# Patient Record
Sex: FEMALE | Race: WHITE | NOT HISPANIC OR LATINO | Employment: UNEMPLOYED | URBAN - METROPOLITAN AREA
[De-identification: names, ages, dates, MRNs, and addresses within clinical notes are randomized per-mention and may not be internally consistent; named-entity substitution may affect disease eponyms.]

---

## 2017-01-05 ENCOUNTER — APPOINTMENT (OUTPATIENT)
Dept: GENERAL RADIOLOGY | Facility: HOSPITAL | Age: 82
End: 2017-01-05

## 2017-01-05 ENCOUNTER — HOSPITAL ENCOUNTER (EMERGENCY)
Facility: HOSPITAL | Age: 82
Discharge: HOME | End: 2017-01-05
Attending: EMERGENCY MEDICINE | Admitting: EMERGENCY MEDICINE

## 2017-01-05 VITALS
SYSTOLIC BLOOD PRESSURE: 173 MMHG | BODY MASS INDEX: 31.53 KG/M2 | HEART RATE: 76 BPM | WEIGHT: 167 LBS | TEMPERATURE: 98.3 F | RESPIRATION RATE: 16 BRPM | DIASTOLIC BLOOD PRESSURE: 73 MMHG | HEIGHT: 61 IN | OXYGEN SATURATION: 98 %

## 2017-01-05 DIAGNOSIS — R53.1 GENERALIZED WEAKNESS: Primary | ICD-10-CM

## 2017-01-05 LAB
ALBUMIN SERPL-MCNC: 3.4 G/DL (ref 3.5–5.2)
ALBUMIN/GLOB SERPL: 1.1 G/DL
ALP SERPL-CCNC: 100 U/L (ref 39–117)
ALT SERPL W P-5'-P-CCNC: 16 U/L (ref 1–33)
ANION GAP SERPL CALCULATED.3IONS-SCNC: 13.8 MMOL/L
APTT PPP: 23 SECONDS (ref 22.7–35.4)
AST SERPL-CCNC: 13 U/L (ref 1–32)
BASOPHILS # BLD AUTO: 0.02 10*3/MM3 (ref 0–0.2)
BASOPHILS NFR BLD AUTO: 0.2 % (ref 0–1.5)
BILIRUB SERPL-MCNC: 0.4 MG/DL (ref 0.1–1.2)
BILIRUB UR QL STRIP: NEGATIVE
BUN BLD-MCNC: 37 MG/DL (ref 8–23)
BUN/CREAT SERPL: 22.8 (ref 7–25)
CALCIUM SPEC-SCNC: 9.2 MG/DL (ref 8.2–9.6)
CHLORIDE SERPL-SCNC: 102 MMOL/L (ref 98–107)
CLARITY UR: CLEAR
CO2 SERPL-SCNC: 23.2 MMOL/L (ref 22–29)
COLOR UR: YELLOW
CREAT BLD-MCNC: 1.62 MG/DL (ref 0.57–1)
DEPRECATED RDW RBC AUTO: 44 FL (ref 37–54)
EOSINOPHIL # BLD AUTO: 0.04 10*3/MM3 (ref 0–0.7)
EOSINOPHIL NFR BLD AUTO: 0.4 % (ref 0.3–6.2)
ERYTHROCYTE [DISTWIDTH] IN BLOOD BY AUTOMATED COUNT: 13.5 % (ref 11.7–13)
GFR SERPL CREATININE-BSD FRML MDRD: 30 ML/MIN/1.73
GLOBULIN UR ELPH-MCNC: 3 GM/DL
GLUCOSE BLD-MCNC: 105 MG/DL (ref 65–99)
GLUCOSE UR STRIP-MCNC: NEGATIVE MG/DL
HCT VFR BLD AUTO: 40.7 % (ref 35.6–45.5)
HGB BLD-MCNC: 13.1 G/DL (ref 11.9–15.5)
HGB UR QL STRIP.AUTO: NEGATIVE
IMM GRANULOCYTES # BLD: 0.2 10*3/MM3 (ref 0–0.03)
IMM GRANULOCYTES NFR BLD: 1.8 % (ref 0–0.5)
INR PPP: 1.02 (ref 0.9–1.1)
KETONES UR QL STRIP: NEGATIVE
LEUKOCYTE ESTERASE UR QL STRIP.AUTO: NEGATIVE
LYMPHOCYTES # BLD AUTO: 1.53 10*3/MM3 (ref 0.9–4.8)
LYMPHOCYTES NFR BLD AUTO: 13.5 % (ref 19.6–45.3)
MCH RBC QN AUTO: 28.6 PG (ref 26.9–32)
MCHC RBC AUTO-ENTMCNC: 32.2 G/DL (ref 32.4–36.3)
MCV RBC AUTO: 88.9 FL (ref 80.5–98.2)
MONOCYTES # BLD AUTO: 1.06 10*3/MM3 (ref 0.2–1.2)
MONOCYTES NFR BLD AUTO: 9.4 % (ref 5–12)
NEUTROPHILS # BLD AUTO: 8.48 10*3/MM3 (ref 1.9–8.1)
NEUTROPHILS NFR BLD AUTO: 74.7 % (ref 42.7–76)
NITRITE UR QL STRIP: NEGATIVE
PH UR STRIP.AUTO: <=5 [PH] (ref 5–8)
PLATELET # BLD AUTO: 300 10*3/MM3 (ref 140–500)
PMV BLD AUTO: 9.7 FL (ref 6–12)
POTASSIUM BLD-SCNC: 5.2 MMOL/L (ref 3.5–5.2)
PROT SERPL-MCNC: 6.4 G/DL (ref 6–8.5)
PROT UR QL STRIP: NEGATIVE
PROTHROMBIN TIME: 13 SECONDS (ref 11.7–14.2)
RBC # BLD AUTO: 4.58 10*6/MM3 (ref 3.9–5.2)
SODIUM BLD-SCNC: 139 MMOL/L (ref 136–145)
SP GR UR STRIP: 1.01 (ref 1–1.03)
TROPONIN T SERPL-MCNC: <0.01 NG/ML (ref 0–0.03)
UROBILINOGEN UR QL STRIP: NORMAL
WBC NRBC COR # BLD: 11.33 10*3/MM3 (ref 4.5–10.7)

## 2017-01-05 PROCEDURE — 80053 COMPREHEN METABOLIC PANEL: CPT | Performed by: EMERGENCY MEDICINE

## 2017-01-05 PROCEDURE — 81003 URINALYSIS AUTO W/O SCOPE: CPT | Performed by: EMERGENCY MEDICINE

## 2017-01-05 PROCEDURE — 85730 THROMBOPLASTIN TIME PARTIAL: CPT | Performed by: EMERGENCY MEDICINE

## 2017-01-05 PROCEDURE — 99284 EMERGENCY DEPT VISIT MOD MDM: CPT

## 2017-01-05 PROCEDURE — 93005 ELECTROCARDIOGRAM TRACING: CPT | Performed by: EMERGENCY MEDICINE

## 2017-01-05 PROCEDURE — 85025 COMPLETE CBC W/AUTO DIFF WBC: CPT | Performed by: EMERGENCY MEDICINE

## 2017-01-05 PROCEDURE — 84484 ASSAY OF TROPONIN QUANT: CPT | Performed by: EMERGENCY MEDICINE

## 2017-01-05 PROCEDURE — 85610 PROTHROMBIN TIME: CPT | Performed by: EMERGENCY MEDICINE

## 2017-01-05 PROCEDURE — 96360 HYDRATION IV INFUSION INIT: CPT

## 2017-01-05 PROCEDURE — 71020 HC CHEST PA AND LATERAL: CPT

## 2017-01-05 PROCEDURE — 93010 ELECTROCARDIOGRAM REPORT: CPT | Performed by: INTERNAL MEDICINE

## 2017-01-05 RX ORDER — SODIUM CHLORIDE 0.9 % (FLUSH) 0.9 %
10 SYRINGE (ML) INJECTION AS NEEDED
Status: DISCONTINUED | OUTPATIENT
Start: 2017-01-05 | End: 2017-01-05 | Stop reason: HOSPADM

## 2017-01-05 RX ADMIN — SODIUM CHLORIDE 500 ML: 9 INJECTION, SOLUTION INTRAVENOUS at 05:10

## 2017-01-05 NOTE — ED PROVIDER NOTES
" EMERGENCY DEPARTMENT ENCOUNTER    CHIEF COMPLAINT  Chief Complaint: Generalized Weakness  History given by: Patient  History limited by:   Room Number: 18/18  PMD: April DCarlos Robert MD  Nephrologist: Dr. Keller    HPI:  Pt is a 89 y.o. female who presents complaining of generalized weakness that onset last night. Pt reports being unable to get off toilet after using restroom due to weakness. She has had episodes of weakness in the past which have become more frequent. Pt has been taking Prednisone due to her RA, but recently stopped due to the weakness. She denies any CP, SOA, N/V/D, fever    Duration: 1 day  Onset: sudden  Timing: constant  Location: generalized  Radiation: none  Quality: \"weak\"  Intensity/Severity: moderate  Progression: worsening  Associated Symptoms: none  Aggravating Factors: movement  Alleviating Factors: none  Previous Episodes: previous episodes of weakness  Treatment before arrival: none    PAST MEDICAL HISTORY  Active Ambulatory Problems     Diagnosis Date Noted   • No Active Ambulatory Problems     Resolved Ambulatory Problems     Diagnosis Date Noted   • No Resolved Ambulatory Problems     Past Medical History   Diagnosis Date   • Foot pain, bilateral    • Glaucoma    • Gout    • Hypertension    • Hypothyroid 09/1999   • IBS (irritable bowel syndrome)    • IGT (impaired glucose tolerance)    • Malaise and fatigue    • Medication management    • Melanoma 1979   • OA (osteoarthritis)    • Osteopenia 09/1999   • Psoriasis    • Renal failure    • Rosacea        PAST SURGICAL HISTORY  Past Surgical History   Procedure Laterality Date   • Foot surgery       mauri toe surgery   • Other surgical history       Melanoma Excision: Left leg       FAMILY HISTORY  Family History   Problem Relation Age of Onset   • Heart attack Mother    • Kidney failure Father    • Heart disease Sister    • Leukemia Brother    • Heart disease Sister    • Heart disease Sister        SOCIAL HISTORY  Social History "     Social History   • Marital status:      Spouse name: N/A   • Number of children: N/A   • Years of education: N/A     Occupational History   • Not on file.     Social History Main Topics   • Smoking status: Never Smoker   • Smokeless tobacco: Not on file   • Alcohol use Not on file   • Drug use: Not on file   • Sexual activity: Not on file     Other Topics Concern   • Not on file     Social History Narrative       ALLERGIES  Cefdinir; Doxycycline monohydrate; and Allopurinol    REVIEW OF SYSTEMS  Review of Systems   Constitutional: Negative for fever.   HENT: Negative for sore throat.    Eyes: Negative.    Respiratory: Negative for cough and shortness of breath.    Cardiovascular: Negative for chest pain.   Gastrointestinal: Negative for abdominal pain, diarrhea and vomiting.   Genitourinary: Negative for dysuria.   Musculoskeletal: Negative for neck pain.   Skin: Negative for rash.   Allergic/Immunologic: Negative.    Neurological: Positive for weakness (generalized). Negative for numbness and headaches.   Hematological: Negative.    Psychiatric/Behavioral: Negative.    All other systems reviewed and are negative.      PHYSICAL EXAM  ED Triage Vitals   Temp Heart Rate Resp BP SpO2   01/05/17 0315 01/05/17 0315 01/05/17 0315 01/05/17 0315 01/05/17 0315   97.4 °F (36.3 °C) 80 16 152/87 100 %      Temp src Heart Rate Source Patient Position BP Location FiO2 (%)   01/05/17 0315 -- -- -- --   Tympanic           Physical Exam   Constitutional: She is oriented to person, place, and time and well-developed, well-nourished, and in no distress. No distress.   HENT:   Head: Normocephalic and atraumatic.   Eyes: EOM are normal. Pupils are equal, round, and reactive to light.   Neck: Normal range of motion. Neck supple.   Cardiovascular: Normal rate, regular rhythm and normal heart sounds.    Pulmonary/Chest: Effort normal and breath sounds normal. No respiratory distress.   Abdominal: Soft. There is no tenderness.  There is no rebound and no guarding.   Musculoskeletal: Normal range of motion. She exhibits no edema.   Neurological: She is alert and oriented to person, place, and time. She has normal sensation and normal strength.   Skin: Skin is warm and dry. No rash noted.   Psychiatric: Mood and affect normal.   Nursing note and vitals reviewed.      LAB RESULTS  Lab Results (last 24 hours)     Procedure Component Value Units Date/Time    CBC & Differential [[id number]] Collected:  01/05/17 0413    Specimen:  Blood Updated:  01/05/17 0426    Narrative:       The following orders were created for panel order CBC & Differential.  Procedure                               Abnormality         Status                     ---------                               -----------         ------                     CBC Auto Differential[[id number]]         Abnormal            Final result                 Please view results for these tests on the individual orders.    Comprehensive Metabolic Panel [[id number]]  (Abnormal) Collected:  01/05/17 0413    Specimen:  Blood Updated:  01/05/17 0445     Glucose 105 (H) mg/dL      BUN 37 (H) mg/dL      Creatinine 1.62 (H) mg/dL      Sodium 139 mmol/L      Potassium 5.2 mmol/L      Chloride 102 mmol/L      CO2 23.2 mmol/L      Calcium 9.2 mg/dL      Total Protein 6.4 g/dL      Albumin 3.40 (L) g/dL      ALT (SGPT) 16 U/L      AST (SGOT) 13 U/L      Alkaline Phosphatase 100 U/L      Total Bilirubin 0.4 mg/dL      eGFR Non African Amer 30 (L) mL/min/1.73      Globulin 3.0 gm/dL      A/G Ratio 1.1 g/dL      BUN/Creatinine Ratio 22.8      Anion Gap 13.8 mmol/L     Narrative:       The MDRD GFR formula is only valid for adults with stable renal function between ages 18 and 70.    Protime-INR [[id number]]  (Normal) Collected:  01/05/17 0413    Specimen:  Blood Updated:  01/05/17 0436     Protime 13.0 Seconds      INR 1.02     aPTT [[id number]]  (Normal) Collected:  01/05/17 0413    Specimen:  Blood Updated:  01/05/17  0436     PTT 23.0 seconds     Troponin [[id number]]  (Normal) Collected:  01/05/17 0413    Specimen:  Blood Updated:  01/05/17 0445     Troponin T <0.010 ng/mL     Narrative:       Troponin T Reference Ranges:  Less than 0.03 ng/mL:    Negative for AMI  0.03 to 0.09 ng/mL:      Indeterminant for AMI  Greater than 0.09 ng/mL: Positive for AMI    CBC Auto Differential [[id number]]  (Abnormal) Collected:  01/05/17 0413    Specimen:  Blood Updated:  01/05/17 0426     WBC 11.33 (H) 10*3/mm3      RBC 4.58 10*6/mm3      Hemoglobin 13.1 g/dL      Hematocrit 40.7 %      MCV 88.9 fL      MCH 28.6 pg      MCHC 32.2 (L) g/dL      RDW 13.5 (H) %      RDW-SD 44.0 fl      MPV 9.7 fL      Platelets 300 10*3/mm3      Neutrophil % 74.7 %      Lymphocyte % 13.5 (L) %      Monocyte % 9.4 %      Eosinophil % 0.4 %      Basophil % 0.2 %      Immature Grans % 1.8 (H) %      Neutrophils, Absolute 8.48 (H) 10*3/mm3      Lymphocytes, Absolute 1.53 10*3/mm3      Monocytes, Absolute 1.06 10*3/mm3      Eosinophils, Absolute 0.04 10*3/mm3      Basophils, Absolute 0.02 10*3/mm3      Immature Grans, Absolute 0.20 (H) 10*3/mm3     Urinalysis With / Culture If Indicated [[id number]]  (Normal) Collected:  01/05/17 0437    Specimen:  Urine from Urine, Catheter Updated:  01/05/17 0454     Color, UA Yellow      Appearance, UA Clear      pH, UA <=5.0      Specific Gravity, UA 1.013      Glucose, UA Negative      Ketones, UA Negative      Bilirubin, UA Negative      Blood, UA Negative      Protein, UA Negative      Leuk Esterase, UA Negative      Nitrite, UA Negative      Urobilinogen, UA 0.2 E.U./dL     Narrative:       Urine microscopic not indicated.          I ordered the above labs and reviewed the results    RADIOLOGY  XR Chest 2 View   Preliminary Result   No acute findings.                    I ordered the above noted radiological studies. Interpreted by radiologist. Reviewed by me in PACS.       PROCEDURES  Procedures  EKG           EKG time:  0337  Rhythm/Rate: NSR, 68BPM  P waves and ID: nml  QRS, axis: nml   ST and T waves: nml     Interpreted Contemporaneously by me, independently viewed  No prior EKG for comparison       PROGRESS AND CONSULTS  ED Course   5:22 AM  Rechecked with pt. Pt resting comfortably and in NAD. Informed pt of labs and imaging showing nothing remarkable. Discussed with pt about plan to discharge. Pt understands and agrees with plan. All concerns addressed.         MEDICAL DECISION MAKING    MDM       DIAGNOSIS  Final diagnoses:   Generalized weakness       DISPOSITION  DISCHARGE    Patient discharged in stable condition.    Reviewed implications of results, diagnosis, meds, responsibility to follow up, warning signs and symptoms of possible worsening, potential complications and reasons to return to ER.    Patient/Family voiced understanding of above instructions.    Discussed plan for discharge, as there is no emergent indication for admission.  Pt/family is agreeable and understands need for follow up and repeat testing.  Pt is aware that discharge does not mean that nothing is wrong but it indicates no emergency is present that requires admission and they must continue care with follow-up as given below or physician of their choice.     FOLLOW-UP  April D MD Jakob  [address] [id number] [phone]    Schedule an appointment as soon as possible for a visit           Medication List      Notice     No changes were made to your prescriptions during this visit.            Latest Documented Vital Signs:  As of 6:54 AM  BP- 134/84 HR- 68 Temp- 98.1 °F (36.7 °C) (Tympanic) O2 sat- 95%    --  Documentation assistance provided by mika Garcia for Dr. Dasilva.  Information recorded by the scribe was done at my direction and has been verified and validated by me.       Real Garcia  01/05/17 0529       Ziyad Dasilva MD  01/05/17 0691

## 2017-01-05 NOTE — ED NOTES
Upon discharge pt's daughter states her car is here but she is not able to assist her mother into the house as well as help her with ADL's once discharged. Pt offered/ agrees to wait for CCP nurse to arrive for further resources.     Luisa West RN  01/05/17 0690

## 2017-01-05 NOTE — PROGRESS NOTES
Discharge Planning Assessment  [facility]     Patient Name: Marianne Delgado  MRN: [id number]  Today's Date: 1/5/2017    Admit Date: 1/5/2017          Discharge Needs Assessment       01/05/17 1013    Living Environment    Living Arrangements house    Provides Primary Care For no one    Primary Care Provided By --   Tono Osei can help but is recovering from recent shoulder surgery.    Caregiving Concerns --   Pt with recent weakness since before Christmas and is wanting help at home with ADL's.    Unique Family Situation --   pt lives alone and has tono who is recovery from recent shoulder surgery so she can't be that much assistance either. Pt requesting in home personal care agency list and [facility] for PT services.    Able to Return to Prior Living Arrangements yes   Pt states she can go back home if she has help. She also states that after getting some PT at home she will evaluate to see if she will need short term rehab from there. Neadenike Daniela at  and is agreeable.    Living Arrangement Comments --   Pt lives alone and has experienced weakness since Christmas. No medical reason for admit. Explained if she needed short term rehab her insurance would require a precert. Pt states she can go home if she has assistnce.     Discharge Needs Assessment    Concerns To Be Addressed basic needs concerns;discharge planning concerns   weakness    Concerns Comments --   pt states she has had weakness since Christmas and needs help at home or short term rehab.    Readmission Within The Last 30 Days no previous admission in last 30 days    Equipment Currently Used at Home walker, rolling    Equipment Needed After Discharge none    Discharge Facility/Level Of Care Needs --   Pt is requesting HH    Transportation Available car    Discharge Disposition --   Pt and Tono are agreeable to go home with in home personal care agency list and arrange HH for PT through her PCP Dr. Robert. I spoke with Jazmyn mabry MD's  office and she is sent the MD a note to call with HH order so I can fax to [facility].    Discharge Contact Information if Applicable --   Jessica Farnazfloyd, [phone].    Discharge Planning Comments --   Await return call from Dr. Osei for a  order for PT.              Discharge Plan       01/05/17 1023    Case Management/Social Work Plan    Plan --   Home with in home personal care agency list and HH.    Patient/Family In Agreement With Plan yes   Jessica Osei at  and is aware.    Final Note    Final Note --   Pt and floyd are agreeable to dc home with in home personal care agency list and arranging [facility] for PT.        Discharge Placement     No information found                Demographic Summary     None            Functional Status     None            Psychosocial     None            Abuse/Neglect     None            Legal     None            Substance Abuse     None            Patient Forms     None          Susy Best, DEVON

## 2017-01-05 NOTE — ED NOTES
Per ems pt has been weakness has been going on since before law and has gotten worse over the past few days.      Hilda Frausto, DEVON  01/05/17 9001

## 2017-01-06 ENCOUNTER — DOCUMENTATION (OUTPATIENT)
Dept: SOCIAL WORK | Facility: HOSPITAL | Age: 82
End: 2017-01-06

## 2017-01-06 NOTE — PROGRESS NOTES
F/u phone call; Spoke w/floyd Osei (POA) who advises has talked w/[facility] and will be following pt for PT. No further needs at this time. Katay Jain RN

## 2019-09-11 ENCOUNTER — OFFICE VISIT (OUTPATIENT)
Dept: FAMILY MEDICINE CLINIC | Facility: CLINIC | Age: 84
End: 2019-09-11

## 2019-09-11 VITALS
HEART RATE: 66 BPM | OXYGEN SATURATION: 98 % | SYSTOLIC BLOOD PRESSURE: 140 MMHG | TEMPERATURE: 97.5 F | WEIGHT: 190 LBS | HEIGHT: 61 IN | DIASTOLIC BLOOD PRESSURE: 74 MMHG | BODY MASS INDEX: 35.87 KG/M2

## 2019-09-11 DIAGNOSIS — L71.9 ROSACEA: ICD-10-CM

## 2019-09-11 DIAGNOSIS — I10 ESSENTIAL HYPERTENSION: ICD-10-CM

## 2019-09-11 DIAGNOSIS — E03.9 ACQUIRED HYPOTHYROIDISM: Primary | ICD-10-CM

## 2019-09-11 DIAGNOSIS — L40.9 PSORIASIS: ICD-10-CM

## 2019-09-11 DIAGNOSIS — B37.31 CANDIDIASIS OF GENITALIA IN FEMALE: ICD-10-CM

## 2019-09-11 DIAGNOSIS — R53.81 DEBILITY: ICD-10-CM

## 2019-09-11 DIAGNOSIS — M1A.0790 CHRONIC GOUT OF ANKLE, UNSPECIFIED CAUSE, UNSPECIFIED LATERALITY: ICD-10-CM

## 2019-09-11 PROCEDURE — 99214 OFFICE O/P EST MOD 30 MIN: CPT | Performed by: FAMILY MEDICINE

## 2019-09-11 RX ORDER — CLOTRIMAZOLE 1 %
CREAM (GRAM) TOPICAL 2 TIMES DAILY
Qty: 113 G | Refills: 5 | Status: SHIPPED | OUTPATIENT
Start: 2019-09-11 | End: 2021-02-17 | Stop reason: HOSPADM

## 2019-09-11 RX ORDER — IRBESARTAN 150 MG/1
150 TABLET ORAL NIGHTLY
COMMUNITY
End: 2019-09-11

## 2019-09-11 RX ORDER — FLUCONAZOLE 150 MG/1
150 TABLET ORAL DAILY
Qty: 7 TABLET | Refills: 0 | Status: SHIPPED | OUTPATIENT
Start: 2019-09-11 | End: 2019-12-11 | Stop reason: SDDI

## 2019-09-11 RX ORDER — AMLODIPINE BESYLATE 10 MG/1
10 TABLET ORAL DAILY
COMMUNITY
End: 2020-03-11 | Stop reason: SDUPTHER

## 2019-09-11 RX ORDER — METOPROLOL SUCCINATE 25 MG/1
25 TABLET, EXTENDED RELEASE ORAL DAILY
COMMUNITY
End: 2019-12-11 | Stop reason: SDDI

## 2019-09-11 RX ORDER — LEVOTHYROXINE SODIUM 0.12 MG/1
125 TABLET ORAL DAILY
COMMUNITY
End: 2020-01-27 | Stop reason: SDUPTHER

## 2019-09-11 RX ORDER — NYSTATIN 100000 [USP'U]/G
POWDER TOPICAL 4 TIMES DAILY
COMMUNITY
End: 2021-02-17 | Stop reason: HOSPADM

## 2019-09-11 NOTE — PROGRESS NOTES
Subjective   Marianne Delgado is a 89 y.o. female.     Chief Complaint   Patient presents with   • Rash     Back and stomach x 1 week        History of Present Illness   Rash- on torso, went to [facility] and was given powder and a pill for fungal infection and it is some better but not resolved. IS extensive and very itchy. Also has a high Cr but this is stable for her.   htn- doing well on meds  Hypothyroidism- doing well on meds and due labs  Gout- no issues, on meds  Psoriasis/rosacea- stable on meds  Having poor balance and no falls but wants to try PT again as this helped in the past. Cannot leave the house without a walker and someone to drive her.     The following portions of the patient's history were reviewed and updated as appropriate: allergies, current medications, past family history, past medical history, past social history, past surgical history and problem list.    Past Medical History:   Diagnosis Date   • Acute sinusitis    • Acute upper respiratory infection    • Arthritis    • CKD (chronic kidney disease)    • Debility    • Fatigue    • Foot pain, bilateral    • Glaucoma    • Gout    • Hematuria    • High blood pressure    • Hypertension    • Hypothyroid 09/1999   • Hypothyroidism    • IBS (irritable bowel syndrome)    • IGT (impaired glucose tolerance)    • Malaise and fatigue    • Medication management    • Melanoma (CMS/HCC) 1979    left leg   • OA (osteoarthritis)    • Osteopenia 09/1999   • Painful joint    • Psoriasis    • Renal failure    • Rosacea    • Vitamin D deficiency        Past Surgical History:   Procedure Laterality Date   • CATARACT EXTRACTION     • FOOT SURGERY      mauri toe surgery   • OTHER SURGICAL HISTORY      Melanoma Excision: Left leg       Family History   Problem Relation Age of Onset   • Heart attack Mother    • Kidney failure Father    • Heart disease Sister    • Leukemia Brother    • Heart disease Sister    • Heart disease Sister    • Heart disease Other         FH  "in females b/f 65 and males b/f 55       Social History     Socioeconomic History   • Marital status:      Spouse name: Not on file   • Number of children: Not on file   • Years of education: Not on file   • Highest education level: Not on file   Tobacco Use   • Smoking status: Never Smoker   • Smokeless tobacco: Never Used   Substance and Sexual Activity   • Alcohol use: Yes   • Drug use: No       Review of Systems   Respiratory: Negative for shortness of breath.    Cardiovascular: Negative for chest pain.       Objective   Visit Vitals  /74   Pulse 66   Temp 97.5 °F (36.4 °C) (Temporal)   Ht 154.9 cm (61\")   Wt 86.2 kg (190 lb)   SpO2 98%   BMI 35.90 kg/m²     Body mass index is 35.9 kg/m².  Physical Exam   Constitutional: She is oriented to person, place, and time. She appears well-developed and well-nourished.   Cardiovascular: Normal rate, regular rhythm, normal heart sounds and intact distal pulses.   Pulmonary/Chest: Effort normal and breath sounds normal.   Musculoskeletal: Normal range of motion. She exhibits no edema.   Using walker   Neurological: She is alert and oriented to person, place, and time.   Skin: Skin is warm and dry.   Psychiatric: She has a normal mood and affect. Her behavior is normal.         Assessment/Plan   Marianne was seen today for rash.    Diagnoses and all orders for this visit:    Acquired hypothyroidism  -     TSH Rfx On Abnormal To Free T4    Essential hypertension  -     Lipid Panel  -     Comprehensive Metabolic Panel    Rosacea    Psoriasis    Chronic gout of ankle, unspecified cause, unspecified laterality  -     Uric Acid    Debility  -     Ambulatory Referral to Physical Therapy Evaluate and treat    Candidiasis of genitalia in female  -     fluconazole (DIFLUCAN) 150 MG tablet; Take 1 tablet by mouth Daily.  -     clotrimazole (LOTRIMIN) 1 % cream; Apply  topically to the appropriate area as directed 2 (Two) Times a Day.             cont meds, f/u in 6 months " and will get medicare wellness exam.

## 2019-09-12 LAB
ALBUMIN SERPL-MCNC: 4.5 G/DL (ref 3.5–5.2)
ALBUMIN/GLOB SERPL: 2.1 G/DL
ALP SERPL-CCNC: 111 U/L (ref 39–117)
ALT SERPL-CCNC: 10 U/L (ref 1–33)
AST SERPL-CCNC: 16 U/L (ref 1–32)
BILIRUB SERPL-MCNC: 0.4 MG/DL (ref 0.2–1.2)
BUN SERPL-MCNC: 35 MG/DL (ref 8–23)
BUN/CREAT SERPL: 17.9 (ref 7–25)
CALCIUM SERPL-MCNC: 9.8 MG/DL (ref 8.2–9.6)
CHLORIDE SERPL-SCNC: 104 MMOL/L (ref 98–107)
CHOLEST SERPL-MCNC: 181 MG/DL (ref 0–200)
CO2 SERPL-SCNC: 21.9 MMOL/L (ref 22–29)
CREAT SERPL-MCNC: 1.95 MG/DL (ref 0.57–1)
GLOBULIN SER CALC-MCNC: 2.1 GM/DL
GLUCOSE SERPL-MCNC: 91 MG/DL (ref 65–99)
HDLC SERPL-MCNC: 69 MG/DL (ref 40–60)
LDLC SERPL CALC-MCNC: 97 MG/DL (ref 0–100)
POTASSIUM SERPL-SCNC: 4.9 MMOL/L (ref 3.5–5.2)
PROT SERPL-MCNC: 6.6 G/DL (ref 6–8.5)
SODIUM SERPL-SCNC: 143 MMOL/L (ref 136–145)
TRIGL SERPL-MCNC: 73 MG/DL (ref 0–150)
TSH SERPL DL<=0.005 MIU/L-ACNC: 1.6 UIU/ML (ref 0.27–4.2)
URATE SERPL-MCNC: 7.3 MG/DL (ref 2.4–5.7)
VLDLC SERPL CALC-MCNC: 14.6 MG/DL

## 2020-01-21 ENCOUNTER — TELEPHONE (OUTPATIENT)
Dept: FAMILY MEDICINE CLINIC | Facility: CLINIC | Age: 85
End: 2020-01-21

## 2020-01-21 DIAGNOSIS — I10 ESSENTIAL HYPERTENSION: Primary | ICD-10-CM

## 2020-01-21 RX ORDER — METOPROLOL SUCCINATE 25 MG/1
25 TABLET, EXTENDED RELEASE ORAL DAILY
Qty: 30 TABLET | Refills: 2 | Status: SHIPPED | OUTPATIENT
Start: 2020-01-21 | End: 2020-01-27 | Stop reason: SDUPTHER

## 2020-01-21 RX ORDER — AMLODIPINE BESYLATE 10 MG/1
10 TABLET ORAL DAILY
Qty: 30 TABLET | Refills: 2 | Status: SHIPPED | OUTPATIENT
Start: 2020-01-21 | End: 2020-03-11 | Stop reason: SDUPTHER

## 2020-01-21 NOTE — TELEPHONE ENCOUNTER
Patient is out of medicine. Pharmacy says they have sent requests. Can Amlodipine and Metoprolol be called in to [facility]? LOV 9-11-19

## 2020-01-27 DIAGNOSIS — I10 ESSENTIAL HYPERTENSION: ICD-10-CM

## 2020-01-27 RX ORDER — METOPROLOL SUCCINATE 25 MG/1
25 TABLET, EXTENDED RELEASE ORAL DAILY
Qty: 30 TABLET | Refills: 2 | Status: SHIPPED | OUTPATIENT
Start: 2020-01-27 | End: 2020-03-11 | Stop reason: SDUPTHER

## 2020-01-27 RX ORDER — LEVOTHYROXINE SODIUM 0.12 MG/1
125 TABLET ORAL DAILY
Qty: 90 TABLET | Refills: 0 | Status: SHIPPED | OUTPATIENT
Start: 2020-01-27 | End: 2020-09-15 | Stop reason: SDUPTHER

## 2020-03-11 ENCOUNTER — OFFICE VISIT (OUTPATIENT)
Dept: FAMILY MEDICINE CLINIC | Facility: CLINIC | Age: 85
End: 2020-03-11

## 2020-03-11 VITALS
TEMPERATURE: 97.4 F | SYSTOLIC BLOOD PRESSURE: 156 MMHG | DIASTOLIC BLOOD PRESSURE: 80 MMHG | HEIGHT: 61 IN | OXYGEN SATURATION: 98 % | WEIGHT: 191.8 LBS | BODY MASS INDEX: 36.21 KG/M2 | HEART RATE: 55 BPM

## 2020-03-11 DIAGNOSIS — L40.9 PSORIASIS: ICD-10-CM

## 2020-03-11 DIAGNOSIS — R53.81 DEBILITY: ICD-10-CM

## 2020-03-11 DIAGNOSIS — M1A.0790 CHRONIC GOUT OF ANKLE, UNSPECIFIED CAUSE, UNSPECIFIED LATERALITY: ICD-10-CM

## 2020-03-11 DIAGNOSIS — L71.9 ROSACEA: ICD-10-CM

## 2020-03-11 DIAGNOSIS — E03.9 ACQUIRED HYPOTHYROIDISM: ICD-10-CM

## 2020-03-11 DIAGNOSIS — E66.01 MORBIDLY OBESE (HCC): ICD-10-CM

## 2020-03-11 DIAGNOSIS — N18.9 CHRONIC KIDNEY DISEASE, UNSPECIFIED CKD STAGE: ICD-10-CM

## 2020-03-11 DIAGNOSIS — Z00.00 MEDICARE ANNUAL WELLNESS VISIT, SUBSEQUENT: Primary | ICD-10-CM

## 2020-03-11 DIAGNOSIS — M85.80 OSTEOPENIA, UNSPECIFIED LOCATION: ICD-10-CM

## 2020-03-11 DIAGNOSIS — I10 ESSENTIAL HYPERTENSION: ICD-10-CM

## 2020-03-11 PROCEDURE — 96160 PT-FOCUSED HLTH RISK ASSMT: CPT | Performed by: FAMILY MEDICINE

## 2020-03-11 PROCEDURE — G0439 PPPS, SUBSEQ VISIT: HCPCS | Performed by: FAMILY MEDICINE

## 2020-03-11 PROCEDURE — G0009 ADMIN PNEUMOCOCCAL VACCINE: HCPCS | Performed by: FAMILY MEDICINE

## 2020-03-11 PROCEDURE — 99213 OFFICE O/P EST LOW 20 MIN: CPT | Performed by: FAMILY MEDICINE

## 2020-03-11 PROCEDURE — 96372 THER/PROPH/DIAG INJ SC/IM: CPT | Performed by: FAMILY MEDICINE

## 2020-03-11 PROCEDURE — 90732 PPSV23 VACC 2 YRS+ SUBQ/IM: CPT | Performed by: FAMILY MEDICINE

## 2020-03-11 RX ORDER — INDAPAMIDE 1.25 MG/1
TABLET, FILM COATED ORAL
COMMUNITY
Start: 2020-02-14 | End: 2021-02-17 | Stop reason: HOSPADM

## 2020-03-11 RX ORDER — METOPROLOL SUCCINATE 25 MG/1
25 TABLET, EXTENDED RELEASE ORAL DAILY
Qty: 90 TABLET | Refills: 3 | Status: SHIPPED | OUTPATIENT
Start: 2020-03-11 | End: 2020-09-15 | Stop reason: SDUPTHER

## 2020-03-11 RX ORDER — CLOBETASOL PROPIONATE 0.5 MG/G
1 AEROSOL, FOAM TOPICAL 2 TIMES DAILY
Qty: 100 G | Refills: 11 | Status: SHIPPED | OUTPATIENT
Start: 2020-03-11 | End: 2020-09-15 | Stop reason: SDUPTHER

## 2020-03-11 RX ORDER — AMLODIPINE BESYLATE 10 MG/1
10 TABLET ORAL DAILY
Qty: 90 TABLET | Refills: 3 | Status: SHIPPED | OUTPATIENT
Start: 2020-03-11 | End: 2020-09-15 | Stop reason: SDUPTHER

## 2020-03-11 NOTE — PROGRESS NOTES
Subsequent Medicare Wellness Visit   The ABC's of the Annual Wellness Visit    Chief Complaint   Patient presents with   • Medicare Wellness-subsequent   • Fatigue       HPI:  Marianne Delgado, -1925, is a 89 y.o. female who presents for a Subsequent Medicare Wellness Visit.    htn- doing well on meds  Hypothyroidism- doing well on meds and due labs, having some fatigue. Sleeping well.   Gout- no issues, has had to stop medication due to side effect of rash  Psoriasis/rosacea- stable on meds  Osteopenia-  CKD-follows with renal, stable.  Needing rails to get in an out of bed. [facility]     Recent Hospitalizations:  No hospitalization(s) within the last year..    Current Medical Providers:  Patient Care Team:  Xenia Robert MD as PCP - General (Family Medicine)    Health Habits and Functional and Cognitive Screening and Depression Screening:  Functional & Cognitive Status 3/11/2020   Do you have difficulty preparing food and eating? Yes   Do you have difficulty bathing yourself, getting dressed or grooming yourself? No   Do you have difficulty using the toilet? No   Do you have difficulty moving around from place to place? Yes   Do you have trouble with steps or getting out of a bed or a chair? Yes   Current Diet Well Balanced Diet   Dental Exam Not up to date   Eye Exam Up to date   Exercise (times per week) 2 times per week   Current Exercise Activities Include (No Data)   Do you need help using the phone?  No   Are you deaf or do you have serious difficulty hearing?  Yes   Do you need help with transportation? Yes   Do you need help shopping? Yes   Do you need help preparing meals?  Yes   Do you need help with housework?  Yes   Do you need help with laundry? No   Do you need help taking your medications? No   Do you need help managing money? No   Do you ever drive or ride in a car without wearing a seat belt? No   Have you felt unusual stress, anger or loneliness in the  last month? No   Who do you live with? (No Data)   If you need help, do you have trouble finding someone available to you? No   Have you been bothered in the last four weeks by sexual problems? No   Do you have difficulty concentrating, remembering or making decisions? No       Compared to one year ago, the patient feels her physical health is the same and her mental health is the same.    Depression Screen:  PHQ-2/PHQ-9 Depression Screening 9/11/2019   Little interest or pleasure in doing things 0   Feeling down, depressed, or hopeless 0   Total Score 0         Past Medical/Family/Social History:  The following portions of the patient's history were reviewed and updated as appropriate: allergies, current medications, past family history, past medical history, past social history, past surgical history and problem list.    Allergies   Allergen Reactions   • Cefdinir Nausea Only   • Doxycycline Monohydrate Nausea Only   • Allopurinol Rash     Psoriasis         Current Outpatient Medications:   •  amLODIPine (NORVASC) 10 MG tablet, Take 1 tablet by mouth Daily., Disp: 90 tablet, Rfl: 3  •  clotrimazole (LOTRIMIN) 1 % cream, Apply  topically to the appropriate area as directed 2 (Two) Times a Day., Disp: 113 g, Rfl: 5  •  indapamide (LOZOL) 1.25 MG tablet, , Disp: , Rfl:   •  levothyroxine (SYNTHROID, LEVOTHROID) 125 MCG tablet, Take 1 tablet by mouth Daily., Disp: 90 tablet, Rfl: 0  •  metoprolol succinate XL (TOPROL XL) 25 MG 24 hr tablet, Take 1 tablet by mouth Daily., Disp: 90 tablet, Rfl: 3  •  metroNIDAZOLE (METROCREAM) 0.75 % cream, Apply 1 application topically 2 (two) times a day as needed., Disp: , Rfl:   •  Clobetasol Propionate Emulsion 0.05 % topical foam, Apply 1 application topically to the appropriate area as directed 2 (Two) Times a Day., Disp: 100 g, Rfl: 11  •  nystatin (nystatin) 625201 UNIT/GM powder, Apply  topically to the appropriate area as directed 4 (Four) Times a Day., Disp: , Rfl:  "    Aspirin use counseling: Does not need ASA but is currently taking (advised patient that ASA is not indicated and patient chooses to stop it)    Current medication list contains no high risk medications.  No harmful drug interactions have been identified.     Family History   Problem Relation Age of Onset   • Heart attack Mother    • Kidney failure Father    • Heart disease Sister    • Leukemia Brother    • Heart disease Sister    • Heart disease Sister    • Heart disease Other         FH in females b/f 65 and males b/f 55       Social History     Tobacco Use   • Smoking status: Never Smoker   • Smokeless tobacco: Never Used   Substance Use Topics   • Alcohol use: Yes       Past Surgical History:   Procedure Laterality Date   • CATARACT EXTRACTION     • FOOT SURGERY      mauri toe surgery   • OTHER SURGICAL HISTORY      Melanoma Excision: Left leg       Patient Active Problem List   Diagnosis   • Arthritis   • CKD (chronic kidney disease)   • Debility   • Foot pain, bilateral   • Glaucoma   • Gout   • Hematuria   • High blood pressure   • Hypothyroid   • Osteopenia   • Psoriasis   • Rosacea   • Vitamin D deficiency   • Medicare annual wellness visit, subsequent   • Morbidly obese (CMS/[facility])       Review of Systems   Respiratory: Negative for shortness of breath.    Cardiovascular: Negative for chest pain.       Objective     Vitals:    03/11/20 1020   BP: 156/80   Pulse: 55   Temp: 97.4 °F (36.3 °C)   TempSrc: Temporal   SpO2: 98%   Weight: 87 kg (191 lb 12.8 oz)   Height: 154.9 cm (61\")       Patient's Body mass index is 36.24 kg/m². BMI is above normal parameters. Recommendations include: exercise counseling.      No exam data present    The patient has no evidence of cognitve impairment.  The patient has no evidence of cognitive impairment. 3/3 items were correctly remembered at 5 minutes.     Physical Exam   Constitutional: She is oriented to person, place, and time. She appears well-developed and " well-nourished.   Cardiovascular: Normal rate, regular rhythm, normal heart sounds and intact distal pulses.   Pulmonary/Chest: Effort normal and breath sounds normal.   Musculoskeletal: Normal range of motion. She exhibits no edema.   Using waker   Neurological: She is alert and oriented to person, place, and time.   Skin: Skin is warm and dry.   Psychiatric: She has a normal mood and affect. Her behavior is normal.       Recent Lab Results:  Lab Results   Component Value Date    GLU 91 09/11/2019     Lab Results   Component Value Date    TRIG 73 09/11/2019    HDL 69 (H) 09/11/2019    VLDL 14.6 09/11/2019       Assessment/Plan   Age-appropriate Screening Schedule:  Refer to the list below for future screening recommendations based on patient's age, sex and/or medical conditions.      Health Maintenance   Topic Date Due   • TDAP/TD VACCINES (1 - Tdap) 07/14/1936   • ZOSTER VACCINE (1 of 2) 07/14/1975   • INFLUENZA VACCINE  08/01/2019   • DXA SCAN  Discontinued       Medicare Risks and Personalized Health Plan:  Advance Directive Discussion      CMS-Preventive Services Quick Reference  Medicare Preventive Services Addressed:  Annual Wellness Visit (AWV)    Advance Care Planning:  ACP discussion was held with the patient during this visit. Patient has an advance directive in EMR which is still valid.     Diagnoses and all orders for this visit:    1. Medicare annual wellness visit, subsequent (Primary)    2. Essential hypertension  -     Comprehensive Metabolic Panel  -     Lipid Panel  -     metoprolol succinate XL (TOPROL XL) 25 MG 24 hr tablet; Take 1 tablet by mouth Daily.  Dispense: 90 tablet; Refill: 3  -     amLODIPine (NORVASC) 10 MG tablet; Take 1 tablet by mouth Daily.  Dispense: 90 tablet; Refill: 3    3. Acquired hypothyroidism  -     TSH Rfx On Abnormal To Free T4    4. Chronic gout of ankle, unspecified cause, unspecified laterality  -     Uric Acid    5. Chronic kidney disease, unspecified CKD  stage    6. Psoriasis  -     Clobetasol Propionate Emulsion 0.05 % topical foam; Apply 1 application topically to the appropriate area as directed 2 (Two) Times a Day.  Dispense: 100 g; Refill: 11    7. Rosacea  -     Clobetasol Propionate Emulsion 0.05 % topical foam; Apply 1 application topically to the appropriate area as directed 2 (Two) Times a Day.  Dispense: 100 g; Refill: 11    8. Osteopenia, unspecified location    9. Debility    10. Morbidly obese (CMS/HCC)    Other orders  -     Pneumococcal Polysaccharide Vaccine 23-Valent Greater Than or Equal To 3yo Subcutaneous / IM        An After Visit Summary and PPPS with all of these plans were given to the patient.      Follow Up:  Return in about 6 months (around 9/11/2020) for Recheck.                Addendum patient requires a hospital bed due to the need of frequent positioning, due to immobility due to arthritis           Dr Eason

## 2020-03-12 LAB
ALBUMIN SERPL-MCNC: 4.1 G/DL (ref 3.5–5.2)
ALBUMIN/GLOB SERPL: 1.7 G/DL
ALP SERPL-CCNC: 119 U/L (ref 39–117)
ALT SERPL-CCNC: 7 U/L (ref 1–33)
AST SERPL-CCNC: 9 U/L (ref 1–32)
BILIRUB SERPL-MCNC: 0.5 MG/DL (ref 0.2–1.2)
BUN SERPL-MCNC: 40 MG/DL (ref 8–23)
BUN/CREAT SERPL: 17.1 (ref 7–25)
CALCIUM SERPL-MCNC: 9.6 MG/DL (ref 8.2–9.6)
CHLORIDE SERPL-SCNC: 106 MMOL/L (ref 98–107)
CHOLEST SERPL-MCNC: 188 MG/DL (ref 0–200)
CO2 SERPL-SCNC: 21.3 MMOL/L (ref 22–29)
CREAT SERPL-MCNC: 2.34 MG/DL (ref 0.57–1)
GLOBULIN SER CALC-MCNC: 2.4 GM/DL
GLUCOSE SERPL-MCNC: 100 MG/DL (ref 65–99)
HDLC SERPL-MCNC: 63 MG/DL (ref 40–60)
LDLC SERPL CALC-MCNC: 106 MG/DL (ref 0–100)
POTASSIUM SERPL-SCNC: 4.7 MMOL/L (ref 3.5–5.2)
PROT SERPL-MCNC: 6.5 G/DL (ref 6–8.5)
SODIUM SERPL-SCNC: 140 MMOL/L (ref 136–145)
TRIGL SERPL-MCNC: 94 MG/DL (ref 0–150)
TSH SERPL DL<=0.005 MIU/L-ACNC: 0.85 UIU/ML (ref 0.27–4.2)
URATE SERPL-MCNC: 7.6 MG/DL (ref 2.4–5.7)
VLDLC SERPL CALC-MCNC: 18.8 MG/DL

## 2020-03-31 ENCOUNTER — TELEPHONE (OUTPATIENT)
Dept: FAMILY MEDICINE CLINIC | Facility: CLINIC | Age: 85
End: 2020-03-31

## 2020-04-07 ENCOUNTER — TELEPHONE (OUTPATIENT)
Dept: FAMILY MEDICINE CLINIC | Facility: CLINIC | Age: 85
End: 2020-04-07

## 2020-09-15 ENCOUNTER — OFFICE VISIT (OUTPATIENT)
Dept: FAMILY MEDICINE CLINIC | Facility: CLINIC | Age: 85
End: 2020-09-15

## 2020-09-15 VITALS
SYSTOLIC BLOOD PRESSURE: 140 MMHG | DIASTOLIC BLOOD PRESSURE: 88 MMHG | OXYGEN SATURATION: 96 % | BODY MASS INDEX: 36.32 KG/M2 | HEIGHT: 61 IN | HEART RATE: 68 BPM | WEIGHT: 192.4 LBS | TEMPERATURE: 97.1 F

## 2020-09-15 DIAGNOSIS — L40.9 PSORIASIS: ICD-10-CM

## 2020-09-15 DIAGNOSIS — L71.9 ROSACEA: ICD-10-CM

## 2020-09-15 DIAGNOSIS — I10 ESSENTIAL HYPERTENSION: Primary | ICD-10-CM

## 2020-09-15 DIAGNOSIS — E03.9 ACQUIRED HYPOTHYROIDISM: ICD-10-CM

## 2020-09-15 DIAGNOSIS — N18.9 CHRONIC KIDNEY DISEASE, UNSPECIFIED CKD STAGE: ICD-10-CM

## 2020-09-15 DIAGNOSIS — M85.80 OSTEOPENIA, UNSPECIFIED LOCATION: ICD-10-CM

## 2020-09-15 DIAGNOSIS — M10.071 ACUTE IDIOPATHIC GOUT OF RIGHT ANKLE: ICD-10-CM

## 2020-09-15 PROCEDURE — 99214 OFFICE O/P EST MOD 30 MIN: CPT | Performed by: FAMILY MEDICINE

## 2020-09-15 RX ORDER — CLOBETASOL PROPIONATE 0.5 MG/G
1 AEROSOL, FOAM TOPICAL 2 TIMES DAILY
Qty: 100 G | Refills: 11 | Status: SHIPPED | OUTPATIENT
Start: 2020-09-15 | End: 2021-02-17 | Stop reason: HOSPADM

## 2020-09-15 RX ORDER — DORZOLAMIDE HYDROCHLORIDE AND TIMOLOL MALEATE 20; 5 MG/ML; MG/ML
1 SOLUTION/ DROPS OPHTHALMIC 2 TIMES DAILY
Status: ON HOLD | COMMUNITY

## 2020-09-15 RX ORDER — METOPROLOL SUCCINATE 25 MG/1
25 TABLET, EXTENDED RELEASE ORAL DAILY
Qty: 90 TABLET | Refills: 3 | Status: ON HOLD | OUTPATIENT
Start: 2020-09-15 | End: 2021-02-17 | Stop reason: SDUPTHER

## 2020-09-15 RX ORDER — AMLODIPINE BESYLATE 10 MG/1
10 TABLET ORAL DAILY
Qty: 90 TABLET | Refills: 3 | Status: SHIPPED | OUTPATIENT
Start: 2020-09-15 | End: 2021-02-17 | Stop reason: HOSPADM

## 2020-09-15 RX ORDER — LEVOTHYROXINE SODIUM 0.12 MG/1
125 TABLET ORAL DAILY
Qty: 90 TABLET | Refills: 0 | Status: SHIPPED | OUTPATIENT
Start: 2020-09-15 | End: 2020-09-16 | Stop reason: SDUPTHER

## 2020-09-15 NOTE — PROGRESS NOTES
Subjective   Marianne Delgado is a 89 y.o. female.     Chief Complaint   Patient presents with   • Hypertension   • Hypothyroidism   • wants flu shot       History of Present Illness   htn- doing well on meds  Hypothyroidism- doing well on meds and due labs, having some fatigue. Sleeping well.   Gout- no issues, has had to stop medication due to side effect of rash  Psoriasis/rosacea- stable on meds  Osteopenia- pt no longer wants to follow this. Discussed risks.   CKD-follows with renal, stable.  Walking much better after PT    The following portions of the patient's history were reviewed and updated as appropriate: allergies, current medications, past family history, past medical history, past social history, past surgical history and problem list.    Past Medical History:   Diagnosis Date   • Acute sinusitis    • Acute upper respiratory infection    • Arthritis    • CKD (chronic kidney disease)    • Debility    • Fatigue    • Foot pain, bilateral    • Glaucoma    • Gout    • Hematuria    • High blood pressure    • Hypertension    • Hypothyroid 09/1999   • Hypothyroidism    • IBS (irritable bowel syndrome)    • IGT (impaired glucose tolerance)    • Malaise and fatigue    • Medication management    • Melanoma (CMS/HCC) 1979    left leg   • OA (osteoarthritis)    • Osteopenia 09/1999   • Painful joint    • Psoriasis    • Renal failure    • Rosacea    • Vitamin D deficiency        Past Surgical History:   Procedure Laterality Date   • CATARACT EXTRACTION     • FOOT SURGERY      mauri toe surgery   • OTHER SURGICAL HISTORY      Melanoma Excision: Left leg       Family History   Problem Relation Age of Onset   • Heart attack Mother    • Kidney failure Father    • Heart disease Sister    • Leukemia Brother    • Heart disease Sister    • Heart disease Sister    • Heart disease Other         FH in females b/f 65 and males b/f 55       Social History     Socioeconomic History   • Marital status:      Spouse name:  "Not on file   • Number of children: Not on file   • Years of education: Not on file   • Highest education level: Not on file   Tobacco Use   • Smoking status: Never Smoker   • Smokeless tobacco: Never Used   Substance and Sexual Activity   • Alcohol use: Yes     Alcohol/week: 2.0 standard drinks     Types: 1 Glasses of wine, 1 Shots of liquor per week     Comment: occasionally   • Drug use: No       Review of Systems   Constitutional: Negative for fever.   Respiratory: Negative for shortness of breath.        Objective   Visit Vitals  /88   Pulse 68   Temp 97.1 °F (36.2 °C) (Temporal)   Ht 154.9 cm (61\")   Wt 87.3 kg (192 lb 6.4 oz)   SpO2 96%   BMI 36.35 kg/m²     Body mass index is 36.35 kg/m².  Physical Exam  Constitutional:       Appearance: Normal appearance. She is well-developed.   Cardiovascular:      Rate and Rhythm: Normal rate and regular rhythm.      Heart sounds: Normal heart sounds.   Pulmonary:      Effort: Pulmonary effort is normal.      Breath sounds: Normal breath sounds.   Musculoskeletal: Normal range of motion.         General: No swelling.      Comments: Slow gait with walker   Skin:     General: Skin is warm and dry.   Neurological:      General: No focal deficit present.      Mental Status: She is alert and oriented to person, place, and time.   Psychiatric:         Mood and Affect: Mood normal.         Behavior: Behavior normal.           Assessment/Plan   Marianne was seen today for hypertension, hypothyroidism and wants flu shot.    Diagnoses and all orders for this visit:    Essential hypertension  -     amLODIPine (Norvasc) 10 MG tablet; Take 1 tablet by mouth Daily.  -     metoprolol succinate XL (Toprol XL) 25 MG 24 hr tablet; Take 1 tablet by mouth Daily.    Acquired hypothyroidism  -     Comprehensive Metabolic Panel  -     TSH Rfx On Abnormal To Free T4  -     levothyroxine (SYNTHROID, LEVOTHROID) 125 MCG tablet; Take 1 tablet by mouth Daily.    Osteopenia, unspecified " location    Rosacea  -     Clobetasol Propionate Emulsion 0.05 % topical foam; Apply 1 application topically to the appropriate area as directed 2 (Two) Times a Day.  -     metroNIDAZOLE (METROCREAM) 0.75 % cream; Apply 1 application topically to the appropriate area as directed 2 (Two) Times a Day As Needed (rash).    Psoriasis  -     Clobetasol Propionate Emulsion 0.05 % topical foam; Apply 1 application topically to the appropriate area as directed 2 (Two) Times a Day.    Chronic kidney disease, unspecified CKD stage    Acute idiopathic gout of right ankle        Cont meds, f/u in 6 months, stable.

## 2020-09-16 ENCOUNTER — TELEPHONE (OUTPATIENT)
Dept: FAMILY MEDICINE CLINIC | Facility: CLINIC | Age: 85
End: 2020-09-16

## 2020-09-16 DIAGNOSIS — E03.9 ACQUIRED HYPOTHYROIDISM: ICD-10-CM

## 2020-09-16 DIAGNOSIS — R74.8 ELEVATED ALKALINE PHOSPHATASE LEVEL: Primary | ICD-10-CM

## 2020-09-16 LAB
ALBUMIN SERPL-MCNC: 4.5 G/DL (ref 3.5–5.2)
ALBUMIN/GLOB SERPL: 2.4 G/DL
ALP SERPL-CCNC: 135 U/L (ref 39–117)
ALT SERPL-CCNC: 14 U/L (ref 1–33)
AST SERPL-CCNC: 12 U/L (ref 1–32)
BILIRUB SERPL-MCNC: 0.5 MG/DL (ref 0–1.2)
BUN SERPL-MCNC: 34 MG/DL (ref 8–23)
BUN/CREAT SERPL: 14.6 (ref 7–25)
CALCIUM SERPL-MCNC: 9.6 MG/DL (ref 8.2–9.6)
CHLORIDE SERPL-SCNC: 110 MMOL/L (ref 98–107)
CO2 SERPL-SCNC: 22.2 MMOL/L (ref 22–29)
CREAT SERPL-MCNC: 2.33 MG/DL (ref 0.57–1)
GLOBULIN SER CALC-MCNC: 1.9 GM/DL
GLUCOSE SERPL-MCNC: 91 MG/DL (ref 65–99)
POTASSIUM SERPL-SCNC: 4.5 MMOL/L (ref 3.5–5.2)
PROT SERPL-MCNC: 6.4 G/DL (ref 6–8.5)
SODIUM SERPL-SCNC: 144 MMOL/L (ref 136–145)
T4 FREE SERPL-MCNC: 1.39 NG/DL (ref 0.93–1.7)
TSH SERPL DL<=0.005 MIU/L-ACNC: 5.16 UIU/ML (ref 0.27–4.2)

## 2020-09-16 RX ORDER — LEVOTHYROXINE SODIUM 137 UG/1
137 TABLET ORAL DAILY
Qty: 90 TABLET | Refills: 1 | Status: SHIPPED | OUTPATIENT
Start: 2020-09-16 | End: 2021-02-17 | Stop reason: HOSPADM

## 2020-09-16 NOTE — TELEPHONE ENCOUNTER
----- Message from Xenia Robert MD sent at 9/16/2020 10:04 AM EDT -----  You are not getting quite enough thyroid hormone.  I increased your thyroid medicine and called it in to your local pharmacy.  Please follow-up in 3 months.  You have some high labs that could indicate a problem with either your liver or your bones.  Please make a lab only appointment and walk-in to get these repeated.

## 2020-09-21 ENCOUNTER — RESULTS ENCOUNTER (OUTPATIENT)
Dept: FAMILY MEDICINE CLINIC | Facility: CLINIC | Age: 85
End: 2020-09-21

## 2020-09-21 DIAGNOSIS — R74.8 ELEVATED ALKALINE PHOSPHATASE LEVEL: ICD-10-CM

## 2021-01-29 ENCOUNTER — HOSPITAL ENCOUNTER (INPATIENT)
Facility: HOSPITAL | Age: 86
LOS: 5 days | Discharge: NURSING, REHAB OR LONG-TERM CARE | End: 2021-02-03
Attending: EMERGENCY MEDICINE | Admitting: HOSPITALIST

## 2021-01-29 ENCOUNTER — APPOINTMENT (OUTPATIENT)
Dept: GENERAL RADIOLOGY | Facility: HOSPITAL | Age: 86
End: 2021-01-29

## 2021-01-29 ENCOUNTER — TELEPHONE (OUTPATIENT)
Dept: FAMILY MEDICINE CLINIC | Facility: CLINIC | Age: 86
End: 2021-01-29

## 2021-01-29 ENCOUNTER — APPOINTMENT (OUTPATIENT)
Dept: MRI IMAGING | Facility: HOSPITAL | Age: 86
End: 2021-01-29

## 2021-01-29 ENCOUNTER — APPOINTMENT (OUTPATIENT)
Dept: CT IMAGING | Facility: HOSPITAL | Age: 86
End: 2021-01-29

## 2021-01-29 DIAGNOSIS — I63.9 CEREBROVASCULAR ACCIDENT (CVA), UNSPECIFIED MECHANISM (HCC): Primary | ICD-10-CM

## 2021-01-29 DIAGNOSIS — N18.9 CHRONIC RENAL FAILURE, UNSPECIFIED CKD STAGE: ICD-10-CM

## 2021-01-29 DIAGNOSIS — I10 ESSENTIAL HYPERTENSION: ICD-10-CM

## 2021-01-29 DIAGNOSIS — R93.89 ABNORMAL CHEST X-RAY: ICD-10-CM

## 2021-01-29 PROBLEM — I16.0 HYPERTENSIVE URGENCY: Status: ACTIVE | Noted: 2021-01-29

## 2021-01-29 LAB
ABO GROUP BLD: NORMAL
ALBUMIN SERPL-MCNC: 3.6 G/DL (ref 3.5–5.2)
ALBUMIN/GLOB SERPL: 1.4 G/DL
ALP SERPL-CCNC: 143 U/L (ref 39–117)
ALT SERPL W P-5'-P-CCNC: 8 U/L (ref 1–33)
ANION GAP SERPL CALCULATED.3IONS-SCNC: 8.4 MMOL/L (ref 5–15)
APTT PPP: 24.3 SECONDS (ref 22.7–35.4)
AST SERPL-CCNC: 10 U/L (ref 1–32)
BASOPHILS # BLD AUTO: 0.05 10*3/MM3 (ref 0–0.2)
BASOPHILS NFR BLD AUTO: 0.4 % (ref 0–1.5)
BILIRUB SERPL-MCNC: 0.3 MG/DL (ref 0–1.2)
BLD GP AB SCN SERPL QL: NEGATIVE
BUN SERPL-MCNC: 20 MG/DL (ref 8–23)
BUN/CREAT SERPL: 9.1 (ref 7–25)
CALCIUM SPEC-SCNC: 9.2 MG/DL (ref 8.2–9.6)
CHLORIDE SERPL-SCNC: 111 MMOL/L (ref 98–107)
CHOLEST SERPL-MCNC: 168 MG/DL (ref 0–200)
CO2 SERPL-SCNC: 23.6 MMOL/L (ref 22–29)
CREAT SERPL-MCNC: 2.2 MG/DL (ref 0.57–1)
DEPRECATED RDW RBC AUTO: 43.5 FL (ref 37–54)
EOSINOPHIL # BLD AUTO: 0.12 10*3/MM3 (ref 0–0.4)
EOSINOPHIL NFR BLD AUTO: 1 % (ref 0.3–6.2)
ERYTHROCYTE [DISTWIDTH] IN BLOOD BY AUTOMATED COUNT: 13.1 % (ref 12.3–15.4)
GFR SERPL CREATININE-BSD FRML MDRD: 21 ML/MIN/1.73
GLOBULIN UR ELPH-MCNC: 2.6 GM/DL
GLUCOSE BLDC GLUCOMTR-MCNC: 100 MG/DL (ref 70–130)
GLUCOSE BLDC GLUCOMTR-MCNC: 96 MG/DL (ref 70–130)
GLUCOSE SERPL-MCNC: 94 MG/DL (ref 65–99)
HBA1C MFR BLD: 5.07 % (ref 4.8–5.6)
HCT VFR BLD AUTO: 42.6 % (ref 34–46.6)
HDLC SERPL-MCNC: 44 MG/DL (ref 40–60)
HGB BLD-MCNC: 13.3 G/DL (ref 12–15.9)
HOLD SPECIMEN: NORMAL
IMM GRANULOCYTES # BLD AUTO: 0.09 10*3/MM3 (ref 0–0.05)
IMM GRANULOCYTES NFR BLD AUTO: 0.8 % (ref 0–0.5)
INR PPP: 0.97 (ref 0.9–1.1)
LDLC SERPL CALC-MCNC: 104 MG/DL (ref 0–100)
LDLC/HDLC SERPL: 2.32 {RATIO}
LYMPHOCYTES # BLD AUTO: 1.49 10*3/MM3 (ref 0.7–3.1)
LYMPHOCYTES NFR BLD AUTO: 12.6 % (ref 19.6–45.3)
MCH RBC QN AUTO: 28.5 PG (ref 26.6–33)
MCHC RBC AUTO-ENTMCNC: 31.2 G/DL (ref 31.5–35.7)
MCV RBC AUTO: 91.2 FL (ref 79–97)
MONOCYTES # BLD AUTO: 0.81 10*3/MM3 (ref 0.1–0.9)
MONOCYTES NFR BLD AUTO: 6.8 % (ref 5–12)
NEUTROPHILS NFR BLD AUTO: 78.4 % (ref 42.7–76)
NEUTROPHILS NFR BLD AUTO: 9.27 10*3/MM3 (ref 1.7–7)
NRBC BLD AUTO-RTO: 0 /100 WBC (ref 0–0.2)
PLATELET # BLD AUTO: 292 10*3/MM3 (ref 140–450)
PMV BLD AUTO: 10.4 FL (ref 6–12)
POTASSIUM SERPL-SCNC: 4 MMOL/L (ref 3.5–5.2)
PROCALCITONIN SERPL-MCNC: 0.07 NG/ML (ref 0–0.25)
PROT SERPL-MCNC: 6.2 G/DL (ref 6–8.5)
PROTHROMBIN TIME: 12.7 SECONDS (ref 11.7–14.2)
QT INTERVAL: 469 MS
RBC # BLD AUTO: 4.67 10*6/MM3 (ref 3.77–5.28)
RH BLD: POSITIVE
SARS-COV-2 ORF1AB RESP QL NAA+PROBE: NOT DETECTED
SODIUM SERPL-SCNC: 143 MMOL/L (ref 136–145)
T&S EXPIRATION DATE: NORMAL
TRIGL SERPL-MCNC: 110 MG/DL (ref 0–150)
TROPONIN T SERPL-MCNC: 0.01 NG/ML (ref 0–0.03)
TSH SERPL DL<=0.05 MIU/L-ACNC: 16.1 UIU/ML (ref 0.27–4.2)
VLDLC SERPL-MCNC: 20 MG/DL (ref 5–40)
WBC # BLD AUTO: 11.83 10*3/MM3 (ref 3.4–10.8)
WHOLE BLOOD HOLD SPECIMEN: NORMAL
WHOLE BLOOD HOLD SPECIMEN: NORMAL

## 2021-01-29 PROCEDURE — 84145 PROCALCITONIN (PCT): CPT | Performed by: NURSE PRACTITIONER

## 2021-01-29 PROCEDURE — 86901 BLOOD TYPING SEROLOGIC RH(D): CPT | Performed by: EMERGENCY MEDICINE

## 2021-01-29 PROCEDURE — 93010 ELECTROCARDIOGRAM REPORT: CPT | Performed by: INTERNAL MEDICINE

## 2021-01-29 PROCEDURE — 85730 THROMBOPLASTIN TIME PARTIAL: CPT | Performed by: EMERGENCY MEDICINE

## 2021-01-29 PROCEDURE — 99222 1ST HOSP IP/OBS MODERATE 55: CPT | Performed by: PSYCHIATRY & NEUROLOGY

## 2021-01-29 PROCEDURE — 86900 BLOOD TYPING SEROLOGIC ABO: CPT | Performed by: EMERGENCY MEDICINE

## 2021-01-29 PROCEDURE — 83036 HEMOGLOBIN GLYCOSYLATED A1C: CPT | Performed by: NURSE PRACTITIONER

## 2021-01-29 PROCEDURE — 99285 EMERGENCY DEPT VISIT HI MDM: CPT

## 2021-01-29 PROCEDURE — 70551 MRI BRAIN STEM W/O DYE: CPT

## 2021-01-29 PROCEDURE — 84484 ASSAY OF TROPONIN QUANT: CPT | Performed by: EMERGENCY MEDICINE

## 2021-01-29 PROCEDURE — 84443 ASSAY THYROID STIM HORMONE: CPT | Performed by: NURSE PRACTITIONER

## 2021-01-29 PROCEDURE — U0004 COV-19 TEST NON-CDC HGH THRU: HCPCS | Performed by: EMERGENCY MEDICINE

## 2021-01-29 PROCEDURE — 82962 GLUCOSE BLOOD TEST: CPT

## 2021-01-29 PROCEDURE — 86850 RBC ANTIBODY SCREEN: CPT | Performed by: EMERGENCY MEDICINE

## 2021-01-29 PROCEDURE — 85610 PROTHROMBIN TIME: CPT | Performed by: EMERGENCY MEDICINE

## 2021-01-29 PROCEDURE — 80061 LIPID PANEL: CPT | Performed by: NURSE PRACTITIONER

## 2021-01-29 PROCEDURE — 85025 COMPLETE CBC W/AUTO DIFF WBC: CPT | Performed by: EMERGENCY MEDICINE

## 2021-01-29 PROCEDURE — 70450 CT HEAD/BRAIN W/O DYE: CPT

## 2021-01-29 PROCEDURE — 93005 ELECTROCARDIOGRAM TRACING: CPT | Performed by: EMERGENCY MEDICINE

## 2021-01-29 PROCEDURE — 71045 X-RAY EXAM CHEST 1 VIEW: CPT

## 2021-01-29 PROCEDURE — 80053 COMPREHEN METABOLIC PANEL: CPT | Performed by: EMERGENCY MEDICINE

## 2021-01-29 RX ORDER — NITROGLYCERIN 0.4 MG/1
0.4 TABLET SUBLINGUAL
Status: DISCONTINUED | OUTPATIENT
Start: 2021-01-29 | End: 2021-02-03 | Stop reason: HOSPADM

## 2021-01-29 RX ORDER — ASPIRIN 300 MG/1
300 SUPPOSITORY RECTAL DAILY
Status: DISCONTINUED | OUTPATIENT
Start: 2021-01-30 | End: 2021-01-30

## 2021-01-29 RX ORDER — SODIUM CHLORIDE 9 MG/ML
125 INJECTION, SOLUTION INTRAVENOUS CONTINUOUS
Status: DISCONTINUED | OUTPATIENT
Start: 2021-01-29 | End: 2021-01-29

## 2021-01-29 RX ORDER — DORZOLAMIDE HYDROCHLORIDE AND TIMOLOL MALEATE 20; 5 MG/ML; MG/ML
SOLUTION/ DROPS OPHTHALMIC 2 TIMES DAILY
Status: DISCONTINUED | OUTPATIENT
Start: 2021-01-29 | End: 2021-02-03 | Stop reason: HOSPADM

## 2021-01-29 RX ORDER — LABETALOL HYDROCHLORIDE 5 MG/ML
10 INJECTION, SOLUTION INTRAVENOUS ONCE
Status: DISCONTINUED | OUTPATIENT
Start: 2021-01-29 | End: 2021-01-29

## 2021-01-29 RX ORDER — LEVOTHYROXINE SODIUM 137 UG/1
137 TABLET ORAL DAILY
Status: DISCONTINUED | OUTPATIENT
Start: 2021-01-29 | End: 2021-01-30

## 2021-01-29 RX ORDER — ASPIRIN 81 MG/1
324 TABLET, CHEWABLE ORAL ONCE
Status: COMPLETED | OUTPATIENT
Start: 2021-01-29 | End: 2021-01-29

## 2021-01-29 RX ORDER — SODIUM CHLORIDE 9 MG/ML
75 INJECTION, SOLUTION INTRAVENOUS CONTINUOUS
Status: DISCONTINUED | OUTPATIENT
Start: 2021-01-29 | End: 2021-01-30

## 2021-01-29 RX ORDER — CHOLECALCIFEROL (VITAMIN D3) 125 MCG
250 CAPSULE ORAL DAILY
Status: DISCONTINUED | OUTPATIENT
Start: 2021-01-29 | End: 2021-02-03 | Stop reason: HOSPADM

## 2021-01-29 RX ORDER — BETAMETHASONE DIPROPIONATE 0.05 %
OINTMENT (GRAM) TOPICAL
Status: DISCONTINUED | OUTPATIENT
Start: 2021-01-29 | End: 2021-02-03 | Stop reason: HOSPADM

## 2021-01-29 RX ORDER — MULTIPLE VITAMINS W/ MINERALS TAB 9MG-400MCG
1 TAB ORAL DAILY
Status: ON HOLD | COMMUNITY

## 2021-01-29 RX ORDER — ATORVASTATIN CALCIUM 80 MG/1
80 TABLET, FILM COATED ORAL NIGHTLY
Status: DISCONTINUED | OUTPATIENT
Start: 2021-01-29 | End: 2021-02-03 | Stop reason: HOSPADM

## 2021-01-29 RX ORDER — METOPROLOL SUCCINATE 25 MG/1
25 TABLET, EXTENDED RELEASE ORAL DAILY
Status: DISCONTINUED | OUTPATIENT
Start: 2021-01-29 | End: 2021-02-01

## 2021-01-29 RX ORDER — ASPIRIN 325 MG
325 TABLET ORAL DAILY
Status: DISCONTINUED | OUTPATIENT
Start: 2021-01-30 | End: 2021-01-30

## 2021-01-29 RX ORDER — TRIAMCINOLONE ACETONIDE 1 MG/G
CREAM TOPICAL
COMMUNITY
End: 2021-02-17 | Stop reason: HOSPADM

## 2021-01-29 RX ORDER — ONDANSETRON 2 MG/ML
4 INJECTION INTRAMUSCULAR; INTRAVENOUS EVERY 4 HOURS PRN
Status: DISCONTINUED | OUTPATIENT
Start: 2021-01-29 | End: 2021-02-03 | Stop reason: HOSPADM

## 2021-01-29 RX ORDER — METOPROLOL SUCCINATE 25 MG/1
25 TABLET, EXTENDED RELEASE ORAL DAILY
Status: DISCONTINUED | OUTPATIENT
Start: 2021-01-29 | End: 2021-01-29

## 2021-01-29 RX ORDER — ASPIRIN 81 MG/1
81 TABLET, CHEWABLE ORAL DAILY
COMMUNITY
End: 2021-02-17 | Stop reason: HOSPADM

## 2021-01-29 RX ORDER — LATANOPROST 50 UG/ML
1 SOLUTION/ DROPS OPHTHALMIC NIGHTLY
Status: DISCONTINUED | OUTPATIENT
Start: 2021-01-29 | End: 2021-02-03 | Stop reason: HOSPADM

## 2021-01-29 RX ORDER — BISACODYL 10 MG
10 SUPPOSITORY, RECTAL RECTAL DAILY PRN
Status: DISCONTINUED | OUTPATIENT
Start: 2021-01-29 | End: 2021-02-03 | Stop reason: HOSPADM

## 2021-01-29 RX ORDER — SODIUM CHLORIDE 0.9 % (FLUSH) 0.9 %
10 SYRINGE (ML) INJECTION AS NEEDED
Status: DISCONTINUED | OUTPATIENT
Start: 2021-01-29 | End: 2021-02-03 | Stop reason: HOSPADM

## 2021-01-29 RX ORDER — ONDANSETRON 2 MG/ML
4 INJECTION INTRAMUSCULAR; INTRAVENOUS EVERY 6 HOURS PRN
Status: DISCONTINUED | OUTPATIENT
Start: 2021-01-29 | End: 2021-02-03 | Stop reason: HOSPADM

## 2021-01-29 RX ADMIN — SODIUM CHLORIDE, PRESERVATIVE FREE 10 ML: 5 INJECTION INTRAVENOUS at 20:33

## 2021-01-29 RX ADMIN — ASPIRIN 324 MG: 81 TABLET, CHEWABLE ORAL at 12:55

## 2021-01-29 RX ADMIN — SODIUM CHLORIDE 500 ML: 9 INJECTION, SOLUTION INTRAVENOUS at 11:01

## 2021-01-29 RX ADMIN — TIMOLOL MALEATE: 5 SOLUTION/ DROPS OPHTHALMIC at 20:32

## 2021-01-29 RX ADMIN — SODIUM CHLORIDE 75 ML/HR: 9 INJECTION, SOLUTION INTRAVENOUS at 20:33

## 2021-01-29 RX ADMIN — SODIUM CHLORIDE 125 ML/HR: 9 INJECTION, SOLUTION INTRAVENOUS at 12:55

## 2021-01-29 RX ADMIN — LATANOPROST 1 DROP: 50 SOLUTION/ DROPS OPHTHALMIC at 22:26

## 2021-01-29 RX ADMIN — ATORVASTATIN CALCIUM 80 MG: 80 TABLET, FILM COATED ORAL at 20:33

## 2021-01-29 NOTE — TELEPHONE ENCOUNTER
Caller: Marianne Delgado    Relationship to patient: Self    Best call back number: [phone]    Chief complaint: NUMBNESS IN LEFT LEG    Patient directed to call 911 or go to their nearest emergency room.     Patient verbalized understanding: [x] Yes  [] No  If no, why?    Additional notes: PATIENT CALLED STATING HER LEFT LEG WAS NUMB AND COULD NOT LIFT OR MOVE IT. LANGUAGE WAS SLURRED. ADVISED PATIENT TO VISIT ER. PATIENT AGREED AND SAID SHE WOULD HAVE TO CALL 911 DUE TO NOT BEING ABLE TO DRIVE AND NO ONE TO TAKE HER.

## 2021-01-30 ENCOUNTER — APPOINTMENT (OUTPATIENT)
Dept: CARDIOLOGY | Facility: HOSPITAL | Age: 86
End: 2021-01-30

## 2021-01-30 LAB
ALBUMIN SERPL-MCNC: 3 G/DL (ref 3.5–5.2)
ALBUMIN/GLOB SERPL: 1.3 G/DL
ALP SERPL-CCNC: 132 U/L (ref 39–117)
ALT SERPL W P-5'-P-CCNC: 6 U/L (ref 1–33)
ANION GAP SERPL CALCULATED.3IONS-SCNC: 6.4 MMOL/L (ref 5–15)
AORTIC ARCH: 2.4 CM
ASCENDING AORTA: 2.6 CM
AST SERPL-CCNC: 10 U/L (ref 1–32)
BH CV ECHO MEAS - ACS: 1.6 CM
BH CV ECHO MEAS - AO ARCH DIAM (PROXIMAL TRANS.): 2.4 CM
BH CV ECHO MEAS - AO MAX PG (FULL): 0.09 MMHG
BH CV ECHO MEAS - AO MAX PG: 5.1 MMHG
BH CV ECHO MEAS - AO MEAN PG (FULL): 1 MMHG
BH CV ECHO MEAS - AO MEAN PG: 3 MMHG
BH CV ECHO MEAS - AO ROOT AREA (BSA CORRECTED): 1.3
BH CV ECHO MEAS - AO ROOT AREA: 4.5 CM^2
BH CV ECHO MEAS - AO ROOT DIAM: 2.4 CM
BH CV ECHO MEAS - AO V2 MAX: 113 CM/SEC
BH CV ECHO MEAS - AO V2 MEAN: 74.9 CM/SEC
BH CV ECHO MEAS - AO V2 VTI: 29.3 CM
BH CV ECHO MEAS - ASC AORTA: 2.6 CM
BH CV ECHO MEAS - AVA(I,A): 2.6 CM^2
BH CV ECHO MEAS - AVA(I,D): 2.6 CM^2
BH CV ECHO MEAS - AVA(V,A): 2.8 CM^2
BH CV ECHO MEAS - AVA(V,D): 2.8 CM^2
BH CV ECHO MEAS - BSA(HAYCOCK): 2 M^2
BH CV ECHO MEAS - BSA: 1.9 M^2
BH CV ECHO MEAS - BZI_BMI: 37 KILOGRAMS/M^2
BH CV ECHO MEAS - BZI_METRIC_HEIGHT: 154.9 CM
BH CV ECHO MEAS - BZI_METRIC_WEIGHT: 88.9 KG
BH CV ECHO MEAS - EDV(CUBED): 32.8 ML
BH CV ECHO MEAS - EDV(MOD-SP2): 90 ML
BH CV ECHO MEAS - EDV(MOD-SP4): 106 ML
BH CV ECHO MEAS - EDV(TEICH): 41 ML
BH CV ECHO MEAS - EF(CUBED): 67.5 %
BH CV ECHO MEAS - EF(MOD-BP): 73.3 %
BH CV ECHO MEAS - EF(MOD-SP2): 75.6 %
BH CV ECHO MEAS - EF(MOD-SP4): 73.6 %
BH CV ECHO MEAS - EF(TEICH): 60.4 %
BH CV ECHO MEAS - ESV(CUBED): 10.6 ML
BH CV ECHO MEAS - ESV(MOD-SP2): 22 ML
BH CV ECHO MEAS - ESV(MOD-SP4): 28 ML
BH CV ECHO MEAS - ESV(TEICH): 16.2 ML
BH CV ECHO MEAS - FS: 31.3 %
BH CV ECHO MEAS - IVS/LVPW: 1.1
BH CV ECHO MEAS - IVSD: 1.3 CM
BH CV ECHO MEAS - LAT PEAK E' VEL: 5.4 CM/SEC
BH CV ECHO MEAS - LV DIASTOLIC VOL/BSA (35-75): 56.6 ML/M^2
BH CV ECHO MEAS - LV MASS(C)D: 127.4 GRAMS
BH CV ECHO MEAS - LV MASS(C)DI: 68 GRAMS/M^2
BH CV ECHO MEAS - LV MAX PG: 5 MMHG
BH CV ECHO MEAS - LV MEAN PG: 2 MMHG
BH CV ECHO MEAS - LV SYSTOLIC VOL/BSA (12-30): 14.9 ML/M^2
BH CV ECHO MEAS - LV V1 MAX: 112 CM/SEC
BH CV ECHO MEAS - LV V1 MEAN: 68.7 CM/SEC
BH CV ECHO MEAS - LV V1 VTI: 27.2 CM
BH CV ECHO MEAS - LVIDD: 3.2 CM
BH CV ECHO MEAS - LVIDS: 2.2 CM
BH CV ECHO MEAS - LVLD AP2: 7 CM
BH CV ECHO MEAS - LVLD AP4: 7.1 CM
BH CV ECHO MEAS - LVLS AP2: 5.2 CM
BH CV ECHO MEAS - LVLS AP4: 5.9 CM
BH CV ECHO MEAS - LVOT AREA (M): 2.8 CM^2
BH CV ECHO MEAS - LVOT AREA: 2.8 CM^2
BH CV ECHO MEAS - LVOT DIAM: 1.9 CM
BH CV ECHO MEAS - LVPWD: 1.2 CM
BH CV ECHO MEAS - MED PEAK E' VEL: 10.8 CM/SEC
BH CV ECHO MEAS - MV A MAX VEL: 99.4 CM/SEC
BH CV ECHO MEAS - MV DEC SLOPE: 161 CM/SEC^2
BH CV ECHO MEAS - MV DEC TIME: 0.22 SEC
BH CV ECHO MEAS - MV E MAX VEL: 81.4 CM/SEC
BH CV ECHO MEAS - MV E/A: 0.82
BH CV ECHO MEAS - MV MAX PG: 4.8 MMHG
BH CV ECHO MEAS - MV MEAN PG: 2 MMHG
BH CV ECHO MEAS - MV P1/2T MAX VEL: 79.3 CM/SEC
BH CV ECHO MEAS - MV P1/2T: 144.3 MSEC
BH CV ECHO MEAS - MV V2 MAX: 109 CM/SEC
BH CV ECHO MEAS - MV V2 MEAN: 63.9 CM/SEC
BH CV ECHO MEAS - MV V2 VTI: 33.5 CM
BH CV ECHO MEAS - MVA P1/2T LCG: 2.8 CM^2
BH CV ECHO MEAS - MVA(P1/2T): 1.5 CM^2
BH CV ECHO MEAS - MVA(VTI): 2.3 CM^2
BH CV ECHO MEAS - PA ACC TIME: 0.12 SEC
BH CV ECHO MEAS - PA MAX PG (FULL): 1.1 MMHG
BH CV ECHO MEAS - PA MAX PG: 4.2 MMHG
BH CV ECHO MEAS - PA PR(ACCEL): 26.8 MMHG
BH CV ECHO MEAS - PA V2 MAX: 102 CM/SEC
BH CV ECHO MEAS - PULM A REVS DUR: 0.18 SEC
BH CV ECHO MEAS - PULM A REVS VEL: 31.9 CM/SEC
BH CV ECHO MEAS - PULM DIAS VEL: 31.4 CM/SEC
BH CV ECHO MEAS - PULM S/D: 1.5
BH CV ECHO MEAS - PULM SYS VEL: 45.7 CM/SEC
BH CV ECHO MEAS - PVA(V,A): 2.4 CM^2
BH CV ECHO MEAS - PVA(V,D): 2.4 CM^2
BH CV ECHO MEAS - QP/QS: 0.85
BH CV ECHO MEAS - RAP SYSTOLE: 3 MMHG
BH CV ECHO MEAS - RV MAX PG: 3.1 MMHG
BH CV ECHO MEAS - RV MEAN PG: 2 MMHG
BH CV ECHO MEAS - RV V1 MAX: 88.1 CM/SEC
BH CV ECHO MEAS - RV V1 MEAN: 68.3 CM/SEC
BH CV ECHO MEAS - RV V1 VTI: 23 CM
BH CV ECHO MEAS - RVOT AREA: 2.8 CM^2
BH CV ECHO MEAS - RVOT DIAM: 1.9 CM
BH CV ECHO MEAS - RVSP: 22.2 MMHG
BH CV ECHO MEAS - SI(AO): 70.8 ML/M^2
BH CV ECHO MEAS - SI(CUBED): 11.8 ML/M^2
BH CV ECHO MEAS - SI(LVOT): 41.2 ML/M^2
BH CV ECHO MEAS - SI(MOD-SP2): 36.3 ML/M^2
BH CV ECHO MEAS - SI(MOD-SP4): 41.6 ML/M^2
BH CV ECHO MEAS - SI(TEICH): 13.2 ML/M^2
BH CV ECHO MEAS - SV(AO): 132.6 ML
BH CV ECHO MEAS - SV(CUBED): 22.1 ML
BH CV ECHO MEAS - SV(LVOT): 77.1 ML
BH CV ECHO MEAS - SV(MOD-SP2): 68 ML
BH CV ECHO MEAS - SV(MOD-SP4): 78 ML
BH CV ECHO MEAS - SV(RVOT): 65.2 ML
BH CV ECHO MEAS - SV(TEICH): 24.8 ML
BH CV ECHO MEAS - TAPSE (>1.6): 1.9 CM
BH CV ECHO MEAS - TR MAX VEL: 219 CM/SEC
BH CV ECHO MEASUREMENTS AVERAGE E/E' RATIO: 10.05
BH CV XLRA - RV BASE: 3 CM
BH CV XLRA - RV LENGTH: 5.9 CM
BH CV XLRA - RV MID: 3.1 CM
BH CV XLRA - TDI S': 12.5 CM/SEC
BH CV XLRA MEAS LEFT CCA RATIO VEL: 64.4 CM/SEC
BH CV XLRA MEAS LEFT DIST CCA EDV: 13.8 CM/SEC
BH CV XLRA MEAS LEFT DIST CCA PSV: 64.4 CM/SEC
BH CV XLRA MEAS LEFT DIST ICA EDV: -22.4 CM/SEC
BH CV XLRA MEAS LEFT DIST ICA PSV: -74.6 CM/SEC
BH CV XLRA MEAS LEFT ICA RATIO VEL: -74.6 CM/SEC
BH CV XLRA MEAS LEFT ICA/CCA RATIO: -1.2
BH CV XLRA MEAS LEFT MID ICA EDV: -15.3 CM/SEC
BH CV XLRA MEAS LEFT MID ICA PSV: -60 CM/SEC
BH CV XLRA MEAS LEFT PROX CCA EDV: 14.3 CM/SEC
BH CV XLRA MEAS LEFT PROX CCA PSV: 79 CM/SEC
BH CV XLRA MEAS LEFT PROX ECA EDV: -7.4 CM/SEC
BH CV XLRA MEAS LEFT PROX ECA PSV: -80.6 CM/SEC
BH CV XLRA MEAS LEFT PROX ICA EDV: -11.9 CM/SEC
BH CV XLRA MEAS LEFT PROX ICA PSV: -41.3 CM/SEC
BH CV XLRA MEAS LEFT PROX SCLA PSV: 99.4 CM/SEC
BH CV XLRA MEAS LEFT VERTEBRAL A EDV: 9.5 CM/SEC
BH CV XLRA MEAS LEFT VERTEBRAL A PSV: 42.4 CM/SEC
BH CV XLRA MEAS RIGHT CCA RATIO VEL: 50.2 CM/SEC
BH CV XLRA MEAS RIGHT DIST CCA EDV: 11.4 CM/SEC
BH CV XLRA MEAS RIGHT DIST CCA PSV: 50.2 CM/SEC
BH CV XLRA MEAS RIGHT DIST ICA EDV: -14.5 CM/SEC
BH CV XLRA MEAS RIGHT DIST ICA PSV: -57.3 CM/SEC
BH CV XLRA MEAS RIGHT ICA RATIO VEL: -57.3 CM/SEC
BH CV XLRA MEAS RIGHT ICA/CCA RATIO: -1.1
BH CV XLRA MEAS RIGHT MID ICA EDV: -17.7 CM/SEC
BH CV XLRA MEAS RIGHT MID ICA PSV: -56.4 CM/SEC
BH CV XLRA MEAS RIGHT PROX CCA EDV: 8.2 CM/SEC
BH CV XLRA MEAS RIGHT PROX CCA PSV: 55.3 CM/SEC
BH CV XLRA MEAS RIGHT PROX ECA EDV: -5.1 CM/SEC
BH CV XLRA MEAS RIGHT PROX ECA PSV: -69.8 CM/SEC
BH CV XLRA MEAS RIGHT PROX ICA EDV: -11.4 CM/SEC
BH CV XLRA MEAS RIGHT PROX ICA PSV: -45.9 CM/SEC
BH CV XLRA MEAS RIGHT PROX SCLA PSV: 163.4 CM/SEC
BH CV XLRA MEAS RIGHT VERTEBRAL A EDV: 9.5 CM/SEC
BH CV XLRA MEAS RIGHT VERTEBRAL A PSV: 43.4 CM/SEC
BILIRUB SERPL-MCNC: 0.5 MG/DL (ref 0–1.2)
BUN SERPL-MCNC: 17 MG/DL (ref 8–23)
BUN/CREAT SERPL: 9.8 (ref 7–25)
CALCIUM SPEC-SCNC: 8.9 MG/DL (ref 8.2–9.6)
CHLORIDE SERPL-SCNC: 113 MMOL/L (ref 98–107)
CO2 SERPL-SCNC: 22.6 MMOL/L (ref 22–29)
CREAT SERPL-MCNC: 1.73 MG/DL (ref 0.57–1)
DEPRECATED RDW RBC AUTO: 42.4 FL (ref 37–54)
ERYTHROCYTE [DISTWIDTH] IN BLOOD BY AUTOMATED COUNT: 12.8 % (ref 12.3–15.4)
GFR SERPL CREATININE-BSD FRML MDRD: 27 ML/MIN/1.73
GLOBULIN UR ELPH-MCNC: 2.4 GM/DL
GLUCOSE SERPL-MCNC: 82 MG/DL (ref 65–99)
HCT VFR BLD AUTO: 39.4 % (ref 34–46.6)
HGB BLD-MCNC: 12.5 G/DL (ref 12–15.9)
LEFT ARM BP: NORMAL MMHG
LEFT ATRIUM VOLUME INDEX: 34 ML/M2
LV EF 2D ECHO EST: 70 %
MAXIMAL PREDICTED HEART RATE: 125 BPM
MCH RBC QN AUTO: 28.6 PG (ref 26.6–33)
MCHC RBC AUTO-ENTMCNC: 31.7 G/DL (ref 31.5–35.7)
MCV RBC AUTO: 90.2 FL (ref 79–97)
PLATELET # BLD AUTO: 244 10*3/MM3 (ref 140–450)
PMV BLD AUTO: 10.5 FL (ref 6–12)
POTASSIUM SERPL-SCNC: 4 MMOL/L (ref 3.5–5.2)
PROT SERPL-MCNC: 5.4 G/DL (ref 6–8.5)
RBC # BLD AUTO: 4.37 10*6/MM3 (ref 3.77–5.28)
RIGHT ARM BP: NORMAL MMHG
SINUS: 2.5 CM
SODIUM SERPL-SCNC: 142 MMOL/L (ref 136–145)
STJ: 2.3 CM
STRESS TARGET HR: 106 BPM
T4 FREE SERPL-MCNC: 1.14 NG/DL (ref 0.93–1.7)
WBC # BLD AUTO: 7.69 10*3/MM3 (ref 3.4–10.8)

## 2021-01-30 PROCEDURE — 93306 TTE W/DOPPLER COMPLETE: CPT | Performed by: INTERNAL MEDICINE

## 2021-01-30 PROCEDURE — 84439 ASSAY OF FREE THYROXINE: CPT | Performed by: HOSPITALIST

## 2021-01-30 PROCEDURE — 97162 PT EVAL MOD COMPLEX 30 MIN: CPT

## 2021-01-30 PROCEDURE — 99233 SBSQ HOSP IP/OBS HIGH 50: CPT | Performed by: NURSE PRACTITIONER

## 2021-01-30 PROCEDURE — 97530 THERAPEUTIC ACTIVITIES: CPT

## 2021-01-30 PROCEDURE — 36415 COLL VENOUS BLD VENIPUNCTURE: CPT | Performed by: NURSE PRACTITIONER

## 2021-01-30 PROCEDURE — 97535 SELF CARE MNGMENT TRAINING: CPT

## 2021-01-30 PROCEDURE — 97166 OT EVAL MOD COMPLEX 45 MIN: CPT

## 2021-01-30 PROCEDURE — 85027 COMPLETE CBC AUTOMATED: CPT | Performed by: NURSE PRACTITIONER

## 2021-01-30 PROCEDURE — 97110 THERAPEUTIC EXERCISES: CPT

## 2021-01-30 PROCEDURE — 80053 COMPREHEN METABOLIC PANEL: CPT | Performed by: NURSE PRACTITIONER

## 2021-01-30 PROCEDURE — 25010000002 PERFLUTREN (DEFINITY) 8.476 MG IN SODIUM CHLORIDE (PF) 0.9 % 10 ML INJECTION: Performed by: NURSE PRACTITIONER

## 2021-01-30 PROCEDURE — 93880 EXTRACRANIAL BILAT STUDY: CPT

## 2021-01-30 PROCEDURE — 93306 TTE W/DOPPLER COMPLETE: CPT

## 2021-01-30 RX ORDER — ASPIRIN 81 MG/1
81 TABLET ORAL DAILY
Status: DISCONTINUED | OUTPATIENT
Start: 2021-01-30 | End: 2021-01-30

## 2021-01-30 RX ORDER — LEVOTHYROXINE SODIUM 0.15 MG/1
150 TABLET ORAL DAILY
Status: DISCONTINUED | OUTPATIENT
Start: 2021-01-31 | End: 2021-02-01

## 2021-01-30 RX ORDER — CLOPIDOGREL BISULFATE 75 MG/1
300 TABLET ORAL ONCE
Status: COMPLETED | OUTPATIENT
Start: 2021-01-30 | End: 2021-01-30

## 2021-01-30 RX ORDER — CLOPIDOGREL BISULFATE 75 MG/1
75 TABLET ORAL DAILY
Status: DISCONTINUED | OUTPATIENT
Start: 2021-01-31 | End: 2021-02-03 | Stop reason: HOSPADM

## 2021-01-30 RX ORDER — ASPIRIN 81 MG/1
81 TABLET ORAL DAILY
Status: DISCONTINUED | OUTPATIENT
Start: 2021-01-31 | End: 2021-02-03 | Stop reason: HOSPADM

## 2021-01-30 RX ORDER — LEVOTHYROXINE SODIUM 0.03 MG/1
25 TABLET ORAL
Status: DISCONTINUED | OUTPATIENT
Start: 2021-01-30 | End: 2021-02-01

## 2021-01-30 RX ADMIN — LEVOTHYROXINE SODIUM 137 MCG: 137 TABLET ORAL at 09:10

## 2021-01-30 RX ADMIN — TIMOLOL MALEATE: 5 SOLUTION/ DROPS OPHTHALMIC at 20:21

## 2021-01-30 RX ADMIN — ASPIRIN 325 MG: 325 TABLET ORAL at 09:10

## 2021-01-30 RX ADMIN — LEVOTHYROXINE SODIUM 25 MCG: 0.03 TABLET ORAL at 13:31

## 2021-01-30 RX ADMIN — CLOPIDOGREL 300 MG: 75 TABLET, FILM COATED ORAL at 13:14

## 2021-01-30 RX ADMIN — LATANOPROST 1 DROP: 50 SOLUTION/ DROPS OPHTHALMIC at 20:21

## 2021-01-30 RX ADMIN — PERFLUTREN 3 ML: 6.52 INJECTION, SUSPENSION INTRAVENOUS at 09:15

## 2021-01-30 RX ADMIN — METOPROLOL SUCCINATE 25 MG: 25 TABLET, EXTENDED RELEASE ORAL at 09:09

## 2021-01-30 RX ADMIN — TIMOLOL MALEATE: 5 SOLUTION/ DROPS OPHTHALMIC at 09:10

## 2021-01-30 RX ADMIN — ATORVASTATIN CALCIUM 80 MG: 80 TABLET, FILM COATED ORAL at 20:21

## 2021-01-30 RX ADMIN — Medication 250 MCG: at 09:10

## 2021-01-31 LAB
ALBUMIN SERPL-MCNC: 3.3 G/DL (ref 3.5–5.2)
ANION GAP SERPL CALCULATED.3IONS-SCNC: 9 MMOL/L (ref 5–15)
BASOPHILS # BLD AUTO: 0.03 10*3/MM3 (ref 0–0.2)
BASOPHILS NFR BLD AUTO: 0.4 % (ref 0–1.5)
BUN SERPL-MCNC: 20 MG/DL (ref 8–23)
BUN/CREAT SERPL: 11.8 (ref 7–25)
CALCIUM SPEC-SCNC: 9 MG/DL (ref 8.2–9.6)
CHLORIDE SERPL-SCNC: 110 MMOL/L (ref 98–107)
CO2 SERPL-SCNC: 20 MMOL/L (ref 22–29)
CREAT SERPL-MCNC: 1.69 MG/DL (ref 0.57–1)
DEPRECATED RDW RBC AUTO: 43 FL (ref 37–54)
EOSINOPHIL # BLD AUTO: 0.12 10*3/MM3 (ref 0–0.4)
EOSINOPHIL NFR BLD AUTO: 1.6 % (ref 0.3–6.2)
ERYTHROCYTE [DISTWIDTH] IN BLOOD BY AUTOMATED COUNT: 12.9 % (ref 12.3–15.4)
GFR SERPL CREATININE-BSD FRML MDRD: 28 ML/MIN/1.73
GLUCOSE SERPL-MCNC: 81 MG/DL (ref 65–99)
HCT VFR BLD AUTO: 41.6 % (ref 34–46.6)
HGB BLD-MCNC: 13.5 G/DL (ref 12–15.9)
IMM GRANULOCYTES # BLD AUTO: 0.02 10*3/MM3 (ref 0–0.05)
IMM GRANULOCYTES NFR BLD AUTO: 0.3 % (ref 0–0.5)
LYMPHOCYTES # BLD AUTO: 1.69 10*3/MM3 (ref 0.7–3.1)
LYMPHOCYTES NFR BLD AUTO: 22 % (ref 19.6–45.3)
MAGNESIUM SERPL-MCNC: 1.8 MG/DL (ref 1.7–2.3)
MCH RBC QN AUTO: 29.7 PG (ref 26.6–33)
MCHC RBC AUTO-ENTMCNC: 32.5 G/DL (ref 31.5–35.7)
MCV RBC AUTO: 91.6 FL (ref 79–97)
MONOCYTES # BLD AUTO: 0.64 10*3/MM3 (ref 0.1–0.9)
MONOCYTES NFR BLD AUTO: 8.3 % (ref 5–12)
NEUTROPHILS NFR BLD AUTO: 5.17 10*3/MM3 (ref 1.7–7)
NEUTROPHILS NFR BLD AUTO: 67.4 % (ref 42.7–76)
NRBC BLD AUTO-RTO: 0 /100 WBC (ref 0–0.2)
PHOSPHATE SERPL-MCNC: 3.3 MG/DL (ref 2.5–4.5)
PLATELET # BLD AUTO: 242 10*3/MM3 (ref 140–450)
PMV BLD AUTO: 10.5 FL (ref 6–12)
POTASSIUM SERPL-SCNC: 4.2 MMOL/L (ref 3.5–5.2)
RBC # BLD AUTO: 4.54 10*6/MM3 (ref 3.77–5.28)
SODIUM SERPL-SCNC: 139 MMOL/L (ref 136–145)
T3FREE SERPL-MCNC: 1.94 PG/ML (ref 2–4.4)
TSH SERPL DL<=0.05 MIU/L-ACNC: 9.15 UIU/ML (ref 0.27–4.2)
URATE SERPL-MCNC: 7.7 MG/DL (ref 2.4–5.7)
WBC # BLD AUTO: 7.67 10*3/MM3 (ref 3.4–10.8)

## 2021-01-31 PROCEDURE — 83735 ASSAY OF MAGNESIUM: CPT | Performed by: INTERNAL MEDICINE

## 2021-01-31 PROCEDURE — 97110 THERAPEUTIC EXERCISES: CPT

## 2021-01-31 PROCEDURE — 84443 ASSAY THYROID STIM HORMONE: CPT | Performed by: HOSPITALIST

## 2021-01-31 PROCEDURE — 97530 THERAPEUTIC ACTIVITIES: CPT

## 2021-01-31 PROCEDURE — 84550 ASSAY OF BLOOD/URIC ACID: CPT | Performed by: INTERNAL MEDICINE

## 2021-01-31 PROCEDURE — 85025 COMPLETE CBC W/AUTO DIFF WBC: CPT | Performed by: INTERNAL MEDICINE

## 2021-01-31 PROCEDURE — 80069 RENAL FUNCTION PANEL: CPT | Performed by: INTERNAL MEDICINE

## 2021-01-31 PROCEDURE — 93005 ELECTROCARDIOGRAM TRACING: CPT | Performed by: PSYCHIATRY & NEUROLOGY

## 2021-01-31 PROCEDURE — 84481 FREE ASSAY (FT-3): CPT | Performed by: HOSPITALIST

## 2021-01-31 PROCEDURE — 99233 SBSQ HOSP IP/OBS HIGH 50: CPT | Performed by: NURSE PRACTITIONER

## 2021-01-31 RX ADMIN — ASPIRIN 81 MG: 81 TABLET, COATED ORAL at 09:50

## 2021-01-31 RX ADMIN — TIMOLOL MALEATE: 5 SOLUTION/ DROPS OPHTHALMIC at 21:01

## 2021-01-31 RX ADMIN — METOPROLOL SUCCINATE 25 MG: 25 TABLET, EXTENDED RELEASE ORAL at 09:50

## 2021-01-31 RX ADMIN — BISACODYL 10 MG: 10 SUPPOSITORY RECTAL at 11:41

## 2021-01-31 RX ADMIN — ATORVASTATIN CALCIUM 80 MG: 80 TABLET, FILM COATED ORAL at 21:01

## 2021-01-31 RX ADMIN — CLOPIDOGREL 75 MG: 75 TABLET, FILM COATED ORAL at 09:50

## 2021-01-31 RX ADMIN — TIMOLOL MALEATE: 5 SOLUTION/ DROPS OPHTHALMIC at 09:52

## 2021-01-31 RX ADMIN — LEVOTHYROXINE SODIUM 150 MCG: 150 TABLET ORAL at 16:13

## 2021-01-31 RX ADMIN — BETAMETHASONE DIPROPIONATE: 0.5 OINTMENT TOPICAL at 13:14

## 2021-01-31 RX ADMIN — Medication 250 MCG: at 09:50

## 2021-01-31 RX ADMIN — LATANOPROST 1 DROP: 50 SOLUTION/ DROPS OPHTHALMIC at 21:01

## 2021-01-31 RX ADMIN — LEVOTHYROXINE SODIUM 25 MCG: 0.03 TABLET ORAL at 05:42

## 2021-02-01 ENCOUNTER — APPOINTMENT (OUTPATIENT)
Dept: CT IMAGING | Facility: HOSPITAL | Age: 86
End: 2021-02-01

## 2021-02-01 ENCOUNTER — TELEPHONE (OUTPATIENT)
Dept: FAMILY MEDICINE CLINIC | Facility: CLINIC | Age: 86
End: 2021-02-01

## 2021-02-01 LAB
ALBUMIN SERPL-MCNC: 2.8 G/DL (ref 3.5–5.2)
ANION GAP SERPL CALCULATED.3IONS-SCNC: 7.5 MMOL/L (ref 5–15)
BUN SERPL-MCNC: 20 MG/DL (ref 8–23)
BUN/CREAT SERPL: 11 (ref 7–25)
CALCIUM SPEC-SCNC: 9 MG/DL (ref 8.2–9.6)
CHLORIDE SERPL-SCNC: 112 MMOL/L (ref 98–107)
CO2 SERPL-SCNC: 20.5 MMOL/L (ref 22–29)
CREAT SERPL-MCNC: 1.81 MG/DL (ref 0.57–1)
GFR SERPL CREATININE-BSD FRML MDRD: 26 ML/MIN/1.73
GLUCOSE SERPL-MCNC: 86 MG/DL (ref 65–99)
MAGNESIUM SERPL-MCNC: 1.8 MG/DL (ref 1.7–2.3)
PHOSPHATE SERPL-MCNC: 3 MG/DL (ref 2.5–4.5)
POTASSIUM SERPL-SCNC: 3.9 MMOL/L (ref 3.5–5.2)
QT INTERVAL: 430 MS
SODIUM SERPL-SCNC: 140 MMOL/L (ref 136–145)
URATE SERPL-MCNC: 7.6 MG/DL (ref 2.4–5.7)

## 2021-02-01 PROCEDURE — 80069 RENAL FUNCTION PANEL: CPT | Performed by: INTERNAL MEDICINE

## 2021-02-01 PROCEDURE — 83735 ASSAY OF MAGNESIUM: CPT | Performed by: INTERNAL MEDICINE

## 2021-02-01 PROCEDURE — 97110 THERAPEUTIC EXERCISES: CPT

## 2021-02-01 PROCEDURE — 71250 CT THORAX DX C-: CPT

## 2021-02-01 PROCEDURE — 84550 ASSAY OF BLOOD/URIC ACID: CPT | Performed by: INTERNAL MEDICINE

## 2021-02-01 RX ORDER — LEVOTHYROXINE SODIUM 0.15 MG/1
150 TABLET ORAL DAILY
Status: DISCONTINUED | OUTPATIENT
Start: 2021-02-01 | End: 2021-02-03 | Stop reason: HOSPADM

## 2021-02-01 RX ORDER — AMLODIPINE BESYLATE 2.5 MG/1
2.5 TABLET ORAL
Status: DISCONTINUED | OUTPATIENT
Start: 2021-02-01 | End: 2021-02-03 | Stop reason: HOSPADM

## 2021-02-01 RX ORDER — METOPROLOL SUCCINATE 25 MG/1
12.5 TABLET, EXTENDED RELEASE ORAL DAILY
Status: DISCONTINUED | OUTPATIENT
Start: 2021-02-02 | End: 2021-02-03 | Stop reason: HOSPADM

## 2021-02-01 RX ADMIN — LEVOTHYROXINE SODIUM 150 MCG: 150 TABLET ORAL at 05:54

## 2021-02-01 RX ADMIN — AMLODIPINE BESYLATE 2.5 MG: 2.5 TABLET ORAL at 09:51

## 2021-02-01 RX ADMIN — ATORVASTATIN CALCIUM 80 MG: 80 TABLET, FILM COATED ORAL at 22:55

## 2021-02-01 RX ADMIN — CLOPIDOGREL 75 MG: 75 TABLET, FILM COATED ORAL at 09:51

## 2021-02-01 RX ADMIN — ASPIRIN 81 MG: 81 TABLET, COATED ORAL at 09:51

## 2021-02-01 RX ADMIN — Medication 250 MCG: at 09:51

## 2021-02-01 RX ADMIN — LEVOTHYROXINE SODIUM 25 MCG: 0.03 TABLET ORAL at 05:45

## 2021-02-01 RX ADMIN — LATANOPROST 1 DROP: 50 SOLUTION/ DROPS OPHTHALMIC at 22:54

## 2021-02-01 RX ADMIN — TIMOLOL MALEATE: 5 SOLUTION/ DROPS OPHTHALMIC at 22:55

## 2021-02-01 RX ADMIN — BETAMETHASONE DIPROPIONATE: 0.5 OINTMENT TOPICAL at 09:56

## 2021-02-01 RX ADMIN — TIMOLOL MALEATE: 5 SOLUTION/ DROPS OPHTHALMIC at 09:54

## 2021-02-01 RX ADMIN — METOPROLOL SUCCINATE 25 MG: 25 TABLET, EXTENDED RELEASE ORAL at 09:51

## 2021-02-01 NOTE — PROGRESS NOTES
"   LOS: 3 days    Patient Care Team:  Xenia Robert MD as PCP - General (Family Medicine)    Chief Complaint:    Chief Complaint   Patient presents with   • Extremity Weakness     Follow UP CKD 4  Subjective     Interval History:   No acute issues    Objective     Vital Signs  Temp:  [98 °F (36.7 °C)-98.2 °F (36.8 °C)] 98 °F (36.7 °C)  Heart Rate:  [64-78] 67  Resp:  [18-20] 18  BP: (148-172)/(58-74) 170/70    Flowsheet Rows      First Filed Value   Admission Height  154.9 cm (61\") Documented at 01/29/2021 1612   Admission Weight  89.4 kg (197 lb 3.2 oz) Documented at 01/29/2021 1027          No intake/output data recorded.  I/O last 3 completed shifts:  In: 920 [P.O.:920]  Out: 500 [Urine:500]    Intake/Output Summary (Last 24 hours) at 2/1/2021 0714  Last data filed at 1/31/2021 1852  Gross per 24 hour   Intake 920 ml   Output --   Net 920 ml       Physical Exam:  General Appearance: frail WF sleeping comfortably  Neck supple no JVD  Lungs CTA bilat no rales  CV RRR no m/g  abd soft NT/ND  vasc no edema, 2+ radial pulses      Results Review:    Results from last 7 days   Lab Units 02/01/21  0530 01/31/21  0710 01/30/21  0629 01/29/21  1035   SODIUM mmol/L 140 139 142 143   POTASSIUM mmol/L 3.9 4.2 4.0 4.0   CHLORIDE mmol/L 112* 110* 113* 111*   CO2 mmol/L 20.5* 20.0* 22.6 23.6   BUN mg/dL 20 20 17 20   CREATININE mg/dL 1.81* 1.69* 1.73* 2.20*   CALCIUM mg/dL 9.0 9.0 8.9 9.2   BILIRUBIN mg/dL  --   --  0.5 0.3   ALK PHOS U/L  --   --  132* 143*   ALT (SGPT) U/L  --   --  6 8   AST (SGOT) U/L  --   --  10 10   GLUCOSE mg/dL 86 81 82 94       Estimated Creatinine Clearance: 19.2 mL/min (A) (by C-G formula based on SCr of 1.81 mg/dL (H)).    Results from last 7 days   Lab Units 02/01/21  0530 01/31/21  0710   MAGNESIUM mg/dL 1.8 1.8   PHOSPHORUS mg/dL 3.0 3.3       Results from last 7 days   Lab Units 02/01/21  0530 01/31/21  0710   URIC ACID mg/dL 7.6* 7.7*       Results from last 7 days   Lab Units " 01/31/21  0710 01/30/21  0629 01/29/21  1035   WBC 10*3/mm3 7.67 7.69 11.83*   HEMOGLOBIN g/dL 13.5 12.5 13.3   PLATELETS 10*3/mm3 242 244 292       Results from last 7 days   Lab Units 01/29/21  1035   INR  0.97         Imaging Results (Last 24 Hours)     ** No results found for the last 24 hours. **        aspirin, 81 mg, Oral, Daily  atorvastatin, 80 mg, Oral, Nightly  betamethasone dipropionate, , Topical, Q24H  clopidogrel, 75 mg, Oral, Daily  dorzolamide-timolol, , Both Eyes, BID  latanoprost, 1 drop, Both Eyes, Nightly  levothyroxine, 150 mcg, Oral, Daily  levothyroxine, 25 mcg, Oral, Q AM  metoprolol succinate XL, 25 mg, Oral, Daily  vitamin B-12, 250 mcg, Oral, Daily           Medication Review:   Current Facility-Administered Medications   Medication Dose Route Frequency Provider Last Rate Last Admin   • aspirin EC tablet 81 mg  81 mg Oral Daily Sandy Miller APRN   81 mg at 01/31/21 0950   • atorvastatin (LIPITOR) tablet 80 mg  80 mg Oral Nightly Jannette Dasilva APRN   80 mg at 01/31/21 2101   • betamethasone dipropionate (DIPROLENE) 0.05 % ointment   Topical Q24H Prasanth Rm MD   Given at 01/31/21 1314   • bisacodyl (DULCOLAX) suppository 10 mg  10 mg Rectal Daily PRN Jannette Dasilva APRN   10 mg at 01/31/21 1141   • clopidogrel (PLAVIX) tablet 75 mg  75 mg Oral Daily Sandy Miller APRN   75 mg at 01/31/21 0950   • dorzolamide (TRUSOPT) 2 % 1 drop, timolol (TIMOPTIC) 0.5 % 1 drop for Cosopt 22.3-6.8 mg/mL   Both Eyes BID Prasanth Rm MD   Given at 01/31/21 2101   • latanoprost (XALATAN) 0.005 % ophthalmic solution 1 drop  1 drop Both Eyes Nightly Prasanth Rm MD   1 drop at 01/31/21 2101   • levothyroxine (SYNTHROID, LEVOTHROID) tablet 150 mcg  150 mcg Oral Daily Prasanth Rm MD       • levothyroxine (SYNTHROID, LEVOTHROID) tablet 25 mcg  25 mcg Oral Q AM Prasanth Rm MD   25 mcg at 02/01/21 0545   • metoprolol succinate XL (TOPROL-XL) 24 hr tablet 25 mg  25  mg Oral Daily Derek Singleton MD   25 mg at 01/31/21 0950   • nitroglycerin (NITROSTAT) SL tablet 0.4 mg  0.4 mg Sublingual Q5 Min PRN Jannette Dasilva APRN       • ondansetron (ZOFRAN) injection 4 mg  4 mg Intravenous Q4H PRN Jannette Dasilva APRN       • ondansetron (ZOFRAN) injection 4 mg  4 mg Intravenous Q6H PRN Jannette Dasilva APRN       • sodium chloride 0.9 % flush 10 mL  10 mL Intravenous PRN Julian Jaquez MD   10 mL at 01/29/21 2033   • vitamin B-12 (CYANOCOBALAMIN) tablet 250 mcg  250 mcg Oral Daily Prasanth Rm MD   250 mcg at 01/31/21 0950       Assessment/Plan   1.  Chronic kidney disease stage IV probably related to nephrosclerosis, Cr stable 1.8. K normal.  Poss NAGMA, HCo3 stable 20.  Euvolemic  2.  Hypertension - liberal BP target given advanced age and frailty, ie 150 - 160 systolic acceptable but often 170s; HR 60s on low dose metoprolol  3.  CVA with weakness of the left lower extremity  4.  Hypothyroidism  5.  Glaucoma  6.  Gout, quiescent    Plan  - restart norvasc at very low dose 2.5 mg  - rehab eval in progress         Cerebrovascular accident (CVA) (CMS/HCC)    Stage 4 chronic kidney disease (CMS/HCC)    Essential hypertension    Acquired hypothyroidism    Vitamin D deficiency    Morbidly obese (CMS/HCC)    Hypertensive urgency              Bernard Emery MD  02/01/21  07:14 EST

## 2021-02-01 NOTE — PROGRESS NOTES
Discharge Planning Assessment  [facility]     Patient Name: Marianne Delgado  MRN: [id number]  Today's Date: 2/1/2021    Admit Date: 1/29/2021    Discharge Needs Assessment     Row Name 02/01/21 1650       Living Environment    Lives With  facility resident    Name(s) of Who Lives With Patient  [facility]    Current Living Arrangements  independent/assisted living facility    Able to Return to Prior Arrangements  yes       Transition Planning    Patient/Family Anticipates Transition to  home with help/services;inpatient rehabilitation facility    Transportation Anticipated  family or friend will provide       Discharge Needs Assessment    Equipment Currently Used at Home  walker, rolling;grab bar;bath bench    Concerns to be Addressed  adjustment to diagnosis/illness;basic needs        Discharge Plan     Row Name 02/01/21 1652       Plan    Plan  Acute rehab vs skilled vs home with assist.    Patient/Family in Agreement with Plan  yes    Plan Comments  CCP spoke with pt @ bedside, confirmed face sheet.  Pt states she lives in independent living.  Pt does have some assist with ADL's.  Pt does not feel she can go home. Requested referral to Acute rehab. CCP discussed sub acute rehab with pt and [facility] FELIPE/ [facility] Osei//  they are also discussing plans to have pvt paid caregiver stay with pt.  CCP will cont to follow.  Corinne Carey RN        Continued Care and Services - Admitted Since 1/29/2021     Destination     Service Provider Request Status Selected Services Address Phone Fax Patient Preferred    [facility]  Accepted N/A [address] [facility] [id number] [phone] --                Demographic Summary    No documentation.       Functional Status     Row Name 02/01/21 1650       Functional Status    Usual Activity Tolerance  moderate    Current Activity Tolerance  fair        Psychosocial    No documentation.       Abuse/Neglect    No documentation.        Legal    No documentation.       Substance Abuse    No documentation.       Patient Forms    No documentation.           Corinne Carey RN

## 2021-02-01 NOTE — PLAN OF CARE
Problem: Adult Inpatient Plan of Care  Goal: Plan of Care Review  Flowsheets (Taken 2/1/2021 0334)  Plan of Care Reviewed With: patient  Outcome Summary: Patient able to ambulate from bed to bedside cammode with assist times 1. Patient has purwick during the nigth time. Vitals stable. Will continue to monitor.

## 2021-02-01 NOTE — THERAPY TREATMENT NOTE
Patient Name: Marianne Delgado  : 1925    MRN: [id number]                              Today's Date: 2021       Admit Date: 2021    Visit Dx:     ICD-10-CM ICD-9-CM   1. Cerebrovascular accident (CVA), unspecified mechanism (CMS/[facility])  I63.9 434.91   2. Abnormal chest x-ray  R93.89 793.2   3. Chronic renal failure, unspecified CKD stage  N18.9 585.9     Patient Active Problem List   Diagnosis   • Arthritis   • Stage 4 chronic kidney disease (CMS/[facility])   • Debility   • Foot pain, bilateral   • Glaucoma   • Acute idiopathic gout of right ankle   • Hematuria   • Essential hypertension   • Acquired hypothyroidism   • Osteopenia   • Psoriasis   • Rosacea   • Vitamin D deficiency   • Medicare annual wellness visit, subsequent   • Morbidly obese (CMS/[facility])   • Cerebrovascular accident (CVA) (CMS/[facility])   • Hypertensive urgency     Past Medical History:   Diagnosis Date   • Acute sinusitis    • Acute upper respiratory infection    • Arthritis    • CKD (chronic kidney disease)    • Debility    • Fatigue    • Foot pain, bilateral    • Glaucoma    • Gout    • Hematuria    • High blood pressure    • Hypertension    • Hypothyroid 1999   • Hypothyroidism    • IBS (irritable bowel syndrome)    • IGT (impaired glucose tolerance)    • Malaise and fatigue    • Medication management    • Melanoma (CMS/[facility]) 1979    left leg   • OA (osteoarthritis)    • Osteopenia 1999   • Painful joint    • Psoriasis    • Renal failure    • Rosacea    • Vitamin D deficiency      Past Surgical History:   Procedure Laterality Date   • CATARACT EXTRACTION     • FOOT SURGERY      mauri toe surgery   • OTHER SURGICAL HISTORY      Melanoma Excision: Left leg     General Information     Row Name 21 1552          OT Time and Intention    Document Type  therapy note (daily note)  -BL     Mode of Treatment  occupational therapy  -BL     Row Name 21 1552          General Information    Patient Profile Reviewed  yes  -BL      Existing Precautions/Restrictions  fall;other (see comments) orthostatic BP  -     Row Name 02/01/21 1552          Cognition    Orientation Status (Cognition)  oriented x 3  -     Row Name 02/01/21 1552          Safety Issues, Functional Mobility    Safety Issues Affecting Function (Mobility)  ability to follow commands  -       User Key  (r) = Recorded By, (t) = Taken By, (c) = Cosigned By    Initials Name Provider Type     Camille Santiago OT Occupational Therapist          Mobility/ADL's     Row Name 02/01/21 1553          Bed Mobility    Comment (Bed Mobility)  NT-pt up in chair upon arrival and departure this date  -     Row Name 02/01/21 1553          Transfers    Comment (Transfers)  NT this date due to increase fatigue from previous PT session  -     Row Name 02/01/21 1553          Activities of Daily Living    BADL Assessment/Intervention  grooming  -     Row Name 02/01/21 1553          Grooming Assessment/Training    [place] (Grooming)  wash face, hands;oral care regimen;set up  -     Position (Grooming)  supported sitting  -       User Key  (r) = Recorded By, (t) = Taken By, (c) = Cosigned By    Initials Name Provider Type     Camille Santiago OT Occupational Therapist        Obj/Interventions     Row Name 02/01/21 1554          Shoulder (Therapeutic Exercise)    Shoulder (Therapeutic Exercise)  AROM (active range of motion);strengthening exercise  -     Shoulder AROM (Therapeutic Exercise)  bilateral;flexion;extension;aBduction;aDduction  -     Shoulder Strengthening (Therapeutic Exercise)  bilateral;flexion;extension;aBduction;aDduction;resistance band;yellow;sitting x15 reps with rest break between sets  -     Row Name 02/01/21 1554          Elbow/Forearm (Therapeutic Exercise)    Elbow/Forearm (Therapeutic Exercise)  AROM (active range of motion);strengthening exercise  -     Elbow/Forearm Strengthening (Therapeutic Exercise)   bilateral;flexion;extension;supination;pronation;resistance band;yellow;sitting  -BL     Row Name 02/01/21 1559          Therapeutic Exercise    Therapeutic Exercise  shoulder;elbow/forearm  -       User Key  (r) = Recorded By, (t) = Taken By, (c) = Cosigned By    Initials Name Provider Type    Camille Urrtuia OT Occupational Therapist        Goals/Plan    No documentation.       Clinical Impression     Row Name 02/01/21 1558          Pain Assessment    Additional Documentation  Pain Scale: Numbers Pre/Post-Treatment (Group)  -BL     Row Name 02/01/21 1558          Pain Scale: Numbers Pre/Post-Treatment    Pretreatment Pain Rating  0/10 - no pain  -BL     Posttreatment Pain Rating  0/10 - no pain  -BL     Row Name 02/01/21 1551          Plan of Care Review    Plan of Care Reviewed With  patient  -BL     Progress  improving  -     Outcome Summary  Pt seen by OT this date. Upon arrival pt reclined in chair with family present, A&O x3. Pt with c/o fatigued due to just completing PT session. Pt completed grooming task sitting up in chair with set-up A. Pt performed seated BUE therex x15 reps with rest break between each set with yellow theraband. Pt wore mask, OT wore mask, gloves, appropriate ppe, hand hygiene performed pre-post treatment session. Pt to continue with skilled OT services to address deficits.  -BL     Row Name 02/01/21 1557          Vital Signs    Pre Patient Position  Sitting  -BL     Intra Patient Position  Sitting  -BL     Post Patient Position  Sitting  -BL     Row Name 02/01/21 1556          Positioning and Restraints    Pre-Treatment Position  sitting in chair/recliner  -BL     Post Treatment Position  chair  -BL     In Chair  notified nsg;reclined;call light within reach;encouraged to call for assist;exit alarm on;with family/caregiver  -       User Key  (r) = Recorded By, (t) = Taken By, (c) = Cosigned By    Initials Name Provider Type    Camille Urrutia OT Occupational Therapist         Outcome Measures     Row Name 02/01/21 1558          How much help from another is currently needed...    Putting on and taking off regular lower body clothing?  2  -BL     Bathing (including washing, rinsing, and drying)  2  -BL     Toileting (which includes using toilet bed pan or urinal)  2  -BL     Putting on and taking off regular upper body clothing  3  -BL     Taking care of personal grooming (such as brushing teeth)  3  -BL     Eating meals  3  -BL     AM-PAC 6 Clicks Score (OT)  15  -BL     Row Name 02/01/21 1558          Modified [place] Scale    Pre-Stroke Modified [place] Scale  1 - No significant disability despite symptoms.  Able to carry out all usual duties and activities.  -BL     Modified [place] Scale  3 - Moderate disability.  Requiring some help, but able to walk without assistance.  -BL     Row Name 02/01/21 1558          Functional Assessment    Outcome Measure Options  AM-PAC 6 Clicks Daily Activity (OT);Modified Lb  -BL       User Key  (r) = Recorded By, (t) = Taken By, (c) = Cosigned By    Initials Name Provider Type    Camille Urrutia OT Occupational Therapist        Occupational Therapy Education                 Title: PT OT SLP Therapies (Done)     Topic: Occupational Therapy (Done)     Point: ADL training (Done)     Description:   Instruct learner(s) on proper safety adaptation and remediation techniques during self care or transfers.   Instruct in proper use of assistive devices.              Learning Progress Summary           Patient Acceptance, E,TB, VU by JULIANO at 1/30/2021 1418                   Point: Home exercise program (Done)     Description:   Instruct learner(s) on appropriate technique for monitoring, assisting and/or progressing therapeutic exercises/activities.              Learning Progress Summary           Patient Acceptance, E,TB, VU by JULIANO at 1/30/2021 1418                   Point: Precautions (Done)     Description:   Instruct learner(s) on prescribed  precautions during self-care and functional transfers.              Learning Progress Summary           Patient Acceptance, E,TB, VU by JULIANO at 1/30/2021 1418                   Point: Body mechanics (Done)     Description:   Instruct learner(s) on proper positioning and spine alignment during self-care, functional mobility activities and/or exercises.              Learning Progress Summary           Patient Acceptance, E,TB, VU by JULIANO at 1/30/2021 1418                               User Key     Initials Effective Dates Name Provider Type Discipline     07/15/20 -  Sandra Souza OT Occupational Therapist OT              OT Recommendation and Plan     Plan of Care Review  Plan of Care Reviewed With: patient  Progress: improving  Outcome Summary: Pt seen by OT this date. Upon arrival pt reclined in chair with family present, A&O x3. Pt with c/o fatigued due to just completing PT session. Pt completed grooming task sitting up in chair with set-up A. Pt performed seated BUE therex x15 reps with rest break between each set with yellow theraband. Pt wore mask, OT wore mask, gloves, appropriate ppe, hand hygiene performed pre-post treatment session. Pt to continue with skilled OT services to address deficits.     Time Calculation:   Time Calculation- OT     Row Name 02/01/21 1558             Time Calculation- OT    OT Start Time  1401  -BL      OT Stop Time  1417  -BL      OT Time Calculation (min)  16 min  -BL      Total Timed Code Minutes- OT  16 minute(s)  -BL      OT Received On  02/01/21  -      OT - Next Appointment  02/02/21  -        User Key  (r) = Recorded By, (t) = Taken By, (c) = Cosigned By    Initials Name Provider Type     Camille Santiago OT Occupational Therapist        Therapy Charges for Today     Code Description Service Date Service Provider Modifiers Qty    [id number] HC OT THER PROC EA 15 MIN 2/1/2021 Camille Santiago OT GO 1               Camille Santiago OT  2/1/2021

## 2021-02-01 NOTE — PROGRESS NOTES
[facility] Acute Inpt Rehab Note     Received request for acute inpt rehab evaluation.  Eval in progress.  Will update CCP when eval is complete.    Thanks,   Xenia Dasilva RN  Rehab Admission Nurse   [phone]

## 2021-02-01 NOTE — PROGRESS NOTES
[facility]     LOS: 3 days     Subjective:    CC:Extremity Weakness    DIET:  Diet Order   Procedures   • Diet Regular   still very fatigued and weak  No cp  No soa  No n/v/d    Objective:    Vital Signs:  Temp:  [97.7 °F (36.5 °C)-98.2 °F (36.8 °C)] 97.7 °F (36.5 °C)  Heart Rate:  [66-73] 73  Resp:  [18-20] 18  BP: (143-219)/(49-89) 219/89    SpO2:  [96 %-97 %] 96 %  on   ;   Device (Oxygen Therapy): room air  Body mass index is 38.32 kg/m².    Physical Exam  Constitutional:       Appearance: She is well-developed.   HENT:      Head: Normocephalic and atraumatic.   Cardiovascular:      Rate and Rhythm: Normal rate and regular rhythm.      Heart sounds: No murmur. No friction rub.   Pulmonary:      Effort: Pulmonary effort is normal.      Breath sounds: Normal breath sounds.   Abdominal:      General: Bowel sounds are normal. There is no distension.      Palpations: Abdomen is soft.      Tenderness: There is no abdominal tenderness.   Skin:     General: Skin is warm and dry.   Neurological:      Mental Status: She is alert.   Psychiatric:         Mood and Affect: Mood normal.         Behavior: Behavior normal.         Results Review:    Glucose   Date Value Ref Range Status   02/01/2021 86 65 - 99 mg/dL Final   01/31/2021 81 65 - 99 mg/dL Final   01/30/2021 82 65 - 99 mg/dL Final     Results from last 7 days   Lab Units 01/31/21  0710   WBC 10*3/mm3 7.67   HEMOGLOBIN g/dL 13.5   HEMATOCRIT % 41.6   PLATELETS 10*3/mm3 242     Results from last 7 days   Lab Units 02/01/21  0530  01/30/21  0629   SODIUM mmol/L 140   < > 142   POTASSIUM mmol/L 3.9   < > 4.0   CHLORIDE mmol/L 112*   < > 113*   CO2 mmol/L 20.5*   < > 22.6   BUN mg/dL 20   < > 17   CREATININE mg/dL 1.81*   < > 1.73*   CALCIUM mg/dL 9.0   < > 8.9   BILIRUBIN mg/dL  --   --  0.5   ALK PHOS U/L  --   --  132*   ALT (SGPT) U/L  --   --  6   AST (SGOT) U/L  --   --  10   GLUCOSE mg/dL 86   < > 82    < > = values in this interval  not displayed.     Results from last 7 days   Lab Units 01/29/21  1035   INR  0.97   APTT seconds 24.3     Results from last 7 days   Lab Units 02/01/21  0530   MAGNESIUM mg/dL 1.8     Results from last 7 days   Lab Units 01/29/21  1035   TROPONIN T ng/mL 0.015     Cultures:  No results found for: BLOODCX, URINECX, WOUNDCX, MRSACX, RESPCX, STOOLCX    I have reviewed daily medications and changes in CPOE    Scheduled meds  amLODIPine, 2.5 mg, Oral, Q24H  aspirin, 81 mg, Oral, Daily  atorvastatin, 80 mg, Oral, Nightly  betamethasone dipropionate, , Topical, Q24H  clopidogrel, 75 mg, Oral, Daily  dorzolamide-timolol, , Both Eyes, BID  latanoprost, 1 drop, Both Eyes, Nightly  levothyroxine, 150 mcg, Oral, Daily  metoprolol succinate XL, 25 mg, Oral, Daily  vitamin B-12, 250 mcg, Oral, Daily           PRN meds  bisacodyl  •  nitroglycerin  •  ondansetron  •  ondansetron  •  sodium chloride        Cerebrovascular accident (CVA) (CMS/HCC)    Stage 4 chronic kidney disease (CMS/HCC)    Essential hypertension    Acquired hypothyroidism    Vitamin D deficiency    Morbidly obese (CMS/HCC)    Hypertensive urgency      Assessment/Plan:      Cerebrovascular accident (CVA) (CMS/HCC)  -mri small cva  -aspirin, add plavix, and high-dose statin  -Carotid Dopplers were unremarkable      Stage 4 chronic kidney disease (CMS/HCC)  -stable  -Nephrology is following      Essential hypertension  -bp is better  -Norvasc added back      Acquired hypothyroidism  -tsh 16 and is coming down, free T3 noted to be, increase levothyroxine  -Poor energy may be worse with beta-blocker will cut back on metoprolol      Vitamin D deficiency      Morbidly obese (CMS/HCC)      DVT PPX: scd    Neurology has signed off, patient continues to feel very weak hopefully with increased thyroid medication this may improve.  CCP to see the patient.  She may need subacute.    >35 minutes spent, > 1/2 time spent counseling and coordination of care on cva    Prasanth SALAZAR  MD Jag  02/01/21  14:16 EST

## 2021-02-01 NOTE — PLAN OF CARE
Goal Outcome Evaluation:  Plan of Care Reviewed With: patient, other (see comments)(Anjel)  Progress: improving  Outcome Summary: Educated patient on the importance of checking her blood pressure regulary.patient is trying to decide on Rehab vs. Home.

## 2021-02-01 NOTE — TELEPHONE ENCOUNTER
PT'S NIECE/POA HOMERO CALLED REQUESTING TO SPEAK WITH DR. DA SILVA OR HER NURSE WITH AN UPDATE ON PT'S CONDITION. SHE STATES PT WAS ADMITTED TO [facility] AND HAS BEEN THERE SINCE Friday. SHE HAS BEEN TOLD SHE HAD A SMALL STROKE, AND THEY ARE WAITING ON AN EVALUATION FOR PHYSICAL THERAPY DUE TO WEAKNESS.    SHE ALSO REQUESTS THAT DR. DA SILVA REVIEW PT'S RECORDS FROM HER HOSPITAL STAY.    SHE WOULD LIKE A CALL BACK ASAP -[phone]

## 2021-02-01 NOTE — PLAN OF CARE
Goal Outcome Evaluation:  Plan of Care Reviewed With: patient  Progress: improving  Outcome Summary: Pt seen by OT this date. Upon arrival pt reclined in chair with family present, A&O x3. Pt with c/o fatigued due to just completing PT session. Pt completed grooming task sitting up in chair with set-up A. Pt performed seated BUE therex x15 reps with rest break between each set with yellow theraband. Pt wore mask, OT wore mask, gloves, appropriate ppe, hand hygiene performed pre-post treatment session. Pt to continue with skilled OT services to address deficits.

## 2021-02-01 NOTE — TELEPHONE ENCOUNTER
So how we normally do this is the patient makes a follow-up appointment after her hospitalization in that first week and then we go over the hospitalization and how she is doing since she is come home from the hospital.  If you want a review of how she is doing in the hospital you would have to call the doctor that is taking care of her there.  I would not have all of that information to be able to give you a full picture.

## 2021-02-01 NOTE — TELEPHONE ENCOUNTER
Was talking to Jessica, uzairece,  she  Cut me off asking to call back because social working came in the room.

## 2021-02-01 NOTE — PLAN OF CARE
Goal Outcome Evaluation:  Plan of Care Reviewed With: patient     Outcome Summary: Pt. able to ambulate 55 feet, CGA x 1, with use of Rwx this date.  Pt. requires Min. assist x 1 for bed mobility and Min. assist x 1 for sit <-> stand transfers.  BLE ther. ex. program x 15 reps completed for general strengthening.  Verbal/tactile cues for posture correction and Rwx guidance given during ambulation.  Limited in gait distance due to fatigue and weakness.  Slow pace throughout.    Patient was wearing a face mask during this therapy encounter. Therapist used appropriate personal protective equipment including eye protection, mask, and gloves.  Mask used was standard procedure mask. Appropriate PPE was worn during the entire therapy session. Hand hygiene was completed before and after therapy session. Patient is not in enhanced droplet precautions.

## 2021-02-01 NOTE — THERAPY TREATMENT NOTE
Patient Name: Marianne Delgado  : 1925    MRN: [id number]                              Today's Date: 2021       Admit Date: 2021    Visit Dx:     ICD-10-CM ICD-9-CM   1. Cerebrovascular accident (CVA), unspecified mechanism (CMS/formerly [facility])  I63.9 434.91   2. Abnormal chest x-ray  R93.89 793.2   3. Chronic renal failure, unspecified CKD stage  N18.9 585.9     Patient Active Problem List   Diagnosis   • Arthritis   • Stage 4 chronic kidney disease (CMS/formerly [facility])   • Debility   • Foot pain, bilateral   • Glaucoma   • Acute idiopathic gout of right ankle   • Hematuria   • Essential hypertension   • Acquired hypothyroidism   • Osteopenia   • Psoriasis   • Rosacea   • Vitamin D deficiency   • Medicare annual wellness visit, subsequent   • Morbidly obese (CMS/formerly [facility])   • Cerebrovascular accident (CVA) (CMS/formerly [facility])   • Hypertensive urgency     Past Medical History:   Diagnosis Date   • Acute sinusitis    • Acute upper respiratory infection    • Arthritis    • CKD (chronic kidney disease)    • Debility    • Fatigue    • Foot pain, bilateral    • Glaucoma    • Gout    • Hematuria    • High blood pressure    • Hypertension    • Hypothyroid 1999   • Hypothyroidism    • IBS (irritable bowel syndrome)    • IGT (impaired glucose tolerance)    • Malaise and fatigue    • Medication management    • Melanoma (CMS/formerly [facility]) 1979    left leg   • OA (osteoarthritis)    • Osteopenia 1999   • Painful joint    • Psoriasis    • Renal failure    • Rosacea    • Vitamin D deficiency      Past Surgical History:   Procedure Laterality Date   • CATARACT EXTRACTION     • FOOT SURGERY      mauri toe surgery   • OTHER SURGICAL HISTORY      Melanoma Excision: Left leg     General Information     Row Name 21 1542          Physical Therapy Time and Intention    Document Type  therapy note (daily note)  -MS     Mode of Treatment  physical therapy;individual therapy  -MS     Row Name 21 1542          General Information    Patient Profile  Reviewed  yes  -MS     Existing Precautions/Restrictions  (S) fall Exit alarm  -MS     Barriers to Rehab  none identified  -MS     Row Name 02/01/21 1542          Cognition    Orientation Status (Cognition)  oriented x 3  -MS     Row Name 02/01/21 1542          Safety Issues, Functional Mobility    Comment, Safety Issues/Impairments (Mobility)  Gait belt used for safety.  -MS       User Key  (r) = Recorded By, (t) = Taken By, (c) = Cosigned By    Initials Name Provider Type    Avel Gibbs, PT Physical Therapist        Mobility     Row Name 02/01/21 1542          Bed Mobility    Bed Mobility  supine-sit;sit-supine  -MS     Supine-Sit [place] (Bed Mobility)  minimum assist (75% patient effort)  -MS     Row Name 02/01/21 1542          Sit-Stand Transfer    Sit-Stand [place]son (Transfers)  minimum assist (75% patient effort)  -MS     Assistive Device (Sit-Stand Transfers)  walker, front-wheeled  -MS     Row Name 02/01/21 1542          Gait/Stairs (Locomotion)    Anson Level (Gait)  contact guard  -MS     Assistive Device (Gait)  walker, front-wheeled  -MS     Distance in Feet (Gait)  55 feet  -MS     Deviations/Abnormal Patterns (Gait)  tho decreased;stride length decreased  -MS     Bilateral Gait Deviations  forward flexed posture  -MS     Comment (Gait/Stairs)  Verbal/tactile cues for posture correction and Rwx guidance.  -MS       User Key  (r) = Recorded By, (t) = Taken By, (c) = Cosigned By    Initials Name Provider Type    Avel Gibbs PT Physical Therapist        Obj/Interventions     Row Name 02/01/21 1543          Motor Skills    Therapeutic Exercise  -- BLE ther. ex. program x 15 reps completed (Ankle Pumps, Hip Flexion, LAQ's)  -MS       User Key  (r) = Recorded By, (t) = Taken By, (c) = Cosigned By    Initials Name Provider Type    Avel Gibbs, PT Physical Therapist        Goals/Plan    No documentation.       Clinical Impression     Row Name 02/01/21 1544           Pain    Additional Documentation  Pain Scale: Numbers Pre/Post-Treatment (Group)  -MS     Row Name 02/01/21 1544          Pain Scale: Numbers Pre/Post-Treatment    Pretreatment Pain Rating  0/10 - no pain  -MS     Posttreatment Pain Rating  0/10 - no pain  -MS     Row Name 02/01/21 1544          Positioning and Restraints    Pre-Treatment Position  in bed  -MS     Post Treatment Position  chair  -MS     In Chair  notified nsg;reclined;sitting;call light within reach;encouraged to call for assist;exit alarm on;with family/caregiver All lines intact.  -MS       User Key  (r) = Recorded By, (t) = Taken By, (c) = Cosigned By    Initials Name Provider Type    Avel Gibbs PT Physical Therapist        Outcome Measures     Row Name 02/01/21 1544          How much help from another person do you currently need...    Turning from your back to your side while in flat bed without using bedrails?  3  -MS     Moving from lying on back to sitting on the side of a flat bed without bedrails?  3  -MS     Moving to and from a bed to a chair (including a wheelchair)?  3  -MS     Standing up from a chair using your arms (e.g., wheelchair, bedside chair)?  3  -MS     Climbing 3-5 steps with a railing?  2  -MS     To walk in hospital room?  3  -MS     AM-PAC 6 Clicks Score (PT)  17  -MS     Row Name 02/01/21 1544          Functional Assessment    Outcome Measure Options  AM-PAC 6 Clicks Basic Mobility (PT)  -MS       User Key  (r) = Recorded By, (t) = Taken By, (c) = Cosigned By    Initials Name Provider Type    Avel Gibbs, PT Physical Therapist        Physical Therapy Education                 Title: PT OT SLP Therapies (Done)     Topic: Physical Therapy (Done)     Point: Mobility training (Done)     Learning Progress Summary           Patient Acceptance, E,D, VU,NR by MS at 2/1/2021 1545    Acceptance, E, VU,NR by RS at 1/31/2021 1203    Acceptance, E, VU,NR by RS at 1/30/2021 1226    Acceptance, E, VU,NR by RS at  1/30/2021 1220                   Point: Home exercise program (Done)     Learning Progress Summary           Patient Acceptance, E,D, VU,NR by MS at 2/1/2021 1545    Acceptance, E, VU,NR by RS at 1/31/2021 1203    Acceptance, E, VU,NR by RS at 1/30/2021 1226    Acceptance, E, VU,NR by RS at 1/30/2021 1220                   Point: Body mechanics (Done)     Learning Progress Summary           Patient Acceptance, E,D, VU,NR by MS at 2/1/2021 1545    Acceptance, E, VU,NR by RS at 1/31/2021 1203    Acceptance, E, VU,NR by RS at 1/30/2021 1226    Acceptance, E, VU,NR by RS at 1/30/2021 1220                   Point: Precautions (Done)     Learning Progress Summary           Patient Acceptance, E,D, VU,NR by MS at 2/1/2021 1545    Acceptance, E, VU,NR by RS at 1/31/2021 1203    Acceptance, E, VU,NR by RS at 1/30/2021 1226    Acceptance, E, VU,NR by RS at 1/30/2021 1220                               User Key     Initials Effective Dates Name Provider Type Discipline    MS 04/03/18 -  Avel Hickey PT Physical Therapist PT     12/04/20 -  Rose Nichols, DOUG Physical Therapist PT              PT Recommendation and Plan     Plan of Care Reviewed With: patient  Outcome Summary: Pt. able to ambulate 55 feet, CGA x 1, with use of Rwx this date.  Pt. requires Min. assist x 1 for bed mobility and Min. assist x 1 for sit <-> stand transfers.  BLE ther. ex. program x 15 reps completed for general strengthening.  Verbal/tactile cues for posture correction and Rwx guidance given during ambulation.  Limited in gait distance due to fatigue and weakness.  Slow pace throughout.     Time Calculation:   PT Charges     Row Name 02/01/21 1547             Time Calculation    Start Time  1327  -MS      Stop Time  1351  -MS      Time Calculation (min)  24 min  -MS      PT Received On  02/01/21  -MS      PT - Next Appointment  02/02/21  -MS         Time Calculation- PT    Total Timed Code Minutes- PT  23 minute(s)  -MS        User Key   (r) = Recorded By, (t) = Taken By, (c) = Cosigned By    Initials Name Provider Type    Avel Gibbs, PT Physical Therapist        Therapy Charges for Today     Code Description Service Date Service Provider Modifiers Qty    07759524383 HC PT THER PROC EA 15 MIN 2/1/2021 Avel Hickey, PT GP 2          PT G-Codes  Outcome Measure Options: AM-PAC 6 Clicks Basic Mobility (PT)  AM-PAC 6 Clicks Score (PT): 17  AM-PAC 6 Clicks Score (OT): 15  Modified Hyden Scale: 3 - Moderate disability.  Requiring some help, but able to walk without assistance.    Avel Hickey, PT  2/1/2021

## 2021-02-01 NOTE — DISCHARGE PLACEMENT REQUEST
"Mk Maya (89 y.o. Female)     Date of Birth Social Security Number Address Home Phone MRN    07/14/1925  [address] [place] [id number] [phone] [id number]    Scientologist Marital Status          Yarsani        Admission Date Admission Type Admitting Provider Attending Provider Department, Room/Bed    1/29/21 Emergency Prasanth Rm MD Edling, Stephen A, MD [address], [address]    Discharge Date Discharge Disposition Discharge Destination                       Attending Provider: Prasanth Rm MD    Allergies: Azithromycin, Cefdinir, Doxycycline Monohydrate, Allopurinol    Isolation: None   Infection: None   Code Status: No CPR    Ht: 154.9 cm (61\")   Wt: 92 kg (202 lb 13.2 oz)    Admission Cmt: None   Principal Problem: Cerebrovascular accident (CVA) (CMS/[facility]) [I63.9]                 Active Insurance as of 1/29/2021     Primary Coverage     Payor Plan Insurance Group Employer/Plan Group    HUMANA MEDICARE REPLACEMENT HUMANA MEDICARE REPLACEMENT [id number]     Payor Plan Address Payor Plan Phone Number Payor Plan Fax Number Effective Dates    [address] [address] [phone]  1/1/2018 - None Entered    [facility] [id number]       Subscriber Name Subscriber Birth Date Member ID       MK MAYA 7/14/1925 [id number]                 Emergency Contacts      (Rel.) Home Phone Work Phone Mobile Phone    Jessica Osei (Power of ) [phone] -- --              "

## 2021-02-02 ENCOUNTER — APPOINTMENT (OUTPATIENT)
Dept: GENERAL RADIOLOGY | Facility: HOSPITAL | Age: 86
End: 2021-02-02

## 2021-02-02 LAB
ANION GAP SERPL CALCULATED.3IONS-SCNC: 7.4 MMOL/L (ref 5–15)
BUN SERPL-MCNC: 21 MG/DL (ref 8–23)
BUN/CREAT SERPL: 11.1 (ref 7–25)
CALCIUM SPEC-SCNC: 9 MG/DL (ref 8.2–9.6)
CHLORIDE SERPL-SCNC: 111 MMOL/L (ref 98–107)
CO2 SERPL-SCNC: 21.6 MMOL/L (ref 22–29)
CREAT SERPL-MCNC: 1.89 MG/DL (ref 0.57–1)
GFR SERPL CREATININE-BSD FRML MDRD: 25 ML/MIN/1.73
GLUCOSE SERPL-MCNC: 89 MG/DL (ref 65–99)
POTASSIUM SERPL-SCNC: 4.3 MMOL/L (ref 3.5–5.2)
SODIUM SERPL-SCNC: 140 MMOL/L (ref 136–145)

## 2021-02-02 PROCEDURE — 97110 THERAPEUTIC EXERCISES: CPT

## 2021-02-02 PROCEDURE — 80048 BASIC METABOLIC PNL TOTAL CA: CPT | Performed by: INTERNAL MEDICINE

## 2021-02-02 PROCEDURE — 73560 X-RAY EXAM OF KNEE 1 OR 2: CPT

## 2021-02-02 RX ORDER — LIDOCAINE HYDROCHLORIDE 10 MG/ML
5 INJECTION, SOLUTION INFILTRATION; PERINEURAL ONCE
Status: DISCONTINUED | OUTPATIENT
Start: 2021-02-02 | End: 2021-02-03 | Stop reason: HOSPADM

## 2021-02-02 RX ORDER — ACETAMINOPHEN 325 MG/1
650 TABLET ORAL EVERY 6 HOURS PRN
Status: DISCONTINUED | OUTPATIENT
Start: 2021-02-02 | End: 2021-02-03 | Stop reason: HOSPADM

## 2021-02-02 RX ORDER — TROLAMINE SALICYLATE 10 G/100G
CREAM TOPICAL AS NEEDED
Status: DISCONTINUED | OUTPATIENT
Start: 2021-02-02 | End: 2021-02-03 | Stop reason: HOSPADM

## 2021-02-02 RX ORDER — TRIAMCINOLONE ACETONIDE 40 MG/ML
40 INJECTION, SUSPENSION INTRA-ARTICULAR; INTRAMUSCULAR ONCE
Status: DISCONTINUED | OUTPATIENT
Start: 2021-02-02 | End: 2021-02-03 | Stop reason: HOSPADM

## 2021-02-02 RX ORDER — HYDROCODONE BITARTRATE AND ACETAMINOPHEN 5; 325 MG/1; MG/1
1 TABLET ORAL EVERY 6 HOURS PRN
Status: DISCONTINUED | OUTPATIENT
Start: 2021-02-02 | End: 2021-02-03 | Stop reason: HOSPADM

## 2021-02-02 RX ADMIN — TROLAMINE SALICYLATE: 10 CREAM TOPICAL at 22:12

## 2021-02-02 RX ADMIN — LATANOPROST 1 DROP: 50 SOLUTION/ DROPS OPHTHALMIC at 22:12

## 2021-02-02 RX ADMIN — LEVOTHYROXINE SODIUM 150 MCG: 150 TABLET ORAL at 07:24

## 2021-02-02 RX ADMIN — AMLODIPINE BESYLATE 2.5 MG: 2.5 TABLET ORAL at 08:53

## 2021-02-02 RX ADMIN — ATORVASTATIN CALCIUM 80 MG: 80 TABLET, FILM COATED ORAL at 22:12

## 2021-02-02 RX ADMIN — BETAMETHASONE DIPROPIONATE: 0.5 OINTMENT TOPICAL at 08:54

## 2021-02-02 RX ADMIN — TIMOLOL MALEATE: 5 SOLUTION/ DROPS OPHTHALMIC at 22:12

## 2021-02-02 RX ADMIN — ASPIRIN 81 MG: 81 TABLET, COATED ORAL at 08:47

## 2021-02-02 RX ADMIN — CLOPIDOGREL 75 MG: 75 TABLET, FILM COATED ORAL at 08:47

## 2021-02-02 RX ADMIN — Medication 250 MCG: at 08:47

## 2021-02-02 RX ADMIN — ACETAMINOPHEN 650 MG: 325 TABLET, FILM COATED ORAL at 14:14

## 2021-02-02 RX ADMIN — TIMOLOL MALEATE: 5 SOLUTION/ DROPS OPHTHALMIC at 08:51

## 2021-02-02 RX ADMIN — METOPROLOL SUCCINATE 12.5 MG: 25 TABLET, EXTENDED RELEASE ORAL at 08:46

## 2021-02-02 NOTE — PLAN OF CARE
Goal Outcome Evaluation:  Plan of Care Reviewed With: patient     Outcome Summary: Pt. requires Min. assist x 1 for bed mobility and Min. assist x 1 for sit <-> stand/stand -pivot transfers this date.  Pt. requires Mod. assist x 1 for attempted gait but pt. unable to bear weight on her LLE due to Left knee pain.  Pt. performed sit <-> stand transfers x 2 at bedside for functional strength training. BUE/LE ther. ex. program x 15 reps completed for general strengthening. Verbal/tactile cues given throughout upright mobility for posture correction.    Patient was wearing a face mask during this therapy encounter. Therapist used appropriate personal protective equipment including eye protection, mask, and gloves.  Mask used was standard procedure mask. Appropriate PPE was worn during the entire therapy session. Hand hygiene was completed before and after therapy session. Patient is not in enhanced droplet precautions.

## 2021-02-02 NOTE — PROGRESS NOTES
Continued Stay Note  [facility]     Patient Name: Marianne Delgado  MRN: [id number]  Today's Date: 2/2/2021    Admit Date: 1/29/2021    Discharge Plan     Row Name 02/02/21 1524       Plan    Plan  Pt has been approved for  Acute rehab pending precert.    Plan Comments  CCP spoke with pt and floyd Lopez to update on plan..  Pt has been approved, pending precert.  They have decided if pt cannot go to acute, they will go home with pvt paid caregivers.  Corinne Carey RN/CCP        Discharge Codes    No documentation.       Expected Discharge Date and Time     Expected Discharge Date Expected Discharge Time    Feb 2, 2021             Corinne Carey RN

## 2021-02-02 NOTE — PROGRESS NOTES
"   LOS: 4 days    Patient Care Team:  Xenia Robert MD as PCP - General (Family Medicine)    Chief Complaint:    Chief Complaint   Patient presents with   • Extremity Weakness     Follow UP CKD 4  Subjective     Interval History:   No appetite. Left knee very painful. Has had injections for osteoarthritis in past, September most recent.  Also with remote history of gout in ankle.  Not soa.     Objective     Vital Signs  Temp:  [98 °F (36.7 °C)-98.3 °F (36.8 °C)] 98 °F (36.7 °C)  Heart Rate:  [67-79] 72  Resp:  [16-18] 16  BP: (142-191)/() 168/63    Flowsheet Rows      First Filed Value   Admission Height  154.9 cm (61\") Documented at 01/29/2021 1612   Admission Weight  89.4 kg (197 lb 3.2 oz) Documented at 01/29/2021 1027          No intake/output data recorded.  I/O last 3 completed shifts:  In: -   Out: 2380 [Urine:2380]    Intake/Output Summary (Last 24 hours) at 2/2/2021 1743  Last data filed at 2/2/2021 0700  Gross per 24 hour   Intake --   Output 1680 ml   Net -1680 ml       Physical Exam:  General Appearance: Appears younger than age.  Speech initially a little dysarthric.     HEENT mask applied. No scleral icterus.   Oral mucosa dry .  Neck supple no JVD  Heart RRR no s3 or rub  Lungs Clear to auscultation, no wheezing.   Abd + bs, soft, nontender.   Ext left knee warm, possible effusion medially.  Tender to touch  Skin no rash .     Results Review:    Results from last 7 days   Lab Units 02/02/21  0450 02/01/21  0530 01/31/21  0710 01/30/21  0629 01/29/21  1035   SODIUM mmol/L 140 140 139 142 143   POTASSIUM mmol/L 4.3 3.9 4.2 4.0 4.0   CHLORIDE mmol/L 111* 112* 110* 113* 111*   CO2 mmol/L 21.6* 20.5* 20.0* 22.6 23.6   BUN mg/dL 21 20 20 17 20   CREATININE mg/dL 1.89* 1.81* 1.69* 1.73* 2.20*   CALCIUM mg/dL 9.0 9.0 9.0 8.9 9.2   BILIRUBIN mg/dL  --   --   --  0.5 0.3   ALK PHOS U/L  --   --   --  132* 143*   ALT (SGPT) U/L  --   --   --  6 8   AST (SGOT) U/L  --   --   --  10 10   GLUCOSE mg/dL " 89 86 81 82 94       Estimated Creatinine Clearance: 18.3 mL/min (A) (by C-G formula based on SCr of 1.89 mg/dL (H)).    Results from last 7 days   Lab Units 02/01/21  0530 01/31/21  0710   MAGNESIUM mg/dL 1.8 1.8   PHOSPHORUS mg/dL 3.0 3.3       Results from last 7 days   Lab Units 02/01/21  0530 01/31/21  0710   URIC ACID mg/dL 7.6* 7.7*       Results from last 7 days   Lab Units 01/31/21  0710 01/30/21  0629 01/29/21  1035   WBC 10*3/mm3 7.67 7.69 11.83*   HEMOGLOBIN g/dL 13.5 12.5 13.3   PLATELETS 10*3/mm3 242 244 292       Results from last 7 days   Lab Units 01/29/21  1035   INR  0.97         Imaging Results (Last 24 Hours)     Procedure Component Value Units Date/Time    XR Knee 1 or 2 View Left [[id number]] Collected: 02/02/21 1711     Updated: 02/02/21 1715    Narrative:      XR KNEE 1 OR 2 VW LEFT-  2/2/2021     HISTORY: Knee pain.     Bones are mildly demineralized. There are mild hypertrophic changes in  the knee. Small amount of vascular calcification is seen. There is a  suprapatellar effusion.     No fractures or subluxation is seen.       Impression:      Mild to moderate degenerative arthritis of the left knee.  2. No acute bony abnormality is seen.  3. Suprapatellar effusion.     This report was finalized on 2/2/2021 5:12 PM by Dr. Kyler Mello M.D.           amLODIPine, 2.5 mg, Oral, Q24H  aspirin, 81 mg, Oral, Daily  atorvastatin, 80 mg, Oral, Nightly  betamethasone dipropionate, , Topical, Q24H  clopidogrel, 75 mg, Oral, Daily  dorzolamide-timolol, , Both Eyes, BID  latanoprost, 1 drop, Both Eyes, Nightly  levothyroxine, 150 mcg, Oral, Daily  metoprolol succinate XL, 12.5 mg, Oral, Daily  vitamin B-12, 250 mcg, Oral, Daily           Medication Review:   Current Facility-Administered Medications   Medication Dose Route Frequency Provider Last Rate Last Admin   • acetaminophen (TYLENOL) tablet 650 mg  650 mg Oral Q6H PRN Prasanth Rm MD   650 mg at 02/02/21 1414   • amLODIPine (NORVASC)  tablet 2.5 mg  2.5 mg Oral Q24H Bernard Emery MD   2.5 mg at 02/02/21 0853   • aspirin EC tablet 81 mg  81 mg Oral Daily Sandy Miller APRN   81 mg at 02/02/21 0847   • atorvastatin (LIPITOR) tablet 80 mg  80 mg Oral Nightly Jannette Dasilva APRN   80 mg at 02/01/21 2255   • betamethasone dipropionate (DIPROLENE) 0.05 % ointment   Topical Q24H Prasanth Rm MD   Given at 02/02/21 0854   • bisacodyl (DULCOLAX) suppository 10 mg  10 mg Rectal Daily PRN Jannette Dasilva APRN   10 mg at 01/31/21 1141   • clopidogrel (PLAVIX) tablet 75 mg  75 mg Oral Daily Sandy Miller APRN   75 mg at 02/02/21 0847   • dorzolamide (TRUSOPT) 2 % 1 drop, timolol (TIMOPTIC) 0.5 % 1 drop for Cosopt 22.3-6.8 mg/mL   Both Eyes BID Prasanth Rm MD   Given at 02/02/21 0851   • HYDROcodone-acetaminophen (NORCO) 5-325 MG per tablet 1 tablet  1 tablet Oral Q6H PRN Prasanth Rm MD       • latanoprost (XALATAN) 0.005 % ophthalmic solution 1 drop  1 drop Both Eyes Nightly Prasanth Rm MD   1 drop at 02/01/21 2254   • levothyroxine (SYNTHROID, LEVOTHROID) tablet 150 mcg  150 mcg Oral Daily Prasanth Rm MD   150 mcg at 02/02/21 0724   • metoprolol succinate XL (TOPROL-XL) 24 hr tablet 12.5 mg  12.5 mg Oral Daily Prasanth Rm MD   12.5 mg at 02/02/21 0846   • nitroglycerin (NITROSTAT) SL tablet 0.4 mg  0.4 mg Sublingual Q5 Min PRN Jannette Dasilva APRN       • ondansetron (ZOFRAN) injection 4 mg  4 mg Intravenous Q4H PRN Jannette Dasilva APRN       • ondansetron (ZOFRAN) injection 4 mg  4 mg Intravenous Q6H PRN Jannette Dasilva APRN       • sodium chloride 0.9 % flush 10 mL  10 mL Intravenous PRN Julian Jaquez MD   10 mL at 01/29/21 2033   • trolamine salicylate (ASPERCREME) 10 % cream   Topical PRN Prasanth Rm MD       • vitamin B-12 (CYANOCOBALAMIN) tablet 250 mcg  250 mcg Oral Daily Prasanth Rm MD   250 mcg at 02/02/21 0834       Assessment/Plan   1.  Chronic  kidney disease stage IV , baseline 2.3. likely  nephrosclerosis, Crt stable 1.89 K normal.  Poss NAGMA, HCo3 stable 21.6.  Euvolemic.  2.  Hypertension - liberal BP target given advanced age and frailty, ie 150 - 160 systolic acceptable but still too high. Norvasc added yesterday, will increase dose to 5 mg tomorrow if not better.   3.  CVA with weakness of the left lower extremity.   4.  Hypothyroidism on replacement.   5.  Glaucoma  6.  Gout. Now with left knee pain. Ortho to see.     Plan  1. Monitor chemistries.          Cerebrovascular accident (CVA) (CMS/HCC)    Stage 4 chronic kidney disease (CMS/HCC)    Essential hypertension    Acquired hypothyroidism    Vitamin D deficiency    Morbidly obese (CMS/HCC)    Hypertensive urgency              Corinne Pugh MD  02/02/21  17:43 EST

## 2021-02-02 NOTE — THERAPY TREATMENT NOTE
Patient Name: Marianne Delgado  : 1925    MRN: [id number]                              Today's Date: 2021       Admit Date: 2021    Visit Dx:     ICD-10-CM ICD-9-CM   1. Cerebrovascular accident (CVA), unspecified mechanism (CMS/[facility])  I63.9 434.91   2. Abnormal chest x-ray  R93.89 793.2   3. Chronic renal failure, unspecified CKD stage  N18.9 585.9     Patient Active Problem List   Diagnosis   • Arthritis   • Stage 4 chronic kidney disease (CMS/[facility])   • Debility   • Foot pain, bilateral   • Glaucoma   • Acute idiopathic gout of right ankle   • Hematuria   • Essential hypertension   • Acquired hypothyroidism   • Osteopenia   • Psoriasis   • Rosacea   • Vitamin D deficiency   • Medicare annual wellness visit, subsequent   • Morbidly obese (CMS/[facility])   • Cerebrovascular accident (CVA) (CMS/[facility])   • Hypertensive urgency     Past Medical History:   Diagnosis Date   • Acute sinusitis    • Acute upper respiratory infection    • Arthritis    • CKD (chronic kidney disease)    • Debility    • Fatigue    • Foot pain, bilateral    • Glaucoma    • Gout    • Hematuria    • High blood pressure    • Hypertension    • Hypothyroid 1999   • Hypothyroidism    • IBS (irritable bowel syndrome)    • IGT (impaired glucose tolerance)    • Malaise and fatigue    • Medication management    • Melanoma (CMS/[facility]) 1979    left leg   • OA (osteoarthritis)    • Osteopenia 1999   • Painful joint    • Psoriasis    • Renal failure    • Rosacea    • Vitamin D deficiency      Past Surgical History:   Procedure Laterality Date   • CATARACT EXTRACTION     • FOOT SURGERY      mauri toe surgery   • OTHER SURGICAL HISTORY      Melanoma Excision: Left leg     General Information     Row Name 21 1147          Physical Therapy Time and Intention    Document Type  therapy note (daily note)  -MS     Mode of Treatment  physical therapy;individual therapy  -MS     Row Name 21 1147          General Information    Patient Profile  Reviewed  yes  -MS     Existing Precautions/Restrictions  (S) fall Exit alarm  -MS     Barriers to Rehab  none identified  -MS     Row Name 02/02/21 1147          Safety Issues, Functional Mobility    Comment, Safety Issues/Impairments (Mobility)  Gait belt used for safety.  -MS       User Key  (r) = Recorded By, (t) = Taken By, (c) = Cosigned By    Initials Name Provider Type    MS MarksHickey, Avel KIRKLAND, PT Physical Therapist        Mobility     Row Name 02/02/21 1148          Bed Mobility    Supine-Sit Ward (Bed Mobility)  minimum assist (75% patient effort)  -MS     Row Name 02/02/21 1148          Transfers    Comment (Transfers)  Pt. performed sit <-> stand transfers x 2 for functional strength training. Pt. attempted to ambulate with each stance but was unable to bear weight on her Left L.E. due to Left knee pain.  Pt. was then able to stand-pivot from bed to chair using Rwx.  -MS     Row Name 02/02/21 1148          Bed-Chair Transfer    Bed-Chair Ward (Transfers)  minimum assist (75% patient effort)  -MS     Assistive Device (Bed-Chair Transfers)  walker, front-wheeled  -MS     Row Name 02/02/21 1148          Sit-Stand Transfer    Sit-Stand Ward (Transfers)  minimum assist (75% patient effort)  -MS     Assistive Device (Sit-Stand Transfers)  walker, front-wheeled  -MS     Row Name 02/02/21 1148          Gait/Stairs (Locomotion)    Ward Level (Gait)  moderate assist (50% patient effort)  -MS     Assistive Device (Gait)  walker, front-wheeled  -MS     Distance in Feet (Gait)  Pt. attempted gait this date but is unable to bear weight on her Left L.E. due to increased Left knee pain.  -MS       User Key  (r) = Recorded By, (t) = Taken By, (c) = Cosigned By    Initials Name Provider Type    Avel Gibbs PT Physical Therapist        Obj/Interventions     Row Name 02/02/21 1150          Motor Skills    Therapeutic Exercise  -- BUE/LE ther. ex. program x 15 reps completed (Ankle  Pumps, Hip Flexion, LAQ's, Shld Shrugs, Scap. Retractions)  -MS       User Key  (r) = Recorded By, (t) = Taken By, (c) = Cosigned By    Initials Name Provider Type    Avel Gibbs, PT Physical Therapist        Goals/Plan    No documentation.       Clinical Impression     Row Name 02/02/21 1150          Pain    Additional Documentation  Pain Scale: Numbers Pre/Post-Treatment (Group)  -MS     Row Name 02/02/21 1150          Pain Scale: Numbers Pre/Post-Treatment    Pretreatment Pain Rating  8/10  -MS     Posttreatment Pain Rating  3/10  -MS     Pain Location - Side  Left  -MS     Pain Location  knee  -MS     Pre/Posttreatment Pain Comment  Pain with LLE weight bearing  -MS     Pain Intervention(s)  Medication (See MAR);Rest;Repositioned  -MS     Row Name 02/02/21 1150          Positioning and Restraints    Pre-Treatment Position  in bed  -MS     Post Treatment Position  chair  -MS     In Chair  notified nsg;sitting;call light within reach;encouraged to call for assist;exit alarm on;with family/caregiver All lines intact.  -MS       User Key  (r) = Recorded By, (t) = Taken By, (c) = Cosigned By    Initials Name Provider Type    Avel Gibbs, PT Physical Therapist        Outcome Measures     Row Name 02/02/21 1151          How much help from another person do you currently need...    Turning from your back to your side while in flat bed without using bedrails?  3  -MS     Moving from lying on back to sitting on the side of a flat bed without bedrails?  3  -MS     Moving to and from a bed to a chair (including a wheelchair)?  3  -MS     Standing up from a chair using your arms (e.g., wheelchair, bedside chair)?  3  -MS     Climbing 3-5 steps with a railing?  2  -MS     To walk in hospital room?  2  -MS     AM-PAC 6 Clicks Score (PT)  16  -MS     Row Name 02/02/21 1151          Functional Assessment    Outcome Measure Options  AM-PAC 6 Clicks Basic Mobility (PT)  -MS       User Key  (r) = Recorded By, (t)  = Taken By, (c) = Cosigned By    Initials Name Provider Type    MS Avel Hickey, PT Physical Therapist        Physical Therapy Education                 Title: PT OT SLP Therapies (Done)     Topic: Physical Therapy (Done)     Point: Mobility training (Done)     Learning Progress Summary           Patient Acceptance, E,D, VU,NR by MS at 2/2/2021 1151    Acceptance, E,D, VU,NR by MS at 2/1/2021 1545    Acceptance, E, VU,NR by RS at 1/31/2021 1203    Acceptance, E, VU,NR by RS at 1/30/2021 1226    Acceptance, E, VU,NR by RS at 1/30/2021 1220                   Point: Home exercise program (Done)     Learning Progress Summary           Patient Acceptance, E,D, VU,NR by MS at 2/2/2021 1151    Acceptance, E,D, VU,NR by MS at 2/1/2021 1545    Acceptance, E, VU,NR by RS at 1/31/2021 1203    Acceptance, E, VU,NR by RS at 1/30/2021 1226    Acceptance, E, VU,NR by RS at 1/30/2021 1220                   Point: Body mechanics (Done)     Learning Progress Summary           Patient Acceptance, E,D, VU,NR by MS at 2/2/2021 1151    Acceptance, E,D, VU,NR by MS at 2/1/2021 1545    Acceptance, E, VU,NR by RS at 1/31/2021 1203    Acceptance, E, VU,NR by RS at 1/30/2021 1226    Acceptance, E, VU,NR by RS at 1/30/2021 1220                   Point: Precautions (Done)     Learning Progress Summary           Patient Acceptance, E,D, VU,NR by MS at 2/2/2021 1151    Acceptance, E,D, VU,NR by MS at 2/1/2021 1545    Acceptance, E, VU,NR by RS at 1/31/2021 1203    Acceptance, E, VU,NR by RS at 1/30/2021 1226    Acceptance, E, VU,NR by RS at 1/30/2021 1220                               User Key     Initials Effective Dates Name Provider Type Discipline    MS 04/03/18 -  Avel Hickey, PT Physical Therapist PT    RS 12/04/20 -  Rose Nichols, PT Physical Therapist PT              PT Recommendation and Plan     Plan of Care Reviewed With: patient  Outcome Summary: Pt. requires Min. assist x 1 for bed mobility and Min. assist x 1 for sit  <-> stand/stand -pivot transfers this date.  Pt. requires Mod. assist x 1 for attempted gait but pt. unable to bear weight on her LLE due to Left knee pain.  Pt. performed sit <-> stand transfers x 2 at bedside for functional strength training. BUE/LE ther. ex. program x 15 reps completed for general strengthening. Verbal/tactile cues given throughout upright mobility for posture correction.     Time Calculation:   PT Charges     Row Name 02/02/21 1153             Time Calculation    Start Time  1121  -MS      Stop Time  1145  -MS      Time Calculation (min)  24 min  -MS      PT Received On  02/02/21  -MS      PT - Next Appointment  02/03/21  -MS         Time Calculation- PT    Total Timed Code Minutes- PT  23 minute(s)  -MS        User Key  (r) = Recorded By, (t) = Taken By, (c) = Cosigned By    Initials Name Provider Type    Avel Gibbs, PT Physical Therapist        Therapy Charges for Today     Code Description Service Date Service Provider Modifiers Qty    74165168317 HC PT THER PROC EA 15 MIN 2/1/2021 Avel Hickey, PT GP 2    13039715032 HC PT THER PROC EA 15 MIN 2/2/2021 Avel Hickey, PT GP 2          PT G-Codes  Outcome Measure Options: AM-PAC 6 Clicks Basic Mobility (PT)  AM-PAC 6 Clicks Score (PT): 16  AM-PAC 6 Clicks Score (OT): 15  Modified Lb Scale: 3 - Moderate disability.  Requiring some help, but able to walk without assistance.    Avel Hickey, PT  2/2/2021

## 2021-02-02 NOTE — PROGRESS NOTES
[facility] Acute Inpt Rehab Note     Chart reviewed with Dr. Matta and he accepted for acute inpt rehab.  Patient will require pre-cert with [employer].  Patient and POA aware patient may be considered too high level from [employer]'s perspective.  Will initiate a pre-cert today and wait to hear back from [employer].      Jessica BRISCOE, and Corinne KIRBY aware.    Thanks,   Xenia Dasilva, RN  Rehab Admissions Office   [phone]

## 2021-02-02 NOTE — PROGRESS NOTES
[facility]     LOS: 4 days     Subjective:    CC:Extremity Weakness    DIET:  Diet Order   Procedures   • Diet Regular   feeling better  No cp  No soa  No n/v/d    Objective:    Vital Signs:  Temp:  [98 °F (36.7 °C)-98.3 °F (36.8 °C)] 98 °F (36.7 °C)  Heart Rate:  [67-79] 72  Resp:  [16-18] 16  BP: (142-191)/() 168/63    SpO2:  [96 %] 96 %  on   ;   Device (Oxygen Therapy): room air  Body mass index is 38.03 kg/m².    Physical Exam  Constitutional:       Appearance: She is well-developed.   HENT:      Head: Normocephalic and atraumatic.   Cardiovascular:      Rate and Rhythm: Normal rate and regular rhythm.      Heart sounds: No murmur. No friction rub.   Pulmonary:      Effort: Pulmonary effort is normal.      Breath sounds: Normal breath sounds.   Abdominal:      General: Bowel sounds are normal. There is no distension.      Palpations: Abdomen is soft.      Tenderness: There is no abdominal tenderness.   Skin:     General: Skin is warm and dry.   Neurological:      Mental Status: She is alert.   Psychiatric:         Mood and Affect: Mood normal.         Behavior: Behavior normal.         Results Review:    Glucose   Date Value Ref Range Status   02/02/2021 89 65 - 99 mg/dL Final   02/01/2021 86 65 - 99 mg/dL Final   01/31/2021 81 65 - 99 mg/dL Final     Results from last 7 days   Lab Units 01/31/21  0710   WBC 10*3/mm3 7.67   HEMOGLOBIN g/dL 13.5   HEMATOCRIT % 41.6   PLATELETS 10*3/mm3 242     Results from last 7 days   Lab Units 02/02/21  0450  01/30/21  0629   SODIUM mmol/L 140   < > 142   POTASSIUM mmol/L 4.3   < > 4.0   CHLORIDE mmol/L 111*   < > 113*   CO2 mmol/L 21.6*   < > 22.6   BUN mg/dL 21   < > 17   CREATININE mg/dL 1.89*   < > 1.73*   CALCIUM mg/dL 9.0   < > 8.9   BILIRUBIN mg/dL  --   --  0.5   ALK PHOS U/L  --   --  132*   ALT (SGPT) U/L  --   --  6   AST (SGOT) U/L  --   --  10   GLUCOSE mg/dL 89   < > 82    < > = values in this interval not displayed.      Results from last 7 days   Lab Units 01/29/21  1035   INR  0.97   APTT seconds 24.3     Results from last 7 days   Lab Units 02/01/21  0530   MAGNESIUM mg/dL 1.8     Results from last 7 days   Lab Units 01/29/21  1035   TROPONIN T ng/mL 0.015     Cultures:  No results found for: BLOODCX, URINECX, WOUNDCX, MRSACX, RESPCX, STOOLCX    I have reviewed daily medications and changes in CPOE    Scheduled meds  amLODIPine, 2.5 mg, Oral, Q24H  aspirin, 81 mg, Oral, Daily  atorvastatin, 80 mg, Oral, Nightly  betamethasone dipropionate, , Topical, Q24H  clopidogrel, 75 mg, Oral, Daily  dorzolamide-timolol, , Both Eyes, BID  latanoprost, 1 drop, Both Eyes, Nightly  levothyroxine, 150 mcg, Oral, Daily  metoprolol succinate XL, 12.5 mg, Oral, Daily  vitamin B-12, 250 mcg, Oral, Daily           PRN meds  bisacodyl  •  nitroglycerin  •  ondansetron  •  ondansetron  •  sodium chloride        Cerebrovascular accident (CVA) (CMS/HCC)    Stage 4 chronic kidney disease (CMS/HCC)    Essential hypertension    Acquired hypothyroidism    Vitamin D deficiency    Morbidly obese (CMS/HCC)    Hypertensive urgency      Assessment/Plan:      Cerebrovascular accident (CVA) (CMS/HCC)  -mri small cva  -aspirin, add plavix, and high-dose statin  -Carotid Dopplers were unremarkable      Stage 4 chronic kidney disease (CMS/HCC)  -stable  -Nephrology is following      Essential hypertension  -bp is better  -Norvasc added back      Acquired hypothyroidism  -tsh 16 and is coming down, free T3 noted to be, increase levothyroxine  -Poor energy may be worse with beta-blocker will cut back on metoprolol    Now with left knee pain will ask ortho to see about an injection- start tylenol and aspercream      Vitamin D deficiency      Morbidly obese (CMS/HCC)      DVT PPX: scd    Patient accepted to acute rehab            Prasanth Rm MD  02/02/21  13:40 EST

## 2021-02-02 NOTE — PLAN OF CARE
Goal Outcome Evaluation:  Plan of Care Reviewed With: patient  Progress: no change  Nihss was two, alert & oriented x4, vss, medicated as ordered  & tolerated well, I will continue to monitor.

## 2021-02-02 NOTE — PLAN OF CARE
Goal Outcome Evaluation:  Plan of Care Reviewed With: patient, family  Progress: improving  Outcome Summary: Patient is agreeable to Rehab awaiting precert.Patient continues to have left knee pain.Tylenol Norco and a pain cream were added today.Consult to Ortho was called in.

## 2021-02-03 ENCOUNTER — HOSPITAL ENCOUNTER (INPATIENT)
Facility: HOSPITAL | Age: 86
LOS: 14 days | Discharge: HOME HEALTH | End: 2021-02-17
Attending: PHYSICAL MEDICINE & REHABILITATION | Admitting: PHYSICAL MEDICINE & REHABILITATION

## 2021-02-03 VITALS
OXYGEN SATURATION: 97 % | SYSTOLIC BLOOD PRESSURE: 140 MMHG | DIASTOLIC BLOOD PRESSURE: 77 MMHG | WEIGHT: 194.45 LBS | HEART RATE: 66 BPM | HEIGHT: 61 IN | TEMPERATURE: 98.3 F | BODY MASS INDEX: 36.71 KG/M2 | RESPIRATION RATE: 18 BRPM

## 2021-02-03 DIAGNOSIS — I10 ESSENTIAL HYPERTENSION: ICD-10-CM

## 2021-02-03 PROBLEM — I63.9 STROKE (CEREBRUM) (HCC): Status: ACTIVE | Noted: 2021-02-03

## 2021-02-03 LAB
ALBUMIN SERPL-MCNC: 3.1 G/DL (ref 3.5–5.2)
ANION GAP SERPL CALCULATED.3IONS-SCNC: 9.4 MMOL/L (ref 5–15)
BUN SERPL-MCNC: 24 MG/DL (ref 8–23)
BUN/CREAT SERPL: 13.2 (ref 7–25)
CALCIUM SPEC-SCNC: 8.8 MG/DL (ref 8.2–9.6)
CHLORIDE SERPL-SCNC: 110 MMOL/L (ref 98–107)
CO2 SERPL-SCNC: 20.6 MMOL/L (ref 22–29)
CREAT SERPL-MCNC: 1.82 MG/DL (ref 0.57–1)
GFR SERPL CREATININE-BSD FRML MDRD: 26 ML/MIN/1.73
GLUCOSE SERPL-MCNC: 84 MG/DL (ref 65–99)
PHOSPHATE SERPL-MCNC: 3.3 MG/DL (ref 2.5–4.5)
POTASSIUM SERPL-SCNC: 4.4 MMOL/L (ref 3.5–5.2)
SARS-COV-2 RNA RESP QL NAA+PROBE: NOT DETECTED
SODIUM SERPL-SCNC: 140 MMOL/L (ref 136–145)

## 2021-02-03 PROCEDURE — 80069 RENAL FUNCTION PANEL: CPT | Performed by: INTERNAL MEDICINE

## 2021-02-03 PROCEDURE — 3E0U33Z INTRODUCTION OF ANTI-INFLAMMATORY INTO JOINTS, PERCUTANEOUS APPROACH: ICD-10-PCS | Performed by: NURSE PRACTITIONER

## 2021-02-03 PROCEDURE — 97110 THERAPEUTIC EXERCISES: CPT

## 2021-02-03 PROCEDURE — U0003 INFECTIOUS AGENT DETECTION BY NUCLEIC ACID (DNA OR RNA); SEVERE ACUTE RESPIRATORY SYNDROME CORONAVIRUS 2 (SARS-COV-2) (CORONAVIRUS DISEASE [COVID-19]), AMPLIFIED PROBE TECHNIQUE, MAKING USE OF HIGH THROUGHPUT TECHNOLOGIES AS DESCRIBED BY CMS-2020-01-R: HCPCS | Performed by: HOSPITALIST

## 2021-02-03 PROCEDURE — 97535 SELF CARE MNGMENT TRAINING: CPT

## 2021-02-03 RX ORDER — BETAMETHASONE DIPROPIONATE 0.05 %
OINTMENT (GRAM) TOPICAL
Status: DISCONTINUED | OUTPATIENT
Start: 2021-02-04 | End: 2021-02-17 | Stop reason: HOSPADM

## 2021-02-03 RX ORDER — BETAMETHASONE DIPROPIONATE 0.05 %
OINTMENT (GRAM) TOPICAL
Status: CANCELLED | OUTPATIENT
Start: 2021-02-04

## 2021-02-03 RX ORDER — AMLODIPINE BESYLATE 2.5 MG/1
2.5 TABLET ORAL
Status: CANCELLED | OUTPATIENT
Start: 2021-02-04

## 2021-02-03 RX ORDER — BISACODYL 10 MG
10 SUPPOSITORY, RECTAL RECTAL DAILY PRN
Status: CANCELLED | OUTPATIENT
Start: 2021-02-03

## 2021-02-03 RX ORDER — CHOLECALCIFEROL (VITAMIN D3) 125 MCG
250 CAPSULE ORAL DAILY
Status: DISCONTINUED | OUTPATIENT
Start: 2021-02-04 | End: 2021-02-17 | Stop reason: HOSPADM

## 2021-02-03 RX ORDER — METOPROLOL SUCCINATE 25 MG/1
12.5 TABLET, EXTENDED RELEASE ORAL DAILY
Status: CANCELLED | OUTPATIENT
Start: 2021-02-04

## 2021-02-03 RX ORDER — ATORVASTATIN CALCIUM 80 MG/1
80 TABLET, FILM COATED ORAL NIGHTLY
Status: DISCONTINUED | OUTPATIENT
Start: 2021-02-03 | End: 2021-02-17 | Stop reason: HOSPADM

## 2021-02-03 RX ORDER — TROLAMINE SALICYLATE 10 G/100G
CREAM TOPICAL AS NEEDED
Status: DISCONTINUED | OUTPATIENT
Start: 2021-02-03 | End: 2021-02-17

## 2021-02-03 RX ORDER — LATANOPROST 50 UG/ML
1 SOLUTION/ DROPS OPHTHALMIC NIGHTLY
Status: DISCONTINUED | OUTPATIENT
Start: 2021-02-03 | End: 2021-02-17 | Stop reason: HOSPADM

## 2021-02-03 RX ORDER — TROLAMINE SALICYLATE 10 G/100G
CREAM TOPICAL AS NEEDED
Status: CANCELLED | OUTPATIENT
Start: 2021-02-03

## 2021-02-03 RX ORDER — ACETAMINOPHEN 325 MG/1
650 TABLET ORAL EVERY 6 HOURS PRN
Status: DISCONTINUED | OUTPATIENT
Start: 2021-02-03 | End: 2021-02-17

## 2021-02-03 RX ORDER — LATANOPROST 50 UG/ML
1 SOLUTION/ DROPS OPHTHALMIC NIGHTLY
Status: CANCELLED | OUTPATIENT
Start: 2021-02-03

## 2021-02-03 RX ORDER — TRIAMCINOLONE ACETONIDE 40 MG/ML
40 INJECTION, SUSPENSION INTRA-ARTICULAR; INTRAMUSCULAR ONCE
Status: CANCELLED | OUTPATIENT
Start: 2021-02-03 | End: 2021-02-03

## 2021-02-03 RX ORDER — ASPIRIN 81 MG/1
81 TABLET ORAL DAILY
Status: CANCELLED | OUTPATIENT
Start: 2021-02-04

## 2021-02-03 RX ORDER — ATORVASTATIN CALCIUM 80 MG/1
80 TABLET, FILM COATED ORAL NIGHTLY
Status: CANCELLED | OUTPATIENT
Start: 2021-02-03

## 2021-02-03 RX ORDER — LEVOTHYROXINE SODIUM 0.15 MG/1
150 TABLET ORAL DAILY
Status: DISCONTINUED | OUTPATIENT
Start: 2021-02-04 | End: 2021-02-17 | Stop reason: HOSPADM

## 2021-02-03 RX ORDER — ACETAMINOPHEN 325 MG/1
650 TABLET ORAL EVERY 6 HOURS PRN
Status: CANCELLED | OUTPATIENT
Start: 2021-02-03

## 2021-02-03 RX ORDER — AMLODIPINE BESYLATE 2.5 MG/1
2.5 TABLET ORAL
Status: DISCONTINUED | OUTPATIENT
Start: 2021-02-04 | End: 2021-02-12

## 2021-02-03 RX ORDER — CHOLECALCIFEROL (VITAMIN D3) 125 MCG
250 CAPSULE ORAL DAILY
Status: CANCELLED | OUTPATIENT
Start: 2021-02-04

## 2021-02-03 RX ORDER — METOPROLOL SUCCINATE 25 MG/1
12.5 TABLET, EXTENDED RELEASE ORAL DAILY
Status: DISCONTINUED | OUTPATIENT
Start: 2021-02-04 | End: 2021-02-17 | Stop reason: HOSPADM

## 2021-02-03 RX ORDER — CLOPIDOGREL BISULFATE 75 MG/1
75 TABLET ORAL DAILY
Status: DISCONTINUED | OUTPATIENT
Start: 2021-02-04 | End: 2021-02-17 | Stop reason: HOSPADM

## 2021-02-03 RX ORDER — TRIAMCINOLONE ACETONIDE 40 MG/ML
40 INJECTION, SUSPENSION INTRA-ARTICULAR; INTRAMUSCULAR ONCE
Status: DISCONTINUED | OUTPATIENT
Start: 2021-02-03 | End: 2021-02-17

## 2021-02-03 RX ORDER — LEVOTHYROXINE SODIUM 0.15 MG/1
150 TABLET ORAL DAILY
Status: CANCELLED | OUTPATIENT
Start: 2021-02-04

## 2021-02-03 RX ORDER — LIDOCAINE HYDROCHLORIDE 10 MG/ML
5 INJECTION, SOLUTION INFILTRATION; PERINEURAL ONCE
Status: DISCONTINUED | OUTPATIENT
Start: 2021-02-03 | End: 2021-02-17

## 2021-02-03 RX ORDER — ASPIRIN 81 MG/1
81 TABLET ORAL DAILY
Status: DISCONTINUED | OUTPATIENT
Start: 2021-02-04 | End: 2021-02-17 | Stop reason: HOSPADM

## 2021-02-03 RX ORDER — DORZOLAMIDE HYDROCHLORIDE AND TIMOLOL MALEATE 20; 5 MG/ML; MG/ML
SOLUTION/ DROPS OPHTHALMIC 2 TIMES DAILY
Status: DISCONTINUED | OUTPATIENT
Start: 2021-02-03 | End: 2021-02-17 | Stop reason: HOSPADM

## 2021-02-03 RX ORDER — LIDOCAINE HYDROCHLORIDE 10 MG/ML
5 INJECTION, SOLUTION INFILTRATION; PERINEURAL ONCE
Status: CANCELLED | OUTPATIENT
Start: 2021-02-03 | End: 2021-02-03

## 2021-02-03 RX ORDER — BISACODYL 10 MG
10 SUPPOSITORY, RECTAL RECTAL DAILY PRN
Status: DISCONTINUED | OUTPATIENT
Start: 2021-02-03 | End: 2021-02-17

## 2021-02-03 RX ORDER — DORZOLAMIDE HYDROCHLORIDE AND TIMOLOL MALEATE 20; 5 MG/ML; MG/ML
SOLUTION/ DROPS OPHTHALMIC 2 TIMES DAILY
Status: CANCELLED | OUTPATIENT
Start: 2021-02-03

## 2021-02-03 RX ORDER — CLOPIDOGREL BISULFATE 75 MG/1
75 TABLET ORAL DAILY
Status: CANCELLED | OUTPATIENT
Start: 2021-02-04

## 2021-02-03 RX ADMIN — TIMOLOL MALEATE: 5 SOLUTION/ DROPS OPHTHALMIC at 23:15

## 2021-02-03 RX ADMIN — LATANOPROST 1 DROP: 50 SOLUTION/ DROPS OPHTHALMIC at 23:14

## 2021-02-03 RX ADMIN — BISACODYL 10 MG: 10 SUPPOSITORY RECTAL at 17:17

## 2021-02-03 RX ADMIN — LEVOTHYROXINE SODIUM 150 MCG: 150 TABLET ORAL at 06:36

## 2021-02-03 RX ADMIN — BETAMETHASONE DIPROPIONATE: 0.5 OINTMENT TOPICAL at 09:05

## 2021-02-03 RX ADMIN — METOPROLOL SUCCINATE 12.5 MG: 25 TABLET, EXTENDED RELEASE ORAL at 09:02

## 2021-02-03 RX ADMIN — AMLODIPINE BESYLATE 2.5 MG: 2.5 TABLET ORAL at 09:03

## 2021-02-03 RX ADMIN — Medication 250 MCG: at 09:03

## 2021-02-03 RX ADMIN — ATORVASTATIN CALCIUM 80 MG: 80 TABLET, FILM COATED ORAL at 23:22

## 2021-02-03 RX ADMIN — TIMOLOL MALEATE: 5 SOLUTION/ DROPS OPHTHALMIC at 09:05

## 2021-02-03 RX ADMIN — CLOPIDOGREL 75 MG: 75 TABLET, FILM COATED ORAL at 09:03

## 2021-02-03 RX ADMIN — ASPIRIN 81 MG: 81 TABLET, COATED ORAL at 09:03

## 2021-02-03 NOTE — THERAPY TREATMENT NOTE
Patient Name: Marianne Delgado  : 1925    MRN: [id number]                              Today's Date: 2/3/2021       Admit Date: 2021    Visit Dx:     ICD-10-CM ICD-9-CM   1. Cerebrovascular accident (CVA), unspecified mechanism (CMS/[facility])  I63.9 434.91   2. Abnormal chest x-ray  R93.89 793.2   3. Chronic renal failure, unspecified CKD stage  N18.9 585.9   4. Essential hypertension  I10 401.9     Patient Active Problem List   Diagnosis   • Arthritis   • Stage 4 chronic kidney disease (CMS/[facility])   • Debility   • Foot pain, bilateral   • Glaucoma   • Acute idiopathic gout of right ankle   • Hematuria   • Essential hypertension   • Acquired hypothyroidism   • Osteopenia   • Psoriasis   • Rosacea   • Vitamin D deficiency   • Medicare annual wellness visit, subsequent   • Morbidly obese (CMS/[facility])   • Cerebrovascular accident (CVA) (CMS/[facility])   • Hypertensive urgency     Past Medical History:   Diagnosis Date   • Acute sinusitis    • Acute upper respiratory infection    • Arthritis    • CKD (chronic kidney disease)    • Debility    • Fatigue    • Foot pain, bilateral    • Glaucoma    • Gout    • Hematuria    • High blood pressure    • Hypertension    • Hypothyroid 1999   • Hypothyroidism    • IBS (irritable bowel syndrome)    • IGT (impaired glucose tolerance)    • Malaise and fatigue    • Medication management    • Melanoma (CMS/[facility]) 1979    left leg   • OA (osteoarthritis)    • Osteopenia 1999   • Painful joint    • Psoriasis    • Renal failure    • Rosacea    • Vitamin D deficiency      Past Surgical History:   Procedure Laterality Date   • CATARACT EXTRACTION     • FOOT SURGERY      mauri toe surgery   • OTHER SURGICAL HISTORY      Melanoma Excision: Left leg     General Information     Row Name 21 1535          OT Time and Intention    Document Type  therapy note (daily note)  -BT     Mode of Treatment  individual therapy;occupational therapy  -BT     Row Name 21 1535          General  Information    Existing Precautions/Restrictions  fall  -BT     Barriers to Rehab  none identified  -BT     Row Name 02/03/21 1535          Cognition    Orientation Status (Cognition)  oriented x 3  -BT     Row Name 02/03/21 1535          Safety Issues, Functional Mobility    Impairments Affecting Function (Mobility)  balance;postural/trunk control;strength;endurance/activity tolerance  -BT       User Key  (r) = Recorded By, (t) = Taken By, (c) = Cosigned By    Initials Name Provider Type    BT Anusha Sloan OT Occupational Therapist          Mobility/ADL's     Row Name 02/03/21 1535          Bed Mobility    Bed Mobility  bed mobility (all) activities  -BT     All Activities, [place] (Bed Mobility)  minimum assist (75% patient effort)  -BT     Assistive Device (Bed Mobility)  bed rails;head of bed elevated  -BT     Row Name 02/03/21 1535          Transfers    Transfers  sit-stand transfer;toilet transfer  -BT     Bed-Chair [place] (Transfers)  minimum assist (75% patient effort)  -BT     Assistive Device (Bed-Chair Transfers)  walker, front-wheeled  -BT     Sit-Stand [place] (Transfers)  minimum assist (75% patient effort)  -BT     [place] Level (Toilet Transfer)  minimum assist (75% patient effort);verbal cues  -BT     Assistive Device (Toilet Transfer)  walker, front-wheeled  -BT     Row Name 02/03/21 1535          Sit-Stand Transfer    Assistive Device (Sit-Stand Transfers)  walker, front-wheeled  -BT     Row Name 02/03/21 1535          Toilet Transfer    Type (Toilet Transfer)  stand pivot/stand step  -BT     Row Name 02/03/21 1535          Activities of Daily Living    BADL Assessment/Intervention  toileting;grooming  -BT     Row Name 02/03/21 1535          Grooming Assessment/Training    [place] Level (Grooming)  grooming skills;wash face, hands;set up  -BT     Position (Grooming)  supported sitting  -BT     Row Name 02/03/21 1535          Toileting Assessment/Training    [place]  Level (Toileting)  toileting skills;moderate assist (50% patient effort)  -BT     Position (Toileting)  supported sitting  -BT       User Key  (r) = Recorded By, (t) = Taken By, (c) = Cosigned By    Initials Name Provider Type    BT Anusha Sloan OT Occupational Therapist        Obj/Interventions    No documentation.       Goals/Plan    No documentation.       Clinical Impression     Row Name 02/03/21 1538          Pain Assessment    Additional Documentation  Pain Scale: Numbers Pre/Post-Treatment (Group)  -BT     Row Name 02/03/21 1538          Pain Scale: Numbers Pre/Post-Treatment    Pretreatment Pain Rating  0/10 - no pain  -BT     Posttreatment Pain Rating  0/10 - no pain  -BT     Row Name 02/03/21 1538          Plan of Care Review    Plan of Care Reviewed With  patient  -BT     Progress  improving  -BT     Outcome Summary  Pt agreeable to OT today. Pt completed functional transfers with min/mod A with vc's utilzing rwx. Toileting task completed with mod A utilzing bsc. Pt worried about being able to tolerate inpatient rehab upon dc due to balance deficit. Pt advised that her balance has improved since admitted.  -BT     Row Name 02/03/21 1533          Positioning and Restraints    Pre-Treatment Position  in bed  -BT     Post Treatment Position  chair  -BT     In Chair  notified nsg;reclined;call light within reach;encouraged to call for assist;exit alarm on  -BT       User Key  (r) = Recorded By, (t) = Taken By, (c) = Cosigned By    Initials Name Provider Type    BT Anusha Sloan OT Occupational Therapist        Outcome Measures     Row Name 02/03/21 6249          How much help from another is currently needed...    Putting on and taking off regular lower body clothing?  2  -BT     Bathing (including washing, rinsing, and drying)  2  -BT     Toileting (which includes using toilet bed pan or urinal)  2  -BT     Putting on and taking off regular upper body clothing  3  -BT     Taking care of personal grooming  (such as brushing teeth)  3  -BT     Eating meals  4  -BT     AM-PAC 6 Clicks Score (OT)  16  -BT     Row Name 02/03/21 1542          Functional Assessment    Outcome Measure Options  AM-PAC 6 Clicks Daily Activity (OT)  -BT       User Key  (r) = Recorded By, (t) = Taken By, (c) = Cosigned By    Initials Name Provider Type    BT Anusha Sloan OT Occupational Therapist        Occupational Therapy Education                 Title: PT OT SLP Therapies (Done)     Topic: Occupational Therapy (Done)     Point: ADL training (Done)     Description:   Instruct learner(s) on proper safety adaptation and remediation techniques during self care or transfers.   Instruct in proper use of assistive devices.              Learning Progress Summary           Patient Acceptance, E,TB, VU by  at 1/30/2021 1418                   Point: Home exercise program (Done)     Description:   Instruct learner(s) on appropriate technique for monitoring, assisting and/or progressing therapeutic exercises/activities.              Learning Progress Summary           Patient Acceptance, E,TB, VU by  at 1/30/2021 1418                   Point: Precautions (Done)     Description:   Instruct learner(s) on prescribed precautions during self-care and functional transfers.              Learning Progress Summary           Patient Acceptance, E,TB, VU by  at 1/30/2021 1418                   Point: Body mechanics (Done)     Description:   Instruct learner(s) on proper positioning and spine alignment during self-care, functional mobility activities and/or exercises.              Learning Progress Summary           Patient Acceptance, E,TB, VU by  at 1/30/2021 1418                               User Key     Initials Effective Dates Name Provider Type Discipline    JULIANO 07/15/20 -  Sandra Souza OT Occupational Therapist OT              OT Recommendation and Plan     Plan of Care Review  Plan of Care Reviewed With: patient  Progress:  improving  Outcome Summary: Pt agreeable to OT today. Pt completed functional transfers with min/mod A with vc's utilzing rwx. Toileting task completed with mod A utilzing bsc. Pt worried about being able to tolerate inpatient rehab upon dc due to balance deficit. Pt advised that her balance has improved since admitted.     Time Calculation:   Time Calculation- OT     Row Name 02/03/21 1543             Time Calculation- OT    OT Start Time  1000  -BT      OT Stop Time  1028  -BT      OT Time Calculation (min)  28 min  -BT      Total Timed Code Minutes- OT  28 minute(s)  -BT      OT Received On  02/03/21  -BT      OT - Next Appointment  02/04/21  -BT        User Key  (r) = Recorded By, (t) = Taken By, (c) = Cosigned By    Initials Name Provider Type    BT Anusha Sloan OT Occupational Therapist        Therapy Charges for Today     Code Description Service Date Service Provider Modifiers Qty    64328781197 HC OT SELF CARE/MGMT/TRAIN EA 15 MIN 2/3/2021 Anusha Sloan OT GO 2               Anusha Sloan OT  2/3/2021

## 2021-02-03 NOTE — PLAN OF CARE
oal Outcome Evaluation:  Plan of Care Reviewed With: patient    Pt. Able to ambulate 40 feet, CGA x 1, with use of Rwx this date. Pt. Requires Min. Assist x 1 for sit <-> stand transfers. BLE exercise program completed for general strengthening. Verbal/tactile cues for posture correction and Rwx guidance. Limited in gait distance due to fatigue and pain.     Patient was wearing a face mask during this therapy encounter. Therapist used appropriate personal protective equipment including eye protection, mask, and gloves.  Mask used was standard procedure mask. Appropriate PPE was worn during the entire therapy session. Hand hygiene was completed before and after therapy session. Patient is not in enhanced droplet precautions.

## 2021-02-03 NOTE — PROCEDURES
Orthopedic Procedure Note      Patient: Marianne Delgado    Date of Admission: 1/29/2021 10:12 AM    YOB: 1925    Medical Record Number: [id number]      Pre-procedure Diagnosis:LEFT Knee pain  Post-procedure Diagnosis: same  Procedure: LEFT Knee steroid injection      Informed Consent: 89 y.o. female presents with knee pain. The risks, benefits, and alternative forms of treatment were discussed with the patient. Patient voiced understanding of the information given and consents to undergo a knee injection.    Time Out was taken to identify the correct patient, extremity, and procedure.     Aspiration: The injection site was cleansed with 70% isopropyl alcohol swab.  This was allowed to dry.  Then, a 22-gauge 1.5 inch needle was advanced into the joint space without difficulty.  After negative aspiration, 40 mg of Kenalog along with 2 mL of 1% lidocaine was injected via an anterolateral approach.  The needle was withdrawn and the injection site was sterilely bandaged.  There were no overlying skin changes noted.  The patient tolerated the procedure well.      Date:2/3/2021    PAUL Littlejohn

## 2021-02-03 NOTE — PLAN OF CARE
Goal Outcome Evaluation:  Plan of Care Reviewed With: patient  Progress: improving  Outcome Summary: Pt agreeable to OT today. Pt completed functional transfers with min/mod A with vc's utilzing rwx. Toileting task completed with mod A utilzing bsc. Pt worried about being able to tolerate inpatient rehab upon dc due to balance deficit. Pt advised that her balance has improved since admitted.    Pt wore face mask during this therapy encounter. Therapist wore appropriate PPE including face mask, gloves, and eye protection. Hand hygiene completed before and after therapy session. Pt not in enhanced precautions.

## 2021-02-03 NOTE — PROGRESS NOTES
Continued Stay Note  [facility]     Patient Name: Marianne Delgado  MRN: [id number]  Today's Date: 2/3/2021    Admit Date: 1/29/2021    Discharge Plan     Row Name 02/03/21 1020       Plan    Plan  Plan is [facility].  Precert received today.    Plan Comments  CCP spoke w/ Paula/ JESUS - precert has been recieved and bed available today pending negative covid test.  MD notified ......sk        Discharge Codes    No documentation.       Expected Discharge Date and Time     Expected Discharge Date Expected Discharge Time    Feb 3, 2021             Annie Gutierrez RN

## 2021-02-03 NOTE — CONSULTS
ORTHOPEDIC SURGERY CONSULT      Patient: Marianne Delgado  Date of Admission: 1/29/2021 10:12 AM  YOB: 1925  Medical Record Number: [id number]  Attending Physician: Yan Rayo MD  Consulting Physician: PAUL Pang    CHIEF COMPLIANT: Left knee pain    HISTORY OF PRESENT ILLINESS: Patient is a 89 y.o. year old female presented to [facility] on 01/29.  She is complaining of lower extremity weakness and strokelike symptoms.  She was admitted for evaluation and treatment.  Over the course of her admission she developed left knee pain.  She has a history of osteoarthritis in the left knee that is been treated successfully with intermittent corticosteroid injections.  Her current pain is similar in quality and severity to previous experience pain.  Exacerbated with activity and improved with rest.  Minimal pain at rest.  She localizes pain to the medial side of the knee.  Denies any recent injury or illness.  No falls.  Positive history of gout with current, mildly elevated serum uric acid. Last injection was in September 2020.  Orthopedics was consulted for further evaluation and treatment.    ALLERGIES:   Allergies   Allergen Reactions   • Azithromycin Diarrhea   • Cefdinir Nausea Only   • Doxycycline Monohydrate Nausea Only   • Allopurinol Rash     Psoriasis       HOME MEDICATIONS:  Medications Prior to Admission   Medication Sig Dispense Refill Last Dose   • amLODIPine (Norvasc) 10 MG tablet Take 1 tablet by mouth Daily. 90 tablet 3 1/28/2021 at Unknown time   • aspirin 81 MG chewable tablet Chew 81 mg Daily.   1/28/2021 at Unknown time   • betamethasone valerate (VALISONE) 0.1 % cream Apply  topically to the appropriate area as directed.   1/28/2021 at Unknown time   • bimatoprost (LUMIGAN) 0.01 % ophthalmic drops 1 drop Every Night.   1/28/2021 at Unknown time   • Clobetasol Propionate Emulsion 0.05 % topical foam Apply 1 application topically to the  appropriate area as directed 2 (Two) Times a Day. 100 g 11 1/28/2021 at Unknown time   • Cyanocobalamin (VITAMIN B-12 PO) Take  by mouth.   1/28/2021 at Unknown time   • dorzolamide-timolol (COSOPT) 22.3-6.8 MG/ML ophthalmic solution 1 drop 2 (Two) Times a Day.   1/28/2021 at Unknown time   • indapamide (LOZOL) 1.25 MG tablet    1/28/2021 at Unknown time   • levothyroxine (SYNTHROID, LEVOTHROID) 137 MCG tablet Take 1 tablet by mouth Daily. 90 tablet 1 1/28/2021 at Unknown time   • metoprolol succinate XL (Toprol XL) 25 MG 24 hr tablet Take 1 tablet by mouth Daily. 90 tablet 3 1/28/2021 at Unknown time   • metroNIDAZOLE (METROCREAM) 0.75 % cream Apply 1 application topically to the appropriate area as directed 2 (Two) Times a Day As Needed (rash). 1 each 5 1/28/2021 at Unknown time   • triamcinolone (KENALOG) 0.1 % cream Apply  topically to the appropriate area as directed.   1/28/2021 at Unknown time   • VITAMIN D PO Take  by mouth.   1/28/2021 at Unknown time   • clotrimazole (LOTRIMIN) 1 % cream Apply  topically to the appropriate area as directed 2 (Two) Times a Day. 113 g 5    • multivitamin with minerals (CENTRUM SILVER PO) Take 1 tablet by mouth Daily.   More than a month at Unknown time   • nystatin (nystatin) 931023 UNIT/GM powder Apply  topically to the appropriate area as directed 4 (Four) Times a Day.          CURRENT MEDICATIONS:  Scheduled Meds:amLODIPine, 2.5 mg, Oral, Q24H  aspirin, 81 mg, Oral, Daily  atorvastatin, 80 mg, Oral, Nightly  betamethasone dipropionate, , Topical, Q24H  clopidogrel, 75 mg, Oral, Daily  dorzolamide-timolol, , Both Eyes, BID  latanoprost, 1 drop, Both Eyes, Nightly  levothyroxine, 150 mcg, Oral, Daily  lidocaine, 5 mL, Intra-articular, Once  metoprolol succinate XL, 12.5 mg, Oral, Daily  triamcinolone acetonide, 40 mg, Intra-articular, Once  vitamin B-12, 250 mcg, Oral, Daily      Continuous Infusions:   PRN Meds:.•  acetaminophen  •  bisacodyl  •   HYDROcodone-acetaminophen  •  nitroglycerin  •  ondansetron  •  ondansetron  •  sodium chloride  •  trolamine salicylate    Past Medical History:   Diagnosis Date   • Acute sinusitis    • Acute upper respiratory infection    • Arthritis    • CKD (chronic kidney disease)    • Debility    • Fatigue    • Foot pain, bilateral    • Glaucoma    • Gout    • Hematuria    • High blood pressure    • Hypertension    • Hypothyroid 09/1999   • Hypothyroidism    • IBS (irritable bowel syndrome)    • IGT (impaired glucose tolerance)    • Malaise and fatigue    • Medication management    • Melanoma (CMS/HCC) 1979    left leg   • OA (osteoarthritis)    • Osteopenia 09/1999   • Painful joint    • Psoriasis    • Renal failure    • Rosacea    • Vitamin D deficiency      Past Surgical History:   Procedure Laterality Date   • CATARACT EXTRACTION     • FOOT SURGERY      mauri toe surgery   • OTHER SURGICAL HISTORY      Melanoma Excision: Left leg     Social History     Occupational History   • Not on file   Tobacco Use   • Smoking status: Never Smoker   • Smokeless tobacco: Never Used   Substance and Sexual Activity   • Alcohol use: Yes     Alcohol/week: 2.0 standard drinks     Types: 1 Glasses of wine, 1 Shots of liquor per week     Comment: occasionally   • Drug use: No   • Sexual activity: Not on file      Social History     Social History Narrative   • Not on file     Family History   Problem Relation Age of Onset   • Heart attack Mother    • Kidney failure Father    • Heart disease Sister    • Leukemia Brother    • Heart disease Sister    • Heart disease Sister    • Heart disease Other         FH in females b/f 65 and males b/f 55       REVIEW OF SYSTEMS:    HEENT: Patient denies any headaches, vision changes, change in hearing, or tinnitus, Patient denies epistaxis, sinus pain, hoarseness, or dysphagia   Pulmonary: Patient denies any cough, congestion, acute change in SOA or wheezing.   Cardiovascular: Patient denies any change in  chest pain, dyspnea, palpitations, weakness, intolerance of exercise, varicosities, change in murmur   Gastrointestinal:  Patient denies change in appetite, melena, change in bowel habits.   Genital/Urinary: Patient denies dysuria, change in color of urine, change in frequency of urination, pain with urgency, change in incontinence, retention.   Musculoskeletal: Patient denies complaints of acute changes in symptoms of other joints not mentioned above.   Neurological: Patient denies changes in dizziness, tremor, ataxia, or difficulty in speaking or changes in memory.   Endocrine system: Patient denies acute changes in tremors, palpitations, polyuria, polydipsia, polyphagia, diaphoresis, exophthalmos, or goiter.   Psychological: Patient denies thoughts/plans or harming self or other; denies acute changes in depression,  insomnia, night terrors, irasema, disorientation.   Skin: Patient denies any bruising, rashes, discoloration, pruritus,or wounds not mentioned in history of present illness or chief complaint above.   Hematopoietic: Patient denies current bleeding, epistaxis, hematuria, or melena.    PHYSICAL EXAM:   Vitals:  Vitals:    02/02/21 0846 02/02/21 2036 02/03/21 0047 02/03/21 0500   BP: 168/63 154/68 165/70    BP Location:  Left arm Left arm    Patient Position:  Lying Lying    Pulse: 72 64 65    Resp:  16 18    Temp:  98.3 °F (36.8 °C) 98.9 °F (37.2 °C)    TempSrc:  Oral Oral    SpO2:  98% 96%    Weight:    88.2 kg (194 lb 7.1 oz)   Height:           General:  89 y.o. female who appears about stated age.    Alert, cooperative, in no acute distress         Head:    Normocephalic, without obvious abnormality, atraumatic   Eyes:            Lids and lashes normal, conjunctivae and sclerae normal, no         icterus, no pallor, corneas clear, PERRLA   Ears:    Ears appear intact with no abnormalities noted   Throat:   No oral lesions, no thrush, oral mucosa moist   Neck:   No adenopathy, supple, trachea midline,  no JVD   Back:     Limited exam shows no severe kyphosis present,no visible           erythema, no excessive  tenderness to palpation.    Lungs:     Respirations regular, even and unlabored.     Heart:    Normal rate, Pulses palpable   Chest Wall:    No abnormalities observed.   Abdomen:     Normal bowel sounds, no masses, no organomegaly, soft              non-tender, non-distended, no guarding, no rebound                      tenderness   Rectal:     Deferred   Pulses:   Pulses palpable and equal bilaterally   Skin:   No bleeding, bruising or rash   Lymph nodes:   No palpable adenopathy   Extremities:     Focused exam of the left knee reveals skin that is warm, dry, and intact with no erythema, ecchymosis.  No signs of trauma.  No signs of infection.  Mild palpable effusion.  Mild point tenderness to manipulation the patella.  Moderate point tenderness overlying the medial joint line.  Nontender overlying the lateral joint line.  She tolerates gentle active and passive range of motion from full extension of 0 degrees to near 90 degrees of flexion.  The knee stable to varus and valgus stress on ligamentous exam and demonstrates no significant sagittal plane instability.  Neurovascularly she is intact although she continues with subjective report of general soreness and blunting to sensation to light touch in the bilateral lower extremities since the time of her recent CVA.  EHL, FHL, TA, GS are intact.  The foot is warm and well-perfused    DIAGNOSTIC TEST:  No results displayed because visit has over 200 results.          XR KNEE 1 OR 2 VW LEFT-  2/2/2021     HISTORY: Knee pain.     Bones are mildly demineralized. There are mild hypertrophic changes in  the knee. Small amount of vascular calcification is seen. There is a  suprapatellar effusion.     No fractures or subluxation is seen.     IMPRESSION:  Mild to moderate degenerative arthritis of the left knee.  2. No acute bony abnormality is seen.  3.  Suprapatellar effusion.     This report was finalized on 2/2/2021 5:12 PM by Dr. Kyler Mello M.D.      ASSESSMENT:  Knee pain, left  Osteoarthritis    Patient Active Problem List   Diagnosis   • Arthritis   • Stage 4 chronic kidney disease (CMS/[facility])   • Debility   • Foot pain, bilateral   • Glaucoma   • Acute idiopathic gout of right ankle   • Hematuria   • Essential hypertension   • Acquired hypothyroidism   • Osteopenia   • Psoriasis   • Rosacea   • Vitamin D deficiency   • Medicare annual wellness visit, subsequent   • Morbidly obese (CMS/[facility])   • Cerebrovascular accident (CVA) (CMS/[facility])   • Hypertensive urgency       PLAN:    Various courses of potential treatment were discussed with the patient at length at bedside.  She would like to repeat cortisone injection as she has had great relief with these in the past.      Risks and benefits of intra-articular injections were reviewed with the patient prior to the procedure and she expressed understanding as well as a desire to proceed.  All her questions were answered to her satisfaction.    We will provide intra-articular injection and discussed follow-up.  She is established with an outside orthopedic physician, but could also return to our office depending on her preference as needed in the future.    No additional interventions are planned.  Orthopedics will sign off at this point.  Please call with any questions or concerns    Thank you for the opportunity participate in this patient's care.    Brigido Erickson, APRN  Date: 2/3/2021

## 2021-02-03 NOTE — THERAPY TREATMENT NOTE
Patient Name: Marianne Delgado  : 1925    MRN: [id number]                              Today's Date: 2/3/2021       Admit Date: 2021    Visit Dx:     ICD-10-CM ICD-9-CM   1. Cerebrovascular accident (CVA), unspecified mechanism (CMS/[facility])  I63.9 434.91   2. Abnormal chest x-ray  R93.89 793.2   3. Chronic renal failure, unspecified CKD stage  N18.9 585.9   4. Essential hypertension  I10 401.9     Patient Active Problem List   Diagnosis   • Arthritis   • Stage 4 chronic kidney disease (CMS/[facility])   • Debility   • Foot pain, bilateral   • Glaucoma   • Acute idiopathic gout of right ankle   • Hematuria   • Essential hypertension   • Acquired hypothyroidism   • Osteopenia   • Psoriasis   • Rosacea   • Vitamin D deficiency   • Medicare annual wellness visit, subsequent   • Morbidly obese (CMS/[facility])   • Cerebrovascular accident (CVA) (CMS/[facility])   • Hypertensive urgency     Past Medical History:   Diagnosis Date   • Acute sinusitis    • Acute upper respiratory infection    • Arthritis    • CKD (chronic kidney disease)    • Debility    • Fatigue    • Foot pain, bilateral    • Glaucoma    • Gout    • Hematuria    • High blood pressure    • Hypertension    • Hypothyroid 1999   • Hypothyroidism    • IBS (irritable bowel syndrome)    • IGT (impaired glucose tolerance)    • Malaise and fatigue    • Medication management    • Melanoma (CMS/[facility]) 1979    left leg   • OA (osteoarthritis)    • Osteopenia 1999   • Painful joint    • Psoriasis    • Renal failure    • Rosacea    • Vitamin D deficiency      Past Surgical History:   Procedure Laterality Date   • CATARACT EXTRACTION     • FOOT SURGERY      mauri toe surgery   • OTHER SURGICAL HISTORY      Melanoma Excision: Left leg     General Information     Row Name 21 1558          Physical Therapy Time and Intention    Document Type  therapy note (daily note)  -MS     Mode of Treatment  physical therapy;individual therapy  -MS     Row Name 21 1558           General Information    Patient Profile Reviewed  yes  -MS     Existing Precautions/Restrictions  (S) fall Exit alarm  -MS     Barriers to Rehab  none identified  -MS     Row Name 02/03/21 1558          Cognition    Orientation Status (Cognition)  oriented x 3  -MS     Row Name 02/03/21 1558          Safety Issues, Functional Mobility    Comment, Safety Issues/Impairments (Mobility)  Gait belt used for safety.  -MS       User Key  (r) = Recorded By, (t) = Taken By, (c) = Cosigned By    Initials Name Provider Type    Avel Gibbs, PT Physical Therapist        Mobility     Row Name 02/03/21 1558          Bed Mobility    Comment (Bed Mobility)  Up in chair this PM.  -MS     Row Name 02/03/21 1558          Sit-Stand Transfer    Sit-Stand [place] (Transfers)  minimum assist (75% patient effort)  -MS     Assistive Device (Sit-Stand Transfers)  walker, front-wheeled  -MS     Row Name 02/03/21 1558          Gait/Stairs (Locomotion)    [place] Level (Gait)  contact guard  -MS     Assistive Device (Gait)  walker, front-wheeled  -MS     Distance in Feet (Gait)  40 feet  -MS     Deviations/Abnormal Patterns (Gait)  antalgic;tho decreased  -MS     Bilateral Gait Deviations  forward flexed posture  -MS     Comment (Gait/Stairs)  Verbal/tactile cues for posture correction and Rwx guidance.  Followed pt. with a chair for safety.  -MS       User Key  (r) = Recorded By, (t) = Taken By, (c) = Cosigned By    Initials Name Provider Type    Avel Gibbs, PT Physical Therapist        Obj/Interventions     Row Name 02/03/21 1559          Motor Skills    Therapeutic Exercise  -- BLE ther. ex. program x 15 reps completed (Ankle Pumps, Hip Flexion, LAQ's)  -MS       User Key  (r) = Recorded By, (t) = Taken By, (c) = Cosigned By    Initials Name Provider Type    Avel Gibbs, PT Physical Therapist        Goals/Plan    No documentation.       Clinical Impression     Row Name 02/03/21 1600          Pain  Scale: Numbers Pre/Post-Treatment    Pretreatment Pain Rating  5/10  -MS     Posttreatment Pain Rating  3/10  -MS     Pain Location - Side  Left  -MS     Pain Location  knee  -MS     Pain Intervention(s)  Medication (See MAR);Rest;Elevated;Repositioned  -MS     Row Name 02/03/21 1600          Positioning and Restraints    Pre-Treatment Position  sitting in chair/recliner  -MS     Post Treatment Position  chair  -MS     In Chair  notified nsg;reclined;sitting;call light within reach;encouraged to call for assist;exit alarm on All lines intact.  -MS       User Key  (r) = Recorded By, (t) = Taken By, (c) = Cosigned By    Initials Name Provider Type    Avel Gibbs PT Physical Therapist        Outcome Measures     Row Name 02/03/21 1601          How much help from another person do you currently need...    Turning from your back to your side while in flat bed without using bedrails?  3  -MS     Moving from lying on back to sitting on the side of a flat bed without bedrails?  3  -MS     Moving to and from a bed to a chair (including a wheelchair)?  3  -MS     Standing up from a chair using your arms (e.g., wheelchair, bedside chair)?  3  -MS     Climbing 3-5 steps with a railing?  2  -MS     To walk in hospital room?  3  -MS     AM-PAC 6 Clicks Score (PT)  17  -MS     Row Name 02/03/21 1601          Functional Assessment    Outcome Measure Options  AM-PAC 6 Clicks Basic Mobility (PT)  -MS       User Key  (r) = Recorded By, (t) = Taken By, (c) = Cosigned By    Initials Name Provider Type    Avel Gibbs PT Physical Therapist        Physical Therapy Education                 Title: PT OT SLP Therapies (Done)     Topic: Physical Therapy (Done)     Point: Mobility training (Done)     Learning Progress Summary           Patient Acceptance, E,D, VU,NR by MS at 2/3/2021 1602    Acceptance, E,D, VU,NR by MS at 2/2/2021 1151    Acceptance, E,D, VU,NR by MS at 2/1/2021 1545    Acceptance, E, VU,NR by RS at  1/31/2021 1203    Acceptance, E, VU,NR by RS at 1/30/2021 1226    Acceptance, E, VU,NR by RS at 1/30/2021 1220                   Point: Home exercise program (Done)     Learning Progress Summary           Patient Acceptance, E,D, VU,NR by MS at 2/3/2021 1602    Acceptance, E,D, VU,NR by MS at 2/2/2021 1151    Acceptance, E,D, VU,NR by MS at 2/1/2021 1545    Acceptance, E, VU,NR by RS at 1/31/2021 1203    Acceptance, E, VU,NR by RS at 1/30/2021 1226    Acceptance, E, VU,NR by RS at 1/30/2021 1220                   Point: Body mechanics (Done)     Learning Progress Summary           Patient Acceptance, E,D, VU,NR by MS at 2/3/2021 1602    Acceptance, E,D, VU,NR by MS at 2/2/2021 1151    Acceptance, E,D, VU,NR by MS at 2/1/2021 1545    Acceptance, E, VU,NR by RS at 1/31/2021 1203    Acceptance, E, VU,NR by RS at 1/30/2021 1226    Acceptance, E, VU,NR by RS at 1/30/2021 1220                   Point: Precautions (Done)     Learning Progress Summary           Patient Acceptance, E,D, VU,NR by MS at 2/3/2021 1602    Acceptance, E,D, VU,NR by MS at 2/2/2021 1151    Acceptance, E,D, VU,NR by MS at 2/1/2021 1545    Acceptance, E, VU,NR by RS at 1/31/2021 1203    Acceptance, E, VU,NR by RS at 1/30/2021 1226    Acceptance, E, VU,NR by RS at 1/30/2021 1220                               User Key     Initials Effective Dates Name Provider Type Discipline    MS 04/03/18 -  Avel Hickey, PT Physical Therapist PT    RS 12/04/20 -  Rose Nichols, PT Physical Therapist PT              PT Recommendation and Plan     Plan of Care Reviewed With: patient  Outcome Summary: Pt. requires Min. assist x 1 for bed mobility and Min. assist x 1 for sit <-> stand/stand -pivot transfers this date.  Pt. requires Mod. assist x 1 for attempted gait but pt. unable to bear weight on her LLE due to Left knee pain.  Pt. performed sit <-> stand transfers x 2 at bedside for functional strength training. BUE/LE ther. ex. program x 15 reps completed  for general strengthening. Verbal/tactile cues given throughout upright mobility for posture correction.     Time Calculation:   PT Charges     Row Name 02/03/21 1605             Time Calculation    Start Time  1410  -MS      Stop Time  1428  -MS      Time Calculation (min)  18 min  -MS      PT Received On  02/03/21  -MS      PT - Next Appointment  02/04/21  -MS         Time Calculation- PT    Total Timed Code Minutes- PT  17 minute(s)  -MS        User Key  (r) = Recorded By, (t) = Taken By, (c) = Cosigned By    Initials Name Provider Type    Avel Gibbs, PT Physical Therapist        Therapy Charges for Today     Code Description Service Date Service Provider Modifiers Qty    37505332683 HC PT THER PROC EA 15 MIN 2/2/2021 Avel Hickey, PT GP 2    99919479739 HC PT THER PROC EA 15 MIN 2/3/2021 Avel Hickey, PT GP 1    24630417376 HC PT THER SUPP EA 15 MIN 2/3/2021 Avel Hickey, PT GP 1          PT G-Codes  Outcome Measure Options: AM-PAC 6 Clicks Basic Mobility (PT)  AM-PAC 6 Clicks Score (PT): 17  AM-PAC 6 Clicks Score (OT): 16  Modified [place] Scale: 3 - Moderate disability.  Requiring some help, but able to walk without assistance.    Avel Hickey, PT  2/3/2021

## 2021-02-03 NOTE — DISCHARGE SUMMARY
PHYSICIAN DISCHARGE SUMMARY                                                                        [facility]    Patient Identification:  Name: Marianne Delgado  Age: 89 y.o.  Sex: female  :  1925  MRN: [id number]  Primary Care Physician: Xenia Robert MD    Admit date: 2021  Discharge date and time: 2/3/2021     Discharged Condition: good    Discharge Diagnoses:  Cerebrovascular accident (CVA) (CMS/[facility])    Stage 4 chronic kidney disease (CMS/[facility])    Essential hypertension    Acquired hypothyroidism    Vitamin D deficiency    Morbidly obese (CMS/[facility])    Hypertensive urgency  Severe, symptomatic hypothyroidism  Knee pain with degenerative joint disease: Status post steroid injection.      Hospital Course:  Pleasant 89-year-old female presenting with new onset left lower extremity weakness and numbness.  Please H&P for full details.  Work-up did reveal a small right sided CVA.  CVA protocol was initiated.  There is some plaquing in the carotid arteries.  Echocardiogram was unremarkable.  She is presently on dual antiplatelet therapy.  Aspirin can be discontinued after 30 days.  In addition she has noted remarkable generalized fatigue and weakness.  Evaluation showed a markedly elevated TSH.  She was on Synthroid prior to admission and states that she had been taking as directed.  Her dose was increased slightly and the TSH has been dropping rather quickly.  The rapidity of its drop with such as small increase does raise the specter of possible compliance issues.  This will need to be followed with repeat TSH in approximately 4 weeks.  She also complained of significant knee pain.  She was evaluated by orthopedic surgery.  X-rays are consistent with degenerative joint disease.  She did receive a steroid injection this morning.  She does have also stage IV chronic kidney disease with significant hypertension which was  uncontrolled at the time of presentation.  Nephrology has been assisting in this.  Norvasc has been started with some improvement.  Goals are to get his systolic down to approximately 150-160.      Consults:     Consults     Date and Time Order Name Status Description    2/2/2021 1341 Inpatient Orthopedic Surgery Consult Completed     1/29/2021 1702 Inpatient Nephrology Consult Completed     1/29/2021 1334 Inpatient Neurology Consult Stroke Completed     1/29/2021 1225 [facility] (on-call MD unless specified) Details Completed     1/29/2021 1032 Inpatient Neurology Consult Stroke Completed     1/29/2021 1032 Inpatient Neurology Consult Stroke Completed             Discharge Exam:  Afebrile vital signs stable.  Well-developed well-nourished female no apparent distress.  Lungs clear to auscultation good air movement.  Heart regular rate and rhythm.  Extremities no clubbing cyanosis or edema.  There are visible and palpable arthritic changes in the knees bilaterally.  Alert oriented conversant cooperative pleasant.     Disposition:  Rehab    Patient Instructions:      Discharge Medications      ASK your doctor about these medications      Instructions Start Date   amLODIPine 10 MG tablet  Commonly known as: Norvasc   10 mg, Oral, Daily      aspirin 81 MG chewable tablet   81 mg, Oral, Daily      betamethasone valerate 0.1 % cream  Commonly known as: VALISONE   Topical      bimatoprost 0.01 % ophthalmic drops  Commonly known as: LUMIGAN   1 drop, Nightly      Clobetasol Propionate Emulsion 0.05 % topical foam   1 application, Topical, 2 Times Daily      clotrimazole 1 % cream  Commonly known as: LOTRIMIN   Topical, 2 Times Daily      dorzolamide-timolol 22.3-6.8 MG/ML ophthalmic solution  Commonly known as: COSOPT   1 drop, 2 Times Daily      indapamide 1.25 MG tablet  Commonly known as: LOZOL   No dose, route, or frequency recorded.      levothyroxine 137 MCG tablet  Commonly known as: SYNTHROID, LEVOTHROID   137 mcg, Oral,  Daily      metoprolol succinate XL 25 MG 24 hr tablet  Commonly known as: Toprol XL   25 mg, Oral, Daily      metroNIDAZOLE 0.75 % cream  Commonly known as: METROCREAM   1 application, Topical, 2 Times Daily PRN      multivitamin with minerals tablet tablet   1 tablet, Oral, Daily      nystatin [id number] UNIT/GM powder  Generic drug: nystatin   Topical, 4 Times Daily      triamcinolone 0.1 % cream  Commonly known as: KENALOG   Topical      VITAMIN B-12 PO   Oral      VITAMIN D PO   Oral             Future Appointments   Date Time Provider Department Center   3/2/2021 10:45 AM Xenia Robert MD [id number]     Follow-up Information     Xenia Robert MD .    Specialty: Family Medicine  Contact information:  [id number] [id number]  [phone]                 Discharge Order (From admission, onward)     Start     Ordered    02/03/21 1122  Discharge readmit patient  Once     Expected Discharge Date: 02/03/21    Discharge Disposition: Rehab Facility or Unit ([facility])    Physician of Record for Attribution - Please select from Treatment Team: LEE ANN LOMAS [[id number]]    Review needed by CMO to determine Physician of Record: No       Question Answer Comment   Physician of Record for Attribution - Please select from Treatment Team LEE ANN LOMAS    Review needed by CMO to determine Physician of Record No        02/03/21 1124                  Total time spent discharging patient including evaluation,post hospitalization follow up,  medication and post hospitalization instructions and education total time exceeds 30 minutes.    Signed:  Yan Rayo MD  2/3/2021  11:24 EST    EMR Dragon/Transcription disclaimer:   Much of this encounter note is an electronic transcription/translation of spoken language to printed text. The electronic translation of spoken language may permit erroneous, or at times, nonsensical words or phrases to be inadvertently transcribed;  Although I have reviewed the note for such errors, some may still exist.

## 2021-02-04 PROCEDURE — 97166 OT EVAL MOD COMPLEX 45 MIN: CPT | Performed by: OCCUPATIONAL THERAPIST

## 2021-02-04 PROCEDURE — 97116 GAIT TRAINING THERAPY: CPT

## 2021-02-04 PROCEDURE — 97161 PT EVAL LOW COMPLEX 20 MIN: CPT

## 2021-02-04 PROCEDURE — 97112 NEUROMUSCULAR REEDUCATION: CPT | Performed by: OCCUPATIONAL THERAPIST

## 2021-02-04 PROCEDURE — 97110 THERAPEUTIC EXERCISES: CPT | Performed by: OCCUPATIONAL THERAPIST

## 2021-02-04 PROCEDURE — 96125 COGNITIVE TEST BY HC PRO: CPT

## 2021-02-04 PROCEDURE — 97530 THERAPEUTIC ACTIVITIES: CPT

## 2021-02-04 PROCEDURE — 97535 SELF CARE MNGMENT TRAINING: CPT | Performed by: OCCUPATIONAL THERAPIST

## 2021-02-04 RX ORDER — NYSTATIN 100000 [USP'U]/G
POWDER TOPICAL EVERY 12 HOURS SCHEDULED
Status: DISCONTINUED | OUTPATIENT
Start: 2021-02-04 | End: 2021-02-17 | Stop reason: HOSPADM

## 2021-02-04 RX ADMIN — METOPROLOL SUCCINATE 12.5 MG: 25 TABLET, EXTENDED RELEASE ORAL at 09:09

## 2021-02-04 RX ADMIN — ATORVASTATIN CALCIUM 80 MG: 80 TABLET, FILM COATED ORAL at 20:38

## 2021-02-04 RX ADMIN — TIMOLOL MALEATE: 5 SOLUTION/ DROPS OPHTHALMIC at 20:38

## 2021-02-04 RX ADMIN — TIMOLOL MALEATE: 5 SOLUTION/ DROPS OPHTHALMIC at 09:10

## 2021-02-04 RX ADMIN — Medication 250 MCG: at 09:09

## 2021-02-04 RX ADMIN — LATANOPROST 1 DROP: 50 SOLUTION/ DROPS OPHTHALMIC at 20:39

## 2021-02-04 RX ADMIN — NYSTATIN 1 APPLICATION: 100000 POWDER TOPICAL at 20:37

## 2021-02-04 RX ADMIN — LEVOTHYROXINE SODIUM 150 MCG: 150 TABLET ORAL at 05:54

## 2021-02-04 RX ADMIN — ASPIRIN 81 MG: 81 TABLET, COATED ORAL at 09:10

## 2021-02-04 RX ADMIN — AMLODIPINE BESYLATE 2.5 MG: 2.5 TABLET ORAL at 09:10

## 2021-02-04 RX ADMIN — NYSTATIN: 100000 POWDER TOPICAL at 13:29

## 2021-02-04 RX ADMIN — BETAMETHASONE DIPROPIONATE: 0.5 OINTMENT TOPICAL at 11:26

## 2021-02-04 RX ADMIN — CLOPIDOGREL 75 MG: 75 TABLET, FILM COATED ORAL at 09:09

## 2021-02-04 NOTE — CONSULTS
"Adult Nutrition  Assessment/PES    Patient Name:  Marianne Delgado  YOB: 1925  MRN: [id number]  Admit Date:  2/3/2021    Assessment Date:  2/4/2021    Comments:  Rehab admission assessment    Transfer from acute neuro, s/p stroke. On regular/thins, but reports poor appetite. RN suggested supplement - boost plus ordered & will be sent with meals. Will work with pt on menu preferences, following during stay.     Reason for Assessment     Row Name 02/04/21 0917          Reason for Assessment    Reason For Assessment  nurse/nurse practitioner consult     Diagnosis  renal disease;neurologic conditions;cardiac disease Stroke, HTN, CKD     Identified At Risk by Screening Criteria  no indicators present         Nutrition/Diet History     Row Name 02/04/21 0929          Nutrition/Diet History    Typical Food/Fluid Intake  25-50%, reports poor appetite.         Anthropometrics     Row Name 02/04/21 0930          Anthropometrics    Height  154.9 cm (61\")        Admit Weight    Admit Weight  88 kg (194 lb)        Ideal Body Weight (IBW)    Ideal Body Weight (IBW) (kg)  48.15        Usual Body Weight (UBW)    Usual Body Weight  -- 190-200#        Body Mass Index (BMI)    BMI Assessment  BMI 35-39.9: obesity grade II         Labs/Tests/Procedures/Meds     Row Name 02/04/21 0979          Labs/Procedures/Meds    Lab Results Reviewed  reviewed     Lab Results Comments  bun, cr, cl        Diagnostic Tests/Procedures    Diagnostic Test/Procedure Reviewed  reviewed     Diagnostic Test/Procedures Comments  MRI, CT head, cxr        Medications    Pertinent Medications Reviewed  reviewed     Pertinent Medications Comments  B12         Physical Findings     Row Name 02/04/21 0934          Physical Findings    Overall Physical Appearance  overweight     Skin  other (see comments) redness to R buttock (blanchable), pink areas under breast         Estimated/Assessed Needs     Row Name 02/04/21 0926          Calculation " "Measurements    Height  154.9 cm (61\")         Nutrition Prescription Ordered     Row Name 02/04/21 0942          Nutrition Prescription PO    Current PO Diet  Regular     Fluid Consistency  Thin         Evaluation of Received Nutrient/Fluid Intake     Row Name 02/04/21 0947          PO Evaluation    Number of Days PO Intake Evaluated  2 days     % PO Intake  25-50               Problem/Interventions:  Problem 1     Row Name 02/04/21 0947          Nutrition Diagnoses Problem 1    Problem 1  Predicted Suboptimal Intake     Etiology (related to)  Factors Affecting Nutrition     Reported/Observed By  Patient     Appetite  Poor at this Time               Intervention Goal     Row Name 02/04/21 0947          Intervention Goal    General  Maintain nutrition;Reduce/improve symptoms;Disease management/therapy     PO  Tolerate PO;Increase intake;PO intake (%)     PO Intake %  75 %     Weight  Appropriate weight loss         Nutrition Intervention     Row Name 02/04/21 0948          Nutrition Intervention    RD/Tech Action  Follow Tx progress;Care plan reviewd;Encourage intake;Recommend/ordered     Recommended/Ordered  Supplement         Nutrition Prescription     Row Name 02/04/21 0949          Nutrition Prescription PO    PO Prescription  Begin/change supplement     Supplement  Boost Plus     Supplement Frequency  3 times a day         Education/Evaluation     Row Name 02/04/21 0949          Education    Education  Will Instruct as appropriate        Monitor/Evaluation    Monitor  Per protocol           Electronically signed by:  Heidi Mac, IVY  02/04/21 09:51 EST  "

## 2021-02-04 NOTE — PROGRESS NOTES
Inpatient Rehabilitation Plan of Care Note    Plan of Care  Updated Problems/Interventions  Mobility    [PT] Bed/Chair/Wheelchair(Active)  Current Status(02/04/2021): Min A, rollator  Weekly Goal(02/11/2021): SBA, rollator  Discharge Goal: Supervision, rollator    [PT] Walk(Active)  Current Status(02/04/2021): 80' CGA, rollator  Weekly Goal(02/11/2021): to and from  SBA, rollator  Discharge Goal: 160' supervision, rollator    Signed by: Katya Bar, PT

## 2021-02-04 NOTE — PROGRESS NOTES
Physical Medicine and Rehabilitation  Inpatient Rehabilitation Interdisciplinary Plan of Care    Demographics            Age: 89Y            Gender: Female    Admission Date: 2/3/2021 8:32:00 PM  Rehabilitation Diagnosis:  Status post CVA-small focus of restricted diffusion in the right parietal lobe  Adjunctive medication for stroke recovery-on atorvastatin.  Recheck vitamin D  level.  Check vitamin B-12 level.    Impaired cognition/impaired mobility/impaired self-care    Stroke prophylaxis-aspirin and Plavix x30 days then Plavix alone.    Left knee degenerative changes-status post steroid injection February 3    Chronic kidney disease stage IV baseline creatinine around 2.0    Acquired hypothyroidism-recheck TFTs 1 March-on levothyroxine    Morbid obesity    Vitamin D deficiency-no home dose listed-recheck vitamin D level    Hypertension-metoprolol/amlodipine-systolic blood pressure goal 150-160    Hyperlipidemia-atorvastatin    Plan of Care    Updated (if changes indicated)  No changes to plan.    Based on the patient's medical and functional status, their prognosis and  expected level of functional improvement is: Fair - supervision    Interdisciplinary Problem/Goals/Status  Body Systems    [RN] Integumentary(Active)  Current Status(02/04/2021): Pt has excoriation to right buttock, blanchable;  slight redness under breasts  Weekly Goal(02/09/2021): No s/s of infection  Discharge Goal: No s/s of infection        Pain    [RN] Pain Management(Active)  Current Status(02/04/2021): Chronic pain to left knee r/t arthritis  Weekly Goal(02/09/2021): Pt able to tolerate therapies  Discharge Goal: Pain under control        Psychosocial    [RN] Coping/Adjustment(Active)  Current Status(02/04/2021): Pt expresses effective coping  Weekly Goal(02/09/2021): Allow pt to express concerns  Discharge Goal: Demonstrates healthy coping strategies        Safety    [RN] Potential for Injury(Active)  Current Status(02/04/2021): No  unsafe behaviors  Weekly Goal(02/09/2021): Pt will use call bell 100% of the time  Discharge Goal: No falls        Sphincter Control    [RN] Bladder Management(Active)  Current Status(02/04/2021): Pt is continent 100%  Weekly Goal(02/09/2021): Pt will be continent 100%  Discharge Goal: Pt will be continent 100%    [RN] Bowel Management(Active)  Current Status(02/04/2021): Pt is continent 100%  Weekly Goal(02/09/2021): Pt will be continent 100%  Discharge Goal: Pt will remain continent 100%      Comments:    Signed by: Ricki Matta MD

## 2021-02-04 NOTE — PROGRESS NOTES
Discharge Planning Assessment  [facility]     Patient Name: Marianne Delgado  MRN: [id number]  Today's Date: 2/4/2021    Admit Date: 2/3/2021    Discharge Needs Assessment    No documentation.       Discharge Plan     Row Name 02/04/21 1323       Plan    Plan  Patient will d/c to her indpendent living apartment at [facility]. Will plan to hire caregiver as needed. Anticipate home health therapies.    Patient/Family in Agreement with Plan  yes    Plan Comments  Completed assessment with patient and niece, by phone. Niece, Jessica Osei, is POA. Patient has lived at [facility], which is Independent Living Facility, for past 2 years. She has first floor apartment with walk in shower and built in shower seat. Patient was independent and uses rollator for ambulation. Patient manages own medicines and independent with ADL's. She does go to  at facility to get meals which is about an 80 foot walk. D/C plan is to return to her apartment and will hire caregiver to assist as needed. She would like home health therapy if needed and stated she has not used any agency in past. Discussed team and family conference. Will assist with plans.        Continued Care and Services - Admitted Since 2/3/2021    Coordination has not been started for this encounter.     Selected Continued Care - Prior Encounters Includes selections from prior encounters from 11/5/2020 to 2/4/2021    Discharged on 2/3/2021 Admission date: 1/29/2021 - Discharge disposition: Rehab Facility or Unit ([facility])    Destination     Service Provider Selected Services Address Phone Fax Patient Preferred    [facility]  Inpatient Rehabilitation [address] [place] [id number] [phone] -- --                      Demographic Summary    No documentation.       Functional Status     Row Name 02/04/21 1305       Functional Status    Usual Activity Tolerance  moderate    Current Activity Tolerance  fair     Functional Status Comments  Patient was independent using rollator for ambulation. Was independent with ADL's. Patient stated her endurance was decreased over last few months.       Functional Status, IADL    Medications  independent    Meal Preparation  completely dependent    Housekeeping  independent    Laundry  independent    Shopping  completely dependent    IADL Comments  Patient goes to dining room at facility where she lives to get her meals. She manages her own medications. Has washer and dryer in her apartment so does own laundry.       Mental Status    General Appearance WDL  WDL       Mental Status Summary    Recent Changes in Mental Status/Cognitive Functioning  no changes    Mental Status Comments  Patient alert and oriented and able to give accurate information. Niece reports patient has always been cognitively sharp and does not notice any changes with cognition since stroke.       Employment/    Employment Status  retired    Current or Previous Occupation  desk job    Employment/ Comments  Patient worked in past as  for her 's business.        Psychosocial     Row Name 02/04/21 1312       Values/Beliefs    Spiritual, Cultural Beliefs, Confucianist Practices, Values that Affect Care  no    Values/Beliefs Comment  Gnosticism       Behavior WDL    Behavior WDL  interactions    Interactions  appropriate to situation;cooperative       Emotion Mood WDL    Emotion/Mood/Affect WDL  emotion mood    Emotion/Mood  appropriate to situation       Speech WDL    Speech WDL  WDL       Perceptual State WDL    Perceptual State WDL  WDL       Thought Process WDL    Thought Process WDL  WDL       Intellectual Performance WDL    Intellectual Performance WDL  WDL       Coping/Stress    Major Change/Loss/Stressor  medical condition/diagnosis    Patient Personal Strengths  expressive of emotions;expressive of needs;motivated;positive attitude;resilient;self-reliant;strong support  system;successful coping history    Sources of Support  other family members    Techniques to Wood with Loss/Stress/Change  diversional activities    Reaction to Health Status  adjusting;hopeful;motivated    Understanding of Condition and Treatment  adequate understanding of medical condition;adequate understanding of treatment    Coping/Stress Comments  Patient stated no history of depression or anxiety. She is discouraged by having stroke but feels she is coping okay.       Developmental Stage (Eriksson's)    Developmental Stage  Stage 8 (65 years-death/Late Adulthood) Integrity vs. Despair        Abuse/Neglect    No documentation.       Legal     Row Name 02/04/21 9446       Financial/Legal    Source of Income  social security;other (see comments) has investments    Who Manages Finances if Patient Unable  Jessica Hobbs    Finance Comments  Niece is POA       Legal    Criminal Activity/Legal Involvement  none        Substance Abuse    No documentation.       Patient Forms    No documentation.           CELY Alvarado

## 2021-02-04 NOTE — H&P
Patient Care Team:  Xenia Robert MD as PCP - General (Family Medicine)    Chief complaint   Status post CVA-small focus of restricted diffusion in the right parietal lobe  Impaired cognition/impaired mobility/impaired self-care  Stroke prophylaxis-aspirin and Plavix x30 days then Plavix alone.  Left knee degenerative changes-status post steroid injection February 3  Chronic kidney disease stage IV baseline creatinine around 2.0  Acquired hypothyroidism-recheck TFTs 1 March-on levothyroxine  Morbid obesity  Vitamin D deficiency  Hypertension-metoprolol/amlodipine-systolic blood pressure goal 150-160  Hyperlipidemia-atorvastatin    Subjective     History of Present Illness  Patient is a 89-year-old female who lived in independent living facility.  She would go to the dining room for meals.  She had difficulty with sit to stand.  She would use a lift chair and slept in the left chair as she had difficulty getting out of her bed.  She uses a Rollator.  She was bathing and dressing independently.    She was admitted to [facility] on January 29, 2021 with stroke with left-sided weakness.  She is on dual antiplatelet therapy with aspirin and Plavix x30 days and then Plavix alone.  She also noted generalized fatigue and weakness.  Markedly elevated TSH.  She was on Synthroid prior to admission.  Her dose was increased slightly and TSH has been dropping.  The rapidity of its drop raised question of possible compliance issues.  She will repeat TSH in 4 weeks.  She complained of significant left knee pain.  She is evaluated by orthopedic surgery.  X-ray showed degenerative joint disease.  She had steroid injection of the left knee on February 3.  She has stage IV chronic kidney disease with significant hypertension which was uncontrolled at the time of her presentation to the hospital.  She is evaluated by nephrology.  Norvasc has been started.  Goal is to get her systolic blood pressure down to  approximately 150-160.    Functionally, supine to sit min assist.  Sit to stand min assist.  Attempted gait but was unable to do that secondary to left knee pain.  Grooming set up.  Given her functional impairments and comorbidities she is now omitted for acute inpatient rehabilitation.  She has the ability to hire additional caregivers at her independent living facility for 24/7 care.  Review of Systems     Past History:  Medical History: has a past medical history of Acute sinusitis, Acute upper respiratory infection, Arthritis, CKD (chronic kidney disease), Debility, Fatigue, Foot pain, bilateral, Glaucoma, Gout, Hematuria, High blood pressure, Hypertension, Hypothyroid (09/1999), Hypothyroidism, IBS (irritable bowel syndrome), IGT (impaired glucose tolerance), Malaise and fatigue, Medication management, Melanoma (CMS/HCC) (1979), OA (osteoarthritis), Osteopenia (09/1999), Painful joint, Psoriasis, Renal failure, Rosacea, and Vitamin D deficiency.   Surgical History: has a past surgical history that includes Foot surgery; Other surgical history; and Cataract extraction.   Family History: family history includes Heart attack in her mother; Heart disease in her sister, sister, sister and another family member; Kidney failure in her father; Leukemia in her brother.   Social History: reports that she has never smoked. She has never used smokeless tobacco. She reports current alcohol use of about 2.0 standard drinks of alcohol per week. She reports that she does not use drugs.    Medications Prior to Admission   Medication Sig Dispense Refill Last Dose   • amLODIPine (Norvasc) 10 MG tablet Take 1 tablet by mouth Daily. 90 tablet 3    • aspirin 81 MG chewable tablet Chew 81 mg Daily.      • betamethasone valerate (VALISONE) 0.1 % cream Apply  topically to the appropriate area as directed.      • bimatoprost (LUMIGAN) 0.01 % ophthalmic drops 1 drop Every Night.      • Clobetasol Propionate Emulsion 0.05 % topical foam  Apply 1 application topically to the appropriate area as directed 2 (Two) Times a Day. 100 g 11    • clotrimazole (LOTRIMIN) 1 % cream Apply  topically to the appropriate area as directed 2 (Two) Times a Day. 113 g 5    • Cyanocobalamin (VITAMIN B-12 PO) Take  by mouth.      • dorzolamide-timolol (COSOPT) 22.3-6.8 MG/ML ophthalmic solution 1 drop 2 (Two) Times a Day.      • indapamide (LOZOL) 1.25 MG tablet       • levothyroxine (SYNTHROID, LEVOTHROID) 137 MCG tablet Take 1 tablet by mouth Daily. 90 tablet 1    • metoprolol succinate XL (Toprol XL) 25 MG 24 hr tablet Take 1 tablet by mouth Daily. 90 tablet 3    • metroNIDAZOLE (METROCREAM) 0.75 % cream Apply 1 application topically to the appropriate area as directed 2 (Two) Times a Day As Needed (rash). 1 each 5    • multivitamin with minerals (CENTRUM SILVER PO) Take 1 tablet by mouth Daily.      • nystatin (nystatin) 137833 UNIT/GM powder Apply  topically to the appropriate area as directed 4 (Four) Times a Day.      • triamcinolone (KENALOG) 0.1 % cream Apply  topically to the appropriate area as directed.      • VITAMIN D PO Take  by mouth.         Allergies: Azithromycin, Cefdinir, Doxycycline monohydrate, and Allopurinol  Scheduled Meds:amLODIPine, 2.5 mg, Oral, Q24H  aspirin, 81 mg, Oral, Daily  atorvastatin, 80 mg, Oral, Nightly  betamethasone dipropionate, , Topical, Q24H  clopidogrel, 75 mg, Oral, Daily  dorzolamide-timolol, , Both Eyes, BID  latanoprost, 1 drop, Both Eyes, Nightly  levothyroxine, 150 mcg, Oral, Daily  lidocaine, 5 mL, Intra-articular, Once  metoprolol succinate XL, 12.5 mg, Oral, Daily  nystatin, , Topical, Q12H  triamcinolone acetonide, 40 mg, Intra-articular, Once  vitamin B-12, 250 mcg, Oral, Daily      Continuous Infusions:   PRN Meds:.•  acetaminophen  •  bisacodyl  •  trolamine salicylate    Objective      Vital Signs  Temp:  [98.1 °F (36.7 °C)-98.9 °F (37.2 °C)] 98.1 °F (36.7 °C)  Heart Rate:  [65-70] 70  Resp:  [18] 18  BP:  (140-172)/(67-77) 163/70    Physical Exam  MENTAL STATUS -  AWAKE / ALERT  HEENT- NCAT, PUPILS EQUALLY ROUND, SCLERAE ANICTERIC, CONJUNCTIVAE PINK, OP MOIST, NO JVD, EARS UNREMARKABLE EXTERNALLY  LUNGS - CTA, NO WHEEZES, RALES OR RHONCHI  HEART- RRR, NO RUB, MURMUR, OR GALLOP  ABD - NORMOACTIVE BOWEL SOUNDS, SOFT, NT. NO HEPATOSPLENOMEGALY APPRECIATED  EXT - NO EDEMA OR CYANOSIS  NEURO -oriented x4.  Able to do simple time and money management.  Spell her name forward and backward.  MOTOR EXAM - RUE/RLE 5/5.  She has weakness in the left upper extremity left lower extremity that was limited by pain inhibition at the left shoulder as well as with the left knee.  Able to take resistance with left ankle dorsiflexion and finger flexion.    Results Review:  Results from last 7 days   Lab Units 01/31/21  0710 01/30/21  0629 01/29/21  1035   WBC 10*3/mm3 7.67 7.69 11.83*   HEMOGLOBIN g/dL 13.5 12.5 13.3   HEMATOCRIT % 41.6 39.4 42.6   PLATELETS 10*3/mm3 242 244 292     Results from last 7 days   Lab Units 02/03/21  0538 02/02/21  0450 02/01/21  0530  01/30/21  0629 01/29/21  1035   SODIUM mmol/L 140 140 140   < > 142 143   POTASSIUM mmol/L 4.4 4.3 3.9   < > 4.0 4.0   CHLORIDE mmol/L 110* 111* 112*   < > 113* 111*   CO2 mmol/L 20.6* 21.6* 20.5*   < > 22.6 23.6   BUN mg/dL 24* 21 20   < > 17 20   CREATININE mg/dL 1.82* 1.89* 1.81*   < > 1.73* 2.20*   CALCIUM mg/dL 8.8 9.0 9.0   < > 8.9 9.2   BILIRUBIN mg/dL  --   --   --   --  0.5 0.3   ALK PHOS U/L  --   --   --   --  132* 143*   ALT (SGPT) U/L  --   --   --   --  6 8   AST (SGOT) U/L  --   --   --   --  10 10   GLUCOSE mg/dL 84 89 86   < > 82 94    < > = values in this interval not displayed.     Results for MK MAYA (MRN [id number]) as of 2/4/2021 12:04   Ref. Range 1/29/2021 10:35 1/30/2021 06:29 1/31/2021 07:10   Hemoglobin A1C Latest Ref Range: 4.80 - 5.60 % 5.07     TSH Baseline Latest Ref Range: 0.270 - 4.200 uIU/mL 16.100 (H)  9.150 (H)   Free T4 Latest  Ref Range: 0.93 - 1.70 ng/dL  1.14    T3, Free Latest Ref Range: 2.00 - 4.40 pg/mL   1.94 (L)   Total Cholesterol Latest Ref Range: 0 - 200 mg/dL 168     HDL Cholesterol Latest Ref Range: 40 - 60 mg/dL 44     LDL Cholesterol  Latest Ref Range: 0 - 100 mg/dL 104 (H)     VLDL Cholesterol Latest Ref Range: 5 - 40 mg/dL 20     Triglycerides Latest Ref Range: 0 - 150 mg/dL 110        I reviewed the patient's new clinical results.      Assessment/Plan       Stroke (cerebrum) (CMS/HCC)      Assessment & Plan  Status post CVA-small focus of restricted diffusion in the right parietal lobe  Adjunctive medication for stroke recovery-on atorvastatin.  Recheck vitamin D level.  Check vitamin B-12 level.    Impaired cognition/impaired mobility/impaired self-care    Stroke prophylaxis-aspirin and Plavix x30 days then Plavix alone.    Left knee degenerative changes-status post steroid injection February 3    Chronic kidney disease stage IV baseline creatinine around 2.0    Acquired hypothyroidism-recheck TFTs 1 March-on levothyroxine    Morbid obesity    Vitamin D deficiency-no home dose listed-recheck vitamin D level    Hypertension-metoprolol/amlodipine-systolic blood pressure goal 150-160    Hyperlipidemia-atorvastatin    Now admit for comprehensive acute inpatient rehabilitation .  This would be an interdisciplinary program with physical therapy 1 hour,  occupational therapy 1 hour, and speech therapy 1 hour, 5 days a week.  Rehabilitation nursing for carryover, monitoring of nutritional and blood pressure and neurologic   status, bowel and bladder, and skin  Ongoing physician follow-up.  Weekly team conferences.  Goals are to achieve a level of supervision with  mobility and self-care and improved balance.   Rehabilitation prognosis fair.  Medical prognosis fair.  Estimated length of stay is approximately 2 to 3 weeks, but is only an estimation.   The patient's functional status and clinical status is unchanged from  preadmission assessment and the patient continues appropriate for acute inpatient rehabilitation.  Goal is for home with home health   therapies.  Barrier to discharge: Impaired mobility- work on transfers ADLs to overcome.       I discussed the patients findings and my recommendations with patient, family and nursing staff    Ricki Matta MD  02/03/21  21:15 EST    Time:   During rounds, used appropriate personal protective equipment including mask and gloves.  Additional gown if indicated.  Mask used was standard procedure mask. Appropriate PPE was worn during the entire visit.  Hand hygiene was completed before and after.

## 2021-02-04 NOTE — PROGRESS NOTES
"Section B. Hearing, Speech, Vision: Expression of Ideas and Wants: Expresses  complex messages without difficulty and with speech that is clear and easy to  understand.  Understanding Verbal and Non-Verbal Content: Understands: Clear comprehension  without cues or repetitions.    Section C. Cognitive Patterns: Brief Interview for Mental Status (BIMS) was  conducted.  Repetition of Three Words: Three words  Able to report correct year: Correct  Able to report correct month: Accurate within 5 days  Able to report correct day of the week: Correct  Able to recall \"sock\": Yes, no cue required  Able to recall \"blue\": Yes, no cue required  Able to recall \"bed\": Yes, no cue required    BIMS SUMMARY SCORE: 15 Cognitively intact Patient was able to complete the Brief  Interview for Mental Status    Signed by: MIRIAM Luis    "

## 2021-02-04 NOTE — PROGRESS NOTES
Inpatient Rehabilitation Functional Measures Assessment and Plan of Care    Plan of Care  Updated Problems/Interventions  Cognition    [ST] Executive Functions(Active)  Current Status(02/04/2021): The patient exhibits moderate deficits in executive  functions.  Weekly Goal(02/09/2021): The patient will provide 3 potentional solutions to  possible problems in the rehab setting.  Discharge Goal: Pt will exhibit age praveen executive function skills.    Functional Measures  TRERANCE Eating:  [place]  [facility] Grooming: [facility] Bathing:  [facility] Upper Body Dressing:  [facility] Lower Body Dressing:  [facility] Toileting:  [facility] Bladder Management  Level of Assistance:  [place]  Frequency/Number of Accidents this Shift:  [facility] Bowel Management  Level of Assistance: [place]  Frequency/Number of Accidents this Shift: [facility] Bed/Chair/Wheelchair Transfer:  [facility] Toilet Transfer:  [facility] Tub/Shower Transfer:  [place]    Previously Documented Mode of Locomotion at Discharge: Field  TERRANCE Expected Mode of Locomotion at Discharge: [facility] Walk/Wheelchair:  [facility] Stairs:  [facility] Comprehension:  [facility] Expression:  [facility] Social Interaction:  [facility] Problem Solving:  [facility] Memory:  [place]    Therapy Mode Minutes  Occupational Therapy: Branch  Physical Therapy: [place]  Speech Language Pathology:  [place]    Signed by: Maria Luisa Lopez, SLP

## 2021-02-04 NOTE — PROGRESS NOTES
SECTION GG    Mobility Performance:     Roll Left and Right: Not attempted due to environmental limitations   Sit to Lying: [place] does all of the effort. Patient does none of the effort to  complete the activity. Or, the assistance of 2 or more helpers is required for  the patient to complete the activity.   Lying to Sitting on Side of Bed: [place] does more than half the effort. [place]  lifts or holds trunk or limbs and provides more than half the effort.   Sit to Stand: [place] does less than half the effort. [place] lifts, holds or  supports trunk or limbs but provides less than half the effort.   Chair/Bed to Chair Transfer: [place] does less than half the effort. [place]  lifts, holds or supports trunk or limbs but provides less than half the effort.   Car Transfer: [place] does less than half the effort. [place] lifts, holds or  supports trunk or limbs but provides less than half the effort.   Walk 10 Feet:   [place] provides verbal cues and/or touching/steadying and/or  contact guard assistance as patient completes activity. Assistance may be  provided throughout the activity or intermittently.  Walk 50 Feet with 2 Turns:   [place] provides verbal cues and/or  touching/steadying and/or contact guard assistance as patient completes  activity. Assistance may be provided throughout the activity or intermittently.  Walk 150 Feet:   [place] provides verbal cues and/or touching/steadying and/or  contact guard assistance as patient completes activity. Assistance may be  provided throughout the activity or intermittently.  Walking 10 Feet on Uneven Surfaces:   Not attempted due to medical or safety  concerns.  1 Step Over Curb or Up/Down Stair:   Not applicable.  Picking up an Object:   Not attempted due to medical or safety concerns.  Uses Wheelchair/Scooter: No    Mobility Discharge Goals:   Car Transfer: [place] provides verbal cues or touching/steadying assistance as  patient completes activity.    Signed by: Katya Bar  PT

## 2021-02-04 NOTE — PROGRESS NOTES
Case Management  Inpatient Rehabilitation Plan of Care and Discharge Plan Note    Rehabilitation Diagnosis:  CVA left siri  Date of Onset:  1/28/21    Medical Summary:  Patient is a 89-year-old female who lived in independent  living facility.  She would go to the dining room for meals.  She had difficulty  with sit to stand.  She would use a lift chair and slept in the left chair as  she had difficulty getting out of her bed.  She uses a Rollator.  She was  bathing and dressing independently.  ?  She was admitted to [facility] on January 29, 2021 with stroke  with left-sided weakness.  She is on dual antiplatelet therapy with aspirin and  Plavix x30 days and then Plavix alone.  She also noted generalized fatigue and  weakness.  Markedly elevated TSH.  She was on Synthroid prior to admission.  Her  dose was increased slightly and TSH has been dropping.  The rapidity of its drop  raised question of possible compliance issues.  She will repeat TSH in 4 weeks.  She complained of significant left knee pain.  She is evaluated by orthopedic  surgery.  X-ray showed degenerative joint disease.  She had steroid injection of  the left knee on February 3.  She has stage IV chronic kidney disease with  significant hypertension which was uncontrolled at the time of her presentation  to the hospital.  She is evaluated by nephrology.  Norvasc has been started.  Goal is to get her systolic blood pressure down to approximately 150-160.  ?  Functionally, supine to sit min assist.  Sit to stand min assist.  Attempted  gait but was unable to do that secondary to left knee pain.  Grooming set up.  Past Medical History: Medical History: has a past medical history of Acute  sinusitis, Acute upper respiratory infection, Arthritis, CKD (chronic kidney  disease), Debility, Fatigue, Foot pain, bilateral, Glaucoma, Gout, Hematuria,  High blood pressure, Hypertension, Hypothyroid (09/1999), Hypothyroidism, IBS  (irritable bowel  syndrome), IGT (impaired glucose tolerance), Malaise and  fatigue, Medication management, Melanoma (CMS/[facility]) (1979), OA (osteoarthritis),  Osteopenia (09/1999), Painful joint, Psoriasis, Renal failure, Rosacea, and  Vitamin D deficiency.  Surgical History: has a past surgical history that includes Foot surgery; Other  surgical history; and Cataract extraction.    Plan of Care  Updated Problems/Interventions      Expected Intensity:  Average of 3 hours of therapy 5 days/week.  Interdisciplinary Team:  Interdisciplinary Team: Medical Supervision and 24 Hour Rehabilitation Nursing.,  Physical Therapy:, Occupational Therapy:, Speech and Language Therapy:, Social  Work, Therapeutic Recreation., Psychology.  Physical Therapy Intensity/Duration: 1 hour/day, 5 days/week for approximately  10 days  Occupational Therapy Intensity/Duration: 1 hour/day, 5 days/week for  approximately 10 days  Speech Language Pathology  Intensity/Duration: 1 hour/day, 5 days/week for  approximately 10 days  Estimated Length of Stay/Anticipated Discharge Date: ELOS: 2 weeks  Anticipated Discharge Destination:  Anticipated discharge destination from inpatient rehabilitation is community  discharge without assistance. Return to independent living      Based on the patient's medical and functional status, their prognosis and  expected level of functional improvement is:  Goals are to achieve a level of  supervision with  mobility and self-care and improved balance.   Rehabilitation  prognosis fair.  Medical prognosis fair.    Signed by: Tone Bernal RN

## 2021-02-04 NOTE — PLAN OF CARE
Goal Outcome Evaluation:  Plan of Care Reviewed With: patient  Progress: improving  Outcome Summary: Patient assessed using the CLQT. Testing revealed moderate impairment in executive functions with functional performance in attention, memory, language, and visuospatial skills. Despite functional performance in those four domains, the patient did score at the lower end of the range. Composite score and clock draw were also WFLs. SLP recs continued cognitive therapy addressing executive function, as patient's goal is to return to independant living. Pt in agreement.

## 2021-02-04 NOTE — PROGRESS NOTES
SECTION GG    Self Care Performance:   Oral Hygiene: [place] sets up or cleans up; patient completes activity. [place]  assists only prior to or following the activity.   Toileting Hygiene: [place] does less than half the effort. [place] lifts, holds  or supports trunk or limbs but provides less than half the effort.   Shower/Bathe Self: [place] does less than half the effort. [place] lifts, holds  or supports trunk or limbs but provides less than half the effort.   Upper Body Dressing: [place] sets up or cleans up; patient completes activity.  [place] assists only prior to or following the activity.   Lower Body Dressing: [place] does more than half the effort. [place] lifts or  holds trunk or limbs and provides more than half the effort.   Putting On/Taking Off Footwear: [place] does all of the effort. Patient does  none of the effort to complete the activity. Or, the assistance of 2 or more  helpers is required for the patient to complete the activity.    Self Care Discharge Goals:   Shower/Bathe Self: Patient completed the activities by him/herself with no  assistance from a helper.    Mobility Toilet Transfer Performance: [place] does less than half the effort.  [place] lifts, holds or supports trunk or limbs but provides less than half the  effort.    Mobility Toilet Transfer Discharge Goal: Patient completed the activities by  him/herself with no assistance from a helper.    Signed by: HARI Dee/MARITA

## 2021-02-04 NOTE — PROGRESS NOTES
LOS: 1 day   Patient Care Team:  Xenia Robert MD as PCP - General (Family Medicine)    Chief Complaint:   Status post CVA-small focus of restricted diffusion in the right parietal lobe  Impaired cognition/impaired mobility/impaired self-care  Stroke prophylaxis-aspirin and Plavix x30 days then Plavix alone.  Left knee degenerative changes-status post steroid injection February 3  Chronic kidney disease stage IV baseline creatinine around 2.0  Acquired hypothyroidism-recheck TFTs 1 March-on levothyroxine  Morbid obesity  Vitamin D deficiency  Hypertension-metoprolol/amlodipine-systolic blood pressure goal 150-160  Hyperlipidemia-atorvastatin       Subjective     History of Present Illness    Subjective  Left knee pain better today.  She walked 100 feet in physical therapy with a walker and two-person assist.  Still has some hypersensitivity in the left leg more than the right leg.  Comfortable with her breathing.  No chest pain.  No shortness of air.  No cough.       History taken from: patient    Objective     Vital Signs  Temp:  [97.8 °F (36.6 °C)-98.3 °F (36.8 °C)] 97.8 °F (36.6 °C)  Heart Rate:  [66-73] 73  Resp:  [18] 18  BP: (140-163)/(70-82) 158/82    Objective  Physical Exam  MENTAL STATUS -  AWAKE / ALERT  HEENT- NCAT, PUPILS EQUALLY ROUND, SCLERAE ANICTERIC,   LUNGS - CTA, NO WHEEZES, RALES OR RHONCHI  HEART- RRR, NO RUB, MURMUR, OR GALLOP  ABD - NORMOACTIVE BOWEL SOUNDS, SOFT, NT.    EXT - NO EDEMA OR CYANOSIS  NEURO -oriented x4.     MOTOR EXAM - RUE/RLE 5/5.  She has weakness in the left upper extremity left lower extremity that was limited by pain inhibition at the left shoulder as well as with the left knee.    She shows better active knee extension seated in the chair.  Able to take resistance with left ankle dorsiflexion and finger flexion.    Results Review:     I reviewed the patient's new clinical results.    Medication Review: done  Scheduled Meds:amLODIPine, 2.5 mg, Oral, Q24H  aspirin,  81 mg, Oral, Daily  atorvastatin, 80 mg, Oral, Nightly  betamethasone dipropionate, , Topical, Q24H  clopidogrel, 75 mg, Oral, Daily  dorzolamide-timolol, , Both Eyes, BID  latanoprost, 1 drop, Both Eyes, Nightly  levothyroxine, 150 mcg, Oral, Daily  lidocaine, 5 mL, Intra-articular, Once  metoprolol succinate XL, 12.5 mg, Oral, Daily  nystatin, , Topical, Q12H  triamcinolone acetonide, 40 mg, Intra-articular, Once  vitamin B-12, 250 mcg, Oral, Daily      Continuous Infusions:   PRN Meds:.•  acetaminophen  •  bisacodyl  •  trolamine salicylate      Assessment/Plan       Stroke (cerebrum) (CMS/[facility])      Assessment & Plan  Status post CVA-small focus of restricted diffusion in the right parietal lobe  Adjunctive medication for stroke recovery-on atorvastatin.  Recheck vitamin D level.  Check vitamin B-12 level.     Impaired cognition/impaired mobility/impaired self-care     Stroke prophylaxis-aspirin and Plavix x30 days then Plavix alone.     Left knee degenerative changes-status post steroid injection February 3     Chronic kidney disease stage IV baseline creatinine around 2.0     Acquired hypothyroidism-recheck TFTs 1 March-on levothyroxine     Morbid obesity     Vitamin D deficiency-no home dose listed-recheck vitamin D level     Hypertension-metoprolol/amlodipine-systolic blood pressure goal 150-160     Hyperlipidemia-atorvastatin     Now admit for comprehensive acute inpatient rehabilitation .  This would be an interdisciplinary program with physical therapy 1 hour,  occupational therapy 1 hour, and speech therapy 1 hour, 5 days a week.  Rehabilitation nursing for carryover, monitoring of nutritional and blood pressure and neurologic   status, bowel and bladder, and skin  Ongoing physician follow-up.  Weekly team conferences.  Goals are to achieve a level of supervision with  mobility and self-care and improved balance.   Rehabilitation prognosis fair.  Medical prognosis fair.  Estimated length of stay is  approximately 2 to 3 weeks, but is only an estimation.   The patient's functional status and clinical status is unchanged from preadmission assessment and the patient continues appropriate for acute inpatient rehabilitation.  Goal is for home with home health   therapies.  Barrier to discharge: Impaired mobility- work on transfers ADLs to overcome.      Ricki Matta MD  02/04/21  12:12 EST    Time:   During rounds, used appropriate personal protective equipment including mask and gloves.  Additional gown if indicated.  Mask used was standard procedure mask. Appropriate PPE was worn during the entire visit.  Hand hygiene was completed before and after.

## 2021-02-04 NOTE — PLAN OF CARE
Goal Outcome Evaluation:  Plan of Care Reviewed With: patient       Problem: Rehabilitation (IRF) Plan of Care  Goal: Plan of Care Review  Flowsheets (Taken 2/4/2021 9778)  Outcome Summary: Pt is alert and oriented x 4. Pleasant and cooperative with staff during all care. Verbalizes all wants and needs with clear speech. Takes all meds whole PO. Regular thin diet. Continent of bowel and bladder. Bed pan this HS r/t new to unit. Last BM 2/3 per report. On RA. Scattered bruising to extremities. Redness to buttocks, will make MD aware for cream. Redness under breasts. C/o of pain and discomfort to left knee r/t arthritis. KPAD ordered. Rested well this evening. Call light within reach.

## 2021-02-04 NOTE — PROGRESS NOTES
Inpatient Rehabilitation Plan of Care Note    Plan of Care  Care Plan Reviewed - No updates at this time.    Sphincter Control    Performed Intervention(s)  Use incontinence products  offer toileting  Encourage fluids      Safety    Performed Intervention(s)  Falls precautions/protocol  Safety rounds  Items within reach  Bed alarm/ chair alarm      Psychosocial    Performed Intervention(s)  Verbalizes needs and concerns  Provide therapeutic enviroment      Pain    Performed Intervention(s)  Offer medication when needed  Practice relaxation techniques  Apply heating pad or cream to area when needed      Body Systems    Performed Intervention(s)  Daily skin inspection  Nutritional support    Signed by: Corinne Sandhu RN

## 2021-02-04 NOTE — PROGRESS NOTES
Inpatient Rehabilitation Plan of Care Note    Plan of Care  Care Plan Reviewed - Updates as Follows    Body Systems    [RN] Integumentary(Active)  Current Status(02/04/2021): Pt has excoriation to right buttock, blanchable;  slight redness under breasts  Weekly Goal(02/09/2021): No s/s of infection  Discharge Goal: No s/s of infection        Pain    [RN] Pain Management(Active)  Current Status(02/04/2021): Chronic pain to left knee r/t arthritis  Weekly Goal(02/09/2021): Pt able to tolerate therapies  Discharge Goal: Pain under control        Psychosocial    [RN] Coping/Adjustment(Active)  Current Status(02/04/2021): Pt expresses effective coping  Weekly Goal(02/09/2021): Allow pt to express concerns  Discharge Goal: Demonstrates healthy coping strategies        Safety    [RN] Potential for Injury(Active)  Current Status(02/04/2021): No unsafe behaviors  Weekly Goal(02/09/2021): Pt will use call bell 100% of the time  Discharge Goal: No falls        Sphincter Control    [RN] Bladder Management(Active)  Current Status(02/04/2021): Pt is continent 100%  Weekly Goal(02/09/2021): Pt will be continent 100%  Discharge Goal: Pt will be continent 100%    [RN] Bowel Management(Active)  Current Status(02/04/2021): Pt is continent 100%  Weekly Goal(02/09/2021): Pt will be continent 100%  Discharge Goal: Pt will remain continent 100%    Signed by: Donna Jones RN

## 2021-02-04 NOTE — THERAPY EVALUATION
Inpatient Rehabilitation - Physical Therapy Initial Evaluation and Treatment Note       [facility]     Patient Name: Marianne Delgado  : 1925  MRN: [id number]    Today's Date: 2021                    Admit Date: 2/3/2021      Visit Dx: No diagnosis found.    Patient Active Problem List   Diagnosis   • Arthritis   • Stage 4 chronic kidney disease (CMS/[facility])   • Debility   • Foot pain, bilateral   • Glaucoma   • Acute idiopathic gout of right ankle   • Hematuria   • Essential hypertension   • Acquired hypothyroidism   • Osteopenia   • Psoriasis   • Rosacea   • Vitamin D deficiency   • Medicare annual wellness visit, subsequent   • Morbidly obese (CMS/[facility])   • Cerebrovascular accident (CVA) (CMS/[facility])   • Hypertensive urgency   • Stroke (cerebrum) (CMS/[facility])       Past Medical History:   Diagnosis Date   • Acute sinusitis    • Acute upper respiratory infection    • Arthritis    • CKD (chronic kidney disease)    • Debility    • Fatigue    • Foot pain, bilateral    • Glaucoma    • Gout    • Hematuria    • High blood pressure    • Hypertension    • Hypothyroid 1999   • Hypothyroidism    • IBS (irritable bowel syndrome)    • IGT (impaired glucose tolerance)    • Malaise and fatigue    • Medication management    • Melanoma (CMS/[facility]) 1979    left leg   • OA (osteoarthritis)    • Osteopenia 1999   • Painful joint    • Psoriasis    • Renal failure    • Rosacea    • Vitamin D deficiency        Past Surgical History:   Procedure Laterality Date   • CATARACT EXTRACTION     • FOOT SURGERY      mauri toe surgery   • OTHER SURGICAL HISTORY      Melanoma Excision: Left leg          PT ASSESSMENT (last 12 hours)      IRF PT Evaluation and Treatment     Row Name 21 1113          PT Time and Intention    Document Type  initial evaluation  -MD     Mode of Treatment  physical therapy  -MD     Patient/Family/Caregiver Comments/Observations  Pt sitting in WC showing no signs of acute distress.  -MD     Row Name  02/04/21 1113          Relationship/Environment    Primary Source of Support/Comfort  extended family Niece  -MD     Row Name 02/04/21 1113          General Information    Patient Profile Reviewed  yes  -MD     Existing Precautions/Restrictions  fall  -MD     Row Name 02/04/21 1113          Previous Level of Function/Home Environm    BADLs, Premorbid Functional Level  needs assistive device for safe performance  -MD     IADLs, Premorbid Functional Level  needs assistive device for safe performance  -MD     Household Ambulation, Premorbid Functional Level  needs assistive device for safe performance  -MD     Community Ambulation, Premorbid Functional Level  other (see comments) NA  -MD     Row Name 02/04/21 1113          Living Environment    Home Accessibility  wheelchair accessible  -MD     Row Name 02/04/21 1113          Home Use of Assistive/Adaptive Equipment    Equipment Currently Used at Home  rollator  -MD     Row Name 02/04/21 1113          Cognition/Psychosocial    Orientation Status (Cognition)  oriented to;person;situation  -MD     Follows Commands (Cognition)  WFL  -MD     Personal Safety Interventions  fall prevention program maintained;gait belt  -MD     Row Name 02/04/21 1113          Pain Scale: Numbers Pre/Post-Treatment    Posttreatment Pain Rating  0/10 - no pain  -MD     Row Name 02/04/21 1113          Range of Motion Comprehensive    General Range of Motion  bilateral lower extremity ROM WFL  -MD     Row Name 02/04/21 1113          Strength (Manual Muscle Testing)    Strength (Manual Muscle Testing)  left lower extremity strength;right lower extremity strength  -MD     Left Lower Extremity Strength  hip;knee;ankle  -MD     Hip, Left (Strength)  flexion: 4-/5  -MD     Knee, Left (Strength)  flexion: 4-/5 extension:3+/5 (limited due to pain)  -MD     Ankle, Left (Strength)  3/5  -MD     Right Lower Extremity Strength  hip;knee;ankle  -MD     Hip, Right (Strength)  flexion: 4/5  -MD     Knee, Right  (Strength)  Flexion: 4/5 extension: 4/5  -MD     Ankle, Right (Strength)  3/5  -MD     Row Name 02/04/21 1113          Bed Mobility    Supine-Sit [place] (Bed Mobility)  verbal cues;maximum assist (25% patient effort)  -MD     Sit-Supine [place] (Bed Mobility)  verbal cues;moderate assist (50% patient effort);2 person assist  -MD     Comment (Bed Mobility)  pt states she sleeps in lift recliner chair at home and does not get into and out of bed  -MD     Row Name 02/04/21 1113          Transfer Assessment/Treatment    Transfers  sit-stand transfer;stand-sit transfer;chair-bed transfer;car transfer  -MD     Row Name 02/04/21 1113          Transfers    Chair-Bed [place] (Transfers)  verbal cues;contact guard;minimum assist (75% patient effort)  -MD     Sit-Stand [place] (Transfers)  verbal cues;minimum assist (75% patient effort)  -MD     Stand-Sit [place] (Transfers)  verbal cues;contact guard  -MD     [place] Level (Toilet Transfer)  verbal cues;contact guard;minimum assist (75% patient effort)  -MD     Assistive Device (Toilet Transfer)  grab bars/safety frame rollator  -MD     Row Name 02/04/21 1113          Chair-Bed Transfer    Assistive Device (Chair-Bed Transfers)  walker, 4-wheeled  -MD     Row Name 02/04/21 1113          Sit-Stand Transfer    Assistive Device (Sit-Stand Transfers)  walker, 4-wheeled  -MD     Row Name 02/04/21 1113          Stand-Sit Transfer    Assistive Device (Stand-Sit Transfers)  walker, 4-wheeled  -MD     Row Name 02/04/21 1113          Car Transfer    [place] Level (Car Transfer)  verbal cues;minimum assist (75% patient effort)  -MD     Assistive Device (Car Transfer)  walker, 4-wheeled  -MD     Row Name 02/04/21 1113          Gait/Stairs (Locomotion)    [place] Level (Gait)  verbal cues;contact guard  -MD     Assistive Device (Gait)  walker, 4-wheeled  -MD     Distance in Feet (Gait)  80'x1 160'x1  -MD     Deviations/Abnormal Patterns (Gait)   antalgic;tho decreased;gait speed decreased;stride length decreased  -MD     Bilateral Gait Deviations  forward flexed posture  -MD     Row Name 02/04/21 1113          Balance    Static Sitting Balance  WFL;sitting, edge of mat  -MD     Row Name 02/04/21 1113          IRF PT Goals    Transfer Goal Selection (PT-IRF)  transfers, PT goal 1;transfers, PT goal 2  -MD     Gait (Walking Locomotion) Goal Selection (PT-IRF)  gait, PT goal 1  -MD     Row Name 02/04/21 1113          Transfer Goal 1 (PT-IRF)    Activity/Assistive Device (Transfer Goal 1, PT-IRF)  sit-to-stand/stand-to-sit rollator  -MD     [place] Level (Transfer Goal 1, PT-IRF)  supervision required  -MD     Time Frame (Transfer Goal 1, PT-IRF)  2 weeks  -MD     Row Name 02/04/21 1113          Transfer Goal 2 (PT-IRF)    Activity/Assistive Device (Transfer Goal 2, PT-IRF)  car transfer rollator  -MD     [place] Level (Transfer Goal 2, PT-IRF)  contact guard assist  -MD     Time Frame (Transfer Goal 2, PT-IRF)  2 weeks  -MD     Row Name 02/04/21 1113          Gait/Walking Locomotion Goal 1 (PT-IRF)    Activity/Assistive Device (Gait/Walking Locomotion Goal 1, PT-IRF)  gait (walking locomotion);other (see comments) rollator  -MD     Gait/Walking Locomotion Distance Goal 1 (PT-IRF)  160'  -MD     [place] Level (Gait/Walking Locomotion Goal 1, PT-IRF)  standby assist  -MD     Time Frame (Gait/Walking Locomotion Goal 1, PT-IRF)  2 weeks  -MD     Row Name 02/04/21 1113          Positioning and Restraints    Pre-Treatment Position  sitting in chair/recliner  -MD     Post Treatment Position  wheelchair  -MD     In Wheelchair  sitting;call light within reach;exit alarm on  -MD     Row Name 02/04/21 1113          Therapy Assessment/Plan (PT)    Functional Level at Time of Evaluation (PT)  Min-CGA  -MD     PT Diagnosis (PT)  decreased strength L>R (due to L knee pain) and decreased endurance  -MD     Rehab Potential/Prognosis (PT)  good, to achieve  stated therapy goals  -MD     Frequency of Treatment (PT)  5 times per week;60 minutes per session  -MD     Estimated Duration of Therapy (PT)  2 weeks  -MD     Comment, Therapy Assessment/Plan (PT)  Pt is 89 yo female presenting to [facility] acute inpatient rehab following CVA of the R parietal region leading to L sided weakness.  Upon PT evaluation pt was found to have generalized weakness L>R and decreased endruance limiting her ability to perform functional mobility tasks.  Prior to admission pt was living in independent living facility independently using rollator for safe ambulation.  At this time pt is requiring assist x1 for functional mobility tasks.  Pt will benifit from skilled PT to address strength deficits and increase safety and independence w functional mobility.  -MD     Row Name 02/04/21 1113          Therapy Plan Review/Discharge Plan (PT)    Anticipated Equipment Needs at Discharge (PT Eval)  -- pt has rollator  -MD     Expected Discharge Disposition (PT Eval)  home with home health care  -MD       User Key  (r) = Recorded By, (t) = Taken By, (c) = Cosigned By    Initials Name Provider Type    Katya Irving, PT Physical Therapist           Physical Therapy Education                 Title: PT OT SLP Therapies (In Progress)     Topic: Physical Therapy (In Progress)     Point: Mobility training (Not Started)     Learner Progress:  Not documented in this visit.          Point: Home exercise program (Not Started)     Learner Progress:  Not documented in this visit.          Point: Body mechanics (Not Started)     Learner Progress:  Not documented in this visit.          Point: Precautions (Done)     Learning Progress Summary           Patient Acceptance, E, VU by MD at 2/4/2021 1551                               User Key     Initials Effective Dates Name Provider Type Landon SHULTZ 04/03/18 -  Katya Bar, PT Physical Therapist PT                PT Recommendation and Plan    Frequency of Treatment (PT):  5 times per week, 60 minutes per session  Anticipated Equipment Needs at Discharge (PT Eval): (pt has rollator)                  Time Calculation:     PT Charges     Row Name 02/04/21 1545 02/04/21 1112          Time Calculation    Start Time  1400  -MD  1030  -MD     Stop Time  1430  -MD  1100  -MD     Time Calculation (min)  30 min  -MD  30 min  -MD     PT Received On  --  02/04/21  -MD     PT - Next Appointment  --  02/05/21  -MD     PT Goal Re-Cert Due Date  --  02/11/21  -MD       User Key  (r) = Recorded By, (t) = Taken By, (c) = Cosigned By    Initials Name Provider Type    Katya Irving, PT Physical Therapist          Therapy Charges for Today     Code Description Service Date Service Provider Modifiers Qty    70737934989 HC PT EVAL LOW COMPLEXITY 2 2/4/2021 Katya Bar, PT GP 1    14346554450 HC PT THERAPEUTIC ACT EA 15 MIN 2/4/2021 Katya Bar, PT GP 1    22849489861 HC GAIT TRAINING EA 15 MIN 2/4/2021 Katya Bar, PT GP 2              Patient was intermittently wearing a face mask during this therapy encounter. Therapist used appropriate personal protective equipment including eye protection, mask, and gloves.  Mask used was standard procedure mask. Appropriate PPE was worn during the entire therapy session. Hand hygiene was completed before and after therapy session. Patient is not in enhanced droplet precautions. Damon Paulino was present throughout evaluation.          Katya Bar PT  2/4/2021

## 2021-02-04 NOTE — PROGRESS NOTES
SECTION GG    Eating Performance: Patient completed the activities by him/herself with no  assistance from a helper.    Eating Discharge Goals: Patient completed the activities by him/herself with no  assistance from a helper.    Signed by: Donna Jones RN

## 2021-02-04 NOTE — PROGRESS NOTES
Inpatient Rehabilitation Functional Measures Assessment and Plan of Care    Plan of Care  Updated Problems/Interventions  Mobility    [OT] Toilet Transfers(Active)  Current Status(02/04/2021): min  Weekly Goal(02/11/2021): CGA  Discharge Goal: SBA/mod I    [OT] Tub/Shower Transfers(Active)  Current Status(02/04/2021): min  Weekly Goal(02/11/2021): CGA  Discharge Goal: SBA/mod I        Self Care    [OT] Bathing(Active)  Current Status(02/04/2021): mod LB and SBA UB  Weekly Goal(02/11/2021): min/CGA w AE  Discharge Goal: SBA/mod I w. AE    [OT] Dressing (Lower)(Active)  Current Status(02/04/2021): max  Weekly Goal(02/11/2021): min/CGA w AE  Discharge Goal: SBA/mod I w AE    [OT] Dressing (Upper)(Active)  Current Status(02/04/2021): SBA  Weekly Goal(02/11/2021):  mod I  Discharge Goal: Independent    [OT] Grooming(Active)  Current Status(02/04/2021): SBA  Weekly Goal(02/11/2021): Supervision/mod I  Discharge Goal: mod I/I    [OT] Toileting(Active)  Current Status(02/04/2021): min/mod  Weekly Goal(02/11/2021): CGA  Discharge Goal: SBA/mod I    Functional Measures  TERRANCE Eating:  Branch  TERRANCE Grooming: Branch  TERRANCE Bathing:  Branch  TERRANCE Upper Body Dressing:  Branch  TERRANCE Lower Body Dressing:  Branch  TERRANCE Toileting:  Branch    TERRANCE Bladder Management  Level of Assistance:  Branch  Frequency/Number of Accidents this Shift:  Branch    TERRANCE Bowel Management  Level of Assistance: Branch  Frequency/Number of Accidents this Shift: Branch    TERRANCE Bed/Chair/Wheelchair Transfer:  Branch  TERRANCE Toilet Transfer:  Branch  TERRANCE Tub/Shower Transfer:  Branch    Previously Documented Mode of Locomotion at Discharge: Field  TERRANCE Expected Mode of Locomotion at Discharge: Branch  TERRANCE Walk/Wheelchair:  Branch  TERRANCE Stairs:  Branch    TERRANCE Comprehension:  Branch  TERRANCE Expression:  Branch  TERRANCE Social Interaction:  Branch  TERRANCE Problem Solving:  Branch  TERRANCE Memory:  Branch    Therapy Mode Minutes  Occupational Therapy: Branch  Physical Therapy: Branch  Speech  Language Pathology:  Branch    Signed by: Mame Nelson, OTR/L

## 2021-02-04 NOTE — THERAPY EVALUATION
Inpatient Rehabilitation - Speech Language Pathology Initial Evaluation  [facility]     Patient Name: Marianne Delgado  : 1925  MRN: [id number]  Today's Date: 2021               Admit Date: 2/3/2021     Visit Dx:  No diagnosis found.  Patient Active Problem List   Diagnosis   • Arthritis   • Stage 4 chronic kidney disease (CMS/HCC)   • Debility   • Foot pain, bilateral   • Glaucoma   • Acute idiopathic gout of right ankle   • Hematuria   • Essential hypertension   • Acquired hypothyroidism   • Osteopenia   • Psoriasis   • Rosacea   • Vitamin D deficiency   • Medicare annual wellness visit, subsequent   • Morbidly obese (CMS/[facility])   • Cerebrovascular accident (CVA) (CMS/[facility])   • Hypertensive urgency   • Stroke (cerebrum) (CMS/[facility])     Past Medical History:   Diagnosis Date   • Acute sinusitis    • Acute upper respiratory infection    • Arthritis    • CKD (chronic kidney disease)    • Debility    • Fatigue    • Foot pain, bilateral    • Glaucoma    • Gout    • Hematuria    • High blood pressure    • Hypertension    • Hypothyroid 1999   • Hypothyroidism    • IBS (irritable bowel syndrome)    • IGT (impaired glucose tolerance)    • Malaise and fatigue    • Medication management    • Melanoma (CMS/[facility]) 1979    left leg   • OA (osteoarthritis)    • Osteopenia 1999   • Painful joint    • Psoriasis    • Renal failure    • Rosacea    • Vitamin D deficiency      Past Surgical History:   Procedure Laterality Date   • CATARACT EXTRACTION     • FOOT SURGERY      mauri toe surgery   • OTHER SURGICAL HISTORY      Melanoma Excision: Left leg     Patient was not wearing a face mask during this therapy encounter. Therapist used appropriate personal protective equipment including mask, eye protection and gloves.  Mask used was N95/duckbill. Appropriate PPE was worn during the entire therapy session. Hand hygiene was completed before and after therapy session. Patient is not in enhanced droplet precautions.           SLP EVALUATION (last 72 hours)      SLP SLC Evaluation     Row Name 02/04/21 1300          Document Type  evaluation  -    Patient Effort  excellent  -          Patient Profile Reviewed  yes  -    Pertinent History Of Current Problem  R parietal stroke. Pt denies speech/lang/cog changes. Mild consonant imprecisions noted in conversation.   -    Precautions/Limitations, Vision  other (see comments);WFL with corrective lenses glaucoma-which impacts her distance vision per pt  -    Precautions/Limitations, Hearing  WFL;for purposes of eval  -    Patient Level of Education  1 year of college  -    Prior Level of Function-Communication  [facility]  -    Plans/Goals Discussed with  patient;agreed upon  -    Barriers to Rehab  none identified  -    Patient's Goals for Discharge  return to home;other (see comments) return to independant assisted living  -    Family Goals for Discharge  patient able to provide self-care with only supervision  -    Standardized Assessment Used  CLQT  -          Additional Documentation  Pain Scale: Word Pre/Post-Treatment (Group)  -          Pain: Word Scale, Pretreatment  0 - no pain  -          Cognitive Function (Cognition)  --  -          Cognitive/Memory Tests  CLQT: Cognitive Linguistic Quick Test  -          Attention Domain Score  164  -    Attention Severity Rating  4: [facility]    Memory Domain Score  151  -    Memory Severity Rating  4: [facility]    Executive Function Domain Score  13  -    Executive Function Severity Rating  2: Moderate  -    Language Domain Score  28  -    Language Severity Rating  4: [facility]    Visuospatial Domain Score  67  -    Visuospatial Severity Rating  4: [facility]  -    Clock Drawing Total Score  12  -    Clock Drawing Severity Rating  [facility]    Composite Severity Rating  3.6  -    Composite Severity Rating Range  4.0 - 3.5: WNL  -    CLQT Comments  Outcome Summary: Patient assessed using the CLQT. Testing  revealed moderate impairment in executive functions with functional performance in attention, memory, language, and visuospatial skills. Despite functional performance in those four domains, the patient did score at the lower end of the range. Composite score and clock draw were also WFLs. SLP recs continued cognitive therapy addressing executive function, as patient's goal is to return to independant living. Pt in agreement.  -          SLP Diagnosis  Moderate deficits in executive functions  -    Rehab Potential/Prognosis  excellent  -          Therapy Frequency (SLP SLC)  5 days per week  -    Predicted Duration Therapy Intervention (Days)  until discharge  -    Anticipated Discharge Disposition (SLP)  assisted living facility (skilled nursing)  -          Cognitive Linguistic Treatment Objectives  Cognitive Linguistic Treatment Objectives (Group)  -          Organizational Skills Selection  organizational skills, SLP goal 1  -    Reasoning Selection  --  -    Executive Function Skills Selection  executive function skills, SLP goal 1  -          Improve Thought Organization Through Goal 1 (SLP)  completing a divergent naming task;completing a convergent naming task;generating a list of items in a category;90%;with minimal cues (75-90%)  -    Time Frame (Thought Organization Skills Goal 1, SLP)  by discharge  -          Improve Executive Function Skills Goal 1 (SLP)  organization/planning activity;time management activity;complex organization/planning activity;home management activity;exhibit cognitive flexibility;80%;with minimal cues (75-90%)  -    Time Frame (Executive Function Skills Goal 1, SLP)  by discharge  -      User Key  (r) = Recorded By, (t) = Taken By, (c) = Cosigned By    Initials Name Effective Dates     Maria Luisa Lopez MS CCC-SLP 03/07/18 -              EDUCATION  The patient has been educated in the following areas:     Cognitive Impairment.    SLP Recommendation and Plan  SLP  Diagnosis: Moderate deficits in executive functions           Anticipated Discharge Disposition (SLP): assisted living facility ([facility])        Predicted Duration Therapy Intervention (Days): until discharge                   Plan of Care Reviewed With: patient  Progress: improving      SLP GOALS     Row Name 02/04/21 1300             Organizational Skills Goal 1 (SLP)    Improve Thought Organization Through Goal 1 (SLP)  completing a divergent naming task;completing a convergent naming task;generating a list of items in a category;90%;with minimal cues (75-90%)  -      Time Frame (Thought Organization Skills Goal 1, SLP)  by discharge  -         Executive Functional Skills Goal 1 (SLP)    Improve Executive Function Skills Goal 1 (SLP)  organization/planning activity;time management activity;complex organization/planning activity;home management activity;exhibit cognitive flexibility;80%;with minimal cues (75-90%)  -      Time Frame (Executive Function Skills Goal 1, SLP)  by discharge  -        User Key  (r) = Recorded By, (t) = Taken By, (c) = Cosigned By    Initials Name Provider Type    Maria Luisa John MS CCC-SLP Speech and Language Pathologist             SLP Outcome Measures (last 72 hours)      SLP Outcome Measures     Row Name 02/04/21 1500             SLP Outcome Measures    Outcome Measure Used?  Adult NOMS  -         Adult FCM Scores    FCM Chosen  Problem Solving  -      Swallowing FCM Score  5  -        User Key  (r) = Recorded By, (t) = Taken By, (c) = Cosigned By    Initials Name Effective Dates    Maria Luisa John MS CCC-SLP 03/07/18 -               Time Calculation:     Time Calculation- SLP     Row Name 02/04/21 1014 02/04/21 1013 02/04/21 1011       Time Calculation- [facility]    SLP Start Time  --  1500  -SH  1000  -    SLP Stop Time  --  1530  -SH  1030  -    SLP Time Calculation (min)  --  30 min  -  30 min  -    SLP Non-Billable Time (min)  60 min  -  --  --    SLP Received  On  --  02/04/21  -  02/04/21  -      User Key  (r) = Recorded By, (t) = Taken By, (c) = Cosigned By    Initials Name Provider Type     Maria Luisa Lopez MS CCC-SLP Speech and Language Pathologist          Therapy Charges for Today     Code Description Service Date Service Provider Modifiers Qty    67988951040 HC ST STD COG PERF TEST PER HOUR 2/4/2021 Maria Luisa Lopez MS CCC-SLP GN 2             ADULT NOMS (last 72 hours)      Adult NOMS     Row Name 02/04/21 1500                   Adult FCM Scores    FCM Chosen  Problem Solving  -        Swallowing FCM Score  5  -          User Key  (r) = Recorded By, (t) = Taken By, (c) = Cosigned By    Initials Name Effective Dates     Maria Luisa Lopez MS CCC-SLP 03/07/18 -                  MS JERROD De Jesus  2/4/2021

## 2021-02-05 LAB
25(OH)D3 SERPL-MCNC: 39.9 NG/ML (ref 30–100)
ALBUMIN SERPL-MCNC: 3.3 G/DL (ref 3.5–5.2)
ANION GAP SERPL CALCULATED.3IONS-SCNC: 6.8 MMOL/L (ref 5–15)
BASOPHILS # BLD AUTO: 0.01 10*3/MM3 (ref 0–0.2)
BASOPHILS NFR BLD AUTO: 0.1 % (ref 0–1.5)
BUN SERPL-MCNC: 42 MG/DL (ref 8–23)
BUN/CREAT SERPL: 23.1 (ref 7–25)
CALCIUM SPEC-SCNC: 9.2 MG/DL (ref 8.2–9.6)
CHLORIDE SERPL-SCNC: 110 MMOL/L (ref 98–107)
CO2 SERPL-SCNC: 20.2 MMOL/L (ref 22–29)
CREAT SERPL-MCNC: 1.82 MG/DL (ref 0.57–1)
DEPRECATED RDW RBC AUTO: 39.7 FL (ref 37–54)
EOSINOPHIL # BLD AUTO: 0 10*3/MM3 (ref 0–0.4)
EOSINOPHIL NFR BLD AUTO: 0 % (ref 0.3–6.2)
ERYTHROCYTE [DISTWIDTH] IN BLOOD BY AUTOMATED COUNT: 12.6 % (ref 12.3–15.4)
GFR SERPL CREATININE-BSD FRML MDRD: 26 ML/MIN/1.73
GLUCOSE SERPL-MCNC: 113 MG/DL (ref 65–99)
HCT VFR BLD AUTO: 37.8 % (ref 34–46.6)
HGB BLD-MCNC: 12.2 G/DL (ref 12–15.9)
IMM GRANULOCYTES # BLD AUTO: 0.05 10*3/MM3 (ref 0–0.05)
IMM GRANULOCYTES NFR BLD AUTO: 0.5 % (ref 0–0.5)
LYMPHOCYTES # BLD AUTO: 0.61 10*3/MM3 (ref 0.7–3.1)
LYMPHOCYTES NFR BLD AUTO: 6 % (ref 19.6–45.3)
MCH RBC QN AUTO: 28.4 PG (ref 26.6–33)
MCHC RBC AUTO-ENTMCNC: 32.3 G/DL (ref 31.5–35.7)
MCV RBC AUTO: 87.9 FL (ref 79–97)
MONOCYTES # BLD AUTO: 0.38 10*3/MM3 (ref 0.1–0.9)
MONOCYTES NFR BLD AUTO: 3.8 % (ref 5–12)
NEUTROPHILS NFR BLD AUTO: 89.6 % (ref 42.7–76)
NEUTROPHILS NFR BLD AUTO: 9.06 10*3/MM3 (ref 1.7–7)
NRBC BLD AUTO-RTO: 0 /100 WBC (ref 0–0.2)
PHOSPHATE SERPL-MCNC: 2.9 MG/DL (ref 2.5–4.5)
PLATELET # BLD AUTO: 274 10*3/MM3 (ref 140–450)
PMV BLD AUTO: 10.8 FL (ref 6–12)
POTASSIUM SERPL-SCNC: 4.7 MMOL/L (ref 3.5–5.2)
RBC # BLD AUTO: 4.3 10*6/MM3 (ref 3.77–5.28)
SODIUM SERPL-SCNC: 137 MMOL/L (ref 136–145)
VIT B12 BLD-MCNC: 1204 PG/ML (ref 211–946)
WBC # BLD AUTO: 10.11 10*3/MM3 (ref 3.4–10.8)

## 2021-02-05 PROCEDURE — 97129 THER IVNTJ 1ST 15 MIN: CPT

## 2021-02-05 PROCEDURE — 97110 THERAPEUTIC EXERCISES: CPT

## 2021-02-05 PROCEDURE — 97535 SELF CARE MNGMENT TRAINING: CPT

## 2021-02-05 PROCEDURE — 82607 VITAMIN B-12: CPT | Performed by: PHYSICAL MEDICINE & REHABILITATION

## 2021-02-05 PROCEDURE — 97530 THERAPEUTIC ACTIVITIES: CPT

## 2021-02-05 PROCEDURE — 85025 COMPLETE CBC W/AUTO DIFF WBC: CPT | Performed by: PHYSICAL MEDICINE & REHABILITATION

## 2021-02-05 PROCEDURE — 97130 THER IVNTJ EA ADDL 15 MIN: CPT

## 2021-02-05 PROCEDURE — 80069 RENAL FUNCTION PANEL: CPT | Performed by: PHYSICAL MEDICINE & REHABILITATION

## 2021-02-05 PROCEDURE — 97116 GAIT TRAINING THERAPY: CPT

## 2021-02-05 PROCEDURE — 82306 VITAMIN D 25 HYDROXY: CPT | Performed by: PHYSICAL MEDICINE & REHABILITATION

## 2021-02-05 RX ORDER — MELATONIN
1000 2 TIMES DAILY
Status: DISCONTINUED | OUTPATIENT
Start: 2021-02-05 | End: 2021-02-17 | Stop reason: HOSPADM

## 2021-02-05 RX ADMIN — Medication 1000 UNITS: at 21:35

## 2021-02-05 RX ADMIN — LEVOTHYROXINE SODIUM 150 MCG: 150 TABLET ORAL at 05:13

## 2021-02-05 RX ADMIN — NYSTATIN 1 APPLICATION: 100000 POWDER TOPICAL at 21:35

## 2021-02-05 RX ADMIN — METOPROLOL SUCCINATE 12.5 MG: 25 TABLET, EXTENDED RELEASE ORAL at 08:49

## 2021-02-05 RX ADMIN — ATORVASTATIN CALCIUM 80 MG: 80 TABLET, FILM COATED ORAL at 21:35

## 2021-02-05 RX ADMIN — CLOPIDOGREL 75 MG: 75 TABLET, FILM COATED ORAL at 08:49

## 2021-02-05 RX ADMIN — AMLODIPINE BESYLATE 2.5 MG: 2.5 TABLET ORAL at 08:49

## 2021-02-05 RX ADMIN — BETAMETHASONE DIPROPIONATE: 0.5 OINTMENT TOPICAL at 08:50

## 2021-02-05 RX ADMIN — LATANOPROST 1 DROP: 50 SOLUTION/ DROPS OPHTHALMIC at 21:39

## 2021-02-05 RX ADMIN — NYSTATIN: 100000 POWDER TOPICAL at 08:50

## 2021-02-05 RX ADMIN — TIMOLOL MALEATE: 5 SOLUTION/ DROPS OPHTHALMIC at 21:35

## 2021-02-05 RX ADMIN — ASPIRIN 81 MG: 81 TABLET, COATED ORAL at 08:50

## 2021-02-05 RX ADMIN — Medication 250 MCG: at 08:49

## 2021-02-05 RX ADMIN — Medication 1000 UNITS: at 13:13

## 2021-02-05 RX ADMIN — TIMOLOL MALEATE: 5 SOLUTION/ DROPS OPHTHALMIC at 08:50

## 2021-02-05 NOTE — THERAPY TREATMENT NOTE
Inpatient Rehabilitation - Physical Therapy Treatment Note       [facility]     Patient Name: Marianne Delgado  : 1925  MRN: [id number]    Today's Date: 2021                    Admit Date: 2/3/2021      Visit Dx: No diagnosis found.    Patient Active Problem List   Diagnosis   • Arthritis   • Stage 4 chronic kidney disease (CMS/[facility])   • Debility   • Foot pain, bilateral   • Glaucoma   • Acute idiopathic gout of right ankle   • Hematuria   • Essential hypertension   • Acquired hypothyroidism   • Osteopenia   • Psoriasis   • Rosacea   • Vitamin D deficiency   • Medicare annual wellness visit, subsequent   • Morbidly obese (CMS/[facility])   • Cerebrovascular accident (CVA) (CMS/[facility])   • Hypertensive urgency   • Stroke (cerebrum) (CMS/[facility])       Past Medical History:   Diagnosis Date   • Acute sinusitis    • Acute upper respiratory infection    • Arthritis    • CKD (chronic kidney disease)    • Debility    • Fatigue    • Foot pain, bilateral    • Glaucoma    • Gout    • Hematuria    • High blood pressure    • Hypertension    • Hypothyroid 1999   • Hypothyroidism    • IBS (irritable bowel syndrome)    • IGT (impaired glucose tolerance)    • Malaise and fatigue    • Medication management    • Melanoma (CMS/[facility]) 1979    left leg   • OA (osteoarthritis)    • Osteopenia 1999   • Painful joint    • Psoriasis    • Renal failure    • Rosacea    • Vitamin D deficiency        Past Surgical History:   Procedure Laterality Date   • CATARACT EXTRACTION     • FOOT SURGERY      mauri toe surgery   • OTHER SURGICAL HISTORY      Melanoma Excision: Left leg          PT ASSESSMENT (last 12 hours)      IR[facility] PT Evaluation and Treatment     Row Name 21 1525 21 1047       PT Time and Intention    Document Type  daily treatment  -MS  daily treatment  -MD    Mode of Treatment  physical therapy  -MS  physical therapy  -MD    Patient/Family/Caregiver Comments/Observations  Sitting up in wheelchair, no acute distress,  exit alarm on  -MS  Pt supine in bed showing no signs of acute distress.  -MD    Total Minutes, Physical Therapy  30  -MS  --    Row Name 02/05/21 1525          General Information    Existing Precautions/Restrictions  fall  -MS     Row Name 02/05/21 1525          Sensory Assessment (Somatosensory)    Sensory Assessment (Somatosensory)  left LE  -MS     Sensory Subjective Reports  numbness  -MS     Row Name 02/05/21 1525 02/05/21 1047       Cognition/Psychosocial    Affect/Mental Status (Cognitive)  WFL  -MS  --    Orientation Status (Cognition)  oriented x 3  -MS  oriented to;person;situation  -MD    Follows Commands (Cognition)  WFL  -MS  WFL  -MD    Personal Safety Interventions  fall prevention program maintained;gait belt;nonskid shoes/slippers when out of bed  -MS  fall prevention program maintained;gait belt  -MD    Row Name 02/05/21 1525 02/05/21 1047       Pain Scale: Numbers Pre/Post-Treatment    Pretreatment Pain Rating  0/10 - no pain  -MS  0/10 - no pain  -MD    Posttreatment Pain Rating  0/10 - no pain  -MS  --    Row Name 02/05/21 1525 02/05/21 1047       Bed Mobility    Supine-Sit [place] (Bed Mobility)  --  standby assist  -MD    Assistive Device (Bed Mobility)  --  bed rails;head of bed elevated  -MD    Comment (Bed Mobility)  NT- up in wheelchair upon PT arrival  -MS  --    Row Name 02/05/21 1525          Transfer Assessment/Treatment    Comment (Transfers)  Multiple STS from different chairs- incr time but able to perform with armless chair.  -MS     Row Name 02/05/21 1525 02/05/21 1047       Transfers    Bed-Chair [place] (Transfers)  --  verbal cues;contact guard  -MD    Assistive Device (Bed-Chair Transfers)  --  walker, 4-wheeled  -MD    Sit-Stand [place] (Transfers)  contact guard;verbal cues  -MS  verbal cues;contact guard  -MD    Stand-Sit [place] (Transfers)  contact guard;verbal cues  -MS  verbal cues;contact guard  -MD    Row Name 02/05/21 1525 02/05/21 1043        Sit-Stand Transfer    Assistive Device (Sit-Stand Transfers)  walker, 4-wheeled  -MS  walker, 4-wheeled  -MD    Row Name 02/05/21 1525 02/05/21 1047       Stand-Sit Transfer    Assistive Device (Stand-Sit Transfers)  walker, 4-wheeled  -MS  walker, 4-wheeled  -MD    Row Name 02/05/21 1525 02/05/21 1047       Gait/Stairs (Locomotion)    [place] Level (Gait)  contact guard;verbal cues  -MS  verbal cues;contact guard  -MD    Assistive Device (Gait)  walker, 4-wheeled  -MS  walker, 4-wheeled  -MD    Distance in Feet (Gait)  160'  -MS  160'x2 and 80'x1  -MD    Pattern (Gait)  step-to  -MS  --    Deviations/Abnormal Patterns (Gait)  antalgic;tho decreased;gait speed decreased  -MS  antalgic;tho decreased;gait speed decreased;stride length decreased  -MD    Bilateral Gait Deviations  forward flexed posture  -MS  forward flexed posture  -MD    Row Name 02/05/21 1525          Balance    Static Sitting Balance  WFL  -MS     Static Standing Balance  mild impairment  -MS     Dynamic Standing Balance  mild impairment  -MS     Row Name 02/05/21 1525          Motor Skills    Therapeutic Exercise  hip;knee;ankle  -MS     Row Name 02/05/21 1525          Hip (Therapeutic Exercise)    Hip (Therapeutic Exercise)  strengthening exercise  -MS     Hip Strengthening (Therapeutic Exercise)  bilateral;marching while seated;10 repetitions;2 sets;resistance band;red;aBduction  -MS     Row Name 02/05/21 1525          Knee (Therapeutic Exercise)    Knee (Therapeutic Exercise)  strengthening exercise  -MS     Knee Strengthening (Therapeutic Exercise)  bilateral;LAQ (long arc quad);10 repetitions;2 sets;resistance band;red;extension  -MS     Row Name 02/05/21 1525          Ankle (Therapeutic Exercise)    Ankle (Therapeutic Exercise)  strengthening exercise  -MS     Ankle Strengthening (Therapeutic Exercise)  bilateral;dorsiflexion;plantarflexion;10 repetitions;2 sets  -MS     Row Name 02/05/21 1525 02/05/21 1047       Positioning and  Restraints    Pre-Treatment Position  sitting in chair/recliner  -MS  in bed  -MD    Post Treatment Position  chair  -MS  wheelchair  -MD    In Chair  reclined;call light within reach;encouraged to call for assist;exit alarm on  -MS  --    In Wheelchair  --  sitting;call light within reach;exit alarm on  -MD      User Key  (r) = Recorded By, (t) = Taken By, (c) = Cosigned By    Initials Name Provider Type    Katya Irving, PT Physical Therapist    Annalise Walton PT Physical Therapist           Physical Therapy Education                 Title: PT OT SLP Therapies (In Progress)     Topic: Physical Therapy (In Progress)     Point: Mobility training (Not Started)     Learner Progress:  Not documented in this visit.          Point: Home exercise program (Not Started)     Learner Progress:  Not documented in this visit.          Point: Body mechanics (Not Started)     Learner Progress:  Not documented in this visit.          Point: Precautions (Done)     Learning Progress Summary           Patient Acceptance, E, VU by MD at 2/5/2021 1049    Acceptance, E, VU by MD at 2/4/2021 1551                               User Key     Initials Effective Dates Name Provider Type Landon SHULTZ 04/03/18 -  Katya Bar, PT Physical Therapist PT                PT Recommendation and Plan      Patient was wearing a face mask during this therapy encounter. Therapist used appropriate personal protective equipment including eye protection, mask, and gloves.  Mask used was standard procedure mask. Appropriate PPE was worn during the entire therapy session. Hand hygiene was completed before and after therapy session. Patient is not in enhanced droplet precautions.        Time Calculation:     PT Charges     Row Name 02/05/21 1521 02/05/21 1047          Time Calculation    Start Time  1430  -MS  1030  -MD     Stop Time  1500  -MS  1100  -MD     Time Calculation (min)  30 min  -MS  30 min  -MD     PT Received On  --  02/05/21  -MD     PT  - Next Appointment  --  02/06/21  -MD       User Key  (r) = Recorded By, (t) = Taken By, (c) = Cosigned By    Initials Name Provider Type    Katya Irving, PT Physical Therapist    Annalise Walton, PT Physical Therapist          Therapy Charges for Today     Code Description Service Date Service Provider Modifiers Qty    08094680844  PT THER PROC EA 15 MIN 2/5/2021 Annalise Neil, PT GP 2                   Annalise Neil PT  2/5/2021

## 2021-02-05 NOTE — PROGRESS NOTES
Inpatient Rehabilitation Plan of Care Note    Plan of Care  Care Plan Reviewed - No updates at this time.    Sphincter Control    Performed Intervention(s)  Use incontinence products  offer toileting  Encourage fluids      Safety    Performed Intervention(s)  Falls precautions/protocol  Safety rounds  Items within reach  Bed alarm/ chair alarm      Psychosocial    Performed Intervention(s)  Verbalizes needs and concerns  Provide therapeutic enviroment      Pain    Performed Intervention(s)  Offer medication when needed  Practice relaxation techniques  Apply heating pad or cream to area when needed      Body Systems    Performed Intervention(s)  Daily skin inspection  Nutritional support  Apply cream to dry areas as needed  Apply powder under folds as ordered    Signed by: Corinne Sandhu RN

## 2021-02-05 NOTE — THERAPY TREATMENT NOTE
Inpatient Rehabilitation - Speech Language Pathology Treatment Note    [facility]     Patient Name: Marianne Delgado  : 1925  MRN: [id number]    Today's Date: 2021                   Admit Date: 2/3/2021       Visit Dx:    No diagnosis found.    Patient Active Problem List   Diagnosis   • Arthritis   • Stage 4 chronic kidney disease (CMS/[facility])   • Debility   • Foot pain, bilateral   • Glaucoma   • Acute idiopathic gout of right ankle   • Hematuria   • Essential hypertension   • Acquired hypothyroidism   • Osteopenia   • Psoriasis   • Rosacea   • Vitamin D deficiency   • Medicare annual wellness visit, subsequent   • Morbidly obese (CMS/[facility])   • Cerebrovascular accident (CVA) (CMS/[facility])   • Hypertensive urgency   • Stroke (cerebrum) (CMS/[facility])       Past Medical History:   Diagnosis Date   • Acute sinusitis    • Acute upper respiratory infection    • Arthritis    • CKD (chronic kidney disease)    • Debility    • Fatigue    • Foot pain, bilateral    • Glaucoma    • Gout    • Hematuria    • High blood pressure    • Hypertension    • Hypothyroid 1999   • Hypothyroidism    • IBS (irritable bowel syndrome)    • IGT (impaired glucose tolerance)    • Malaise and fatigue    • Medication management    • Melanoma (CMS/[facility]) 1979    left leg   • OA (osteoarthritis)    • Osteopenia 1999   • Painful joint    • Psoriasis    • Renal failure    • Rosacea    • Vitamin D deficiency        Past Surgical History:   Procedure Laterality Date   • CATARACT EXTRACTION     • FOOT SURGERY      mauri toe surgery   • OTHER SURGICAL HISTORY      Melanoma Excision: Left leg          SLP EVALUATION (last 72 hours)      SLP SLC Evaluation     Row Name 21 0900 21 1300                Communication Assessment/Intervention    Document Type  therapy note (daily note)  -SH  evaluation  -SH       Patient Effort  excellent  -SH  excellent  -SH          General Information    Patient Profile Reviewed  --  yes  -SH        Pertinent History Of Current Problem  --  R parietal stroke. Pt denies speech/lang/cog changes. Mild consonant imprecisions noted in conversation.   -       Precautions/Limitations, Vision  --  other (see comments);WFL with corrective lenses glaucoma-which impacts her distance vision per pt  -       Precautions/Limitations, Hearing  --  WFL;for purposes of eval  -       Patient Level of Education  --  1 year of college  -       Prior Level of Function-Communication  --  WFL  -       Plans/Goals Discussed with  --  patient;agreed upon  [facility]       Barriers to Rehab  --  none identified  -       Patient's Goals for Discharge  --  return to home;other (see comments) return to independant assisted living  -       Family Goals for Discharge  --  patient able to provide self-care with only supervision  [facility]       Standardized Assessment Used  --  CLQT  -          Pain    Additional Documentation  --  Pain Scale: Word Pre/Post-Treatment (Group)  [facility]          Pain Scale: Numbers Pre/Post-Treatment    Pretreatment Pain Rating  0/10 - no pain  -  --          Pain Scale: Word Pre/Post-Treatment    Pain: Word Scale, Pretreatment  --  0 - no pain  -          Cognitive Assessment Intervention- SLP    Cognitive Function (Cognition)  --  --  -          Standardized Tests    Cognitive/Memory Tests  --  CLQT: Cognitive Linguistic Quick Test  [facility]          CLQT (The Cognitive Linguistic Quick Test)    Attention Domain Score  --  164  -       Attention Severity Rating  --  4: [facility]  -       Memory Domain Score  --  151  -       Memory Severity Rating  --  4: [facility]       Executive Function Domain Score  --  13  -       Executive Function Severity Rating  --  2: Moderate  -       Language Domain Score  --  28  -       Language Severity Rating  --  4: [facility]       Visuospatial Domain Score  --  67  -       Visuospatial Severity Rating  --  4: [facility]  -       Clock Drawing Total Score  --  12  -        Clock Drawing Severity Rating  --  WNL  -       Composite Severity Rating  --  3.6  -       Composite Severity Rating Range  --  4.0 - 3.5: WNL  -       CLQT Comments  --  Outcome Summary: Patient assessed using the CLQT. Testing revealed moderate impairment in executive functions with functional performance in attention, memory, language, and visuospatial skills. Despite functional performance in those four domains, the patient did score at the lower end of the range. Composite score and clock draw were also WFLs. SLP recs continued cognitive therapy addressing executive function, as patient's goal is to return to independant living. Pt in agreement.  -          SLP Clinical Impressions    SLP Diagnosis  --  Moderate deficits in executive functions  -       Rehab Potential/Prognosis  --  excellent  -          Recommendations    Therapy Frequency (SLP SLC)  --  5 days per week  -       Predicted Duration Therapy Intervention (Days)  --  until discharge  -       Anticipated Discharge Disposition (SLP)  --  assisted living facility ([facility])  -          Communication Treatment Objective and Progress Goals (SLP)    Cognitive Linguistic Treatment Objectives  --  Cognitive Linguistic Treatment Objectives (Group)  -          Cognitive Linguistic Treatment Objectives    Organizational Skills Selection  --  organizational skills, SLP goal 1  -       Reasoning Selection  --  --  -       Executive Function Skills Selection  --  executive function skills, SLP goal 1  -          Organizational Skills Goal 1 (SLP)    Improve Thought Organization Through Goal 1 (SLP)  --  completing a divergent naming task;completing a convergent naming task;generating a list of items in a category;90%;with minimal cues (75-90%)  -       Time Frame (Thought Organization Skills Goal 1, SLP)  --  by discharge  -          Executive Functional Skills Goal 1 (SLP)    Improve Executive Function Skills Goal 1 (SLP)  --   organization/planning activity;time management activity;complex organization/planning activity;home management activity;exhibit cognitive flexibility;80%;with minimal cues (75-90%)  -       Time Frame (Executive Function Skills Goal 1, SLP)  --  by discharge  -         User Key  (r) = Recorded By, (t) = Taken By, (c) = Cosigned By    Initials Name Effective Dates     Maria Luisa Lopez, MS CCC-SLP 03/07/18 -              EDUCATION    The patient has been educated in the following areas:       Cognitive Impairment.      SLP Recommendation and Plan    Patient was intermittently wearing a mask wearing a face mask during this therapy encounter. Pt did not wear a mask as SLP assisted patient with using the bathroom and teeth brushing. Pt also removed her mask to sneeze x3, but did turn away from SLP. Patient was within 6 feet of SLP for greater than 15 mins without her mask in place. Therapist used appropriate personal protective equipment including mask, eye protection and gloves.  Mask used was N95/duckbill. Appropriate PPE was worn during the entire therapy session. Hand hygiene was completed before and after therapy session. Patient is not in enhanced droplet precautions.         SLP GOALS     Row Name 02/05/21 0900 02/04/21 1300          Organizational Skills Goal 1 (SLP)    Improve Thought Organization Through Goal 1 (SLP)  completing a divergent naming task;completing a convergent naming task;generating a list of items in a category;90%;with minimal cues (75-90%)  -  completing a divergent naming task;completing a convergent naming task;generating a list of items in a category;90%;with minimal cues (75-90%)  -     Time Frame (Thought Organization Skills Goal 1, SLP)  by discharge  -  by discharge  -     Progress/Outcomes (Thought Organization Skills Goal 1, SLP)  good progress toward goal  -  --     Comment (Thought Organization Skills Goal 1, SLP)  Word fluency task: avg 9 words in 1 minute without  cues  -  --        Executive Functional Skills Goal 1 (SLP)    Improve Executive Function Skills Goal 1 (SLP)  organization/planning activity;time management activity;complex organization/planning activity;home management activity;exhibit cognitive flexibility;80%;with minimal cues (75-90%)  -  organization/planning activity;time management activity;complex organization/planning activity;home management activity;exhibit cognitive flexibility;80%;with minimal cues (75-90%)  -     Time Frame (Executive Function Skills Goal 1, SLP)  by discharge  -  by discharge  -     Progress/Outcomes (Executive Function Skills Goal 1, SLP)  continuing progress toward goal  -  --     Comment (Executive Function Skills Goal 1, SLP)  Simple time word problem with pictures: 66% without cues, simple money word problem with pictures-80% acc, simple deduction puzzle (12 squares)-MOD to MAX cues to complete accurately.   -  --       User Key  (r) = Recorded By, (t) = Taken By, (c) = Cosigned By    Initials Name Provider Type    Maria Luisa John MS CCC-SLP Speech and Language Pathologist             SLP Outcome Measures (last 72 hours)      SLP Outcome Measures     Row Name 02/04/21 1500             SLP Outcome Measures    Outcome Measure Used?  Adult NOMS  -         Adult FCM Scores    FCM Chosen  Problem Solving  -      Swallowing FCM Score  5  -        User Key  (r) = Recorded By, (t) = Taken By, (c) = Cosigned By    Initials Name Effective Dates    Maria Luisa John MS CCC-SLP 03/07/18 -                   Time Calculation:       Time Calculation- SLP     Row Name 02/05/21 1012             Time Calculation- [facility]    SLP Start Time  0900  -      SLP Stop Time  1000  -SH      SLP Time Calculation (min)  60 min  -      SLP Received On  02/05/21  -        User Key  (r) = Recorded By, (t) = Taken By, (c) = Cosigned By    Initials Name Provider Type    Maria Luisa John MS CCC-SLP Speech and Language Pathologist             Therapy Charges for Today     Code Description Service Date Service Provider Modifiers Qty    79759136212 HC ST STD COG PERF TEST PER HOUR 2/4/2021 Maria Luisa Lopez, MS CCC-SLP GN 2    49807380671 HC ST DEV OF COGN SKILLS INITIAL 15 MIN 2/5/2021 Maria Luisa Lopez MS CCC-SLP  1    09808168168 HC ST DEV OF COGN SKILLS EACH ADDT'L 15 MIN 2/5/2021 Maria Luisa Lopez MS CCC-SLP  3               ADULT NOMS (last 72 hours)      Adult NOMS     Row Name 02/04/21 1500                   Adult FCM Scores    FCM Chosen  Problem Solving  -        Swallowing FCM Score  5  -SH          User Key  (r) = Recorded By, (t) = Taken By, (c) = Cosigned By    Initials Name Effective Dates     Maria Luisa Lopez MS CCC-SLP 03/07/18 -                      Maria Luisa Lopez MS CCC-SLP  2/5/2021

## 2021-02-05 NOTE — PLAN OF CARE
Goal Outcome Evaluation:        Outcome Summary: A&OX4. Continent B&B. Last BM today. Calm, cooperative, and pleasant. Diet: Reg, thin liquids. Added protein shakes BID. Nystatin powder to underside breasts. Ate little at meals. Stated she has no appetite. L. lower extremity numb/weak.

## 2021-02-05 NOTE — THERAPY TREATMENT NOTE
Inpatient Rehabilitation - Physical Therapy Treatment Note       [facility]     Patient Name: Marianne Delgado  : 1925  MRN: [id number]    Today's Date: 2021                    Admit Date: 2/3/2021      Visit Dx: No diagnosis found.    Patient Active Problem List   Diagnosis   • Arthritis   • Stage 4 chronic kidney disease (CMS/[facility])   • Debility   • Foot pain, bilateral   • Glaucoma   • Acute idiopathic gout of right ankle   • Hematuria   • Essential hypertension   • Acquired hypothyroidism   • Osteopenia   • Psoriasis   • Rosacea   • Vitamin D deficiency   • Medicare annual wellness visit, subsequent   • Morbidly obese (CMS/[facility])   • Cerebrovascular accident (CVA) (CMS/[facility])   • Hypertensive urgency   • Stroke (cerebrum) (CMS/[facility])       Past Medical History:   Diagnosis Date   • Acute sinusitis    • Acute upper respiratory infection    • Arthritis    • CKD (chronic kidney disease)    • Debility    • Fatigue    • Foot pain, bilateral    • Glaucoma    • Gout    • Hematuria    • High blood pressure    • Hypertension    • Hypothyroid 1999   • Hypothyroidism    • IBS (irritable bowel syndrome)    • IGT (impaired glucose tolerance)    • Malaise and fatigue    • Medication management    • Melanoma (CMS/[facility]) 1979    left leg   • OA (osteoarthritis)    • Osteopenia 1999   • Painful joint    • Psoriasis    • Renal failure    • Rosacea    • Vitamin D deficiency        Past Surgical History:   Procedure Laterality Date   • CATARACT EXTRACTION     • FOOT SURGERY      mauri toe surgery   • OTHER SURGICAL HISTORY      Melanoma Excision: Left leg          PT ASSESSMENT (last 12 hours)      [facility] PT Evaluation and Treatment     Row Name 21 1047          PT Time and Intention    Document Type  daily treatment  -MD     Mode of Treatment  physical therapy  -MD     Patient/Family/Caregiver Comments/Observations  Pt supine in bed showing no signs of acute distress.  -MD     Row Name 21 1046           Cognition/Psychosocial    Orientation Status (Cognition)  oriented to;person;situation  -MD     Follows Commands (Cognition)  WFL  -MD     Personal Safety Interventions  fall prevention program maintained;gait belt  -MD     Row Name 02/05/21 1047          Pain Scale: Numbers Pre/Post-Treatment    Pretreatment Pain Rating  0/10 - no pain  -MD     Row Name 02/05/21 1047          Bed Mobility    Supine-Sit [place] (Bed Mobility)  standby assist  -MD     Assistive Device (Bed Mobility)  bed rails;head of bed elevated  -MD     Row Name 02/05/21 1047          Transfers    Bed-Chair [place] (Transfers)  verbal cues;contact guard  -MD     Assistive Device (Bed-Chair Transfers)  walker, 4-wheeled  -MD     Sit-Stand [place] (Transfers)  verbal cues;contact guard  -MD     Stand-Sit [place] (Transfers)  verbal cues;contact guard  -MD     Row Name 02/05/21 1047          Sit-Stand Transfer    Assistive Device (Sit-Stand Transfers)  walker, 4-wheeled  -MD     Row Name 02/05/21 1047          Stand-Sit Transfer    Assistive Device (Stand-Sit Transfers)  walker, 4-wheeled  -MD     Row Name 02/05/21 1047          Gait/Stairs (Locomotion)    [place] Level (Gait)  verbal cues;contact guard  -MD     Assistive Device (Gait)  walker, 4-wheeled  -MD     Distance in Feet (Gait)  160'x2 and 80'x1  -MD     Deviations/Abnormal Patterns (Gait)  antalgic;tho decreased;gait speed decreased;stride length decreased  -MD     Bilateral Gait Deviations  forward flexed posture  -MD     Row Name 02/05/21 1047          Positioning and Restraints    Pre-Treatment Position  in bed  -MD     Post Treatment Position  wheelchair  -MD     In Wheelchair  sitting;call light within reach;exit alarm on  -MD       User Key  (r) = Recorded By, (t) = Taken By, (c) = Cosigned By    Initials Name Provider Type    Katya Irving PT Physical Therapist           Physical Therapy Education                 Title: PT OT SLP Therapies (In Progress)      Topic: Physical Therapy (In Progress)     Point: Mobility training (Not Started)     Learner Progress:  Not documented in this visit.          Point: Home exercise program (Not Started)     Learner Progress:  Not documented in this visit.          Point: Body mechanics (Not Started)     Learner Progress:  Not documented in this visit.          Point: Precautions (Done)     Learning Progress Summary           Patient Acceptance, E, VU by MD at 2/5/2021 1049    Acceptance, E, VU by MD at 2/4/2021 1551                               User Key     Initials Effective Dates Name Provider Type Discipline    MD 04/03/18 -  Katya Bar PT Physical Therapist PT                PT Recommendation and Plan    Frequency of Treatment (PT): 5 times per week, 60 minutes per session  Anticipated Equipment Needs at Discharge (PT Eval): (pt has rollator)                  Time Calculation:     PT Charges     Row Name 02/05/21 1047             Time Calculation    Start Time  1030  -MD      Stop Time  1100  -MD      Time Calculation (min)  30 min  -MD      PT Received On  02/05/21  -MD      PT - Next Appointment  02/06/21  -MD        User Key  (r) = Recorded By, (t) = Taken By, (c) = Cosigned By    Initials Name Provider Type    Katya Irving PT Physical Therapist          Therapy Charges for Today     Code Description Service Date Service Provider Modifiers Qty    23001776673 HC PT EVAL LOW COMPLEXITY 2 2/4/2021 Katya Bar, PT GP 1    24313997883 HC PT THERAPEUTIC ACT EA 15 MIN 2/4/2021 Katya Bar, PT GP 1    42280747533 HC GAIT TRAINING EA 15 MIN 2/4/2021 Katya Bar, PT GP 2    20736104690 HC GAIT TRAINING EA 15 MIN 2/5/2021 Katya Bar, PT GP 2          Patient was intermittently wearing a face mask during this therapy encounter. Therapist used appropriate personal protective equipment including eye protection, mask, and gloves.  Mask used was standard procedure mask. Appropriate PPE was worn during the entire therapy session. Hand  hygiene was completed before and after therapy session. Patient is not in enhanced droplet precautions.              Katya Bar, PT  2/5/2021

## 2021-02-05 NOTE — PROGRESS NOTES
"[facility] finds pt sitting in chair in sunshine, congenial and cognizant.  Pt relates she is encouraged by her progress over the last couple of days and states \"I am working hard to regain my strength; I want to get back to being able to take care of myself.\"   Grateful for support received by allyssa jenkins for her continued progress and rehab.  Made aware of availability of [facility] 24/7.  No other requests for assistance.    "

## 2021-02-05 NOTE — PROGRESS NOTES
Inpatient Rehabilitation Plan of Care Note    Plan of Care  Care Plan Reviewed - Updates as Follows    Body Systems    [RN] Integumentary(Active)  Current Status(02/05/2021): Pt has red area to right buttock (psoriasis) cream  ordered; slight redness under breasts; powder ordered  Weekly Goal(02/09/2021): No s/s of infection  Discharge Goal: No s/s of infection    Performed Intervention(s)  Daily skin inspection  Nutritional support      Pain    [RN] Pain Management(Active)  Current Status(02/05/2021): Chronic pain to left knee r/t arthritis  Weekly Goal(02/09/2021): Pt able to tolerate therapies  Discharge Goal: Pain under control    Performed Intervention(s)  Offer medication when needed  Practice relaxation techniques  Apply heating pad or cream to area when needed      Psychosocial    [RN] Coping/Adjustment(Active)  Current Status(02/05/2021): Pt expresses effective coping  Weekly Goal(02/09/2021): Allow pt to express concerns  Discharge Goal: Demonstrates healthy coping strategies    Performed Intervention(s)  Verbalizes needs and concerns  Provide therapeutic enviroment      Safety    [RN] Potential for Injury(Active)  Current Status(02/05/2021): No unsafe behaviors  Weekly Goal(02/09/2021): Pt will use call bell 100% of the time  Discharge Goal: No falls    Performed Intervention(s)  Falls precautions/protocol  Safety rounds  Items within reach  Bed alarm/ chair alarm      Sphincter Control    [RN] Bladder Management(Active)  Current Status(02/05/2021): Pt is continent 100%  Weekly Goal(02/09/2021): Pt will be continent 100%  Discharge Goal: Pt will be continent 100%    [RN] Bowel Management(Active)  Current Status(02/05/2021): Pt is continent 100%  Weekly Goal(02/09/2021): Pt will be continent 100%  Discharge Goal: Pt will remain continent 100%    Performed Intervention(s)  Use incontinence products  offer toileting  Encourage fluids    Signed by: Donna Jones RN

## 2021-02-05 NOTE — THERAPY TREATMENT NOTE
Inpatient Rehabilitation - Occupational Therapy Treatment Note    [facility]     Patient Name: Marianne Delgado  : 1925  MRN: [id number]    Today's Date: 2021                 Admit Date: 2/3/2021       No diagnosis found.    Patient Active Problem List   Diagnosis   • Arthritis   • Stage 4 chronic kidney disease (CMS/[facility])   • Debility   • Foot pain, bilateral   • Glaucoma   • Acute idiopathic gout of right ankle   • Hematuria   • Essential hypertension   • Acquired hypothyroidism   • Osteopenia   • Psoriasis   • Rosacea   • Vitamin D deficiency   • Medicare annual wellness visit, subsequent   • Morbidly obese (CMS/[facility])   • Cerebrovascular accident (CVA) (CMS/[facility])   • Hypertensive urgency   • Stroke (cerebrum) (CMS/[facility])       Past Medical History:   Diagnosis Date   • Acute sinusitis    • Acute upper respiratory infection    • Arthritis    • CKD (chronic kidney disease)    • Debility    • Fatigue    • Foot pain, bilateral    • Glaucoma    • Gout    • Hematuria    • High blood pressure    • Hypertension    • Hypothyroid 1999   • Hypothyroidism    • IBS (irritable bowel syndrome)    • IGT (impaired glucose tolerance)    • Malaise and fatigue    • Medication management    • Melanoma (CMS/[facility]) 1979    left leg   • OA (osteoarthritis)    • Osteopenia 1999   • Painful joint    • Psoriasis    • Renal failure    • Rosacea    • Vitamin D deficiency        Past Surgical History:   Procedure Laterality Date   • CATARACT EXTRACTION     • FOOT SURGERY      mauri toe surgery   • OTHER SURGICAL HISTORY      Melanoma Excision: Left leg                IRF OT ASSESSMENT FLOWSHEET (last 12 hours)      IRF OT Evaluation and Treatment     Row Name 21 1444 21 1100       OT Time and Intention    Document Type  daily treatment  -AF  daily treatment  -RD    Mode of Treatment  occupational therapy  -AF  occupational therapy  -RD    Patient Effort  good  -AF  excellent  -RD    Symptoms Noted During/After  Treatment  --  fatigue  -RD    Row Name 02/05/21 1444 02/05/21 1100       General Information    Patient Profile Reviewed  --  yes  -RD    Patient/Family/Caregiver Comments/Observations  pt sitting up in recliner   -AF  pt seated in w/c w/ no signs of acute distress  -RD    Existing Precautions/Restrictions  fall  -AF  fall  -RD    Row Name 02/05/21 1444 02/05/21 1100       Cognition/Psychosocial    Affect/Mental Status (Cognitive)  WFL  -AF  WFL  -RD    Orientation Status (Cognition)  oriented x 3  -AF  oriented x 3  -RD    Follows Commands (Cognition)  WFL  -AF  WFL  -RD    Personal Safety Interventions  fall prevention program maintained;gait belt;nonskid shoes/slippers when out of bed  -AF  fall prevention program maintained;gait belt;nonskid shoes/slippers when out of bed;muscle strengthening facilitated  -RD    Row Name 02/05/21 1444          Pain Scale: Numbers Pre/Post-Treatment    Pretreatment Pain Rating  0/10 - no pain  -AF     Posttreatment Pain Rating  0/10 - no pain  -AF     Row Name 02/05/21 1100          Pain Scale: FACES Pre/Post-Treatment    Pain: FACES Scale, Pretreatment  0-->no hurt  -RD     Posttreatment Pain Rating  0-->no hurt  -RD     Row Name 02/05/21 1444 02/05/21 1100       Transfer Assessment/Treatment    Transfers  --  shower transfer  -RD    Comment (Transfers)  recliner chair to w/c BEBE , MIN with coming to stand at counter top with reaching activities  -AF  stand pivot transfer w/c > recliner chair- CGA/min A  -RD    Row Name 02/05/21 1100          Transfers    [place] Level (Shower Transfer)  minimum assist (75% patient effort);verbal cues  -RD     Assistive Device (Shower Transfer)  grab bar, tub/shower;tub bench;wheelchair  -RD     Row Name 02/05/21 1100          Shower Transfer    Type (Shower Transfer)  stand pivot/stand step;sit-stand;stand-sit  -RD     Row Name 02/05/21 1444          Balance    Static Standing Balance  mild impairment  -AF     Comment, Balance  standing  at counter top to increase standing endurance for ADL tasks with MIN A to come to stand and CGA to maintain balance during BUE reaching tasks 2 mins x 2 sets   -AF     Row Name 02/05/21 1444          Motor Skills    Therapeutic Exercise  wrist  -AF     Row Name 02/05/21 1444          Elbow/Forearm (Therapeutic Exercise)    Elbow/Forearm Strengthening (Therapeutic Exercise)  bilateral;flexion;extension;supination;pronation;sitting;2 lb free weight;2 sets 15 reps   -AF     Row Name 02/05/21 1444          Wrist (Therapeutic Exercise)    Wrist (Therapeutic Exercise)  strengthening exercise  -AF     Wrist Strengthening (Therapeutic Exercise)  bilateral;flexion;extension;2 lb free weight;2 sets 15 reps   -AF     Row Name 02/05/21 1100          Basic Activities of Daily Living (BADLs)    Basic Activities of Daily Living  bathing;upper body dressing;lower body dressing;grooming  -RD     Row Name 02/05/21 1100          Bathing    [place] Level (Bathing)  bathing skills;upper body;lower body;minimum assist (75% patient effort);moderate assist (50% patient effort)  -RD     Assistive Device (Bathing)  grab bar/tub rail;hand held shower spray hose;tub bench  -RD     Position (Bathing)  supported sitting;supported standing  -RD     Set-up Assistance (Bathing)  obtain supplies;adjust water temperature  -RD     Comment (Bathing)  assist for B LEs; min/CGA for balance when standing to wash krista regions  -RD     Row Name 02/05/21 1444 02/05/21 1100       Upper Body Dressing    [place] Level (Upper Body Dressing)  front opening garment;minimum assist (75% or more patient effort)  -AF  upper body dressing skills;doff;don;pull over garment;set up assistance;supervision  -RD    Position (Upper Body Dressing)  supported sitting  -AF  supported sitting  -RD    Set-up Assistance (Upper Body Dressing)  --  obtain clothing  -RD    Comment (Upper Body Dressing)  don sweater  -AF  --    Row Name 02/05/21 1100          Lower Body  Dressing    [place] Level (Lower Body Dressing)  doff;don;underwear;pants/bottoms;socks;shoes/slippers;shoelaces;maximum assist (25% patient effort);verbal cues  -RD     Position (Lower Body Dressing)  supported sitting;supported standing  -RD     Set-up Assistance (Lower Body Dressing)  obtain clothing  -RD     Comment (Lower Body Dressing)  assist to thread all LB clothing- pt able to assist w/ pulling up LB clothing over hips only  -RD     Row Name 02/05/21 1100          Grooming    [place] Level (Grooming)  grooming skills;wash face, hands;set up;supervision  -RD     Position (Grooming)  supported sitting  -RD     Set-up Assistance (Grooming)  obtain supplies  -RD     Row Name 02/05/21 1444 02/05/21 1100       Positioning and Restraints    Pre-Treatment Position  sitting in chair/recliner  -AF  sitting in chair/recliner  -RD    Post Treatment Position  wheelchair  -AF  chair  -RD    In Chair  --  sitting;call light within reach;encouraged to call for assist;exit alarm on  -RD    In Wheelchair  sitting;call light within reach;encouraged to call for assist;exit alarm on  -AF  --      User Key  (r) = Recorded By, (t) = Taken By, (c) = Cosigned By    Initials Name Effective Dates    AF Fadia Patel, OTR 04/14/20 -     Sabina Whitman, OT 10/14/19 -            Occupational Therapy Education                 Title: PT OT SLP Therapies (In Progress)     Topic: Occupational Therapy (In Progress)     Point: ADL training (Done)     Description:   Instruct learner(s) on proper safety adaptation and remediation techniques during self care or transfers.   Instruct in proper use of assistive devices.              Learning Progress Summary           Patient Acceptance, E,TB, VU,DU by  at 2/4/2021 1640    Comment: ed pt on role of OT, benefit of therapy, POC w. OT ed on safety w ADLs and tsf techniques. pt demo w. min A for tsf and more A w. LBD and SBA UBD                   Point: Home exercise program (Not  Started)     Description:   Instruct learner(s) on appropriate technique for monitoring, assisting and/or progressing therapeutic exercises/activities.              Learner Progress:  Not documented in this visit.          Point: Precautions (Done)     Description:   Instruct learner(s) on prescribed precautions during self-care and functional transfers.              Learning Progress Summary           Patient Acceptance, E,TB, VU,DU by  at 2/4/2021 1640    Comment: ed pt on role of OT, benefit of therapy, POC w. OT ed on safety w ADLs and tsf techniques. pt demo w. min A for tsf and more A w. LBD and SBA UBD                   Point: Body mechanics (Done)     Description:   Instruct learner(s) on proper positioning and spine alignment during self-care, functional mobility activities and/or exercises.              Learning Progress Summary           Patient Acceptance, E,TB, VU,DU by  at 2/4/2021 1640    Comment: ed pt on role of OT, benefit of therapy, POC w. OT ed on safety w ADLs and tsf techniques. pt demo w. min A for tsf and more A w. LBD and SBA UBD                               User Key     Initials Effective Dates Name Provider Type Discipline     06/08/18 -  Mame Nelson, OTR Occupational Therapist OT                    OT Recommendation and Plan                         Time Calculation:     Time Calculation- OT     Row Name 02/05/21 1447 02/05/21 1144          Time Calculation- OT    OT Start Time  1330  -AF  1100  -RD     OT Stop Time  1400  -AF  1130  -RD     OT Time Calculation (min)  30 min  -AF  30 min  -RD     Total Timed Code Minutes- OT  --  30 minute(s)  -RD     OT Received On  --  02/05/21  -RD       User Key  (r) = Recorded By, (t) = Taken By, (c) = Cosigned By    Initials Name Provider Type    AF Fadia Patel, OTR Occupational Therapist    RD Sabina Samaniego, OT Occupational Therapist        Therapy Charges for Today     Code Description Service Date Service Provider  Modifiers Qty    90937677605  OT THERAPEUTIC ACT EA 15 MIN 2/5/2021 Fadia Patel, OTR GO 1    [id number]  OT THER PROC EA 15 MIN 2/5/2021 Fadia Patel, OTLEONCIO GO 1                   HRAI Palmer  2/5/2021

## 2021-02-05 NOTE — THERAPY TREATMENT NOTE
Inpatient Rehabilitation - Occupational Therapy Treatment Note    [facility]     Patient Name: Marianne Delgado  : 1925  MRN: [id number]    Today's Date: 2021                 Admit Date: 2/3/2021       No diagnosis found.    Patient Active Problem List   Diagnosis   • Arthritis   • Stage 4 chronic kidney disease (CMS/[facility])   • Debility   • Foot pain, bilateral   • Glaucoma   • Acute idiopathic gout of right ankle   • Hematuria   • Essential hypertension   • Acquired hypothyroidism   • Osteopenia   • Psoriasis   • Rosacea   • Vitamin D deficiency   • Medicare annual wellness visit, subsequent   • Morbidly obese (CMS/[facility])   • Cerebrovascular accident (CVA) (CMS/[facility])   • Hypertensive urgency   • Stroke (cerebrum) (CMS/[facility])       Past Medical History:   Diagnosis Date   • Acute sinusitis    • Acute upper respiratory infection    • Arthritis    • CKD (chronic kidney disease)    • Debility    • Fatigue    • Foot pain, bilateral    • Glaucoma    • Gout    • Hematuria    • High blood pressure    • Hypertension    • Hypothyroid 1999   • Hypothyroidism    • IBS (irritable bowel syndrome)    • IGT (impaired glucose tolerance)    • Malaise and fatigue    • Medication management    • Melanoma (CMS/[facility]) 1979    left leg   • OA (osteoarthritis)    • Osteopenia 1999   • Painful joint    • Psoriasis    • Renal failure    • Rosacea    • Vitamin D deficiency        Past Surgical History:   Procedure Laterality Date   • CATARACT EXTRACTION     • FOOT SURGERY      mauri toe surgery   • OTHER SURGICAL HISTORY      Melanoma Excision: Left leg                IRF OT ASSESSMENT FLOWSHEET (last 12 hours)      IRF OT Evaluation and Treatment     Row Name 21 1100          OT Time and Intention    Document Type  daily treatment  -RD     Mode of Treatment  occupational therapy  -RD     Patient Effort  excellent  -RD     Symptoms Noted During/After Treatment  fatigue  -RD     Row Name 21 1100          General  Information    Patient Profile Reviewed  yes  -RD     Patient/Family/Caregiver Comments/Observations  pt seated in w/c w/ no signs of acute distress  -RD     Existing Precautions/Restrictions  fall  -RD     Row Name 02/05/21 1100          Cognition/Psychosocial    Affect/Mental Status (Cognitive)  WFL  -RD     Orientation Status (Cognition)  oriented x 3  -RD     Follows Commands (Cognition)  WFL  -RD     Personal Safety Interventions  fall prevention program maintained;gait belt;nonskid shoes/slippers when out of bed;muscle strengthening facilitated  -RD     Row Name 02/05/21 1100          Pain Scale: FACES Pre/Post-Treatment    Pain: FACES Scale, Pretreatment  0-->no hurt  -RD     Posttreatment Pain Rating  0-->no hurt  -RD     Row Name 02/05/21 1100          Transfer Assessment/Treatment    Transfers  shower transfer  -RD     Comment (Transfers)  stand pivot transfer w/c > recliner chair- CGA/min A  -RD     Row Name 02/05/21 1100          Transfers    [place] Level (Shower Transfer)  minimum assist (75% patient effort);verbal cues  -RD     Assistive Device (Shower Transfer)  grab bar, tub/shower;tub bench;wheelchair  -RD     Row Name 02/05/21 1100          Shower Transfer    Type (Shower Transfer)  stand pivot/stand step;sit-stand;stand-sit  -RD     Row Name 02/05/21 1100          Basic Activities of Daily Living (BADLs)    Basic Activities of Daily Living  bathing;upper body dressing;lower body dressing;grooming  -RD     Row Name 02/05/21 1100          Bathing    [place] Level (Bathing)  bathing skills;upper body;lower body;minimum assist (75% patient effort);moderate assist (50% patient effort)  -RD     Assistive Device (Bathing)  grab bar/tub rail;hand held shower spray hose;tub bench  -RD     Position (Bathing)  supported sitting;supported standing  -RD     Set-up Assistance (Bathing)  obtain supplies;adjust water temperature  -RD     Comment (Bathing)  assist for B LEs; min/CGA for balance when  standing to wash krista regions  -RD     Row Name 02/05/21 1100          Upper Body Dressing    [place] Level (Upper Body Dressing)  upper body dressing skills;doff;don;pull over garment;set up assistance;supervision  -RD     Position (Upper Body Dressing)  supported sitting  -RD     Set-up Assistance (Upper Body Dressing)  obtain clothing  -RD     Row Name 02/05/21 1100          Lower Body Dressing    [place] Level (Lower Body Dressing)  doff;don;underwear;pants/bottoms;socks;shoes/slippers;shoelaces;maximum assist (25% patient effort);verbal cues  -RD     Position (Lower Body Dressing)  supported sitting;supported standing  -RD     Set-up Assistance (Lower Body Dressing)  obtain clothing  -RD     Comment (Lower Body Dressing)  assist to thread all LB clothing- pt able to assist w/ pulling up LB clothing over hips only  -RD     Row Name 02/05/21 1100          Grooming    [place] Level (Grooming)  grooming skills;wash face, hands;set up;supervision  -RD     Position (Grooming)  supported sitting  -RD     Set-up Assistance (Grooming)  obtain supplies  -RD     Row Name 02/05/21 1100          Positioning and Restraints    Pre-Treatment Position  sitting in chair/recliner  -RD     Post Treatment Position  chair  -RD     In Chair  sitting;call light within reach;encouraged to call for assist;exit alarm on  -RD       User Key  (r) = Recorded By, (t) = Taken By, (c) = Cosigned By    Initials Name Effective Dates    RD Sabina Samaniego, OT 10/14/19 -            Occupational Therapy Education                 Title: PT OT SLP Therapies (In Progress)     Topic: Occupational Therapy (In Progress)     Point: ADL training (Done)     Description:   Instruct learner(s) on proper safety adaptation and remediation techniques during self care or transfers.   Instruct in proper use of assistive devices.              Learning Progress Summary           Patient Acceptance, E,TB, VU,DU by  at 2/4/2021 1640    Comment: ed  pt on role of OT, benefit of therapy, POC w. OT ed on safety w ADLs and tsf techniques. pt demo w. min A for tsf and more A w. LBD and SBA UBD                   Point: Home exercise program (Not Started)     Description:   Instruct learner(s) on appropriate technique for monitoring, assisting and/or progressing therapeutic exercises/activities.              Learner Progress:  Not documented in this visit.          Point: Precautions (Done)     Description:   Instruct learner(s) on prescribed precautions during self-care and functional transfers.              Learning Progress Summary           Patient Acceptance, E,TB, VU,DU by  at 2/4/2021 1640    Comment: ed pt on role of OT, benefit of therapy, POC w. OT ed on safety w ADLs and tsf techniques. pt demo w. min A for tsf and more A w. LBD and SBA UBD                   Point: Body mechanics (Done)     Description:   Instruct learner(s) on proper positioning and spine alignment during self-care, functional mobility activities and/or exercises.              Learning Progress Summary           Patient Acceptance, E,TB, VU,DU by  at 2/4/2021 1640    Comment: ed pt on role of OT, benefit of therapy, POC w. OT ed on safety w ADLs and tsf techniques. pt demo w. min A for tsf and more A w. LBD and SBA UBD                               User Key     Initials Effective Dates Name Provider Type [facility] 06/08/18 -  Mame Nelson, OTR Occupational Therapist OT                    OT Recommendation and Plan                         Time Calculation:     Time Calculation- OT     Row Name 02/05/21 1144             Time Calculation- OT    OT Start Time  1100  -RD      OT Stop Time  1130  -RD      OT Time Calculation (min)  30 min  -RD      Total Timed Code Minutes- OT  30 minute(s)  -RD      OT Received On  02/05/21  -RD        User Key  (r) = Recorded By, (t) = Taken By, (c) = Cosigned By    Initials Name Provider Type    Sabina Whitman, OT Occupational  Therapist        Therapy Charges for Today     Code Description Service Date Service Provider Modifiers Qty    [id number] HC OT SELF CARE/MGMT/TRAIN EA 15 MIN 2/5/2021 Sabina Samaniego, OT GO 2                   Sabina Samaniego, OT  2/5/2021

## 2021-02-05 NOTE — PLAN OF CARE
Goal Outcome Evaluation:  Plan of Care Reviewed With: patient       Problem: Rehabilitation (IRF) Plan of Care  Goal: Plan of Care Review  Outcome: Ongoing, Progressing  Flowsheets  Taken 2/5/2021 0318 by Donna Jones RN  Outcome Summary: Pt is alert and oriented x 4. Pleasant and cooperative with all staff. Takes all meds whole PO. Regular diet with thin liquids. Will get boost supplements TID r/t lack of appetite. Continent of bowel and bladder. lst BM 2/4. CGA with rollator during transfers. Nystatin powder to folds and under breasts. Denies pain at this time. Resting in bed with eyes closed. Call light within reach.

## 2021-02-05 NOTE — PROGRESS NOTES
"   LOS: 2 days   Patient Care Team:  Xenia Robert MD as PCP - General (Family Medicine)    Chief Complaint:   Status post CVA-small focus of restricted diffusion in the right parietal lobe  Impaired cognition/impaired mobility/impaired self-care  Stroke prophylaxis-aspirin and Plavix x30 days then Plavix alone.  Left knee degenerative changes-status post steroid injection February 3  Chronic kidney disease stage IV baseline creatinine around 2.0  Acquired hypothyroidism-recheck TFTs 1 March-on levothyroxine  Morbid obesity  Vitamin D deficiency  Hypertension-metoprolol/amlodipine-systolic blood pressure goal 150-160  Hyperlipidemia-atorvastatin       Subjective     History of Present Illness    Subjective  Continues stronger.  Left knee pain better.  Tolerates therapies.    Denies fever/ chills/ headaches/ bodyaches/ sorethroat/ congestion/ change in taste or smell/ sob/ cough/ cp/ abdominal discomfort/ loose stools      .       History taken from: patient    Objective     Vital Signs  Temp:  [98 °F (36.7 °C)-98.3 °F (36.8 °C)] 98.1 °F (36.7 °C)  Heart Rate:  [59-78] 63  Resp:  [16-18] 18  BP: (152-165)/(65-78) 157/65    Objective:  Vital signs: (most recent): Blood pressure 157/65, pulse 63, temperature 98.1 °F (36.7 °C), temperature source Oral, resp. rate 18, height 154.9 cm (61\"), SpO2 96 %.            Physical Exam  MENTAL STATUS -  AWAKE / ALERT  HEENT- NCAT, PUPILS EQUALLY ROUND, SCLERAE ANICTERIC,   LUNGS - CTA, NO WHEEZES, RALES OR RHONCHI  HEART- RRR, NO RUB, MURMUR, OR GALLOP  ABD - NORMOACTIVE BOWEL SOUNDS, SOFT, NT.    EXT - NO EDEMA OR CYANOSIS  NEURO -oriented x4.     MOTOR EXAM - RUE/RLE 5/5.  She has weakness in the left upper extremity left lower extremity that was limited by pain inhibition at the left shoulder as well as with the left knee.    She shows better active knee extension seated in the chair.  Able to take resistance with left ankle dorsiflexion and finger flexion.    Results " Review:     I reviewed the patient's new clinical results.  Results from last 7 days   Lab Units 02/05/21  0711 01/31/21  0710 01/30/21  0629   WBC 10*3/mm3 10.11 7.67 7.69   HEMOGLOBIN g/dL 12.2 13.5 12.5   HEMATOCRIT % 37.8 41.6 39.4   PLATELETS 10*3/mm3 274 242 244     Results from last 7 days   Lab Units 02/05/21  0711 02/03/21  0538 02/02/21  0450  01/30/21  0629   SODIUM mmol/L 137 140 140   < > 142   POTASSIUM mmol/L 4.7 4.4 4.3   < > 4.0   CHLORIDE mmol/L 110* 110* 111*   < > 113*   CO2 mmol/L 20.2* 20.6* 21.6*   < > 22.6   BUN mg/dL 42* 24* 21   < > 17   CREATININE mg/dL 1.82* 1.82* 1.89*   < > 1.73*   CALCIUM mg/dL 9.2 8.8 9.0   < > 8.9   BILIRUBIN mg/dL  --   --   --   --  0.5   ALK PHOS U/L  --   --   --   --  132*   ALT (SGPT) U/L  --   --   --   --  6   AST (SGOT) U/L  --   --   --   --  10   GLUCOSE mg/dL 113* 84 89   < > 82    < > = values in this interval not displayed.         Ref. Range 9/15/2020 11:00 1/29/2021 10:35 1/30/2021 06:29 1/31/2021 07:10 2/5/2021 07:11   Hemoglobin A1C Latest Ref Range: 4.80 - 5.60 %  5.07      TSH Baseline Latest Ref Range: 0.270 - 4.200 uIU/mL 5.160 (H) 16.100 (H)  9.150 (H)    Free T4 Latest Ref Range: 0.93 - 1.70 ng/dL 1.39  1.14     T3, Free Latest Ref Range: 2.00 - 4.40 pg/mL    1.94 (L)    Total Cholesterol Latest Ref Range: 0 - 200 mg/dL  168      HDL Cholesterol Latest Ref Range: 40 - 60 mg/dL  44      LDL Cholesterol  Latest Ref Range: 0 - 100 mg/dL  104 (H)      VLDL Cholesterol Latest Ref Range: 5 - 40 mg/dL  20      Triglycerides Latest Ref Range: 0 - 150 mg/dL  110      Vitamin B-12 Latest Ref Range: 211 - 946 pg/mL     1,204 (H)   25 Hydroxy, Vitamin D Latest Ref Range: 30.0 - 100.0 ng/ml     39.9     Medication Review: done  Scheduled Meds:amLODIPine, 2.5 mg, Oral, Q24H  aspirin, 81 mg, Oral, Daily  atorvastatin, 80 mg, Oral, Nightly  betamethasone dipropionate, , Topical, Q24H  cholecalciferol, 1,000 Units, Oral, BID  clopidogrel, 75 mg, Oral,  Daily  dorzolamide-timolol, , Both Eyes, BID  latanoprost, 1 drop, Both Eyes, Nightly  levothyroxine, 150 mcg, Oral, Daily  lidocaine, 5 mL, Intra-articular, Once  metoprolol succinate XL, 12.5 mg, Oral, Daily  nystatin, , Topical, Q12H  triamcinolone acetonide, 40 mg, Intra-articular, Once  vitamin B-12, 250 mcg, Oral, Daily      Continuous Infusions:   PRN Meds:.•  acetaminophen  •  bisacodyl  •  trolamine salicylate      Assessment/Plan       Stroke (cerebrum) (CMS/[facility])      Assessment & Plan  Status post CVA-small focus of restricted diffusion in the right parietal lobe  Adjunctive medication for stroke recovery-on atorvastatin.  Recheck vitamin D level.  Check vitamin B-12 level.     Impaired cognition/impaired mobility/impaired self-care     Stroke prophylaxis-aspirin and Plavix x30 days then Plavix alone.     Left knee degenerative changes-status post steroid injection February 3     Chronic kidney disease stage IV baseline creatinine around 2.0     Acquired hypothyroidism-recheck TFTs 1 March-on levothyroxine     Morbid obesity     Vitamin D deficiency-no home dose listed-recheck vitamin D level     Hypertension-metoprolol/amlodipine-systolic blood pressure goal 150-160     Hyperlipidemia-atorvastatin     Now admit for comprehensive acute inpatient rehabilitation .  This would be an interdisciplinary program with physical therapy 1 hour,  occupational therapy 1 hour, and speech therapy 1 hour, 5 days a week.  Rehabilitation nursing for carryover, monitoring of nutritional and blood pressure and neurologic   status, bowel and bladder, and skin  Ongoing physician follow-up.  Weekly team conferences.  Goals are to achieve a level of supervision with  mobility and self-care and improved balance.   Rehabilitation prognosis fair.  Medical prognosis fair.  Estimated length of stay is approximately 2 to 3 weeks, but is only an estimation.   The patient's functional status and clinical status is unchanged from  preadmission assessment and the patient continues appropriate for acute inpatient rehabilitation.  Goal is for home with home health   therapies.  Barrier to discharge: Impaired mobility- work on transfers ADLs to overcome.      Ricki Matta MD  02/05/21  11:12 EST    Time:   During rounds, used appropriate personal protective equipment including mask and gloves.  Additional gown if indicated.  Mask used was standard procedure mask. Appropriate PPE was worn during the entire visit.  Hand hygiene was completed before and after.

## 2021-02-06 PROCEDURE — 97110 THERAPEUTIC EXERCISES: CPT

## 2021-02-06 PROCEDURE — 97116 GAIT TRAINING THERAPY: CPT

## 2021-02-06 PROCEDURE — 97535 SELF CARE MNGMENT TRAINING: CPT

## 2021-02-06 PROCEDURE — 97130 THER IVNTJ EA ADDL 15 MIN: CPT | Performed by: SPEECH-LANGUAGE PATHOLOGIST

## 2021-02-06 PROCEDURE — 97129 THER IVNTJ 1ST 15 MIN: CPT

## 2021-02-06 RX ADMIN — CLOPIDOGREL 75 MG: 75 TABLET, FILM COATED ORAL at 09:56

## 2021-02-06 RX ADMIN — ASPIRIN 81 MG: 81 TABLET, COATED ORAL at 09:56

## 2021-02-06 RX ADMIN — LATANOPROST 1 DROP: 50 SOLUTION/ DROPS OPHTHALMIC at 20:49

## 2021-02-06 RX ADMIN — TIMOLOL MALEATE: 5 SOLUTION/ DROPS OPHTHALMIC at 09:58

## 2021-02-06 RX ADMIN — METOPROLOL SUCCINATE 12.5 MG: 25 TABLET, EXTENDED RELEASE ORAL at 09:57

## 2021-02-06 RX ADMIN — Medication 250 MCG: at 09:57

## 2021-02-06 RX ADMIN — NYSTATIN 1 APPLICATION: 100000 POWDER TOPICAL at 20:49

## 2021-02-06 RX ADMIN — NYSTATIN: 100000 POWDER TOPICAL at 09:59

## 2021-02-06 RX ADMIN — LEVOTHYROXINE SODIUM 150 MCG: 150 TABLET ORAL at 05:49

## 2021-02-06 RX ADMIN — AMLODIPINE BESYLATE 2.5 MG: 2.5 TABLET ORAL at 09:56

## 2021-02-06 RX ADMIN — Medication 1000 UNITS: at 09:56

## 2021-02-06 RX ADMIN — TIMOLOL MALEATE: 5 SOLUTION/ DROPS OPHTHALMIC at 20:49

## 2021-02-06 RX ADMIN — ATORVASTATIN CALCIUM 80 MG: 80 TABLET, FILM COATED ORAL at 20:49

## 2021-02-06 RX ADMIN — BETAMETHASONE DIPROPIONATE: 0.5 OINTMENT TOPICAL at 09:58

## 2021-02-06 RX ADMIN — Medication 1000 UNITS: at 20:49

## 2021-02-06 NOTE — PROGRESS NOTES
Cc overall fatigue    Pt. Complains of being fatigued and low energy. This is her typical level  Left knee pain is better after injection      98.4 64 18 155/67    Awake, alert and 0x3  NAD  No increased work of breathing  abd soft,nt  Calves soft,nt  Lsided weakness        Assessment & Plan  Status post CVA-small focus of restricted diffusion in the right parietal lobe  Adjunctive medication for stroke recovery-on atorvastatin.  Recheck vitamin D level.  Check vitamin B-12 level.     Impaired cognition/impaired mobility/impaired self-care     Stroke prophylaxis-aspirin and Plavix x30 days then Plavix alone.     Left knee degenerative changes-status post steroid injection February 3     Chronic kidney disease stage IV baseline creatinine around 2.0     Acquired hypothyroidism-recheck TFTs 1 March-on levothyroxine     Morbid obesity     Vitamin D deficiency-no home dose listed-recheck vitamin D level     Hypertension-metoprolol/amlodipine-systolic blood pressure goal 150-160     Hyperlipidemia-atorvastatin    2/6- Working on overall endurance  ASA and Plavix for CVA prophy for 30 days then Plavix alone  No changes today  Continue therapy

## 2021-02-06 NOTE — PLAN OF CARE
Goal Outcome Evaluation:  Plan of Care Reviewed With: patient  Progress: improving  Outcome Summary: LLE tender to touch. Ambulates CGA with rollator. Meds with water. Red/pink slit open areas breast folds. Using Nystatin powder.

## 2021-02-06 NOTE — THERAPY TREATMENT NOTE
Inpatient Rehabilitation - Physical Therapy Treatment Note       [facility]     Patient Name: Marianne Delgado  : 1925  MRN: [id number]    Today's Date: 2021                    Admit Date: 2/3/2021      Visit Dx: No diagnosis found.    Patient Active Problem List   Diagnosis   • Arthritis   • Stage 4 chronic kidney disease (CMS/[facility])   • Debility   • Foot pain, bilateral   • Glaucoma   • Acute idiopathic gout of right ankle   • Hematuria   • Essential hypertension   • Acquired hypothyroidism   • Osteopenia   • Psoriasis   • Rosacea   • Vitamin D deficiency   • Medicare annual wellness visit, subsequent   • Morbidly obese (CMS/[facility])   • Cerebrovascular accident (CVA) (CMS/[facility])   • Hypertensive urgency   • Stroke (cerebrum) (CMS/[facility])       Past Medical History:   Diagnosis Date   • Acute sinusitis    • Acute upper respiratory infection    • Arthritis    • CKD (chronic kidney disease)    • Debility    • Fatigue    • Foot pain, bilateral    • Glaucoma    • Gout    • Hematuria    • High blood pressure    • Hypertension    • Hypothyroid 1999   • Hypothyroidism    • IBS (irritable bowel syndrome)    • IGT (impaired glucose tolerance)    • Malaise and fatigue    • Medication management    • Melanoma (CMS/[facility]) 1979    left leg   • OA (osteoarthritis)    • Osteopenia 1999   • Painful joint    • Psoriasis    • Renal failure    • Rosacea    • Vitamin D deficiency        Past Surgical History:   Procedure Laterality Date   • CATARACT EXTRACTION     • FOOT SURGERY      mauri toe surgery   • OTHER SURGICAL HISTORY      Melanoma Excision: Left leg          PT ASSESSMENT (last 12 hours)      IRF PT Evaluation and Treatment     Row Name 21 1000          PT Time and Intention    Document Type  daily treatment  -LB     Mode of Treatment  physical therapy  -LB     Patient/Family/Caregiver Comments/Observations  Pt reporting she was tired but needed to push herself.   -LB     Row Name 21 1000           Home Use of Assistive/Adaptive Equipment    Equipment Currently Used at Home  lift device Pt reports she sleeps in her lift chair.  -LB     Row Name 02/06/21 1000          Cognition/Psychosocial    Affect/Mental Status (Cognitive)  WFL  -LB     Orientation Status (Cognition)  oriented x 3  -LB     Follows Commands (Cognition)  WFL  -LB     Personal Safety Interventions  fall prevention program maintained  -LB     Row Name 02/06/21 1000          Pain Scale: Numbers Pre/Post-Treatment    Pretreatment Pain Rating  0/10 - no pain  -LB     Posttreatment Pain Rating  0/10 - no pain  -LB     Row Name 02/06/21 1000          Bed Mobility    Comment (Bed Mobility)  not tested, up in chair   -LB     Row Name 02/06/21 1000          Transfers    Chair-Bed [place] (Transfers)  contact guard slowly   -LB     Sit-Stand [place] (Transfers)  contact guard  -LB     Row Name 02/06/21 1000          Chair-Bed Transfer    Assistive Device (Chair-Bed Transfers)  walker, 4-wheeled  -LB     Row Name 02/06/21 1000          Sit-Stand Transfer    Assistive Device (Sit-Stand Transfers)  walker, 4-wheeled  -LB     Row Name 02/06/21 1000          Gait/Stairs (Locomotion)    [place] Level (Gait)  contact guard  -LB     Assistive Device (Gait)  walker, 4-wheeled  -LB     Distance in Feet (Gait)  160' x 4   -LB     Pattern (Gait)  step-to;step-through  -LB     Deviations/Abnormal Patterns (Gait)  gait speed decreased;stride length decreased  -LB     Bilateral Gait Deviations  forward flexed posture  -LB     Row Name 02/06/21 1000          Balance    Balance Interventions  standing;static;other (see comments) with one UE on walker  -LB     Row Name 02/06/21 1000          Hip (Therapeutic Exercise)    Hip (Therapeutic Exercise)  strengthening exercise  -LB     Hip Strengthening (Therapeutic Exercise)  bilateral;marching while seated;10 repetitions;2 sets  -LB     Row Name 02/06/21 1000          Knee (Therapeutic Exercise)    Knee  (Therapeutic Exercise)  strengthening exercise  -LB     Knee Strengthening (Therapeutic Exercise)  bilateral;LAQ (long arc quad);sitting;10 repetitions;2 sets  -LB     Row Name 02/06/21 1000          Ankle (Therapeutic Exercise)    Ankle (Therapeutic Exercise)  strengthening exercise  -LB     Ankle Strengthening (Therapeutic Exercise)  bilateral;dorsiflexion;plantarflexion;sitting;10 repetitions;2 sets  -LB       User Key  (r) = Recorded By, (t) = Taken By, (c) = Cosigned By    Initials Name Provider Type    LB Chely Perkins, PT Physical Therapist           Physical Therapy Education                 Title: PT OT SLP Therapies (In Progress)     Topic: Physical Therapy (In Progress)     Point: Mobility training (Done)     Learning Progress Summary           Patient Acceptance, E, DU,NR by  at 2/6/2021 1514                   Point: Home exercise program (Not Started)     Learner Progress:  Not documented in this visit.          Point: Body mechanics (Not Started)     Learner Progress:  Not documented in this visit.          Point: Precautions (Done)     Learning Progress Summary           Patient Acceptance, E, VU by MD at 2/5/2021 1049    Acceptance, E, VU by MD at 2/4/2021 1551                               User Key     Initials Effective Dates Name Provider Type Discipline    LB 06/08/18 -  Chely Perkins, PT Physical Therapist PT    MD 04/03/18 -  Katya Bar, PT Physical Therapist PT                PT Recommendation and Plan                          Time Calculation:     PT Charges     Row Name 02/06/21 1514             Time Calculation    Start Time  1000  -LB      Stop Time  1100  -LB      Time Calculation (min)  60 min  -LB      PT Received On  02/06/21  -LB      PT - Next Appointment  02/08/21  -LB        User Key  (r) = Recorded By, (t) = Taken By, (c) = Cosigned By    Initials Name Provider Type    Chely Mcdonald, PT Physical Therapist          Therapy Charges for Today     Code Description Service Date  Service Provider Modifiers Qty    [id number]  GAIT TRAINING EA 15 MIN 2/6/2021 Chely Perkins, PT GP 3    [id number]  PT THER PROC EA 15 MIN 2/6/2021 Chely Perkins, PT GP 1          Patient was wearing a face mask during this therapy encounter. Therapist used appropriate personal protective equipment including eye protection, mask, and gloves.  Mask used was standard procedure mask. Appropriate PPE was worn during the entire therapy session. Hand hygiene was completed before and after therapy session. Patient is not in enhanced droplet precautions.            Chely Perkins, PT  2/6/2021

## 2021-02-06 NOTE — THERAPY TREATMENT NOTE
Inpatient Rehabilitation - Speech Language Pathology Treatment Note  [facility]     Patient Name: Marianne Delgado  : 1925  MRN: [id number]  Today's Date: 2021               Admit Date: 2/3/2021     Visit Dx:  No diagnosis found.  Patient Active Problem List   Diagnosis   • Arthritis   • Stage 4 chronic kidney disease (CMS/[facility])   • Debility   • Foot pain, bilateral   • Glaucoma   • Acute idiopathic gout of right ankle   • Hematuria   • Essential hypertension   • Acquired hypothyroidism   • Osteopenia   • Psoriasis   • Rosacea   • Vitamin D deficiency   • Medicare annual wellness visit, subsequent   • Morbidly obese (CMS/[facility])   • Cerebrovascular accident (CVA) (CMS/[facility])   • Hypertensive urgency   • Stroke (cerebrum) (CMS/[facility])     Past Medical History:   Diagnosis Date   • Acute sinusitis    • Acute upper respiratory infection    • Arthritis    • CKD (chronic kidney disease)    • Debility    • Fatigue    • Foot pain, bilateral    • Glaucoma    • Gout    • Hematuria    • High blood pressure    • Hypertension    • Hypothyroid 1999   • Hypothyroidism    • IBS (irritable bowel syndrome)    • IGT (impaired glucose tolerance)    • Malaise and fatigue    • Medication management    • Melanoma (CMS/[facility]) 1979    left leg   • OA (osteoarthritis)    • Osteopenia 1999   • Painful joint    • Psoriasis    • Renal failure    • Rosacea    • Vitamin D deficiency      Past Surgical History:   Procedure Laterality Date   • CATARACT EXTRACTION     • FOOT SURGERY      mauri toe surgery   • OTHER SURGICAL HISTORY      Melanoma Excision: Left leg        SLP EVALUATION (last 72 hours)      SLP SLC Evaluation     Row Name 21 0900 21 1300                Communication Assessment/Intervention    Document Type  therapy note (daily note)  -SH  evaluation  -SH       Patient Effort  excellent  -SH  excellent  -SH          General Information    Patient Profile Reviewed  --  yes  -SH       Pertinent History Of  Current Problem  --  R parietal stroke. Pt denies speech/lang/cog changes. Mild consonant imprecisions noted in conversation.   -       Precautions/Limitations, Vision  --  other (see comments);WFL with corrective lenses glaucoma-which impacts her distance vision per pt  -       Precautions/Limitations, Hearing  --  WFL;for purposes of eval  -       Patient Level of Education  --  1 year of college  -       Prior Level of Function-Communication  --  [facility]  -       Plans/Goals Discussed with  --  patient;agreed upon  [facility]       Barriers to Rehab  --  none identified  -       Patient's Goals for Discharge  --  return to home;other (see comments) return to independant assisted living  -       Family Goals for Discharge  --  patient able to provide self-care with only supervision  [facility]       Standardized Assessment Used  --  CLQT  -          Pain    Additional Documentation  --  Pain Scale: Word Pre/Post-Treatment (Group)  [facility]          Pain Scale: Numbers Pre/Post-Treatment    Pretreatment Pain Rating  0/10 - no pain  -  --          Pain Scale: Word Pre/Post-Treatment    Pain: Word Scale, Pretreatment  --  0 - no pain  -          Cognitive Assessment Intervention- SLP    Cognitive Function (Cognition)  --  --  -          Standardized Tests    Cognitive/Memory Tests  --  CLQT: Cognitive Linguistic Quick Test  [facility]          CLQT (The Cognitive Linguistic Quick Test)    Attention Domain Score  --  164  -       Attention Severity Rating  --  4: L  -       Memory Domain Score  --  151  -       Memory Severity Rating  --  4: [facility]       Executive Function Domain Score  --  13  -       Executive Function Severity Rating  --  2: Moderate  -       Language Domain Score  --  28  -       Language Severity Rating  --  4: [facility]       Visuospatial Domain Score  --  67  -       Visuospatial Severity Rating  --  4: [facility]  -       Clock Drawing Total Score  --  12  -       Clock Drawing Severity  Rating  --  WNL  -       Composite Severity Rating  --  3.6  -       Composite Severity Rating Range  --  4.0 - 3.5: WNL  -       CLQT Comments  --  Outcome Summary: Patient assessed using the CLQT. Testing revealed moderate impairment in executive functions with functional performance in attention, memory, language, and visuospatial skills. Despite functional performance in those four domains, the patient did score at the lower end of the range. Composite score and clock draw were also WFLs. SLP recs continued cognitive therapy addressing executive function, as patient's goal is to return to independant living. Pt in agreement.  -          SLP Clinical Impressions    SLP Diagnosis  --  Moderate deficits in executive functions  -       Rehab Potential/Prognosis  --  excellent  -          Recommendations    Therapy Frequency (SLP SLC)  --  5 days per week  -       Predicted Duration Therapy Intervention (Days)  --  until discharge  -       Anticipated Discharge Disposition (SLP)  --  assisted living facility ([facility])  -          Communication Treatment Objective and Progress Goals (SLP)    Cognitive Linguistic Treatment Objectives  --  Cognitive Linguistic Treatment Objectives (Group)  -          Cognitive Linguistic Treatment Objectives    Organizational Skills Selection  --  organizational skills, SLP goal 1  -       Reasoning Selection  --  --  -       Executive Function Skills Selection  --  executive function skills, SLP goal 1  -          Organizational Skills Goal 1 (SLP)    Improve Thought Organization Through Goal 1 (SLP)  --  completing a divergent naming task;completing a convergent naming task;generating a list of items in a category;90%;with minimal cues (75-90%)  -       Time Frame (Thought Organization Skills Goal 1, SLP)  --  by discharge  -          Executive Functional Skills Goal 1 (SLP)    Improve Executive Function Skills Goal 1 (SLP)  --  organization/planning  "activity;time management activity;complex organization/planning activity;home management activity;exhibit cognitive flexibility;80%;with minimal cues (75-90%)  -       Time Frame (Executive Function Skills Goal 1, SLP)  --  by discharge  -         User Key  (r) = Recorded By, (t) = Taken By, (c) = Cosigned By    Initials Name Effective Dates     Maria Luisa Lopez, MS CCC-SLP 03/07/18 -              EDUCATION  The patient has been educated in the following areas:     Cognitive Impairment.    SLP Recommendation and Plan    S: Patient in bed when SLP arrived for therapy.  She said that she wasn't \"doing very well\" and was hesitant to participate with therapy.  She agreed to come to SLP office for therapy but complained of being cold.  SLP brought her a blanket and patient preferred to only do cognitive exercises aloud so she would not have to get uncovered.  The patient also kept complaining that her hearing aids were not helping but she attempted to participate.    O: The patient was alert and fully oriented.  She was able to name 13-14 items in a category with a time constraint and min cues over 3 trials.  She was 80% accurate with completing analogies aloud.  She was 67% accurate with auditory memory tasks with multiple repetitions but her performance was negatively impacted by her decreased hearing ability.  She was able to recall the events of her morning independently and accurately.      A: Cognitive deficits      P: Continue speech therapy to target cognition.    Patient was wearing a face mask during this therapy encounter. Therapist used appropriate personal protective equipment including mask, eye protection, plastic partition and gloves.  Mask used was standard procedure mask. Appropriate PPE was worn during the entire therapy session. Hand hygiene was completed before and after therapy session. Patient is not in enhanced droplet precautions.       SLP GOALS     Row Name 02/05/21 0900 02/04/21 1300       "    Organizational Skills Goal 1 (SLP)    Improve Thought Organization Through Goal 1 (SLP)  completing a divergent naming task;completing a convergent naming task;generating a list of items in a category;90%;with minimal cues (75-90%)  -  completing a divergent naming task;completing a convergent naming task;generating a list of items in a category;90%;with minimal cues (75-90%)  -     Time Frame (Thought Organization Skills Goal 1, SLP)  by discharge  -  by discharge  -     Progress/Outcomes (Thought Organization Skills Goal 1, SLP)  good progress toward goal  -SH  --     Comment (Thought Organization Skills Goal 1, SLP)  Word fluency task: avg 9 words in 1 minute without cues  -  --        Executive Functional Skills Goal 1 (SLP)    Improve Executive Function Skills Goal 1 (SLP)  organization/planning activity;time management activity;complex organization/planning activity;home management activity;exhibit cognitive flexibility;80%;with minimal cues (75-90%)  -  organization/planning activity;time management activity;complex organization/planning activity;home management activity;exhibit cognitive flexibility;80%;with minimal cues (75-90%)  -     Time Frame (Executive Function Skills Goal 1, SLP)  by discharge  -  by discharge  -     Progress/Outcomes (Executive Function Skills Goal 1, SLP)  continuing progress toward goal  -SH  --     Comment (Executive Function Skills Goal 1, SLP)  Simple time word problem with pictures: 66% without cues, simple money word problem with pictures-80% acc, simple deduction puzzle (12 squares)-MOD to MAX cues to complete accurately.   -  --       User Key  (r) = Recorded By, (t) = Taken By, (c) = Cosigned By    Initials Name Provider Type    Maria Luisa John MS CCC-SLP Speech and Language Pathologist             SLP Outcome Measures (last 72 hours)      SLP Outcome Measures     Row Name 02/04/21 1500             SLP Outcome Measures    Outcome Measure Used?   Adult NOMS  -         Adult FCM Scores    FCM Chosen  Problem Solving  -      Swallowing FCM Score  5  -SH        User Key  (r) = Recorded By, (t) = Taken By, (c) = Cosigned By    Initials Name Effective Dates    Maria Luisa John MS CCC-SLP 03/07/18 -               Time Calculation:     Time Calculation- SLP     Row Name 02/06/21 1056             Time Calculation- SLP    SLP Start Time  0900  -J      SLP Stop Time  1000  -J      SLP Time Calculation (min)  60 min  -        User Key  (r) = Recorded By, (t) = Taken By, (c) = Cosigned By    Initials Name Provider Type    Christie Laws, MS CCC-SLP Speech and Language Pathologist          Therapy Charges for Today     Code Description Service Date Service Provider Modifiers Qty    75095666601 [facility] DEV OF COGN SKILLS EACH ADDT'L 15 MIN 2/6/2021 Christei Kuo, MS CCC-SLP  4             ADULT NOMS (last 72 hours)      Adult NOMS     Row Name 02/04/21 1500                   Adult FCM Scores    FCM Chosen  Problem Solving  -        Swallowing FCM Score  5  -SH          User Key  (r) = Recorded By, (t) = Taken By, (c) = Cosigned By    Initials Name Effective Dates    EZIO Maria Luisa Lopez, MS CCC-SLP 03/07/18 -                  Christie Kuo MS CCC-SLP  2/6/2021

## 2021-02-06 NOTE — PLAN OF CARE
Goal Outcome Evaluation:        Outcome Summary: A&OX4. Continent B&B. Last BM y/d. Calm, cooperative, and pleasant. Diet: Reg, thin liquids. Added protein shakes BID. Nystatin powder to underside breasts. Ate little at meals. Stated she has no appetite. L. lower extremity numb/weak. K-pad to L. knee for arthritis.  added vitamin D supplement. No CPR code. Eye drops for glaucoma.

## 2021-02-06 NOTE — PROGRESS NOTES
Inpatient Rehabilitation Plan of Care Note    Plan of Care  Care Plan Reviewed - No updates at this time.    Sphincter Control    Performed Intervention(s)  Use incontinence products  offer toileting  Encourage fluids      Safety    Performed Intervention(s)  Falls precautions/protocol  Safety rounds  Items within reach  Bed alarm/ chair alarm      Psychosocial    Performed Intervention(s)  Verbalizes needs and concerns  Provide therapeutic enviroment      Pain    Performed Intervention(s)  Offer medication when needed  Practice relaxation techniques  Apply heating pad or cream to area when needed      Body Systems    Performed Intervention(s)  Daily skin inspection  Nutritional support  Apply cream to dry areas as needed  Apply powder under folds as ordered    Signed by: Shirley Camilo RN

## 2021-02-06 NOTE — THERAPY TREATMENT NOTE
Inpatient Rehabilitation - Occupational Therapy Treatment Note    [facility]     Patient Name: Marianne Delgado  : 1925  MRN: [id number]    Today's Date: 2021                 Admit Date: 2/3/2021       No diagnosis found.    Patient Active Problem List   Diagnosis   • Arthritis   • Stage 4 chronic kidney disease (CMS/[facility])   • Debility   • Foot pain, bilateral   • Glaucoma   • Acute idiopathic gout of right ankle   • Hematuria   • Essential hypertension   • Acquired hypothyroidism   • Osteopenia   • Psoriasis   • Rosacea   • Vitamin D deficiency   • Medicare annual wellness visit, subsequent   • Morbidly obese (CMS/[facility])   • Cerebrovascular accident (CVA) (CMS/[facility])   • Hypertensive urgency   • Stroke (cerebrum) (CMS/[facility])       Past Medical History:   Diagnosis Date   • Acute sinusitis    • Acute upper respiratory infection    • Arthritis    • CKD (chronic kidney disease)    • Debility    • Fatigue    • Foot pain, bilateral    • Glaucoma    • Gout    • Hematuria    • High blood pressure    • Hypertension    • Hypothyroid 1999   • Hypothyroidism    • IBS (irritable bowel syndrome)    • IGT (impaired glucose tolerance)    • Malaise and fatigue    • Medication management    • Melanoma (CMS/[facility]) 1979    left leg   • OA (osteoarthritis)    • Osteopenia 1999   • Painful joint    • Psoriasis    • Renal failure    • Rosacea    • Vitamin D deficiency        Past Surgical History:   Procedure Laterality Date   • CATARACT EXTRACTION     • FOOT SURGERY      mauri toe surgery   • OTHER SURGICAL HISTORY      Melanoma Excision: Left leg                IRF OT ASSESSMENT FLOWSHEET (last 12 hours)      IRF OT Evaluation and Treatment     Row Name 21 1500          OT Time and Intention    Document Type  daily treatment  -JULIANO     Mode of Treatment  occupational therapy  -JULIANO     Patient Effort  good  -JULIANO     Symptoms Noted During/After Treatment  fatigue  -JULIANO     Comment, Evaluation/Treatment Not Performed  AM:  "Found in w/c. PM: Found in bed.  -     Row Name 02/06/21 1500          General Information    Patient Profile Reviewed  yes  -JULIANO     Patient/Family/Caregiver Comments/Observations  Pt reports she has \"zero energy\" today, but did participate despite this.  -JULIANO     General Observations of Patient  AM/PM: Left in bed at end of sessions each time.  -JULIANO     Existing Precautions/Restrictions  fall  -     Row Name 02/06/21 1500          Cognition/Psychosocial    Affect/Mental Status (Cognitive)  WNL  -JULIANO     Orientation Status (Cognition)  oriented x 3  -JULIANO     Follows Commands (Cognition)  WNL  -JULIANO     Personal Safety Interventions  gait belt;fall prevention program maintained;nonskid shoes/slippers when out of bed;supervised activity  -     Row Name 02/06/21 1500          Pain Scale: Numbers Pre/Post-Treatment    Pretreatment Pain Rating  0/10 - no pain  -     Posttreatment Pain Rating  0/10 - no pain  -     Row Name 02/06/21 1500          Bed Mobility    Bed Mobility  bed mobility (all) activities  -     Supine-Sit [place] (Bed Mobility)  minimum assist (75% patient effort);1 person assist  -JULIANO     Sit-Supine [place] (Bed Mobility)  moderate assist (50% patient effort);maximum assist (25% patient effort);1 person assist  -JULIANO     Bed Mobility, Safety Issues  decreased use of legs for bridging/pushing;impaired trunk control for bed mobility  -     Assistive Device (Bed Mobility)  bed rails;draw sheet;head of bed elevated  -     Row Name 02/06/21 1500          Transfers    Bed-Chair [place] (Transfers)  set up;minimum assist (75% patient effort);1 person assist  -JULIANO     Chair-Bed [place] (Transfers)  minimum assist (75% patient effort);1 person assist  -JULIANO     Assistive Device (Bed-Chair Transfers)  wheelchair  -     Row Name 02/06/21 1500          Chair-Bed Transfer    Assistive Device (Chair-Bed Transfers)  wheelchair  -     Row Name 02/06/21 1500          Safety Issues, Functional " Mobility    Impairments Affecting Function (Mobility)  balance;endurance/activity tolerance;pain;postural/trunk control;shortness of breath;strength  -[facility] Name 02/06/21 1500          Motor Skills    Motor Skills  coordination;functional endurance;therapeutic exercise  -     Therapeutic Exercise  aerobic  -[facility] Name 02/06/21 1500          Aerobic Exercise    Type (Aerobic Exercise)  arm bike;for 5 minutes multiple rest breaks required due to severe fatigue this rosibel  -[facility] Name 02/06/21 1500          Basic Activities of Daily Living (BADLs)    Basic Activities of Daily Living  lower body dressing;grooming;upper body dressing  -[facility] Name 02/06/21 1500          Upper Body Dressing    [place] Level (Upper Body Dressing)  front opening garment;doff;don;minimum assist (75% or more patient effort)  -     Position (Upper Body Dressing)  edge of bed sitting  -     Set-up Assistance (Upper Body Dressing)  obtain clothing  -[facility] Name 02/06/21 1500          Lower Body Dressing    [place] Level (Lower Body Dressing)  don;doff;shoes/slippers;maximum assist (25% patient effort)  -     Position (Lower Body Dressing)  supine  -     Set-up Assistance (Lower Body Dressing)  obtain clothing;don;doff  -[facility] Name 02/06/21 1500          Grooming    [place] Level (Grooming)  grooming skills;make-up application;hair care, combing/brushing;oral care regimen;wash face, hands;standby assist  -     Position (Grooming)  sink side;supported sitting  -     Set-up Assistance (Grooming)  open containers;obtain supplies  -[facility] Name 02/06/21 1500          Positioning and Restraints    In Bed  supine;call light within reach;encouraged to call for assist;exit alarm on  -[facility] Name 02/06/21 1500          Therapy Assessment/Plan (OT)    Rehab Potential/Prognosis (OT)  good, to achieve stated therapy goals  -     Frequency of Treatment (OT)  5 times per week  -JULIANO     Estimated  Duration of Therapy (OT)  2 weeks  -JULIANO     Problem List (OT)  problems related to;balance;mobility;strength;pain;coordination  -JULIANO     Row Name 02/06/21 1500          Therapy Plan Review/Discharge Plan (OT)    Therapy Plan Review (OT)  evaluation/treatment results reviewed;current/potential barriers reviewed;patient;daughter  -JULIANO     Expected Discharge Disposition (OT)  home with 24/7 care;home with home health care  -JULIANO       User Key  (r) = Recorded By, (t) = Taken By, (c) = Cosigned By    Initials Name Effective Dates    Sandra Staton, OT 07/15/20 -            Occupational Therapy Education                 Title: PT OT SLP Therapies (In Progress)     Topic: Occupational Therapy (Done)     Point: ADL training (Done)     Description:   Instruct learner(s) on proper safety adaptation and remediation techniques during self care or transfers.   Instruct in proper use of assistive devices.              Learning Progress Summary           Patient Acceptance, E,TB, VU by JULIANO at 2/6/2021 1534    Acceptance, E,TB, VU,DU by JEMAL at 2/4/2021 1640    Comment: ed pt on role of OT, benefit of therapy, POC w. OT ed on safety w ADLs and tsf techniques. pt demo w. min A for tsf and more A w. LBD and SBA UBD                   Point: Home exercise program (Done)     Description:   Instruct learner(s) on appropriate technique for monitoring, assisting and/or progressing therapeutic exercises/activities.              Learning Progress Summary           Patient Acceptance, E,TB, VU by JULIANO at 2/6/2021 1534                   Point: Precautions (Done)     Description:   Instruct learner(s) on prescribed precautions during self-care and functional transfers.              Learning Progress Summary           Patient Acceptance, E,TB, VU by JULIANO at 2/6/2021 1534    Acceptance, E,TB, VU,DU by JEMAL at 2/4/2021 1640    Comment: ed pt on role of OT, benefit of therapy, POC w. OT ed on safety w ADLs and tsf techniques. pt demo w. min A for tsf  and more A w. LBD and SBA UBD                   Point: Body mechanics (Done)     Description:   Instruct learner(s) on proper positioning and spine alignment during self-care, functional mobility activities and/or exercises.              Learning Progress Summary           Patient Acceptance, E,TB, VU by  at 2/6/2021 1534    Acceptance, E,TB, VU,DU by  at 2/4/2021 1640    Comment: ed pt on role of OT, benefit of therapy, POC w. OT ed on safety w ADLs and tsf techniques. pt demo w. min A for tsf and more A w. LBD and SBA UBD                               User Key     Initials Effective Dates Name Provider Type Discipline     06/08/18 -  Mame Nelson OTR Occupational Therapist OT    JULIANO 07/15/20 -  Sandra Souza OT Occupational Therapist OT                    OT Recommendation and Plan                         Time Calculation:     Time Calculation- OT     Row Name 02/06/21 1535 02/06/21 1534          Time Calculation- OT    OT Start Time  1300  -JULIANO  1100  -JULIANO     OT Stop Time  1330  -JULIANO  1130  -JULIANO     OT Time Calculation (min)  30 min  -JULIANO  30 min  -JULIANO     Total Timed Code Minutes- OT  30 minute(s)  -JULIANO  30 minute(s)  -JULIANO     OT Received On  --  02/06/21  -JULIANO       User Key  (r) = Recorded By, (t) = Taken By, (c) = Cosigned By    Initials Name Provider Type    JULIANO Sandra Souza OT Occupational Therapist        Therapy Charges for Today     Code Description Service Date Service Provider Modifiers Qty    49796430099 HC OT SELF CARE/MGMT/TRAIN EA 15 MIN 2/6/2021 Sandra Souza OT GO 3    15872462535 HC OT THER PROC EA 15 MIN 2/6/2021 Sandra Souza OT GO 1                   Sandra Souza OT  2/6/2021

## 2021-02-07 RX ADMIN — CLOPIDOGREL 75 MG: 75 TABLET, FILM COATED ORAL at 08:54

## 2021-02-07 RX ADMIN — ATORVASTATIN CALCIUM 80 MG: 80 TABLET, FILM COATED ORAL at 20:18

## 2021-02-07 RX ADMIN — NYSTATIN: 100000 POWDER TOPICAL at 20:19

## 2021-02-07 RX ADMIN — Medication 250 MCG: at 08:54

## 2021-02-07 RX ADMIN — NYSTATIN: 100000 POWDER TOPICAL at 08:55

## 2021-02-07 RX ADMIN — ASPIRIN 81 MG: 81 TABLET, COATED ORAL at 08:52

## 2021-02-07 RX ADMIN — LEVOTHYROXINE SODIUM 150 MCG: 150 TABLET ORAL at 06:29

## 2021-02-07 RX ADMIN — TIMOLOL MALEATE: 5 SOLUTION/ DROPS OPHTHALMIC at 20:19

## 2021-02-07 RX ADMIN — TIMOLOL MALEATE: 5 SOLUTION/ DROPS OPHTHALMIC at 08:55

## 2021-02-07 RX ADMIN — METOPROLOL SUCCINATE 12.5 MG: 25 TABLET, EXTENDED RELEASE ORAL at 08:52

## 2021-02-07 RX ADMIN — Medication 1000 UNITS: at 08:54

## 2021-02-07 RX ADMIN — AMLODIPINE BESYLATE 2.5 MG: 2.5 TABLET ORAL at 08:52

## 2021-02-07 RX ADMIN — LATANOPROST 1 DROP: 50 SOLUTION/ DROPS OPHTHALMIC at 20:19

## 2021-02-07 RX ADMIN — BETAMETHASONE DIPROPIONATE: 0.5 OINTMENT TOPICAL at 08:55

## 2021-02-07 RX ADMIN — Medication 1000 UNITS: at 20:18

## 2021-02-07 NOTE — PLAN OF CARE
Problem: Rehabilitation (IRF) Plan of Care  Goal: Plan of Care Review  Outcome: Ongoing, Progressing  Goal: Patient-Specific Goal (Individualized)  Outcome: Ongoing, Progressing  Goal: Absence of New-Onset Illness or Injury  Outcome: Ongoing, Progressing  Goal: Optimal Comfort and Wellbeing  Outcome: Ongoing, Progressing  Goal: Home and Community Transition Plan Established  Outcome: Ongoing, Progressing   Goal Outcome Evaluation:

## 2021-02-07 NOTE — PLAN OF CARE
Goal Outcome Evaluation:  Plan of Care Reviewed With: patient  Progress: improving  Outcome Summary: Pt has bruising both inner calves, both legs tender to touch. Refused scd's at night, states she wore them earlier daytime. Mod. assist to sit up to side of bed, CGA ambulates with rollator. Appears to be sleeping well.

## 2021-02-07 NOTE — NURSING NOTE
"1244-  After helping patient from [facility], patient stated that \"she had an episode of diarrhea earlier that went all over the bed and aid cleaned it up\". Will pass in report and continue to monitor closely. Will call Dr for further orders when this RN observes loose stool.   "

## 2021-02-07 NOTE — PROGRESS NOTES
CC CVA    Pt sleeping comfortably. Awoke to name  Is tired at times.   Left knee pain controlled    98 66 18 163/68    Awake, alert and 0x3  NAD  No increased work of breathing  abd soft,nt  Calves soft,nt  Lsided weakness           Assessment & Plan  Status post CVA-small focus of restricted diffusion in the right parietal lobe  Adjunctive medication for stroke recovery-on atorvastatin.  Recheck vitamin D level.  Check vitamin B-12 level.     Impaired cognition/impaired mobility/impaired self-care     Stroke prophylaxis-aspirin and Plavix x30 days then Plavix alone.     Left knee degenerative changes-status post steroid injection February 3     Chronic kidney disease stage IV baseline creatinine around 2.0     Acquired hypothyroidism-recheck TFTs 1 March-on levothyroxine     Morbid obesity     Vitamin D deficiency-no home dose listed-recheck vitamin D level     Hypertension-metoprolol/amlodipine-systolic blood pressure goal 150-160     Hyperlipidemia-atorvastatin     2/6- Working on overall endurance  ASA and Plavix for CVA prophy for 30 days then Plavix alone  No changes today  Continue therapy              2/7- BP up a little today. 163 systolic, just a little over goal. Will watch and not change meds today  Asa/Plavis for CVA  No changes today  Continue therapy

## 2021-02-07 NOTE — PROGRESS NOTES
Inpatient Rehabilitation Plan of Care Note    Plan of Care  Care Plan Reviewed - Updates as Follows    Pain    [RN] Pain Management(Active)  Current Status(02/05/2021): Chronic pain to left knee r/t arthritis. 2/6 pt  states both legs tender/sore.  Weekly Goal(02/09/2021): Pt able to tolerate therapies  Discharge Goal: Pain under control    Performed Intervention(s)  Offer medication when needed  Practice relaxation techniques  Apply heating pad or cream to area when needed      Sphincter Control    Performed Intervention(s)  Use incontinence products  offer toileting  Encourage fluids      Safety    Performed Intervention(s)  Falls precautions/protocol  Safety rounds  Items within reach  Bed alarm/ chair alarm      Psychosocial    Performed Intervention(s)  Verbalizes needs and concerns  Provide therapeutic enviroment      Body Systems    Performed Intervention(s)  Daily skin inspection  Nutritional support  Apply cream to dry areas as needed  Apply powder under folds as ordered    Signed by: Shirley Camilo RN

## 2021-02-08 LAB
ALBUMIN SERPL-MCNC: 3.2 G/DL (ref 3.5–5.2)
ANION GAP SERPL CALCULATED.3IONS-SCNC: 8.4 MMOL/L (ref 5–15)
BASOPHILS # BLD AUTO: 0.01 10*3/MM3 (ref 0–0.2)
BASOPHILS NFR BLD AUTO: 0.1 % (ref 0–1.5)
BUN SERPL-MCNC: 49 MG/DL (ref 8–23)
BUN/CREAT SERPL: 29.3 (ref 7–25)
CALCIUM SPEC-SCNC: 9 MG/DL (ref 8.2–9.6)
CHLORIDE SERPL-SCNC: 111 MMOL/L (ref 98–107)
CO2 SERPL-SCNC: 21.6 MMOL/L (ref 22–29)
CREAT SERPL-MCNC: 1.67 MG/DL (ref 0.57–1)
DEPRECATED RDW RBC AUTO: 41.1 FL (ref 37–54)
EOSINOPHIL # BLD AUTO: 0.06 10*3/MM3 (ref 0–0.4)
EOSINOPHIL NFR BLD AUTO: 0.9 % (ref 0.3–6.2)
ERYTHROCYTE [DISTWIDTH] IN BLOOD BY AUTOMATED COUNT: 12.6 % (ref 12.3–15.4)
GFR SERPL CREATININE-BSD FRML MDRD: 29 ML/MIN/1.73
GLUCOSE SERPL-MCNC: 82 MG/DL (ref 65–99)
HCT VFR BLD AUTO: 38.2 % (ref 34–46.6)
HGB BLD-MCNC: 12.6 G/DL (ref 12–15.9)
IMM GRANULOCYTES # BLD AUTO: 0.06 10*3/MM3 (ref 0–0.05)
IMM GRANULOCYTES NFR BLD AUTO: 0.9 % (ref 0–0.5)
LYMPHOCYTES # BLD AUTO: 1.05 10*3/MM3 (ref 0.7–3.1)
LYMPHOCYTES NFR BLD AUTO: 15.6 % (ref 19.6–45.3)
MCH RBC QN AUTO: 29.6 PG (ref 26.6–33)
MCHC RBC AUTO-ENTMCNC: 33 G/DL (ref 31.5–35.7)
MCV RBC AUTO: 89.7 FL (ref 79–97)
MONOCYTES # BLD AUTO: 0.63 10*3/MM3 (ref 0.1–0.9)
MONOCYTES NFR BLD AUTO: 9.4 % (ref 5–12)
NEUTROPHILS NFR BLD AUTO: 4.9 10*3/MM3 (ref 1.7–7)
NEUTROPHILS NFR BLD AUTO: 73.1 % (ref 42.7–76)
NRBC BLD AUTO-RTO: 0 /100 WBC (ref 0–0.2)
PHOSPHATE SERPL-MCNC: 3.1 MG/DL (ref 2.5–4.5)
PLATELET # BLD AUTO: 267 10*3/MM3 (ref 140–450)
PMV BLD AUTO: 10.5 FL (ref 6–12)
POTASSIUM SERPL-SCNC: 4.8 MMOL/L (ref 3.5–5.2)
RBC # BLD AUTO: 4.26 10*6/MM3 (ref 3.77–5.28)
SODIUM SERPL-SCNC: 141 MMOL/L (ref 136–145)
WBC # BLD AUTO: 6.71 10*3/MM3 (ref 3.4–10.8)

## 2021-02-08 PROCEDURE — 97110 THERAPEUTIC EXERCISES: CPT

## 2021-02-08 PROCEDURE — 97535 SELF CARE MNGMENT TRAINING: CPT

## 2021-02-08 PROCEDURE — 85025 COMPLETE CBC W/AUTO DIFF WBC: CPT | Performed by: PHYSICAL MEDICINE & REHABILITATION

## 2021-02-08 PROCEDURE — 80069 RENAL FUNCTION PANEL: CPT | Performed by: PHYSICAL MEDICINE & REHABILITATION

## 2021-02-08 RX ADMIN — NYSTATIN: 100000 POWDER TOPICAL at 08:09

## 2021-02-08 RX ADMIN — TIMOLOL MALEATE: 5 SOLUTION/ DROPS OPHTHALMIC at 08:09

## 2021-02-08 RX ADMIN — ASPIRIN 81 MG: 81 TABLET, COATED ORAL at 08:08

## 2021-02-08 RX ADMIN — METOPROLOL SUCCINATE 12.5 MG: 25 TABLET, EXTENDED RELEASE ORAL at 08:08

## 2021-02-08 RX ADMIN — BETAMETHASONE DIPROPIONATE: 0.5 OINTMENT TOPICAL at 08:09

## 2021-02-08 RX ADMIN — Medication 1000 UNITS: at 08:08

## 2021-02-08 RX ADMIN — AMLODIPINE BESYLATE 2.5 MG: 2.5 TABLET ORAL at 08:08

## 2021-02-08 RX ADMIN — Medication 250 MCG: at 08:08

## 2021-02-08 RX ADMIN — LATANOPROST 1 DROP: 50 SOLUTION/ DROPS OPHTHALMIC at 22:00

## 2021-02-08 RX ADMIN — CLOPIDOGREL 75 MG: 75 TABLET, FILM COATED ORAL at 08:08

## 2021-02-08 RX ADMIN — NYSTATIN 1 APPLICATION: 100000 POWDER TOPICAL at 22:00

## 2021-02-08 RX ADMIN — TIMOLOL MALEATE: 5 SOLUTION/ DROPS OPHTHALMIC at 22:00

## 2021-02-08 RX ADMIN — LEVOTHYROXINE SODIUM 150 MCG: 150 TABLET ORAL at 05:30

## 2021-02-08 RX ADMIN — ATORVASTATIN CALCIUM 80 MG: 80 TABLET, FILM COATED ORAL at 21:59

## 2021-02-08 RX ADMIN — Medication 1000 UNITS: at 21:59

## 2021-02-08 NOTE — PLAN OF CARE
Goal Outcome Evaluation:  Plan of Care Reviewed With: patient  Progress: improving   Patient is cooperative. Alert. Forgetful. Complaining of BLE sensitive to touch. Positioned for comfort. Heat applied as ordered. Refusing SCD's. Using the call light for assistance. Continent of B/B. Requires assist with transfers. Taking her medication whole with thin liquids without difficulty. Will continue to monitor.

## 2021-02-08 NOTE — THERAPY TREATMENT NOTE
Inpatient Rehabilitation - Physical Therapy Treatment Note       Psychiatric     Patient Name: Marianne Delgado  : 1925  MRN: [id number]    Today's Date: 2021                    Admit Date: 2/3/2021      Visit Dx: No diagnosis found.    Patient Active Problem List   Diagnosis   • Arthritis   • Stage 4 chronic kidney disease (CMS/[facility])   • Debility   • Foot pain, bilateral   • Glaucoma   • Acute idiopathic gout of right ankle   • Hematuria   • Essential hypertension   • Acquired hypothyroidism   • Osteopenia   • Psoriasis   • Rosacea   • Vitamin D deficiency   • Medicare annual wellness visit, subsequent   • Morbidly obese (CMS/[facility])   • Cerebrovascular accident (CVA) (CMS/[facility])   • Hypertensive urgency   • Stroke (cerebrum) (CMS/[facility])       Past Medical History:   Diagnosis Date   • Acute sinusitis    • Acute upper respiratory infection    • Arthritis    • CKD (chronic kidney disease)    • Debility    • Fatigue    • Foot pain, bilateral    • Glaucoma    • Gout    • Hematuria    • High blood pressure    • Hypertension    • Hypothyroid 1999   • Hypothyroidism    • IBS (irritable bowel syndrome)    • IGT (impaired glucose tolerance)    • Malaise and fatigue    • Medication management    • Melanoma (CMS/[facility]) 1979    left leg   • OA (osteoarthritis)    • Osteopenia 1999   • Painful joint    • Psoriasis    • Renal failure    • Rosacea    • Vitamin D deficiency        Past Surgical History:   Procedure Laterality Date   • CATARACT EXTRACTION     • FOOT SURGERY      mauri toe surgery   • OTHER SURGICAL HISTORY      Melanoma Excision: Left leg          PT ASSESSMENT (last 12 hours)      IRF PT Evaluation and Treatment     Row Name 21 1050          PT Time and Intention    Document Type  daily treatment  -MS     Mode of Treatment  physical therapy  -MS     Patient/Family/Caregiver Comments/Observations  AM: not feeling well and reports no energy, agreeable to PT despite sx; PM: reporting feeling a  little better, continues to be agreeable  -MS     Total Minutes, Physical Therapy  60  -MS     Row Name 02/08/21 1050          General Information    Existing Precautions/Restrictions  fall  -MS     Row Name 02/08/21 1050          Cognition/Psychosocial    Orientation Status (Cognition)  oriented x 4  -MS     Follows Commands (Cognition)  WFL  -MS     Personal Safety Interventions  fall prevention program maintained;gait belt;nonskid shoes/slippers when out of bed  -MS     Row Name 02/08/21 1050          Pain Scale: Numbers Pre/Post-Treatment    Pretreatment Pain Rating  0/10 - no pain  -MS     Posttreatment Pain Rating  0/10 - no pain  -MS     Row Name 02/08/21 1050          Bed Mobility    Comment (Bed Mobility)  NT- up in wheelchair  -MS     Row Name 02/08/21 1050          Transfers    Sit-Stand [place] (Transfers)  contact guard;verbal cues  -MS     Stand-Sit [place] (Transfers)  contact guard;verbal cues  -MS     [place] Level (Toilet Transfer)  contact guard;verbal cues  -MS     Assistive Device (Toilet Transfer)  walker, 4-wheeled  -MS     Row Name 02/08/21 1050          Sit-Stand Transfer    Assistive Device (Sit-Stand Transfers)  walker, 4-wheeled  -MS     Row Name 02/08/21 1050          Stand-Sit Transfer    Assistive Device (Stand-Sit Transfers)  walker, 4-wheeled  -MS     Row Name 02/08/21 1050          Toilet Transfer    Type (Toilet Transfer)  sit-stand;stand-sit  -MS     Row Name 02/08/21 1050          Gait/Stairs (Locomotion)    [place] Level (Gait)  contact guard;verbal cues  -MS     Assistive Device (Gait)  walker, 4-wheeled  -MS     Distance in Feet (Gait)  AM: 20'; PM: 160' x 2 trials  -MS     Pattern (Gait)  step-to;step-through  -MS     Deviations/Abnormal Patterns (Gait)  tho decreased;gait speed decreased  -MS     Bilateral Gait Deviations  forward flexed posture  -MS     Comment (Gait/Stairs)  No overt LOB or veering noted.  -MS     Row Name 02/08/21 1050           Safety Issues, Functional Mobility    Safety Issues Affecting Function (Mobility)  ability to follow commands  -MS     Row Name 02/08/21 1050          Hip (Therapeutic Exercise)    Hip Strengthening (Therapeutic Exercise)  bilateral;marching while seated;resistance band;yellow 2 sets: 15 reps and 10 reps  -MS     Row Name 02/08/21 1050          Knee (Therapeutic Exercise)    Knee Strengthening (Therapeutic Exercise)  bilateral;LAQ (long arc quad);flexion;resistance band;yellow 2 sets: 15 reps and 10 reps  -MS     Row Name 02/08/21 1050          Positioning and Restraints    Pre-Treatment Position  sitting in chair/recliner  -MS     Post Treatment Position  wheelchair  -MS     In Bed  fowlers;call light within reach;encouraged to call for assist;exit alarm on PM session  -MS     In Wheelchair  sitting;call light within reach;encouraged to call for assist;exit alarm on AM session  -MS       User Key  (r) = Recorded By, (t) = Taken By, (c) = Cosigned By    Initials Name Provider Type    Annalise Walton PT Physical Therapist           Physical Therapy Education                 Title: PT OT SLP Therapies (Done)     Topic: Physical Therapy (Done)     Point: Mobility training (Done)     Learning Progress Summary           Patient Acceptance, E,TB, VU,NR by MS at 2/8/2021 1521    Acceptance, E, DU,NR by LB at 2/6/2021 1514                   Point: Home exercise program (Done)     Learning Progress Summary           Patient Acceptance, E,TB, VU,NR by MS at 2/8/2021 1521                   Point: Body mechanics (Done)     Learning Progress Summary           Patient Acceptance, E,TB, VU,NR by MS at 2/8/2021 1521                   Point: Precautions (Done)     Learning Progress Summary           Patient Acceptance, E,TB, VU,NR by MS at 2/8/2021 1521    Acceptance, E, VU by MD at 2/5/2021 1049    Acceptance, E, VU by MD at 2/4/2021 1551                               User Key     Initials Effective Dates Name Provider Type  Discipline    LB 06/08/18 -  Chely Perkins, PT Physical Therapist PT    MD 04/03/18 -  Katya Bar PT Physical Therapist PT    MS 03/04/19 -  Annalise Neil PT Physical Therapist PT                PT Recommendation and Plan      Patient was wearing a face mask during this therapy encounter. Therapist used appropriate personal protective equipment including eye protection, mask, and gloves.  Mask used was standard procedure mask. Appropriate PPE was worn during the entire therapy session. Hand hygiene was completed before and after therapy session. Patient is not in enhanced droplet precautions.      Time Calculation:     PT Charges     Row Name 02/08/21 1518 02/08/21 1049          Time Calculation    Start Time  1430  -MS  1030  -MS     Stop Time  1500  -MS  1100  -MS     Time Calculation (min)  30 min  -MS  30 min  -MS     PT Received On  --  02/08/21  -MS     PT - Next Appointment  --  02/09/21  -MS       User Key  (r) = Recorded By, (t) = Taken By, (c) = Cosigned By    Initials Name Provider Type    MS NeilAnnalise, PT Physical Therapist          Therapy Charges for Today     Code Description Service Date Service Provider Modifiers Qty    50004485477 HC PT THER PROC EA 15 MIN 2/8/2021 Annalise Neil, PT GP 4                   Annalise Neil PT  2/8/2021

## 2021-02-08 NOTE — THERAPY TREATMENT NOTE
Inpatient Rehabilitation - Occupational Therapy Treatment Note    [facility]     Patient Name: Marianne Delgado  : 1925  MRN: [id number]    Today's Date: 2021                 Admit Date: 2/3/2021       No diagnosis found.    Patient Active Problem List   Diagnosis   • Arthritis   • Stage 4 chronic kidney disease (CMS/[facility])   • Debility   • Foot pain, bilateral   • Glaucoma   • Acute idiopathic gout of right ankle   • Hematuria   • Essential hypertension   • Acquired hypothyroidism   • Osteopenia   • Psoriasis   • Rosacea   • Vitamin D deficiency   • Medicare annual wellness visit, subsequent   • Morbidly obese (CMS/[facility])   • Cerebrovascular accident (CVA) (CMS/[facility])   • Hypertensive urgency   • Stroke (cerebrum) (CMS/[facility])       Past Medical History:   Diagnosis Date   • Acute sinusitis    • Acute upper respiratory infection    • Arthritis    • CKD (chronic kidney disease)    • Debility    • Fatigue    • Foot pain, bilateral    • Glaucoma    • Gout    • Hematuria    • High blood pressure    • Hypertension    • Hypothyroid 1999   • Hypothyroidism    • IBS (irritable bowel syndrome)    • IGT (impaired glucose tolerance)    • Malaise and fatigue    • Medication management    • Melanoma (CMS/[facility]) 1979    left leg   • OA (osteoarthritis)    • Osteopenia 1999   • Painful joint    • Psoriasis    • Renal failure    • Rosacea    • Vitamin D deficiency        Past Surgical History:   Procedure Laterality Date   • CATARACT EXTRACTION     • FOOT SURGERY      mauri toe surgery   • OTHER SURGICAL HISTORY      Melanoma Excision: Left leg                IRF OT ASSESSMENT FLOWSHEET (last 12 hours)      IRF OT Evaluation and Treatment     Row Name 21 1245          OT Time and Intention    Document Type  initial evaluation  -CC     Mode of Treatment  occupational therapy  -CC     Patient Effort  good  -CC     Symptoms Noted During/After Treatment  fatigue  -CC     Row Name 21 1245          Sensory     Additional Documentation  Hearing Assessment (Group)  -     Hearing Status  hearing aid, bilateral  -     Row Name 02/08/21 1245          Cognition/Psychosocial    Affect/Mental Status (Cognitive)  WFL  -     Orientation Status (Cognition)  oriented x 4  -     Follows Commands (Cognition)  WFL  -     Personal Safety Interventions  fall prevention program maintained;gait belt;nonskid shoes/slippers when out of bed  -     Row Name 02/08/21 1245          Pain Scale: Numbers Pre/Post-Treatment    Pretreatment Pain Rating  0/10 - no pain  -     Posttreatment Pain Rating  0/10 - no pain  -     Row Name 02/08/21 1245          Bed Mobility    Comment (Bed Mobility)  in w/c  -[facility] Name 02/08/21 1245          Functional Mobility    Functional Mobility- Ind. Level  set up required;contact guard assist  -     Functional Mobility- Device  rollator  -     Functional Mobility-Distance (Feet)  -- to Br  -     Row Name 02/08/21 1245          Transfers    Sit-Stand [employer] (Transfers)  contact guard;verbal cues  -     Stand-Sit [employer] (Transfers)  contact guard;verbal cues  -     [employer] Level (Shower Transfer)  verbal cues;contact guard;minimum assist (75% patient effort)  -     Assistive Device (Shower Transfer)  grab bar, tub/shower;tub bench;wheelchair  -     Row Name 02/08/21 1245          Sit-Stand Transfer    Assistive Device (Sit-Stand Transfers)  walker, 4-wheeled  -[facility] Name 02/08/21 1245          Stand-Sit Transfer    Assistive Device (Stand-Sit Transfers)  walker, 4-wheeled  -[facility] Name 02/08/21 1245          Shower Transfer    Type (Shower Transfer)  sit-stand;stand-sit  -[facility] Name 02/08/21 1245          Bathing    [employer] Level (Bathing)  bathing skills;lower body;upper body;minimum assist (75% patient effort)  -     Assistive Device (Bathing)  grab bar/tub rail;hand held shower spray hose;shower chair  -     Position (Bathing)  supported  sitting;supported standing  -     Set-up Assistance (Bathing)  obtain supplies  -CC     Row Name 02/08/21 1245          Upper Body Dressing    [place] Level (Upper Body Dressing)  upper body dressing skills;doff;don;front opening garment;pull over garment;supervision;moderate assist (50-74% patient effort)  -CC     Position (Upper Body Dressing)  supported sitting  -     Set-up Assistance (Upper Body Dressing)  obtain clothing  -     Row Name 02/08/21 1245          Lower Body Dressing    [place] Level (Lower Body Dressing)  doff;don;pants/bottoms;shoes/slippers;socks;moderate assist (50% patient effort)  -     Position (Lower Body Dressing)  supported sitting;supported standing  -CC     Set-up Assistance (Lower Body Dressing)  obtain clothing  -CC     Row Name 02/08/21 1245          Grooming    [place] Level (Grooming)  grooming skills;make-up application;hair care, combing/brushing;oral care regimen;wash face, hands;standby assist  -     Position (Grooming)  sink side;supported sitting  -CC     Set-up Assistance (Grooming)  obtain supplies;open containers  -     Row Name 02/08/21 1245          Positioning and Restraints    Pre-Treatment Position  sitting in chair/recliner  -CC     Post Treatment Position  wheelchair  -CC     In Wheelchair  notified nsg;sitting;call light within reach;encouraged to call for assist;exit alarm on  -CC       User Key  (r) = Recorded By, (t) = Taken By, (c) = Cosigned By    Initials Name Effective Dates    CC Idania Blood OTR 06/08/18 -            Occupational Therapy Education                 Title: PT OT SLP Therapies (In Progress)     Topic: Occupational Therapy (Done)     Point: ADL training (Done)     Description:   Instruct learner(s) on proper safety adaptation and remediation techniques during self care or transfers.   Instruct in proper use of assistive devices.              Learning Progress Summary           Patient Acceptance, E,TB, VU by JULIANO  at 2/6/2021 1534    Acceptance, E,TB, VU,DU by  at 2/4/2021 1640    Comment: ed pt on role of OT, benefit of therapy, POC w. OT ed on safety w ADLs and tsf techniques. pt demo w. min A for tsf and more A w. LBD and SBA UBD                   Point: Home exercise program (Done)     Description:   Instruct learner(s) on appropriate technique for monitoring, assisting and/or progressing therapeutic exercises/activities.              Learning Progress Summary           Patient Acceptance, E,TB, VU by JULIANO at 2/6/2021 1534                   Point: Precautions (Done)     Description:   Instruct learner(s) on prescribed precautions during self-care and functional transfers.              Learning Progress Summary           Patient Acceptance, E,TB, VU by JULIANO at 2/6/2021 1534    Acceptance, E,TB, VU,DU by  at 2/4/2021 1640    Comment: ed pt on role of OT, benefit of therapy, POC w. OT ed on safety w ADLs and tsf techniques. pt demo w. min A for tsf and more A w. LBD and SBA UBD                   Point: Body mechanics (Done)     Description:   Instruct learner(s) on proper positioning and spine alignment during self-care, functional mobility activities and/or exercises.              Learning Progress Summary           Patient Acceptance, E,TB, VU by JULIANO at 2/6/2021 1534    Acceptance, E,TB, VU,DU by  at 2/4/2021 1640    Comment: ed pt on role of OT, benefit of therapy, POC w. OT ed on safety w ADLs and tsf techniques. pt demo w. min A for tsf and more A w. LBD and SBA UBD                               User Key     Initials Effective Dates Name Provider Type Discipline     06/08/18 -  Mame Nelson OTR Occupational Therapist OT     07/15/20 -  Sandra Souza OT Occupational Therapist OT                    OT Recommendation and Plan                         Time Calculation:     Time Calculation- OT     Row Name 02/08/21 1100             Time Calculation- OT    OT Start Time  1100  -CC      OT Stop Time   1145  -CC      OT Time Calculation (min)  45 min  -CC        User Key  (r) = Recorded By, (t) = Taken By, (c) = Cosigned By    Initials Name Provider Type    CC Idania Blood OTR Occupational Therapist        Therapy Charges for Today     Code Description Service Date Service Provider Modifiers Qty    [id number]  OT SELF CARE/MGMT/TRAIN EA 15 MIN 2/8/2021 Idania Blood OTR GO 3                   HARI Fofana  2/8/2021

## 2021-02-08 NOTE — PROGRESS NOTES
Inpatient Rehabilitation Plan of Care Note    Plan of Care  Updated Problems/Interventions  Mobility    [PT] Bed/Chair/Wheelchair(Active)  Current Status(02/08/2021): CGA, rollator  Weekly Goal(02/15/2021): SBA, rollator  Discharge Goal: SV, rollator    [PT] Walk(Active)  Current Status(02/08/2021): 160' CGA, rollator  Weekly Goal(02/15/2021): to and from BR SBA, rollator  Discharge Goal: 160' supervision, rollator    Signed by: Annalise Neil, PT

## 2021-02-08 NOTE — THERAPY TREATMENT NOTE
Inpatient Rehabilitation - Speech Language Pathology Treatment Note    [facility]     Patient Name: Marianne Delgado  : 1925  MRN: [id number]    Today's Date: 2021                   Admit Date: 2/3/2021       Visit Dx:    No diagnosis found.    Patient Active Problem List   Diagnosis   • Arthritis   • Stage 4 chronic kidney disease (CMS/[facility])   • Debility   • Foot pain, bilateral   • Glaucoma   • Acute idiopathic gout of right ankle   • Hematuria   • Essential hypertension   • Acquired hypothyroidism   • Osteopenia   • Psoriasis   • Rosacea   • Vitamin D deficiency   • Medicare annual wellness visit, subsequent   • Morbidly obese (CMS/[facility])   • Cerebrovascular accident (CVA) (CMS/[facility])   • Hypertensive urgency   • Stroke (cerebrum) (CMS/[facility])       Past Medical History:   Diagnosis Date   • Acute sinusitis    • Acute upper respiratory infection    • Arthritis    • CKD (chronic kidney disease)    • Debility    • Fatigue    • Foot pain, bilateral    • Glaucoma    • Gout    • Hematuria    • High blood pressure    • Hypertension    • Hypothyroid 1999   • Hypothyroidism    • IBS (irritable bowel syndrome)    • IGT (impaired glucose tolerance)    • Malaise and fatigue    • Medication management    • Melanoma (CMS/[facility]) 1979    left leg   • OA (osteoarthritis)    • Osteopenia 1999   • Painful joint    • Psoriasis    • Renal failure    • Rosacea    • Vitamin D deficiency        Past Surgical History:   Procedure Laterality Date   • CATARACT EXTRACTION     • FOOT SURGERY      mauri toe surgery   • OTHER SURGICAL HISTORY      Melanoma Excision: Left leg          SLP EVALUATION (last 72 hours)      SLP SLC Evaluation     Row Name 21 1300                   Executive Functional Skills Goal 1 (SLP)    Improve Executive Function Skills Goal 1 (SLP)  organization/planning activity  -NR        Time Frame (Executive Function Skills Goal 1, SLP)  by discharge  -NR        Comment (Executive Function Skills Goal  1, SLP)  Pt completed a moderate level deductive reasoning puzzle with moderate cues for higher level thinking skills.  Pt completed a written exercise regarding written word problems with 4/5 (80%) increased to 100% with min cues.  Pt able to complete analogies with 100% without cues.  Pt was  alert and oriented x 4.  Pt demonstrated functional sustained attention.  Pt was able to recall 3/3 items with a 10 minute delay and from AM to PM session.  Pt also recalled name of therapist in PM session.  Pt states she feels her mental status was not impaired from the stroke.  Pt states shed like to focus on PT a nd OT.  Pt does manage her finances and medication.  Recommend high level financial managment/check writting/check register balancing and medication management tasks prior to a possible DC from therapy.   -NR          User Key  (r) = Recorded By, (t) = Taken By, (c) = Cosigned By    Initials Name Effective Dates    NR Trudi Trivedi MA,[facility]-SLP 06/08/18 -              EDUCATION    The patient has been educated in the following areas:       Cognitive Impairment.      SLP Recommendation and Plan                                                  SLP GOALS     Row Name 02/08/21 1300             Executive Functional Skills Goal 1 (SLP)    Improve Executive Function Skills Goal 1 (SLP)  organization/planning activity  -NR      Time Frame (Executive Function Skills Goal 1, SLP)  by discharge  -NR      Comment (Executive Function Skills Goal 1, SLP)  Pt completed a moderate level deductive reasoning puzzle with moderate cues for higher level thinking skills.  Pt completed a written exercise regarding written word problems with 4/5 (80%) increased to 100% with min cues.  Pt able to complete analogies with 100% without cues.  Pt was  alert and oriented x 4.  Pt demonstrated functional sustained attention.  Pt was able to recall 3/3 items with a 10 minute delay and from AM to PM session.  Pt also recalled name of therapist in  PM session.  Pt states she feels her mental status was not impaired from the stroke.  Pt states shed like to focus on PT a nd OT.  Pt does manage her finances and medication.  Recommend high level financial managment/check writting/check register balancing and medication management tasks prior to a possible DC from therapy.   -NR        User Key  (r) = Recorded By, (t) = Taken By, (c) = Cosigned By    Initials Name Provider Type    Trudi Solo MA,[facility]-SLP Speech and Language Pathologist             SLP Outcome Measures (last 72 hours)      SLP Outcome Measures     Row Name 02/08/21 1300             SLP Outcome Measures    Outcome Measure Used?  Adult NOMS  -NR         Adult FCM Scores    FCM Chosen  Memory  -NR      Memory FCM Score  5  -NR        User Key  (r) = Recorded By, (t) = Taken By, (c) = Cosigned By    Initials Name Effective Dates    NR Trudi Trivedi MA,[facility]-SLP 06/08/18 -                   Time Calculation:       Time Calculation- SLP     Row Name 02/08/21 1318 02/08/21 1314          Time Calculation- SLP    SLP Start Time  1230  -NR  1000  -NR     SLP Stop Time  1300  -NR  1030  -NR     SLP Time Calculation (min)  30 min  -NR  30 min  -NR     SLP Received On  --  02/08/21  -NR       User Key  (r) = Recorded By, (t) = Taken By, (c) = Cosigned By    Initials Name Provider Type    Trudi Solo MA,CCC-SLP Speech and Language Pathologist                     ADULT NOMS (last 72 hours)      Adult NOMS     Row Name 02/08/21 1300                   Adult FCM Scores    FCM Chosen  Memory  -NR        Memory FCM Score  5  -NR          User Key  (r) = Recorded By, (t) = Taken By, (c) = Cosigned By    Initials Name Effective Dates    Trudi Solo MA,[facility]-SLP 06/08/18 -                      Trudi Trivedi MA,CCC-SLP  2/8/2021

## 2021-02-08 NOTE — PROGRESS NOTES
Inpatient Rehabilitation Functional Measures Assessment and Plan of Care    Plan of Care  Updated Problems/Interventions  Mobility    [OT] Toilet Transfers(Active)  Current Status(02/08/2021): min  Weekly Goal(02/16/2021): CGA  Discharge Goal: SBA/mod I    [OT] Tub/Shower Transfers(Active)  Current Status(02/08/2021): min  Weekly Goal(02/15/2021): CGA  Discharge Goal: SBA/mod I        Self Care    [OT] Bathing(Active)  Current Status(02/08/2021): min LB and SBA UB  Weekly Goal(02/16/2021): min/CGA w AE  Discharge Goal: SBA/mod I w. AE    [OT] Dressing (Lower)(Active)  Current Status(02/08/2021): mod  Weekly Goal(02/15/2021): min/CGA w AE  Discharge Goal: SBA/mod I w AE    [OT] Dressing (Upper)(Active)  Current Status(02/08/2021): SBA  Weekly Goal(02/16/2021):  mod I  Discharge Goal: Independent    [OT] Grooming(Active)  Current Status(02/08/2021): SBA  Weekly Goal(02/15/2021): Supervision/mod I  Discharge Goal: mod I/I    [OT] Toileting(Active)  Current Status(02/08/2021): min  Weekly Goal(02/16/2021): CGA  Discharge Goal: SBA/mod I    Functional Measures  TERRANCE Eating:  Branch  TERRANCE Grooming: Branch  TERRANCE Bathing:  Branch  TERRANCE Upper Body Dressing:  Branch  TERRANCE Lower Body Dressing:  Branch  TERRANCE Toileting:  Branch    TERRANCE Bladder Management  Level of Assistance:  Branch  Frequency/Number of Accidents this Shift:  Branch    TERRANCE Bowel Management  Level of Assistance: Branch  Frequency/Number of Accidents this Shift: Branch    TERRANCE Bed/Chair/Wheelchair Transfer:  Branch  TERRANCE Toilet Transfer:  Branch  TERRANCE Tub/Shower Transfer:  Branch    Previously Documented Mode of Locomotion at Discharge: Field  TERRANCE Expected Mode of Locomotion at Discharge: Branch  TERRANCE Walk/Wheelchair:  Branch  TERRANCE Stairs:  Branch    TERRANCE Comprehension:  Branch  TERRANCE Expression:  Branch  TERRANCE Social Interaction:  Branch  TERRANCE Problem Solving:  Branch  TERRANCE Memory:  Branch    Therapy Mode Minutes  Occupational Therapy: Branch  Physical Therapy: Branch  Speech  Language Pathology:  Branch    Signed by: Idania Blood OTR/L

## 2021-02-08 NOTE — PLAN OF CARE
Goal Outcome Evaluation:  Plan of Care Reviewed With: patient  Progress: improving  Outcome Summary: Ms. Delgado attended therapies and is cooperative with staff.  Pt. is A&Ox4, can be forgetful.  Cont B&B.  Sat up in wc or recliner most of the day.  Ambulates with rollator.  Uses call light for assistance.  No safety issues noted.

## 2021-02-08 NOTE — THERAPY TREATMENT NOTE
Inpatient Rehabilitation - Occupational Therapy Treatment Note    [facility]     Patient Name: Marianne Delgado  : 1925  MRN: [id number]    Today's Date: 2021                 Admit Date: 2/3/2021       No diagnosis found.    Patient Active Problem List   Diagnosis   • Arthritis   • Stage 4 chronic kidney disease (CMS/[facility])   • Debility   • Foot pain, bilateral   • Glaucoma   • Acute idiopathic gout of right ankle   • Hematuria   • Essential hypertension   • Acquired hypothyroidism   • Osteopenia   • Psoriasis   • Rosacea   • Vitamin D deficiency   • Medicare annual wellness visit, subsequent   • Morbidly obese (CMS/[facility])   • Cerebrovascular accident (CVA) (CMS/[facility])   • Hypertensive urgency   • Stroke (cerebrum) (CMS/[facility])       Past Medical History:   Diagnosis Date   • Acute sinusitis    • Acute upper respiratory infection    • Arthritis    • CKD (chronic kidney disease)    • Debility    • Fatigue    • Foot pain, bilateral    • Glaucoma    • Gout    • Hematuria    • High blood pressure    • Hypertension    • Hypothyroid 1999   • Hypothyroidism    • IBS (irritable bowel syndrome)    • IGT (impaired glucose tolerance)    • Malaise and fatigue    • Medication management    • Melanoma (CMS/[facility]) 1979    left leg   • OA (osteoarthritis)    • Osteopenia 1999   • Painful joint    • Psoriasis    • Renal failure    • Rosacea    • Vitamin D deficiency        Past Surgical History:   Procedure Laterality Date   • CATARACT EXTRACTION     • FOOT SURGERY      mauri toe surgery   • OTHER SURGICAL HISTORY      Melanoma Excision: Left leg                IRF OT ASSESSMENT FLOWSHEET (last 12 hours)      IRF OT Evaluation and Treatment     Row Name 21 1531 21 1245       OT Time and Intention    Document Type  daily treatment  -CC  daily treatment  -CC    Mode of Treatment  occupational therapy  -CC  occupational therapy  -CC    Patient Effort  good  -CC  good  -CC    Symptoms Noted During/After Treatment   fatigue  -  fatigue  -[facility] Name 02/08/21 1245          Sensory    Additional Documentation  Hearing Assessment (Group)  -     Hearing Status  hearing aid, bilateral  -[facility] Name 02/08/21 1531 02/08/21 1245       Cognition/Psychosocial    Affect/Mental Status (Cognitive)  WFL  -[facility]  -    Orientation Status (Cognition)  oriented x 4  -CC  oriented x 4  -CC    Follows Commands (Cognition)  WFL  -  WFL  -    Personal Safety Interventions  fall prevention program maintained;gait belt;nonskid shoes/slippers when out of bed  -  fall prevention program maintained;gait belt;nonskid shoes/slippers when out of bed  -[facility] Name 02/08/21 1531 02/08/21 1245       Pain Scale: Numbers Pre/Post-Treatment    Pretreatment Pain Rating  0/10 - no pain  -  0/10 - no pain  -    Posttreatment Pain Rating  0/10 - no pain  -  0/10 - no pain  -CC    Row Name 02/08/21 1531 02/08/21 1245       Bed Mobility    Comment (Bed Mobility)  in w/c  -CC  in w/c  -[facility] Name 02/08/21 1245          Functional Mobility    Functional Mobility- Ind. Level  set up required;contact guard assist  -     Functional Mobility- Device  rollator  -     Functional Mobility-Distance (Feet)  -- to Br  -[facility] Name 02/08/21 1531 02/08/21 1245       Transfers    Sit-Stand [place] (Transfers)  contact guard;verbal cues  -  contact guard;verbal cues  -    Stand-Sit [place] (Transfers)  contact guard;verbal cues  -  contact guard;verbal cues  -    [place] Level (Shower Transfer)  --  verbal cues;contact guard;minimum assist (75% patient effort)  -    Assistive Device (Shower Transfer)  --  grab bar, tub/shower;tub bench;wheelchair  -[facility] Name 02/08/21 1531 02/08/21 1245       Sit-Stand Transfer    Assistive Device (Sit-Stand Transfers)  walker, 4-wheeled  -  walker, 4-wheeled  -[facility] Name 02/08/21 1531 02/08/21 1245       Stand-Sit Transfer    Assistive Device (Stand-Sit Transfers)  walker,  4-wheeled  -CC  walker, 4-wheeled  -    Row Name 02/08/21 1245          Shower Transfer    Type (Shower Transfer)  sit-stand;stand-sit  -     Row Name 02/08/21 1531          Elbow/Forearm (Therapeutic Exercise)    Elbow/Forearm (Therapeutic Exercise)  strengthening exercise  -     Elbow/Forearm Strengthening (Therapeutic Exercise)  bilateral;flexion;extension;supination;pronation;sitting;1 lb free weight;10 repetitions;2 sets  -     Row Name 02/08/21 1531          Wrist (Therapeutic Exercise)    Wrist (Therapeutic Exercise)  strengthening exercise  -     Wrist Strengthening (Therapeutic Exercise)  bilateral;flexion;extension;1 lb free weight;10 repetitions;2 sets  -     Row Name 02/08/21 1300          Aerobic Exercise    Type (Aerobic Exercise)  arm bike  -     Time Performed (Aerobic Exercise)  2 min x 2 seated program 3  -CC     Row Name 02/08/21 1245          Bathing    [place] Level (Bathing)  bathing skills;lower body;upper body;minimum assist (75% patient effort)  -     Assistive Device (Bathing)  grab bar/tub rail;hand held shower spray hose;shower chair  -     Position (Bathing)  supported sitting;supported standing  -     Set-up Assistance (Bathing)  obtain supplies  -     Row Name 02/08/21 1245          Upper Body Dressing    [place] Level (Upper Body Dressing)  upper body dressing skills;doff;don;front opening garment;pull over garment;supervision;moderate assist (50-74% patient effort)  -     Position (Upper Body Dressing)  supported sitting  -     Set-up Assistance (Upper Body Dressing)  obtain clothing  -     Row Name 02/08/21 1245          Lower Body Dressing    [place] Level (Lower Body Dressing)  doff;don;pants/bottoms;shoes/slippers;socks;moderate assist (50% patient effort)  -     Position (Lower Body Dressing)  supported sitting;supported standing  -     Set-up Assistance (Lower Body Dressing)  obtain clothing  -     Row Name 02/08/21 1249           Grooming    [place] Level (Grooming)  grooming skills;make-up application;hair care, combing/brushing;oral care regimen;wash face, hands;standby assist  -CC     Position (Grooming)  sink side;supported sitting  -CC     Set-up Assistance (Grooming)  obtain supplies;open containers  -CC     Row Name 02/08/21 1300 02/08/21 1245       Positioning and Restraints    Pre-Treatment Position  sitting in chair/recliner  -CC  sitting in chair/recliner  -CC    Post Treatment Position  wheelchair  -CC  wheelchair  -CC    In Wheelchair  sitting;call light within reach;encouraged to call for assist;exit alarm on  -CC  notified nsg;sitting;call light within reach;encouraged to call for assist;exit alarm on  -CC      User Key  (r) = Recorded By, (t) = Taken By, (c) = Cosigned By    Initials Name Effective Dates    CC Zachariah Bloodie, OTR 06/08/18 -            Occupational Therapy Education                 Title: PT OT SLP Therapies (Done)     Topic: Occupational Therapy (Done)     Point: ADL training (Done)     Description:   Instruct learner(s) on proper safety adaptation and remediation techniques during self care or transfers.   Instruct in proper use of assistive devices.              Learning Progress Summary           Patient Acceptance, E,TB, VU by JULIANO at 2/6/2021 1534    Acceptance, E,TB, VU,DU by JEMAL at 2/4/2021 1640    Comment: ed pt on role of OT, benefit of therapy, POC w. OT ed on safety w ADLs and tsf techniques. pt demo w. min A for tsf and more A w. LBD and SBA UBD                   Point: Home exercise program (Done)     Description:   Instruct learner(s) on appropriate technique for monitoring, assisting and/or progressing therapeutic exercises/activities.              Learning Progress Summary           Patient Acceptance, E,TB, VU by JULIANO at 2/6/2021 1534                   Point: Precautions (Done)     Description:   Instruct learner(s) on prescribed precautions during self-care and functional transfers.               Learning Progress Summary           Patient Acceptance, E,TB, VU by  at 2/6/2021 1534    Acceptance, E,TB, VU,DU by  at 2/4/2021 1640    Comment: ed pt on role of OT, benefit of therapy, POC w. OT ed on safety w ADLs and tsf techniques. pt demo w. min A for tsf and more A w. LBD and SBA UBD                   Point: Body mechanics (Done)     Description:   Instruct learner(s) on proper positioning and spine alignment during self-care, functional mobility activities and/or exercises.              Learning Progress Summary           Patient Acceptance, E,TB, VU by JULIANO at 2/6/2021 1534    Acceptance, E,TB, VU,DU by  at 2/4/2021 1640    Comment: ed pt on role of OT, benefit of therapy, POC w. OT ed on safety w ADLs and tsf techniques. pt demo w. min A for tsf and more A w. LBD and SBA UBD                               User Key     Initials Effective Dates Name Provider Type Discipline     06/08/18 -  Mame Nelson OTR Occupational Therapist OT     07/15/20 -  Sandra Souza OT Occupational Therapist OT                    OT Recommendation and Plan                         Time Calculation:     Time Calculation- OT     Row Name 02/08/21 1330 02/08/21 1100          Time Calculation- OT    OT Start Time  1330  -CC  1100  -CC     OT Stop Time  1400  -CC  1145  -CC     OT Time Calculation (min)  30 min  -CC  45 min  -CC       User Key  (r) = Recorded By, (t) = Taken By, (c) = Cosigned By    Initials Name Provider Type    CC Idania Blood OTR Occupational Therapist        Therapy Charges for Today     Code Description Service Date Service Provider Modifiers Qty    67182217223 HC OT SELF CARE/MGMT/TRAIN EA 15 MIN 2/8/2021 Idania Blood OTR GO 3    09845802517 HC OT THER PROC EA 15 MIN 2/8/2021 Idania Blood OTR GO 2                   HARI Fofana  2/8/2021

## 2021-02-08 NOTE — PROGRESS NOTES
Inpatient Rehabilitation Plan of Care Note    Plan of Care  Care Plan Reviewed - No updates at this time.    Sphincter Control    Performed Intervention(s)  Use incontinence products  offer toileting  Encourage fluids      Safety    Performed Intervention(s)  Falls precautions/protocol  Safety rounds  Items within reach  Bed alarm/ chair alarm      Psychosocial    Performed Intervention(s)  Verbalizes needs and concerns  Provide therapeutic enviroment      Pain    Performed Intervention(s)  Offer medication when needed  Practice relaxation techniques  Apply heating pad or cream to area when needed      Body Systems    Performed Intervention(s)  Daily skin inspection  Nutritional support  Apply cream to dry areas as needed  Apply powder under folds as ordered    Signed by: Didi Luna RN

## 2021-02-08 NOTE — PROGRESS NOTES
Adult Nutrition  Assessment/PES    Patient Name:  Marianne Delgado  YOB: 1925  MRN: [id number]  Admit Date:  2/3/2021    Assessment Date:  2/8/2021    Reason for Assessment     Row Name 02/08/21 1311          Reason for Assessment    Reason For Assessment  follow-up protocol         Nutrition/Diet History     Row Name 02/08/21 1311          Nutrition/Diet History    Typical Food/Fluid Intake  Pt seen at lunch, had eaten about 50% of meal. Reports appetite still on poor side. She's tried to drink the Boost and Mighty Shake but says one of them caused diarrhea so she's backing off.           Labs/Tests/Procedures/Meds     Row Name 02/08/21 1312          Labs/Procedures/Meds    Lab Results Reviewed  reviewed        Diagnostic Tests/Procedures    Diagnostic Test/Procedure Reviewed  reviewed        Medications    Pertinent Medications Reviewed  reviewed         Physical Findings     Row Name 02/08/21 1312          Physical Findings    Overall Physical Appearance  overweight           Nutrition Prescription Ordered     Row Name 02/08/21 1313 02/08/21 1312       Nutrition Prescription PO    Current PO Diet  --  Regular    Supplement  Boost Plus (Ensure Enlive, Ensure Plus);Mighty Shake  --    Supplement Frequency  3 times a day;2 times a day  --        Evaluation of Received Nutrient/Fluid Intake     Row Name 02/08/21 1314          PO Evaluation    Number of Days PO Intake Evaluated  3 days     % PO Intake  25-75%               Problem/Interventions:          Intervention Goal     Row Name 02/08/21 1315          Intervention Goal    General  Maintain nutrition;Disease management/therapy     PO  Tolerate PO;PO intake (%)     PO Intake %  75 %     Weight  Appropriate weight gain         Nutrition Intervention     Row Name 02/08/21 1315          Nutrition Intervention    RD/Tech Action  Follow Tx progress;Care plan reviewd;Encourage intake;Menu provided;Advise alternate selection            Education/Evaluation     Row Name 02/08/21 1316          Monitor/Evaluation    Monitor  Per protocol           Electronically signed by:  Heidi Mac RD  02/08/21 13:16 EST

## 2021-02-08 NOTE — PROGRESS NOTES
Inpatient Rehabilitation Plan of Care Note    Plan of Care  Care Plan Reviewed - Updates as Follows    Body Systems    [RN] Integumentary(Active)  Current Status(02/08/2021): Pt has red area to right buttock (psoriasis) cream  ordered; slight redness under breasts; powder ordered  Weekly Goal(02/15/2021): No s/s of infection  Discharge Goal: No s/s of infection    Performed Intervention(s)  Daily skin inspection  Nutritional support  Apply cream to dry areas as needed  Apply powder under folds as ordered      Pain    [RN] Pain Management(Active)  Current Status(02/08/2021): Chronic pain to left knee r/t arthritis. 2/6 pt  states both legs tender/sore.  Weekly Goal(02/15/2021): Pt able to tolerate therapies  Discharge Goal: Pain under control    Performed Intervention(s)  Offer medication when needed  Practice relaxation techniques  Apply heating pad or cream to area when needed      Psychosocial    [RN] Coping/Adjustment(Active)  Current Status(02/08/2021): Pt expresses effective coping  Weekly Goal(02/15/2021): Allow pt to express concerns  Discharge Goal: Demonstrates healthy coping strategies    Performed Intervention(s)  Verbalizes needs and concerns  Provide therapeutic enviroment      Safety    [RN] Potential for Injury(Active)  Current Status(02/08/2021): No unsafe behaviors  Weekly Goal(02/15/2021): Pt will use call bell 100% of the time  Discharge Goal: No falls    Performed Intervention(s)  Falls precautions/protocol  Safety rounds  Items within reach  Bed alarm/ chair alarm      Sphincter Control    [RN] Bladder Management(Active)  Current Status(02/08/2021): Pt is continent 100%  Weekly Goal(02/15/2021): Pt will be continent 100%  Discharge Goal: Pt will be continent 100%    [RN] Bowel Management(Active)  Current Status(02/08/2021): Pt is continent 100%  Weekly Goal(02/15/2021): Pt will be continent 100%  Discharge Goal: Pt will remain continent 100%    Performed Intervention(s)  Use incontinence  products  offer toileting  Encourage fluids    Signed by: Corinne Angeles RN

## 2021-02-08 NOTE — PROGRESS NOTES
"   LOS: 5 days   Patient Care Team:  Xenia Robert MD as PCP - General (Family Medicine)    Chief Complaint:   Status post CVA-small focus of restricted diffusion in the right parietal lobe  Impaired cognition/impaired mobility/impaired self-care  Stroke prophylaxis-aspirin and Plavix x30 days then Plavix alone.  Left knee degenerative changes-status post steroid injection February 3  Chronic kidney disease stage IV baseline creatinine around 2.0  Acquired hypothyroidism-recheck TFTs 1 March-on levothyroxine  Morbid obesity  Vitamin D deficiency  Hypertension-metoprolol/amlodipine-systolic blood pressure goal 150-160  Hyperlipidemia-atorvastatin       Subjective     History of Present Illness    Subjective    She is tolerating therapies.  No new weakness    .       History taken from: patient    Objective     Vital Signs  Temp:  [97.7 °F (36.5 °C)-98.3 °F (36.8 °C)] 97.7 °F (36.5 °C)  Heart Rate:  [67-75] 75  Resp:  [18] 18  BP: (130-169)/(64-77) 130/77    Objective:  Vital signs: (most recent): Blood pressure 130/77, pulse 75, temperature 97.7 °F (36.5 °C), temperature source Oral, resp. rate 18, height 154.9 cm (61\"), SpO2 95 %.            Physical Exam  MENTAL STATUS -  AWAKE / ALERT  HEENT- NCAT, PUPILS EQUALLY ROUND, SCLERAE ANICTERIC,   LUNGS - CTA, NO WHEEZES, RALES OR RHONCHI  HEART- RRR, NO RUB, MURMUR, OR GALLOP  ABD - NORMOACTIVE BOWEL SOUNDS, SOFT, NT.    EXT - NO EDEMA OR CYANOSIS  NEURO -oriented x4.     MOTOR EXAM - RUE/RLE 5/5.  She has weakness in the left upper extremity left lower extremity that was limited by pain inhibition at the left shoulder as well as with the left knee.    She shows better active knee extension seated in the chair.  Able to take resistance with left ankle dorsiflexion and finger flexion.    Results Review:     I reviewed the patient's new clinical results.  Results from last 7 days   Lab Units 02/08/21  0729 02/05/21  0711   WBC 10*3/mm3 6.71 10.11   HEMOGLOBIN g/dL " 12.6 12.2   HEMATOCRIT % 38.2 37.8   PLATELETS 10*3/mm3 267 274     Results from last 7 days   Lab Units 02/08/21  0729 02/05/21  0711 02/03/21  0538   SODIUM mmol/L 141 137 140   POTASSIUM mmol/L 4.8 4.7 4.4   CHLORIDE mmol/L 111* 110* 110*   CO2 mmol/L 21.6* 20.2* 20.6*   BUN mg/dL 49* 42* 24*   CREATININE mg/dL 1.67* 1.82* 1.82*   CALCIUM mg/dL 9.0 9.2 8.8   GLUCOSE mg/dL 82 113* 84         Ref. Range 9/15/2020 11:00 1/29/2021 10:35 1/30/2021 06:29 1/31/2021 07:10 2/5/2021 07:11   Hemoglobin A1C Latest Ref Range: 4.80 - 5.60 %  5.07      TSH Baseline Latest Ref Range: 0.270 - 4.200 uIU/mL 5.160 (H) 16.100 (H)  9.150 (H)    Free T4 Latest Ref Range: 0.93 - 1.70 ng/dL 1.39  1.14     T3, Free Latest Ref Range: 2.00 - 4.40 pg/mL    1.94 (L)    Total Cholesterol Latest Ref Range: 0 - 200 mg/dL  168      HDL Cholesterol Latest Ref Range: 40 - 60 mg/dL  44      LDL Cholesterol  Latest Ref Range: 0 - 100 mg/dL  104 (H)      VLDL Cholesterol Latest Ref Range: 5 - 40 mg/dL  20      Triglycerides Latest Ref Range: 0 - 150 mg/dL  110      Vitamin B-12 Latest Ref Range: 211 - 946 pg/mL     1,204 (H)   25 Hydroxy, Vitamin D Latest Ref Range: 30.0 - 100.0 ng/ml     39.9     Medication Review: done  Scheduled Meds:amLODIPine, 2.5 mg, Oral, Q24H  aspirin, 81 mg, Oral, Daily  atorvastatin, 80 mg, Oral, Nightly  betamethasone dipropionate, , Topical, Q24H  cholecalciferol, 1,000 Units, Oral, BID  clopidogrel, 75 mg, Oral, Daily  dorzolamide-timolol, , Both Eyes, BID  latanoprost, 1 drop, Both Eyes, Nightly  levothyroxine, 150 mcg, Oral, Daily  lidocaine, 5 mL, Intra-articular, Once  metoprolol succinate XL, 12.5 mg, Oral, Daily  nystatin, , Topical, Q12H  triamcinolone acetonide, 40 mg, Intra-articular, Once  vitamin B-12, 250 mcg, Oral, Daily      Continuous Infusions:   PRN Meds:.•  acetaminophen  •  bisacodyl  •  trolamine salicylate      Assessment/Plan       Stroke (cerebrum) (CMS/[facility])      Assessment & Plan  Status post  CVA-small focus of restricted diffusion in the right parietal lobe  Adjunctive medication for stroke recovery-on atorvastatin.  Recheck vitamin D level.  Check vitamin B-12 level.     Impaired cognition/impaired mobility/impaired self-care     Stroke prophylaxis-aspirin and Plavix x30 days then Plavix alone.     Left knee degenerative changes-status post steroid injection February 3     Chronic kidney disease stage IV baseline creatinine around 2.0     Acquired hypothyroidism-recheck TFTs 1 March-on levothyroxine     Morbid obesity     Vitamin D deficiency-no home dose listed-recheck vitamin D level     Hypertension-metoprolol/amlodipine-systolic blood pressure goal 150-160     Hyperlipidemia-atorvastatin     Now admit for comprehensive acute inpatient rehabilitation .  This would be an interdisciplinary program with physical therapy 1 hour,  occupational therapy 1 hour, and speech therapy 1 hour, 5 days a week.  Rehabilitation nursing for carryover, monitoring of nutritional and blood pressure and neurologic   status, bowel and bladder, and skin  Ongoing physician follow-up.  Weekly team conferences.  Goals are to achieve a level of supervision with  mobility and self-care and improved balance.   Rehabilitation prognosis fair.  Medical prognosis fair.  Estimated length of stay is approximately 2 to 3 weeks, but is only an estimation.   The patient's functional status and clinical status is unchanged from preadmission assessment and the patient continues appropriate for acute inpatient rehabilitation.  Goal is for home with home health   therapies.  Barrier to discharge: Impaired mobility- work on transfers ADLs to overcome.      Ricki Matta MD  02/08/21  13:15 EST    Time:   During rounds, used appropriate personal protective equipment including mask and gloves.  Additional gown if indicated.  Mask used was standard procedure mask. Appropriate PPE was worn during the entire visit.  Hand hygiene was  completed before and after.

## 2021-02-08 NOTE — PROGRESS NOTES
Case Management  Inpatient Rehabilitation Plan of Care and Discharge Plan Note    Rehabilitation Diagnosis:  Branch  Date of Onset:  Branch    Medical Summary:  Branch  Past Medical History: Branch    Plan of Care  Updated Problems/Interventions  Field    Expected Intensity:  Branch  Interdisciplinary Team:  Paulino  Estimated Length of Stay/Anticipated Discharge Date: Branch  Anticipated Discharge Destination:  Anticipated discharge destination from inpatient rehabilitation is community  discharge with assistance. Patient lives in Independent Living apartment at [facility]  D/[place] to [place]      Based on the patient's medical and functional status, their prognosis and  expected level of functional improvement is:  Paulino    Signed by: ELIZABETH Solis

## 2021-02-09 PROCEDURE — 97110 THERAPEUTIC EXERCISES: CPT

## 2021-02-09 PROCEDURE — 97535 SELF CARE MNGMENT TRAINING: CPT

## 2021-02-09 PROCEDURE — 97129 THER IVNTJ 1ST 15 MIN: CPT

## 2021-02-09 PROCEDURE — 97116 GAIT TRAINING THERAPY: CPT

## 2021-02-09 PROCEDURE — 97130 THER IVNTJ EA ADDL 15 MIN: CPT

## 2021-02-09 RX ADMIN — TIMOLOL MALEATE: 5 SOLUTION/ DROPS OPHTHALMIC at 08:27

## 2021-02-09 RX ADMIN — ATORVASTATIN CALCIUM 80 MG: 80 TABLET, FILM COATED ORAL at 21:32

## 2021-02-09 RX ADMIN — CLOPIDOGREL 75 MG: 75 TABLET, FILM COATED ORAL at 08:25

## 2021-02-09 RX ADMIN — LEVOTHYROXINE SODIUM 150 MCG: 150 TABLET ORAL at 05:46

## 2021-02-09 RX ADMIN — NYSTATIN: 100000 POWDER TOPICAL at 08:27

## 2021-02-09 RX ADMIN — ASPIRIN 81 MG: 81 TABLET, COATED ORAL at 08:25

## 2021-02-09 RX ADMIN — AMLODIPINE BESYLATE 2.5 MG: 2.5 TABLET ORAL at 08:25

## 2021-02-09 RX ADMIN — NYSTATIN 1 APPLICATION: 100000 POWDER TOPICAL at 21:32

## 2021-02-09 RX ADMIN — LATANOPROST 1 DROP: 50 SOLUTION/ DROPS OPHTHALMIC at 21:31

## 2021-02-09 RX ADMIN — Medication 1000 UNITS: at 21:32

## 2021-02-09 RX ADMIN — Medication 1000 UNITS: at 08:25

## 2021-02-09 RX ADMIN — TIMOLOL MALEATE: 5 SOLUTION/ DROPS OPHTHALMIC at 21:32

## 2021-02-09 RX ADMIN — BETAMETHASONE DIPROPIONATE: 0.5 OINTMENT TOPICAL at 08:27

## 2021-02-09 RX ADMIN — Medication 250 MCG: at 08:26

## 2021-02-09 RX ADMIN — METOPROLOL SUCCINATE 12.5 MG: 25 TABLET, EXTENDED RELEASE ORAL at 08:25

## 2021-02-09 NOTE — PROGRESS NOTES
Inpatient Rehabilitation Plan of Care Note    Plan of Care  Care Plan Reviewed - Updates as Follows    Sphincter Control    [RN] Bowel Management(Active)  Current Status(02/09/2021): Pt is continent 100% except 1 incont. bm with  urgency, loose/liquid/ some formed bm on 2/8.  Weekly Goal(02/15/2021): Pt will be continent 100%  Discharge Goal: Pt will remain continent 100%    Performed Intervention(s)  Use incontinence products  offer toileting  Encourage fluids      Safety    Performed Intervention(s)  Falls precautions/protocol  Safety rounds  Items within reach  Bed alarm/ chair alarm      Psychosocial    Performed Intervention(s)  Verbalizes needs and concerns  Provide therapeutic enviroment      Pain    Performed Intervention(s)  Offer medication when needed  Practice relaxation techniques  Apply heating pad or cream to area when needed      Body Systems    Performed Intervention(s)  Daily skin inspection  Nutritional support  Apply cream to dry areas as needed  Apply powder under folds as ordered    Signed by: Shirley Camilo, RN

## 2021-02-09 NOTE — THERAPY TREATMENT NOTE
Inpatient Rehabilitation - Occupational Therapy Treatment Note    [facility]     Patient Name: Marianne Delgado  : 1925  MRN: [id number]    Today's Date: 2021                 Admit Date: 2/3/2021       No diagnosis found.    Patient Active Problem List   Diagnosis   • Arthritis   • Stage 4 chronic kidney disease (CMS/[facility])   • Debility   • Foot pain, bilateral   • Glaucoma   • Acute idiopathic gout of right ankle   • Hematuria   • Essential hypertension   • Acquired hypothyroidism   • Osteopenia   • Psoriasis   • Rosacea   • Vitamin D deficiency   • Medicare annual wellness visit, subsequent   • Morbidly obese (CMS/[facility])   • Cerebrovascular accident (CVA) (CMS/[facility])   • Hypertensive urgency   • Stroke (cerebrum) (CMS/[facility])       Past Medical History:   Diagnosis Date   • Acute sinusitis    • Acute upper respiratory infection    • Arthritis    • CKD (chronic kidney disease)    • Debility    • Fatigue    • Foot pain, bilateral    • Glaucoma    • Gout    • Hematuria    • High blood pressure    • Hypertension    • Hypothyroid 1999   • Hypothyroidism    • IBS (irritable bowel syndrome)    • IGT (impaired glucose tolerance)    • Malaise and fatigue    • Medication management    • Melanoma (CMS/[facility]) 1979    left leg   • OA (osteoarthritis)    • Osteopenia 1999   • Painful joint    • Psoriasis    • Renal failure    • Rosacea    • Vitamin D deficiency        Past Surgical History:   Procedure Laterality Date   • CATARACT EXTRACTION     • FOOT SURGERY      mauri toe surgery   • OTHER SURGICAL HISTORY      Melanoma Excision: Left leg                IRF OT ASSESSMENT FLOWSHEET (last 12 hours)      IRF OT Evaluation and Treatment     Row Name 21 1237          OT Time and Intention    Document Type  daily treatment  -CC     Mode of Treatment  occupational therapy  -CC     Patient Effort  good  -CC     Symptoms Noted During/After Treatment  fatigue  -CC     Row Name 21 1231           Cognition/Psychosocial    Affect/Mental Status (Cognitive)  [facility]     Orientation Status (Cognition)  oriented x 4  -CC     Follows Commands (Cognition)  [facility]  -     Personal Safety Interventions  fall prevention program maintained;gait belt;nonskid shoes/slippers when out of bed  -     Row Name 02/09/21 1237          Pain Scale: Numbers Pre/Post-Treatment    Pretreatment Pain Rating  0/10 - no pain  -     Posttreatment Pain Rating  0/10 - no pain  -     Row Name 02/09/21 1237          Pain Scale: FACES Pre/Post-Treatment    Pain: FACES Scale, Pretreatment  0-->no hurt  -     Posttreatment Pain Rating  0-->no hurt  -[facility] Name 02/09/21 1237          Bed Mobility    Comment (Bed Mobility)  in w/c  -[facility] Name 02/09/21 1237          Transfers    Bed-Chair [place] (Transfers)  set up;minimum assist (75% patient effort);1 person assist  -     Chair-Bed [place] (Transfers)  minimum assist (75% patient effort);1 person assist  -     Assistive Device (Bed-Chair Transfers)  wheelchair  -     Sit-Stand [place] (Transfers)  contact guard;verbal cues  -     [place] Level (Shower Transfer)  verbal cues;contact guard  -     Assistive Device (Shower Transfer)  grab bar, tub/shower;tub bench;wheelchair  -     Row Name 02/09/21 1237          Chair-Bed Transfer    Assistive Device (Chair-Bed Transfers)  wheelchair  -[facility] Name 02/09/21 [facility]7          Sit-Stand Transfer    Assistive Device (Sit-Stand Transfers)  walker, 4-wheeled  -[facility] Name 02/09/21 1237          Shower Transfer    Type (Shower Transfer)  sit-stand;stand-sit  -[facility] Name 02/09/21 [facility]7          Bathing    [place] Level (Bathing)  bathing skills;lower body;upper body;supervision;contact guard assist  -     Assistive Device (Bathing)  grab bar/tub rail;hand held shower spray hose;shower chair  -     Position (Bathing)  supported sitting;supported standing  -     Set-up Assistance (Bathing)   obtain supplies  -     Row Name 02/09/21 1237          Upper Body Dressing    [place] Level (Upper Body Dressing)  upper body dressing skills;doff;don;front opening garment;pull over garment;supervision;minimum assist (75% or more patient effort)  -CC     Position (Upper Body Dressing)  supported sitting  -     Set-up Assistance (Upper Body Dressing)  obtain clothing  -CC     Row Name 02/09/21 1237          Lower Body Dressing    [place] Level (Lower Body Dressing)  doff;don;pants/bottoms;shoes/slippers;socks;moderate assist (50% patient effort)  -CC     Position (Lower Body Dressing)  supported sitting;supported standing  -CC     Set-up Assistance (Lower Body Dressing)  obtain clothing  -CC     Row Name 02/09/21 1237          Grooming    [place] Level (Grooming)  grooming skills;make-up application;hair care, combing/brushing;oral care regimen;wash face, hands;standby assist  -CC     Position (Grooming)  sink side;supported sitting  -CC     Set-up Assistance (Grooming)  obtain supplies  -     Row Name 02/09/21 1237          Positioning and Restraints    Pre-Treatment Position  sitting in chair/recliner  -     Post Treatment Position  wheelchair  -CC     In Wheelchair  sitting;call light within reach;encouraged to call for assist;exit alarm on  -CC       User Key  (r) = Recorded By, (t) = Taken By, (c) = Cosigned By    Initials Name Effective Dates    CC Idania Blood, OTR 06/08/18 -            Occupational Therapy Education                 Title: PT OT SLP Therapies (Done)     Topic: Occupational Therapy (Done)     Point: ADL training (Done)     Description:   Instruct learner(s) on proper safety adaptation and remediation techniques during self care or transfers.   Instruct in proper use of assistive devices.              Learning Progress Summary           Patient Acceptance, E,TB, VU by JULIANO at 2/6/2021 1534    Acceptance, E,TB, VU,DU by JEMAL at 2/4/2021 1640    Comment: ed pt on role of  OT, benefit of therapy, POC w. OT ed on safety w ADLs and tsf techniques. pt demo w. min A for tsf and more A w. LBD and SBA UBD                   Point: Home exercise program (Done)     Description:   Instruct learner(s) on appropriate technique for monitoring, assisting and/or progressing therapeutic exercises/activities.              Learning Progress Summary           Patient Acceptance, E,TB, VU by JULIANO at 2/6/2021 1534                   Point: Precautions (Done)     Description:   Instruct learner(s) on prescribed precautions during self-care and functional transfers.              Learning Progress Summary           Patient Acceptance, E,TB, VU by JULIANO at 2/6/2021 1534    Acceptance, E,TB, VU,DU by  at 2/4/2021 1640    Comment: ed pt on role of OT, benefit of therapy, POC w. OT ed on safety w ADLs and tsf techniques. pt demo w. min A for tsf and more A w. LBD and SBA UBD                   Point: Body mechanics (Done)     Description:   Instruct learner(s) on proper positioning and spine alignment during self-care, functional mobility activities and/or exercises.              Learning Progress Summary           Patient Acceptance, E,TB, VU by JULIANO at 2/6/2021 1534    Acceptance, E,TB, VU,DU by  at 2/4/2021 1640    Comment: ed pt on role of OT, benefit of therapy, POC w. OT ed on safety w ADLs and tsf techniques. pt demo w. min A for tsf and more A w. LBD and SBA UBD                               User Key     Initials Effective Dates Name Provider Type Discipline     06/08/18 -  Mame Nelson OTR Occupational Therapist OT    JULIANO 07/15/20 -  Sandra Souza OT Occupational Therapist OT                    OT Recommendation and Plan                         Time Calculation:     Time Calculation- OT     Row Name 02/09/21 1100             Time Calculation- OT    OT Start Time  1100  -CC      OT Stop Time  1145  -CC      OT Time Calculation (min)  45 min  -CC        User Key  (r) = Recorded By, (t) =  Taken By, (c) = Cosigned By    Initials Name Provider Type    CC Idania Blood, HARI Occupational Therapist        Therapy Charges for Today     Code Description Service Date Service Provider Modifiers Qty    50064323732 HC OT SELF CARE/MGMT/TRAIN EA 15 MIN 2/8/2021 Idania Blood OTR GO 3    93853143724 HC OT THER PROC EA 15 MIN 2/8/2021 Idania Blood OTR GO 2    76023372917 HC OT SELF CARE/MGMT/TRAIN EA 15 MIN 2/9/2021 Idania Blood OTR GO 3                   HARI Fofana  2/9/2021

## 2021-02-09 NOTE — PLAN OF CARE
Goal Outcome Evaluation:  Plan of Care Reviewed With: patient  Progress: improving  Outcome Summary: Ms. Delgado attended therapies and is cooperative with staff. Pt. is A&Ox4.  Cont bladder.  No BM this shift.  Ambulates with rollator.  Sat up in recliner and wc this shift.  Uses k-pad on legs.  Uses call light for assistance.  No safety issues noted.

## 2021-02-09 NOTE — THERAPY TREATMENT NOTE
Inpatient Rehabilitation - Occupational Therapy Treatment Note    [facility]     Patient Name: Marianne Delgado  : 1925  MRN: [id number]    Today's Date: 2021                 Admit Date: 2/3/2021       No diagnosis found.    Patient Active Problem List   Diagnosis   • Arthritis   • Stage 4 chronic kidney disease (CMS/[facility])   • Debility   • Foot pain, bilateral   • Glaucoma   • Acute idiopathic gout of right ankle   • Hematuria   • Essential hypertension   • Acquired hypothyroidism   • Osteopenia   • Psoriasis   • Rosacea   • Vitamin D deficiency   • Medicare annual wellness visit, subsequent   • Morbidly obese (CMS/[facility])   • Cerebrovascular accident (CVA) (CMS/[facility])   • Hypertensive urgency   • Stroke (cerebrum) (CMS/[facility])       Past Medical History:   Diagnosis Date   • Acute sinusitis    • Acute upper respiratory infection    • Arthritis    • CKD (chronic kidney disease)    • Debility    • Fatigue    • Foot pain, bilateral    • Glaucoma    • Gout    • Hematuria    • High blood pressure    • Hypertension    • Hypothyroid 1999   • Hypothyroidism    • IBS (irritable bowel syndrome)    • IGT (impaired glucose tolerance)    • Malaise and fatigue    • Medication management    • Melanoma (CMS/[facility]) 1979    left leg   • OA (osteoarthritis)    • Osteopenia 1999   • Painful joint    • Psoriasis    • Renal failure    • Rosacea    • Vitamin D deficiency        Past Surgical History:   Procedure Laterality Date   • CATARACT EXTRACTION     • FOOT SURGERY      mauri toe surgery   • OTHER SURGICAL HISTORY      Melanoma Excision: Left leg                IRF OT ASSESSMENT FLOWSHEET (last 12 hours)      IRF OT Evaluation and Treatment     Row Name 21 1531 21 1237       OT Time and Intention    Document Type  daily treatment  -CC  daily treatment  -CC    Mode of Treatment  occupational therapy  -CC  occupational therapy  -CC    Patient Effort  good  -CC  good  -CC    Symptoms Noted During/After Treatment   fatigue  -CC  fatigue  -[facility] Name 02/09/21 1531 02/09/21 1237       Cognition/Psychosocial    Affect/Mental Status (Cognitive)  WFL  -CC  WFL  -    Orientation Status (Cognition)  oriented x 4  -CC  oriented x 4  -CC    Follows Commands (Cognition)  [facility]  [facility]  -    Personal Safety Interventions  fall prevention program maintained;gait belt;nonskid shoes/slippers when out of bed  -  fall prevention program maintained;gait belt;nonskid shoes/slippers when out of bed  -CC    [facility] Name 02/09/21 1531 02/09/21 1237       Pain Scale: Numbers Pre/Post-Treatment    Pretreatment Pain Rating  0/10 - no pain  -  0/10 - no pain  -    Posttreatment Pain Rating  0/10 - no pain  -  0/10 - no pain  -CC    Row Name 02/09/21 1237          Pain Scale: FACES Pre/Post-Treatment    Pain: FACES Scale, Pretreatment  0-->no hurt  -     Posttreatment Pain Rating  0-->no hurt  -[facility] Name 02/09/21 1237          Bed Mobility    Comment (Bed Mobility)  in w/c  -[facility] Name 02/09/21 1237          Transfers    Bed-Chair [place] (Transfers)  set up;minimum assist (75% patient effort);1 person assist  -     Chair-Bed [place] (Transfers)  minimum assist (75% patient effort);1 person assist  -     Assistive Device (Bed-Chair Transfers)  wheelchair  -     Sit-Stand [place] (Transfers)  contact guard;verbal cues  -     [place] Level (Shower Transfer)  verbal cues;contact guard  -     Assistive Device (Shower Transfer)  grab bar, tub/shower;tub bench;wheelchair  -[facility] Name 02/09/21 1237          Chair-Bed Transfer    Assistive Device (Chair-Bed Transfers)  wheelchair  -[facility] Name 02/09/21 1237          Sit-Stand Transfer    Assistive Device (Sit-Stand Transfers)  walker, 4-wheeled  -[facility] Name 02/09/21 1237          Shower Transfer    Type (Shower Transfer)  sit-stand;stand-sit  -CC     Row Name 02/09/21 1531          Shoulder (Therapeutic Exercise)    Shoulder (Therapeutic  Exercise)  strengthening exercise  -     Shoulder Strengthening (Therapeutic Exercise)  bilateral;flexion;extension;aBduction;aDduction;1 lb free weight;10 repetitions;2 sets  -     Row Name 02/09/21 1531          Elbow/Forearm (Therapeutic Exercise)    Elbow/Forearm (Therapeutic Exercise)  strengthening exercise  -     Elbow/Forearm Strengthening (Therapeutic Exercise)  bilateral;flexion;extension;supination;pronation;sitting;1 lb free weight;3 sets 15  rep s  -     Row Name 02/09/21 1531          Wrist (Therapeutic Exercise)    Wrist (Therapeutic Exercise)  strengthening exercise  -     Wrist Strengthening (Therapeutic Exercise)  bilateral;flexion;extension;1 lb free weight;10 repetitions;3 sets  -     Row Name 02/09/21 1531          Aerobic Exercise    Type (Aerobic Exercise)  arm bike;for 2 minutes  -     Time Performed (Aerobic Exercise)  2 min x 1 2.5 min x 1 light resistance   -     Row Name 02/09/21 1237          Bathing    [place] Level (Bathing)  bathing skills;lower body;upper body;supervision;contact guard assist  -     Assistive Device (Bathing)  grab bar/tub rail;hand held shower spray hose;shower chair  -     Position (Bathing)  supported sitting;supported standing  -     Set-up Assistance (Bathing)  obtain supplies  -     Row Name 02/09/21 1237          Upper Body Dressing    [place] Level (Upper Body Dressing)  upper body dressing skills;doff;don;front opening garment;pull over garment;supervision;minimum assist (75% or more patient effort)  -     Position (Upper Body Dressing)  supported sitting  -     Set-up Assistance (Upper Body Dressing)  obtain clothing  -     Row Name 02/09/21 1237          Lower Body Dressing    [place] Level (Lower Body Dressing)  doff;don;pants/bottoms;shoes/slippers;socks;moderate assist (50% patient effort)  -     Position (Lower Body Dressing)  supported sitting;supported standing  -     Set-up Assistance (Lower Body  Dressing)  obtain clothing  -CC     Row Name 02/09/21 1237          Grooming    [place] Level (Grooming)  grooming skills;make-up application;hair care, combing/brushing;oral care regimen;wash face, hands;standby assist  -CC     Position (Grooming)  sink side;supported sitting  -     Set-up Assistance (Grooming)  obtain supplies  -     Row Name 02/09/21 1531 02/09/21 1237       Positioning and Restraints    Pre-Treatment Position  sitting in chair/recliner  -CC  sitting in chair/recliner  -CC    Post Treatment Position  wheelchair  -CC  wheelchair  -CC    In Wheelchair  sitting;with PT  -CC  sitting;call light within reach;encouraged to call for assist;exit alarm on  -CC      User Key  (r) = Recorded By, (t) = Taken By, (c) = Cosigned By    Initials Name Effective Dates    CC Idania Blood, OTR 06/08/18 -            Occupational Therapy Education                 Title: PT OT SLP Therapies (Done)     Topic: Occupational Therapy (Done)     Point: ADL training (Done)     Description:   Instruct learner(s) on proper safety adaptation and remediation techniques during self care or transfers.   Instruct in proper use of assistive devices.              Learning Progress Summary           Patient Acceptance, E,TB, VU by JULIANO at 2/6/2021 1534    Acceptance, E,TB, VU,DU by  at 2/4/2021 1640    Comment: ed pt on role of OT, benefit of therapy, POC w. OT ed on safety w ADLs and tsf techniques. pt demo w. min A for tsf and more A w. LBD and SBA UBD                   Point: Home exercise program (Done)     Description:   Instruct learner(s) on appropriate technique for monitoring, assisting and/or progressing therapeutic exercises/activities.              Learning Progress Summary           Patient Acceptance, E,TB, VU by JULIANO at 2/6/2021 1534                   Point: Precautions (Done)     Description:   Instruct learner(s) on prescribed precautions during self-care and functional transfers.              Learning  Progress Summary           Patient Acceptance, E,TB, VU by  at 2/6/2021 1534    Acceptance, E,TB, VU,DU by  at 2/4/2021 1640    Comment: ed pt on role of OT, benefit of therapy, POC w. OT ed on safety w ADLs and tsf techniques. pt demo w. min A for tsf and more A w. LBD and SBA UBD                   Point: Body mechanics (Done)     Description:   Instruct learner(s) on proper positioning and spine alignment during self-care, functional mobility activities and/or exercises.              Learning Progress Summary           Patient Acceptance, E,TB, VU by JULIANO at 2/6/2021 1534    Acceptance, E,TB, VU,DU by  at 2/4/2021 1640    Comment: ed pt on role of OT, benefit of therapy, POC w. OT ed on safety w ADLs and tsf techniques. pt demo w. min A for tsf and more A w. LBD and SBA UBD                               User Key     Initials Effective Dates Name Provider Type Discipline     06/08/18 -  Mame Nelson OTR Occupational Therapist OT    JULIANO 07/15/20 -  Sandra Souza OT Occupational Therapist OT                    OT Recommendation and Plan                         Time Calculation:     Time Calculation- OT     Row Name 02/09/21 1330 02/09/21 1100          Time Calculation- OT    OT Start Time  1330  -CC  1100  -CC     OT Stop Time  1400  -CC  1145  -CC     OT Time Calculation (min)  30 min  -CC  45 min  -CC       User Key  (r) = Recorded By, (t) = Taken By, (c) = Cosigned By    Initials Name Provider Type    CC Idania Blood OTR Occupational Therapist        Therapy Charges for Today     Code Description Service Date Service Provider Modifiers Qty    22373940837 HC OT SELF CARE/MGMT/TRAIN EA 15 MIN 2/8/2021 Idania Blood OTR GO 3    42944557814 HC OT THER PROC EA 15 MIN 2/8/2021 Idania Blood OTR GO 2    25187969358 HC OT SELF CARE/MGMT/TRAIN EA 15 MIN 2/9/2021 Idania Blood OTR GO 3    84839174061 HC OT THER PROC EA 15 MIN 2/9/2021 Idania Blood OTR GO 2                    Idania Blood, OTR  2/9/2021

## 2021-02-09 NOTE — PROGRESS NOTES
Inpatient Rehabilitation Functional Measures Assessment and Plan of Care    Plan of Care  Updated Problems/Interventions  Cognition    [ST] Executive Functions(Resolved)  Current Status(02/09/2021): Pt completed medicine and money management tasks  without errors.  Weekly Goal(02/09/2021): The patient will provide 3 potentional solutions to  possible problems in the rehab setting.  Discharge Goal: Pt is at baseline function per report and performance noted on  2/9.    Signed by: Maria Luisa Lopez, SLP

## 2021-02-09 NOTE — PLAN OF CARE
Goal Outcome Evaluation:  Plan of Care Reviewed With: patient  Progress: improving  Outcome Summary: No loose/liquid bm, did have one soft bm so far this shift per NA report. Ambulates with rollator. Legs tender to touch, otherwise no pain. Meds with water.

## 2021-02-09 NOTE — PROGRESS NOTES
Discussed progress/current status, d/c goals, and d/c date set for Wednesday, 2/17 with patient and niece, Jessica, by phone. Patient hoping her endurance will be improved by d/c date. Discussed home health therapy. [facility] following and notified of d/c date. Scheduled family conference for Monday, 2/15 at 3:00 p.m. Will assist with plans.

## 2021-02-09 NOTE — THERAPY TREATMENT NOTE
Inpatient Rehabilitation - Physical Therapy Treatment Note       [facility]     Patient Name: Marianne Delgado  : 1925  MRN: [id number]    Today's Date: 2021                    Admit Date: 2/3/2021      Visit Dx: No diagnosis found.    Patient Active Problem List   Diagnosis   • Arthritis   • Stage 4 chronic kidney disease (CMS/[facility])   • Debility   • Foot pain, bilateral   • Glaucoma   • Acute idiopathic gout of right ankle   • Hematuria   • Essential hypertension   • Acquired hypothyroidism   • Osteopenia   • Psoriasis   • Rosacea   • Vitamin D deficiency   • Medicare annual wellness visit, subsequent   • Morbidly obese (CMS/[facility])   • Cerebrovascular accident (CVA) (CMS/[facility])   • Hypertensive urgency   • Stroke (cerebrum) (CMS/[facility])       Past Medical History:   Diagnosis Date   • Acute sinusitis    • Acute upper respiratory infection    • Arthritis    • CKD (chronic kidney disease)    • Debility    • Fatigue    • Foot pain, bilateral    • Glaucoma    • Gout    • Hematuria    • High blood pressure    • Hypertension    • Hypothyroid 1999   • Hypothyroidism    • IBS (irritable bowel syndrome)    • IGT (impaired glucose tolerance)    • Malaise and fatigue    • Medication management    • Melanoma (CMS/[facility]) 1979    left leg   • OA (osteoarthritis)    • Osteopenia 1999   • Painful joint    • Psoriasis    • Renal failure    • Rosacea    • Vitamin D deficiency        Past Surgical History:   Procedure Laterality Date   • CATARACT EXTRACTION     • FOOT SURGERY      mauri toe surgery   • OTHER SURGICAL HISTORY      Melanoma Excision: Left leg          PT ASSESSMENT (last 12 hours)      IRF PT Evaluation and Treatment     Row Name 21 0939          PT Time and Intention    Document Type  daily treatment  -MD     Mode of Treatment  physical therapy  -MD     Patient/Family/Caregiver Comments/Observations  Pt sitting in recliner reporting fatigue and weakness.  Pt report diarrhea that started over the  weekend and has continued.  -MD     Row Name 02/09/21 0939          Cognition/Psychosocial    Orientation Status (Cognition)  oriented x 4  -MD     Follows Commands (Cognition)  WFL  -MD     Personal Safety Interventions  fall prevention program maintained;gait belt  -MD     Row Name 02/09/21 0939          Pain Scale: Numbers Pre/Post-Treatment    Pretreatment Pain Rating  0/10 - no pain  -MD     Row Name 02/09/21 0939          Bed Mobility    Sit-Supine [place] (Bed Mobility)  verbal cues;moderate assist (50% patient effort)  -MD     Row Name 02/09/21 0939          Transfers    Chair-Bed [place] (Transfers)  verbal cues;minimum assist (75% patient effort)  -MD     Sit-Stand [place] (Transfers)  contact guard;verbal cues  -MD     Stand-Sit [place] (Transfers)  contact guard;verbal cues  -MD     Row Name 02/09/21 0939          Chair-Bed Transfer    Assistive Device (Chair-Bed Transfers)  walker, 4-wheeled  -MD     Row Name 02/09/21 0939          Sit-Stand Transfer    Assistive Device (Sit-Stand Transfers)  walker, 4-wheeled  -MD     Row Name 02/09/21 0939          Stand-Sit Transfer    Assistive Device (Stand-Sit Transfers)  walker, 4-wheeled  -MD     Row Name 02/09/21 0939          Gait/Stairs (Locomotion)    [place] Level (Gait)  contact guard;verbal cues  -MD     Assistive Device (Gait)  walker, 4-wheeled  -MD     Distance in Feet (Gait)  15'x1 and 160'x1  -MD     Deviations/Abnormal Patterns (Gait)  tho decreased;gait speed decreased  -MD     Bilateral Gait Deviations  forward flexed posture  -MD     Row Name 02/09/21 0939          Hip (Therapeutic Exercise)    Hip Strengthening (Therapeutic Exercise)  bilateral;flexion;aDduction  -MD     Row Name 02/09/21 0939          Knee (Therapeutic Exercise)    Knee Strengthening (Therapeutic Exercise)  bilateral;SAQ (short arc quad)  -MD     Row Name 02/09/21 0939          Positioning and Restraints    Pre-Treatment Position  sitting in  chair/recliner  -MD     Post Treatment Position  bed  -MD     In Bed  supine;call light within reach;exit alarm on  -MD       User Key  (r) = Recorded By, (t) = Taken By, (c) = Cosigned By    Initials Name Provider Type    Katya Irving, PT Physical Therapist           Physical Therapy Education                 Title: PT OT SLP Therapies (Done)     Topic: Physical Therapy (Done)     Point: Mobility training (Done)     Learning Progress Summary           Patient Acceptance, E,TB, VU,NR by MS at 2/8/2021 1521    Acceptance, E, DU,NR by LB at 2/6/2021 1514                   Point: Home exercise program (Done)     Learning Progress Summary           Patient Acceptance, E,TB, VU,NR by MS at 2/8/2021 1521                   Point: Body mechanics (Done)     Learning Progress Summary           Patient Acceptance, E,TB, VU,NR by MS at 2/8/2021 1521                   Point: Precautions (Done)     Learning Progress Summary           Patient Acceptance, E, VU by MD at 2/9/2021 0941    Acceptance, E,TB, VU,NR by MS at 2/8/2021 1521    Acceptance, E, VU by MD at 2/5/2021 1049    Acceptance, E, VU by MD at 2/4/2021 1551                               User Key     Initials Effective Dates Name Provider Type Discipline    LB 06/08/18 -  Chely Perkins, PT Physical Therapist PT    MD 04/03/18 -  Katya Bar PT Physical Therapist PT    MS 03/04/19 -  Annalise Neil PT Physical Therapist PT                PT Recommendation and Plan    Frequency of Treatment (PT): 5 times per week, 60 minutes per session  Anticipated Equipment Needs at Discharge (PT Eval): (pt has rollator)                  Time Calculation:     PT Charges     Row Name 02/09/21 1547 02/09/21 0939          Time Calculation    Start Time  1400  -MD  0930  -MD     Stop Time  1430  -MD  1000  -MD     Time Calculation (min)  30 min  -MD  30 min  -MD     PT Received On  --  02/09/21  -MD     PT - Next Appointment  --  02/10/21  -MD       User Key  (r) = Recorded By, (t) =  Taken By, (c) = Cosigned By    Initials Name Provider Type    Katya Irving, PT Physical Therapist          Therapy Charges for Today     Code Description Service Date Service Provider Modifiers Qty    13457200132  PT THER PROC EA 15 MIN 2/9/2021 Katya Bar PT GP 2    29264492901  GAIT TRAINING EA 15 MIN 2/9/2021 Katya Bar, PT GP 2          Patient was intermittently wearing a face mask during this therapy encounter. Therapist used appropriate personal protective equipment including eye protection, mask, and gloves.  Mask used was standard procedure mask. Appropriate PPE was worn during the entire therapy session. Hand hygiene was completed before and after therapy session. Patient is not in enhanced droplet precautions. Damon Paulino was present throughout treatment.             Katya Bar PT  2/9/2021

## 2021-02-09 NOTE — THERAPY DISCHARGE NOTE
Inpatient Rehabilitation - Speech Language Pathology Treatment Note/Discharge  [facility]     Patient Name: Marianne Delgado  : 1925  MRN: [id number]  Today's Date: 2021               Admit Date: 2/3/2021     Visit Dx:  No diagnosis found.  Patient Active Problem List   Diagnosis   • Arthritis   • Stage 4 chronic kidney disease (CMS/[facility])   • Debility   • Foot pain, bilateral   • Glaucoma   • Acute idiopathic gout of right ankle   • Hematuria   • Essential hypertension   • Acquired hypothyroidism   • Osteopenia   • Psoriasis   • Rosacea   • Vitamin D deficiency   • Medicare annual wellness visit, subsequent   • Morbidly obese (CMS/[facility])   • Cerebrovascular accident (CVA) (CMS/[facility])   • Hypertensive urgency   • Stroke (cerebrum) (CMS/[facility])     Past Medical History:   Diagnosis Date   • Acute sinusitis    • Acute upper respiratory infection    • Arthritis    • CKD (chronic kidney disease)    • Debility    • Fatigue    • Foot pain, bilateral    • Glaucoma    • Gout    • Hematuria    • High blood pressure    • Hypertension    • Hypothyroid 1999   • Hypothyroidism    • IBS (irritable bowel syndrome)    • IGT (impaired glucose tolerance)    • Malaise and fatigue    • Medication management    • Melanoma (CMS/[facility]) 1979    left leg   • OA (osteoarthritis)    • Osteopenia 1999   • Painful joint    • Psoriasis    • Renal failure    • Rosacea    • Vitamin D deficiency      Past Surgical History:   Procedure Laterality Date   • CATARACT EXTRACTION     • FOOT SURGERY      mauri toe surgery   • OTHER SURGICAL HISTORY      Melanoma Excision: Left leg              EDUCATION  The patient has been educated in the following areas:   Cognitive Impairment.      SLP Recommendation and Plan    Patient was wearing a face mask during this therapy encounter. Therapist used appropriate personal protective equipment including mask, eye protection and gloves.  Mask used was N95/duckbill. Appropriate PPE was worn during the  entire therapy session. Hand hygiene was completed before and after therapy session. Patient is not in enhanced droplet precautions.       SLP GOALS     Row Name 02/09/21 1000 02/08/21 1300          Organizational Skills Goal 1 (SLP)    Progress/Outcomes (Thought Organization Skills Goal 1, SLP)  other (see comments) D/C per patient request  -  --        Executive Functional Skills Goal 1 (SLP)    Improve Executive Function Skills Goal 1 (SLP)  organization/planning activity No report of pain this date  -  organization/planning activity  -NR     Time Frame (Executive Function Skills Goal 1, SLP)  by discharge  -  by discharge  -NR     Progress/Outcomes (Executive Function Skills Goal 1, SLP)  goal met  -  --     Comment (Executive Function Skills Goal 1, SLP)  Complex pill sorting task: 100%. Check balancing task: 100%   -  Pt completed a moderate level deductive reasoning puzzle with moderate cues for higher level thinking skills.  Pt completed a written exercise regarding written word problems with 4/5 (80%) increased to 100% with min cues.  Pt able to complete analogies with 100% without cues.  Pt was  alert and oriented x 4.  Pt demonstrated functional sustained attention.  Pt was able to recall 3/3 items with a 10 minute delay and from AM to PM session.  Pt also recalled name of therapist in PM session.  Pt states she feels her mental status was not impaired from the stroke.  Pt states shed like to focus on PT a nd OT.  Pt does manage her finances and medication.  Recommend high level financial managment/check writting/check register balancing and medication management tasks prior to a possible DC from therapy.   -NR       User Key  (r) = Recorded By, (t) = Taken By, (c) = Cosigned By    Initials Name Provider Type    NR Trudi Trivedi MA,CCC-SLP Speech and Language Pathologist     Maria Luisa Lopez MS CCC-SLP Speech and Language Pathologist             SLP Outcome Measures (last 72 hours)      SLP  Outcome Measures     Row Name 02/08/21 1300             SLP Outcome Measures    Outcome Measure Used?  Adult NOMS  -NR         Adult FCM Scores    FCM Chosen  Memory  -NR      Memory FCM Score  5  -NR        User Key  (r) = Recorded By, (t) = Taken By, (c) = Cosigned By    Initials Name Effective Dates    NR Trudi Trivedi MA,CCC-SLP 06/08/18 -           Time Calculation:   Time Calculation- SLP     Row Name 02/09/21 1017             Time Calculation- [facility]    SLP Start Time  1000  -      SLP Stop Time  1100  -      SLP Time Calculation (min)  60 min  -      SLP Non-Billable Time (min)  15 min rounds  -      SLP Received On  02/09/21  -        User Key  (r) = Recorded By, (t) = Taken By, (c) = Cosigned By    Initials Name Provider Type     Maria Luisa Lopez MS CCC-SLP Speech and Language Pathologist          Therapy Charges for Today     Code Description Service Date Service Provider Modifiers Qty    52336726819 HC ST DEV OF COGN SKILLS INITIAL 15 MIN 2/9/2021 Maria Luisa Lopez MS CCC-SLP  1    99792851982 HC ST DEV OF COGN SKILLS EACH ADDT'L 15 MIN 2/9/2021 Maria Luisa Lopez MS CCC-SLP  3             ADULT NOMS (last 72 hours)      Adult NOMS     Row Name 02/08/21 1300                   Adult FCM Scores    FCM Chosen  Memory  -NR        Memory FCM Score  5  -NR          User Key  (r) = Recorded By, (t) = Taken By, (c) = Cosigned By    Initials Name Effective Dates    NR Trudi Trivedi MA,CCC-SLP 06/08/18 -                SLP Discharge Summary  Anticipated Discharge Disposition (SLP): assisted living facility ([facility])    Maria Luisa Lopez MS CCC-SLP  2/9/2021

## 2021-02-09 NOTE — PROGRESS NOTES
Inpatient Rehabilitation Plan of Care Note    Plan of Care  Care Plan Reviewed - No updates at this time.    Sphincter Control    Performed Intervention(s)  Use incontinence products  offer toileting  Encourage fluids      Safety    Performed Intervention(s)  Falls precautions/protocol  Safety rounds  Items within reach  Bed alarm/ chair alarm      Psychosocial    Performed Intervention(s)  Verbalizes needs and concerns  Provide therapeutic enviroment      Pain    Performed Intervention(s)  Offer medication when needed  Practice relaxation techniques  Apply heating pad or cream to area when needed      Body Systems    Performed Intervention(s)  Daily skin inspection  Nutritional support  Apply cream to dry areas as needed  Apply powder under folds as ordered    Signed by: Corinne Angeles RN

## 2021-02-09 NOTE — PLAN OF CARE
Goal Outcome Evaluation:  Plan of Care Reviewed With: patient  Progress: improving  Outcome Summary: Pt is requesting D/C from speech therapy d/t report of being at baseline. Pt completed a complex medicine sorting task and checking writing/balancing task without errors. SLP to sign off at this time d/t patient request and functional performance with money & medicine tasks this date. MD sepncer.

## 2021-02-09 NOTE — PROGRESS NOTES
"   LOS: 6 days   Patient Care Team:  Xenia Robert MD as PCP - General (Family Medicine)    Chief Complaint:   Status post CVA-small focus of restricted diffusion in the right parietal lobe  Impaired cognition/impaired mobility/impaired self-care  Stroke prophylaxis-aspirin and Plavix x30 days then Plavix alone.  Left knee degenerative changes-status post steroid injection February 3  Chronic kidney disease stage IV baseline creatinine around 2.0  Acquired hypothyroidism-recheck TFTs 1 March-on levothyroxine  Morbid obesity  Vitamin D deficiency  Hypertension-metoprolol/amlodipine-systolic blood pressure goal 150-160  Hyperlipidemia-atorvastatin       Subjective     History of Present Illness    Subjective    She is tolerating therapies.  No new weakness  She is improving with her walking.  Left knee pain continues improved.  Still hypersensitivity to touch bilateral lower extremities.  .       History taken from: patient    Objective     Vital Signs  Temp:  [97.4 °F (36.3 °C)-97.8 °F (36.6 °C)] 97.4 °F (36.3 °C)  Heart Rate:  [65-73] 65  Resp:  [18] 18  BP: (135-168)/(58-68) 158/58    Objective:  Vital signs: (most recent): Blood pressure 158/58, pulse 65, temperature 97.4 °F (36.3 °C), temperature source Oral, resp. rate 18, height 154.9 cm (61\"), SpO2 97 %.            Physical Exam  MENTAL STATUS -  AWAKE / ALERT  HEENT- NCAT, PUPILS EQUALLY ROUND, SCLERAE ANICTERIC,   LUNGS - CTA, NO WHEEZES, RALES OR RHONCHI  HEART- RRR, NO RUB, MURMUR, OR GALLOP  ABD - NORMOACTIVE BOWEL SOUNDS, SOFT, NT.    EXT - NO EDEMA OR CYANOSIS  NEURO -oriented x4.     MOTOR EXAM - RUE/RLE 5/5.  Takes resistance with left elbow flexion, finger flexion.   She shows better active knee extension seated in the chair.  Able to take resistance with left ankle dorsiflexion and finger flexion.    Results Review:     I reviewed the patient's new clinical results.  Results from last 7 days   Lab Units 02/08/21  0729 02/05/21  0711   WBC " 10*3/mm3 6.71 10.11   HEMOGLOBIN g/dL 12.6 12.2   HEMATOCRIT % 38.2 37.8   PLATELETS 10*3/mm3 267 274     Results from last 7 days   Lab Units 02/08/21  0729 02/05/21  0711 02/03/21  0538   SODIUM mmol/L 141 137 140   POTASSIUM mmol/L 4.8 4.7 4.4   CHLORIDE mmol/L 111* 110* 110*   CO2 mmol/L 21.6* 20.2* 20.6*   BUN mg/dL 49* 42* 24*   CREATININE mg/dL 1.67* 1.82* 1.82*   CALCIUM mg/dL 9.0 9.2 8.8   GLUCOSE mg/dL 82 113* 84         Ref. Range 9/15/2020 11:00 1/29/2021 10:35 1/30/2021 06:29 1/31/2021 07:10 2/5/2021 07:11   Hemoglobin A1C Latest Ref Range: 4.80 - 5.60 %  5.07      TSH Baseline Latest Ref Range: 0.270 - 4.200 uIU/mL 5.160 (H) 16.100 (H)  9.150 (H)    Free T4 Latest Ref Range: 0.93 - 1.70 ng/dL 1.39  1.14     T3, Free Latest Ref Range: 2.00 - 4.40 pg/mL    1.94 (L)    Total Cholesterol Latest Ref Range: 0 - 200 mg/dL  168      HDL Cholesterol Latest Ref Range: 40 - 60 mg/dL  44      LDL Cholesterol  Latest Ref Range: 0 - 100 mg/dL  104 (H)      VLDL Cholesterol Latest Ref Range: 5 - 40 mg/dL  20      Triglycerides Latest Ref Range: 0 - 150 mg/dL  110      Vitamin B-12 Latest Ref Range: 211 - 946 pg/mL     1,204 (H)   25 Hydroxy, Vitamin D Latest Ref Range: 30.0 - 100.0 ng/ml     39.9     Medication Review: done  Scheduled Meds:amLODIPine, 2.5 mg, Oral, Q24H  aspirin, 81 mg, Oral, Daily  atorvastatin, 80 mg, Oral, Nightly  betamethasone dipropionate, , Topical, Q24H  cholecalciferol, 1,000 Units, Oral, BID  clopidogrel, 75 mg, Oral, Daily  dorzolamide-timolol, , Both Eyes, BID  latanoprost, 1 drop, Both Eyes, Nightly  levothyroxine, 150 mcg, Oral, Daily  lidocaine, 5 mL, Intra-articular, Once  metoprolol succinate XL, 12.5 mg, Oral, Daily  nystatin, , Topical, Q12H  triamcinolone acetonide, 40 mg, Intra-articular, Once  vitamin B-12, 250 mcg, Oral, Daily      Continuous Infusions:   PRN Meds:.•  acetaminophen  •  bisacodyl  •  trolamine salicylate      Assessment/Plan       Stroke (cerebrum)  (CMS/[facility])      Assessment & Plan  Status post CVA-small focus of restricted diffusion in the right parietal lobe  Adjunctive medication for stroke recovery-on atorvastatin.  Recheck vitamin D level.  Check vitamin B-12 level.     Impaired cognition/impaired mobility/impaired self-care     Stroke prophylaxis-aspirin and Plavix x30 days then Plavix alone.     Left knee degenerative changes-status post steroid injection February 3     Chronic kidney disease stage IV baseline creatinine around 2.0     Acquired hypothyroidism-recheck TFTs 1 March-on levothyroxine     Morbid obesity     Vitamin D deficiency-no home dose listed-recheck vitamin D level     Hypertension-metoprolol/amlodipine-systolic blood pressure goal 150-160     Hyperlipidemia-atorvastatin    Hypersensitivity to touch bilateral lower extremities-May possibly be neuropathy.  B12 within normal limits.  She has had hypothyroidism.  Has chronic kidney disease.    TEAM CONF - FEB 9 -  TRANSFERS CTG ROLLATOR. GAIT 160 FEET CTG ROLLATOR. TOILET TRANSFERS MIN. SHOWER TRANSFERS MIN. BATH UB SBA AND LB MIN. LBD MOD. UBD SBA. GROOMING SBA. MODERATE DEFICITS IN EXECUTIVE FUNCTION. PATIENT REQUESTS DISCHARGE FROM SLP AS SHE FEELS SHE IS AT BASELINE. CONTINENT BOWEL AND BLADDER. LEFT KNEE DJD, S/P INJECTION LAST WEEK, PAIN IMPROVED.   ELOS -   WED.  February 17       Now admit for comprehensive acute inpatient rehabilitation .  This would be an interdisciplinary program with physical therapy 1 hour,  occupational therapy 1 hour, and speech therapy 1 hour, 5 days a week.  Rehabilitation nursing for carryover, monitoring of nutritional and blood pressure and neurologic   status, bowel and bladder, and skin  Ongoing physician follow-up.  Weekly team conferences.  Goals are to achieve a level of supervision with  mobility and self-care and improved balance.   Rehabilitation prognosis fair.  Medical prognosis fair.  Estimated length of stay is approximately 2 to 3 weeks,  but is only an estimation.   The patient's functional status and clinical status is unchanged from preadmission assessment and the patient continues appropriate for acute inpatient rehabilitation.  Goal is for home with home health   therapies.  Barrier to discharge: Impaired mobility- work on transfers ADLs to overcome.      Ricki Matta MD  02/09/21  18:31 EST    Time:   During rounds, used appropriate personal protective equipment including mask and gloves.  Additional gown if indicated.  Mask used was standard procedure mask. Appropriate PPE was worn during the entire visit.  Hand hygiene was completed before and after.

## 2021-02-09 NOTE — PROGRESS NOTES
TEAM CONF - FEB 9 -  TRANSFERS CTG ROLLATOR. GAIT 160 FEET CTG ROLLATOR. TOILET TRANSFERS MIN. SHOWER TRANSFERS MIN. BATH UB SBA AND LB MIN. LBD MOD. UBD SBA. GROOMING SBA. MODERATE DEFICITS IN EXECUTIVE FUNCTION. PATIENT REQUESTS DISCHARGE FROM SLP AS SHE FEELS SHE IS AT BASELINE. CONTINENT BOWEL AND BLADDER. LEFT KNEE DJD, S/P INJECTION LAST WEEK, PAIN IMPROVED.   ELOS -   WED. February 17

## 2021-02-09 NOTE — PROGRESS NOTES
Case Management  Inpatient Rehabilitation Team Conference    Conference Date/Time: 2/9/2021 8:23:22 AM    Team Conference Attendees:  Karen Leon MSSW  Katya Bar, PT  Idania Blood, OT  Maria Luisa Lopez, MIRIAM Garrison, CTRS  Heidi Mac RD, LD  Corinne Angeles, RN  Tone Bernal RN    Demographics            Age: 89Y            Gender: Female    Admission Date: 2/3/2021 8:32:00 PM  Rehabilitation Diagnosis:  CVA left siri  Past Medical History: Medical History: has a past medical history of Acute  sinusitis, Acute upper respiratory infection, Arthritis, CKD (chronic kidney  disease), Debility, Fatigue, Foot pain, bilateral, Glaucoma, Gout, Hematuria,  High blood pressure, Hypertension, Hypothyroid (09/1999), Hypothyroidism, IBS  (irritable bowel syndrome), IGT (impaired glucose tolerance), Malaise and  fatigue, Medication management, Melanoma (CMS/HCC) (1979), OA (osteoarthritis),  Osteopenia (09/1999), Painful joint, Psoriasis, Renal failure, Rosacea, and  Vitamin D deficiency.  Surgical History: has a past surgical history that includes Foot surgery; Other  surgical history; and Cataract extraction.      Plan of Care  Anticipated Discharge Date/Estimated Length of Stay: ELOS: 2 weeks  Anticipated Discharge Destination: Community discharge with assistance  Discharge Plan : Patient lives in Independent Living apartment at [facility]  D/[place] to [place]  Medical Necessity Expected Level Rationale: Goals are to achieve a level of  supervision with  mobility and self-care and improved balance.   Rehabilitation  prognosis fair.  Medical prognosis fair.  Intensity and Duration: an average of 3 hours/5 days per week  Medical Supervision and 24 Hour Rehab Nursing: x  Physical Therapy: x  PT Intensity/Duration: 1 hour/day, 5 days/week for approximately 10 days  Occupational Therapy: x  OT Intensity/Duration: 1 hour/day, 5 days/week for approximately 10  days  Speech and Language Therapy: x  SLP Intensity/Duration: 1 hour/day, 5 days/week for approximately 10 days  Social Work: x  Therapeutic Recreation: x  Psychology: x  Updated (if changes indicated)    Anticipated Discharge Date/Estimated Length of Stay:   [facility] 2/17    Based on the patient's medical and functional status, their prognosis and  expected level of functional improvement is: Goals are to achieve a level of  supervision with  mobility and self-care and improved balance.   Rehabilitation  prognosis fair.  Medical prognosis fair.      Interdisciplinary Problem/Goals/Status    All Rehab Problems:  Body Systems    [RN] Integumentary(Active)  Current Status(02/08/2021): Pt has red area to right buttock (psoriasis) cream  ordered; slight redness under breasts; powder ordered  Weekly Goal(02/15/2021): No s/s of infection  Discharge Goal: No s/s of infection        Cognition    [ST] Executive Functions(Active)  Current Status(02/04/2021): The patient exhibits moderate deficits in executive  functions.  Weekly Goal(02/09/2021): The patient will provide 3 potentional solutions to  possible problems in the rehab setting.  Discharge Goal: Pt will exhibit age praveen executive function skills.        Mobility    [PT] Bed/Chair/Wheelchair(Active)  Current Status(02/08/2021): CGA, rollator  Weekly Goal(02/15/2021): SBA, rollator  Discharge Goal: SV, rollator    [PT] Walk(Active)  Current Status(02/08/2021): 160' CGA, rollator  Weekly Goal(02/15/2021): to and from BR SBA, rollator  Discharge Goal: 160' supervision, rollator    [OT] Toilet Transfers(Active)  Current Status(02/08/2021): min  Weekly Goal(02/16/2021): CGA  Discharge Goal: SBA/mod I    [OT] Tub/Shower Transfers(Active)  Current Status(02/08/2021): min  Weekly Goal(02/15/2021): CGA  Discharge Goal: SBA/mod I        Pain    [RN] Pain Management(Active)  Current Status(02/08/2021): Chronic pain to left knee r/t arthritis. 2/6 pt  states both legs  tender/sore.  Weekly Goal(02/15/2021): Pt able to tolerate therapies  Discharge Goal: Pain under control        Psychosocial    [RN] Coping/Adjustment(Active)  Current Status(02/08/2021): Pt expresses effective coping  Weekly Goal(02/15/2021): Allow pt to express concerns  Discharge Goal: Demonstrates healthy coping strategies        Safety    [RN] Potential for Injury(Active)  Current Status(02/08/2021): No unsafe behaviors  Weekly Goal(02/15/2021): Pt will use call bell 100% of the time  Discharge Goal: No falls        Self Care    [OT] Bathing(Active)  Current Status(02/08/2021): min LB and SBA UB  Weekly Goal(02/16/2021): min/CGA w AE  Discharge Goal: SBA/mod I w. AE    [OT] Dressing (Lower)(Active)  Current Status(02/08/2021): mod  Weekly Goal(02/15/2021): min/CGA w AE  Discharge Goal: SBA/mod I w AE    [OT] Dressing (Upper)(Active)  Current Status(02/08/2021): SBA  Weekly Goal(02/16/2021):  mod I  Discharge Goal: Independent    [OT] Grooming(Active)  Current Status(02/08/2021): SBA  Weekly Goal(02/15/2021): Supervision/mod I  Discharge Goal: mod I/I    [OT] Toileting(Active)  Current Status(02/08/2021): min  Weekly Goal(02/16/2021): CGA  Discharge Goal: SBA/mod I        Sphincter Control    [RN] Bladder Management(Active)  Current Status(02/08/2021): Pt is continent 100%  Weekly Goal(02/15/2021): Pt will be continent 100%  Discharge Goal: Pt will be continent 100%    [RN] Bowel Management(Active)  Current Status(02/09/2021): Pt is continent 100% except 1 incont. bm with  urgency, loose/liquid/ some formed bm on 2/8.  Weekly Goal(02/15/2021): Pt will be continent 100%  Discharge Goal: Pt will remain continent 100%        Comments: 2/9: patient requested DC from SLP - to test pill sorrting, may sign  off soon;    Signed by: Tone Wasem, RN    Physician CoSigned By: Ricki Matta 02/09/2021 08:58:48

## 2021-02-10 LAB
ALBUMIN SERPL-MCNC: 3.3 G/DL (ref 3.5–5.2)
ANION GAP SERPL CALCULATED.3IONS-SCNC: 9.9 MMOL/L (ref 5–15)
BUN SERPL-MCNC: 38 MG/DL (ref 8–23)
BUN/CREAT SERPL: 21.2 (ref 7–25)
CALCIUM SPEC-SCNC: 9.3 MG/DL (ref 8.2–9.6)
CHLORIDE SERPL-SCNC: 112 MMOL/L (ref 98–107)
CO2 SERPL-SCNC: 20.1 MMOL/L (ref 22–29)
CREAT SERPL-MCNC: 1.79 MG/DL (ref 0.57–1)
GFR SERPL CREATININE-BSD FRML MDRD: 26 ML/MIN/1.73
GLUCOSE SERPL-MCNC: 78 MG/DL (ref 65–99)
PHOSPHATE SERPL-MCNC: 3.4 MG/DL (ref 2.5–4.5)
POTASSIUM SERPL-SCNC: 4.6 MMOL/L (ref 3.5–5.2)
SODIUM SERPL-SCNC: 142 MMOL/L (ref 136–145)

## 2021-02-10 PROCEDURE — 97116 GAIT TRAINING THERAPY: CPT

## 2021-02-10 PROCEDURE — 97110 THERAPEUTIC EXERCISES: CPT

## 2021-02-10 PROCEDURE — 97535 SELF CARE MNGMENT TRAINING: CPT

## 2021-02-10 PROCEDURE — 80069 RENAL FUNCTION PANEL: CPT | Performed by: PHYSICAL MEDICINE & REHABILITATION

## 2021-02-10 PROCEDURE — 97530 THERAPEUTIC ACTIVITIES: CPT

## 2021-02-10 RX ADMIN — ATORVASTATIN CALCIUM 80 MG: 80 TABLET, FILM COATED ORAL at 19:59

## 2021-02-10 RX ADMIN — LATANOPROST 1 DROP: 50 SOLUTION/ DROPS OPHTHALMIC at 20:00

## 2021-02-10 RX ADMIN — NYSTATIN: 100000 POWDER TOPICAL at 09:28

## 2021-02-10 RX ADMIN — NYSTATIN: 100000 POWDER TOPICAL at 20:00

## 2021-02-10 RX ADMIN — Medication 1000 UNITS: at 19:59

## 2021-02-10 RX ADMIN — Medication 1000 UNITS: at 09:26

## 2021-02-10 RX ADMIN — BETAMETHASONE DIPROPIONATE: 0.5 OINTMENT TOPICAL at 20:00

## 2021-02-10 RX ADMIN — METOPROLOL SUCCINATE 12.5 MG: 25 TABLET, EXTENDED RELEASE ORAL at 09:29

## 2021-02-10 RX ADMIN — CLOPIDOGREL 75 MG: 75 TABLET, FILM COATED ORAL at 09:26

## 2021-02-10 RX ADMIN — TIMOLOL MALEATE: 5 SOLUTION/ DROPS OPHTHALMIC at 20:00

## 2021-02-10 RX ADMIN — LEVOTHYROXINE SODIUM 150 MCG: 150 TABLET ORAL at 06:16

## 2021-02-10 RX ADMIN — AMLODIPINE BESYLATE 2.5 MG: 2.5 TABLET ORAL at 09:25

## 2021-02-10 RX ADMIN — TIMOLOL MALEATE: 5 SOLUTION/ DROPS OPHTHALMIC at 09:27

## 2021-02-10 RX ADMIN — ASPIRIN 81 MG: 81 TABLET, COATED ORAL at 09:25

## 2021-02-10 RX ADMIN — Medication 250 MCG: at 09:30

## 2021-02-10 NOTE — THERAPY PROGRESS REPORT/RE-CERT
Inpatient Rehabilitation - Occupational Therapy Treatment Note    [facility]     Patient Name: Marianne Delgado  : 1925  MRN: [id number]    Today's Date: 2/10/2021                 Admit Date: 2/3/2021       No diagnosis found.    Patient Active Problem List   Diagnosis   • Arthritis   • Stage 4 chronic kidney disease (CMS/[facility])   • Debility   • Foot pain, bilateral   • Glaucoma   • Acute idiopathic gout of right ankle   • Hematuria   • Essential hypertension   • Acquired hypothyroidism   • Osteopenia   • Psoriasis   • Rosacea   • Vitamin D deficiency   • Medicare annual wellness visit, subsequent   • Morbidly obese (CMS/[facility])   • Cerebrovascular accident (CVA) (CMS/[facility])   • Hypertensive urgency   • Stroke (cerebrum) (CMS/[facility])       Past Medical History:   Diagnosis Date   • Acute sinusitis    • Acute upper respiratory infection    • Arthritis    • CKD (chronic kidney disease)    • Debility    • Fatigue    • Foot pain, bilateral    • Glaucoma    • Gout    • Hematuria    • High blood pressure    • Hypertension    • Hypothyroid 1999   • Hypothyroidism    • IBS (irritable bowel syndrome)    • IGT (impaired glucose tolerance)    • Malaise and fatigue    • Medication management    • Melanoma (CMS/[facility]) 1979    left leg   • OA (osteoarthritis)    • Osteopenia 1999   • Painful joint    • Psoriasis    • Renal failure    • Rosacea    • Vitamin D deficiency        Past Surgical History:   Procedure Laterality Date   • CATARACT EXTRACTION     • FOOT SURGERY      mauri toe surgery   • OTHER SURGICAL HISTORY      Melanoma Excision: Left leg                IRF OT ASSESSMENT FLOWSHEET (last 12 hours)      IRF OT Evaluation and Treatment     Row Name 02/10/21 1609          OT Time and Intention    Document Type  daily treatment  -CC     Mode of Treatment  occupational therapy  -CC     Patient Effort  good  -CC     Symptoms Noted During/After Treatment  fatigue  -CC     Row Name 02/10/21 1607           Cognition/Psychosocial    Affect/Mental Status (Cognitive)  [facility]     Orientation Status (Cognition)  oriented x 4  -     Follows Commands (Cognition)  [facility]  -     Personal Safety Interventions  fall prevention program maintained;gait belt;nonskid shoes/slippers when out of bed  -[facility] Name 02/10/21 1609          Pain Scale: Numbers Pre/Post-Treatment    Pretreatment Pain Rating  0/10 - no pain  -     Posttreatment Pain Rating  0/10 - no pain  -[facility] Name 02/10/21 1609          Bed Mobility    Comment (Bed Mobility)  in w/c  -[facility] Name 02/10/21 1609          Functional Mobility    Functional Mobility- Ind. Level  set up required;contact guard assist  -     Functional Mobility- Device  rollator  -     Functional Mobility-Distance (Feet)  -- to and from BR  -[facility] Name 02/10/21 1609          Transfers    Sit-Stand [place] (Transfers)  supervision;contact guard  -     Stand-Sit [place] (Transfers)  supervision;contact guard  -     [place] Level (Shower Transfer)  supervision;verbal cues;contact guard  -     Assistive Device (Shower Transfer)  grab bar, tub/shower;tub bench;wheelchair  -[facility] Name 02/10/21 1609          Sit-Stand Transfer    Assistive Device (Sit-Stand Transfers)  walker, 4-wheeled  -[facility] Name 02/10/21 1609          Stand-Sit Transfer    Assistive Device (Stand-Sit Transfers)  walker, 4-wheeled  -[facility] Name 02/10/21 1609          Shower Transfer    Type (Shower Transfer)  sit-stand;stand-sit  -[facility] Name 02/10/21 1609          Balance    Static Sitting Balance  [facility]     Balance Interventions  sit to stand  -[facility] Name 02/10/21 1609          Shoulder (Therapeutic Exercise)    Shoulder (Therapeutic Exercise)  strengthening exercise  -     Shoulder Strengthening (Therapeutic Exercise)  bilateral;flexion;extension;aBduction;aDduction;sitting;2 lb free weight;10 repetitions;2 sets  -[facility] Name 02/10/21 1609           Elbow/Forearm (Therapeutic Exercise)    Elbow/Forearm (Therapeutic Exercise)  strengthening exercise  -     Elbow/Forearm Strengthening (Therapeutic Exercise)  bilateral;flexion;extension;supination;pronation;sitting;1 lb free weight;10 repetitions;3 sets  -     Row Name 02/10/21 1609          Wrist (Therapeutic Exercise)    Wrist (Therapeutic Exercise)  strengthening exercise  -     Wrist Strengthening (Therapeutic Exercise)  bilateral;flexion;extension;1 lb free weight;10 repetitions;3 sets  -     Row Name 02/10/21 1609          Aerobic Exercise    Type (Aerobic Exercise)  arm bike;for 2 minutes  -     Time Performed (Aerobic Exercise)  2 min x 2 with a rest breaks during each.   -     Row Name 02/10/21 1609          Bathing    [place] Level (Bathing)  bathing skills;lower body;upper body;supervision;contact guard assist  -     Assistive Device (Bathing)  grab bar/tub rail;hand held shower spray hose;shower chair  -     Position (Bathing)  supported sitting;supported standing  -     Set-up Assistance (Bathing)  adjust water temperature;obtain supplies  -     Row Name 02/10/21 1609          Upper Body Dressing    [place] Level (Upper Body Dressing)  upper body dressing skills;doff;don;front opening garment;pull over garment;supervision;minimum assist (75% or more patient effort)  -CC     Position (Upper Body Dressing)  supported sitting  -CC     Set-up Assistance (Upper Body Dressing)  obtain clothing  -CC     Comment (Upper Body Dressing)  assist w front opening   -     Row Name 02/10/21 1609          Lower Body Dressing    [place] Level (Lower Body Dressing)  doff;don;pants/bottoms;shoes/slippers;socks;contact guard assist;minimum assist (75% patient effort)  -CC     Position (Lower Body Dressing)  supported sitting;supported standing  -CC     Set-up Assistance (Lower Body Dressing)  adaptive equipment set-up;obtain clothing  -CC     Comment (Lower Body Dressing)  educated on  sock aid in pm. Min assist placing sock on device. Able to boo on R foot   -CC     Row Name 02/10/21 1609          Grooming    [place] Level (Grooming)  grooming skills;make-up application;hair care, combing/brushing;oral care regimen;wash face, hands;standby assist  -     Position (Grooming)  sink side;supported sitting  -     Set-up Assistance (Grooming)  obtain supplies  -     Row Name 02/10/21 1609          Transfer Goal 1 (OT-IRF)    Progress/Outcomes (Transfer Goal 1, OT-IRF)  goal met  -     Row Name 02/10/21 1609          Transfer Goal 2 (OT-IRF)    Progress/Outcomes (Transfer Goal 2, OT-IRF)  goal partially met  -     Row Name 02/10/21 1609          Bathing Goal 1 (OT-IRF)    Progress/Outcomes (Bathing Goal 1, OT-IRF)  goal met  -     Row Name 02/10/21 1609          Bathing Goal 2 (OT-IRF)    Progress/Outcomes (Bathing Goal 2, OT-IRF)  goal ongoing  -     Row Name 02/10/21 1609          UB Dressing Goal 1 (OT-IRF)    Progress/Outcomes (UB Dressing Goal 1, OT-IRF)  goal ongoing  -     Row Name 02/10/21 1609          UB Dressing Goal 2 (OT-IRF)    Progress/Outcomes (UB Dressing Goal 2, OT-IRF)  goal ongoing  -     Row Name 02/10/21 1609          LB Dressing Goal 1 (OT-IRF)    Progress/Outcomes (LB Dressing Goal 1, OT-IRF)  goal ongoing  -     Row Name 02/10/21 1609          LB Dressing Goal 2 (OT-IRF)    Progress/Outcomes (LB Dressing Goal 2, OT-IRF)  goal ongoing  -     Row Name 02/10/21 1609          Grooming Goal 1 (OT-IRF)    Progress/Outcomes (Grooming Goal 1, OT-IRF)  goal ongoing  -     Row Name 02/10/21 1609          Toileting Goal 1 (OT-IRF)    Progress/Outcomes (Toileting Goal 1, OT-IRF)  goal met  -     Row Name 02/10/21 1609          Toileting Goal 2 (OT-IRF)    Progress/Outcomes (Toileting Goal 2, OT-IRF)  goal ongoing  -     Row Name 02/10/21 1609          Strength Goal 1 (OT-IRF)    Progress/Outcomes (Strength Goal 1, OT-IRF)  goal ongoing  -     Row Name  02/10/21 1609          Balance Goal 1 (OT)    Progress/Outcomes (Balance Goal 1, OT)  goal ongoing  -CC     Row Name 02/10/21 1609          Positioning and Restraints    Pre-Treatment Position  sitting in chair/recliner  -CC     Post Treatment Position  chair  -CC     In Chair  notified nsg;sitting;call light within reach;encouraged to call for assist;exit alarm on  -CC       User Key  (r) = Recorded By, (t) = Taken By, (c) = Cosigned By    Initials Name Effective Dates    CC Idania Blood OTR 06/08/18 -            Occupational Therapy Education                 Title: PT OT SLP Therapies (Done)     Topic: Occupational Therapy (Done)     Point: ADL training (Done)     Description:   Instruct learner(s) on proper safety adaptation and remediation techniques during self care or transfers.   Instruct in proper use of assistive devices.              Learning Progress Summary           Patient Acceptance, E,TB, VU by JULIANO at 2/6/2021 1534    Acceptance, E,TB, VU,DU by  at 2/4/2021 1640    Comment: ed pt on role of OT, benefit of therapy, POC w. OT ed on safety w ADLs and tsf techniques. pt demo w. min A for tsf and more A w. LBD and SBA UBD                   Point: Home exercise program (Done)     Description:   Instruct learner(s) on appropriate technique for monitoring, assisting and/or progressing therapeutic exercises/activities.              Learning Progress Summary           Patient Acceptance, E,TB, VU by JULIANO at 2/6/2021 1534                   Point: Precautions (Done)     Description:   Instruct learner(s) on prescribed precautions during self-care and functional transfers.              Learning Progress Summary           Patient Acceptance, E,TB, VU by JULIANO at 2/6/2021 1534    Acceptance, E,TB, VU,DU by  at 2/4/2021 1640    Comment: ed pt on role of OT, benefit of therapy, POC w. OT ed on safety w ADLs and tsf techniques. pt demo w. min A for tsf and more A w. LBD and SBA UBD                   Point: Body  mechanics (Done)     Description:   Instruct learner(s) on proper positioning and spine alignment during self-care, functional mobility activities and/or exercises.              Learning Progress Summary           Patient Acceptance, E,TB, VU by JULIANO at 2/6/2021 1534    Acceptance, E,TB, VU,DU by JEMAL at 2/4/2021 1640    Comment: ed pt on role of OT, benefit of therapy, POC w. OT ed on safety w ADLs and tsf techniques. pt demo w. min A for tsf and more A w. LBD and SBA UBD                               User Key     Initials Effective Dates Name Provider Type Discipline     06/08/18 -  Mame Nelson, OTR Occupational Therapist OT    JULIANO 07/15/20 -  Sandra Souza, OT Occupational Therapist OT                    OT Recommendation and Plan                         Time Calculation:     Time Calculation- OT     Row Name 02/10/21 1500 02/10/21 1330 02/10/21 1100       Time Calculation- OT    OT Start Time  1500  -CC  1330  -CC  1100  -CC    OT Stop Time  1515  -CC  1400  -CC  1200  -CC    OT Time Calculation (min)  15 min  -CC  30 min  -CC  60 min  -CC      User Key  (r) = Recorded By, (t) = Taken By, (c) = Cosigned By    Initials Name Provider Type    CC Idania Blood OTR Occupational Therapist        Therapy Charges for Today     Code Description Service Date Service Provider Modifiers Qty    32863312748 HC OT SELF CARE/MGMT/TRAIN EA 15 MIN 2/9/2021 Idania Blood OTR GO 3    69415721035 HC OT THER PROC EA 15 MIN 2/9/2021 Idania Blood OTR GO 2    97355326436 HC OT SELF CARE/MGMT/TRAIN EA 15 MIN 2/10/2021 Idania Blood OTR GO 4    91279185581 HC OT THER PROC EA 15 MIN 2/10/2021 Idania Blood OTR GO 3    68054505620 HC OT SELF CARE/MGMT/TRAIN EA 15 MIN 2/10/2021 Idania Blood OTR GO 4    89103651491 HC OT THER PROC EA 15 MIN 2/10/2021 Idania Blood OTR GO 3                   HARI Fofana  2/10/2021

## 2021-02-10 NOTE — THERAPY TREATMENT NOTE
Inpatient Rehabilitation - Physical Therapy Treatment Note       [facility]     Patient Name: Marianne Delgado  : 1925  MRN: [id number]    Today's Date: 2/10/2021                    Admit Date: 2/3/2021      Visit Dx: No diagnosis found.    Patient Active Problem List   Diagnosis   • Arthritis   • Stage 4 chronic kidney disease (CMS/[facility])   • Debility   • Foot pain, bilateral   • Glaucoma   • Acute idiopathic gout of right ankle   • Hematuria   • Essential hypertension   • Acquired hypothyroidism   • Osteopenia   • Psoriasis   • Rosacea   • Vitamin D deficiency   • Medicare annual wellness visit, subsequent   • Morbidly obese (CMS/[facility])   • Cerebrovascular accident (CVA) (CMS/[facility])   • Hypertensive urgency   • Stroke (cerebrum) (CMS/[facility])       Past Medical History:   Diagnosis Date   • Acute sinusitis    • Acute upper respiratory infection    • Arthritis    • CKD (chronic kidney disease)    • Debility    • Fatigue    • Foot pain, bilateral    • Glaucoma    • Gout    • Hematuria    • High blood pressure    • Hypertension    • Hypothyroid 1999   • Hypothyroidism    • IBS (irritable bowel syndrome)    • IGT (impaired glucose tolerance)    • Malaise and fatigue    • Medication management    • Melanoma (CMS/[facility]) 1979    left leg   • OA (osteoarthritis)    • Osteopenia 1999   • Painful joint    • Psoriasis    • Renal failure    • Rosacea    • Vitamin D deficiency        Past Surgical History:   Procedure Laterality Date   • CATARACT EXTRACTION     • FOOT SURGERY      mauri toe surgery   • OTHER SURGICAL HISTORY      Melanoma Excision: Left leg          PT ASSESSMENT (last 12 hours)      IRF PT Evaluation and Treatment     Row Name 02/10/21 0900          PT Time and Intention    Document Type  daily treatment  (Pended)   -AB     Mode of Treatment  physical therapy  (Pended)   -AB     Row Name 02/10/21 0900          General Information    General Observations of Patient  Patient reports and appears to still  suffer from fatigue. Pt cannot specfically attribute anything that is causing her fatigue. Sleep is good, reports bowls improved. Will continue to monitor and adjust therapy accordingly.   (Pended)   -AB     Row Name 02/10/21 0900          Cognition/Psychosocial    Affect/Mental Status (Cognitive)  WFL  (Pended)   -AB     Orientation Status (Cognition)  oriented x 4  (Pended)   -AB     Follows Commands (Cognition)  WFL  (Pended)   -AB     Personal Safety Interventions  fall prevention program maintained;gait belt  (Pended)   -AB     Row Name 02/10/21 0900          Pain Scale: Numbers Pre/Post-Treatment    Pretreatment Pain Rating  0/10 - no pain  (Pended)   -AB     Row Name 02/10/21 0900          Bed Mobility    Bed Mobility  rolling left  (Pended)   -AB     All Activities, [place] (Bed Mobility)  standby assist;verbal cues;supervision  (Pended)   -AB     Rolling Left [place] (Bed Mobility)  supervision;verbal cues;standby assist  (Pended)   -AB     Row Name 02/10/21 0900          Transfers    Sit-Stand [place] (Transfers)  contact guard  (Pended)   -AB     Stand-Sit [place] (Transfers)  contact guard  (Pended)   -AB     [place] Level (Toilet Transfer)  contact guard;minimum assist (75% patient effort)  (Pended)  use of grab bars for assitance.   -AB     Assistive Device (Toilet Transfer)  walker, 4-wheeled  (Pended)   -AB     Row Name 02/10/21 0900          Sit-Stand Transfer    Assistive Device (Sit-Stand Transfers)  walker, 4-wheeled  (Pended)   -AB     Row Name 02/10/21 0900          Stand-Sit Transfer    Assistive Device (Stand-Sit Transfers)  walker, 4-wheeled  (Pended)   -AB     Row Name 02/10/21 0900          Toilet Transfer    Type (Toilet Transfer)  sit-stand;stand-sit  (Pended)   -AB     Row Name 02/10/21 0900          Gait/Stairs (Locomotion)    [place] Level (Gait)  contact guard;standby assist  (Pended)   -AB     Assistive Device (Gait)  walker, 4-wheeled  (Pended)   -AB   "   Distance in Feet (Gait)  160'x2, 80'x2,20'  (Pended)   -AB     Pattern (Gait)  step-through  (Pended)   -AB     Row Name 02/10/21 0900          Balance    Balance Interventions  dynamic;minimal challenge;standing;other (see comments)  (Pended)  Patient engaged in trunk rotation & Placing tray on walker.  -AB     Row Name 02/10/21 0900          Motor Skills    Additional Documentation  Advanced Stepping/Walking Interventions (Group)  (Pended)   -AB     Row Name 02/10/21 0900          Hip (Therapeutic Exercise)    Hip (Therapeutic Exercise)  strengthening exercise  (Pended)   -AB     Hip Strengthening (Therapeutic Exercise)  bilateral;aBduction;red;10 repetitions;aDduction  (Pended)  focus on Ecc control, Adduction w/ smooth ball  -AB     Row Name 02/10/21 0900          Knee (Therapeutic Exercise)    Knee (Therapeutic Exercise)  strengthening exercise  (Pended)   -AB     Knee Strengthening (Therapeutic Exercise)  bilateral;LAQ (long arc quad);hamstring curls;sitting;red;10 repetitions  (Pended)  2x10 w/ red band for all. L LAQ against gravity only.  -AB     Row Name 02/10/21 0900          Advanced Stepping/Walking Interventions    Stepping/Walking Interventions  box stepping  (Pended)   -AB     Box Stepping (Stepping/Walking Interventions)  2\" inch box step. 25 reps L/R alternating every 5 reps.   (Pended)   -AB     Row Name 02/10/21 0900          Positioning and Restraints    Pre-Treatment Position  in bed  (Pended)   -AB     Post Treatment Position  chair  (Pended)   -AB     In Bed  sitting;exit alarm on;call light within reach  (Pended)   -AB     Row Name 02/10/21 0900          Daily Progress Summary (PT)    Daily Progress Summary (PT)  Patient is fatigued but was able to engage in therapeutic intervetions w/ appropriate rest breaks. Gait endurance improving but still impaired to PLOF. Pt was able to progress to more functional task simulating home (simulated walking to  \"tray\" and bring it back to her " room). Pt would continue to benefit from skilled therapy to address impairments and progress to established goals.   (Pended)   -AB       User Key  (r) = Recorded By, (t) = Taken By, (c) = Cosigned By    Initials Name Provider Type    AB Damon Paulino, PT Student PT Student           Physical Therapy Education                 Title: PT OT SLP Therapies (Done)     Topic: Physical Therapy (Done)     Point: Mobility training (Done)     Learning Progress Summary           Patient Acceptance, E, VU by AB at 2/10/2021 1144    Comment: Pt educated about dynamic balance and improved gait mechanics.    Acceptance, E,TB, VU,NR by MS at 2/8/2021 1521    Acceptance, E, DU,NR by LB at 2/6/2021 1514                   Point: Home exercise program (Done)     Learning Progress Summary           Patient Acceptance, E, VU by AB at 2/10/2021 1144    Comment: Pt educated about dynamic balance and improved gait mechanics.    Acceptance, E,TB, VU,NR by MS at 2/8/2021 1521                   Point: Body mechanics (Done)     Learning Progress Summary           Patient Acceptance, E, VU by AB at 2/10/2021 1144    Comment: Pt educated about dynamic balance and improved gait mechanics.    Acceptance, E,TB, VU,NR by MS at 2/8/2021 1521                   Point: Precautions (Done)     Learning Progress Summary           Patient Acceptance, E, VU by AB at 2/10/2021 1144    Comment: Pt educated about dynamic balance and improved gait mechanics.    Acceptance, E, VU by MD at 2/9/2021 0941    Acceptance, E,TB, VU,NR by MS at 2/8/2021 1521    Acceptance, E, VU by MD at 2/5/2021 1049    Acceptance, E, VU by MD at 2/4/2021 1551                               User Key     Initials Effective Dates Name Provider Type Discipline    LB 06/08/18 -  Chely Perkins, PT Physical Therapist PT    MD 04/03/18 -  Katya Bar PT Physical Therapist PT    MS 03/04/19 -  Annalise Neil, PT Physical Therapist PT    AB 02/01/21 -  Damon Paulino, PT Student PT Student PT  "               PT Recommendation and Plan          Daily Progress Summary (PT)  Daily Progress Summary (PT): (P) Patient is fatigued but was able to engage in therapeutic intervetions w/ appropriate rest breaks. Gait endurance improving but still impaired to PLOF. Pt was able to progress to more functional task simulating home (simulated walking to  \"tray\" and bring it back to her room). Pt would continue to benefit from skilled therapy to address impairments and progress to established goals.                Time Calculation:     PT Charges     Row Name 02/10/21 1501 02/10/21 1143          Time Calculation    Start Time  1400  (Pended)   -AB  0930  (Pended)   -AB     Stop Time  1430  (Pended)   -AB  1030  (Pended)   -AB     Time Calculation (min)  30 min  (Pended)   -AB  60 min  (Pended)   -AB     PT Received On  02/10/21  (Pended)   -AB  02/10/21  (Pended)   -AB     PT - Next Appointment  02/11/21  (Pended)   -AB  02/11/21  (Pended)   -AB       User Key  (r) = Recorded By, (t) = Taken By, (c) = Cosigned By    Initials Name Provider Type    AB Damon Paulino, PT Student PT Student          Therapy Charges for Today     Code Description Service Date Service Provider Modifiers Qty    97436382112  GAIT TRAINING EA 15 MIN 2/10/2021 Damon Paulino, PT Student GP 3    [id number]  PT THER PROC EA 15 MIN 2/10/2021 Damon Pauilno, PT Student GP 3        Patient was wearing a face mask during this therapy encounter. Therapist used appropriate personal protective equipment including eye protection, mask, and gloves.  Mask used was standard procedure mask. Appropriate PPE was worn during the entire therapy session. Hand hygiene was completed before and after therapy session. Patient is not in enhanced droplet precautions. Katya Bar present and supervising throughout session.           Damon Paulino PT Student  2/10/2021    "

## 2021-02-10 NOTE — PLAN OF CARE
Goal Outcome Evaluation:  Plan of Care Reviewed With: patient  Progress: improving  Outcome Summary: Legs/toes tender. Using kpad. States needs toenails cut. Meds with water. No unsafe behavior. Ambulates with rollator. No bm's tonight.

## 2021-02-10 NOTE — THERAPY TREATMENT NOTE
Inpatient Rehabilitation - Physical Therapy Treatment Note       [facility]     Patient Name: Marianne Delgado  : 1925  MRN: [id number]    Today's Date: 2/10/2021                    Admit Date: 2/3/2021      Visit Dx: No diagnosis found.    Patient Active Problem List   Diagnosis   • Arthritis   • Stage 4 chronic kidney disease (CMS/[facility])   • Debility   • Foot pain, bilateral   • Glaucoma   • Acute idiopathic gout of right ankle   • Hematuria   • Essential hypertension   • Acquired hypothyroidism   • Osteopenia   • Psoriasis   • Rosacea   • Vitamin D deficiency   • Medicare annual wellness visit, subsequent   • Morbidly obese (CMS/[facility])   • Cerebrovascular accident (CVA) (CMS/[facility])   • Hypertensive urgency   • Stroke (cerebrum) (CMS/[facility])       Past Medical History:   Diagnosis Date   • Acute sinusitis    • Acute upper respiratory infection    • Arthritis    • CKD (chronic kidney disease)    • Debility    • Fatigue    • Foot pain, bilateral    • Glaucoma    • Gout    • Hematuria    • High blood pressure    • Hypertension    • Hypothyroid 1999   • Hypothyroidism    • IBS (irritable bowel syndrome)    • IGT (impaired glucose tolerance)    • Malaise and fatigue    • Medication management    • Melanoma (CMS/[facility]) 1979    left leg   • OA (osteoarthritis)    • Osteopenia 1999   • Painful joint    • Psoriasis    • Renal failure    • Rosacea    • Vitamin D deficiency        Past Surgical History:   Procedure Laterality Date   • CATARACT EXTRACTION     • FOOT SURGERY      mauri toe surgery   • OTHER SURGICAL HISTORY      Melanoma Excision: Left leg          PT ASSESSMENT (last 12 hours)      IRF PT Evaluation and Treatment     Row Name 02/10/21 0900          PT Time and Intention    Document Type  daily treatment  -MD daniel) AB evans) MD (c)     Mode of Treatment  physical therapy  -MD daniel) AB evans) MD (c)     Row Name 02/10/21 0900          General Information    General Observations of Patient  Patient reports and  appears to still suffer from fatigue. Pt cannot specfically attribute anything that is causing her fatigue. Sleep is good, reports bowls improved. Will continue to monitor and adjust therapy accordingly.   -MD (yeni) AB (beau) MD (c)     Row Name 02/10/21 0900          Cognition/Psychosocial    Affect/Mental Status (Cognitive)  WFL  -MD (yeni) AB (beau) MD (c)     Orientation Status (Cognition)  oriented x 4  -MD (yeni) AB evans) MD (c)     Follows Commands (Cognition)  WFL  -MD (yeni) AB (beau) MD (c)     Personal Safety Interventions  fall prevention program maintained;gait belt  -MD (yeni) AB (beau) MD (c)     Row Name 02/10/21 0900          Pain Scale: Numbers Pre/Post-Treatment    Pretreatment Pain Rating  0/10 - no pain  -MD (yeni) AB (beau) MD (c)     Row Name 02/10/21 0900          Bed Mobility    Bed Mobility  rolling left  -MD (r) AB (beau) MD (c)     All Activities, [place] (Bed Mobility)  standby assist;verbal cues;supervision  -MD (yeni) AB (beau) MD (c)     Rolling Left [place] (Bed Mobility)  supervision;verbal cues;standby assist  -MD (yeni) AB (beau) MD (c)     Row Name 02/10/21 0900          Transfers    Sit-Stand [place] (Transfers)  contact guard  -MD (r) AB (beau) MD (c)     Stand-Sit [place] (Transfers)  contact guard  -MD (yeni) AB (beau) MD (c)     [place] Level (Toilet Transfer)  contact guard;minimum assist (75% patient effort) use of grab bars for assitance.   -MD (yeni) AB (beau) MD (c)     Assistive Device (Toilet Transfer)  walker, 4-wheeled  -MD (r) AB (beau) MD (c)     Row Name 02/10/21 0900          Sit-Stand Transfer    Assistive Device (Sit-Stand Transfers)  walker, 4-wheeled  -MD (r) AB (beau) MD (c)     Row Name 02/10/21 0900          Stand-Sit Transfer    Assistive Device (Stand-Sit Transfers)  walker, 4-wheeled  -MD (r) AB (beau) MD (c)     Row Name 02/10/21 0900          Toilet Transfer    Type (Toilet Transfer)  sit-stand;stand-sit  -MD (r) AB (t) MD (c)     Row Name 02/10/21 0900          Gait/Stairs (Locomotion)     "[place] Level (Gait)  contact guard;standby assist  -MD (yeni) AB (beau) MD (c)     Assistive Device (Gait)  walker, 4-wheeled  -MD (yeni) AB (beau) MD (c)     Distance in Feet (Gait)  160'x2, 80'x2,20'  -MD daniel) AB (beau) MD (c)     Pattern (Gait)  step-through  -MD (yeni) AB (beau) MD (chris)     Deviations/Abnormal Patterns (Gait)  gait speed decreased;stride length decreased  (Pended)   -AB     Bilateral Gait Deviations  forward flexed posture  (Pended)   -AB     Row Name 02/10/21 0900          Balance    Balance Interventions  dynamic;minimal challenge;standing;other (see comments) Patient engaged in trunk rotation & Placing tray on walker.  -MD daniel) AB evans) MD (chris)     Row Name 02/10/21 0900          Motor Skills    Additional Documentation  Advanced Stepping/Walking Interventions (Group)  -MD AB nathan (r)) MD edmonds)     Row Name 02/10/21 0900          Hip (Therapeutic Exercise)    Hip (Therapeutic Exercise)  strengthening exercise  -MD daniel) AB evans) MD (c)     Hip Strengthening (Therapeutic Exercise)  bilateral;aBduction;red;10 repetitions;aDduction focus on Ecc control, Adduction w/ smooth ball  -MD daniel) AB evans) MD (c)     Row Name 02/10/21 0900          Knee (Therapeutic Exercise)    Knee (Therapeutic Exercise)  strengthening exercise  -MD daniel) AB evans) MD (c)     Knee Strengthening (Therapeutic Exercise)  bilateral;LAQ (long arc quad);hamstring curls;sitting;red;10 repetitions 2x10 w/ red band for all. L LAQ against gravity only.  -MD daniel) AB evans) MD (c)     Row Name 02/10/21 0900          Advanced Stepping/Walking Interventions    Stepping/Walking Interventions  box stepping  -MD daniel) AB evans) MD (c)     Box Stepping (Stepping/Walking Interventions)  2\" inch box step. 25 reps L/R alternating every 5 reps.   -MD AB nathan (r)) MD edmonds)     Row Name 02/10/21 0900          Positioning and Restraints    Pre-Treatment Position  in bed  -MD daniel) AB evans) MD (c)     Post Treatment Position  chair  -MD daniel) AB evans) MD (chris)     In Bed  sitting;exit alarm on;call light " "within reach  -MD (r) AB (t) MD (chris)     Row Name 02/10/21 0900          Daily Progress Summary (PT)    Daily Progress Summary (PT)  Patient is fatigued but was able to engage in therapeutic intervetions w/ appropriate rest breaks. Gait endurance improving but still impaired to PLOF. Pt was able to progress to more functional task simulating home (simulated walking to  \"tray\" and bring it back to her room). Pt would continue to benefit from skilled therapy to address impairments and progress to established goals.   -MD (r) AB (t) MD (chris)       User Key  (r) = Recorded By, (t) = Taken By, (c) = Cosigned By    Initials Name Provider Type    Katya Irving, PT Physical Therapist    Damon Caba, PT Student PT Student           Physical Therapy Education                 Title: PT OT SLP Therapies (Done)     Topic: Physical Therapy (Done)     Point: Mobility training (Done)     Learning Progress Summary           Patient Acceptance, E, VU by AB at 2/10/2021 1144    Comment: Pt educated about dynamic balance and improved gait mechanics.    Acceptance, E,TB, VU,NR by MS at 2/8/2021 1521    Acceptance, E, DU,NR by LB at 2/6/2021 1514                   Point: Home exercise program (Done)     Learning Progress Summary           Patient Acceptance, E, VU by AB at 2/10/2021 1144    Comment: Pt educated about dynamic balance and improved gait mechanics.    Acceptance, E,TB, VU,NR by MS at 2/8/2021 1521                   Point: Body mechanics (Done)     Learning Progress Summary           Patient Acceptance, E, VU by AB at 2/10/2021 1144    Comment: Pt educated about dynamic balance and improved gait mechanics.    Acceptance, E,TB, VU,NR by MS at 2/8/2021 1521                   Point: Precautions (Done)     Learning Progress Summary           Patient Acceptance, E, VU by AB at 2/10/2021 1144    Comment: Pt educated about dynamic balance and improved gait mechanics.    Acceptance, E, VU by MD at 2/9/2021 0941    " "Acceptance, E,TB, VU,NR by MS at 2/8/2021 1521    Acceptance, E, VU by MD at 2/5/2021 1049    Acceptance, E, VU by MD at 2/4/2021 1551                               User Key     Initials Effective Dates Name Provider Type Discipline    LB 06/08/18 -  Chely Perkins, PT Physical Therapist PT    MD 04/03/18 -  Katya Bar, PT Physical Therapist PT    MS 03/04/19 -  Annalise Neil, PT Physical Therapist PT    AB 02/01/21 -  Damon Paulino, PT Student PT Student PT                PT Recommendation and Plan          Daily Progress Summary (PT)  Daily Progress Summary (PT): Patient is fatigued but was able to engage in therapeutic intervetions w/ appropriate rest breaks. Gait endurance improving but still impaired to PLOF. Pt was able to progress to more functional task simulating home (simulated walking to  \"tray\" and bring it back to her room). Pt would continue to benefit from skilled therapy to address impairments and progress to established goals.                Time Calculation:     PT Charges     Row Name 02/10/21 1501 02/10/21 1143          Time Calculation    Start Time  1400  -MD (r) AB (t) MD (c)  0930  -MD (r) AB (t) MD (c)     Stop Time  1430  -MD (r) AB (t) MD (c)  1030  -MD (r) AB (t) MD (c)     Time Calculation (min)  30 min  -MD (r) AB (t)  60 min  -MD (r) AB (t)     PT Received On  02/10/21  -MD (r) AB (beau) MD (c)  02/10/21  -MD (r) AB (t) MD (c)     PT - Next Appointment  02/11/21  -MD (r) AB (t) MD (c)  02/11/21  -MD (r) AB (t) MD (c)       User Key  (r) = Recorded By, (t) = Taken By, (c) = Cosigned By    Initials Name Provider Type    Katya Irving, PT Physical Therapist    AB Damon Paulino, PT Student PT Student          Therapy Charges for Today     Code Description Service Date Service Provider Modifiers Qty    53183249816 HC GAIT TRAINING EA 15 MIN 2/10/2021 Damon Paulino, PT Student GP 3    20124828844 HC PT THER PROC EA 15 MIN 2/10/2021 Damon Paulino, PT Student GP 3        Patient was " wearing a face mask during this therapy encounter. Therapist used appropriate personal protective equipment including eye protection, mask, and gloves.  Mask used was standard procedure mask. Appropriate PPE was worn during the entire therapy session. Hand hygiene was completed before and after therapy session. Patient is not in enhanced droplet precautions. Katya Bar PT, was present and supervising throughout session.         Damon Paulino, PT Student  2/10/2021

## 2021-02-10 NOTE — PROGRESS NOTES
"   LOS: 7 days   Patient Care Team:  Xenia Robert MD as PCP - General (Family Medicine)    Chief Complaint:   Status post CVA-small focus of restricted diffusion in the right parietal lobe  Impaired cognition/impaired mobility/impaired self-care  Stroke prophylaxis-aspirin and Plavix x30 days then Plavix alone.  Left knee degenerative changes-status post steroid injection February 3  Chronic kidney disease stage IV baseline creatinine around 2.0  Acquired hypothyroidism-recheck TFTs 1 March-on levothyroxine  Morbid obesity  Vitamin D deficiency  Hypertension-metoprolol/amlodipine-systolic blood pressure goal 150-160  Hyperlipidemia-atorvastatin       Subjective     History of Present Illness    Subjective    She is tolerating therapies.  No new weakness  She is improving with her walking.  Left knee pain continues improved.     .       History taken from: patient    Objective     Vital Signs  Temp:  [96.6 °F (35.9 °C)-98.5 °F (36.9 °C)] 96.6 °F (35.9 °C)  Heart Rate:  [63-70] 63  Resp:  [18-20] 18  BP: (134-161)/(65-71) 146/67    Objective:  Vital signs: (most recent): Blood pressure 146/67, pulse 63, temperature 96.6 °F (35.9 °C), temperature source Oral, resp. rate 18, height 154.9 cm (61\"), SpO2 98 %.            Physical Exam  MENTAL STATUS -  AWAKE / ALERT  HEENT- NCAT, PUPILS EQUALLY ROUND, SCLERAE ANICTERIC,   LUNGS - CTA, NO WHEEZES, RALES OR RHONCHI  HEART- RRR, NO RUB, MURMUR, OR GALLOP  ABD - NORMOACTIVE BOWEL SOUNDS, SOFT, NT.    EXT - NO EDEMA OR CYANOSIS  NEURO -oriented x4.     MOTOR EXAM - RUE/RLE 5/5.  Takes resistance with left elbow flexion, finger flexion, knee extension, ADF  Results Review:     I reviewed the patient's new clinical results.  Results from last 7 days   Lab Units 02/08/21  0729 02/05/21  0711   WBC 10*3/mm3 6.71 10.11   HEMOGLOBIN g/dL 12.6 12.2   HEMATOCRIT % 38.2 37.8   PLATELETS 10*3/mm3 267 274     Results from last 7 days   Lab Units 02/10/21  0745 02/08/21  0729 " 02/05/21  0711   SODIUM mmol/L 142 141 137   POTASSIUM mmol/L 4.6 4.8 4.7   CHLORIDE mmol/L 112* 111* 110*   CO2 mmol/L 20.1* 21.6* 20.2*   BUN mg/dL 38* 49* 42*   CREATININE mg/dL 1.79* 1.67* 1.82*   CALCIUM mg/dL 9.3 9.0 9.2   GLUCOSE mg/dL 78 82 113*         Ref. Range 9/15/2020 11:00 1/29/2021 10:35 1/30/2021 06:29 1/31/2021 07:10 2/5/2021 07:11   Hemoglobin A1C Latest Ref Range: 4.80 - 5.60 %  5.07      TSH Baseline Latest Ref Range: 0.270 - 4.200 uIU/mL 5.160 (H) 16.100 (H)  9.150 (H)    Free T4 Latest Ref Range: 0.93 - 1.70 ng/dL 1.39  1.14     T3, Free Latest Ref Range: 2.00 - 4.40 pg/mL    1.94 (L)    Total Cholesterol Latest Ref Range: 0 - 200 mg/dL  168      HDL Cholesterol Latest Ref Range: 40 - 60 mg/dL  44      LDL Cholesterol  Latest Ref Range: 0 - 100 mg/dL  104 (H)      VLDL Cholesterol Latest Ref Range: 5 - 40 mg/dL  20      Triglycerides Latest Ref Range: 0 - 150 mg/dL  110      Vitamin B-12 Latest Ref Range: 211 - 946 pg/mL     1,204 (H)   25 Hydroxy, Vitamin D Latest Ref Range: 30.0 - 100.0 ng/ml     39.9     Medication Review: done  Scheduled Meds:amLODIPine, 2.5 mg, Oral, Q24H  aspirin, 81 mg, Oral, Daily  atorvastatin, 80 mg, Oral, Nightly  betamethasone dipropionate, , Topical, Q24H  cholecalciferol, 1,000 Units, Oral, BID  clopidogrel, 75 mg, Oral, Daily  dorzolamide-timolol, , Both Eyes, BID  latanoprost, 1 drop, Both Eyes, Nightly  levothyroxine, 150 mcg, Oral, Daily  lidocaine, 5 mL, Intra-articular, Once  metoprolol succinate XL, 12.5 mg, Oral, Daily  nystatin, , Topical, Q12H  triamcinolone acetonide, 40 mg, Intra-articular, Once  vitamin B-12, 250 mcg, Oral, Daily      Continuous Infusions:   PRN Meds:.•  acetaminophen  •  bisacodyl  •  trolamine salicylate      Assessment/Plan       Stroke (cerebrum) (CMS/HCC)      Assessment & Plan  Status post CVA-small focus of restricted diffusion in the right parietal lobe  Adjunctive medication for stroke recovery-on atorvastatin.  Recheck  vitamin D level.  Check vitamin B-12 level.     Impaired cognition/impaired mobility/impaired self-care     Stroke prophylaxis-aspirin and Plavix x30 days then Plavix alone.     Left knee degenerative changes-status post steroid injection February 3     Chronic kidney disease stage IV baseline creatinine around 2.0     Acquired hypothyroidism-recheck TFTs 1 March-on levothyroxine     Morbid obesity     Vitamin D deficiency-no home dose listed-recheck vitamin D level     Hypertension-metoprolol/amlodipine-systolic blood pressure goal 150-160     Hyperlipidemia-atorvastatin    Hypersensitivity to touch bilateral lower extremities-May possibly be neuropathy.  B12 within normal limits.  She has had hypothyroidism.  Has chronic kidney disease.    TEAM CONF - FEB 9 -  TRANSFERS CTG ROLLATOR. GAIT 160 FEET CTG ROLLATOR. TOILET TRANSFERS MIN. SHOWER TRANSFERS MIN. BATH UB SBA AND LB MIN. LBD MOD. UBD SBA. GROOMING SBA. MODERATE DEFICITS IN EXECUTIVE FUNCTION. PATIENT REQUESTS DISCHARGE FROM SLP AS SHE FEELS SHE IS AT BASELINE. CONTINENT BOWEL AND BLADDER. LEFT KNEE DJD, S/P INJECTION LAST WEEK, PAIN IMPROVED.   ELOS -   WED.  February 17       Now admit for comprehensive acute inpatient rehabilitation .  This would be an interdisciplinary program with physical therapy 1 hour,  occupational therapy 1 hour, and speech therapy 1 hour, 5 days a week.  Rehabilitation nursing for carryover, monitoring of nutritional and blood pressure and neurologic   status, bowel and bladder, and skin  Ongoing physician follow-up.  Weekly team conferences.  Goals are to achieve a level of supervision with  mobility and self-care and improved balance.   Rehabilitation prognosis fair.  Medical prognosis fair.  Estimated length of stay is approximately 2 to 3 weeks, but is only an estimation.   The patient's functional status and clinical status is unchanged from preadmission assessment and the patient continues appropriate for acute inpatient  rehabilitation.  Goal is for home with home health   therapies.  Barrier to discharge: Impaired mobility- work on transfers ADLs to overcome.      Ricki Matta MD  02/10/21  17:36 EST    Time:   During rounds, used appropriate personal protective equipment including mask and gloves.  Additional gown if indicated.  Mask used was standard procedure mask. Appropriate PPE was worn during the entire visit.  Hand hygiene was completed before and after.

## 2021-02-10 NOTE — PAYOR COMM NOTE
"Good morning!    AUTH # [id number]    Included are current therapy notes as well as the patient's first team conference report from yesterday, 2/9. Next team conference planned for Tuesday, 2/16, and discharge tentatively planned for Wednesday, 2/17. The patient is participating and progressing with therapies. Please review for authorization of continued stay.     Thank you!    Tone Bernal RN  p   f     Mk Maya (89 y.o. Female)     Date of Birth Social Security Number Address Home Phone MRN    07/14/1925  [address] [place] [id number] [phone] [id number]    Gnosticist Marital Status          Voodoo        Admission Date Admission Type Admitting Provider Attending Provider Department, Room/Bed    2/3/21 Elective Ricki Matta MD Gormley, John Michael, MD [facility], 4411/1    Discharge Date Discharge Disposition Discharge Destination                       Attending Provider: Ricki Matta MD    Allergies: Azithromycin, Cefdinir, Doxycycline Monohydrate, Allopurinol    Isolation: None   Infection: None   Code Status: No CPR    Ht: 154.9 cm (61\")   Wt: 88.2 kg (194 lb 7.1 oz)    Admission Cmt: None   Principal Problem: None                Active Insurance as of 2/3/2021     Primary Coverage     Payor Plan Insurance Group Employer/Plan Group    HUMANA MEDICARE REPLACEMENT HUMANA MEDICARE REPLACEMENT [id number]     Payor Plan Address Payor Plan Phone Number Payor Plan Fax Number Effective Dates    [address] [address] [phone]  1/1/2018 - None Entered    [facility] [id number]       Subscriber Name Subscriber Birth Date Member ID       MK MAYA 7/14/1925 [id number]                 Emergency Contacts      (Rel.) Home Phone Work Phone Mobile Phone    Jessica Osei (Power of ) [phone] -- --               Physical Therapy Notes (most recent note)      Katya Bar, PT at 02/09/21 " 1548  Version 1 of 1         Inpatient Rehabilitation - Physical Therapy Treatment Note       [facility]     Patient Name: Marianne Delgado  : 1925  MRN: [id number]    Today's Date: 2021                    Admit Date: 2/3/2021      Visit Dx: No diagnosis found.    Patient Active Problem List   Diagnosis   • Arthritis   • Stage 4 chronic kidney disease (CMS/HCC)   • Debility   • Foot pain, bilateral   • Glaucoma   • Acute idiopathic gout of right ankle   • Hematuria   • Essential hypertension   • Acquired hypothyroidism   • Osteopenia   • Psoriasis   • Rosacea   • Vitamin D deficiency   • Medicare annual wellness visit, subsequent   • Morbidly obese (CMS/[facility])   • Cerebrovascular accident (CVA) (CMS/[facility])   • Hypertensive urgency   • Stroke (cerebrum) (CMS/[facility])       Past Medical History:   Diagnosis Date   • Acute sinusitis    • Acute upper respiratory infection    • Arthritis    • CKD (chronic kidney disease)    • Debility    • Fatigue    • Foot pain, bilateral    • Glaucoma    • Gout    • Hematuria    • High blood pressure    • Hypertension    • Hypothyroid 1999   • Hypothyroidism    • IBS (irritable bowel syndrome)    • IGT (impaired glucose tolerance)    • Malaise and fatigue    • Medication management    • Melanoma (CMS/HCC) 1979    left leg   • OA (osteoarthritis)    • Osteopenia 1999   • Painful joint    • Psoriasis    • Renal failure    • Rosacea    • Vitamin D deficiency        Past Surgical History:   Procedure Laterality Date   • CATARACT EXTRACTION     • FOOT SURGERY      mauri toe surgery   • OTHER SURGICAL HISTORY      Melanoma Excision: Left leg          PT ASSESSMENT (last 12 hours)      IRF PT Evaluation and Treatment     Row Name 21 0939          PT Time and Intention    Document Type  daily treatment  -MD     Mode of Treatment  physical therapy  -MD     Patient/Family/Caregiver Comments/Observations  Pt sitting in recliner reporting fatigue and weakness.  Pt report  diarrhea that started over the weekend and has continued.  -MD     Row Name 02/09/21 0939          Cognition/Psychosocial    Orientation Status (Cognition)  oriented x 4  -MD     Follows Commands (Cognition)  WFL  -MD     Personal Safety Interventions  fall prevention program maintained;gait belt  -MD     Row Name 02/09/21 0939          Pain Scale: Numbers Pre/Post-Treatment    Pretreatment Pain Rating  0/10 - no pain  -MD     Row Name 02/09/21 0939          Bed Mobility    Sit-Supine [place] (Bed Mobility)  verbal cues;moderate assist (50% patient effort)  -MD     Row Name 02/09/21 0939          Transfers    Chair-Bed [place] (Transfers)  verbal cues;minimum assist (75% patient effort)  -MD     Sit-Stand [place] (Transfers)  contact guard;verbal cues  -MD     Stand-Sit [place] (Transfers)  contact guard;verbal cues  -MD     Row Name 02/09/21 0939          Chair-Bed Transfer    Assistive Device (Chair-Bed Transfers)  walker, 4-wheeled  -MD     Row Name 02/09/21 0939          Sit-Stand Transfer    Assistive Device (Sit-Stand Transfers)  walker, 4-wheeled  -MD     Row Name 02/09/21 0939          Stand-Sit Transfer    Assistive Device (Stand-Sit Transfers)  walker, 4-wheeled  -MD     Row Name 02/09/21 0939          Gait/Stairs (Locomotion)    [place] Level (Gait)  contact guard;verbal cues  -MD     Assistive Device (Gait)  walker, 4-wheeled  -MD     Distance in Feet (Gait)  15'x1 and 160'x1  -MD     Deviations/Abnormal Patterns (Gait)  tho decreased;gait speed decreased  -MD     Bilateral Gait Deviations  forward flexed posture  -MD     Row Name 02/09/21 0939          Hip (Therapeutic Exercise)    Hip Strengthening (Therapeutic Exercise)  bilateral;flexion;aDduction  -MD     Row Name 02/09/21 0939          Knee (Therapeutic Exercise)    Knee Strengthening (Therapeutic Exercise)  bilateral;SAQ (short arc quad)  -MD     Row Name 02/09/21 0939          Positioning and Restraints     Pre-Treatment Position  sitting in chair/recliner  -MD     Post Treatment Position  bed  -MD     In Bed  supine;call light within reach;exit alarm on  -MD       User Key  (r) = Recorded By, (t) = Taken By, (c) = Cosigned By    Initials Name Provider Type    Katya Irving, PT Physical Therapist           Physical Therapy Education                 Title: PT OT SLP Therapies (Done)     Topic: Physical Therapy (Done)     Point: Mobility training (Done)     Learning Progress Summary           Patient Acceptance, E,TB, VU,NR by MS at 2/8/2021 1521    Acceptance, E, DU,NR by LB at 2/6/2021 1514                   Point: Home exercise program (Done)     Learning Progress Summary           Patient Acceptance, E,TB, VU,NR by MS at 2/8/2021 1521                   Point: Body mechanics (Done)     Learning Progress Summary           Patient Acceptance, E,TB, VU,NR by MS at 2/8/2021 1521                   Point: Precautions (Done)     Learning Progress Summary           Patient Acceptance, E, VU by MD at 2/9/2021 0941    Acceptance, E,TB, VU,NR by MS at 2/8/2021 1521    Acceptance, E, VU by MD at 2/5/2021 1049    Acceptance, E, VU by MD at 2/4/2021 1551                               User Key     Initials Effective Dates Name Provider Type Discipline     06/08/18 -  Chely Perkins, PT Physical Therapist PT    MD 04/03/18 -  Katya Bar, PT Physical Therapist PT    MS 03/04/19 -  Annalise Neil PT Physical Therapist PT                PT Recommendation and Plan    Frequency of Treatment (PT): 5 times per week, 60 minutes per session  Anticipated Equipment Needs at Discharge (PT Eval): (pt has rollator)                  Time Calculation:     PT Charges     Row Name 02/09/21 1547 02/09/21 0939          Time Calculation    Start Time  1400  -MD  0930  -MD     Stop Time  1430  -MD  1000  -MD     Time Calculation (min)  30 min  -MD  30 min  -MD     PT Received On  --  02/09/21  -MD     PT - Next Appointment  --  02/10/21  -MD        User Key  (r) = Recorded By, (t) = Taken By, (c) = Cosigned By    Initials Name Provider Type    Katya Irving PT Physical Therapist          Therapy Charges for Today     Code Description Service Date Service Provider Modifiers Qty    07310048580  PT THER PROC EA 15 MIN 2021 Katya Bar PT GP 2    31503970034 HC GAIT TRAINING EA 15 MIN 2021 Katya Bar, PT GP 2          Patient was intermittently wearing a face mask during this therapy encounter. Therapist used appropriate personal protective equipment including eye protection, mask, and gloves.  Mask used was standard procedure mask. Appropriate PPE was worn during the entire therapy session. Hand hygiene was completed before and after therapy session. Patient is not in enhanced droplet precautions. Damon Paulino was present throughout treatment.             Katya Bar PT  2021      Electronically signed by Katya Bar PT at 21 1548          Occupational Therapy Notes (most recent note)      Idania Blood, OTR at 21 1533          Inpatient Rehabilitation - Occupational Therapy Treatment Note    [facility]     Patient Name: Marianne Delgado  : 1925  MRN: [id number]    Today's Date: 2021                 Admit Date: 2/3/2021       No diagnosis found.    Patient Active Problem List   Diagnosis   • Arthritis   • Stage 4 chronic kidney disease (CMS/[facility])   • Debility   • Foot pain, bilateral   • Glaucoma   • Acute idiopathic gout of right ankle   • Hematuria   • Essential hypertension   • Acquired hypothyroidism   • Osteopenia   • Psoriasis   • Rosacea   • Vitamin D deficiency   • Medicare annual wellness visit, subsequent   • Morbidly obese (CMS/[facility])   • Cerebrovascular accident (CVA) (CMS/[facility])   • Hypertensive urgency   • Stroke (cerebrum) (CMS/[facility])       Past Medical History:   Diagnosis Date   • Acute sinusitis    • Acute upper respiratory infection    • Arthritis    • CKD (chronic kidney disease)    • Debility    •  Fatigue    • Foot pain, bilateral    • Glaucoma    • Gout    • Hematuria    • High blood pressure    • Hypertension    • Hypothyroid 09/1999   • Hypothyroidism    • IBS (irritable bowel syndrome)    • IGT (impaired glucose tolerance)    • Malaise and fatigue    • Medication management    • Melanoma (CMS/HCC) 1979    left leg   • OA (osteoarthritis)    • Osteopenia 09/1999   • Painful joint    • Psoriasis    • Renal failure    • Rosacea    • Vitamin D deficiency        Past Surgical History:   Procedure Laterality Date   • CATARACT EXTRACTION     • FOOT SURGERY      mauri toe surgery   • OTHER SURGICAL HISTORY      Melanoma Excision: Left leg                IRF OT ASSESSMENT FLOWSHEET (last 12 hours)      IRF OT Evaluation and Treatment     Row Name 02/09/21 1531 02/09/21 1237       OT Time and Intention    Document Type  daily treatment  -CC  daily treatment  -CC    Mode of Treatment  occupational therapy  -CC  occupational therapy  -CC    Patient Effort  good  -CC  good  -CC    Symptoms Noted During/After Treatment  fatigue  -CC  fatigue  -CC    Row Name 02/09/21 1531 02/09/21 1237       Cognition/Psychosocial    Affect/Mental Status (Cognitive)  WFL  -CC  WFL  -CC    Orientation Status (Cognition)  oriented x 4  -CC  oriented x 4  -CC    Follows Commands (Cognition)  WFL  -CC  WFL  -CC    Personal Safety Interventions  fall prevention program maintained;gait belt;nonskid shoes/slippers when out of bed  -CC  fall prevention program maintained;gait belt;nonskid shoes/slippers when out of bed  -CC    Row Name 02/09/21 1531 02/09/21 1237       Pain Scale: Numbers Pre/Post-Treatment    Pretreatment Pain Rating  0/10 - no pain  -CC  0/10 - no pain  -CC    Posttreatment Pain Rating  0/10 - no pain  -CC  0/10 - no pain  -CC    Row Name 02/09/21 1237          Pain Scale: FACES Pre/Post-Treatment    Pain: FACES Scale, Pretreatment  0-->no hurt  -CC     Posttreatment Pain Rating  0-->no hurt  -CC     Row Name 02/09/21 7222           Bed Mobility    Comment (Bed Mobility)  in w/c  -     Row Name 02/09/21 1237          Transfers    Bed-Chair [place] (Transfers)  set up;minimum assist (75% patient effort);1 person assist  -     Chair-Bed [place] (Transfers)  minimum assist (75% patient effort);1 person assist  -     Assistive Device (Bed-Chair Transfers)  wheelchair  -     Sit-Stand [place] (Transfers)  contact guard;verbal cues  -     [place] Level (Shower Transfer)  verbal cues;contact guard  -     Assistive Device (Shower Transfer)  grab bar, tub/shower;tub bench;wheelchair  -     Row Name 02/09/21 1237          Chair-Bed Transfer    Assistive Device (Chair-Bed Transfers)  wheelchair  -     Row Name 02/09/21 1237          Sit-Stand Transfer    Assistive Device (Sit-Stand Transfers)  walker, 4-wheeled  -     Row Name 02/09/21 1237          Shower Transfer    Type (Shower Transfer)  sit-stand;stand-sit  -     Row Name 02/09/21 1531          Shoulder (Therapeutic Exercise)    Shoulder (Therapeutic Exercise)  strengthening exercise  -     Shoulder Strengthening (Therapeutic Exercise)  bilateral;flexion;extension;aBduction;aDduction;1 lb free weight;10 repetitions;2 sets  -     Row Name 02/09/21 1531          Elbow/Forearm (Therapeutic Exercise)    Elbow/Forearm (Therapeutic Exercise)  strengthening exercise  -     Elbow/Forearm Strengthening (Therapeutic Exercise)  bilateral;flexion;extension;supination;pronation;sitting;1 lb free weight;3 sets 15  rep s  -     Row Name 02/09/21 1531          Wrist (Therapeutic Exercise)    Wrist (Therapeutic Exercise)  strengthening exercise  -     Wrist Strengthening (Therapeutic Exercise)  bilateral;flexion;extension;1 lb free weight;10 repetitions;3 sets  -     Row Name 02/09/21 1531          Aerobic Exercise    Type (Aerobic Exercise)  arm bike;for 2 minutes  -     Time Performed (Aerobic Exercise)  2 min x 1 2.5 min x 1 light resistance   -      Row Name 02/09/21 1237          Bathing    [place] Level (Bathing)  bathing skills;lower body;upper body;supervision;contact guard assist  -CC     Assistive Device (Bathing)  grab bar/tub rail;hand held shower spray hose;shower chair  -CC     Position (Bathing)  supported sitting;supported standing  -CC     Set-up Assistance (Bathing)  obtain supplies  -CC     Row Name 02/09/21 1237          Upper Body Dressing    [place] Level (Upper Body Dressing)  upper body dressing skills;doff;don;front opening garment;pull over garment;supervision;minimum assist (75% or more patient effort)  -CC     Position (Upper Body Dressing)  supported sitting  -CC     Set-up Assistance (Upper Body Dressing)  obtain clothing  -CC     Row Name 02/09/21 1237          Lower Body Dressing    [place] Level (Lower Body Dressing)  doff;don;pants/bottoms;shoes/slippers;socks;moderate assist (50% patient effort)  -CC     Position (Lower Body Dressing)  supported sitting;supported standing  -CC     Set-up Assistance (Lower Body Dressing)  obtain clothing  -CC     Row Name 02/09/21 1237          Grooming    [place] Level (Grooming)  grooming skills;make-up application;hair care, combing/brushing;oral care regimen;wash face, hands;standby assist  -CC     Position (Grooming)  sink side;supported sitting  -CC     Set-up Assistance (Grooming)  obtain supplies  -     Row Name 02/09/21 1531 02/09/21 1237       Positioning and Restraints    Pre-Treatment Position  sitting in chair/recliner  -CC  sitting in chair/recliner  -CC    Post Treatment Position  wheelchair  -CC  wheelchair  -CC    In Wheelchair  sitting;with PT  -CC  sitting;call light within reach;encouraged to call for assist;exit alarm on  -CC      User Key  (r) = Recorded By, (t) = Taken By, (c) = Cosigned By    Initials Name Effective Dates    Idania Awan OTR 06/08/18 -            Occupational Therapy Education                 Title: PT OT SLP Therapies (Done)      Topic: Occupational Therapy (Done)     Point: ADL training (Done)     Description:   Instruct learner(s) on proper safety adaptation and remediation techniques during self care or transfers.   Instruct in proper use of assistive devices.              Learning Progress Summary           Patient Acceptance, E,TB, VU by JULIANO at 2/6/2021 1534    Acceptance, E,TB, VU,DU by  at 2/4/2021 1640    Comment: ed pt on role of OT, benefit of therapy, POC w. OT ed on safety w ADLs and tsf techniques. pt demo w. min A for tsf and more A w. LBD and SBA UBD                   Point: Home exercise program (Done)     Description:   Instruct learner(s) on appropriate technique for monitoring, assisting and/or progressing therapeutic exercises/activities.              Learning Progress Summary           Patient Acceptance, E,TB, VU by JULIANO at 2/6/2021 1534                   Point: Precautions (Done)     Description:   Instruct learner(s) on prescribed precautions during self-care and functional transfers.              Learning Progress Summary           Patient Acceptance, E,TB, VU by JULIANO at 2/6/2021 1534    Acceptance, E,TB, VU,DU by  at 2/4/2021 1640    Comment: ed pt on role of OT, benefit of therapy, POC w. OT ed on safety w ADLs and tsf techniques. pt demo w. min A for tsf and more A w. LBD and SBA UBD                   Point: Body mechanics (Done)     Description:   Instruct learner(s) on proper positioning and spine alignment during self-care, functional mobility activities and/or exercises.              Learning Progress Summary           Patient Acceptance, E,TB, VU by JULIANO at 2/6/2021 1534    Acceptance, E,TB, VU,DU by  at 2/4/2021 1640    Comment: ed pt on role of OT, benefit of therapy, POC w. OT ed on safety w ADLs and tsf techniques. pt demo w. min A for tsf and more A w. LBD and SBA UBD                               User Key     Initials Effective Dates Name Provider Type Discipline     06/08/18 -  Mame Nelson  HARI Castillo Occupational Therapist OT    JULIANO 07/15/20 -  Sandra Souza OT Occupational Therapist OT                    OT Recommendation and Plan                         Time Calculation:     Time Calculation- OT     Row Name 02/09/21 1330 02/09/21 1100          Time Calculation- OT    OT Start Time  1330  -CC  1100  -CC     OT Stop Time  1400  -CC  1145  -CC     OT Time Calculation (min)  30 min  -CC  45 min  -CC       User Key  (r) = Recorded By, (t) = Taken By, (c) = Cosigned By    Initials Name Provider Type    CC Idania Blood OTR Occupational Therapist        Therapy Charges for Today     Code Description Service Date Service Provider Modifiers Qty    27594410334 HC OT SELF CARE/MGMT/TRAIN EA 15 MIN 2/8/2021 Idania Blood OTR GO 3    62312033869 HC OT THER PROC EA 15 MIN 2/8/2021 Idania Blood OTR GO 2    45283369486 HC OT SELF CARE/MGMT/TRAIN EA 15 MIN 2/9/2021 Idania Blood OTR GO 3    32825090251 HC OT THER PROC EA 15 MIN 2/9/2021 Idania Blood OTR GO 2                   HARI Fofana  2/9/2021    Electronically signed by Idania Blood OTR at 02/09/21 1534          Speech Language Pathology Notes (last 48 hours) (Notes from 02/08/21 1210 through 02/10/21 1210)      Progress Notes signed by Maria Luisa Lopez MS CCC-SLP at 02/09/21 1055         Inpatient Rehabilitation Functional Measures Assessment and Plan of Care    Plan of Care  Updated Problems/Interventions  Cognition    [ST] Executive Functions(Resolved)  Current Status(02/09/2021): Pt completed medicine and money management tasks  without errors.  Weekly Goal(02/09/2021): The patient will provide 3 potentional solutions to  possible problems in the rehab setting.  Discharge Goal: Pt is at baseline function per report and performance noted on  2/9.    Signed by: MIRIAM Luis      Electronically signed by Maria Luisa Lopez MS CCC-SLP at 02/09/21 1055     Maria Luisa Lopez MS CCC-SLP at 02/09/21 1040          Inpatient  Rehabilitation - Speech Language Pathology Treatment Note/Discharge  [facility]     Patient Name: Marianne Delgado  : 1925  MRN: [id number]  Today's Date: 2021               Admit Date: 2/3/2021     Visit Dx:  No diagnosis found.  Patient Active Problem List   Diagnosis   • Arthritis   • Stage 4 chronic kidney disease (CMS/[facility])   • Debility   • Foot pain, bilateral   • Glaucoma   • Acute idiopathic gout of right ankle   • Hematuria   • Essential hypertension   • Acquired hypothyroidism   • Osteopenia   • Psoriasis   • Rosacea   • Vitamin D deficiency   • Medicare annual wellness visit, subsequent   • Morbidly obese (CMS/[facility])   • Cerebrovascular accident (CVA) (CMS/[facility])   • Hypertensive urgency   • Stroke (cerebrum) (CMS/[facility])     Past Medical History:   Diagnosis Date   • Acute sinusitis    • Acute upper respiratory infection    • Arthritis    • CKD (chronic kidney disease)    • Debility    • Fatigue    • Foot pain, bilateral    • Glaucoma    • Gout    • Hematuria    • High blood pressure    • Hypertension    • Hypothyroid 1999   • Hypothyroidism    • IBS (irritable bowel syndrome)    • IGT (impaired glucose tolerance)    • Malaise and fatigue    • Medication management    • Melanoma (CMS/[facility]) 1979    left leg   • OA (osteoarthritis)    • Osteopenia 1999   • Painful joint    • Psoriasis    • Renal failure    • Rosacea    • Vitamin D deficiency      Past Surgical History:   Procedure Laterality Date   • CATARACT EXTRACTION     • FOOT SURGERY      mauri toe surgery   • OTHER SURGICAL HISTORY      Melanoma Excision: Left leg              EDUCATION  The patient has been educated in the following areas:   Cognitive Impairment.      SLP Recommendation and Plan    Patient was wearing a face mask during this therapy encounter. Therapist used appropriate personal protective equipment including mask, eye protection and gloves.  Mask used was N95/duckbill. Appropriate PPE was worn during the entire  therapy session. Hand hygiene was completed before and after therapy session. Patient is not in enhanced droplet precautions.       SLP GOALS     Row Name 02/09/21 1000 02/08/21 1300          Organizational Skills Goal 1 (SLP)    Progress/Outcomes (Thought Organization Skills Goal 1, SLP)  other (see comments) D/C per patient request  -  --        Executive Functional Skills Goal 1 (SLP)    Improve Executive Function Skills Goal 1 (SLP)  organization/planning activity No report of pain this date  -  organization/planning activity  -NR     Time Frame (Executive Function Skills Goal 1, SLP)  by discharge  -  by discharge  -NR     Progress/Outcomes (Executive Function Skills Goal 1, SLP)  goal met  -  --     Comment (Executive Function Skills Goal 1, SLP)  Complex pill sorting task: 100%. Check balancing task: 100%   -  Pt completed a moderate level deductive reasoning puzzle with moderate cues for higher level thinking skills.  Pt completed a written exercise regarding written word problems with 4/5 (80%) increased to 100% with min cues.  Pt able to complete analogies with 100% without cues.  Pt was  alert and oriented x 4.  Pt demonstrated functional sustained attention.  Pt was able to recall 3/3 items with a 10 minute delay and from AM to PM session.  Pt also recalled name of therapist in PM session.  Pt states she feels her mental status was not impaired from the stroke.  Pt states shed like to focus on PT a nd OT.  Pt does manage her finances and medication.  Recommend high level financial managment/check writting/check register balancing and medication management tasks prior to a possible DC from therapy.   -NR       User Key  (r) = Recorded By, (t) = Taken By, (c) = Cosigned By    Initials Name Provider Type    NR Trudi Trivedi MA,CCC-SLP Speech and Language Pathologist     Maria Luisa Lopez MS CCC-SLP Speech and Language Pathologist             SLP Outcome Measures (last 72 hours)      SLP Outcome  Measures     Row Name 02/08/21 1300             SLP Outcome Measures    Outcome Measure Used?  Adult NOMS  -NR         Adult FCM Scores    FCM Chosen  Memory  -NR      Memory FCM Score  5  -NR        User Key  (r) = Recorded By, (t) = Taken By, (c) = Cosigned By    Initials Name Effective Dates    NR Trudi Trivedi MA,CCC-SLP 06/08/18 -           Time Calculation:   Time Calculation- SLP     Row Name 02/09/21 1017             Time Calculation- [facility]    SLP Start Time  1000  -      SLP Stop Time  1100  -      SLP Time Calculation (min)  60 min  -      SLP Non-Billable Time (min)  15 min rounds  -      SLP Received On  02/09/21  -        User Key  (r) = Recorded By, (t) = Taken By, (c) = Cosigned By    Initials Name Provider Type     Maria Luisa Lopez MS CCC-SLP Speech and Language Pathologist          Therapy Charges for Today     Code Description Service Date Service Provider Modifiers Qty    27834961956 HC ST DEV OF COGN SKILLS INITIAL 15 MIN 2/9/2021 Maria Luisa Lopez MS CCC-SLP  1    84405549253 HC ST DEV OF COGN SKILLS EACH ADDT'L 15 MIN 2/9/2021 Maria Luisa Lopez MS CCC-SLP  3             ADULT NOMS (last 72 hours)      Adult NOMS     Row Name 02/08/21 1300                   Adult FCM Scores    FCM Chosen  Memory  -NR        Memory FCM Score  5  -NR          User Key  (r) = Recorded By, (t) = Taken By, (c) = Cosigned By    Initials Name Effective Dates    NR Trudi Trivedi MA,CCC-SLP 06/08/18 -                SLP Discharge Summary  Anticipated Discharge Disposition (SLP): assisted living facility (correction)    Maria Luisa Lopez MS CCC-SLP  2/9/2021    Electronically signed by Maria Luisa Lopez MS CCC-SLP at 02/09/21 1043     Maria Luisa Lopez MS CCC-SLP at 02/09/21 1037        Goal Outcome Evaluation:  Plan of Care Reviewed With: patient  Progress: improving  Outcome Summary: Pt is requesting D/C from speech therapy d/t report of being at baseline. Pt completed a complex medicine sorting task and checking  writing/balancing task without errors. SLP to sign off at this time d/t patient request and functional performance with money & medicine tasks this date. MD aware.    Electronically signed by Maria Luisa Lopez MS CCC-SLP at 21 1038     Trudi Trivedi MA,CCC-SLP at 21 1318          Inpatient Rehabilitation - Speech Language Pathology Treatment Note    [facility]     Patient Name: Marianne Delgado  : 1925  MRN: [id number]    Today's Date: 2021                   Admit Date: 2/3/2021       Visit Dx:    No diagnosis found.    Patient Active Problem List   Diagnosis   • Arthritis   • Stage 4 chronic kidney disease (CMS/HCC)   • Debility   • Foot pain, bilateral   • Glaucoma   • Acute idiopathic gout of right ankle   • Hematuria   • Essential hypertension   • Acquired hypothyroidism   • Osteopenia   • Psoriasis   • Rosacea   • Vitamin D deficiency   • Medicare annual wellness visit, subsequent   • Morbidly obese (CMS/[facility])   • Cerebrovascular accident (CVA) (CMS/[facility])   • Hypertensive urgency   • Stroke (cerebrum) (CMS/[facility])       Past Medical History:   Diagnosis Date   • Acute sinusitis    • Acute upper respiratory infection    • Arthritis    • CKD (chronic kidney disease)    • Debility    • Fatigue    • Foot pain, bilateral    • Glaucoma    • Gout    • Hematuria    • High blood pressure    • Hypertension    • Hypothyroid 1999   • Hypothyroidism    • IBS (irritable bowel syndrome)    • IGT (impaired glucose tolerance)    • Malaise and fatigue    • Medication management    • Melanoma (CMS/HCC) 1979    left leg   • OA (osteoarthritis)    • Osteopenia 1999   • Painful joint    • Psoriasis    • Renal failure    • Rosacea    • Vitamin D deficiency        Past Surgical History:   Procedure Laterality Date   • CATARACT EXTRACTION     • FOOT SURGERY      mauri toe surgery   • OTHER SURGICAL HISTORY      Melanoma Excision: Left leg          SLP EVALUATION (last 72 hours)      SLP SLC Evaluation      Row Name 02/08/21 1300                   Executive Functional Skills Goal 1 (SLP)    Improve Executive Function Skills Goal 1 (SLP)  organization/planning activity  -NR        Time Frame (Executive Function Skills Goal 1, SLP)  by discharge  -NR        Comment (Executive Function Skills Goal 1, SLP)  Pt completed a moderate level deductive reasoning puzzle with moderate cues for higher level thinking skills.  Pt completed a written exercise regarding written word problems with 4/5 (80%) increased to 100% with min cues.  Pt able to complete analogies with 100% without cues.  Pt was  alert and oriented x 4.  Pt demonstrated functional sustained attention.  Pt was able to recall 3/3 items with a 10 minute delay and from AM to PM session.  Pt also recalled name of therapist in PM session.  Pt states she feels her mental status was not impaired from the stroke.  Pt states shed like to focus on PT a nd OT.  Pt does manage her finances and medication.  Recommend high level financial managment/check writting/check register balancing and medication management tasks prior to a possible DC from therapy.   -NR          User Key  (r) = Recorded By, (t) = Taken By, (c) = Cosigned By    Initials Name Effective Dates    NR Trudi Trivedi MA,[facility]-SLP 06/08/18 -              EDUCATION    The patient has been educated in the following areas:       Cognitive Impairment.      SLP Recommendation and Plan                                                  SLP GOALS     Row Name 02/08/21 1300             Executive Functional Skills Goal 1 (SLP)    Improve Executive Function Skills Goal 1 (SLP)  organization/planning activity  -NR      Time Frame (Executive Function Skills Goal 1, SLP)  by discharge  -NR      Comment (Executive Function Skills Goal 1, SLP)  Pt completed a moderate level deductive reasoning puzzle with moderate cues for higher level thinking skills.  Pt completed a written exercise regarding written word problems with 4/5 (80%)  increased to 100% with min cues.  Pt able to complete analogies with 100% without cues.  Pt was  alert and oriented x 4.  Pt demonstrated functional sustained attention.  Pt was able to recall 3/3 items with a 10 minute delay and from AM to PM session.  Pt also recalled name of therapist in PM session.  Pt states she feels her mental status was not impaired from the stroke.  Pt states shed like to focus on PT a nd OT.  Pt does manage her finances and medication.  Recommend high level financial managment/check writting/check register balancing and medication management tasks prior to a possible DC from therapy.   -NR        User Key  (r) = Recorded By, (t) = Taken By, (c) = Cosigned By    Initials Name Provider Type    NR Trudi Trivedi MA,CCC-SLP Speech and Language Pathologist             SLP Outcome Measures (last 72 hours)      SLP Outcome Measures     Row Name 02/08/21 1300             SLP Outcome Measures    Outcome Measure Used?  Adult NOMS  -NR         Adult FCM Scores    FCM Chosen  Memory  -NR      Memory FCM Score  5  -NR        User Key  (r) = Recorded By, (t) = Taken By, (c) = Cosigned By    Initials Name Effective Dates    NR Trudi Trivedi MA,[facility]-SLP 06/08/18 -                   Time Calculation:       Time Calculation- SLP     Row Name 02/08/21 1318 02/08/21 1314          Time Calculation- SLP    SLP Start Time  1230  -NR  1000  -NR     SLP Stop Time  1300  -NR  1030  -NR     SLP Time Calculation (min)  30 min  -NR  30 min  -NR     SLP Received On  --  02/08/21  -NR       User Key  (r) = Recorded By, (t) = Taken By, (c) = Cosigned By    Initials Name Provider Type    Trudi Solo MA,CCC-SLP Speech and Language Pathologist                     ADULT NOMS (last 72 hours)      Adult NOMS     Row Name 02/08/21 1300                   Adult FCM Scores    FCM Chosen  Memory  -NR        Memory FCM Score  5  -NR          User Key  (r) = Recorded By, (t) = Taken By, (c) = Cosigned By    Initials Name  Effective Dates    NR Trudi Trivedi MA,CCC-SLP 06/08/18 -                      Trudi Trivedi MA,CCC-SLP  2/8/2021      Electronically signed by Trudi Trivedi MA,CCC-SLP at 02/08/21 7432

## 2021-02-11 PROCEDURE — 97530 THERAPEUTIC ACTIVITIES: CPT

## 2021-02-11 PROCEDURE — 97116 GAIT TRAINING THERAPY: CPT

## 2021-02-11 PROCEDURE — 97110 THERAPEUTIC EXERCISES: CPT

## 2021-02-11 PROCEDURE — 97110 THERAPEUTIC EXERCISES: CPT | Performed by: OCCUPATIONAL THERAPIST

## 2021-02-11 PROCEDURE — 97535 SELF CARE MNGMENT TRAINING: CPT | Performed by: OCCUPATIONAL THERAPIST

## 2021-02-11 RX ADMIN — CLOPIDOGREL 75 MG: 75 TABLET, FILM COATED ORAL at 09:15

## 2021-02-11 RX ADMIN — ASPIRIN 81 MG: 81 TABLET, COATED ORAL at 09:14

## 2021-02-11 RX ADMIN — LEVOTHYROXINE SODIUM 150 MCG: 150 TABLET ORAL at 06:24

## 2021-02-11 RX ADMIN — Medication 250 MCG: at 11:31

## 2021-02-11 RX ADMIN — TIMOLOL MALEATE: 5 SOLUTION/ DROPS OPHTHALMIC at 09:15

## 2021-02-11 RX ADMIN — TIMOLOL MALEATE: 5 SOLUTION/ DROPS OPHTHALMIC at 20:32

## 2021-02-11 RX ADMIN — NYSTATIN 1 APPLICATION: 100000 POWDER TOPICAL at 20:32

## 2021-02-11 RX ADMIN — AMLODIPINE BESYLATE 2.5 MG: 2.5 TABLET ORAL at 09:14

## 2021-02-11 RX ADMIN — Medication 1000 UNITS: at 09:14

## 2021-02-11 RX ADMIN — LATANOPROST 1 DROP: 50 SOLUTION/ DROPS OPHTHALMIC at 20:32

## 2021-02-11 RX ADMIN — BETAMETHASONE DIPROPIONATE 1 APPLICATION: 0.5 OINTMENT TOPICAL at 20:33

## 2021-02-11 RX ADMIN — NYSTATIN 1 APPLICATION: 100000 POWDER TOPICAL at 09:16

## 2021-02-11 RX ADMIN — Medication 1000 UNITS: at 20:32

## 2021-02-11 RX ADMIN — METOPROLOL SUCCINATE 12.5 MG: 25 TABLET, EXTENDED RELEASE ORAL at 09:15

## 2021-02-11 RX ADMIN — ATORVASTATIN CALCIUM 80 MG: 80 TABLET, FILM COATED ORAL at 20:33

## 2021-02-11 NOTE — PLAN OF CARE
Goal Outcome Evaluation:  Plan of Care Reviewed With: patient  Progress: improving   Patient is calm and cooperative. A & O x 4. Using the call light for assistance. Ambulating with rolling walker and 1 person assist to the bathroom and back to bed. Independent in bed mobility. Continent of bladder during the night. Taking her medication whole with thin liquids without difficulty. Refusing SCD's. Bilateral lower legs tender to touch. No safety issues observed. Will continue to monitor.

## 2021-02-11 NOTE — PROGRESS NOTES
Inpatient Rehabilitation Plan of Care Note    Plan of Care  Care Plan Reviewed - No updates at this time.    Sphincter Control    Performed Intervention(s)  Use incontinence products  offer toileting  Encourage fluids      Safety    Performed Intervention(s)  Falls precautions/protocol  Safety rounds  Items within reach  Bed alarm/ chair alarm      Psychosocial    Performed Intervention(s)  Verbalizes needs and concerns  Provide therapeutic enviroment      Pain    Performed Intervention(s)  Practice relaxation techniques  Apply heating pad or cream to area when needed      Body Systems    Performed Intervention(s)  Daily skin inspection  Nutritional support  Apply cream to dry areas as needed  Apply powder under folds as ordered    Signed by: Didi Luna, RN

## 2021-02-11 NOTE — PROGRESS NOTES
Inpatient Rehabilitation Plan of Care Note    Plan of Care  Care Plan Reviewed - No updates at this time.    Pain    Performed Intervention(s)  Offer medication when needed      Body Systems    Performed Intervention(s)  Daily skin inspection  Nutritional support  Apply cream to dry areas as needed  Apply powder under folds as ordered    Signed by: Didi Luna RN

## 2021-02-11 NOTE — PLAN OF CARE
Goal Outcome Evaluation:  Plan of Care Reviewed With: patient     Outcome Summary: Legs tender. Heat pad helps. AAO x 4 but forgetful. Ambulates w/ Rolator. Cont B & B

## 2021-02-11 NOTE — THERAPY TREATMENT NOTE
Inpatient Rehabilitation - Occupational Therapy Treatment Note    [facility]     Patient Name: Marianne Delgado  : 1925  MRN: [id number]    Today's Date: 2021                 Admit Date: 2/3/2021       No diagnosis found.    Patient Active Problem List   Diagnosis   • Arthritis   • Stage 4 chronic kidney disease (CMS/[facility])   • Debility   • Foot pain, bilateral   • Glaucoma   • Acute idiopathic gout of right ankle   • Hematuria   • Essential hypertension   • Acquired hypothyroidism   • Osteopenia   • Psoriasis   • Rosacea   • Vitamin D deficiency   • Medicare annual wellness visit, subsequent   • Morbidly obese (CMS/[facility])   • Cerebrovascular accident (CVA) (CMS/[facility])   • Hypertensive urgency   • Stroke (cerebrum) (CMS/[facility])       Past Medical History:   Diagnosis Date   • Acute sinusitis    • Acute upper respiratory infection    • Arthritis    • CKD (chronic kidney disease)    • Debility    • Fatigue    • Foot pain, bilateral    • Glaucoma    • Gout    • Hematuria    • High blood pressure    • Hypertension    • Hypothyroid 1999   • Hypothyroidism    • IBS (irritable bowel syndrome)    • IGT (impaired glucose tolerance)    • Malaise and fatigue    • Medication management    • Melanoma (CMS/[facility]) 1979    left leg   • OA (osteoarthritis)    • Osteopenia 1999   • Painful joint    • Psoriasis    • Renal failure    • Rosacea    • Vitamin D deficiency        Past Surgical History:   Procedure Laterality Date   • CATARACT EXTRACTION     • FOOT SURGERY      mauri toe surgery   • OTHER SURGICAL HISTORY      Melanoma Excision: Left leg                IRF OT ASSESSMENT FLOWSHEET (last 12 hours)      IRF OT Evaluation and Treatment     Row Name 21 1537 21 1214       OT Time and Intention    Document Type  daily treatment  -KP  daily treatment  -KP    Mode of Treatment  individual therapy;occupational therapy  -KP  individual therapy;occupational therapy  -    Patient Effort  excellent  -KP  good   -KP    Symptoms Noted During/After Treatment  fatigue  -KP  none  -KP    Row Name 02/11/21 1537 02/11/21 1214       General Information    Patient/Family/Caregiver Comments/Observations  pt in wc in room.   -KP  pt in wc in room  -    Existing Precautions/Restrictions  --  fall  -KP    Row Name 02/11/21 1537          Cognition/Psychosocial    Affect/Mental Status (Cognitive)  WFL  -     Orientation Status (Cognition)  oriented x 4  -KP     Follows Commands (Cognition)  WFL  -     Personal Safety Interventions  fall prevention program maintained;gait belt;muscle strengthening facilitated;nonskid shoes/slippers when out of bed  -KP     Row Name 02/11/21 1537 02/11/21 1214       Pain Scale: Numbers Pre/Post-Treatment    Pretreatment Pain Rating  0/10 - no pain  -KP  0/10 - no pain  -KP    Posttreatment Pain Rating  0/10 - no pain  -KP  0/10 - no pain  -KP    Row Name 02/11/21 1537 02/11/21 1214       Bed Mobility    Sit-Supine [place] (Bed Mobility)  set up;minimum assist (75% patient effort)  -  --    Comment (Bed Mobility)  --  in wc  -KP    Row Name 02/11/21 1537 02/11/21 1214       Transfers    Sit-Stand [place] (Transfers)  set up;contact guard  -  contact guard;set up  -    Stand-Sit [place] (Transfers)  set up;contact guard;supervision  -  set up;standby assist  -    [place] Level (Toilet Transfer)  set up;contact guard  -  --    Assistive Device (Toilet Transfer)  grab bars/safety frame;wheelchair  -  --    Assistive Device (Shower Transfer)  --  -- pt did not want to shower today, dry skin  -KP    Row Name 02/11/21 1537 02/11/21 1214       Sit-Stand Transfer    Assistive Device (Sit-Stand Transfers)  wheelchair  -KP  wheelchair  -KP    Row Name 02/11/21 1537 02/11/21 1214       Stand-Sit Transfer    Assistive Device (Stand-Sit Transfers)  wheelchair  -KP  wheelchair  -KP    Row Name 02/11/21 1537 02/11/21 1214       Toilet Transfer    Type (Toilet Transfer)   stand-sit;sit-stand  -  -- NT pt did not have to use the BR  -    Row Name 02/11/21 1537          Shoulder (Therapeutic Exercise)    Shoulder (Therapeutic Exercise)  strengthening exercise  -     Shoulder Strengthening (Therapeutic Exercise)  other (see comments);bilateral;flexion;extension 1# dowel elbert, chest press and sh fl/ext.  -     Row Name 02/11/21 1214          Elbow/Forearm (Therapeutic Exercise)    Elbow/Forearm (Therapeutic Exercise)  strengthening exercise  -     Elbow/Forearm Strengthening (Therapeutic Exercise)  left;right;flexion;extension;supination;pronation;sitting;1 lb free weight;other (see comments) 15x3  -     Row Name 02/11/21 1214          Wrist (Therapeutic Exercise)    Wrist (Therapeutic Exercise)  strengthening exercise  -     Wrist Strengthening (Therapeutic Exercise)  left;right;extension;flexion;1 lb free weight;other (see comments) 15x3  -     Row Name 02/11/21 1537          Aerobic Exercise    Type (Aerobic Exercise)  arm bike;for 2 minutes 2 sets  -     Time Performed (Aerobic Exercise)  2 min 2 sets w rest breaks in bewteen.  -     Row Name 02/11/21 1214          Bathing    Comment (Bathing)  NT this date pt did not want to bathe fully this am, dry skin, but says she def will tomorrow  -     Row Name 02/11/21 1214          Upper Body Dressing    [place] Level (Upper Body Dressing)  upper body dressing skills;doff;don;front opening garment;minimum assist (75% or more patient effort);set up assistance;pull over garment;supervision  -     Position (Upper Body Dressing)  supported sitting  -     Set-up Assistance (Upper Body Dressing)  obtain clothing  -     Comment (Upper Body Dressing)  A w front open garment  -     Row Name 02/11/21 1214          Lower Body Dressing    [place] Level (Lower Body Dressing)  doff;don;pants/bottoms;socks;shoes/slippers;underwear;set up;verbal cues;moderate assist (50% patient effort)  -     Position (Lower Body  Dressing)  supported sitting;supported standing  -KP     Set-up Assistance (Lower Body Dressing)  obtain clothing  -     Comment (Lower Body Dressing)  pt did not want to use AE this date, tired.  -KP     Row Name 02/11/21 1537 02/11/21 1214       Grooming    [place] Level (Grooming)  grooming skills;wash face, hands;set up;standby assist  -KP  grooming skills;hair care, combing/brushing;oral care regimen;wash face, hands;set up;standby assist  -KP    Position (Grooming)  sink side  -KP  sink side  -KP    Set-up Assistance (Grooming)  obtain supplies  -  obtain supplies  -    Row Name 02/11/21 1537 02/11/21 1214       Toileting    [place] Level (Toileting)  toileting skills;adjust/manage clothing;perform perineal hygiene;minimum assist (75% patient effort)  -  toileting skills;adjust/manage clothing;set up assistance;minimum assist (75% patient effort);perform perineal hygiene  -    Assistive Device Use (Toileting)  grab bar/safety frame  -KP  grab bar/safety frame  -KP    Position (Toileting)  supported standing;supported sitting  -KP  supported standing;supported sitting  -KP    Set-up Assistance (Toileting)  --  obtain supplies  -    Row Name 02/11/21 1537 02/11/21 1214       Positioning and Restraints    Pre-Treatment Position  sitting in chair/recliner  -KP  sitting in chair/recliner  -KP    Post Treatment Position  bed  -KP  wheelchair  -KP    In Bed  supine;call light within reach;encouraged to call for assist;exit alarm on  -  --    In Wheelchair  --  sitting;call light within reach;encouraged to call for assist;exit alarm on  -KP    Row Name 02/11/21 1537 02/11/21 1214       Daily Progress Summary (OT)    Overall Progress Toward Functional Goals (OT)  progressing toward functional goals as expected  -KP  progressing toward functional goals as expected  -KP      User Key  (r) = Recorded By, (t) = Taken By, (c) = Cosigned By    Initials Name Effective Dates    Mame Rondon  HARI Castillo 06/08/18 -            Occupational Therapy Education                 Title: PT OT SLP Therapies (Done)     Topic: Occupational Therapy (Done)     Point: ADL training (Done)     Description:   Instruct learner(s) on proper safety adaptation and remediation techniques during self care or transfers.   Instruct in proper use of assistive devices.              Learning Progress Summary           Patient Acceptance, E, VU by AB at 2/11/2021 1415    Comment: Educated pt about pacing and activity tolerance strategies to manage fatigue    Acceptance, E,TB, VU by JULIANO at 2/6/2021 1534    Acceptance, E,TB, VU,DU by JEMAL at 2/4/2021 1640    Comment: ed pt on role of OT, benefit of therapy, POC w. OT ed on safety w ADLs and tsf techniques. pt demo w. min A for tsf and more A w. LBD and SBA UBD                   Point: Home exercise program (Done)     Description:   Instruct learner(s) on appropriate technique for monitoring, assisting and/or progressing therapeutic exercises/activities.              Learning Progress Summary           Patient Acceptance, E, VU by AB at 2/11/2021 1415    Comment: Educated pt about pacing and activity tolerance strategies to manage fatigue    Acceptance, E,TB, VU by JULIANO at 2/6/2021 1534                   Point: Precautions (Done)     Description:   Instruct learner(s) on prescribed precautions during self-care and functional transfers.              Learning Progress Summary           Patient Acceptance, E, VU by AB at 2/11/2021 1415    Comment: Educated pt about pacing and activity tolerance strategies to manage fatigue    Acceptance, E,TB, VU by JULIANO at 2/6/2021 1534    Acceptance, E,TB, VU,DU by JEMAL at 2/4/2021 1640    Comment: ed pt on role of OT, benefit of therapy, POC w. OT ed on safety w ADLs and tsf techniques. pt demo w. min A for tsf and more A w. LBD and SBA UBD                   Point: Body mechanics (Done)     Description:   Instruct learner(s) on proper positioning and spine  alignment during self-care, functional mobility activities and/or exercises.              Learning Progress Summary           Patient Acceptance, E, VU by AB at 2/11/2021 1415    Comment: Educated pt about pacing and activity tolerance strategies to manage fatigue    Acceptance, E,TB, VU by JULIANO at 2/6/2021 1534    Acceptance, E,TB, VU,DU by KP at 2/4/2021 1640    Comment: ed pt on role of OT, benefit of therapy, POC w. OT ed on safety w ADLs and tsf techniques. pt demo w. min A for tsf and more A w. LBD and SBA UBD                               User Key     Initials Effective Dates Name Provider Type Discipline     06/08/18 -  Mame Nelson OTR Occupational Therapist OT    JULIANO 07/15/20 -  Sandra Souza OT Occupational Therapist OT    AB 02/01/21 -  Damon Paulino, PT Student PT Student PT                    OT Recommendation and Plan            Daily Progress Summary (OT)  Overall Progress Toward Functional Goals (OT): progressing toward functional goals as expected            Time Calculation:     Time Calculation- OT     Row Name 02/11/21 1544 02/11/21 1220          Time Calculation- OT    OT Start Time  1330  -KP  1030  -KP     OT Stop Time  1400  -KP  1130  -KP     OT Time Calculation (min)  30 min  -  60 min  -       User Key  (r) = Recorded By, (t) = Taken By, (c) = Cosigned By    Initials Name Provider Type     Mame Nelson OTLEONCIO Occupational Therapist        Therapy Charges for Today     Code Description Service Date Service Provider Modifiers Qty    53287138053 HC OT SELF CARE/MGMT/TRAIN EA 15 MIN 2/11/2021 Mame Nelson OTLEONCIO GO 2    27176908805 HC OT THER PROC EA 15 MIN 2/11/2021 Mame Nelson OTR GO 2    20661357763 HC OT THER PROC EA 15 MIN 2/11/2021 Mame Nelson OTR GO 2                   HARI Diallo  2/11/2021

## 2021-02-11 NOTE — THERAPY TREATMENT NOTE
Inpatient Rehabilitation - Physical Therapy Treatment Note       [facility]     Patient Name: Marianne Delgado  : 1925  MRN: [id number]    Today's Date: 2021                    Admit Date: 2/3/2021      Visit Dx: No diagnosis found.    Patient Active Problem List   Diagnosis   • Arthritis   • Stage 4 chronic kidney disease (CMS/[facility])   • Debility   • Foot pain, bilateral   • Glaucoma   • Acute idiopathic gout of right ankle   • Hematuria   • Essential hypertension   • Acquired hypothyroidism   • Osteopenia   • Psoriasis   • Rosacea   • Vitamin D deficiency   • Medicare annual wellness visit, subsequent   • Morbidly obese (CMS/[facility])   • Cerebrovascular accident (CVA) (CMS/[facility])   • Hypertensive urgency   • Stroke (cerebrum) (CMS/[facility])       Past Medical History:   Diagnosis Date   • Acute sinusitis    • Acute upper respiratory infection    • Arthritis    • CKD (chronic kidney disease)    • Debility    • Fatigue    • Foot pain, bilateral    • Glaucoma    • Gout    • Hematuria    • High blood pressure    • Hypertension    • Hypothyroid 1999   • Hypothyroidism    • IBS (irritable bowel syndrome)    • IGT (impaired glucose tolerance)    • Malaise and fatigue    • Medication management    • Melanoma (CMS/[facility]) 1979    left leg   • OA (osteoarthritis)    • Osteopenia 1999   • Painful joint    • Psoriasis    • Renal failure    • Rosacea    • Vitamin D deficiency        Past Surgical History:   Procedure Laterality Date   • CATARACT EXTRACTION     • FOOT SURGERY      mauri toe surgery   • OTHER SURGICAL HISTORY      Melanoma Excision: Left leg          PT ASSESSMENT (last 12 hours)      IRF PT Evaluation and Treatment     Row Name 21 0945          PT Time and Intention    Document Type  daily treatment  -MD     Mode of Treatment  physical therapy  -MD     Patient/Family/Caregiver Comments/Observations  Pt supine in bed showing no signs of acute distress.    -MD     Row Name 21 0945           "Pain Scale: Numbers Pre/Post-Treatment    Pretreatment Pain Rating  0/10 - no pain  -MD     Row Name 02/11/21 0945          Bed Mobility    Supine-Sit [place] (Bed Mobility)  standby assist  -MD     Assistive Device (Bed Mobility)  bed rails;head of bed elevated  -MD     Row Name 02/11/21 0945          Transfers    Sit-Stand [place] (Transfers)  verbal cues;standby assist  -MD     Stand-Sit [place] (Transfers)  verbal cues;standby assist  -MD     Row Name 02/11/21 0945          Sit-Stand Transfer    Assistive Device (Sit-Stand Transfers)  walker, 4-wheeled  -MD     Row Name 02/11/21 0945          Stand-Sit Transfer    Assistive Device (Stand-Sit Transfers)  walker, 4-wheeled  -MD     Row Name 02/11/21 0945          Car Transfer    [place] Level (Car Transfer)  verbal cues;contact guard;minimum assist (75% patient effort)  -MD     Assistive Device (Car Transfer)  walker, 4-wheeled  -MD     Row Name 02/11/21 0945          Gait/Stairs (Locomotion)    [place] Level (Gait)  contact guard;standby assist  -MD     Assistive Device (Gait)  walker, 4-wheeled  -MD     Distance in Feet (Gait)  160'x1 and 200'x1  -MD     Pattern (Gait)  step-through  -MD     Deviations/Abnormal Patterns (Gait)  gait speed decreased;stride length decreased  -MD     Bilateral Gait Deviations  forward flexed posture  -MD     Row Name 02/11/21 0945          Balance    Balance Assessment  hip strategy assessment  -MD     Row Name 02/11/21 0945          Hip Strategy (Balance)    Hip Strategy Assessment (Balance)  standing in // bars: toe taps onto 2\" step w CGA then step ups onto 2\" step w CGA.  10 reps on each LE  for each exercise  -MD     Promotion Techniques, Hip Strategy (Balance)  other (see comments)  -MD     Row Name 02/11/21 0945          Hip (Therapeutic Exercise)    Hip Strengthening (Therapeutic Exercise)  bilateral;marching while seated 15 reps  -MD     Row Name 02/11/21 0945          Knee (Therapeutic Exercise) "    Knee Strengthening (Therapeutic Exercise)  right;hamstring curls;red;resistance band;left;SAQ (short arc quad) 15 reps  -MD     Row Name 02/11/21 0945          Positioning and Restraints    Pre-Treatment Position  sitting in chair/recliner  -MD     Post Treatment Position  wheelchair  -MD     In Wheelchair  sitting;call light within reach;exit alarm on  -MD       User Key  (r) = Recorded By, (t) = Taken By, (c) = Cosigned By    Initials Name Provider Type    Katya Irving, PT Physical Therapist           Physical Therapy Education                 Title: PT OT SLP Therapies (Done)     Topic: Physical Therapy (Done)     Point: Mobility training (Done)     Learning Progress Summary           Patient Acceptance, E, VU by AB at 2/10/2021 1144    Comment: Pt educated about dynamic balance and improved gait mechanics.    Acceptance, E,TB, VU,NR by MS at 2/8/2021 1521    Acceptance, E, DU,NR by LB at 2/6/2021 1514                   Point: Home exercise program (Done)     Learning Progress Summary           Patient Acceptance, E, VU by AB at 2/10/2021 1144    Comment: Pt educated about dynamic balance and improved gait mechanics.    Acceptance, E,TB, VU,NR by MS at 2/8/2021 1521                   Point: Body mechanics (Done)     Learning Progress Summary           Patient Acceptance, E, VU by AB at 2/10/2021 1144    Comment: Pt educated about dynamic balance and improved gait mechanics.    Acceptance, E,TB, VU,NR by MS at 2/8/2021 1521                   Point: Precautions (Done)     Learning Progress Summary           Patient Acceptance, E, VU by MD at 2/11/2021 0949    Acceptance, E, VU by AB at 2/10/2021 1144    Comment: Pt educated about dynamic balance and improved gait mechanics.    Acceptance, E, VU by MD at 2/9/2021 0941    Acceptance, E,TB, VU,NR by MS at 2/8/2021 1521    Acceptance, E, VU by MD at 2/5/2021 1049    Acceptance, E, VU by MD at 2/4/2021 1551                               User Key     Initials  Effective Dates Name Provider Type Discipline    LB 06/08/18 -  Chely Perkins, PT Physical Therapist PT    MD 04/03/18 -  Katya Bar PT Physical Therapist PT    MS 03/04/19 -  Annalise Neil PT Physical Therapist PT    AB 02/01/21 -  Damon Paulino, PT Student PT Student PT                PT Recommendation and Plan    Frequency of Treatment (PT): 5 times per week, 60 minutes per session  Anticipated Equipment Needs at Discharge (PT Eval): (pt has rollator)                  Time Calculation:     PT Charges     Row Name 02/11/21 0945             Time Calculation    Start Time  0930  -MD      Stop Time  1030  -MD      Time Calculation (min)  60 min  -MD      PT Received On  02/11/21  -MD      PT - Next Appointment  02/12/21  -MD        User Key  (r) = Recorded By, (t) = Taken By, (c) = Cosigned By    Initials Name Provider Type    Katya Irving, PT Physical Therapist          Therapy Charges for Today     Code Description Service Date Service Provider Modifiers Qty    59037776826 HC PT THER PROC EA 15 MIN 2/11/2021 Katya Bar, PT GP 2    56907063113 HC GAIT TRAINING EA 15 MIN 2/11/2021 Katya Bar, PT GP 2              Patient was wearing a face mask during this therapy encounter. Therapist used appropriate personal protective equipment including eye protection, mask, and gloves.  Mask used was standard procedure mask. Appropriate PPE was worn during the entire therapy session. Hand hygiene was completed before and after therapy session. Patient is not in enhanced droplet precautions.           Katya Bar PT  2/11/2021

## 2021-02-11 NOTE — PROGRESS NOTES
"   LOS: 8 days   Patient Care Team:  Xenia Robert MD as PCP - General (Family Medicine)    Chief Complaint:   Status post CVA-small focus of restricted diffusion in the right parietal lobe  Impaired cognition/impaired mobility/impaired self-care  Stroke prophylaxis-aspirin and Plavix x30 days then Plavix alone.  Left knee degenerative changes-status post steroid injection February 3  Chronic kidney disease stage IV baseline creatinine around 2.0  Acquired hypothyroidism-recheck TFTs 1 March-on levothyroxine  Morbid obesity  Vitamin D deficiency  Hypertension-metoprolol/amlodipine-systolic blood pressure goal 150-160  Hyperlipidemia-atorvastatin       Subjective     History of Present Illness    Subjective  Tolerating therapies.  Strength continues to improve.  No new focal weakness.  Left knee pain continues better.     .       History taken from: patient    Objective     Vital Signs  Temp:  [96.6 °F (35.9 °C)-98.2 °F (36.8 °C)] 98.2 °F (36.8 °C)  Heart Rate:  [63-70] 70  Resp:  [18] 18  BP: (138-163)/(67-72) 163/68    Objective:  Vital signs: (most recent): Blood pressure 163/68, pulse 70, temperature 98.2 °F (36.8 °C), temperature source Oral, resp. rate 18, height 154.9 cm (61\"), SpO2 97 %.            Physical Exam  MENTAL STATUS -  AWAKE / ALERT  HEENT- NCAT, PUPILS EQUALLY ROUND, SCLERAE ANICTERIC,   LUNGS - CTA, NO WHEEZES, RALES OR RHONCHI  HEART- RRR, NO RUB, MURMUR, OR GALLOP  ABD - NORMOACTIVE BOWEL SOUNDS, SOFT, NT.    EXT - NO EDEMA OR CYANOSIS  NEURO -oriented x4.     MOTOR EXAM - RUE/RLE 5/5.  Takes resistance with left elbow flexion, finger flexion, knee extension, ADF  Results Review:     I reviewed the patient's new clinical results.  Results from last 7 days   Lab Units 02/08/21  0729 02/05/21  0711   WBC 10*3/mm3 6.71 10.11   HEMOGLOBIN g/dL 12.6 12.2   HEMATOCRIT % 38.2 37.8   PLATELETS 10*3/mm3 267 274     Results from last 7 days   Lab Units 02/10/21  0745 02/08/21  0729 02/05/21  0711 "   SODIUM mmol/L 142 141 137   POTASSIUM mmol/L 4.6 4.8 4.7   CHLORIDE mmol/L 112* 111* 110*   CO2 mmol/L 20.1* 21.6* 20.2*   BUN mg/dL 38* 49* 42*   CREATININE mg/dL 1.79* 1.67* 1.82*   CALCIUM mg/dL 9.3 9.0 9.2   GLUCOSE mg/dL 78 82 113*         Ref. Range 9/15/2020 11:00 1/29/2021 10:35 1/30/2021 06:29 1/31/2021 07:10 2/5/2021 07:11   Hemoglobin A1C Latest Ref Range: 4.80 - 5.60 %  5.07      TSH Baseline Latest Ref Range: 0.270 - 4.200 uIU/mL 5.160 (H) 16.100 (H)  9.150 (H)    Free T4 Latest Ref Range: 0.93 - 1.70 ng/dL 1.39  1.14     T3, Free Latest Ref Range: 2.00 - 4.40 pg/mL    1.94 (L)    Total Cholesterol Latest Ref Range: 0 - 200 mg/dL  168      HDL Cholesterol Latest Ref Range: 40 - 60 mg/dL  44      LDL Cholesterol  Latest Ref Range: 0 - 100 mg/dL  104 (H)      VLDL Cholesterol Latest Ref Range: 5 - 40 mg/dL  20      Triglycerides Latest Ref Range: 0 - 150 mg/dL  110      Vitamin B-12 Latest Ref Range: 211 - 946 pg/mL     1,204 (H)   25 Hydroxy, Vitamin D Latest Ref Range: 30.0 - 100.0 ng/ml     39.9     Medication Review: done  Scheduled Meds:amLODIPine, 2.5 mg, Oral, Q24H  aspirin, 81 mg, Oral, Daily  atorvastatin, 80 mg, Oral, Nightly  betamethasone dipropionate, , Topical, Q24H  cholecalciferol, 1,000 Units, Oral, BID  clopidogrel, 75 mg, Oral, Daily  dorzolamide-timolol, , Both Eyes, BID  latanoprost, 1 drop, Both Eyes, Nightly  levothyroxine, 150 mcg, Oral, Daily  lidocaine, 5 mL, Intra-articular, Once  metoprolol succinate XL, 12.5 mg, Oral, Daily  nystatin, , Topical, Q12H  triamcinolone acetonide, 40 mg, Intra-articular, Once  vitamin B-12, 250 mcg, Oral, Daily      Continuous Infusions:   PRN Meds:.•  acetaminophen  •  bisacodyl  •  trolamine salicylate      Assessment/Plan       Stroke (cerebrum) (CMS/HCC)      Assessment & Plan  Status post CVA-small focus of restricted diffusion in the right parietal lobe  Adjunctive medication for stroke recovery-on atorvastatin.  Recheck vitamin D level.   Check vitamin B-12 level.     Impaired cognition/impaired mobility/impaired self-care     Stroke prophylaxis-aspirin and Plavix x30 days then Plavix alone.     Left knee degenerative changes-status post steroid injection February 3     Chronic kidney disease stage IV baseline creatinine around 2.0     Acquired hypothyroidism-recheck TFTs 1 March-on levothyroxine     Morbid obesity     Vitamin D deficiency-no home dose listed-recheck vitamin D level     Hypertension-metoprolol/amlodipine-systolic blood pressure goal 150-160     Hyperlipidemia-atorvastatin    Hypersensitivity to touch bilateral lower extremities-May possibly be neuropathy.  B12 within normal limits.  She has had hypothyroidism.  Has chronic kidney disease.    TEAM CONF - FEB 9 -  TRANSFERS CTG ROLLATOR. GAIT 160 FEET CTG ROLLATOR. TOILET TRANSFERS MIN. SHOWER TRANSFERS MIN. BATH UB SBA AND LB MIN. LBD MOD. UBD SBA. GROOMING SBA. MODERATE DEFICITS IN EXECUTIVE FUNCTION. PATIENT REQUESTS DISCHARGE FROM SLP AS SHE FEELS SHE IS AT BASELINE. CONTINENT BOWEL AND BLADDER. LEFT KNEE DJD, S/P INJECTION LAST WEEK, PAIN IMPROVED.   ELOS -   WED.  February 17       Now admit for comprehensive acute inpatient rehabilitation .  This would be an interdisciplinary program with physical therapy 1 hour,  occupational therapy 1 hour, and speech therapy 1 hour, 5 days a week.  Rehabilitation nursing for carryover, monitoring of nutritional and blood pressure and neurologic   status, bowel and bladder, and skin  Ongoing physician follow-up.  Weekly team conferences.  Goals are to achieve a level of supervision with  mobility and self-care and improved balance.   Rehabilitation prognosis fair.  Medical prognosis fair.  Estimated length of stay is approximately 2 to 3 weeks, but is only an estimation.   The patient's functional status and clinical status is unchanged from preadmission assessment and the patient continues appropriate for acute inpatient rehabilitation.  Goal  is for home with home health   therapies.  Barrier to discharge: Impaired mobility- work on transfers ADLs to overcome.      Ricki Matta MD  02/11/21  12:14 EST    Time:   During rounds, used appropriate personal protective equipment including mask and gloves.  Additional gown if indicated.  Mask used was standard procedure mask. Appropriate PPE was worn during the entire visit.  Hand hygiene was completed before and after.

## 2021-02-11 NOTE — THERAPY TREATMENT NOTE
Inpatient Rehabilitation - Occupational Therapy Treatment Note    [facility]     Patient Name: Marianne Delgado  : 1925  MRN: [id number]    Today's Date: 2021                 Admit Date: 2/3/2021       No diagnosis found.    Patient Active Problem List   Diagnosis   • Arthritis   • Stage 4 chronic kidney disease (CMS/[facility])   • Debility   • Foot pain, bilateral   • Glaucoma   • Acute idiopathic gout of right ankle   • Hematuria   • Essential hypertension   • Acquired hypothyroidism   • Osteopenia   • Psoriasis   • Rosacea   • Vitamin D deficiency   • Medicare annual wellness visit, subsequent   • Morbidly obese (CMS/[facility])   • Cerebrovascular accident (CVA) (CMS/[facility])   • Hypertensive urgency   • Stroke (cerebrum) (CMS/[facility])       Past Medical History:   Diagnosis Date   • Acute sinusitis    • Acute upper respiratory infection    • Arthritis    • CKD (chronic kidney disease)    • Debility    • Fatigue    • Foot pain, bilateral    • Glaucoma    • Gout    • Hematuria    • High blood pressure    • Hypertension    • Hypothyroid 1999   • Hypothyroidism    • IBS (irritable bowel syndrome)    • IGT (impaired glucose tolerance)    • Malaise and fatigue    • Medication management    • Melanoma (CMS/[facility]) 1979    left leg   • OA (osteoarthritis)    • Osteopenia 1999   • Painful joint    • Psoriasis    • Renal failure    • Rosacea    • Vitamin D deficiency        Past Surgical History:   Procedure Laterality Date   • CATARACT EXTRACTION     • FOOT SURGERY      mauri toe surgery   • OTHER SURGICAL HISTORY      Melanoma Excision: Left leg                IRF OT ASSESSMENT FLOWSHEET (last 12 hours)      IRF OT Evaluation and Treatment     Row Name 21 1214          OT Time and Intention    Document Type  daily treatment  -KP     Mode of Treatment  individual therapy;occupational therapy  -KP     Patient Effort  good  -KP     Symptoms Noted During/After Treatment  none  -KP     Row Name 21 1211           General Information    Patient/Family/Caregiver Comments/Observations  pt in  in room  -     Existing Precautions/Restrictions  fall  -     Row Name 02/11/21 1214          Pain Scale: Numbers Pre/Post-Treatment    Pretreatment Pain Rating  0/10 - no pain  -     Posttreatment Pain Rating  0/10 - no pain  -     Row Name 02/11/21 1214          Bed Mobility    Comment (Bed Mobility)  in wc  -     Row Name 02/11/21 1214          Transfers    Sit-Stand [place] (Transfers)  contact guard;set up  -     Stand-Sit [place] (Transfers)  set up;standby assist  -     Assistive Device (Shower Transfer)  -- pt did not want to shower today, dry skin  -     Row Name 02/11/21 1214          Sit-Stand Transfer    Assistive Device (Sit-Stand Transfers)  wheelchair  -     Row Name 02/11/21 1214          Stand-Sit Transfer    Assistive Device (Stand-Sit Transfers)  wheelchair  -     Row Name 02/11/21 1214          Toilet Transfer    Type (Toilet Transfer)  -- NT pt did not have to use the BR  -     Row Name 02/11/21 1214          Elbow/Forearm (Therapeutic Exercise)    Elbow/Forearm (Therapeutic Exercise)  strengthening exercise  -     Elbow/Forearm Strengthening (Therapeutic Exercise)  left;right;flexion;extension;supination;pronation;sitting;1 lb free weight;other (see comments) 15x3  -     Row Name 02/11/21 1214          Wrist (Therapeutic Exercise)    Wrist (Therapeutic Exercise)  strengthening exercise  -     Wrist Strengthening (Therapeutic Exercise)  left;right;extension;flexion;1 lb free weight;other (see comments) 15x3  -     Row Name 02/11/21 1214          Bathing    Comment (Bathing)  NT this date pt did not want to bathe fully this am, dry skin, but says she def will tomorrow  -     Row Name 02/11/21 1214          Upper Body Dressing    [place] Level (Upper Body Dressing)  upper body dressing skills;doff;don;front opening garment;minimum assist (75% or more patient effort);set up  assistance;pull over garment;supervision  -KP     Position (Upper Body Dressing)  supported sitting  -KP     Set-up Assistance (Upper Body Dressing)  obtain clothing  -KP     Comment (Upper Body Dressing)  A w front open garment  -KP     Row Name 02/11/21 1214          Lower Body Dressing    [place] Level (Lower Body Dressing)  doff;don;pants/bottoms;socks;shoes/slippers;underwear;set up;verbal cues;moderate assist (50% patient effort)  -KP     Position (Lower Body Dressing)  supported sitting;supported standing  -KP     Set-up Assistance (Lower Body Dressing)  obtain clothing  -KP     Comment (Lower Body Dressing)  pt did not want to use AE this date, tired.  -     Row Name 02/11/21 1214          Grooming    [place] Level (Grooming)  grooming skills;hair care, combing/brushing;oral care regimen;wash face, hands;set up;standby assist  -KP     Position (Grooming)  sink side  -KP     Set-up Assistance (Grooming)  obtain supplies  -     Row Name 02/11/21 1214          Toileting    [place] Level (Toileting)  toileting skills;adjust/manage clothing;set up assistance;minimum assist (75% patient effort);perform perineal hygiene  -     Assistive Device Use (Toileting)  grab bar/safety frame  -     Position (Toileting)  supported standing;supported sitting  -     Set-up Assistance (Toileting)  obtain supplies  -     Row Name 02/11/21 1214          Positioning and Restraints    Pre-Treatment Position  sitting in chair/recliner  -     Post Treatment Position  wheelchair  -KP     In Wheelchair  sitting;call light within reach;encouraged to call for assist;exit alarm on  -     Row Name 02/11/21 1214          Daily Progress Summary (OT)    Overall Progress Toward Functional Goals (OT)  progressing toward functional goals as expected  -       User Key  (r) = Recorded By, (t) = Taken By, (c) = Cosigned By    Initials Name Effective Dates     Mame Nelson, OTR 06/08/18 -             Occupational Therapy Education                 Title: PT OT SLP Therapies (Done)     Topic: Occupational Therapy (Done)     Point: ADL training (Done)     Description:   Instruct learner(s) on proper safety adaptation and remediation techniques during self care or transfers.   Instruct in proper use of assistive devices.              Learning Progress Summary           Patient Acceptance, E,TB, VU by JULIANO at 2/6/2021 1534    Acceptance, E,TB, VU,DU by  at 2/4/2021 1640    Comment: ed pt on role of OT, benefit of therapy, POC w. OT ed on safety w ADLs and tsf techniques. pt demo w. min A for tsf and more A w. LBD and SBA UBD                   Point: Home exercise program (Done)     Description:   Instruct learner(s) on appropriate technique for monitoring, assisting and/or progressing therapeutic exercises/activities.              Learning Progress Summary           Patient Acceptance, E,TB, VU by JULIANO at 2/6/2021 1534                   Point: Precautions (Done)     Description:   Instruct learner(s) on prescribed precautions during self-care and functional transfers.              Learning Progress Summary           Patient Acceptance, E,TB, VU by JULIANO at 2/6/2021 1534    Acceptance, E,TB, VU,DU by  at 2/4/2021 1640    Comment: ed pt on role of OT, benefit of therapy, POC w. OT ed on safety w ADLs and tsf techniques. pt demo w. min A for tsf and more A w. LBD and SBA UBD                   Point: Body mechanics (Done)     Description:   Instruct learner(s) on proper positioning and spine alignment during self-care, functional mobility activities and/or exercises.              Learning Progress Summary           Patient Acceptance, E,TB, VU by JULIANO at 2/6/2021 1534    Acceptance, E,TB, VU,DU by  at 2/4/2021 1640    Comment: ed pt on role of OT, benefit of therapy, POC w. OT ed on safety w ADLs and tsf techniques. pt demo w. min A for tsf and more A w. LBD and SBA UBD                               User Key      Initials Effective Dates Name Provider Type Discipline     06/08/18 -  Mame Nelson OTR Occupational Therapist OT    JULIANO 07/15/20 -  Sandra Souza OT Occupational Therapist OT                    OT Recommendation and Plan            Daily Progress Summary (OT)  Overall Progress Toward Functional Goals (OT): progressing toward functional goals as expected            Time Calculation:     Time Calculation- OT     Row Name 02/11/21 1220             Time Calculation- OT    OT Start Time  1030  -      OT Stop Time  1130  -      OT Time Calculation (min)  60 min  -        User Key  (r) = Recorded By, (t) = Taken By, (c) = Cosigned By    Initials Name Provider Type     Mame Nelson, OTLEONCIO Occupational Therapist        Therapy Charges for Today     Code Description Service Date Service Provider Modifiers Qty    90567467875 HC OT SELF CARE/MGMT/TRAIN EA 15 MIN 2/11/2021 Mame Nelson OTR GO 2    85527187025 HC OT THER PROC EA 15 MIN 2/11/2021 Mame Nelson OTR GO 2                   HARI Diallo  2/11/2021

## 2021-02-11 NOTE — THERAPY PROGRESS REPORT/RE-CERT
Inpatient Rehabilitation - Physical Therapy Progress Note       [facility]     Patient Name: Marianne Delgado  : 1925  MRN: [id number]    Today's Date: 2021                    Admit Date: 2/3/2021      Visit Dx: No diagnosis found.    Patient Active Problem List   Diagnosis   • Arthritis   • Stage 4 chronic kidney disease (CMS/HCC)   • Debility   • Foot pain, bilateral   • Glaucoma   • Acute idiopathic gout of right ankle   • Hematuria   • Essential hypertension   • Acquired hypothyroidism   • Osteopenia   • Psoriasis   • Rosacea   • Vitamin D deficiency   • Medicare annual wellness visit, subsequent   • Morbidly obese (CMS/[facility])   • Cerebrovascular accident (CVA) (CMS/[facility])   • Hypertensive urgency   • Stroke (cerebrum) (CMS/[facility])       Past Medical History:   Diagnosis Date   • Acute sinusitis    • Acute upper respiratory infection    • Arthritis    • CKD (chronic kidney disease)    • Debility    • Fatigue    • Foot pain, bilateral    • Glaucoma    • Gout    • Hematuria    • High blood pressure    • Hypertension    • Hypothyroid 1999   • Hypothyroidism    • IBS (irritable bowel syndrome)    • IGT (impaired glucose tolerance)    • Malaise and fatigue    • Medication management    • Melanoma (CMS/[facility]) 1979    left leg   • OA (osteoarthritis)    • Osteopenia 1999   • Painful joint    • Psoriasis    • Renal failure    • Rosacea    • Vitamin D deficiency        Past Surgical History:   Procedure Laterality Date   • CATARACT EXTRACTION     • FOOT SURGERY      mauri toe surgery   • OTHER SURGICAL HISTORY      Melanoma Excision: Left leg          PT ASSESSMENT (last 12 hours)      IRF PT Evaluation and Treatment     Row Name 21 1245 21 0945       PT Time and Intention    Document Type  progress note  (Pended)   -AB  daily treatment  -MD    Mode of Treatment  physical therapy  (Pended)   -AB  physical therapy  -MD    Patient/Family/Caregiver Comments/Observations  Pt seated in . States  she is feeling more energize this morning and got much more sleep than previous nights.  (Pended)   -AB  Pt supine in bed showing no signs of acute distress.    -MD    Row Name 02/11/21 1245          General Information    Existing Precautions/Restrictions  fall  (Pended)   -AB     Row Name 02/11/21 1245          Cognition/Psychosocial    Affect/Mental Status (Cognitive)  WFL  (Pended)   -AB     Orientation Status (Cognition)  oriented x 4  (Pended)   -AB     Follows Commands (Cognition)  WFL  (Pended)   -AB     Personal Safety Interventions  gait belt;fall prevention program maintained  (Pended)   -AB     Row Name 02/11/21 1245 02/11/21 0945       Pain Scale: Numbers Pre/Post-Treatment    Pretreatment Pain Rating  0/10 - no pain  (Pended)   -AB  0/10 - no pain  -MD    Posttreatment Pain Rating  0/10 - no pain  (Pended)   -AB  --    Row Name 02/11/21 1245 02/11/21 0945       Bed Mobility    Supine-Sit [place] (Bed Mobility)  --  standby assist  -MD    Assistive Device (Bed Mobility)  --  bed rails;head of bed elevated  -MD    Comment (Bed Mobility)  Patient was in WC for beginning of session  (Pended)   -AB  --    Row Name 02/11/21 1245 02/11/21 0945       Transfers    Sit-Stand [place] (Transfers)  contact guard;standby assist;set up  (Pended)   -AB  verbal cues;standby assist  -MD    Stand-Sit [place] (Transfers)  standby assist;contact guard;set up  (Pended)   -AB  verbal cues;standby assist  -MD    Row Name 02/11/21 1245 02/11/21 0945       Sit-Stand Transfer    Assistive Device (Sit-Stand Transfers)  wheelchair;walker, 4-wheeled  (Pended)   -AB  walker, 4-wheeled  -MD    Row Name 02/11/21 1245 02/11/21 0945       Stand-Sit Transfer    Assistive Device (Stand-Sit Transfers)  wheelchair;walker, 4-wheeled  (Pended)   -AB  walker, 4-wheeled  -MD    [facility] Name 02/11/21 0945          Car Transfer    [place] Level (Car Transfer)  verbal cues;contact guard;minimum assist (75% patient effort)  -MD      "Assistive Device (Car Transfer)  walker, 4-wheeled  -MD     Row Name 02/11/21 1245 02/11/21 0945       Gait/Stairs (Locomotion)    [place] Level (Gait)  standby assist  (Pended)   -AB  contact guard;standby assist  -MD    Assistive Device (Gait)  walker, 4-wheeled  (Pended)   -AB  walker, 4-wheeled  -MD    Distance in Feet (Gait)  180' and 15'  (Pended)   -AB  160'x1 and 200'x1  -MD    Pattern (Gait)  step-through  (Pended)   -AB  step-through  -MD    Deviations/Abnormal Patterns (Gait)  tho decreased  (Pended)   -AB  gait speed decreased;stride length decreased  -MD    Bilateral Gait Deviations  forward flexed posture  (Pended)   -AB  forward flexed posture  -MD    Row Name 02/11/21 1245          Safety Issues, Functional Mobility    Impairments Affecting Function (Mobility)  endurance/activity tolerance;strength  (Pended)   -AB     Row Name 02/11/21 1245 02/11/21 0945       Balance    Balance Assessment  --  hip strategy assessment  -MD    Dynamic Standing Balance  supported  (Pended)   -AB  --    Balance Interventions  standing;dynamic;dynamic reaching  (Pended)   -AB  --    Comment, Balance  Performing reaching into a art machine with use of tongs for 20 bean bags. Use UE on machine for support. No LOB observed.   (Pended)   -AB  --    Row Name 02/11/21 0945          Hip Strategy (Balance)    Hip Strategy Assessment (Balance)  standing in // bars: toe taps onto 2\" step w CGA then step ups onto 2\" step w CGA.  10 reps on each LE  for each exercise  -MD     Promotion Techniques, Hip Strategy (Balance)  other (see comments)  -MD     Row Name 02/11/21 0945          Hip (Therapeutic Exercise)    Hip Strengthening (Therapeutic Exercise)  bilateral;marching while seated 15 reps  -MD     Row Name 02/11/21 0945          Knee (Therapeutic Exercise)    Knee Strengthening (Therapeutic Exercise)  right;hamstring curls;red;resistance band;left;SAQ (short arc quad) 15 reps  -MD     Row Name 02/11/21 1245          " Transfer Goal 1 (PT-IRF)    Activity/Assistive Device (Transfer Goal 1, PT-IRF)  sit-to-stand/stand-to-sit  (Pended)   -AB     [place] Level (Transfer Goal 1, PT-IRF)  supervision required  (Pended)   -AB     Time Frame (Transfer Goal 1, PT-IRF)  1 week  (Pended)   -AB     Progress/Outcomes (Transfer Goal 1, PT-IRF)  goal ongoing  (Pended)   -AB     Row Name 02/11/21 1245          Transfer Goal 2 (PT-IRF)    Activity/Assistive Device (Transfer Goal 2, PT-IRF)  car transfer  (Pended)  w/ rollator   -AB     [place] Level (Transfer Goal 2, PT-IRF)  contact guard assist  (Pended)   -AB     Progress/Outcomes (Transfer Goal 2, PT-IRF)  goal met  (Pended)   -AB     Row Name 02/11/21 1245          Gait/Walking Locomotion Goal 1 (PT-IRF)    Activity/Assistive Device (Gait/Walking Locomotion Goal 1, PT-IRF)  gait (walking locomotion);other (see comments)  (Pended)  w/ rollator  -AB     Gait/Walking Locomotion Distance Goal 1 (PT-IRF)  160'  (Pended)   -AB     [place] Level (Gait/Walking Locomotion Goal 1, PT-IRF)  supervision required  (Pended)   -AB     Time Frame (Gait/Walking Locomotion Goal 1, PT-IRF)  1 week  (Pended)   -AB     Progress/Outcomes (Gait/Walking Locomotion Goal 1, PT-IRF)  goal revised this date  (Pended)  able to perform 160' w/ CGA  -AB     Row Name 02/11/21 1245 02/11/21 0945       Positioning and Restraints    Pre-Treatment Position  other (comment)  (Pended)  in WC  -AB  sitting in chair/recliner  -MD    Post Treatment Position  wheelchair  (Pended)   -AB  wheelchair  -MD    In Wheelchair  call light within reach;exit alarm on  (Pended)   -AB  sitting;call light within reach;exit alarm on  -MD    Row Name 02/11/21 1245          Weekly Progress Summary (PT)    Weekly Progress Summary (PT)  Pt demonstrates improved ambulation endurance and tolerance with PT intervention. LE muscular endurance also steadily improving. Current concerns are full curve navigation and still needing at times CGA  for transfers. Thus it is recommended that patient continue to engage in skilled PT to address impairments and work towards established goals.   (Pended)   -AB       User Key  (r) = Recorded By, (t) = Taken By, (c) = Cosigned By    Initials Name Provider Type    Katya Irving, PT Physical Therapist    Damon Caba, PT Student PT Student           Physical Therapy Education                 Title: PT OT SLP Therapies (Done)     Topic: Physical Therapy (Done)     Point: Mobility training (Done)     Learning Progress Summary           Patient Acceptance, E, VU by AB at 2/11/2021 1415    Comment: Educated pt about pacing and activity tolerance strategies to manage fatigue    Acceptance, E, VU by AB at 2/10/2021 1144    Comment: Pt educated about dynamic balance and improved gait mechanics.    Acceptance, E,TB, VU,NR by MS at 2/8/2021 1521    Acceptance, E, DU,NR by LB at 2/6/2021 1514                   Point: Home exercise program (Done)     Learning Progress Summary           Patient Acceptance, E, VU by AB at 2/11/2021 1415    Comment: Educated pt about pacing and activity tolerance strategies to manage fatigue    Acceptance, E, VU by AB at 2/10/2021 1144    Comment: Pt educated about dynamic balance and improved gait mechanics.    Acceptance, E,TB, VU,NR by MS at 2/8/2021 1521                   Point: Body mechanics (Done)     Learning Progress Summary           Patient Acceptance, E, VU by AB at 2/11/2021 1415    Comment: Educated pt about pacing and activity tolerance strategies to manage fatigue    Acceptance, E, VU by AB at 2/10/2021 1144    Comment: Pt educated about dynamic balance and improved gait mechanics.    Acceptance, E,TB, VU,NR by MS at 2/8/2021 1521                   Point: Precautions (Done)     Learning Progress Summary           Patient Acceptance, E, VU by AB at 2/11/2021 1415    Comment: Educated pt about pacing and activity tolerance strategies to manage fatigue    Acceptance, E, VU by MD at  "2/11/2021 0949    Acceptance, E, VU by AB at 2/10/2021 1144    Comment: Pt educated about dynamic balance and improved gait mechanics.    Acceptance, E, VU by MD at 2/9/2021 0941    Acceptance, E,TB, VU,NR by MS at 2/8/2021 1521    Acceptance, E, VU by MD at 2/5/2021 1049    Acceptance, E, VU by MD at 2/4/2021 1551                               User Key     Initials Effective Dates Name Provider Type Discipline    LB 06/08/18 -  Chely Perkins, PT Physical Therapist PT    MD 04/03/18 -  Katya Bar, PT Physical Therapist PT    MS 03/04/19 -  Annalise Neil, PT Physical Therapist PT    AB 02/01/21 -  Damon Paulino, PT Student PT Student PT                PT Recommendation and Plan          Daily Progress Summary (PT)  Daily Progress Summary (PT): Patient is fatigued but was able to engage in therapeutic intervetions w/ appropriate rest breaks. Gait endurance improving but still impaired to PLOF. Pt was able to progress to more functional task simulating home (simulated walking to  \"tray\" and bring it back to her room). Pt would continue to benefit from skilled therapy to address impairments and progress to established goals.                Time Calculation:     PT Charges     Row Name 02/11/21 1244 02/11/21 0945          Time Calculation    Start Time  1230  (Pended)   -AB  0930  -MD     Stop Time  1300  (Pended)   -AB  1030  -MD     Time Calculation (min)  30 min  (Pended)   -AB  60 min  -MD     PT Received On  02/11/21  (Pended)   -AB  02/11/21  -MD     PT - Next Appointment  02/12/21  (Pended)   -AB  02/12/21  -MD       User Key  (r) = Recorded By, (t) = Taken By, (c) = Cosigned By    Initials Name Provider Type    Katya Irving, PT Physical Therapist    AB Damon Paulino, PT Student PT Student          Therapy Charges for Today     Code Description Service Date Service Provider Modifiers Qty    79587169917 HC GAIT TRAINING EA 15 MIN 2/10/2021 Damon Paulino, PT Student GP 3    95047871588 HC PT THER PROC " EA 15 MIN 2/10/2021 Damon Paulino, PT Student GP 3    [id number]  GAIT TRAINING EA 15 MIN 2/11/2021 Damon Paulino, PT Student GP 1    [id number]  PT THERAPEUTIC ACT EA 15 MIN 2/11/2021 Damon Paulino, PT Student GP 1      Patient was intermittently wearing a face mask during this therapy encounter. Therapist used appropriate personal protective equipment including eye protection, mask, and gloves.  Mask used was standard procedure mask. Appropriate PPE was worn during the entire therapy session. Hand hygiene was completed before and after therapy session. Patient is not in enhanced droplet precautions. Katya Bar, PT supervised and was present throughout treatment session.             Damon Paulino PT Student  2/11/2021

## 2021-02-11 NOTE — PROGRESS NOTES
Inpatient Rehabilitation Plan of Care Note    Plan of Care  Care Plan Reviewed - No updates at this time.    Safety    Performed Intervention(s)  Falls precautions/protocol  Safety rounds      Pain    Performed Intervention(s)  Offer medication when needed  Apply heating pad or cream to area when needed      Body Systems    Performed Intervention(s)  Daily skin inspection    Signed by: Julian De La Cruz RN

## 2021-02-12 LAB
ALBUMIN SERPL-MCNC: 3.1 G/DL (ref 3.5–5.2)
ANION GAP SERPL CALCULATED.3IONS-SCNC: 5.6 MMOL/L (ref 5–15)
BASOPHILS # BLD AUTO: 0.02 10*3/MM3 (ref 0–0.2)
BASOPHILS NFR BLD AUTO: 0.3 % (ref 0–1.5)
BUN SERPL-MCNC: 29 MG/DL (ref 8–23)
BUN/CREAT SERPL: 17.7 (ref 7–25)
CALCIUM SPEC-SCNC: 9.1 MG/DL (ref 8.2–9.6)
CHLORIDE SERPL-SCNC: 112 MMOL/L (ref 98–107)
CO2 SERPL-SCNC: 23.4 MMOL/L (ref 22–29)
CREAT SERPL-MCNC: 1.64 MG/DL (ref 0.57–1)
DEPRECATED RDW RBC AUTO: 39.9 FL (ref 37–54)
EOSINOPHIL # BLD AUTO: 0.18 10*3/MM3 (ref 0–0.4)
EOSINOPHIL NFR BLD AUTO: 2.7 % (ref 0.3–6.2)
ERYTHROCYTE [DISTWIDTH] IN BLOOD BY AUTOMATED COUNT: 12.3 % (ref 12.3–15.4)
GFR SERPL CREATININE-BSD FRML MDRD: 29 ML/MIN/1.73
GLUCOSE BLDC GLUCOMTR-MCNC: 104 MG/DL (ref 70–130)
GLUCOSE SERPL-MCNC: 80 MG/DL (ref 65–99)
HCT VFR BLD AUTO: 35.9 % (ref 34–46.6)
HGB BLD-MCNC: 11.9 G/DL (ref 12–15.9)
IMM GRANULOCYTES # BLD AUTO: 0.05 10*3/MM3 (ref 0–0.05)
IMM GRANULOCYTES NFR BLD AUTO: 0.7 % (ref 0–0.5)
LYMPHOCYTES # BLD AUTO: 0.91 10*3/MM3 (ref 0.7–3.1)
LYMPHOCYTES NFR BLD AUTO: 13.6 % (ref 19.6–45.3)
MCH RBC QN AUTO: 29.7 PG (ref 26.6–33)
MCHC RBC AUTO-ENTMCNC: 33.1 G/DL (ref 31.5–35.7)
MCV RBC AUTO: 89.5 FL (ref 79–97)
MONOCYTES # BLD AUTO: 0.69 10*3/MM3 (ref 0.1–0.9)
MONOCYTES NFR BLD AUTO: 10.3 % (ref 5–12)
NEUTROPHILS NFR BLD AUTO: 4.82 10*3/MM3 (ref 1.7–7)
NEUTROPHILS NFR BLD AUTO: 72.4 % (ref 42.7–76)
NRBC BLD AUTO-RTO: 0 /100 WBC (ref 0–0.2)
PHOSPHATE SERPL-MCNC: 3 MG/DL (ref 2.5–4.5)
PLATELET # BLD AUTO: 234 10*3/MM3 (ref 140–450)
PMV BLD AUTO: 10.6 FL (ref 6–12)
POTASSIUM SERPL-SCNC: 4.8 MMOL/L (ref 3.5–5.2)
RBC # BLD AUTO: 4.01 10*6/MM3 (ref 3.77–5.28)
SODIUM SERPL-SCNC: 141 MMOL/L (ref 136–145)
WBC # BLD AUTO: 6.67 10*3/MM3 (ref 3.4–10.8)

## 2021-02-12 PROCEDURE — 85025 COMPLETE CBC W/AUTO DIFF WBC: CPT | Performed by: PHYSICAL MEDICINE & REHABILITATION

## 2021-02-12 PROCEDURE — 97110 THERAPEUTIC EXERCISES: CPT

## 2021-02-12 PROCEDURE — 97116 GAIT TRAINING THERAPY: CPT

## 2021-02-12 PROCEDURE — 82962 GLUCOSE BLOOD TEST: CPT

## 2021-02-12 PROCEDURE — 97530 THERAPEUTIC ACTIVITIES: CPT

## 2021-02-12 PROCEDURE — 80069 RENAL FUNCTION PANEL: CPT | Performed by: PHYSICAL MEDICINE & REHABILITATION

## 2021-02-12 PROCEDURE — 97535 SELF CARE MNGMENT TRAINING: CPT

## 2021-02-12 RX ORDER — AMLODIPINE BESYLATE 5 MG/1
5 TABLET ORAL
Status: DISCONTINUED | OUTPATIENT
Start: 2021-02-13 | End: 2021-02-15

## 2021-02-12 RX ORDER — AMLODIPINE BESYLATE 2.5 MG/1
2.5 TABLET ORAL ONCE
Status: COMPLETED | OUTPATIENT
Start: 2021-02-12 | End: 2021-02-12

## 2021-02-12 RX ADMIN — LEVOTHYROXINE SODIUM 150 MCG: 150 TABLET ORAL at 06:01

## 2021-02-12 RX ADMIN — Medication 1000 UNITS: at 08:36

## 2021-02-12 RX ADMIN — AMLODIPINE BESYLATE 2.5 MG: 2.5 TABLET ORAL at 08:36

## 2021-02-12 RX ADMIN — TIMOLOL MALEATE: 5 SOLUTION/ DROPS OPHTHALMIC at 08:36

## 2021-02-12 RX ADMIN — Medication 1000 UNITS: at 20:19

## 2021-02-12 RX ADMIN — LATANOPROST 1 DROP: 50 SOLUTION/ DROPS OPHTHALMIC at 20:20

## 2021-02-12 RX ADMIN — TIMOLOL MALEATE: 5 SOLUTION/ DROPS OPHTHALMIC at 20:20

## 2021-02-12 RX ADMIN — CLOPIDOGREL 75 MG: 75 TABLET, FILM COATED ORAL at 08:35

## 2021-02-12 RX ADMIN — ATORVASTATIN CALCIUM 80 MG: 80 TABLET, FILM COATED ORAL at 20:20

## 2021-02-12 RX ADMIN — BETAMETHASONE DIPROPIONATE: 0.5 OINTMENT TOPICAL at 20:20

## 2021-02-12 RX ADMIN — Medication 250 MCG: at 08:36

## 2021-02-12 RX ADMIN — ASPIRIN 81 MG: 81 TABLET, COATED ORAL at 08:36

## 2021-02-12 RX ADMIN — AMLODIPINE BESYLATE 2.5 MG: 2.5 TABLET ORAL at 18:28

## 2021-02-12 RX ADMIN — METOPROLOL SUCCINATE 12.5 MG: 25 TABLET, EXTENDED RELEASE ORAL at 08:36

## 2021-02-12 RX ADMIN — NYSTATIN: 100000 POWDER TOPICAL at 08:37

## 2021-02-12 RX ADMIN — NYSTATIN: 100000 POWDER TOPICAL at 20:19

## 2021-02-12 NOTE — CODE DOCUMENTATION
Nereida craftr called, spoke with Dr Carpenter. No need for team D imaging at this time. RN will update Dr Matta and notify A of elevated BP.  No additional need for RRT at this time.

## 2021-02-12 NOTE — PROGRESS NOTES
"   LOS: 9 days   Patient Care Team:  Xenia Robert MD as PCP - General (Family Medicine)    Chief Complaint:   Status post CVA-small focus of restricted diffusion in the right parietal lobe  Impaired cognition/impaired mobility/impaired self-care  Stroke prophylaxis-aspirin and Plavix x30 days then Plavix alone.  Left knee degenerative changes-status post steroid injection February 3  Chronic kidney disease stage IV baseline creatinine around 2.0  Acquired hypothyroidism-recheck TFTs 1 March-on levothyroxine  Morbid obesity  Vitamin D deficiency  Hypertension-metoprolol/amlodipine-systolic blood pressure goal 150-160  Hyperlipidemia-atorvastatin       Subjective     History of Present Illness    Subjective    Patient had complaints of increased weakness in the left lower extremity this morning with left lower extremity feeling heaviness, per like she was dragging the left leg.  No weakness in the left arm.  No changes in her speech.  Given her symptoms of increased weakness on the left side, stroke team team was called.  Evaluated by the neurology service.  Patient was not felt to need imaging at that time and her symptoms have resolved and she reports that her strength on the left leg is back to the recent baseline.    Her blood pressure has increased over the last day, late yesterday blood pressure was 191/84 and then today 183/97.  She is on amlodipine 2.5 and metoprolol 12.5 mg daily.  Patient reviewed with internal medicine service and recommended increase amlodipine.    History taken from: patient    Objective     Vital Signs  Temp:  [97.9 °F (36.6 °C)-98.6 °F (37 °C)] 97.9 °F (36.6 °C)  Heart Rate:  [63-71] 64  Resp:  [18] 18  BP: (158-187)/(62-97) 182/75    Objective:  Vital signs: (most recent): Blood pressure (!) 182/75, pulse 64, temperature 97.9 °F (36.6 °C), temperature source Oral, resp. rate 18, height 154.9 cm (61\"), SpO2 100 %.            Physical Exam  MENTAL STATUS -  AWAKE / ALERT  HEENT- " NCAT, PUPILS EQUALLY ROUND, SCLERAE ANICTERIC,   LUNGS - CTA, NO WHEEZES, RALES OR RHONCHI  HEART- RRR, NO RUB, MURMUR, OR GALLOP  ABD - NORMOACTIVE BOWEL SOUNDS, SOFT, NT.    EXT - NO EDEMA OR CYANOSIS  NEURO -oriented x4.     MOTOR EXAM - RUE/RLE 5/5.  Takes resistance with left elbow flexion, finger flexion, knee extension, ADF  Results Review:     I reviewed the patient's new clinical results.  Results from last 7 days   Lab Units 02/12/21  0613 02/08/21  0729   WBC 10*3/mm3 6.67 6.71   HEMOGLOBIN g/dL 11.9* 12.6   HEMATOCRIT % 35.9 38.2   PLATELETS 10*3/mm3 234 267     Results from last 7 days   Lab Units 02/12/21  0613 02/10/21  0745 02/08/21  0729   SODIUM mmol/L 141 142 141   POTASSIUM mmol/L 4.8 4.6 4.8   CHLORIDE mmol/L 112* 112* 111*   CO2 mmol/L 23.4 20.1* 21.6*   BUN mg/dL 29* 38* 49*   CREATININE mg/dL 1.64* 1.79* 1.67*   CALCIUM mg/dL 9.1 9.3 9.0   GLUCOSE mg/dL 80 78 82         Ref. Range 9/15/2020 11:00 1/29/2021 10:35 1/30/2021 06:29 1/31/2021 07:10 2/5/2021 07:11   Hemoglobin A1C Latest Ref Range: 4.80 - 5.60 %  5.07      TSH Baseline Latest Ref Range: 0.270 - 4.200 uIU/mL 5.160 (H) 16.100 (H)  9.150 (H)    Free T4 Latest Ref Range: 0.93 - 1.70 ng/dL 1.39  1.14     T3, Free Latest Ref Range: 2.00 - 4.40 pg/mL    1.94 (L)    Total Cholesterol Latest Ref Range: 0 - 200 mg/dL  168      HDL Cholesterol Latest Ref Range: 40 - 60 mg/dL  44      LDL Cholesterol  Latest Ref Range: 0 - 100 mg/dL  104 (H)      VLDL Cholesterol Latest Ref Range: 5 - 40 mg/dL  20      Triglycerides Latest Ref Range: 0 - 150 mg/dL  110      Vitamin B-12 Latest Ref Range: 211 - 946 pg/mL     1,204 (H)   25 Hydroxy, Vitamin D Latest Ref Range: 30.0 - 100.0 ng/ml     39.9     Medication Review: done  Scheduled Meds:amLODIPine, 2.5 mg, Oral, Once  [START ON 2/13/2021] amLODIPine, 5 mg, Oral, Q24H  aspirin, 81 mg, Oral, Daily  atorvastatin, 80 mg, Oral, Nightly  betamethasone dipropionate, , Topical, Q24H  cholecalciferol, 1,000  Units, Oral, BID  clopidogrel, 75 mg, Oral, Daily  dorzolamide-timolol, , Both Eyes, BID  latanoprost, 1 drop, Both Eyes, Nightly  levothyroxine, 150 mcg, Oral, Daily  lidocaine, 5 mL, Intra-articular, Once  metoprolol succinate XL, 12.5 mg, Oral, Daily  nystatin, , Topical, Q12H  triamcinolone acetonide, 40 mg, Intra-articular, Once  vitamin B-12, 250 mcg, Oral, Daily      Continuous Infusions:   PRN Meds:.•  acetaminophen  •  bisacodyl  •  trolamine salicylate      Assessment/Plan       Stroke (cerebrum) (CMS/HCC)      Assessment & Plan  Status post CVA-small focus of restricted diffusion in the right parietal lobe  Adjunctive medication for stroke recovery-on atorvastatin.  Recheck vitamin D level.  Check vitamin B-12 level.     Impaired cognition/impaired mobility/impaired self-care     Stroke prophylaxis-aspirin and Plavix x30 days then Plavix alone.     Left knee degenerative changes-status post steroid injection February 3     Chronic kidney disease stage IV baseline creatinine around 2.0     Acquired hypothyroidism-recheck TFTs 1 March-on levothyroxine     Morbid obesity     Vitamin D deficiency-no home dose listed-recheck vitamin D level     Hypertension-metoprolol/amlodipine-systolic blood pressure goal 150-160  February 12-Her blood pressure has increased over the last day, late yesterday blood pressure was 191/84 and then today 183/97.  She is on amlodipine 2.5 and metoprolol 12.5 mg daily.  Patient reviewed with internal medicine service and recommended increase amlodipine.  Will give additional amlodipine 2.5 mg this afternoon increased dose to 5 mg daily     Hyperlipidemia-atorvastatin    Hypersensitivity to touch bilateral lower extremities-May possibly be neuropathy.  B12 within normal limits.  She has had hypothyroidism.  Has chronic kidney disease.    TEAM CONF - FEB 9 -  TRANSFERS CTG ROLLATOR. GAIT 160 FEET CTG ROLLATOR. TOILET TRANSFERS MIN. SHOWER TRANSFERS MIN. BATH UB SBA AND LB MIN. LBD  MOD. UBD SBA. GROOMING SBA. MODERATE DEFICITS IN EXECUTIVE FUNCTION. PATIENT REQUESTS DISCHARGE FROM SLP AS SHE FEELS SHE IS AT BASELINE. CONTINENT BOWEL AND BLADDER. LEFT KNEE DJD, S/P INJECTION LAST WEEK, PAIN IMPROVED.   ELOS -   WED.  February 17       Now admit for comprehensive acute inpatient rehabilitation .  This would be an interdisciplinary program with physical therapy 1 hour,  occupational therapy 1 hour, and speech therapy 1 hour, 5 days a week.  Rehabilitation nursing for carryover, monitoring of nutritional and blood pressure and neurologic   status, bowel and bladder, and skin  Ongoing physician follow-up.  Weekly team conferences.  Goals are to achieve a level of supervision with  mobility and self-care and improved balance.   Rehabilitation prognosis fair.  Medical prognosis fair.  Estimated length of stay is approximately 2 to 3 weeks, but is only an estimation.   The patient's functional status and clinical status is unchanged from preadmission assessment and the patient continues appropriate for acute inpatient rehabilitation.  Goal is for home with home health   therapies.  Barrier to discharge: Impaired mobility- work on transfers ADLs to overcome.      Ricki Matta MD  02/12/21  16:28 EST    Time:   During rounds, used appropriate personal protective equipment including mask and gloves.  Additional gown if indicated.  Mask used was standard procedure mask. Appropriate PPE was worn during the entire visit.  Hand hygiene was completed before and after.

## 2021-02-12 NOTE — PLAN OF CARE
Goal Outcome Evaluation:  Plan of Care Reviewed With: patient  Progress: improving  Outcome Summary: Kpad on feet/lower legs for pain. Declines Tylenol. Meds with thin liquids. Urgency with bm but was continent formed bm. Brief on at night per pt request.

## 2021-02-12 NOTE — CODE DOCUMENTATION
"RRT called for worsening weakness in left leg. RN reports pt was in therapy and therapist noted increased weakness and notified RN. RN called and talked with Dr Alexander who requested Team D. RN also reports higher BP despite BP meds given this am. Upon arrival pt is awake, alert and oriented x4. Neuro assessment and NIH per LUCERO Mi RN.  Pt reports left left feels \"stiff and heavy\"  "

## 2021-02-12 NOTE — CONSULTS
CONSULT NOTE    INTERNAL MEDICINE   [facility]       Patient Identification:  Name: Marianne Delgado  Age: 89 y.o.  Sex: female  :  1925  MRN: [id number]             Date of Consultation:  2021        Primary Care Physician: Xenia Robert MD                               Requesting Physician: Dr Matta  Reason for Consultation:htn    Chief Complaint: High blood pressure    History of Present Illness:          The patient is a 89-year-old white female with history of recent acute stroke, chronic kidney disease, gout, hypertension, hypothyroidism, osteoarthritis and renal insufficiency who was admitted to rehab for rehabilitation from acute stroke.  Medicine was asked to see the patient for some high blood pressure.  Patient has been off most of her medicine due to stroke wanting to let the pressure run a little higher.  The patient had some strokelike symptoms earlier today and blood pressure was above 180 systolic.  The patient was started on some amlodipine 2.5 mg and medicine was consulted for further evaluation treatment.      Past Medical History:  Past Medical History:   Diagnosis Date   • Acute sinusitis    • Acute upper respiratory infection    • Arthritis    • CKD (chronic kidney disease)    • Debility    • Fatigue    • Foot pain, bilateral    • Glaucoma    • Gout    • Hematuria    • High blood pressure    • Hypertension    • Hypothyroid 1999   • Hypothyroidism    • IBS (irritable bowel syndrome)    • IGT (impaired glucose tolerance)    • Malaise and fatigue    • Medication management    • Melanoma (CMS/HCC) 1979    left leg   • OA (osteoarthritis)    • Osteopenia 1999   • Painful joint    • Psoriasis    • Renal failure    • Rosacea    • Vitamin D deficiency      Past Surgical History:  Past Surgical History:   Procedure Laterality Date   • CATARACT EXTRACTION     • FOOT SURGERY      mauri toe surgery   • OTHER SURGICAL HISTORY      Melanoma Excision: Left leg       Home Meds:  Medications Prior to Admission   Medication Sig Dispense Refill Last Dose   • amLODIPine (Norvasc) 10 MG tablet Take 1 tablet by mouth Daily. 90 tablet 3    • aspirin 81 MG chewable tablet Chew 81 mg Daily.      • betamethasone valerate (VALISONE) 0.1 % cream Apply  topically to the appropriate area as directed.      • bimatoprost (LUMIGAN) 0.01 % ophthalmic drops 1 drop Every Night.      • Clobetasol Propionate Emulsion 0.05 % topical foam Apply 1 application topically to the appropriate area as directed 2 (Two) Times a Day. 100 g 11    • clotrimazole (LOTRIMIN) 1 % cream Apply  topically to the appropriate area as directed 2 (Two) Times a Day. 113 g 5    • Cyanocobalamin (VITAMIN B-12 PO) Take  by mouth.      • dorzolamide-timolol (COSOPT) 22.3-6.8 MG/ML ophthalmic solution 1 drop 2 (Two) Times a Day.      • indapamide (LOZOL) 1.25 MG tablet       • levothyroxine (SYNTHROID, LEVOTHROID) 137 MCG tablet Take 1 tablet by mouth Daily. 90 tablet 1    • metoprolol succinate XL (Toprol XL) 25 MG 24 hr tablet Take 1 tablet by mouth Daily. 90 tablet 3    • metroNIDAZOLE (METROCREAM) 0.75 % cream Apply 1 application topically to the appropriate area as directed 2 (Two) Times a Day As Needed (rash). 1 each 5    • multivitamin with minerals (CENTRUM SILVER PO) Take 1 tablet by mouth Daily.      • nystatin (nystatin) 438034 UNIT/GM powder Apply  topically to the appropriate area as directed 4 (Four) Times a Day.      • triamcinolone (KENALOG) 0.1 % cream Apply  topically to the appropriate area as directed.      • VITAMIN D PO Take  by mouth.        Current Meds:     Current Facility-Administered Medications:   •  acetaminophen (TYLENOL) tablet 650 mg, 650 mg, Oral, Q6H PRN, Ricki Matta MD  •  amLODIPine (NORVASC) tablet 2.5 mg, 2.5 mg, Oral, Once, Ricki Matta MD  •  [START ON 2/13/2021] amLODIPine (NORVASC) tablet 5 mg, 5 mg, Oral, Q24H, Ricki Matta MD  •  aspirin EC tablet 81  mg, 81 mg, Oral, Daily, Rciki Matta MD, 81 mg at 02/12/21 0836  •  atorvastatin (LIPITOR) tablet 80 mg, 80 mg, Oral, Nightly, Ricki Matta MD, 80 mg at 02/11/21 2033  •  betamethasone dipropionate (DIPROLENE) 0.05 % ointment, , Topical, Q24H, Ricki Matta MD, 1 application at 02/11/21 2033  •  bisacodyl (DULCOLAX) suppository 10 mg, 10 mg, Rectal, Daily PRN, Ricki Matta MD  •  cholecalciferol (VITAMIN D3) tablet 1,000 Units, 1,000 Units, Oral, BID, Ricki Matta MD, 1,000 Units at 02/12/21 0836  •  clopidogrel (PLAVIX) tablet 75 mg, 75 mg, Oral, Daily, Ricki Matta MD, 75 mg at 02/12/21 0835  •  dorzolamide (TRUSOPT) 2 % 1 drop, timolol (TIMOPTIC) 0.5 % 1 drop for Cosopt 22.3-6.8 mg/mL, , Both Eyes, BID, Ricki Matta MD, Given at 02/12/21 0836  •  latanoprost (XALATAN) 0.005 % ophthalmic solution 1 drop, 1 drop, Both Eyes, Nightly, Ricki Matta MD, 1 drop at 02/11/21 2032  •  levothyroxine (SYNTHROID, LEVOTHROID) tablet 150 mcg, 150 mcg, Oral, Daily, Ricki Matta MD, 150 mcg at 02/12/21 0601  •  lidocaine (XYLOCAINE) 1 % injection 5 mL, 5 mL, Intra-articular, Once, Ricki Matta MD  •  metoprolol succinate XL (TOPROL-XL) 24 hr tablet 12.5 mg, 12.5 mg, Oral, Daily, Ricki Matta MD, 12.5 mg at 02/12/21 0836  •  nystatin (MYCOSTATIN) powder, , Topical, Q12H, Ricki Matta MD, Given at 02/12/21 0837  •  triamcinolone acetonide (KENALOG-40) injection 40 mg, 40 mg, Intra-articular, Once, Ricki Matta MD  •  trolamine salicylate (ASPERCREME) 10 % cream, , Topical, PRN, Ricki Matta MD  •  vitamin B-12 (CYANOCOBALAMIN) tablet 250 mcg, 250 mcg, Oral, Daily, Ricki Matta MD, 250 mcg at 02/12/21 0836  Allergies:  Allergies   Allergen Reactions   • Azithromycin Diarrhea   • Cefdinir Nausea Only   • Doxycycline Monohydrate Nausea Only   • Allopurinol Rash     Psoriasis     Social  "History:   Social History     Socioeconomic History   • Marital status:      Spouse name: Not on file   • Number of children: Not on file   • Years of education: Not on file   • Highest education level: Not on file   Tobacco Use   • Smoking status: Never Smoker   • Smokeless tobacco: Never Used   Substance and Sexual Activity   • Alcohol use: Yes     Alcohol/week: 2.0 standard drinks     Types: 1 Glasses of wine, 1 Shots of liquor per week     Comment: occasionally   • Drug use: No     Family History:  Family History   Problem Relation Age of Onset   • Heart attack Mother    • Kidney failure Father    • Heart disease Sister    • Leukemia Brother    • Heart disease Sister    • Heart disease Sister    • Heart disease Other         FH in females b/f 65 and males b/f 55          Review of Systems  See history of present illness and past medical history.  Patient denies headache, dizziness, syncope, falls, trauma, change in vision, change in hearing, change in taste, changes in weight, changes in appetite,.  Patient denies chest pain, palpitations, dyspnea, orthopnea, PND, cough, sinus pressure, rhinorrhea, epistaxis, hemoptysis, nausea, vomiting,hematemesis, diarrhea, constipation or hematchezia.  Denies cold or heat intolerance, polydipsia, polyuria, polyphagia. Denies hematuria, pyuria, dysuria, hesitancy, frequency or urgency.   Denies fever, chills, sweats, night sweats.   Remainder of ROS is negative.      Vitals:   BP (!) 182/75 (BP Location: Right arm)   Pulse 64   Temp 97.9 °F (36.6 °C) (Oral)   Resp 18   Ht 154.9 cm (61\")   SpO2 100%   BMI 36.74 kg/m²   I/O:     Intake/Output Summary (Last 24 hours) at 2/12/2021 1730  Last data filed at 2/12/2021 1310  Gross per 24 hour   Intake 440 ml   Output --   Net 440 ml     Exam:  General Appearance:    Alert, cooperative, no distress, appears stated age   Head:    Normocephalic, without obvious abnormality, atraumatic   Eyes:    PERRL, conjunctivae/corneas " clear, EOM's intact, both eyes   Ears:    Normal external ear canals, both ears   Nose:   Nares normal, septum midline, mucosa normal, no drainage    or sinus tenderness   Throat:   Lips, tongue, gums normal; oral mucosa pink and moist   Neck:   Supple, symmetrical, trachea midline, no adenopathy;     thyroid:  no enlargement/tenderness/nodules; no carotid    bruit or JVD   Back:     Symmetric, no curvature, ROM normal, no CVA tenderness   Lungs:     Clear to auscultation bilaterally, respirations unlabored   Chest Wall:    No tenderness or deformity    Heart:    Regular rate and rhythm, S1 and S2 normal, no murmur, rub   or gallop   Abdomen:     Soft, nontender, bowel sounds active all four quadrants,     no masses, no hepatomegaly, no splenomegaly   Extremities:   Extremities normal, atraumatic, no cyanosis or edema   Pulses:   Pulses palpable in all extremities; symmetric all extremities   Skin:   Skin color normal, Skin is warm and dry,  no rashes or palpable lesions   Neurologic:  She is a little weak from stroke.     Data Review:  WBC   Date Value Ref Range Status   02/12/2021 6.67 3.40 - 10.80 10*3/mm3 Final     Hemoglobin   Date Value Ref Range Status   02/12/2021 11.9 (L) 12.0 - 15.9 g/dL Final     Hematocrit   Date Value Ref Range Status   02/12/2021 35.9 34.0 - 46.6 % Final     Platelets   Date Value Ref Range Status   02/12/2021 234 140 - 450 10*3/mm3 Final     Sodium   Date Value Ref Range Status   02/12/2021 141 136 - 145 mmol/L Final     Potassium   Date Value Ref Range Status   02/12/2021 4.8 3.5 - 5.2 mmol/L Final     Chloride   Date Value Ref Range Status   02/12/2021 112 (H) 98 - 107 mmol/L Final     CO2   Date Value Ref Range Status   02/12/2021 23.4 22.0 - 29.0 mmol/L Final     BUN   Date Value Ref Range Status   02/12/2021 29 (H) 8 - 23 mg/dL Final     Creatinine   Date Value Ref Range Status   02/12/2021 1.64 (H) 0.57 - 1.00 mg/dL Final     Glucose   Date Value Ref Range Status   02/12/2021 80  65 - 99 mg/dL Final     Calcium   Date Value Ref Range Status   02/12/2021 9.1 8.2 - 9.6 mg/dL Final     Phosphorus   Date Value Ref Range Status   02/12/2021 3.0 2.5 - 4.5 mg/dL Final     No results found for: AST, ALT, ALKPHOS  No results found for: APTT, INR  No results found for: COLORU, CLARITYU, SPECGRAV, PHUR, PROTEINUR, GLUCOSEU, KETONESU, BLOODU, NITRITE, LEUKOCYTESUR, BILIRUBINUR, UROBILINOGEN, RBCUA, WBCUA, BACTERIA, UACOMMENT  No results found for: TROPONINT, TROPONINI, BNP  No components found for: HGBA1C;2  No components found for: TSH;2            Imaging Results (Last 7 Days)     ** No results found for the last 168 hours. **        Past Medical History:   Diagnosis Date   • Acute sinusitis    • Acute upper respiratory infection    • Arthritis    • CKD (chronic kidney disease)    • Debility    • Fatigue    • Foot pain, bilateral    • Glaucoma    • Gout    • Hematuria    • High blood pressure    • Hypertension    • Hypothyroid 09/1999   • Hypothyroidism    • IBS (irritable bowel syndrome)    • IGT (impaired glucose tolerance)    • Malaise and fatigue    • Medication management    • Melanoma (CMS/HCC) 1979    left leg   • OA (osteoarthritis)    • Osteopenia 09/1999   • Painful joint    • Psoriasis    • Renal failure    • Rosacea    • Vitamin D deficiency        Assessment:  Active Hospital Problems    Diagnosis  POA   • **Stroke (cerebrum) (CMS/[facility]) [I63.9]  Yes   • Stage 4 chronic kidney disease (CMS/HCC) [N18.4]  Yes   • Arthritis [M19.90]  Yes   • Essential hypertension [I10]  Yes   • Acquired hypothyroidism [E03.9]  Yes      Resolved Hospital Problems   No resolved problems to display.       Plan:  Agree with starting small dose of amlodipine and medicine will follow for blood pressure management.  Ideally would be better to get her pressure under 180 systolic.    Jose Mosher MD   2/12/2021  17:30 EST

## 2021-02-12 NOTE — THERAPY TREATMENT NOTE
Inpatient Rehabilitation - Physical Therapy Treatment Note       [facility]     Patient Name: Marianne Delgado  : 1925  MRN: [id number]    Today's Date: 2021                    Admit Date: 2/3/2021      Visit Dx: No diagnosis found.    Patient Active Problem List   Diagnosis   • Arthritis   • Stage 4 chronic kidney disease (CMS/[facility])   • Debility   • Foot pain, bilateral   • Glaucoma   • Acute idiopathic gout of right ankle   • Hematuria   • Essential hypertension   • Acquired hypothyroidism   • Osteopenia   • Psoriasis   • Rosacea   • Vitamin D deficiency   • Medicare annual wellness visit, subsequent   • Morbidly obese (CMS/[facility])   • Cerebrovascular accident (CVA) (CMS/[facility])   • Hypertensive urgency   • Stroke (cerebrum) (CMS/[facility])       Past Medical History:   Diagnosis Date   • Acute sinusitis    • Acute upper respiratory infection    • Arthritis    • CKD (chronic kidney disease)    • Debility    • Fatigue    • Foot pain, bilateral    • Glaucoma    • Gout    • Hematuria    • High blood pressure    • Hypertension    • Hypothyroid 1999   • Hypothyroidism    • IBS (irritable bowel syndrome)    • IGT (impaired glucose tolerance)    • Malaise and fatigue    • Medication management    • Melanoma (CMS/[facility]) 1979    left leg   • OA (osteoarthritis)    • Osteopenia 1999   • Painful joint    • Psoriasis    • Renal failure    • Rosacea    • Vitamin D deficiency        Past Surgical History:   Procedure Laterality Date   • CATARACT EXTRACTION     • FOOT SURGERY      mauri toe surgery   • OTHER SURGICAL HISTORY      Melanoma Excision: Left leg          PT ASSESSMENT (last 12 hours)      [facility] PT Evaluation and Treatment     Row Name 21 0900          PT Time and Intention    Document Type  daily treatment  (Pended)   -AB     Mode of Treatment  physical therapy;individual therapy  (Pended)   -AB     Patient/Family/Caregiver Comments/Observations  Pt lying supine in bed w/ HOB elevated upon entery. Pt  "reports faitgue secondary to poor nights rest. Agrees to PT today. During therapeutic activity patient reports feeling increased heaviness in LLE. \"it feels like it did when I first came in.\" \"It feels like someone is holding my leg down.\" PM patient remarks that LLE feels better but that she is very fatigued.  (Pended)   -AB     Row Name 02/12/21 0900          Cognition/Psychosocial    Affect/Mental Status (Cognitive)  WFL  (Pended)   -AB     Orientation Status (Cognition)  oriented x 4  (Pended)   -AB     Follows Commands (Cognition)  WFL  (Pended)   -AB     Personal Safety Interventions  gait belt;fall prevention program maintained  (Pended)   -AB     Row Name 02/12/21 0900          Pain Scale: Numbers Pre/Post-Treatment    Pretreatment Pain Rating  0/10 - no pain  (Pended)   -AB     Posttreatment Pain Rating  0/10 - no pain  (Pended)   -[facility] Name 02/12/21 0900          Strength (Manual Muscle Testing)    Hip, Left (Strength)  3+/5  (Pended)   -AB     Knee, Left (Strength)  at least 3/5  (Pended)  (additionally pressure not applied due to L knee OA)  -AB     Ankle, Left (Strength)  at least 3/5  (Pended)   -AB     Row Name 02/12/21 0900          Bed Mobility    Bed Mobility  supine-sit  (Pended)   -AB     Supine-Sit [place] (Bed Mobility)  set up;minimum assist (75% patient effort)  (Pended)   -AB     Sit-Supine [place] (Bed Mobility)  set up;moderate assist (50% patient effort);2 person assist  (Pended)  w/ use of HR, for shoulder guide and LE lift.   -AB     Row Name 02/12/21 0900          Transfers    Sit-Stand [place] (Transfers)  set up;contact guard  (Pended)   -AB     Stand-Sit [place] (Transfers)  set up;contact guard  (Pended)   -[facility] Name 02/12/21 0900          Sit-Stand Transfer    Assistive Device (Sit-Stand Transfers)  walker, 4-wheeled;wheelchair  (Pended)   -AB     Row Name 02/12/21 0900          Stand-Sit Transfer    Assistive Device (Stand-Sit Transfers)  " "wheelchair;walker, 4-wheeled  (Pended)   -AB     Row Name 02/12/21 0900          Gait/Stairs (Locomotion)    [place] Level (Gait)  contact guard  (Pended)   -AB     Assistive Device (Gait)  walker, 4-wheeled  (Pended)   -AB     Distance in Feet (Gait)  160',160' in PM  (Pended)   -AB     Pattern (Gait)  step-through  (Pended)   -AB     Deviations/Abnormal Patterns (Gait)  gait speed decreased  (Pended)   -AB     Bilateral Gait Deviations  forward flexed posture  (Pended)   -AB     Comment (Gait/Stairs)  Patient gait speed notably decreased in PM compared to past days. Pt attributes this to fatigue.   (Pended)   -AB     Row Name 02/12/21 0900          Hip Strategy (Balance)    Promotion Techniques, Hip Strategy (Balance)  other (see comments)  (Pended)  Step ups onto a 2\" step. Alternating 25 Reps L/R in //  -AB     Row Name 02/12/21 0900          Hip (Therapeutic Exercise)    Hip (Therapeutic Exercise)  strengthening exercise  (Pended)   -AB     Hip Strengthening (Therapeutic Exercise)  marching while seated;aBduction;red;10 repetitions  (Pended)  10 reps x 2 sets   -AB     Row Name 02/12/21 0900          Knee (Therapeutic Exercise)    Knee (Therapeutic Exercise)  strengthening exercise  (Pended)   -AB     Knee Strengthening (Therapeutic Exercise)  bilateral;LAQ (long arc quad);sitting;hamstring curls;10 repetitions;yellow  (Pended)  against gravity for LAQ,   -AB     Row Name 02/12/21 0900          Positioning and Restraints    Pre-Treatment Position  in bed  (Pended)   -AB     Post Treatment Position  wheelchair  (Pended)   -AB     In Chair  exit alarm on;call light within reach  (Pended)   -AB     Row Name 02/12/21 0900          Vital Signs    Intra Systolic BP Rehab  183  (Pended)   -AB     Intra Treatment Diastolic BP  97  (Pended)   -AB     Intratreatment Heart Rate (beats/min)  63  (Pended)   -AB     Intra SpO2 (%)  96  (Pended)   -AB     Row Name 02/12/21 0900          Daily Progress Summary (PT)    " Daily Progress Summary (PT)  Pt reported increased fatigued today. Activity tolerance decreased for morning session. Nursing staff alerted about change in symptoms for LLE. Julian MEDINA came and took manual BP (see nursing note). Dr. Alexander informed by nursing staff. Team D called. Will monitor situation and adjust POC as needed.   (Pended)   -AB     Impairments Still Limiting Function (PT)  impaired functional activity tolerance  (Pended)   -AB     Recommendations (PT)  may trial to move pt session later into the day and break sessions into 3x30 mins as schedule allows.    (Pended)   -AB       User Key  (r) = Recorded By, (t) = Taken By, (c) = Cosigned By    Initials Name Provider Type    AB Damon Paulino, PT Student PT Student           Physical Therapy Education                 Title: PT OT SLP Therapies (Done)     Topic: Physical Therapy (Done)     Point: Mobility training (Done)     Learning Progress Summary           Patient Acceptance, E, VU,DU by AB at 2/12/2021 1428    Acceptance, E, VU by AB at 2/11/2021 1415    Comment: Educated pt about pacing and activity tolerance strategies to manage fatigue    Acceptance, E, VU by AB at 2/10/2021 1144    Comment: Pt educated about dynamic balance and improved gait mechanics.    Acceptance, E,TB, VU,NR by MS at 2/8/2021 1521    Acceptance, E, DU,NR by LB at 2/6/2021 1514                   Point: Home exercise program (Done)     Learning Progress Summary           Patient Acceptance, E, VU,DU by AB at 2/12/2021 1428    Acceptance, E, VU by AB at 2/11/2021 1415    Comment: Educated pt about pacing and activity tolerance strategies to manage fatigue    Acceptance, E, VU by AB at 2/10/2021 1144    Comment: Pt educated about dynamic balance and improved gait mechanics.    Acceptance, E,TB, VU,NR by MS at 2/8/2021 1521                   Point: Body mechanics (Done)     Learning Progress Summary           Patient Acceptance, E, VU,DU by AB at 2/12/2021 1428    Acceptance, E, VU  by AB at 2/11/2021 1415    Comment: Educated pt about pacing and activity tolerance strategies to manage fatigue    Acceptance, E, VU by AB at 2/10/2021 1144    Comment: Pt educated about dynamic balance and improved gait mechanics.    Acceptance, E,TB, VU,NR by MS at 2/8/2021 1521                   Point: Precautions (Done)     Learning Progress Summary           Patient Acceptance, E, VU,DU by AB at 2/12/2021 1428    Acceptance, E, VU by AB at 2/11/2021 1415    Comment: Educated pt about pacing and activity tolerance strategies to manage fatigue    Acceptance, E, VU by MD at 2/11/2021 0949    Acceptance, E, VU by AB at 2/10/2021 1144    Comment: Pt educated about dynamic balance and improved gait mechanics.    Acceptance, E, VU by MD at 2/9/2021 0941    Acceptance, E,TB, VU,NR by MS at 2/8/2021 1521    Acceptance, E, VU by MD at 2/5/2021 1049    Acceptance, E, VU by MD at 2/4/2021 1551                               User Key     Initials Effective Dates Name Provider Type Discipline    LB 06/08/18 -  Chely Perkins, PT Physical Therapist PT    MD 04/03/18 -  Katya Bar, PT Physical Therapist PT    MS 03/04/19 -  Annalise Neil, PT Physical Therapist PT    AB 02/01/21 -  Damon Paulino, PT Student PT Student PT                PT Recommendation and Plan          Daily Progress Summary (PT)  Daily Progress Summary (PT): (P) Pt reported increased fatigued today. Activity tolerance decreased for morning session. Nursing staff alerted about change in symptoms for LLE. Julian MEDINA came and took manual BP (see nursing note). Dr. Alexander informed by nursing staff. Team D called. Will monitor situation and adjust POC as needed.   Impairments Still Limiting Function (PT): (P) impaired functional activity tolerance  Recommendations (PT): (P) may trial to move pt session later into the day and break sessions into 3x30 mins as schedule allows.                 Time Calculation:     PT Charges     Row Name 02/12/21 1244 02/12/21  0946          Time Calculation    Start Time  1230  (Pended)   -AB  0930  (Pended)   -AB     Stop Time  1300  (Pended)   -AB  1030  (Pended)   -AB     Time Calculation (min)  30 min  (Pended)   -AB  60 min  (Pended)   -AB     PT Received On  02/12/21  (Pended)   -AB  02/12/21  (Pended)   -AB     PT - Next Appointment  02/13/21  (Pended)   -AB  02/13/21  (Pended)   -AB       User Key  (r) = Recorded By, (t) = Taken By, (c) = Cosigned By    Initials Name Provider Type    AB Damon Paulino PT Student PT Student          Therapy Charges for Today     Code Description Service Date Service Provider Modifiers Qty    25148874237  GAIT TRAINING EA 15 MIN 2/11/2021 Damon Paulino, PT Student GP 1    [id number]  PT THERAPEUTIC ACT EA 15 MIN 2/11/2021 Damon Paulino, PT Student GP 1    [id number]  GAIT TRAINING EA 15 MIN 2/12/2021 Damon Paulino, PT Student GP 2    [id number]  PT THER PROC EA 15 MIN 2/12/2021 Damon Paulino, PT Student GP 2    [id number]  PT THERAPEUTIC ACT EA 15 MIN 2/12/2021 Damon Paulino, PT Student GP 2      Patient was intermittently wearing a face mask during this therapy encounter. Therapist used appropriate personal protective equipment including eye protection, mask, and gloves.  Mask used was standard procedure mask. Appropriate PPE was worn during the entire therapy session. Hand hygiene was completed before and after therapy session. Patient is not in enhanced droplet precautions. Katya Bar, PT was supervising and present throughout treatment session.              DOUG Johnston  2/12/2021

## 2021-02-12 NOTE — PROGRESS NOTES
Inpatient Rehabilitation Plan of Care Note    Plan of Care  Care Plan Reviewed - No updates at this time.    Safety    Performed Intervention(s)  Safety rounds      Pain    Performed Intervention(s)  Apply heating pad or cream to area when needed      Body Systems    Performed Intervention(s)  Daily skin inspection    Signed by: Julian De La Cruz RN

## 2021-02-12 NOTE — THERAPY TREATMENT NOTE
Inpatient Rehabilitation - Occupational Therapy Treatment Note    [facility]     Patient Name: Marianne Delgado  : 1925  MRN: [id number]    Today's Date: 2021                 Admit Date: 2/3/2021       No diagnosis found.    Patient Active Problem List   Diagnosis   • Arthritis   • Stage 4 chronic kidney disease (CMS/[facility])   • Debility   • Foot pain, bilateral   • Glaucoma   • Acute idiopathic gout of right ankle   • Hematuria   • Essential hypertension   • Acquired hypothyroidism   • Osteopenia   • Psoriasis   • Rosacea   • Vitamin D deficiency   • Medicare annual wellness visit, subsequent   • Morbidly obese (CMS/[facility])   • Cerebrovascular accident (CVA) (CMS/[facility])   • Hypertensive urgency   • Stroke (cerebrum) (CMS/[facility])       Past Medical History:   Diagnosis Date   • Acute sinusitis    • Acute upper respiratory infection    • Arthritis    • CKD (chronic kidney disease)    • Debility    • Fatigue    • Foot pain, bilateral    • Glaucoma    • Gout    • Hematuria    • High blood pressure    • Hypertension    • Hypothyroid 1999   • Hypothyroidism    • IBS (irritable bowel syndrome)    • IGT (impaired glucose tolerance)    • Malaise and fatigue    • Medication management    • Melanoma (CMS/[facility]) 1979    left leg   • OA (osteoarthritis)    • Osteopenia 1999   • Painful joint    • Psoriasis    • Renal failure    • Rosacea    • Vitamin D deficiency        Past Surgical History:   Procedure Laterality Date   • CATARACT EXTRACTION     • FOOT SURGERY      mauri toe surgery   • OTHER SURGICAL HISTORY      Melanoma Excision: Left leg                IRF OT ASSESSMENT FLOWSHEET (last 12 hours)      IRF OT Evaluation and Treatment     Row Name 21 1516          OT Time and Intention    Document Type  daily treatment  -CC     Mode of Treatment  individual therapy;occupational therapy  -CC     Patient Effort  good  -CC     Symptoms Noted During/After Treatment  fatigue  -CC     Row Name 21 1516           Cognition/Psychosocial    Affect/Mental Status (Cognitive)  WFL  [facility]     Orientation Status (Cognition)  oriented x 4  -CC     Personal Safety Interventions  fall prevention program maintained;gait belt;nonskid shoes/slippers when out of bed  -[facility] Name 02/12/21 1516          Pain Scale: Numbers Pre/Post-Treatment    Pretreatment Pain Rating  0/10 - no pain  -     Posttreatment Pain Rating  0/10 - no pain  -CC     Row Name 02/12/21 1516          Bed Mobility    Comment (Bed Mobility)  in w/c   -CC     Row Name 02/12/21 1516          Transfers    Sit-Stand [place] (Transfers)  set up;contact guard  -     Stand-Sit [place] (Transfers)  set up;contact guard;supervision  -     [place] Level (Toilet Transfer)  set up;contact guard  -     Assistive Device (Toilet Transfer)  grab bars/safety frame;wheelchair  [facility]     [place] Level (Shower Transfer)  supervision;verbal cues;contact guard  -     Assistive Device (Shower Transfer)  grab bar, tub/shower;shower chair  -CC     Row Name 02/12/21 1516          Sit-Stand Transfer    Assistive Device (Sit-Stand Transfers)  wheelchair  -CC     Row Name 02/12/21 1516          Stand-Sit Transfer    Assistive Device (Stand-Sit Transfers)  wheelchair  -CC     Row Name 02/12/21 1516          Toilet Transfer    Type (Toilet Transfer)  stand-sit;sit-stand  -CC     Row Name 02/12/21 1516          Shower Transfer    Type (Shower Transfer)  sit-stand;stand-sit  -CC     Row Name 02/12/21 1516          Shoulder (Therapeutic Exercise)    Shoulder (Therapeutic Exercise)  strengthening exercise  -     Shoulder Strengthening (Therapeutic Exercise)  bilateral;flexion;extension;aBduction;aDduction;sitting;1 lb free weight;10 repetitions;3 sets  -CC     Row Name 02/12/21 1516          Elbow/Forearm (Therapeutic Exercise)    Elbow/Forearm (Therapeutic Exercise)  strengthening exercise  -     Elbow/Forearm Strengthening (Therapeutic Exercise)   bilateral;flexion;extension;supination;pronation;sitting;1 lb free weight;10 repetitions;3 sets  -     Row Name 02/12/21 1516          Wrist (Therapeutic Exercise)    Wrist (Therapeutic Exercise)  strengthening exercise  -     Wrist Strengthening (Therapeutic Exercise)  bilateral;flexion;extension;1 lb free weight;10 repetitions;3 sets  -     Row Name 02/12/21 1516          Bathing    [place] Level (Bathing)  bathing skills;lower body;upper body;standby assist;supervision  -     Assistive Device (Bathing)  grab bar/tub rail;hand held shower spray hose;shower chair  -     Position (Bathing)  supported sitting;supported standing  -     Set-up Assistance (Bathing)  adjust water temperature;obtain supplies  -     Row Name 02/12/21 1516          Upper Body Dressing    [place] Level (Upper Body Dressing)  upper body dressing skills;doff;don;front opening garment;pull over garment;supervision;verbal cues  -     Position (Upper Body Dressing)  supported sitting  -     Set-up Assistance (Upper Body Dressing)  obtain clothing  -     Row Name 02/12/21 1516          Lower Body Dressing    [place] Level (Lower Body Dressing)  doff;don;pants/bottoms;shoes/slippers;socks;underwear;set up;verbal cues;minimum assist (75% patient effort)  -     Position (Lower Body Dressing)  supported sitting;supported standing  -     Set-up Assistance (Lower Body Dressing)  obtain clothing  -     Row Name 02/12/21 1516          Grooming    [place] Level (Grooming)  grooming skills;hair care, combing/brushing;oral care regimen;set up  -     Position (Grooming)  sink side;supported sitting  -     Set-up Assistance (Grooming)  obtain supplies  -     Row Name 02/12/21 1516          Toileting    [place] Level (Toileting)  toileting skills;adjust/manage clothing;perform perineal hygiene;supervision  -     Assistive Device Use (Toileting)  grab bar/safety frame  -     Position (Toileting)   supported sitting;supported standing  -     Row Name 02/12/21 1516          Positioning and Restraints    Pre-Treatment Position  sitting in chair/recliner  -CC     Post Treatment Position  wheelchair  -CC     In Wheelchair  sitting;call light within reach;encouraged to call for assist;exit alarm on  -CC       User Key  (r) = Recorded By, (t) = Taken By, (c) = Cosigned By    Initials Name Effective Dates    CC Idania Blood, OTR 06/08/18 -            Occupational Therapy Education                 Title: PT OT SLP Therapies (Done)     Topic: Occupational Therapy (Done)     Point: ADL training (Done)     Description:   Instruct learner(s) on proper safety adaptation and remediation techniques during self care or transfers.   Instruct in proper use of assistive devices.              Learning Progress Summary           Patient Acceptance, E, VU by AB at 2/11/2021 1415    Comment: Educated pt about pacing and activity tolerance strategies to manage fatigue    Acceptance, E,TB, VU by JULIANO at 2/6/2021 1534    Acceptance, E,TB, VU,DU by JEMAL at 2/4/2021 1640    Comment: ed pt on role of OT, benefit of therapy, POC w. OT ed on safety w ADLs and tsf techniques. pt demo w. min A for tsf and more A w. LBD and SBA UBD                   Point: Home exercise program (Done)     Description:   Instruct learner(s) on appropriate technique for monitoring, assisting and/or progressing therapeutic exercises/activities.              Learning Progress Summary           Patient Acceptance, E, VU by AB at 2/11/2021 1415    Comment: Educated pt about pacing and activity tolerance strategies to manage fatigue    Acceptance, E,TB, VU by JULIANO at 2/6/2021 1534                   Point: Precautions (Done)     Description:   Instruct learner(s) on prescribed precautions during self-care and functional transfers.              Learning Progress Summary           Patient Acceptance, E, VU by AB at 2/11/2021 1415    Comment: Educated pt about pacing and  activity tolerance strategies to manage fatigue    Acceptance, E,TB, VU by JULIANO at 2/6/2021 1534    Acceptance, E,TB, VU,DU by  at 2/4/2021 1640    Comment: ed pt on role of OT, benefit of therapy, POC w. OT ed on safety w ADLs and tsf techniques. pt demo w. min A for tsf and more A w. LBD and SBA UBD                   Point: Body mechanics (Done)     Description:   Instruct learner(s) on proper positioning and spine alignment during self-care, functional mobility activities and/or exercises.              Learning Progress Summary           Patient Acceptance, E, VU by AB at 2/11/2021 1415    Comment: Educated pt about pacing and activity tolerance strategies to manage fatigue    Acceptance, E,TB, VU by JULIANO at 2/6/2021 1534    Acceptance, E,TB, VU,DU by  at 2/4/2021 1640    Comment: ed pt on role of OT, benefit of therapy, POC w. OT ed on safety w ADLs and tsf techniques. pt demo w. min A for tsf and more A w. LBD and SBA UBD                               User Key     Initials Effective Dates Name Provider Type Discipline     06/08/18 -  Mame Nelson, OTR Occupational Therapist OT     07/15/20 -  Sandra Souza OT Occupational Therapist OT    AB 02/01/21 -  Damon Paulino, DOUG Student PT Student PT                    OT Recommendation and Plan                         Time Calculation:     Time Calculation- OT     Row Name 02/12/21 1430 02/12/21 1330 02/12/21 1100       Time Calculation- OT    OT Start Time  1430  -CC  1330  -CC  1100  -CC    OT Stop Time  1500  -CC  1400  -CC  1130  -CC    OT Time Calculation (min)  30 min  -CC  30 min  -CC  30 min  -CC      User Key  (r) = Recorded By, (t) = Taken By, (c) = Cosigned By    Initials Name Provider Type    CC Idania Blood OTR Occupational Therapist        Therapy Charges for Today     Code Description Service Date Service Provider Modifiers Qty    58762713669 HC OT SELF CARE/MGMT/TRAIN EA 15 MIN 2/12/2021 Idania Blood OTR GO 3     [id number]  OT THER PROC EA 15 MIN 2/12/2021 Idania Blood OTR GO 3                   HARI Fofana  2/12/2021

## 2021-02-12 NOTE — PLAN OF CARE
Goal Outcome Evaluation:  Plan of Care Reviewed With: patient  Progress: improving  Outcome Summary: Called Team D today r/t new left leg heaviness. NIH was 0. BP has been elevated since 1600 yesterday.Allowing to be in 150 SBP to keep up perfusion. Too high. Dr. Mosher of [facility] to come in this afternoon to advise.

## 2021-02-13 PROCEDURE — 97530 THERAPEUTIC ACTIVITIES: CPT

## 2021-02-13 PROCEDURE — 97112 NEUROMUSCULAR REEDUCATION: CPT

## 2021-02-13 RX ADMIN — CLOPIDOGREL 75 MG: 75 TABLET, FILM COATED ORAL at 08:44

## 2021-02-13 RX ADMIN — Medication 250 MCG: at 08:45

## 2021-02-13 RX ADMIN — LEVOTHYROXINE SODIUM 150 MCG: 150 TABLET ORAL at 06:18

## 2021-02-13 RX ADMIN — NYSTATIN: 100000 POWDER TOPICAL at 20:32

## 2021-02-13 RX ADMIN — ATORVASTATIN CALCIUM 80 MG: 80 TABLET, FILM COATED ORAL at 20:32

## 2021-02-13 RX ADMIN — ASPIRIN 81 MG: 81 TABLET, COATED ORAL at 08:44

## 2021-02-13 RX ADMIN — Medication 1000 UNITS: at 20:33

## 2021-02-13 RX ADMIN — Medication 1000 UNITS: at 08:44

## 2021-02-13 RX ADMIN — BETAMETHASONE DIPROPIONATE: 0.5 OINTMENT TOPICAL at 20:32

## 2021-02-13 RX ADMIN — AMLODIPINE BESYLATE 5 MG: 5 TABLET ORAL at 08:44

## 2021-02-13 RX ADMIN — LATANOPROST 1 DROP: 50 SOLUTION/ DROPS OPHTHALMIC at 20:32

## 2021-02-13 RX ADMIN — TIMOLOL MALEATE: 5 SOLUTION/ DROPS OPHTHALMIC at 20:32

## 2021-02-13 RX ADMIN — NYSTATIN: 100000 POWDER TOPICAL at 08:46

## 2021-02-13 RX ADMIN — TIMOLOL MALEATE: 5 SOLUTION/ DROPS OPHTHALMIC at 08:46

## 2021-02-13 NOTE — PROGRESS NOTES
Blood pressure slowly improving.  Norvasc does have a slow onset of action.  Will reassess on Monday.    Thanks.

## 2021-02-13 NOTE — PROGRESS NOTES
NEPHROLOGY PROGRESS NOTE    PATIENT IDENTIFICATION:   Name:  Marianne Delgado      MRN:  [id number]     89 y.o.  female             Reason for visit: CKD3    SUBJECTIVE:   Appetite is poor but no N/V.  No shortness of breath on room air; no dizziness when standing    OBJECTIVE:  Vitals:    02/12/21 2007 02/12/21 2300 02/13/21 0615 02/13/21 0855   BP: (!) 188/79 160/70 130/70 132/78   BP Location: Left arm  Left arm    Patient Position: Lying  Lying    Pulse: 67  64 64   Resp: 16  16    Temp: 98.1 °F (36.7 °C)  98.5 °F (36.9 °C)    TempSrc: Oral  Oral    SpO2: 98%  96%    Height:               Body mass index is 36.74 kg/m².    Intake/Output Summary (Last 24 hours) at 2/13/2021 0957  Last data filed at 2/13/2021 0735  Gross per 24 hour   Intake 480 ml   Output --   Net 480 ml     Wt Readings from Last 1 Encounters:   02/03/21 0500 88.2 kg (194 lb 7.1 oz)   02/02/21 0500 91.3 kg (201 lb 4.5 oz)   02/01/21 0500 92 kg (202 lb 13.2 oz)   01/30/21 0745 88.9 kg (196 lb)   01/30/21 0524 89.1 kg (196 lb 6.9 oz)   01/29/21 1612 85.7 kg (189 lb)   01/29/21 1027 89.4 kg (197 lb 3.2 oz)     Wt Readings from Last 3 Encounters:   02/03/21 88.2 kg (194 lb 7.1 oz)   01/29/21 85.7 kg (188 lb 15 oz)   09/15/20 87.3 kg (192 lb 6.4 oz)         Exam:  GEN:  No distress, appears younger than stated age  EYES:   Anicteric sclera  ENT:    External ears/nose normal, MM are moist  NECK:  No adenopathy, JVP normal  LUNGS: A few crackles in bases; not labored on room air  CV:  Normal S1S2, without rub  ABD:  Non-tender, non-distended, soft, +BS  EXT:  Trace edema; legs are tender when touched    Scheduled meds:    amLODIPine, 5 mg, Oral, Q24H  aspirin, 81 mg, Oral, Daily  atorvastatin, 80 mg, Oral, Nightly  betamethasone dipropionate, , Topical, Q24H  cholecalciferol, 1,000 Units, Oral, BID  clopidogrel, 75 mg, Oral, Daily  dorzolamide-timolol, , Both Eyes, BID  latanoprost, 1 drop, Both Eyes, Nightly  levothyroxine, 150 mcg, Oral,  Daily  lidocaine, 5 mL, Intra-articular, Once  metoprolol succinate XL, 12.5 mg, Oral, Daily  nystatin, , Topical, Q12H  triamcinolone acetonide, 40 mg, Intra-articular, Once  vitamin B-12, 250 mcg, Oral, Daily      IV meds:                           Data Review:    Results from last 7 days   Lab Units 02/12/21  0613 02/10/21  0745 02/08/21  0729   SODIUM mmol/L 141 142 141   POTASSIUM mmol/L 4.8 4.6 4.8   CHLORIDE mmol/L 112* 112* 111*   CO2 mmol/L 23.4 20.1* 21.6*   BUN mg/dL 29* 38* 49*   CREATININE mg/dL 1.64* 1.79* 1.67*   CALCIUM mg/dL 9.1 9.3 9.0   GLUCOSE mg/dL 80 78 82     Estimated Creatinine Clearance: 20.7 mL/min (A) (by C-G formula based on SCr of 1.64 mg/dL (H)).      Results from last 7 days   Lab Units 02/12/21  0613 02/10/21  0745 02/08/21  0729   PHOSPHORUS mg/dL 3.0 3.4 3.1       Results from last 7 days   Lab Units 02/12/21  0613 02/08/21  0729   WBC 10*3/mm3 6.67 6.71   HEMOGLOBIN g/dL 11.9* 12.6   PLATELETS 10*3/mm3 234 267                   ASSESSMENT:     Stroke (cerebrum) (CMS/[facility])    Arthritis    Stage 4 chronic kidney disease (CMS/[facility])    Essential hypertension    Acquired hypothyroidism    1.  CKD3, stable.  Appears euvolemic; electrolytes are compensated  2.  Hypertension, controlled   3.  CVA with weakness of the left lower extremity.   4.  Hypothyroidism on replacement.   5.  Glaucoma  6.  Gout      PLAN:  1.  Continue amlodipine 5 mg daily and metoprolol 12.5 mg daily  2.  Will reassess 2/15    Víctor Garcia MD  2/13/2021    09:57 EST

## 2021-02-13 NOTE — PROGRESS NOTES
"   LOS: 10 days   Patient Care Team:  Xenia Robert MD as PCP - General (Family Medicine)    Chief Complaint:   Status post CVA-small focus of restricted diffusion in the right parietal lobe  Impaired cognition/impaired mobility/impaired self-care  Stroke prophylaxis-aspirin and Plavix x30 days then Plavix alone.  Left knee degenerative changes-status post steroid injection February 3  Chronic kidney disease stage IV baseline creatinine around 2.0  Acquired hypothyroidism-recheck TFTs 1 March-on levothyroxine  Morbid obesity  Vitamin D deficiency  Hypertension-metoprolol/amlodipine-systolic blood pressure goal 150-160  Hyperlipidemia-atorvastatin       Subjective     History of Present Illness    Subjective    Patient visiting with family at bedside. Voices no new complaints. Reports BP improved.     History taken from: patient    Objective     Vital Signs  Temp:  [98.1 °F (36.7 °C)-98.5 °F (36.9 °C)] 98.5 °F (36.9 °C)  Heart Rate:  [63-67] 63  Resp:  [16-18] 18  BP: (130-188)/(62-82) 144/82    Objective:  Vital signs: (most recent): Blood pressure 144/82, pulse 63, temperature 98.5 °F (36.9 °C), temperature source Oral, resp. rate 18, height 154.9 cm (61\"), SpO2 98 %.            Physical Exam  MENTAL STATUS -  AWAKE / ALERT  HEENT- NCAT, PUPILS EQUALLY ROUND, SCLERAE ANICTERIC,   LUNGS - CTA, NO WHEEZES, RALES OR RHONCHI  HEART- RRR, NO RUB, MURMUR, OR GALLOP  ABD - NORMOACTIVE BOWEL SOUNDS, SOFT, NT.    EXT - NO EDEMA OR CYANOSIS  NEURO -oriented x4.     MOTOR EXAM - RUE/RLE 5/5.  Takes resistance with left elbow flexion, finger flexion, knee extension, ADF  Results Review:     I reviewed the patient's new clinical results.  Results from last 7 days   Lab Units 02/12/21  0613 02/08/21  0729   WBC 10*3/mm3 6.67 6.71   HEMOGLOBIN g/dL 11.9* 12.6   HEMATOCRIT % 35.9 38.2   PLATELETS 10*3/mm3 234 267     Results from last 7 days   Lab Units 02/12/21  0613 02/10/21  0745 02/08/21  0729   SODIUM mmol/L 141 142 141 "   POTASSIUM mmol/L 4.8 4.6 4.8   CHLORIDE mmol/L 112* 112* 111*   CO2 mmol/L 23.4 20.1* 21.6*   BUN mg/dL 29* 38* 49*   CREATININE mg/dL 1.64* 1.79* 1.67*   CALCIUM mg/dL 9.1 9.3 9.0   GLUCOSE mg/dL 80 78 82         Ref. Range 9/15/2020 11:00 1/29/2021 10:35 1/30/2021 06:29 1/31/2021 07:10 2/5/2021 07:11   Hemoglobin A1C Latest Ref Range: 4.80 - 5.60 %  5.07      TSH Baseline Latest Ref Range: 0.270 - 4.200 uIU/mL 5.160 (H) 16.100 (H)  9.150 (H)    Free T4 Latest Ref Range: 0.93 - 1.70 ng/dL 1.39  1.14     T3, Free Latest Ref Range: 2.00 - 4.40 pg/mL    1.94 (L)    Total Cholesterol Latest Ref Range: 0 - 200 mg/dL  168      HDL Cholesterol Latest Ref Range: 40 - 60 mg/dL  44      LDL Cholesterol  Latest Ref Range: 0 - 100 mg/dL  104 (H)      VLDL Cholesterol Latest Ref Range: 5 - 40 mg/dL  20      Triglycerides Latest Ref Range: 0 - 150 mg/dL  110      Vitamin B-12 Latest Ref Range: 211 - 946 pg/mL     1,204 (H)   25 Hydroxy, Vitamin D Latest Ref Range: 30.0 - 100.0 ng/ml     39.9     Medication Review: done  Scheduled Meds:amLODIPine, 5 mg, Oral, Q24H  aspirin, 81 mg, Oral, Daily  atorvastatin, 80 mg, Oral, Nightly  betamethasone dipropionate, , Topical, Q24H  cholecalciferol, 1,000 Units, Oral, BID  clopidogrel, 75 mg, Oral, Daily  dorzolamide-timolol, , Both Eyes, BID  latanoprost, 1 drop, Both Eyes, Nightly  levothyroxine, 150 mcg, Oral, Daily  lidocaine, 5 mL, Intra-articular, Once  metoprolol succinate XL, 12.5 mg, Oral, Daily  nystatin, , Topical, Q12H  triamcinolone acetonide, 40 mg, Intra-articular, Once  vitamin B-12, 250 mcg, Oral, Daily      Continuous Infusions:   PRN Meds:.•  acetaminophen  •  bisacodyl  •  trolamine salicylate      Assessment/Plan       Stroke (cerebrum) (CMS/HCC)    Arthritis    Stage 4 chronic kidney disease (CMS/HCC)    Essential hypertension    Acquired hypothyroidism      Assessment & Plan  Status post CVA-small focus of restricted diffusion in the right parietal lobe  Adjunctive  medication for stroke recovery-on atorvastatin.  Recheck vitamin D level.  Check vitamin B-12 level.     Impaired cognition/impaired mobility/impaired self-care     Stroke prophylaxis-aspirin and Plavix x30 days then Plavix alone.     Left knee degenerative changes-status post steroid injection February 3     Chronic kidney disease stage IV baseline creatinine around 2.0     Acquired hypothyroidism-recheck TFTs 1 March-on levothyroxine     Morbid obesity     Vitamin D deficiency-no home dose listed-recheck vitamin D level     Hypertension-metoprolol/amlodipine-systolic blood pressure goal 150-160  February 12-Her blood pressure has increased over the last day, late yesterday blood pressure was 191/84 and then today 183/97.  She is on amlodipine 2.5 and metoprolol 12.5 mg daily.  Patient reviewed with internal medicine service and recommended increase amlodipine.  Will give additional amlodipine 2.5 mg this afternoon increased dose to 5 mg daily  February 13-BP improved. Medicine and Renal following.      Hyperlipidemia-atorvastatin    Hypersensitivity to touch bilateral lower extremities-May possibly be neuropathy.  B12 within normal limits.  She has had hypothyroidism.  Has chronic kidney disease.    TEAM CONF - FEB 9 -  TRANSFERS CTG ROLLATOR. GAIT 160 FEET CTG ROLLATOR. TOILET TRANSFERS MIN. SHOWER TRANSFERS MIN. BATH UB SBA AND LB MIN. LBD MOD. UBD SBA. GROOMING SBA. MODERATE DEFICITS IN EXECUTIVE FUNCTION. PATIENT REQUESTS DISCHARGE FROM SLP AS SHE FEELS SHE IS AT BASELINE. CONTINENT BOWEL AND BLADDER. LEFT KNEE DJD, S/P INJECTION LAST WEEK, PAIN IMPROVED.   ELOS -   WED.  February 17       Now admit for comprehensive acute inpatient rehabilitation .  This would be an interdisciplinary program with physical therapy 1 hour,  occupational therapy 1 hour, and speech therapy 1 hour, 5 days a week.  Rehabilitation nursing for carryover, monitoring of nutritional and blood pressure and neurologic   status, bowel and  bladder, and skin  Ongoing physician follow-up.  Weekly team conferences.  Goals are to achieve a level of supervision with  mobility and self-care and improved balance.   Rehabilitation prognosis fair.  Medical prognosis fair.  Estimated length of stay is approximately 2 to 3 weeks, but is only an estimation.   The patient's functional status and clinical status is unchanged from preadmission assessment and the patient continues appropriate for acute inpatient rehabilitation.  Goal is for home with home health   therapies.  Barrier to discharge: Impaired mobility- work on transfers ADLs to overcome.      2/13/21:  Seen and examined. No acute events overnight.      Reviewed medications, vital signs, and recent lab work.      Progressing well. BP improved.     Continue comprehensive inpatient rehab program.     Tyler Dasilva MD  02/13/21  17:19 EST    Time:   During rounds, used appropriate personal protective equipment including mask and gloves.  Additional gown if indicated.  Mask used was standard procedure mask. Appropriate PPE was worn during the entire visit.  Hand hygiene was completed before and after.

## 2021-02-13 NOTE — PROGRESS NOTES
"DAILY PROGRESS NOTE  [facility]    Patient Identification:  Name: Marianne Delgado  Age: 89 y.o.  Sex: female  :  1925  MRN: [id number]         Primary Care Physician: Xenia Robert MD    Subjective:  Interval History: She is still very weak and fatigued.    Objective:    Scheduled Meds:amLODIPine, 5 mg, Oral, Q24H  aspirin, 81 mg, Oral, Daily  atorvastatin, 80 mg, Oral, Nightly  betamethasone dipropionate, , Topical, Q24H  cholecalciferol, 1,000 Units, Oral, BID  clopidogrel, 75 mg, Oral, Daily  dorzolamide-timolol, , Both Eyes, BID  latanoprost, 1 drop, Both Eyes, Nightly  levothyroxine, 150 mcg, Oral, Daily  lidocaine, 5 mL, Intra-articular, Once  metoprolol succinate XL, 12.5 mg, Oral, Daily  nystatin, , Topical, Q12H  triamcinolone acetonide, 40 mg, Intra-articular, Once  vitamin B-12, 250 mcg, Oral, Daily      Continuous Infusions:     Vital signs in last 24 hours:  Temp:  [97.9 °F (36.6 °C)-98.5 °F (36.9 °C)] 98.5 °F (36.9 °C)  Heart Rate:  [64-67] 64  Resp:  [16-18] 16  BP: (130-188)/(62-79) 132/78    Intake/Output:    Intake/Output Summary (Last 24 hours) at 2021 1159  Last data filed at 2021 0735  Gross per 24 hour   Intake 480 ml   Output --   Net 480 ml       Exam:  /78   Pulse 64   Temp 98.5 °F (36.9 °C) (Oral)   Resp 16   Ht 154.9 cm (61\")   SpO2 96%   BMI 36.74 kg/m²     General Appearance:    Alert, cooperative, no distress   Head:    Normocephalic, without obvious abnormality, atraumatic   Eyes:       Throat:   Lips, tongue, gums normal   Neck:   Supple, symmetrical, trachea midline, no JVD   Lungs:     Clear to auscultation bilaterally, respirations unlabored   Chest Wall:    No tenderness or deformity    Heart:    Regular rate and rhythm, S1 and S2 normal, no murmur,no  Rub or gallop   Abdomen:     Soft, nontender, bowel sounds active, no masses, no organomegaly    Extremities:   Extremities normal, atraumatic, no cyanosis or edema   Pulses:    "   Skin:   Skin is warm and dry,  no rashes or palpable lesions   Neurologic:  Weakness from stroke      Lab Results (last 72 hours)     Procedure Component Value Units Date/Time    POC Glucose Once [[id number]]  (Normal) Collected: 02/12/21 1027    Specimen: Blood Updated: 02/12/21 1028     Glucose 104 mg/dL     Renal Function Panel [[id number]]  (Abnormal) Collected: 02/12/21 0613    Specimen: Blood from Arm, Left Updated: 02/12/21 0734     Glucose 80 mg/dL      BUN 29 mg/dL      Creatinine 1.64 mg/dL      Sodium 141 mmol/L      Potassium 4.8 mmol/L      Chloride 112 mmol/L      CO2 23.4 mmol/L      Calcium 9.1 mg/dL      Albumin 3.10 g/dL      Phosphorus 3.0 mg/dL      Anion Gap 5.6 mmol/L      BUN/Creatinine Ratio 17.7     eGFR Non African Amer 29 mL/min/1.73     Narrative:      GFR Normal >60  Chronic Kidney Disease <60  Kidney Failure <15      CBC & Differential [[id number]]  (Abnormal) Collected: 02/12/21 0613    Specimen: Blood Updated: 02/12/21 0709    Narrative:      The following orders were created for panel order CBC & Differential.  Procedure                               Abnormality         Status                     ---------                               -----------         ------                     CBC Auto Differential[[id number]]        Abnormal            Final result                 Please view results for these tests on the individual orders.    CBC Auto Differential [[id number]]  (Abnormal) Collected: 02/12/21 0613    Specimen: Blood Updated: 02/12/21 0709     WBC 6.67 10*3/mm3      RBC 4.01 10*6/mm3      Hemoglobin 11.9 g/dL      Hematocrit 35.9 %      MCV 89.5 fL      MCH 29.7 pg      MCHC 33.1 g/dL      RDW 12.3 %      RDW-SD 39.9 fl      MPV 10.6 fL      Platelets 234 10*3/mm3      Neutrophil % 72.4 %      Lymphocyte % 13.6 %      Monocyte % 10.3 %      Eosinophil % 2.7 %      Basophil % 0.3 %      Immature Grans % 0.7 %      Neutrophils, Absolute 4.82 10*3/mm3      Lymphocytes, Absolute 0.91  10*3/mm3      Monocytes, Absolute 0.69 10*3/mm3      Eosinophils, Absolute 0.18 10*3/mm3      Basophils, Absolute 0.02 10*3/mm3      Immature Grans, Absolute 0.05 10*3/mm3      nRBC 0.0 /100 WBC         Data Review:  Results from last 7 days   Lab Units 02/12/21  0613 02/10/21  0745 02/08/21  0729   SODIUM mmol/L 141 142 141   POTASSIUM mmol/L 4.8 4.6 4.8   CHLORIDE mmol/L 112* 112* 111*   CO2 mmol/L 23.4 20.1* 21.6*   BUN mg/dL 29* 38* 49*   CREATININE mg/dL 1.64* 1.79* 1.67*   GLUCOSE mg/dL 80 78 82   CALCIUM mg/dL 9.1 9.3 9.0     Results from last 7 days   Lab Units 02/12/21  0613 02/08/21  0729   WBC 10*3/mm3 6.67 6.71   HEMOGLOBIN g/dL 11.9* 12.6   HEMATOCRIT % 35.9 38.2   PLATELETS 10*3/mm3 234 267             Lab Results   Lab Value Date/Time    TROPONINT 0.015 01/29/2021 1035    TROPONINT <0.010 01/05/2017 0413               Invalid input(s): PROT, LABALBU          Glucose   Date/Time Value Ref Range Status   02/12/2021 1027 104 70 - 130 mg/dL Final           Past Medical History:   Diagnosis Date   • Acute sinusitis    • Acute upper respiratory infection    • Arthritis    • CKD (chronic kidney disease)    • Debility    • Fatigue    • Foot pain, bilateral    • Glaucoma    • Gout    • Hematuria    • High blood pressure    • Hypertension    • Hypothyroid 09/1999   • Hypothyroidism    • IBS (irritable bowel syndrome)    • IGT (impaired glucose tolerance)    • Malaise and fatigue    • Medication management    • Melanoma (CMS/[facility]) 1979    left leg   • OA (osteoarthritis)    • Osteopenia 09/1999   • Painful joint    • Psoriasis    • Renal failure    • Rosacea    • Vitamin D deficiency        Assessment:  Active Hospital Problems    Diagnosis  POA   • **Stroke (cerebrum) (CMS/[facility]) [I63.9]  Yes   • Stage 4 chronic kidney disease (CMS/[facility]) [N18.4]  Yes   • Arthritis [M19.90]  Yes   • Essential hypertension [I10]  Yes   • Acquired hypothyroidism [E03.9]  Yes      Resolved Hospital Problems   No resolved problems to  display.       Plan:  Her blood pressure is better with small dose of Norvasc.  We will continue with same and will follow.    Jose Mosher MD  2/13/2021  11:59 EST

## 2021-02-13 NOTE — PLAN OF CARE
Goal Outcome Evaluation:        Outcome Summary: Up for therapy and toileting, but all meals in bed today. No c/o pain. Heating pad not used. Patient was pleasant and alert, napped in between care. Anjel Lopez visited for extended time, allowing for FB live conversation with family and friends. Last BM 2/11/2021, patient is aware of prn Dulcolax if needed.

## 2021-02-13 NOTE — PROGRESS NOTES
Inpatient Rehabilitation Plan of Care Note    Plan of Care  Care Plan Reviewed - No updates at this time.    Sphincter Control    Performed Intervention(s)  Use incontinence products  offer toileting  Encourage fluids      Safety    Performed Intervention(s)  Falls precautions/protocol  Safety rounds  Items within reach  Bed alarm/ chair alarm      Psychosocial    Performed Intervention(s)  Verbalizes needs and concerns  Provide therapeutic enviroment      Pain    Performed Intervention(s)  Offer medication when needed  Practice relaxation techniques  Apply heating pad or cream to area when needed      Body Systems    Performed Intervention(s)  Daily skin inspection  Nutritional support  Apply cream to dry areas as needed  Apply powder under folds as ordered    Signed by: Anna Lambert RN

## 2021-02-13 NOTE — PROGRESS NOTES
Inpatient Rehabilitation Plan of Care Note    Plan of Care  Care Plan Reviewed - No updates at this time.    Sphincter Control    Performed Intervention(s)  Use incontinence products  offer toileting  Encourage fluids      Safety    Performed Intervention(s)  Falls precautions/protocol  Safety rounds  Items within reach  Bed alarm/ chair alarm      Psychosocial    Performed Intervention(s)  Verbalizes needs and concerns  Provide therapeutic enviroment      Pain    Performed Intervention(s)  Offer medication when needed  Practice relaxation techniques  Apply heating pad or cream to area when needed      Body Systems    Performed Intervention(s)  Daily skin inspection  Nutritional support  Apply cream to dry areas as needed  Apply powder under folds as ordered    Signed by: Kimmie Yeh RN

## 2021-02-13 NOTE — PLAN OF CARE
Goal Outcome Evaluation:  Plan of Care Reviewed With: patient  Progress: improving  Outcome Summary: Pt rested very well this shift.  Did not request heating pad of feet.  All meds with water. Continent bladder, uses call light for assistance. Ointment applied to eczema on  buttocks.

## 2021-02-14 RX ADMIN — Medication 1000 UNITS: at 08:35

## 2021-02-14 RX ADMIN — BETAMETHASONE DIPROPIONATE 1 APPLICATION: 0.5 OINTMENT TOPICAL at 20:20

## 2021-02-14 RX ADMIN — TIMOLOL MALEATE: 5 SOLUTION/ DROPS OPHTHALMIC at 08:35

## 2021-02-14 RX ADMIN — METOPROLOL SUCCINATE 12.5 MG: 25 TABLET, EXTENDED RELEASE ORAL at 08:34

## 2021-02-14 RX ADMIN — ASPIRIN 81 MG: 81 TABLET, COATED ORAL at 08:34

## 2021-02-14 RX ADMIN — Medication 250 MCG: at 08:34

## 2021-02-14 RX ADMIN — Medication 1000 UNITS: at 20:19

## 2021-02-14 RX ADMIN — TIMOLOL MALEATE: 5 SOLUTION/ DROPS OPHTHALMIC at 20:10

## 2021-02-14 RX ADMIN — CLOPIDOGREL 75 MG: 75 TABLET, FILM COATED ORAL at 08:35

## 2021-02-14 RX ADMIN — NYSTATIN: 100000 POWDER TOPICAL at 20:19

## 2021-02-14 RX ADMIN — NYSTATIN 1 APPLICATION: 100000 POWDER TOPICAL at 08:35

## 2021-02-14 RX ADMIN — ATORVASTATIN CALCIUM 80 MG: 80 TABLET, FILM COATED ORAL at 20:19

## 2021-02-14 RX ADMIN — LATANOPROST 1 DROP: 50 SOLUTION/ DROPS OPHTHALMIC at 20:19

## 2021-02-14 RX ADMIN — LEVOTHYROXINE SODIUM 150 MCG: 150 TABLET ORAL at 05:03

## 2021-02-14 RX ADMIN — AMLODIPINE BESYLATE 5 MG: 5 TABLET ORAL at 08:34

## 2021-02-14 NOTE — PLAN OF CARE
Goal Outcome Evaluation:  Plan of Care Reviewed With: patient  Progress: improving  Outcome Summary: Ms. Delgado is A&Ox4.  Cont B&B.  Sat up in recliner for awhile today.  Ambulates with rollator.  Takes medications with water.  Niece visited this afternoon.  Uses call light for assistance.  No safety issues noted.

## 2021-02-14 NOTE — PROGRESS NOTES
"   LOS: 11 days   Patient Care Team:  Xenia Robert MD as PCP - General (Family Medicine)    Chief Complaint:   Status post CVA-small focus of restricted diffusion in the right parietal lobe  Impaired cognition/impaired mobility/impaired self-care  Stroke prophylaxis-aspirin and Plavix x30 days then Plavix alone.  Left knee degenerative changes-status post steroid injection February 3  Chronic kidney disease stage IV baseline creatinine around 2.0  Acquired hypothyroidism-recheck TFTs 1 March-on levothyroxine  Morbid obesity  Vitamin D deficiency  Hypertension-metoprolol/amlodipine-systolic blood pressure goal 150-160  Hyperlipidemia-atorvastatin       Subjective     History of Present Illness    Subjective    NAEO. Rested well. Denies CP, SOA. Voices no new complaints.     History taken from: patient    Objective     Vital Signs  Temp:  [98.4 °F (36.9 °C)-98.6 °F (37 °C)] 98.6 °F (37 °C)  Heart Rate:  [71-72] 71  Resp:  [18] 18  BP: (144-158)/(66-82) 156/66    Objective:  Vital signs: (most recent): Blood pressure 156/66, pulse 71, temperature 98.6 °F (37 °C), temperature source Oral, resp. rate 18, height 154.9 cm (61\"), SpO2 96 %.            Physical Exam  MENTAL STATUS -  AWAKE / ALERT  HEENT- NCAT, PUPILS EQUALLY ROUND, SCLERAE ANICTERIC,   LUNGS - CTA, NO WHEEZES, RALES OR RHONCHI  HEART- RRR, NO RUB, MURMUR, OR GALLOP  ABD - NORMOACTIVE BOWEL SOUNDS, SOFT, NT.    EXT - NO EDEMA OR CYANOSIS  NEURO -oriented x4.     MOTOR EXAM - RUE/RLE 5/5.  Takes resistance with left elbow flexion, finger flexion, knee extension, ADF  Results Review:     I reviewed the patient's new clinical results.  Results from last 7 days   Lab Units 02/12/21  0613 02/08/21  0729   WBC 10*3/mm3 6.67 6.71   HEMOGLOBIN g/dL 11.9* 12.6   HEMATOCRIT % 35.9 38.2   PLATELETS 10*3/mm3 234 267     Results from last 7 days   Lab Units 02/12/21  0613 02/10/21  0745 02/08/21  0729   SODIUM mmol/L 141 142 141   POTASSIUM mmol/L 4.8 4.6 4.8 "   CHLORIDE mmol/L 112* 112* 111*   CO2 mmol/L 23.4 20.1* 21.6*   BUN mg/dL 29* 38* 49*   CREATININE mg/dL 1.64* 1.79* 1.67*   CALCIUM mg/dL 9.1 9.3 9.0   GLUCOSE mg/dL 80 78 82         Ref. Range 9/15/2020 11:00 1/29/2021 10:35 1/30/2021 06:29 1/31/2021 07:10 2/5/2021 07:11   Hemoglobin A1C Latest Ref Range: 4.80 - 5.60 %  5.07      TSH Baseline Latest Ref Range: 0.270 - 4.200 uIU/mL 5.160 (H) 16.100 (H)  9.150 (H)    Free T4 Latest Ref Range: 0.93 - 1.70 ng/dL 1.39  1.14     T3, Free Latest Ref Range: 2.00 - 4.40 pg/mL    1.94 (L)    Total Cholesterol Latest Ref Range: 0 - 200 mg/dL  168      HDL Cholesterol Latest Ref Range: 40 - 60 mg/dL  44      LDL Cholesterol  Latest Ref Range: 0 - 100 mg/dL  104 (H)      VLDL Cholesterol Latest Ref Range: 5 - 40 mg/dL  20      Triglycerides Latest Ref Range: 0 - 150 mg/dL  110      Vitamin B-12 Latest Ref Range: 211 - 946 pg/mL     1,204 (H)   25 Hydroxy, Vitamin D Latest Ref Range: 30.0 - 100.0 ng/ml     39.9     Medication Review: done  Scheduled Meds:amLODIPine, 5 mg, Oral, Q24H  aspirin, 81 mg, Oral, Daily  atorvastatin, 80 mg, Oral, Nightly  betamethasone dipropionate, , Topical, Q24H  cholecalciferol, 1,000 Units, Oral, BID  clopidogrel, 75 mg, Oral, Daily  dorzolamide-timolol, , Both Eyes, BID  latanoprost, 1 drop, Both Eyes, Nightly  levothyroxine, 150 mcg, Oral, Daily  lidocaine, 5 mL, Intra-articular, Once  metoprolol succinate XL, 12.5 mg, Oral, Daily  nystatin, , Topical, Q12H  triamcinolone acetonide, 40 mg, Intra-articular, Once  vitamin B-12, 250 mcg, Oral, Daily      Continuous Infusions:   PRN Meds:.•  acetaminophen  •  bisacodyl  •  trolamine salicylate      Assessment/Plan       Stroke (cerebrum) (CMS/HCC)    Arthritis    Stage 4 chronic kidney disease (CMS/HCC)    Essential hypertension    Acquired hypothyroidism      Assessment & Plan  Status post CVA-small focus of restricted diffusion in the right parietal lobe  Adjunctive medication for stroke  recovery-on atorvastatin.  Recheck vitamin D level.  Check vitamin B-12 level.     Impaired cognition/impaired mobility/impaired self-care     Stroke prophylaxis-aspirin and Plavix x30 days then Plavix alone.     Left knee degenerative changes-status post steroid injection February 3     Chronic kidney disease stage IV baseline creatinine around 2.0     Acquired hypothyroidism-recheck TFTs 1 March-on levothyroxine     Morbid obesity     Vitamin D deficiency-no home dose listed-recheck vitamin D level     Hypertension-metoprolol/amlodipine-systolic blood pressure goal 150-160  February 12-Her blood pressure has increased over the last day, late yesterday blood pressure was 191/84 and then today 183/97.  She is on amlodipine 2.5 and metoprolol 12.5 mg daily.  Patient reviewed with internal medicine service and recommended increase amlodipine.  Will give additional amlodipine 2.5 mg this afternoon increased dose to 5 mg daily  February 13-BP improved. Medicine and Renal following.      Hyperlipidemia-atorvastatin    Hypersensitivity to touch bilateral lower extremities-May possibly be neuropathy.  B12 within normal limits.  She has had hypothyroidism.  Has chronic kidney disease.    TEAM CONF - FEB 9 -  TRANSFERS CTG ROLLATOR. GAIT 160 FEET CTG ROLLATOR. TOILET TRANSFERS MIN. SHOWER TRANSFERS MIN. BATH UB SBA AND LB MIN. LBD MOD. UBD SBA. GROOMING SBA. MODERATE DEFICITS IN EXECUTIVE FUNCTION. PATIENT REQUESTS DISCHARGE FROM SLP AS SHE FEELS SHE IS AT BASELINE. CONTINENT BOWEL AND BLADDER. LEFT KNEE DJD, S/P INJECTION LAST WEEK, PAIN IMPROVED.   ELOS -   WED.  February 17       Now admit for comprehensive acute inpatient rehabilitation .  This would be an interdisciplinary program with physical therapy 1 hour,  occupational therapy 1 hour, and speech therapy 1 hour, 5 days a week.  Rehabilitation nursing for carryover, monitoring of nutritional and blood pressure and neurologic   status, bowel and bladder, and skin   Ongoing physician follow-up.  Weekly team conferences.  Goals are to achieve a level of supervision with  mobility and self-care and improved balance.   Rehabilitation prognosis fair.  Medical prognosis fair.  Estimated length of stay is approximately 2 to 3 weeks, but is only an estimation.   The patient's functional status and clinical status is unchanged from preadmission assessment and the patient continues appropriate for acute inpatient rehabilitation.  Goal is for home with home health   therapies.  Barrier to discharge: Impaired mobility- work on transfers ADLs to overcome.      2/14/21:  Seen and examined. BOONE.     Reviewed medications, vital signs, and recent lab work.      Progressing well. BP stable. Medicine and Renal following.     Continue comprehensive inpatient rehab program.     Tyler Dasilva MD  02/14/21  13:51 EST    Time:   During rounds, used appropriate personal protective equipment including mask and gloves.  Additional gown if indicated.  Mask used was standard procedure mask. Appropriate PPE was worn during the entire visit.  Hand hygiene was completed before and after.

## 2021-02-14 NOTE — PLAN OF CARE
Goal Outcome Evaluation:  Plan of Care Reviewed With: patient  Progress: improving  Outcome Summary: Pt rested very well this shift.  Pt declined dulcolax.  All meds whole with water.  Eyedrops and eczema ointment administered.

## 2021-02-14 NOTE — PROGRESS NOTES
"DAILY PROGRESS NOTE  [facility]    Patient Identification:  Name: Marianne Delgado  Age: 89 y.o.  Sex: female  :  1925  MRN: [id number]         Primary Care Physician: Xenia Robert MD    Subjective:  Interval History: She is still very weak and fatigued.    Objective:    Scheduled Meds:amLODIPine, 5 mg, Oral, Q24H  aspirin, 81 mg, Oral, Daily  atorvastatin, 80 mg, Oral, Nightly  betamethasone dipropionate, , Topical, Q24H  cholecalciferol, 1,000 Units, Oral, BID  clopidogrel, 75 mg, Oral, Daily  dorzolamide-timolol, , Both Eyes, BID  latanoprost, 1 drop, Both Eyes, Nightly  levothyroxine, 150 mcg, Oral, Daily  lidocaine, 5 mL, Intra-articular, Once  metoprolol succinate XL, 12.5 mg, Oral, Daily  nystatin, , Topical, Q12H  triamcinolone acetonide, 40 mg, Intra-articular, Once  vitamin B-12, 250 mcg, Oral, Daily      Continuous Infusions:     Vital signs in last 24 hours:  Temp:  [98.4 °F (36.9 °C)-98.6 °F (37 °C)] 98.6 °F (37 °C)  Heart Rate:  [71-72] 71  Resp:  [18] 18  BP: (144-158)/(66-82) 156/66    Intake/Output:    Intake/Output Summary (Last 24 hours) at 2021 1456  Last data filed at 2021 1150  Gross per 24 hour   Intake 720 ml   Output --   Net 720 ml       Exam:  /66 (BP Location: Left arm, Patient Position: Lying)   Pulse 71   Temp 98.6 °F (37 °C) (Oral)   Resp 18   Ht 154.9 cm (61\")   SpO2 96%   BMI 36.74 kg/m²     General Appearance:    Alert, cooperative, no distress   Head:    Normocephalic, without obvious abnormality, atraumatic   Eyes:       Throat:   Lips, tongue, gums normal   Neck:   Supple, symmetrical, trachea midline, no JVD   Lungs:     Clear to auscultation bilaterally, respirations unlabored   Chest Wall:    No tenderness or deformity    Heart:    Regular rate and rhythm, S1 and S2 normal, no murmur,no  Rub or gallop   Abdomen:     Soft, nontender, bowel sounds active, no masses, no organomegaly    Extremities:   Extremities normal, " atraumatic, no cyanosis or edema   Pulses:      Skin:   Skin is warm and dry,  no rashes or palpable lesions   Neurologic:  Weakness from stroke      Lab Results (last 72 hours)     Procedure Component Value Units Date/Time    POC Glucose Once [[id number]]  (Normal) Collected: 02/12/21 1027    Specimen: Blood Updated: 02/12/21 1028     Glucose 104 mg/dL     Renal Function Panel [[id number]]  (Abnormal) Collected: 02/12/21 0613    Specimen: Blood from Arm, Left Updated: 02/12/21 0734     Glucose 80 mg/dL      BUN 29 mg/dL      Creatinine 1.64 mg/dL      Sodium 141 mmol/L      Potassium 4.8 mmol/L      Chloride 112 mmol/L      CO2 23.4 mmol/L      Calcium 9.1 mg/dL      Albumin 3.10 g/dL      Phosphorus 3.0 mg/dL      Anion Gap 5.6 mmol/L      BUN/Creatinine Ratio 17.7     eGFR Non African Amer 29 mL/min/1.73     Narrative:      GFR Normal >60  Chronic Kidney Disease <60  Kidney Failure <15      CBC & Differential [[id number]]  (Abnormal) Collected: 02/12/21 0613    Specimen: Blood Updated: 02/12/21 0709    Narrative:      The following orders were created for panel order CBC & Differential.  Procedure                               Abnormality         Status                     ---------                               -----------         ------                     CBC Auto Differential[[id number]]        Abnormal            Final result                 Please view results for these tests on the individual orders.    CBC Auto Differential [[id number]]  (Abnormal) Collected: 02/12/21 0613    Specimen: Blood Updated: 02/12/21 0709     WBC 6.67 10*3/mm3      RBC 4.01 10*6/mm3      Hemoglobin 11.9 g/dL      Hematocrit 35.9 %      MCV 89.5 fL      MCH 29.7 pg      MCHC 33.1 g/dL      RDW 12.3 %      RDW-SD 39.9 fl      MPV 10.6 fL      Platelets 234 10*3/mm3      Neutrophil % 72.4 %      Lymphocyte % 13.6 %      Monocyte % 10.3 %      Eosinophil % 2.7 %      Basophil % 0.3 %      Immature Grans % 0.7 %      Neutrophils, Absolute  4.82 10*3/mm3      Lymphocytes, Absolute 0.91 10*3/mm3      Monocytes, Absolute 0.69 10*3/mm3      Eosinophils, Absolute 0.18 10*3/mm3      Basophils, Absolute 0.02 10*3/mm3      Immature Grans, Absolute 0.05 10*3/mm3      nRBC 0.0 /100 WBC         Data Review:  Results from last 7 days   Lab Units 02/12/21  0613 02/10/21  0745 02/08/21  0729   SODIUM mmol/L 141 142 141   POTASSIUM mmol/L 4.8 4.6 4.8   CHLORIDE mmol/L 112* 112* 111*   CO2 mmol/L 23.4 20.1* 21.6*   BUN mg/dL 29* 38* 49*   CREATININE mg/dL 1.64* 1.79* 1.67*   GLUCOSE mg/dL 80 78 82   CALCIUM mg/dL 9.1 9.3 9.0     Results from last 7 days   Lab Units 02/12/21  0613 02/08/21  0729   WBC 10*3/mm3 6.67 6.71   HEMOGLOBIN g/dL 11.9* 12.6   HEMATOCRIT % 35.9 38.2   PLATELETS 10*3/mm3 234 267             Lab Results   Lab Value Date/Time    TROPONINT 0.015 01/29/2021 1035    TROPONINT <0.010 01/05/2017 0413               Invalid input(s): PROT, LABALBU          Glucose   Date/Time Value Ref Range Status   02/12/2021 1027 104 70 - 130 mg/dL Final           Past Medical History:   Diagnosis Date   • Acute sinusitis    • Acute upper respiratory infection    • Arthritis    • CKD (chronic kidney disease)    • Debility    • Fatigue    • Foot pain, bilateral    • Glaucoma    • Gout    • Hematuria    • High blood pressure    • Hypertension    • Hypothyroid 09/1999   • Hypothyroidism    • IBS (irritable bowel syndrome)    • IGT (impaired glucose tolerance)    • Malaise and fatigue    • Medication management    • Melanoma (CMS/[facility]) 1979    left leg   • OA (osteoarthritis)    • Osteopenia 09/1999   • Painful joint    • Psoriasis    • Renal failure    • Rosacea    • Vitamin D deficiency        Assessment:  Active Hospital Problems    Diagnosis  POA   • **Stroke (cerebrum) (CMS/[facility]) [I63.9]  Yes   • Stage 4 chronic kidney disease (CMS/[facility]) [N18.4]  Yes   • Arthritis [M19.90]  Yes   • Essential hypertension [I10]  Yes   • Acquired hypothyroidism [E03.9]  Yes      Resolved  Hospital Problems   No resolved problems to display.       Plan:  Her blood pressure is better with small dose of Norvasc.  We will continue with same and will follow.    Jose Mosher MD  2/14/2021  14:56 EST

## 2021-02-15 LAB
ALBUMIN SERPL-MCNC: 3.3 G/DL (ref 3.5–5.2)
ANION GAP SERPL CALCULATED.3IONS-SCNC: 8.8 MMOL/L (ref 5–15)
BASOPHILS # BLD AUTO: 0.03 10*3/MM3 (ref 0–0.2)
BASOPHILS NFR BLD AUTO: 0.4 % (ref 0–1.5)
BUN SERPL-MCNC: 26 MG/DL (ref 8–23)
BUN/CREAT SERPL: 16.5 (ref 7–25)
CALCIUM SPEC-SCNC: 9.2 MG/DL (ref 8.2–9.6)
CHLORIDE SERPL-SCNC: 111 MMOL/L (ref 98–107)
CO2 SERPL-SCNC: 23.2 MMOL/L (ref 22–29)
CREAT SERPL-MCNC: 1.58 MG/DL (ref 0.57–1)
DEPRECATED RDW RBC AUTO: 39.8 FL (ref 37–54)
EOSINOPHIL # BLD AUTO: 0.21 10*3/MM3 (ref 0–0.4)
EOSINOPHIL NFR BLD AUTO: 3.1 % (ref 0.3–6.2)
ERYTHROCYTE [DISTWIDTH] IN BLOOD BY AUTOMATED COUNT: 12.3 % (ref 12.3–15.4)
GFR SERPL CREATININE-BSD FRML MDRD: 30 ML/MIN/1.73
GLUCOSE SERPL-MCNC: 81 MG/DL (ref 65–99)
HCT VFR BLD AUTO: 36.6 % (ref 34–46.6)
HGB BLD-MCNC: 11.7 G/DL (ref 12–15.9)
IMM GRANULOCYTES # BLD AUTO: 0.04 10*3/MM3 (ref 0–0.05)
IMM GRANULOCYTES NFR BLD AUTO: 0.6 % (ref 0–0.5)
LYMPHOCYTES # BLD AUTO: 0.96 10*3/MM3 (ref 0.7–3.1)
LYMPHOCYTES NFR BLD AUTO: 14 % (ref 19.6–45.3)
MCH RBC QN AUTO: 28.4 PG (ref 26.6–33)
MCHC RBC AUTO-ENTMCNC: 32 G/DL (ref 31.5–35.7)
MCV RBC AUTO: 88.8 FL (ref 79–97)
MONOCYTES # BLD AUTO: 0.64 10*3/MM3 (ref 0.1–0.9)
MONOCYTES NFR BLD AUTO: 9.4 % (ref 5–12)
NEUTROPHILS NFR BLD AUTO: 4.96 10*3/MM3 (ref 1.7–7)
NEUTROPHILS NFR BLD AUTO: 72.5 % (ref 42.7–76)
NRBC BLD AUTO-RTO: 0 /100 WBC (ref 0–0.2)
PHOSPHATE SERPL-MCNC: 3.1 MG/DL (ref 2.5–4.5)
PLATELET # BLD AUTO: 203 10*3/MM3 (ref 140–450)
PMV BLD AUTO: 10.9 FL (ref 6–12)
POTASSIUM SERPL-SCNC: 4.8 MMOL/L (ref 3.5–5.2)
RBC # BLD AUTO: 4.12 10*6/MM3 (ref 3.77–5.28)
SODIUM SERPL-SCNC: 143 MMOL/L (ref 136–145)
WBC # BLD AUTO: 6.84 10*3/MM3 (ref 3.4–10.8)

## 2021-02-15 PROCEDURE — 97116 GAIT TRAINING THERAPY: CPT

## 2021-02-15 PROCEDURE — 85025 COMPLETE CBC W/AUTO DIFF WBC: CPT | Performed by: PHYSICAL MEDICINE & REHABILITATION

## 2021-02-15 PROCEDURE — 97110 THERAPEUTIC EXERCISES: CPT

## 2021-02-15 PROCEDURE — 97530 THERAPEUTIC ACTIVITIES: CPT

## 2021-02-15 PROCEDURE — 80069 RENAL FUNCTION PANEL: CPT | Performed by: PHYSICAL MEDICINE & REHABILITATION

## 2021-02-15 PROCEDURE — 97535 SELF CARE MNGMENT TRAINING: CPT

## 2021-02-15 RX ADMIN — ASPIRIN 81 MG: 81 TABLET, COATED ORAL at 09:11

## 2021-02-15 RX ADMIN — Medication 250 MCG: at 09:11

## 2021-02-15 RX ADMIN — AMLODIPINE BESYLATE 5 MG: 5 TABLET ORAL at 09:11

## 2021-02-15 RX ADMIN — LEVOTHYROXINE SODIUM 150 MCG: 150 TABLET ORAL at 05:42

## 2021-02-15 RX ADMIN — METOPROLOL SUCCINATE 12.5 MG: 25 TABLET, EXTENDED RELEASE ORAL at 09:11

## 2021-02-15 RX ADMIN — ATORVASTATIN CALCIUM 80 MG: 80 TABLET, FILM COATED ORAL at 20:44

## 2021-02-15 RX ADMIN — Medication 1000 UNITS: at 20:44

## 2021-02-15 RX ADMIN — Medication 1000 UNITS: at 09:11

## 2021-02-15 RX ADMIN — NYSTATIN 1 APPLICATION: 100000 POWDER TOPICAL at 20:47

## 2021-02-15 RX ADMIN — BETAMETHASONE DIPROPIONATE 1 APPLICATION: 0.5 OINTMENT TOPICAL at 20:45

## 2021-02-15 RX ADMIN — NYSTATIN: 100000 POWDER TOPICAL at 09:13

## 2021-02-15 RX ADMIN — LATANOPROST 1 DROP: 50 SOLUTION/ DROPS OPHTHALMIC at 20:45

## 2021-02-15 RX ADMIN — CLOPIDOGREL 75 MG: 75 TABLET, FILM COATED ORAL at 09:11

## 2021-02-15 RX ADMIN — TIMOLOL MALEATE: 5 SOLUTION/ DROPS OPHTHALMIC at 09:12

## 2021-02-15 RX ADMIN — TIMOLOL MALEATE: 5 SOLUTION/ DROPS OPHTHALMIC at 20:45

## 2021-02-15 NOTE — THERAPY TREATMENT NOTE
Inpatient Rehabilitation - Occupational Therapy Treatment Note    [facility]     Patient Name: Marianne Delgado  : 1925  MRN: [id number]    Today's Date: 2/15/2021                 Admit Date: 2/3/2021       No diagnosis found.    Patient Active Problem List   Diagnosis   • Arthritis   • Stage 4 chronic kidney disease (CMS/[facility])   • Debility   • Foot pain, bilateral   • Glaucoma   • Acute idiopathic gout of right ankle   • Hematuria   • Essential hypertension   • Acquired hypothyroidism   • Osteopenia   • Psoriasis   • Rosacea   • Vitamin D deficiency   • Medicare annual wellness visit, subsequent   • Morbidly obese (CMS/[facility])   • Cerebrovascular accident (CVA) (CMS/[facility])   • Hypertensive urgency   • Stroke (cerebrum) (CMS/[facility])       Past Medical History:   Diagnosis Date   • Acute sinusitis    • Acute upper respiratory infection    • Arthritis    • CKD (chronic kidney disease)    • Debility    • Fatigue    • Foot pain, bilateral    • Glaucoma    • Gout    • Hematuria    • High blood pressure    • Hypertension    • Hypothyroid 1999   • Hypothyroidism    • IBS (irritable bowel syndrome)    • IGT (impaired glucose tolerance)    • Malaise and fatigue    • Medication management    • Melanoma (CMS/[facility]) 1979    left leg   • OA (osteoarthritis)    • Osteopenia 1999   • Painful joint    • Psoriasis    • Renal failure    • Rosacea    • Vitamin D deficiency        Past Surgical History:   Procedure Laterality Date   • CATARACT EXTRACTION     • FOOT SURGERY      mauri toe surgery   • OTHER SURGICAL HISTORY      Melanoma Excision: Left leg                IRF OT ASSESSMENT FLOWSHEET (last 12 hours)      IRF OT Evaluation and Treatment     Row Name 02/15/21 1535          OT Time and Intention    Document Type  daily treatment  -CC     Mode of Treatment  occupational therapy  -CC     Patient Effort  good  -CC     Symptoms Noted During/After Treatment  fatigue  -CC     Row Name 02/15/21 1538           Cognition/Psychosocial    Affect/Mental Status (Cognitive)  WFL  [facility]     Orientation Status (Cognition)  oriented x 4  -CC     Personal Safety Interventions  fall prevention program maintained;gait belt;nonskid shoes/slippers when out of bed  -[facility] Name 02/15/21 1535          Pain Scale: Numbers Pre/Post-Treatment    Pretreatment Pain Rating  0/10 - no pain  -     Posttreatment Pain Rating  0/10 - no pain  -CC     Row Name 02/15/21 1535          Pain Scale: FACES Pre/Post-Treatment    Pain: FACES Scale, Pretreatment  0-->no hurt  -     Posttreatment Pain Rating  0-->no hurt  -CC     Row Name 02/15/21 1535          Bed Mobility    Rolling Left [place] (Bed Mobility)  supervision;verbal cues;standby assist  -[facility] Name 02/15/21 1535          Transfers    Sit-Stand [place] (Transfers)  supervision  -     Stand-Sit [place] (Transfers)  supervision  -     Assistive Device (Shower Transfer)  -- refused  -CC     Row Name 02/15/21 1535          Sit-Stand Transfer    Assistive Device (Sit-Stand Transfers)  walker, front-wheeled  -[facility] Name 02/15/21 1535          Stand-Sit Transfer    Assistive Device (Stand-Sit Transfers)  walker, front-wheeled  -[facility] Name 02/15/21 1535          Shoulder (Therapeutic Exercise)    Shoulder (Therapeutic Exercise)  strengthening exercise  -     Shoulder Strengthening (Therapeutic Exercise)  bilateral;flexion;extension;aBduction;aDduction;sitting;1 lb free weight;10 repetitions;3 sets  -[facility] Name 02/15/21 1535          Elbow/Forearm (Therapeutic Exercise)    Elbow/Forearm (Therapeutic Exercise)  strengthening exercise  -     Elbow/Forearm Strengthening (Therapeutic Exercise)  bilateral;flexion;extension;supination;pronation;sitting;2 lb free weight;10 repetitions;3 sets  -[facility] Name 02/15/21 1535          Wrist (Therapeutic Exercise)    Wrist (Therapeutic Exercise)  strengthening exercise  -     Wrist Strengthening (Therapeutic  Exercise)  bilateral;flexion;extension;2 lb free weight;10 repetitions;3 sets  -     Row Name 02/15/21 1535          Aerobic Exercise    Type (Aerobic Exercise)  arm bike;for 3 minutes  -     Time Performed (Aerobic Exercise)  3 min x 2 seated   -     Row Name 02/15/21 1535          Bathing    Comment (Bathing)  -- erefused  -     Row Name 02/15/21 1535          Upper Body Dressing    [place] Level (Upper Body Dressing)  upper body dressing skills;doff;don;front opening garment;pull over garment;set up assistance;supervision  -     Position (Upper Body Dressing)  supported sitting  -     Set-up Assistance (Upper Body Dressing)  obtain clothing  -     Row Name 02/15/21 1535          Lower Body Dressing    [place] Level (Lower Body Dressing)  doff;don;pants/bottoms;shoes/slippers;socks;underwear;set up;verbal cues;minimum assist (75% patient effort)  -     Position (Lower Body Dressing)  supported sitting;supported standing  -     Set-up Assistance (Lower Body Dressing)  obtain clothing  -     Row Name 02/15/21 1535          Grooming    [place] Level (Grooming)  grooming skills;hair care, combing/brushing;oral care regimen;set up  -     Position (Grooming)  sink side;supported sitting  -     Set-up Assistance (Grooming)  obtain supplies  -     Row Name 02/15/21 1535          Positioning and Restraints    Pre-Treatment Position  sitting in chair/recliner  -     Post Treatment Position  wheelchair  -     In Wheelchair  sitting;call light within reach;encouraged to call for assist;exit alarm on  -       User Key  (r) = Recorded By, (t) = Taken By, (c) = Cosigned By    Initials Name Effective Dates    CC Idania Blood OTR 06/08/18 -            Occupational Therapy Education                 Title: PT OT SLP Therapies (Done)     Topic: Occupational Therapy (Done)     Point: ADL training (Done)     Description:   Instruct learner(s) on proper safety adaptation and  remediation techniques during self care or transfers.   Instruct in proper use of assistive devices.              Learning Progress Summary           Patient Acceptance, E, VU by CC at 2/15/2021 1534    Comment: Family conf with pt and niece. Status, DME (orderd 3-1 sent info on sock aid), follow up therapy discussed.    Acceptance, E, VU by AB at 2/11/2021 1415    Comment: Educated pt about pacing and activity tolerance strategies to manage fatigue    Acceptance, E,TB, VU by JULIANO at 2/6/2021 1534    Acceptance, E,TB, VU,DU by JEMAL at 2/4/2021 1640    Comment: ed pt on role of OT, benefit of therapy, POC w. OT ed on safety w ADLs and tsf techniques. pt demo w. min A for tsf and more A w. LBD and SBA UBD   Family Acceptance, E, VU by CC at 2/15/2021 1534    Comment: Family conf with pt and niece. Status, DME (orderd 3-1 sent info on sock aid), follow up therapy discussed.                   Point: Home exercise program (Done)     Description:   Instruct learner(s) on appropriate technique for monitoring, assisting and/or progressing therapeutic exercises/activities.              Learning Progress Summary           Patient Acceptance, E, VU by CC at 2/15/2021 1534    Comment: Family conf with pt and niece. Status, DME (orderd 3-1 sent info on sock aid), follow up therapy discussed.    Acceptance, E, VU by AB at 2/11/2021 1415    Comment: Educated pt about pacing and activity tolerance strategies to manage fatigue    Acceptance, E,TB, VU by JULIANO at 2/6/2021 1534   Family Acceptance, E, VU by CC at 2/15/2021 1534    Comment: Family conf with pt and niece. Status, DME (orderd 3-1 sent info on sock aid), follow up therapy discussed.                   Point: Precautions (Done)     Description:   Instruct learner(s) on prescribed precautions during self-care and functional transfers.              Learning Progress Summary           Patient Acceptance, E, VU by CC at 2/15/2021 1534    Comment: Family conf with pt and niece. Status,  DME (orderd 3-1 sent info on sock aid), follow up therapy discussed.    Acceptance, E, VU by AB at 2/11/2021 1415    Comment: Educated pt about pacing and activity tolerance strategies to manage fatigue    Acceptance, E,TB, VU by JULIANO at 2/6/2021 1534    Acceptance, E,TB, VU,DU by JEMAL at 2/4/2021 1640    Comment: ed pt on role of OT, benefit of therapy, POC w. OT ed on safety w ADLs and tsf techniques. pt demo w. min A for tsf and more A w. LBD and SBA UBD   Family Acceptance, E, VU by CC at 2/15/2021 1534    Comment: Family conf with pt and niece. Status, DME (orderd 3-1 sent info on sock aid), follow up therapy discussed.                   Point: Body mechanics (Done)     Description:   Instruct learner(s) on proper positioning and spine alignment during self-care, functional mobility activities and/or exercises.              Learning Progress Summary           Patient Acceptance, E, VU by CC at 2/15/2021 1534    Comment: Family conf with pt and niece. Status, DME (orderd 3-1 sent info on sock aid), follow up therapy discussed.    Acceptance, E, VU by AB at 2/11/2021 1415    Comment: Educated pt about pacing and activity tolerance strategies to manage fatigue    Acceptance, E,TB, VU by JULIANO at 2/6/2021 1534    Acceptance, E,TB, VU,DU by JEMAL at 2/4/2021 1640    Comment: ed pt on role of OT, benefit of therapy, POC w. OT ed on safety w ADLs and tsf techniques. pt demo w. min A for tsf and more A w. LBD and SBA UBD   Family Acceptance, E, VU by CC at 2/15/2021 1534    Comment: Family conf with pt and niece. Status, DME (orderd 3-1 sent info on sock aid), follow up therapy discussed.                               User Key     Initials Effective Dates Name Provider Type Discipline    CC 06/08/18 -  Idania Blood, OTR Occupational Therapist OT    KP 06/08/18 -  Mame Nelson OTR Occupational Therapist OT    JULIANO 07/15/20 -  Sandra Souza OT Occupational Therapist OT    AB 02/01/21 -  Damon Paulino, PT  Student PT Student PT                    OT Recommendation and Plan                         Time Calculation:     Time Calculation- OT     Row Name 02/15/21 1430 02/15/21 1330 02/15/21 1100       Time Calculation- OT    OT Start Time  1430  -CC  1330  -CC  1100  -CC    OT Stop Time  1500  -CC  1400  -CC  1130  -CC    OT Time Calculation (min)  30 min  -CC  30 min  -CC  30 min  -CC      User Key  (r) = Recorded By, (t) = Taken By, (c) = Cosigned By    Initials Name Provider Type    CC Idania Blood OTR Occupational Therapist        Therapy Charges for Today     Code Description Service Date Service Provider Modifiers Qty    30590055617 HC OT SELF CARE/MGMT/TRAIN EA 15 MIN 2/15/2021 Idania Blood OTR GO 2    55182958424 HC OT THER PROC EA 15 MIN 2/15/2021 Idania Blood OTR GO 4                   HARI Fofana  2/15/2021

## 2021-02-15 NOTE — PROGRESS NOTES
Adult Nutrition  Assessment/PES    Patient Name:  Marianne Delgado  YOB: 1925  MRN: [id number]  Admit Date:  2/3/2021    Assessment Date:  2/15/2021      Reason for Assessment     Row Name 02/15/21 1200          Reason for Assessment    Reason For Assessment  follow-up protocol         Nutrition/Diet History     Row Name 02/15/21 1200          Nutrition/Diet History    Typical Food/Fluid Intake  PO variable, %. Still c/o no appetite most of the time. Denies GI issues/reasons     Supplemental Drinks/Foods/Additives  can't drink too much of the supplements - believe they caused her diarrhea           Labs/Tests/Procedures/Meds     Row Name 02/15/21 1201          Labs/Procedures/Meds    Lab Results Reviewed  reviewed     Lab Results Comments  Cl, BUN, Cr, Hgb        Diagnostic Tests/Procedures    Diagnostic Test/Procedure Reviewed  reviewed        Medications    Pertinent Medications Reviewed  reviewed     Pertinent Medications Comments  Vit D, synthroid, B 12             Nutrition Prescription Ordered     Row Name 02/15/21 1203          Nutrition Prescription PO    Current PO Diet  Regular     Supplement  Boost Plus (Ensure Enlive, Ensure Plus);Mighty Shake         Evaluation of Received Nutrient/Fluid Intake     Row Name 02/15/21 1203          PO Evaluation    Number of Days PO Intake Evaluated  3 days     % PO Intake  %               Problem/Interventions:          Intervention Goal     Row Name 02/15/21 1203          Intervention Goal    General  Maintain nutrition;Disease management/therapy;Reduce/improve symptoms     PO  Tolerate PO;Increase intake;PO intake (%)     PO Intake %  75 %     Weight  No significant weight loss         Nutrition Intervention     Row Name 02/15/21 1204          Nutrition Intervention    RD/Tech Action  Advise alternate selection;Encourage intake;Follow Tx progress;Care plan reviewd;Supplement provided           Education/Evaluation     Row Name 02/15/21  1204          Monitor/Evaluation    Monitor  Per protocol;I&O;PO intake;Supplement intake;Pertinent labs;Skin status;Weight;Symptoms           Electronically signed by:  Heidi Mac RD  02/15/21 12:04 EST

## 2021-02-15 NOTE — THERAPY TREATMENT NOTE
Inpatient Rehabilitation - Occupational Therapy Treatment Note    [facility]     Patient Name: Marianne Delgado  : 1925  MRN: [id number]    Today's Date: 2/15/2021                 Admit Date: 2/3/2021       No diagnosis found.    Patient Active Problem List   Diagnosis   • Arthritis   • Stage 4 chronic kidney disease (CMS/[facility])   • Debility   • Foot pain, bilateral   • Glaucoma   • Acute idiopathic gout of right ankle   • Hematuria   • Essential hypertension   • Acquired hypothyroidism   • Osteopenia   • Psoriasis   • Rosacea   • Vitamin D deficiency   • Medicare annual wellness visit, subsequent   • Morbidly obese (CMS/[facility])   • Cerebrovascular accident (CVA) (CMS/[facility])   • Hypertensive urgency   • Stroke (cerebrum) (CMS/[facility])       Past Medical History:   Diagnosis Date   • Acute sinusitis    • Acute upper respiratory infection    • Arthritis    • CKD (chronic kidney disease)    • Debility    • Fatigue    • Foot pain, bilateral    • Glaucoma    • Gout    • Hematuria    • High blood pressure    • Hypertension    • Hypothyroid 1999   • Hypothyroidism    • IBS (irritable bowel syndrome)    • IGT (impaired glucose tolerance)    • Malaise and fatigue    • Medication management    • Melanoma (CMS/[facility]) 1979    left leg   • OA (osteoarthritis)    • Osteopenia 1999   • Painful joint    • Psoriasis    • Renal failure    • Rosacea    • Vitamin D deficiency        Past Surgical History:   Procedure Laterality Date   • CATARACT EXTRACTION     • FOOT SURGERY      mauri toe surgery   • OTHER SURGICAL HISTORY      Melanoma Excision: Left leg                IRF OT ASSESSMENT FLOWSHEET (last 12 hours)      IRF OT Evaluation and Treatment     Row Name 02/15/21 1535          OT Time and Intention    Document Type  daily treatment  -CC     Mode of Treatment  occupational therapy  -CC     Patient Effort  good  -CC     Symptoms Noted During/After Treatment  fatigue  -CC     Row Name 02/15/21 1532           Cognition/Psychosocial    Affect/Mental Status (Cognitive)  WFL  [facility]     Orientation Status (Cognition)  oriented x 4  -CC     Personal Safety Interventions  fall prevention program maintained;gait belt;nonskid shoes/slippers when out of bed  -[facility] Name 02/15/21 1535          Pain Scale: Numbers Pre/Post-Treatment    Pretreatment Pain Rating  0/10 - no pain  -     Posttreatment Pain Rating  0/10 - no pain  -CC     Row Name 02/15/21 1535          Pain Scale: FACES Pre/Post-Treatment    Pain: FACES Scale, Pretreatment  0-->no hurt  -     Posttreatment Pain Rating  0-->no hurt  -CC     Row Name 02/15/21 1535          Bed Mobility    Rolling Left [place] (Bed Mobility)  supervision;verbal cues;standby assist  -[facility] Name 02/15/21 1535          Transfers    Sit-Stand [place] (Transfers)  supervision  -     Stand-Sit [place] (Transfers)  supervision  -     Assistive Device (Shower Transfer)  -- refused  -CC     Row Name 02/15/21 1535          Sit-Stand Transfer    Assistive Device (Sit-Stand Transfers)  walker, front-wheeled  -[facility] Name 02/15/21 1535          Stand-Sit Transfer    Assistive Device (Stand-Sit Transfers)  walker, front-wheeled  -[facility] Name 02/15/21 1535          Shoulder (Therapeutic Exercise)    Shoulder (Therapeutic Exercise)  strengthening exercise  -     Shoulder Strengthening (Therapeutic Exercise)  bilateral;flexion;extension;aBduction;aDduction;sitting;1 lb free weight;10 repetitions;3 sets  -[facility] Name 02/15/21 1535          Elbow/Forearm (Therapeutic Exercise)    Elbow/Forearm (Therapeutic Exercise)  strengthening exercise  -     Elbow/Forearm Strengthening (Therapeutic Exercise)  bilateral;flexion;extension;supination;pronation;sitting;2 lb free weight;10 repetitions;3 sets  -[facility] Name 02/15/21 1535          Wrist (Therapeutic Exercise)    Wrist (Therapeutic Exercise)  strengthening exercise  -     Wrist Strengthening (Therapeutic  Exercise)  bilateral;flexion;extension;2 lb free weight;10 repetitions;3 sets  -     Row Name 02/15/21 1535          Aerobic Exercise    Type (Aerobic Exercise)  arm bike;for 3 minutes  -     Time Performed (Aerobic Exercise)  3 min x 2 seated   -     Row Name 02/15/21 1535          Bathing    Comment (Bathing)  -- erefused  -     Row Name 02/15/21 1535          Upper Body Dressing    [place] Level (Upper Body Dressing)  upper body dressing skills;doff;don;front opening garment;pull over garment;set up assistance;supervision  -     Position (Upper Body Dressing)  supported sitting  -     Set-up Assistance (Upper Body Dressing)  obtain clothing  -     Row Name 02/15/21 1535          Lower Body Dressing    [place] Level (Lower Body Dressing)  doff;don;pants/bottoms;shoes/slippers;socks;underwear;set up;verbal cues;minimum assist (75% patient effort)  -     Position (Lower Body Dressing)  supported sitting;supported standing  -     Set-up Assistance (Lower Body Dressing)  obtain clothing  -     Row Name 02/15/21 1535          Grooming    [place] Level (Grooming)  grooming skills;hair care, combing/brushing;oral care regimen;set up  -     Position (Grooming)  sink side;supported sitting  -     Set-up Assistance (Grooming)  obtain supplies  -     Row Name 02/15/21 1535          Positioning and Restraints    Pre-Treatment Position  sitting in chair/recliner  -     Post Treatment Position  wheelchair  -     In Wheelchair  sitting;call light within reach;encouraged to call for assist;exit alarm on  -       User Key  (r) = Recorded By, (t) = Taken By, (c) = Cosigned By    Initials Name Effective Dates    CC Idania Blood OTR 06/08/18 -            Occupational Therapy Education                 Title: PT OT SLP Therapies (Done)     Topic: Occupational Therapy (Done)     Point: ADL training (Done)     Description:   Instruct learner(s) on proper safety adaptation and  remediation techniques during self care or transfers.   Instruct in proper use of assistive devices.              Learning Progress Summary           Patient Acceptance, E, VU by CC at 2/15/2021 1534    Comment: Family conf with pt and niece. Status, DME (orderd 3-1 sent info on sock aid), follow up therapy discussed.    Acceptance, E, VU by AB at 2/11/2021 1415    Comment: Educated pt about pacing and activity tolerance strategies to manage fatigue    Acceptance, E,TB, VU by JULIANO at 2/6/2021 1534    Acceptance, E,TB, VU,DU by JEMAL at 2/4/2021 1640    Comment: ed pt on role of OT, benefit of therapy, POC w. OT ed on safety w ADLs and tsf techniques. pt demo w. min A for tsf and more A w. LBD and SBA UBD   Family Acceptance, E, VU by CC at 2/15/2021 1534    Comment: Family conf with pt and niece. Status, DME (orderd 3-1 sent info on sock aid), follow up therapy discussed.                   Point: Home exercise program (Done)     Description:   Instruct learner(s) on appropriate technique for monitoring, assisting and/or progressing therapeutic exercises/activities.              Learning Progress Summary           Patient Acceptance, E, VU by CC at 2/15/2021 1534    Comment: Family conf with pt and niece. Status, DME (orderd 3-1 sent info on sock aid), follow up therapy discussed.    Acceptance, E, VU by AB at 2/11/2021 1415    Comment: Educated pt about pacing and activity tolerance strategies to manage fatigue    Acceptance, E,TB, VU by JULIANO at 2/6/2021 1534   Family Acceptance, E, VU by CC at 2/15/2021 1534    Comment: Family conf with pt and niece. Status, DME (orderd 3-1 sent info on sock aid), follow up therapy discussed.                   Point: Precautions (Done)     Description:   Instruct learner(s) on prescribed precautions during self-care and functional transfers.              Learning Progress Summary           Patient Acceptance, E, VU by CC at 2/15/2021 1534    Comment: Family conf with pt and niece. Status,  DME (orderd 3-1 sent info on sock aid), follow up therapy discussed.    Acceptance, E, VU by AB at 2/11/2021 1415    Comment: Educated pt about pacing and activity tolerance strategies to manage fatigue    Acceptance, E,TB, VU by JULIANO at 2/6/2021 1534    Acceptance, E,TB, VU,DU by JEMAL at 2/4/2021 1640    Comment: ed pt on role of OT, benefit of therapy, POC w. OT ed on safety w ADLs and tsf techniques. pt demo w. min A for tsf and more A w. LBD and SBA UBD   Family Acceptance, E, VU by CC at 2/15/2021 1534    Comment: Family conf with pt and niece. Status, DME (orderd 3-1 sent info on sock aid), follow up therapy discussed.                   Point: Body mechanics (Done)     Description:   Instruct learner(s) on proper positioning and spine alignment during self-care, functional mobility activities and/or exercises.              Learning Progress Summary           Patient Acceptance, E, VU by CC at 2/15/2021 1534    Comment: Family conf with pt and niece. Status, DME (orderd 3-1 sent info on sock aid), follow up therapy discussed.    Acceptance, E, VU by AB at 2/11/2021 1415    Comment: Educated pt about pacing and activity tolerance strategies to manage fatigue    Acceptance, E,TB, VU by JULIANO at 2/6/2021 1534    Acceptance, E,TB, VU,DU by JEMAL at 2/4/2021 1640    Comment: ed pt on role of OT, benefit of therapy, POC w. OT ed on safety w ADLs and tsf techniques. pt demo w. min A for tsf and more A w. LBD and SBA UBD   Family Acceptance, E, VU by CC at 2/15/2021 1534    Comment: Family conf with pt and niece. Status, DME (orderd 3-1 sent info on sock aid), follow up therapy discussed.                               User Key     Initials Effective Dates Name Provider Type Discipline    CC 06/08/18 -  Idania Blood, OTR Occupational Therapist OT    KP 06/08/18 -  Mame Nelson OTR Occupational Therapist OT    JULIANO 07/15/20 -  Sandra Souza OT Occupational Therapist OT    AB 02/01/21 -  Damon Paulino, PT  Student PT Student PT                    OT Recommendation and Plan                         Time Calculation:     Time Calculation- OT     Row Name 02/15/21 1430 02/15/21 1330 02/15/21 1100       Time Calculation- OT    OT Start Time  1430  -CC  1330  -CC  1100  -CC    OT Stop Time  1500  -CC  1400  -CC  1130  -CC    OT Time Calculation (min)  30 min  -CC  30 min  -CC  30 min  -CC      User Key  (r) = Recorded By, (t) = Taken By, (c) = Cosigned By    Initials Name Provider Type    CC Idania Blood OTR Occupational Therapist        Therapy Charges for Today     Code Description Service Date Service Provider Modifiers Qty    90433779498 HC OT SELF CARE/MGMT/TRAIN EA 15 MIN 2/15/2021 Idania Blood OTR GO 2    24630581722 HC OT THER PROC EA 15 MIN 2/15/2021 Idania Blood OTR GO 4                   HARI Fofana  2/15/2021

## 2021-02-15 NOTE — PROGRESS NOTES
Inpatient Rehabilitation Plan of Care Note    Plan of Care  Care Plan Reviewed - Updates as Follows    Body Systems    [RN] Integumentary(Active)  Current Status(02/15/2021): Pt has red area to right buttock (psoriasis) cream  ordered; slight redness under breasts; powder ordered  Weekly Goal(02/22/2021): No s/s of infection  Discharge Goal: No s/s of infection    Performed Intervention(s)  Daily skin inspection  Nutritional support  Apply cream to dry areas as needed  Apply powder under folds as ordered      Pain    [RN] Pain Management(Active)  Current Status(02/15/2021): Chronic pain to left knee r/t arthritis. 2/6 pt  states both legs tender/sore.  Weekly Goal(02/15/2021): Pt able to tolerate therapies  Discharge Goal: Pain under control    Performed Intervention(s)  Offer medication when needed  Practice relaxation techniques  Apply heating pad or cream to area when needed      Psychosocial    [RN] Coping/Adjustment(Active)  Current Status(02/15/2021): Pt expresses effective coping  Weekly Goal(02/15/2021): Allow pt to express concerns  Discharge Goal: Demonstrates healthy coping strategies    Performed Intervention(s)  Verbalizes needs and concerns  Provide therapeutic enviroment      Safety    [RN] Potential for Injury(Active)  Current Status(02/15/2021): No unsafe behaviors  Weekly Goal(02/15/2021): Pt will use call bell 100% of the time  Discharge Goal: No falls    Performed Intervention(s)  Falls precautions/protocol  Safety rounds  Items within reach  Bed alarm/ chair alarm      Sphincter Control    [RN] Bladder Management(Active)  Current Status(02/15/2021): Pt is continent 100%  Weekly Goal(02/15/2021): Pt will be continent 100%  Discharge Goal: Pt will be continent 100%    [RN] Bowel Management(Active)  Current Status(02/15/2021): Pt is continent 100% except 1 incont. bm with  urgency, loose/liquid/ some formed bm on 2/8.  Weekly Goal(02/15/2021): Pt will be continent 100%  Discharge Goal: Pt will  remain continent 100%    Performed Intervention(s)  Use incontinence products  offer toileting  Encourage fluids    Signed by: Yanci De Jesus RN

## 2021-02-15 NOTE — PROGRESS NOTES
Family conference held today with patient and uzairece, Jessica, on phone. PT, OT, NSG, and SW present. Discussed progress and d/c recommendations.   PT reported patient needs Min assist with bed mobility, but patient does not sleep in her bed at home so should do okay getting in/out of her lift recliner chair she sleeps in at home. Patient transfers sit-stand from chair with SBA. Patient walking with rollator with SBA. Recommend patient continued to use rollator and have SBA at home initially.   OT reported patient is at SBA level with commode and shower transfers. Patient using shower chair for bathing with SBA. Patient able to dress UE with SBA but needs Min/SBA with LE dressing. Patient practicing using sock aid and would like to get one for home. OT sent information to floyd to order sock aid on line. Discussed patient's concerns for walking to bathroom at night since has to go frequently. OT recommended patient use BSC by her lift chair but to practice walking with rollator while having caregiver at home with her.   NSG reviewed medications and discussed follow up appointments. Patient has been continent of bowel and bladder. NSG is using cream to area on bottom and powder under breasts.   Patient has rollator to use at home. PT did make nicierra aware that right brake is not working well. OT to order BSC. Also discussed option of toilet safety frame to use with commode in bathroom.  Home health PT and OT recommended. [facility] following. [facility] liaison notified of d/c date 2/17.   PT and OT discussed importance of patient getting up frequently to walk at home. They will also give home exercise program.  Team recommended patient have someone with her at home all the time initially. [facility] has made arrangements for private duty caregiver to be with patient 24/7 for first 2 weeks home.   D/C date is Wednesday, 2/17. [facility] will plan to pick patient up after lunch. Will assist with plans.

## 2021-02-15 NOTE — THERAPY TREATMENT NOTE
Inpatient Rehabilitation - Physical Therapy Treatment Note       [facility]     Patient Name: Marianne Delgado  : 1925  MRN: [id number]    Today's Date: 2/15/2021                    Admit Date: 2/3/2021      Visit Dx: No diagnosis found.    Patient Active Problem List   Diagnosis   • Arthritis   • Stage 4 chronic kidney disease (CMS/Formerly [facility])   • Debility   • Foot pain, bilateral   • Glaucoma   • Acute idiopathic gout of right ankle   • Hematuria   • Essential hypertension   • Acquired hypothyroidism   • Osteopenia   • Psoriasis   • Rosacea   • Vitamin D deficiency   • Medicare annual wellness visit, subsequent   • Morbidly obese (CMS/Formerly [facility])   • Cerebrovascular accident (CVA) (CMS/Formerly [facility])   • Hypertensive urgency   • Stroke (cerebrum) (CMS/Formerly [facility])       Past Medical History:   Diagnosis Date   • Acute sinusitis    • Acute upper respiratory infection    • Arthritis    • CKD (chronic kidney disease)    • Debility    • Fatigue    • Foot pain, bilateral    • Glaucoma    • Gout    • Hematuria    • High blood pressure    • Hypertension    • Hypothyroid 1999   • Hypothyroidism    • IBS (irritable bowel syndrome)    • IGT (impaired glucose tolerance)    • Malaise and fatigue    • Medication management    • Melanoma (CMS/Formerly [facility]) 1979    left leg   • OA (osteoarthritis)    • Osteopenia 1999   • Painful joint    • Psoriasis    • Renal failure    • Rosacea    • Vitamin D deficiency        Past Surgical History:   Procedure Laterality Date   • CATARACT EXTRACTION     • FOOT SURGERY      mauri toe surgery   • OTHER SURGICAL HISTORY      Melanoma Excision: Left leg          PT ASSESSMENT (last 12 hours)      IRF PT Evaluation and Treatment     Row Name 02/15/21 0948          PT Time and Intention    Document Type  daily treatment  -MD (yeni) AB (beau) MD (c)     Mode of Treatment  individual therapy;physical therapy  -MD (yeni) AB (beau) MD (c)     Patient/Family/Caregiver Comments/Observations  Pt lying in bed, Reports getting more rest  this weekend.   -MD (r) AB (t) MD (c)     Row Name 02/15/21 0948          Cognition/Psychosocial    Affect/Mental Status (Cognitive)  WFL  -MD (r) AB (t) MD (c)     Orientation Status (Cognition)  oriented x 4  -MD (r) AB (t) MD (c)     Follows Commands (Cognition)  WFL;follows multi-step commands  -MD (r) AB (t) MD (c)     Row Name 02/15/21 0948          Pain Scale: Numbers Pre/Post-Treatment    Pretreatment Pain Rating  0/10 - no pain  -MD (r) AB (t) MD (c)     Row Name 02/15/21 0948          Bed Mobility    Bed Mobility  supine-sit  -MD (r) AB (t) MD (c)     Supine-Sit Braxton (Bed Mobility)  verbal cues;supervision;set up  -MD (r) AB (t) MD (c)     Assistive Device (Bed Mobility)  bed rails;head of bed elevated  -MD (r) AB (t) MD (c)     Row Name 02/15/21 0948          Transfers    Sit-Stand Braxton (Transfers)  contact guard;set up;verbal cues VC for improved technique   -MD (r) AB (t) MD (c)     Stand-Sit Braxton (Transfers)  contact guard;set up;verbal cues VC for improved technique   -MD (r) AB (t) MD (c)     Row Name 02/15/21 0948          Car Transfer    Type (Car Transfer)  sit-stand;stand-sit  -MD (r) AB (t) MD (c)     Braxton Level (Car Transfer)  verbal cues;contact guard;minimum assist (75% patient effort);moderate assist (50% patient effort) Renata for LE clearance into car. ModA for sit to stand.   -MD (r) AB (t) MD (c)     Assistive Device (Car Transfer)  walker, 4-wheeled  -MD (r) AB (t) MD (c)     Row Name 02/15/21 0948          Gait/Stairs (Locomotion)    Braxton Level (Gait)  contact guard;standby assist  -MD (r) AB (t) MD (c)     Assistive Device (Gait)  walker, 4-wheeled  -MD (r) AB (t) MD (c)     Distance in Feet (Gait)  240',160'x 2, 80' w/ RWx  -MD (r) AB (t) MD (c)     Pattern (Gait)  step-through  -MD (r) AB (t) MD (c)     Bilateral Gait Deviations  forward flexed posture  -MD (r) AB (t) MD (c)     Row Name 02/15/21 0948          Hip (Therapeutic Exercise)    Hip  (Therapeutic Exercise)  strengthening exercise  -MD AB nathan (r)) MD edmonds)     Hip Strengthening (Therapeutic Exercise)  bilateral;marching while seated + sit to stand w/ standard height chair x25 reps + VC  -MD AB nathan (r)) MD edmonds)     Row Name 02/15/21 0948          Knee (Therapeutic Exercise)    Knee (Therapeutic Exercise)  strengthening exercise  -MD AB nathan (r)) MD edmonds)     Knee Strengthening (Therapeutic Exercise)  extension;flexion;red;10 repetitions;2 sets Red band used for flexion  -MD AB nathan (r)) MD edmonds)     Row Name 02/15/21 0948          Positioning and Restraints    Pre-Treatment Position  in bed  -MD AB MD grey (r t))     Post Treatment Position  wheelchair  -MD AB MD grey (r t))     In Wheelchair  exit alarm on;with PT;with OT;with nsg had family conference post session  -MD AB nathan (r)) MD edmonds)     Row Name 02/15/21 0948          Daily Progress Summary (PT)    Daily Progress Summary (PT)  Pt seemed to have improved energy during today session. Steady progress made in gait ambulation. Sit to stand training implemented for improved LE strengthening and for practice getting up from standard height chair for return to dinning room eating. Muscular endurance still limited. Thus it is recommend that patient continue to engage in skilled PT to address impairments and work towards established goals.   -MD PONCE (r) (beau) MD edmonds)       User Key  (r) = Recorded By, (t) = Taken By, (c) = Cosigned By    Initials Name Provider Type    Katya Irving, PT Physical Therapist    Damon Caba, PT Student PT Student           Physical Therapy Education                 Title: PT OT SLP Therapies (Done)     Topic: Physical Therapy (Done)     Point: Mobility training (Done)     Learning Progress Summary           Patient Acceptance, E,TB, VU,DU by AB at 2/15/2021 1205    Comment: Demonstrated and taught proper mechanics for sit-stand transfers from standard height chair.    Acceptance, E,D, NR by  at 2/13/2021 1124    Comment: max  encouragement    Acceptance, E, VU,DU by AB at 2/12/2021 1428    Acceptance, E, VU by AB at 2/11/2021 1415    Comment: Educated pt about pacing and activity tolerance strategies to manage fatigue    Acceptance, E, VU by AB at 2/10/2021 1144    Comment: Pt educated about dynamic balance and improved gait mechanics.    Acceptance, E,TB, VU,NR by MS at 2/8/2021 1521    Acceptance, E, DU,NR by LB at 2/6/2021 1514                   Point: Home exercise program (Done)     Learning Progress Summary           Patient Acceptance, E, VU,DU by AB at 2/12/2021 1428    Acceptance, E, VU by AB at 2/11/2021 1415    Comment: Educated pt about pacing and activity tolerance strategies to manage fatigue    Acceptance, E, VU by AB at 2/10/2021 1144    Comment: Pt educated about dynamic balance and improved gait mechanics.    Acceptance, E,TB, VU,NR by MS at 2/8/2021 1521                   Point: Body mechanics (Done)     Learning Progress Summary           Patient Acceptance, E,TB, VU,DU by AB at 2/15/2021 1205    Comment: Demonstrated and taught proper mechanics for sit-stand transfers from standard height chair.    Acceptance, E, VU,DU by AB at 2/12/2021 1428    Acceptance, E, VU by AB at 2/11/2021 1415    Comment: Educated pt about pacing and activity tolerance strategies to manage fatigue    Acceptance, E, VU by AB at 2/10/2021 1144    Comment: Pt educated about dynamic balance and improved gait mechanics.    Acceptance, E,TB, VU,NR by MS at 2/8/2021 1521                   Point: Precautions (Done)     Learning Progress Summary           Patient Acceptance, E, VU,DU by AB at 2/12/2021 1428    Acceptance, E, VU by AB at 2/11/2021 1415    Comment: Educated pt about pacing and activity tolerance strategies to manage fatigue    Acceptance, E, VU by MD at 2/11/2021 0949    Acceptance, E, VU by AB at 2/10/2021 1144    Comment: Pt educated about dynamic balance and improved gait mechanics.    Acceptance, E, VU by MD at 2/9/2021 0949     Acceptance, E,TB, VU,NR by MS at 2/8/2021 1521    Acceptance, E, VU by MD at 2/5/2021 1049    Acceptance, E, VU by MD at 2/4/2021 1551                               User Key     Initials Effective Dates Name Provider Type Discipline    LB 06/08/18 -  Chley Perkins, PT Physical Therapist PT    MD 04/03/18 -  Katya Bar, PT Physical Therapist PT    KP 04/03/18 -  Daily Agarwal, PT Physical Therapist PT    MS 03/04/19 -  Annalise Neil, PT Physical Therapist PT    AB 02/01/21 -  Damon Paulino, PT Student PT Student PT                PT Recommendation and Plan          Daily Progress Summary (PT)  Daily Progress Summary (PT): Pt seemed to have improved energy during today session. Steady progress made in gait ambulation. Sit to stand training implemented for improved LE strengthening and for practice getting up from standard height chair for return to dinning room eating. Muscular endurance still limited. Thus it is recommend that patient continue to engage in skilled PT to address impairments and work towards established goals.   Impairments Still Limiting Function (PT): impaired functional activity tolerance  Recommendations (PT): may trial to move pt session later into the day and break sessions into 3x30 mins as schedule allows.                 Time Calculation:     PT Charges     Row Name 02/15/21 1355 02/15/21 1042          Time Calculation    Start Time  1230  -MD daniel) AB evans) MD (c)  0930  -MD dainel) AB evans) MD (c)     Stop Time  1300  -MD daniel) AB evans) MD (c)  1030  -MD daniel) AB (beau) MD (c)     Time Calculation (min)  30 min  -MD daniel) AB (t)  60 min  -MD daniel) AB (t)     PT Received On  02/15/21  -MD AB nathan (r)) MD (c)  02/15/21  -MD daniel) AB evans) MD (c)     PT - Next Appointment  02/16/21  -MD AB nathan (r)) MD (c)  02/16/21  -MD daniel) AB evans) MD (c)     PT Goal Re-Cert Due Date  02/19/21  -MD AB nathan (r)) MD (c)  02/19/21  -MD daniel) AB evans) MD (c)       User Key  (r) = Recorded By, (t) = Taken By, (c) = Cosigned By    Initials Name  Provider Type    Katya Irving, PT Physical Therapist    AB Damon Paulino, PT Student PT Student          Therapy Charges for Today     Code Description Service Date Service Provider Modifiers Qty    18203238615  GAIT TRAINING EA 15 MIN 2/15/2021 Damon Paulino, PT Student GP 3    [id number]  PT THERAPEUTIC ACT EA 15 MIN 2/15/2021 Damon Paulino, PT Student GP 2    [id number]  PT THER PROC EA 15 MIN 2/15/2021 Damon Paulino PT Student GP 1        Patient was intermittently wearing a face mask during this therapy encounter. Therapist used appropriate personal protective equipment including eye protection, mask, and gloves.  Mask used was standard procedure mask. Appropriate PPE was worn during the entire therapy session. Hand hygiene was completed before and after therapy session. Patient is not in enhanced droplet precautions. Katya Bar PT was supervising and present throughout treatment session.             DOUG Johnston  2/15/2021

## 2021-02-15 NOTE — PROGRESS NOTES
Inpatient Rehabilitation Plan of Care Note    Plan of Care  Care Plan Reviewed - No updates at this time.    Sphincter Control    Performed Intervention(s)  Use incontinence products  offer toileting  Encourage fluids      Safety    Performed Intervention(s)  Falls precautions/protocol  Safety rounds  Items within reach  Bed alarm/ chair alarm      Psychosocial    Performed Intervention(s)  Verbalizes needs and concerns  Provide therapeutic enviroment      Pain    Performed Intervention(s)  Offer medication when needed  Practice relaxation techniques  Apply heating pad or cream to area when needed      Body Systems    Performed Intervention(s)  Daily skin inspection  Nutritional support  Apply cream to dry areas as needed  Apply powder under folds as ordered    Signed by: Ronda Mcginnis RN

## 2021-02-15 NOTE — PROGRESS NOTES
[facility]    Blood pressure stable. Continue Norvasc and metoprolol.  Will sign off.  Follow up with PCP 1-2 weeks post discharge.    Jamal Smith MD

## 2021-02-15 NOTE — PROGRESS NOTES
Case Management  Inpatient Rehabilitation Plan of Care and Discharge Plan Note    Rehabilitation Diagnosis:  Branch  Date of Onset:  Branch    Medical Summary:  Branch  Past Medical History: Branch    Plan of Care  Updated Problems/Interventions  Field    Expected Intensity:  Branch  Interdisciplinary Team:  Paulino  Estimated Length of Stay/Anticipated Discharge Date: Branch  Anticipated Discharge Destination:  Anticipated discharge destination from inpatient rehabilitation is community  discharge with assistance. Patient lives in Independent Living apartment at [facility]  Family conference held with patient and niece, by phone on 2/15.  [place] 24/7 hired caregiver.  [facility] to provide home PT and OT.      Based on the patient's medical and functional status, their prognosis and  expected level of functional improvement is:  Paulino    Signed by: ELIZABETH Solis

## 2021-02-15 NOTE — DISCHARGE PLACEMENT REQUEST
"Mk Maya (89 y.o. Female)     Date of Birth Social Security Number Address Home Phone MRN    07/14/1925  [address] [place] [id number] [phone] [id number]    Bahai Marital Status          Baptism        Admission Date Admission Type Admitting Provider Attending Provider Department, Room/Bed    2/3/21 Elective Ricki Matta MD Gormley, John Michael, MD [facility], 4411/1    Discharge Date Discharge Disposition Discharge Destination                       Attending Provider: Ricki Matta MD    Allergies: Azithromycin, Cefdinir, Doxycycline Monohydrate, Allopurinol    Isolation: None   Infection: None   Code Status: No CPR    Ht: 154.9 cm (61\")   Wt: 88.2 kg (194 lb 7.1 oz)    Admission Cmt: None   Principal Problem: Stroke (cerebrum) (CMS/[facility]) [I63.9]                 Active Insurance as of 2/3/2021     Primary Coverage     Payor Plan Insurance Group Employer/Plan Group    HUMANA MEDICARE REPLACEMENT HUMANA MEDICARE REPLACEMENT [id number]     Payor Plan Address Payor Plan Phone Number Payor Plan Fax Number Effective Dates    [address] [address] [phone]  1/1/2018 - None Entered    [facility] [id number]       Subscriber Name Subscriber Birth Date Member ID       MK MAYA 7/14/1925 [id number]                 Emergency Contacts      (Rel.) Home Phone Work Phone Mobile Phone    Jessica Osei (Power of ) [phone] -- --              "

## 2021-02-15 NOTE — PROGRESS NOTES
Inpatient Rehabilitation Functional Measures Assessment and Plan of Care    Plan of Care  Updated Problems/Interventions  Mobility    [OT] Toilet Transfers(Active)  Current Status(02/15/2021): SBA  Weekly Goal(02/17/2021): SBA/mod I  Discharge Goal: SBA/mod I    [OT] Tub/Shower Transfers(Active)  Current Status(02/15/2021): SBA  Weekly Goal(02/17/2021): SBA  Discharge Goal: SBA        Self Care    [OT] Bathing(Active)  Current Status(02/15/2021): SBA  Weekly Goal(02/16/2021): SBA/mod i  Discharge Goal: SBA/mod I w. AE    [OT] Dressing (Lower)(Active)  Current Status(02/15/2021): SBa/Min w AE  Weekly Goal(02/17/2021): SBA  Discharge Goal: SBA    [OT] Dressing (Upper)(Active)  Current Status(02/15/2021): Set up  Weekly Goal(02/16/2021):  mod I  Discharge Goal: mod I    [OT] Grooming(Active)  Current Status(02/15/2021): SBA/Mod I  Weekly Goal(02/17/2021): Supervision/mod I  Discharge Goal: mod I    [OT] Toileting(Active)  Current Status(02/15/2021): SBA  Weekly Goal(02/16/2021): Mod I  Discharge Goal: Mod I    Functional Measures  TERRANCE Eating:  Branch  TERRANCE Grooming: Branch  TERRANCE Bathing:  Branch  TERRANCE Upper Body Dressing:  Branch  TERRANCE Lower Body Dressing:  Branch  TERRANCE Toileting:  Branch    TERRANCE Bladder Management  Level of Assistance:  Branch  Frequency/Number of Accidents this Shift:  Branch    TERRANCE Bowel Management  Level of Assistance: Branch  Frequency/Number of Accidents this Shift: Branch    TERRANCE Bed/Chair/Wheelchair Transfer:  Branch  TERRANCE Toilet Transfer:  Branch  TERRANCE Tub/Shower Transfer:  Branch    Previously Documented Mode of Locomotion at Discharge: Field  TERRANCE Expected Mode of Locomotion at Discharge: Branch  TERRANCE Walk/Wheelchair:  Branch  TERRANCE Stairs:  Branch    TERRANCE Comprehension:  Branch  TERRANCE Expression:  Branch  TERRANCE Social Interaction:  Branch  TERRANCE Problem Solving:  Branch  TERRANCE Memory:  Branch    Therapy Mode Minutes  Occupational Therapy: Branch  Physical Therapy: Branch  Speech Language Pathology:   Branch    Signed by: Idania Blood, OTR/L

## 2021-02-15 NOTE — PROGRESS NOTES
Inpatient Rehabilitation Plan of Care Note    Plan of Care  Updated Problems/Interventions  Mobility    Bed/Chair/Wheelchair (Active)  Current Status (2/15/2021 9:30:00 AM): SBA, rollator  Weekly Goal: SBA, rollator  Discharge Goal: SV, rollator    Walk (Active)  Current Status (2/15/2021 9:30:00 AM): 240' SBA, rollator  Weekly Goal: to and from BR SBA, rollator  Discharge Goal: 160' supervision, rollator    Signed by: Damon Paulino PT Student     - CoSigned By: Katya Bar PT 2/15/2021 2:22:17 PM

## 2021-02-15 NOTE — PLAN OF CARE
Goal Outcome Evaluation:  Plan of Care Reviewed With: patient     Outcome Summary: Patient pleasant and cooperative, alert and oriented x4, and continent of b/b-BM today. She takes medication with water, assist x1, and uses a K pad for pain. Norvasc increased related to high blood pressure, no safety issues and no other issues at this time. Family conference held today- discharge scheduled on Wednesday, weather permitting..

## 2021-02-15 NOTE — PROGRESS NOTES
"   LOS: 12 days   Patient Care Team:  Xenia Robert MD as PCP - General (Family Medicine)    Chief Complaint:   Status post CVA-small focus of restricted diffusion in the right parietal lobe  Impaired cognition/impaired mobility/impaired self-care  Stroke prophylaxis-aspirin and Plavix x30 days then Plavix alone.  Left knee degenerative changes-status post steroid injection February 3  Chronic kidney disease stage IV baseline creatinine around 2.0  Acquired hypothyroidism-recheck TFTs 1 March-on levothyroxine  Morbid obesity  Vitamin D deficiency  Hypertension-metoprolol/amlodipine-systolic blood pressure goal 150-160  Hyperlipidemia-atorvastatin       Subjective     History of Present Illness    Subjective    Generalized weakness. Hypersensitivity BLE unchanged, she attributes in part to areas with bruising.  No new weakness. Tolerates therapies.  .    History taken from: patient    Objective     Vital Signs  Temp:  [97.7 °F (36.5 °C)-98.1 °F (36.7 °C)] 97.9 °F (36.6 °C)  Heart Rate:  [66-70] 70  Resp:  [18-20] 20  BP: (142-157)/(61-72) 153/72    Objective:  Vital signs: (most recent): Blood pressure 153/72, pulse 70, temperature 97.9 °F (36.6 °C), temperature source Oral, resp. rate 20, height 154.9 cm (61\"), SpO2 96 %.            Physical Exam  MENTAL STATUS -  AWAKE / ALERT  HEENT- NCAT, PUPILS EQUALLY ROUND, SCLERAE ANICTERIC,   LUNGS - CTA, NO WHEEZES, RALES OR RHONCHI  HEART- RRR, NO RUB, MURMUR, OR GALLOP  ABD - NORMOACTIVE BOWEL SOUNDS, SOFT, NT.    EXT - NO EDEMA OR CYANOSIS  NEURO -oriented x4.     MOTOR EXAM - RUE/RLE 5/5.  Takes resistance with left elbow flexion, finger flexion, knee extension, ADF  Results Review:     I reviewed the patient's new clinical results.  Results from last 7 days   Lab Units 02/15/21  0655 02/12/21  0613   WBC 10*3/mm3 6.84 6.67   HEMOGLOBIN g/dL 11.7* 11.9*   HEMATOCRIT % 36.6 35.9   PLATELETS 10*3/mm3 203 234     Results from last 7 days   Lab Units 02/15/21  0655 " 02/12/21  0613 02/10/21  0745   SODIUM mmol/L 143 141 142   POTASSIUM mmol/L 4.8 4.8 4.6   CHLORIDE mmol/L 111* 112* 112*   CO2 mmol/L 23.2 23.4 20.1*   BUN mg/dL 26* 29* 38*   CREATININE mg/dL 1.58* 1.64* 1.79*   CALCIUM mg/dL 9.2 9.1 9.3   GLUCOSE mg/dL 81 80 78         Ref. Range 9/15/2020 11:00 1/29/2021 10:35 1/30/2021 06:29 1/31/2021 07:10 2/5/2021 07:11   Hemoglobin A1C Latest Ref Range: 4.80 - 5.60 %  5.07      TSH Baseline Latest Ref Range: 0.270 - 4.200 uIU/mL 5.160 (H) 16.100 (H)  9.150 (H)    Free T4 Latest Ref Range: 0.93 - 1.70 ng/dL 1.39  1.14     T3, Free Latest Ref Range: 2.00 - 4.40 pg/mL    1.94 (L)    Total Cholesterol Latest Ref Range: 0 - 200 mg/dL  168      HDL Cholesterol Latest Ref Range: 40 - 60 mg/dL  44      LDL Cholesterol  Latest Ref Range: 0 - 100 mg/dL  104 (H)      VLDL Cholesterol Latest Ref Range: 5 - 40 mg/dL  20      Triglycerides Latest Ref Range: 0 - 150 mg/dL  110      Vitamin B-12 Latest Ref Range: 211 - 946 pg/mL     1,204 (H)   25 Hydroxy, Vitamin D Latest Ref Range: 30.0 - 100.0 ng/ml     39.9     Medication Review: done  Scheduled Meds:[START ON 2/16/2021] amLODIPine, 7.5 mg, Oral, Q24H  aspirin, 81 mg, Oral, Daily  atorvastatin, 80 mg, Oral, Nightly  betamethasone dipropionate, , Topical, Q24H  cholecalciferol, 1,000 Units, Oral, BID  clopidogrel, 75 mg, Oral, Daily  dorzolamide-timolol, , Both Eyes, BID  latanoprost, 1 drop, Both Eyes, Nightly  levothyroxine, 150 mcg, Oral, Daily  lidocaine, 5 mL, Intra-articular, Once  metoprolol succinate XL, 12.5 mg, Oral, Daily  nystatin, , Topical, Q12H  triamcinolone acetonide, 40 mg, Intra-articular, Once  vitamin B-12, 250 mcg, Oral, Daily      Continuous Infusions:   PRN Meds:.•  acetaminophen  •  bisacodyl  •  trolamine salicylate      Assessment/Plan       Stroke (cerebrum) (CMS/HCC)    Arthritis    Stage 4 chronic kidney disease (CMS/HCC)    Essential hypertension    Acquired hypothyroidism      Assessment & Plan  Status  post CVA-small focus of restricted diffusion in the right parietal lobe  Adjunctive medication for stroke recovery-on atorvastatin.  Recheck vitamin D level.  Check vitamin B-12 level.     Impaired cognition/impaired mobility/impaired self-care     Stroke prophylaxis-aspirin and Plavix x30 days then Plavix alone.     Left knee degenerative changes-status post steroid injection February 3     Chronic kidney disease stage IV baseline creatinine around 2.0     Acquired hypothyroidism-recheck TFTs 1 March-on levothyroxine     Morbid obesity     Vitamin D deficiency-no home dose listed-recheck vitamin D level     Hypertension-metoprolol/amlodipine-systolic blood pressure goal 150-160  February 12-Her blood pressure has increased over the last day, late yesterday blood pressure was 191/84 and then today 183/97.  She is on amlodipine 2.5 and metoprolol 12.5 mg daily.  Patient reviewed with internal medicine service and recommended increase amlodipine.  Will give additional amlodipine 2.5 mg this afternoon increased dose to 5 mg daily  February 15-blood pressure pattern improved.  To titrate up further on amlodipine to 7.5 mg daily     Hyperlipidemia-atorvastatin    Hypersensitivity to touch bilateral lower extremities-May possibly be neuropathy.  B12 within normal limits.  She has had hypothyroidism.  Has chronic kidney disease.    TEAM CONF - FEB 9 -  TRANSFERS CTG ROLLATOR. GAIT 160 FEET CTG ROLLATOR. TOILET TRANSFERS MIN. SHOWER TRANSFERS MIN. BATH UB SBA AND LB MIN. LBD MOD. UBD SBA. GROOMING SBA. MODERATE DEFICITS IN EXECUTIVE FUNCTION. PATIENT REQUESTS DISCHARGE FROM SLP AS SHE FEELS SHE IS AT BASELINE. CONTINENT BOWEL AND BLADDER. LEFT KNEE DJD, S/P INJECTION LAST WEEK, PAIN IMPROVED.   ELOS -   WED.  February 17       Now admit for comprehensive acute inpatient rehabilitation .  This would be an interdisciplinary program with physical therapy 1 hour,  occupational therapy 1 hour, and speech therapy 1 hour, 5 days a  week.  Rehabilitation nursing for carryover, monitoring of nutritional and blood pressure and neurologic   status, bowel and bladder, and skin  Ongoing physician follow-up.  Weekly team conferences.  Goals are to achieve a level of supervision with  mobility and self-care and improved balance.   Rehabilitation prognosis fair.  Medical prognosis fair.  Estimated length of stay is approximately 2 to 3 weeks, but is only an estimation.   The patient's functional status and clinical status is unchanged from preadmission assessment and the patient continues appropriate for acute inpatient rehabilitation.  Goal is for home with home health   therapies.  Barrier to discharge: Impaired mobility- work on transfers ADLs to overcome.      Ricki Matta MD  02/15/21  11:42 EST    Time:   During rounds, used appropriate personal protective equipment including mask and gloves.  Additional gown if indicated.  Mask used was standard procedure mask. Appropriate PPE was worn during the entire visit.  Hand hygiene was completed before and after.

## 2021-02-15 NOTE — PROGRESS NOTES
[facility] CM[facility] Coordinator  Inpatient Rehabilitation Admission    Ethnic Group: White.  Marital Status:  Marital Status: .    IRF Admission Date:  02/03/2021  Admission Class: Initial Rehab.  Admit From:  [facility]    Pre-Hospital Living: Home. Pre-Hospital Living  With: (1) Alone.    Payment Sources: Primary: Medicare - Medicare Advantage  Secondary: Not Listed.  Impairment Group: 01.1 Left Body Involvement (Right Brain)  Date of Onset of Impairment: 01/28/2021    Etiologic Diagnosis Code(s):  Rank Code      Description  1    I63.9     Cerebral infarction, unspecified    Comorbidities:      Are there any arthritis conditions recorded for Impairment Group, Etiologic  Diagnosis, or Comorbid Conditions that meet all of the regulatory requirements  for IRF classification (in 42 .29(b)(2)(x), (xi), and xii))? No    Presence of Pressure Ulcer:  No observed/documented pressure ulcers.    MEDICAL NEEDS  Height on Admission:  61 inches.  Weight on Admission:  194 pounds.    QUALITY INDICATORS  Prior Functioning:  Self Care: Patient completed the activities by him/herself, with or without an  assistive device, with no assistance from a helper.  Indoor Mobility: Patient completed the activities by him/herself, with or  without an assistive device, with no assistance from a helper.  Stairs: Patient completed the activities by him/herself, with or without an  assistive device, with no assistance from a helper.  Functional Cognition: Patient completed the activities by him/herself, with or  without an assistive device, with no assistance from a helper.  Prior Device Use: Walker    Bladder and Bowel: Bladder Continence: Always continent (no documented  incontinence).  Bowel Continence: Always continent (no documented incontinence).  Swallowing/Nutritional Status: Regular food (solids and liquids swallowed safely  without supervision or modified food or liquid consistency).  Special Conditions: Patient did  not receive total parenteral nutrition treatment  at the time of admission.    Section I. Active Diagnosis: Comorbidities and Co-existing Conditions:   Patient  does not have PAD, PVD, or Diabetes Mellitus  Section J. Health Conditions: Patient has not had any falls in the past year.  Patient has not had major surgery during the 100 days prior to admission.  Section M. Skin Conditions  Unhealed Pressure Ulcer/Injuries at Stage 1 or  Higher on Admission:  No.  Section N. Medication:  Potential Clinically Significant Medication Issues: No issues found during  review    Signed by: Tone Bernal RN     No

## 2021-02-15 NOTE — PLAN OF CARE
Goal Outcome Evaluation:         Slept fair. Meds whole with water. Eye drops for Glaucoma given as ordered. Diprolene ointment to Rt. Buttocks eczema area. Nystatin powder applied to pinkish areas under breast folds as ordered. C/o legs being heavy & tender to touch, unchanged from earlier assessment. Using heat pad for pain. Continent of B&B, so far tonight. Wears SCD's

## 2021-02-15 NOTE — PROGRESS NOTES
NEPHROLOGY PROGRESS NOTE    PATIENT IDENTIFICATION:   Name:  Marianne Delgado      MRN:  [id number]     89 y.o.  female             Reason for visit: CKD3    SUBJECTIVE:   Still has no appetite, but no N/V; no shortness of breath on room air; no dizziness when standing    OBJECTIVE:  Vitals:    02/14/21 1534 02/14/21 1940 02/15/21 0524 02/15/21 0908   BP: 156/70 157/68 142/61 153/72   BP Location: Left arm Left arm Left arm Left arm   Patient Position: Sitting Lying Lying Lying   Pulse: 68 69 66 70   Resp: 18 18 20    Temp: 97.7 °F (36.5 °C) 98.1 °F (36.7 °C) 97.9 °F (36.6 °C)    TempSrc: Oral Oral Oral    SpO2: 99% 96% 97% 96%   Height:               Body mass index is 36.74 kg/m².    Intake/Output Summary (Last 24 hours) at 2/15/2021 1131  Last data filed at 2/15/2021 0902  Gross per 24 hour   Intake 600 ml   Output --   Net 600 ml     Wt Readings from Last 1 Encounters:   02/03/21 0500 88.2 kg (194 lb 7.1 oz)   02/02/21 0500 91.3 kg (201 lb 4.5 oz)   02/01/21 0500 92 kg (202 lb 13.2 oz)   01/30/21 0745 88.9 kg (196 lb)   01/30/21 0524 89.1 kg (196 lb 6.9 oz)   01/29/21 1612 85.7 kg (189 lb)   01/29/21 1027 89.4 kg (197 lb 3.2 oz)     Wt Readings from Last 3 Encounters:   02/03/21 88.2 kg (194 lb 7.1 oz)   01/29/21 85.7 kg (188 lb 15 oz)   09/15/20 87.3 kg (192 lb 6.4 oz)         Exam:  GEN:  No distress, appears younger than stated age  EYES:   Anicteric sclera  ENT:    External ears/nose normal, MM are moist  NECK:  No adenopathy, JVP normal  LUNGS: Fairly clear bilaterally; not labored on room air  CV:  Normal S1S2, without rub  ABD:  Non-tender, non-distended, soft, +BS  EXT:  Trace edema; legs are tender when touched    Scheduled meds:    amLODIPine, 5 mg, Oral, Q24H  aspirin, 81 mg, Oral, Daily  atorvastatin, 80 mg, Oral, Nightly  betamethasone dipropionate, , Topical, Q24H  cholecalciferol, 1,000 Units, Oral, BID  clopidogrel, 75 mg, Oral, Daily  dorzolamide-timolol, , Both Eyes, BID  latanoprost, 1  drop, Both Eyes, Nightly  levothyroxine, 150 mcg, Oral, Daily  lidocaine, 5 mL, Intra-articular, Once  metoprolol succinate XL, 12.5 mg, Oral, Daily  nystatin, , Topical, Q12H  triamcinolone acetonide, 40 mg, Intra-articular, Once  vitamin B-12, 250 mcg, Oral, Daily      IV meds:                           Data Review:    Results from last 7 days   Lab Units 02/15/21  0655 02/12/21  0613 02/10/21  0745   SODIUM mmol/L 143 141 142   POTASSIUM mmol/L 4.8 4.8 4.6   CHLORIDE mmol/L 111* 112* 112*   CO2 mmol/L 23.2 23.4 20.1*   BUN mg/dL 26* 29* 38*   CREATININE mg/dL 1.58* 1.64* 1.79*   CALCIUM mg/dL 9.2 9.1 9.3   GLUCOSE mg/dL 81 80 78     Estimated Creatinine Clearance: 21.5 mL/min (A) (by C-G formula based on SCr of 1.58 mg/dL (H)).      Results from last 7 days   Lab Units 02/15/21  0655 02/12/21  0613 02/10/21  0745   PHOSPHORUS mg/dL 3.1 3.0 3.4       Results from last 7 days   Lab Units 02/15/21  0655 02/12/21  0613   WBC 10*3/mm3 6.84 6.67   HEMOGLOBIN g/dL 11.7* 11.9*   PLATELETS 10*3/mm3 203 234                   ASSESSMENT:     Stroke (cerebrum) (CMS/[facility])    Arthritis    Stage 4 chronic kidney disease (CMS/[facility])    Essential hypertension    Acquired hypothyroidism    1.  CKD3, stable.  Appears euvolemic; electrolytes are compensated  2.  Hypertension, with variable control  3.  CVA with weakness of the left lower extremity.   4.  Hypothyroidism on replacement.   5.  Glaucoma  6.  Gout      PLAN:  1.  Continue metoprolol 12.5 mg daily, tho increase amlodipine to 7.5 mg daily  2.  Periodic labs    Víctor Garcia MD  2/15/2021    11:31 EST

## 2021-02-16 LAB — GLUCOSE BLDC GLUCOMTR-MCNC: 79 MG/DL (ref 70–130)

## 2021-02-16 PROCEDURE — 82962 GLUCOSE BLOOD TEST: CPT

## 2021-02-16 PROCEDURE — 97116 GAIT TRAINING THERAPY: CPT

## 2021-02-16 PROCEDURE — 97110 THERAPEUTIC EXERCISES: CPT

## 2021-02-16 PROCEDURE — 97530 THERAPEUTIC ACTIVITIES: CPT

## 2021-02-16 PROCEDURE — 97535 SELF CARE MNGMENT TRAINING: CPT

## 2021-02-16 RX ADMIN — BETAMETHASONE DIPROPIONATE: 0.5 OINTMENT TOPICAL at 20:13

## 2021-02-16 RX ADMIN — AMLODIPINE BESYLATE 7.5 MG: 2.5 TABLET ORAL at 07:34

## 2021-02-16 RX ADMIN — TIMOLOL MALEATE: 5 SOLUTION/ DROPS OPHTHALMIC at 07:37

## 2021-02-16 RX ADMIN — CLOPIDOGREL 75 MG: 75 TABLET, FILM COATED ORAL at 07:34

## 2021-02-16 RX ADMIN — NYSTATIN: 100000 POWDER TOPICAL at 07:37

## 2021-02-16 RX ADMIN — Medication 1000 UNITS: at 11:58

## 2021-02-16 RX ADMIN — Medication 250 MCG: at 10:14

## 2021-02-16 RX ADMIN — LATANOPROST 1 DROP: 50 SOLUTION/ DROPS OPHTHALMIC at 20:12

## 2021-02-16 RX ADMIN — METOPROLOL SUCCINATE 12.5 MG: 25 TABLET, EXTENDED RELEASE ORAL at 07:34

## 2021-02-16 RX ADMIN — ASPIRIN 81 MG: 81 TABLET, COATED ORAL at 07:34

## 2021-02-16 RX ADMIN — ATORVASTATIN CALCIUM 80 MG: 80 TABLET, FILM COATED ORAL at 20:13

## 2021-02-16 RX ADMIN — NYSTATIN: 100000 POWDER TOPICAL at 20:13

## 2021-02-16 RX ADMIN — TIMOLOL MALEATE: 5 SOLUTION/ DROPS OPHTHALMIC at 20:13

## 2021-02-16 RX ADMIN — LEVOTHYROXINE SODIUM 150 MCG: 150 TABLET ORAL at 05:29

## 2021-02-16 RX ADMIN — Medication 1000 UNITS: at 23:00

## 2021-02-16 NOTE — PROGRESS NOTES
SECTION GG      Self Care Performance Discharge:   Oral Hygiene: Patient completed the activities by him/herself with no  assistance from a helper.   Toileting Hygiene: : [place] provides verbal cues and/or touching/steadying  and/or contact guard assistance as patient completes activity.   Shower/Bathe Self: Font[place] provides verbal cues and/or touching/steadying and/or  contact guard assistance as patient completes activity.   Upper Body Dressing: Fontana sets up or cleans up; patient completes activity.  [place] assists only prior to or following the activity.   Lower Body Dressing: Fontana provides verbal cues and/or touching/steadying  and/or contact guard assistance as patient completes activity.   Putting On/Taking Off Footwear: Fontana does less than half the effort. Fontana  lifts, holds or supports trunk or limbs but provides less than half the effort.    Mobility Toilet Transfer Discharge: [place] provides verbal cues or  touching/steadying assistance as patient completes activity.    Signed by: HARI Fofana/MARITA

## 2021-02-16 NOTE — PROGRESS NOTES
Inpatient Rehabilitation Plan of Care Note    Plan of Care  Care Plan Reviewed - No updates at this time.    Body Systems    [RN] Integumentary(Active)  Current Status(02/16/2021): Pt has red area to right buttock (psoriasis) cream  ordered; slight redness breast folds except L lateral red/excoriated; powder  ordered  Weekly Goal(02/23/2021): No s/s of infection  Discharge Goal: No s/s of infection    Performed Intervention(s)  Daily skin inspection  Nutritional support  Apply cream to dry areas as needed  Apply powder under folds as ordered      Pain    [RN] Pain Management(Active)  Current Status(02/16/2021): Chronic pain to left knee r/t arthritis. 2/6 pt  states both legs tender/sore.  Weekly Goal(02/22/2021): Pt able to tolerate therapies  Discharge Goal: Pain under control    Performed Intervention(s)  Offer medication when needed  Practice relaxation techniques  Apply heating pad or cream to area when needed      Psychosocial    [RN] Coping/Adjustment(Active)  Current Status(02/16/2021): Pt expresses effective coping  Weekly Goal(02/23/2021): Allow pt to express concerns  Discharge Goal: Demonstrates healthy coping strategies    Performed Intervention(s)  Verbalizes needs and concerns  Provide therapeutic enviroment      Safety    [RN] Potential for Injury(Active)  Current Status(02/16/2021): No unsafe behaviors  Weekly Goal(02/22/2021): Pt will use call bell 100% of the time  Discharge Goal: No falls    Performed Intervention(s)  Falls precautions/protocol  Safety rounds  Items within reach  Bed alarm/ chair alarm      Sphincter Control    [RN] Bladder Management(Active)  Current Status(02/16/2021): Pt is continent 100%  Weekly Goal(02/23/2021): Pt will be continent 100%  Discharge Goal: Pt will be continent 100%    [RN] Bowel Management(Active)  Current Status(02/16/2021): Pt is continent 100% except 1 incont. bm with  urgency, loose/liquid/ some formed bm on 2/8.  Weekly Goal(02/22/2021): Pt will be  continent 100%  Discharge Goal: Pt will remain continent 100%    Performed Intervention(s)  Use incontinence products  offer toileting  Encourage fluids    Signed by: Shirley Camilo RN

## 2021-02-16 NOTE — PLAN OF CARE
Goal Outcome Evaluation:  Plan of Care Reviewed With: patient  Progress: improving  Outcome Summary: Kpad to feet/ lower legs. Meds with water. Ambulates CGA with rollator. Occasionally needs boost to sit up/ stand from bed. BP stable.

## 2021-02-16 NOTE — PROGRESS NOTES
Patient scheduled to go home tomorrow, 2/17. Discussed plans with patient. She feels ready to go and stated niece plans to pick her up around 12:30. Niece has arranged for private duty hired caregiver to stay with patient. [facility] to provide home health PT and OT.

## 2021-02-16 NOTE — THERAPY TREATMENT NOTE
Inpatient Rehabilitation - Occupational Therapy Treatment Note    [facility]     Patient Name: Marianne Delgado  : 1925  MRN: [id number]    Today's Date: 2021                 Admit Date: 2/3/2021       No diagnosis found.    Patient Active Problem List   Diagnosis   • Arthritis   • Stage 4 chronic kidney disease (CMS/[facility])   • Debility   • Foot pain, bilateral   • Glaucoma   • Acute idiopathic gout of right ankle   • Hematuria   • Essential hypertension   • Acquired hypothyroidism   • Osteopenia   • Psoriasis   • Rosacea   • Vitamin D deficiency   • Medicare annual wellness visit, subsequent   • Morbidly obese (CMS/[facility])   • Cerebrovascular accident (CVA) (CMS/[facility])   • Hypertensive urgency   • Stroke (cerebrum) (CMS/[facility])       Past Medical History:   Diagnosis Date   • Acute sinusitis    • Acute upper respiratory infection    • Arthritis    • CKD (chronic kidney disease)    • Debility    • Fatigue    • Foot pain, bilateral    • Glaucoma    • Gout    • Hematuria    • High blood pressure    • Hypertension    • Hypothyroid 1999   • Hypothyroidism    • IBS (irritable bowel syndrome)    • IGT (impaired glucose tolerance)    • Malaise and fatigue    • Medication management    • Melanoma (CMS/[facility]) 1979    left leg   • OA (osteoarthritis)    • Osteopenia 1999   • Painful joint    • Psoriasis    • Renal failure    • Rosacea    • Vitamin D deficiency        Past Surgical History:   Procedure Laterality Date   • CATARACT EXTRACTION     • FOOT SURGERY      mauri toe surgery   • OTHER SURGICAL HISTORY      Melanoma Excision: Left leg                IRF OT ASSESSMENT FLOWSHEET (last 12 hours)      IRF OT Evaluation and Treatment     Row Name 21 1627          OT Time and Intention    Document Type  daily treatment  -CC     Mode of Treatment  individual therapy;occupational therapy  -CC     Patient Effort  good  -CC     Symptoms Noted During/After Treatment  fatigue  -CC     Row Name 21 9523           Cognition/Psychosocial    Affect/Mental Status (Cognitive)  WFL  -     Orientation Status (Cognition)  oriented x 4  -     Follows Commands (Cognition)  WFL;follows multi-step commands  -     Personal Safety Interventions  fall prevention program maintained;gait belt;nonskid shoes/slippers when out of bed  -     Cognitive Function (Cognitive)  WFL  -[facility] Name 02/16/21 1627          Pain Scale: Numbers Pre/Post-Treatment    Pretreatment Pain Rating  0/10 - no pain  -     Posttreatment Pain Rating  0/10 - no pain  -[facility] Name 02/16/21 1627          Bed Mobility    Comment (Bed Mobility)  in w/c  -[facility] Name 02/16/21 1627          Functional Mobility    Functional Mobility- Ind. Level  set up required;supervision required  -     Functional Mobility- Device  rollator  -     Functional Mobility-Distance (Feet)  -- in room and BR  -[facility] Name 02/16/21 1627          Transfers    Sit-Stand [place] (Transfers)  supervision VC to scoot to edge, get feet in position, lean fwd  -     Stand-Sit [place] (Transfers)  supervision  -     [place] Level (Toilet Transfer)  set up;supervision  -     [place] Level (Shower Transfer)  supervision;verbal cues  -[facility] Name 02/16/21 1627          Sit-Stand Transfer    Assistive Device (Sit-Stand Transfers)  walker, front-wheeled  -[facility] Name 02/16/21 1627          Stand-Sit Transfer    Assistive Device (Stand-Sit Transfers)  walker, front-wheeled  -CC     Row Name 02/16/21 1627          Toilet Transfer    Type (Toilet Transfer)  stand-sit;sit-stand  -CC     Row Name 02/16/21 1627          Shower Transfer    Type (Shower Transfer)  sit-stand;stand-sit  -CC     Row Name 02/16/21 1627          Shoulder (Therapeutic Exercise)    Shoulder (Therapeutic Exercise)  strengthening exercise  -     Shoulder Strengthening (Therapeutic Exercise)  bilateral;flexion;extension;aBduction;aDduction;external rotation;internal  rotation;sitting;1 lb free weight;2 lb free weight;10 repetitions;3 sets 2# dowel 1# hand wt   -     Row Name 02/16/21 1627          Elbow/Forearm (Therapeutic Exercise)    Elbow/Forearm (Therapeutic Exercise)  strengthening exercise  -     Elbow/Forearm Strengthening (Therapeutic Exercise)  bilateral;flexion;extension;supination;pronation;sitting;1 lb free weight;10 repetitions;3 sets  -     Row Name 02/16/21 1627          Wrist (Therapeutic Exercise)    Wrist (Therapeutic Exercise)  strengthening exercise  -     Wrist Strengthening (Therapeutic Exercise)  bilateral;flexion;extension;1 lb free weight;10 repetitions;3 sets  -     Row Name 02/16/21 1627          Aerobic Exercise    Type (Aerobic Exercise)  arm bike;for 3 minutes  -     Time Performed (Aerobic Exercise)  3 min x 1 2 min x 1  -     Row Name 02/16/21 1627          Bathing    [place] Level (Bathing)  bathing skills;lower body;upper body;standby assist;supervision  -     Assistive Device (Bathing)  grab bar/tub rail;hand held shower spray hose;shower chair  -     Position (Bathing)  supported sitting;supported standing  -     Set-up Assistance (Bathing)  adjust water temperature;obtain supplies  -     Row Name 02/16/21 1627          Upper Body Dressing    [place] Level (Upper Body Dressing)  upper body dressing skills;doff;don;pull over garment;set up assistance  -     Position (Upper Body Dressing)  supported sitting  -     Set-up Assistance (Upper Body Dressing)  obtain clothing  -     Row Name 02/16/21 1627          Lower Body Dressing    [place] Level (Lower Body Dressing)  doff;don;pants/bottoms;shoes/slippers;socks;underwear;set up;verbal cues;minimum assist (75% patient effort)  -     Position (Lower Body Dressing)  supported sitting;supported standing  -     Set-up Assistance (Lower Body Dressing)  obtain clothing  -     Row Name 02/16/21 1627          Grooming    [place] Level (Grooming)   grooming skills;hair care, combing/brushing;oral care regimen;set up  -CC     Position (Grooming)  sink side;supported sitting  -     Set-up Assistance (Grooming)  adjust water temperature/flow;obtain supplies  -     Row Name 02/16/21 1627          Toileting    [place] Level (Toileting)  toileting skills;adjust/manage clothing;perform perineal hygiene;supervision  -     Position (Toileting)  supported sitting;supported standing  -     Row Name 02/16/21 1627          Positioning and Restraints    Pre-Treatment Position  sitting in chair/recliner  -     Post Treatment Position  wheelchair  -     In Wheelchair  notified nsg;sitting;call light within reach;encouraged to call for assist;exit alarm on  -CC       User Key  (r) = Recorded By, (t) = Taken By, (c) = Cosigned By    Initials Name Effective Dates    CC Idania Blood, OTR 06/08/18 -            Occupational Therapy Education                 Title: PT OT SLP Therapies (Done)     Topic: Occupational Therapy (Done)     Point: ADL training (Done)     Description:   Instruct learner(s) on proper safety adaptation and remediation techniques during self care or transfers.   Instruct in proper use of assistive devices.              Learning Progress Summary           Patient Acceptance, E, VU by AB at 2/16/2021 1140    Comment: How and when to implement additional aerobic training into HEP upon D/C home    Acceptance, E, VU by CC at 2/15/2021 1534    Comment: Family conf with pt and niece. Status, DME (orderd 3-1 sent info on sock aid), follow up therapy discussed.    Acceptance, E, VU by AB at 2/11/2021 1415    Comment: Educated pt about pacing and activity tolerance strategies to manage fatigue    Acceptance, E,TB, VU by JULIANO at 2/6/2021 1534    Acceptance, E,TB, VU,DU by KP at 2/4/2021 1640    Comment: ed pt on role of OT, benefit of therapy, POC w. OT ed on safety w ADLs and tsf techniques. pt demo w. min A for tsf and more A w. LBD and SBA UBD    Family Acceptance, E, VU by CC at 2/15/2021 1534    Comment: Family conf with pt and niece. Status, DME (orderd 3-1 sent info on sock aid), follow up therapy discussed.                   Point: Home exercise program (Done)     Description:   Instruct learner(s) on appropriate technique for monitoring, assisting and/or progressing therapeutic exercises/activities.              Learning Progress Summary           Patient Acceptance, E, VU by AB at 2/16/2021 1140    Comment: How and when to implement additional aerobic training into HEP upon D/C home    Acceptance, E, VU by CC at 2/15/2021 1534    Comment: Family conf with pt and niece. Status, DME (orderd 3-1 sent info on sock aid), follow up therapy discussed.    Acceptance, E, VU by AB at 2/11/2021 1415    Comment: Educated pt about pacing and activity tolerance strategies to manage fatigue    Acceptance, E,TB, VU by JULIANO at 2/6/2021 1534   Family Acceptance, E, VU by CC at 2/15/2021 1534    Comment: Family conf with pt and niece. Status, DME (orderd 3-1 sent info on sock aid), follow up therapy discussed.                   Point: Precautions (Done)     Description:   Instruct learner(s) on prescribed precautions during self-care and functional transfers.              Learning Progress Summary           Patient Acceptance, E, VU by AB at 2/16/2021 1140    Comment: How and when to implement additional aerobic training into HEP upon D/C home    Acceptance, E, VU by CC at 2/15/2021 1534    Comment: Family conf with pt and niece. Status, DME (orderd 3-1 sent info on sock aid), follow up therapy discussed.    Acceptance, E, VU by AB at 2/11/2021 1415    Comment: Educated pt about pacing and activity tolerance strategies to manage fatigue    Acceptance, E,TB, VU by JULIANO at 2/6/2021 1534    Acceptance, E,TB, VU,DU by JEMAL at 2/4/2021 1640    Comment: ed pt on role of OT, benefit of therapy, POC w. OT ed on safety w ADLs and tsf techniques. pt demo w. min A for tsf and more A  w. LBD and SBA UBD   Family Acceptance, E, VU by CC at 2/15/2021 1534    Comment: Family conf with pt and niece. Status, DME (orderd 3-1 sent info on sock aid), follow up therapy discussed.                   Point: Body mechanics (Done)     Description:   Instruct learner(s) on proper positioning and spine alignment during self-care, functional mobility activities and/or exercises.              Learning Progress Summary           Patient Acceptance, E, VU by AB at 2/16/2021 1140    Comment: How and when to implement additional aerobic training into HEP upon D/C home    Acceptance, E, VU by CC at 2/15/2021 1534    Comment: Family conf with pt and niece. Status, DME (orderd 3-1 sent info on sock aid), follow up therapy discussed.    Acceptance, E, VU by AB at 2/11/2021 1415    Comment: Educated pt about pacing and activity tolerance strategies to manage fatigue    Acceptance, E,TB, VU by JULIANO at 2/6/2021 1534    Acceptance, E,TB, VU,DU by JEMAL at 2/4/2021 1640    Comment: ed pt on role of OT, benefit of therapy, POC w. OT ed on safety w ADLs and tsf techniques. pt demo w. min A for tsf and more A w. LBD and SBA UBD   Family Acceptance, E, VU by CC at 2/15/2021 1534    Comment: Family conf with pt and niece. Status, DME (orderd 3-1 sent info on sock aid), follow up therapy discussed.                               User Key     Initials Effective Dates Name Provider Type Discipline    CC 06/08/18 -  Idania Blood OTR Occupational Therapist OT    KP 06/08/18 -  Mame Nelson OTR Occupational Therapist OT    JULIANO 07/15/20 -  Sandra Souza OT Occupational Therapist OT    AB 02/01/21 -  Damon Paulino, PT Student PT Student PT                    OT Recommendation and Plan                         Time Calculation:     Time Calculation- OT     Row Name 02/16/21 1500 02/16/21 1330 02/16/21 1200       Time Calculation- OT    OT Start Time  1500  -CC  1330  -CC  1200  -CC    OT Stop Time  1530  -CC  1400  -CC  1230   -CC    OT Time Calculation (min)  30 min  -CC  30 min  -CC  30 min  -CC      User Key  (r) = Recorded By, (t) = Taken By, (c) = Cosigned By    Initials Name Provider Type    CC Idania Blood OTR Occupational Therapist        Therapy Charges for Today     Code Description Service Date Service Provider Modifiers Qty    30136172264 HC OT SELF CARE/MGMT/TRAIN EA 15 MIN 2/15/2021 Idania Blood OTR GO 2    86263490801 HC OT THER PROC EA 15 MIN 2/15/2021 Idania Blood OTR GO 4    79010349752 HC OT SELF CARE/MGMT/TRAIN EA 15 MIN 2/16/2021 Idania Blood OTR GO 2    22533215532 HC OT THER PROC EA 15 MIN 2/16/2021 Idania Blood OTR GO 4                   HARI Fofana  2/16/2021

## 2021-02-16 NOTE — PROGRESS NOTES
SECTION GG      Mobility Performance Discharge:     Roll Left and Right: Dickson does less than half the effort. Dickson lifts, holds  or supports trunk or limbs but provides less than half the effort.   Sit to Lying: Dickson provides verbal cues and/or touching/steadying and/or  contact guard assistance as patient completes activity. Assistance may be  provided throughout the activity or intermittently.   Lying to Sitting on Side of Bed: Dickson does less than half the effort. Dickson  lifts, holds or supports trunk or limbs but provides less than half the effort.   Sit to Stand: [place]ickson provides verbal cues and/or touching/steadying and/or  contact guard assistance as patient completes activity. Assistance may be  provided throughout the activity or intermittently.   Chair/Bed to Chair Transfer: Dickson provides verbal cues and/or  touching/steadying and/or contact guard assistance as patient completes  activity. Assistance may be provided throughout the activity or intermittently.   Car Transfer: Dickson provides verbal cues and/or touching/steadying and/or  contact guard assistance as patient completes activity. Assistance may be  provided throughout the activity or intermittently.   Walk 10 Feet:   Dickson provides verbal cues and/or touching/steadying and/or  contact guard assistance as patient completes activity. Assistance may be  provided throughout the activity or intermittently.  Walk 50 Feet with 2 Turns:   Dickson provides verbal cues and/or  touching/steadying and/or contact guard assistance as patient completes  activity. Assistance may be provided throughout the activity or intermittently.  Walk 150 Feet:   Dickson provides verbal cues and/or touching/steadying and/or  contact guard assistance as patient completes activity. Assistance may be  provided throughout the activity or intermittently.  Walking 10 Feet on Uneven Surfaces:   Dickson provides verbal cues and/or  touching/steadying and/or contact guard  assistance as patient completes  activity. Assistance may be provided throughout the activity or intermittently.  1 Step Over Curb or Up/Down Stair:   Anthon does all of the effort. Patient does  none of the effort to complete the activity. Or, the assistance of 2 or more  helpers is required for the patient to complete the activity.  4 Steps Up and Down, With/Without Rail:   Not applicable.  Picking up an Object:   Anthon provides verbal cues and/or touching/steadying  and/or contact guard assistance as patient completes activity. Assistance may be  provided throughout the activity or intermittently. Uses Wheelchair and/or  Scooter: No    Signed by: Damon Paulino PT Student     - CoSigned By: Katya Bar PT 2/16/2021 3:29:06 PM

## 2021-02-16 NOTE — THERAPY DISCHARGE NOTE
Inpatient Rehabilitation - Physical Therapy Treatment Note/Discharge  [facility]     Patient Name: Marianne Delgado  : 1925  MRN: [id number]  Today's Date: 2021                Admit Date: 2/3/2021    Visit Dx:  No diagnosis found.  Patient Active Problem List   Diagnosis   • Arthritis   • Stage 4 chronic kidney disease (CMS/HCC)   • Debility   • Foot pain, bilateral   • Glaucoma   • Acute idiopathic gout of right ankle   • Hematuria   • Essential hypertension   • Acquired hypothyroidism   • Osteopenia   • Psoriasis   • Rosacea   • Vitamin D deficiency   • Medicare annual wellness visit, subsequent   • Morbidly obese (CMS/[facility])   • Cerebrovascular accident (CVA) (CMS/[facility])   • Hypertensive urgency   • Stroke (cerebrum) (CMS/[facility])     Past Medical History:   Diagnosis Date   • Acute sinusitis    • Acute upper respiratory infection    • Arthritis    • CKD (chronic kidney disease)    • Debility    • Fatigue    • Foot pain, bilateral    • Glaucoma    • Gout    • Hematuria    • High blood pressure    • Hypertension    • Hypothyroid 1999   • Hypothyroidism    • IBS (irritable bowel syndrome)    • IGT (impaired glucose tolerance)    • Malaise and fatigue    • Medication management    • Melanoma (CMS/HCC) 1979    left leg   • OA (osteoarthritis)    • Osteopenia 1999   • Painful joint    • Psoriasis    • Renal failure    • Rosacea    • Vitamin D deficiency      Past Surgical History:   Procedure Laterality Date   • CATARACT EXTRACTION     • FOOT SURGERY      mauri toe surgery   • OTHER SURGICAL HISTORY      Melanoma Excision: Left leg          PT Assessment (last 12 hours)      PT Evaluation and Treatment    No documentation.         Physical Therapy Education                 Title: PT OT SLP Therapies (Done)     Topic: Physical Therapy (Done)     Point: Mobility training (Done)     Learning Progress Summary           Patient Acceptance, E, VU by AB at 2021 1140    Comment: How and when to implement  additional aerobic training into HEP upon D/C home    Acceptance, E,TB, VU,DU by AB at 2/15/2021 1205    Comment: Demonstrated and taught proper mechanics for sit-stand transfers from standard height chair.    Acceptance, E,D, NR by KP at 2/13/2021 1124    Comment: max encouragement    Acceptance, E, VU,DU by AB at 2/12/2021 1428    Acceptance, E, VU by AB at 2/11/2021 1415    Comment: Educated pt about pacing and activity tolerance strategies to manage fatigue    Acceptance, E, VU by AB at 2/10/2021 1144    Comment: Pt educated about dynamic balance and improved gait mechanics.    Acceptance, E,TB, VU,NR by MS at 2/8/2021 1521    Acceptance, E, DU,NR by LB at 2/6/2021 1514                   Point: Home exercise program (Done)     Learning Progress Summary           Patient Acceptance, E, VU by AB at 2/16/2021 1140    Comment: How and when to implement additional aerobic training into HEP upon D/C home    Acceptance, E, VU,DU by AB at 2/12/2021 1428    Acceptance, E, VU by AB at 2/11/2021 1415    Comment: Educated pt about pacing and activity tolerance strategies to manage fatigue    Acceptance, E, VU by AB at 2/10/2021 1144    Comment: Pt educated about dynamic balance and improved gait mechanics.    Acceptance, E,TB, VU,NR by MS at 2/8/2021 1521                   Point: Body mechanics (Done)     Learning Progress Summary           Patient Acceptance, E, VU by AB at 2/16/2021 1140    Comment: How and when to implement additional aerobic training into HEP upon D/C home    Acceptance, E,TB, VU,DU by AB at 2/15/2021 1205    Comment: Demonstrated and taught proper mechanics for sit-stand transfers from standard height chair.    Acceptance, E, VU,DU by AB at 2/12/2021 1428    Acceptance, E, VU by AB at 2/11/2021 1415    Comment: Educated pt about pacing and activity tolerance strategies to manage fatigue    Acceptance, E, VU by AB at 2/10/2021 1144    Comment: Pt educated about dynamic balance and improved gait  mechanics.    Acceptance, E,TB, VU,NR by MS at 2/8/2021 1521                   Point: Precautions (Done)     Learning Progress Summary           Patient Acceptance, E, VU by AB at 2/16/2021 1140    Comment: How and when to implement additional aerobic training into HEP upon D/C home    Acceptance, E, VU,DU by AB at 2/12/2021 1428    Acceptance, E, VU by AB at 2/11/2021 1415    Comment: Educated pt about pacing and activity tolerance strategies to manage fatigue    Acceptance, E, VU by MD at 2/11/2021 0949    Acceptance, E, VU by AB at 2/10/2021 1144    Comment: Pt educated about dynamic balance and improved gait mechanics.    Acceptance, E, VU by MD at 2/9/2021 0941    Acceptance, E,TB, VU,NR by MS at 2/8/2021 1521    Acceptance, E, VU by MD at 2/5/2021 1049    Acceptance, E, VU by MD at 2/4/2021 1551                               User Key     Initials Effective Dates Name Provider Type Discipline    LB 06/08/18 -  Chely Perkins, PT Physical Therapist PT    MD 04/03/18 -  Katya Bar, PT Physical Therapist PT     04/03/18 -  Daily Agarwal, PT Physical Therapist PT    MS 03/04/19 -  Annalise Neil, PT Physical Therapist PT    AB 02/01/21 -  Damon Paulino, PT Student PT Student PT                PT Recommendation and Plan               Time Calculation:   PT Charges     Row Name 02/16/21 1444 02/16/21 1443 02/16/21 1123       Time Calculation    Start Time  1400  (Pended)   -AB  1230  (Pended)   -AB  0930  (Pended)   -AB    Stop Time  1430  (Pended)   -AB  1300  (Pended)   -AB  1000  (Pended)   -AB    Time Calculation (min)  30 min  (Pended)   -AB  30 min  (Pended)   -AB  30 min  (Pended)   -AB    PT Received On  02/16/21  (Pended)   -AB  02/16/21  (Pended)   -AB  02/16/21  (Pended)   -AB      User Key  (r) = Recorded By, (t) = Taken By, (c) = Cosigned By    Initials Name Provider Type    AB Damon Paulino, PT Student PT Student        Therapy Charges for Today     Code Description Service Date Service  Provider Modifiers Qty    98740980410  GAIT TRAINING EA 15 MIN 2/15/2021 Damon Paulino, PT Student GP 3    77574699191 HC PT THERAPEUTIC ACT EA 15 MIN 2/15/2021 Damon Paulino, PT Student GP 2    [id number]  PT THER PROC EA 15 MIN 2/15/2021 Damon Paulino, PT Student GP 1    10607406850 HC GAIT TRAINING EA 15 MIN 2/16/2021 Damon Paulino, PT Student GP 5    [id number]  PT THERAPEUTIC ACT EA 15 MIN 2/16/2021 Damon Paulino, PT Student GP 1    [id number]  PT THER SUPP EA 15 MIN 2/16/2021 Damon Paulino, PT Student GP 1      Patient was intermittently wearing a face mask during this therapy encounter. Therapist used appropriate personal protective equipment including eye protection, mask, and gloves.  Mask used was standard procedure mask. Appropriate PPE was worn during the entire therapy session. Hand hygiene was completed before and after therapy session. Patient is not in enhanced droplet precautions. Katya Bar PT was supervising and present throughout treatment session.            Damon Paulino PT Student  2/16/2021

## 2021-02-16 NOTE — PROGRESS NOTES
"   LOS: 13 days   Patient Care Team:  Xenia Robert MD as PCP - General (Family Medicine)    Chief Complaint:   Status post CVA-small focus of restricted diffusion in the right parietal lobe  Impaired cognition/impaired mobility/impaired self-care  Stroke prophylaxis-aspirin and Plavix x30 days then Plavix alone.  Left knee degenerative changes-status post steroid injection February 3  Chronic kidney disease stage IV baseline creatinine around 2.0  Acquired hypothyroidism-recheck TFTs 1 March-on levothyroxine  Morbid obesity  Vitamin D deficiency  Hypertension-metoprolol/amlodipine-systolic blood pressure goal 150-160  Hyperlipidemia-atorvastatin       Subjective     History of Present Illness    Subjective    Generalized weakness. Hypersensitivity BLE unchanged, she attributes in part to areas with bruising.  No new weakness. Tolerates therapies. Did better with gait today, walking further , Did car transfer without any physical assistance.  .    History taken from: patient    Objective     Vital Signs  Temp:  [97.8 °F (36.6 °C)-98.2 °F (36.8 °C)] 97.8 °F (36.6 °C)  Heart Rate:  [60-65] 65  Resp:  [16-18] 18  BP: (132-150)/(64-67) 132/64    Objective:  Vital signs: (most recent): Blood pressure 132/64, pulse 65, temperature 97.8 °F (36.6 °C), temperature source Oral, resp. rate 18, height 154.9 cm (61\"), SpO2 99 %.            Physical Exam  MENTAL STATUS -  AWAKE / ALERT  HEENT- NCAT, PUPILS EQUALLY ROUND, SCLERAE ANICTERIC,   LUNGS - CTA, NO WHEEZES, RALES OR RHONCHI  HEART- RRR, NO RUB, MURMUR, OR GALLOP  ABD - NORMOACTIVE BOWEL SOUNDS, SOFT, NT.    EXT - NO EDEMA OR CYANOSIS  NEURO -oriented x4.     MOTOR EXAM - RUE/RLE 5/5.  Takes resistance with left elbow flexion, finger flexion, knee extension, ADF  Results Review:     I reviewed the patient's new clinical results.  Results from last 7 days   Lab Units 02/15/21  0655 02/12/21  0613   WBC 10*3/mm3 6.84 6.67   HEMOGLOBIN g/dL 11.7* 11.9*   HEMATOCRIT % " 36.6 35.9   PLATELETS 10*3/mm3 203 234     Results from last 7 days   Lab Units 02/15/21  0655 02/12/21  0613 02/10/21  0745   SODIUM mmol/L 143 141 142   POTASSIUM mmol/L 4.8 4.8 4.6   CHLORIDE mmol/L 111* 112* 112*   CO2 mmol/L 23.2 23.4 20.1*   BUN mg/dL 26* 29* 38*   CREATININE mg/dL 1.58* 1.64* 1.79*   CALCIUM mg/dL 9.2 9.1 9.3   GLUCOSE mg/dL 81 80 78         Ref. Range 9/15/2020 11:00 1/29/2021 10:35 1/30/2021 06:29 1/31/2021 07:10 2/5/2021 07:11   Hemoglobin A1C Latest Ref Range: 4.80 - 5.60 %  5.07      TSH Baseline Latest Ref Range: 0.270 - 4.200 uIU/mL 5.160 (H) 16.100 (H)  9.150 (H)    Free T4 Latest Ref Range: 0.93 - 1.70 ng/dL 1.39  1.14     T3, Free Latest Ref Range: 2.00 - 4.40 pg/mL    1.94 (L)    Total Cholesterol Latest Ref Range: 0 - 200 mg/dL  168      HDL Cholesterol Latest Ref Range: 40 - 60 mg/dL  44      LDL Cholesterol  Latest Ref Range: 0 - 100 mg/dL  104 (H)      VLDL Cholesterol Latest Ref Range: 5 - 40 mg/dL  20      Triglycerides Latest Ref Range: 0 - 150 mg/dL  110      Vitamin B-12 Latest Ref Range: 211 - 946 pg/mL     1,204 (H)   25 Hydroxy, Vitamin D Latest Ref Range: 30.0 - 100.0 ng/ml     39.9     Medication Review: done  Scheduled Meds:amLODIPine, 7.5 mg, Oral, Q24H  aspirin, 81 mg, Oral, Daily  atorvastatin, 80 mg, Oral, Nightly  betamethasone dipropionate, , Topical, Q24H  cholecalciferol, 1,000 Units, Oral, BID  clopidogrel, 75 mg, Oral, Daily  dorzolamide-timolol, , Both Eyes, BID  latanoprost, 1 drop, Both Eyes, Nightly  levothyroxine, 150 mcg, Oral, Daily  lidocaine, 5 mL, Intra-articular, Once  metoprolol succinate XL, 12.5 mg, Oral, Daily  nystatin, , Topical, Q12H  triamcinolone acetonide, 40 mg, Intra-articular, Once  vitamin B-12, 250 mcg, Oral, Daily      Continuous Infusions:   PRN Meds:.•  acetaminophen  •  bisacodyl  •  trolamine salicylate      Assessment/Plan       Stroke (cerebrum) (CMS/HCC)    Arthritis    Stage 4 chronic kidney disease (CMS/HCC)    Essential  hypertension    Acquired hypothyroidism      Assessment & Plan  Status post CVA-small focus of restricted diffusion in the right parietal lobe  Adjunctive medication for stroke recovery-on atorvastatin.  Recheck vitamin D level.  Check vitamin B-12 level.     Impaired cognition/impaired mobility/impaired self-care     Stroke prophylaxis-aspirin and Plavix x30 days then Plavix alone.     Left knee degenerative changes-status post steroid injection February 3     Chronic kidney disease stage IV baseline creatinine around 2.0     Acquired hypothyroidism-recheck TFTs 1 March-on levothyroxine     Morbid obesity     Vitamin D deficiency-no home dose listed-recheck vitamin D level     Hypertension-metoprolol/amlodipine-systolic blood pressure goal 150-160  February 12-Her blood pressure has increased over the last day, late yesterday blood pressure was 191/84 and then today 183/97.  She is on amlodipine 2.5 and metoprolol 12.5 mg daily.  Patient reviewed with internal medicine service and recommended increase amlodipine.  Will give additional amlodipine 2.5 mg this afternoon increased dose to 5 mg daily  February 15-blood pressure pattern improved.  To titrate up further on amlodipine to 7.5 mg daily     Hyperlipidemia-atorvastatin    Hypersensitivity to touch bilateral lower extremities-May possibly be neuropathy.  B12 within normal limits.  She has had hypothyroidism.  Has chronic kidney disease.    TEAM CONF - FEB 9 -  TRANSFERS CTG ROLLATOR. GAIT 160 FEET CTG ROLLATOR. TOILET TRANSFERS MIN. SHOWER TRANSFERS MIN. BATH UB SBA AND LB MIN. LBD MOD. UBD SBA. GROOMING SBA. MODERATE DEFICITS IN EXECUTIVE FUNCTION. PATIENT REQUESTS DISCHARGE FROM SLP AS SHE FEELS SHE IS AT BASELINE. CONTINENT BOWEL AND BLADDER. LEFT KNEE DJD, S/P INJECTION LAST WEEK, PAIN IMPROVED.   ELOS -   WED. February 17       Now admit for comprehensive acute inpatient rehabilitation .  This would be an interdisciplinary program with physical therapy 1  hour,  occupational therapy 1 hour, and speech therapy 1 hour, 5 days a week.  Rehabilitation nursing for carryover, monitoring of nutritional and blood pressure and neurologic   status, bowel and bladder, and skin  Ongoing physician follow-up.  Weekly team conferences.  Goals are to achieve a level of supervision with  mobility and self-care and improved balance.   Rehabilitation prognosis fair.  Medical prognosis fair.  Estimated length of stay is approximately 2 to 3 weeks, but is only an estimation.   The patient's functional status and clinical status is unchanged from preadmission assessment and the patient continues appropriate for acute inpatient rehabilitation.  Goal is for home with home health   therapies.  Barrier to discharge: Impaired mobility- work on transfers ADLs to overcome.      Ricki Matta MD  02/16/21  16:20 EST    Time:   During rounds, used appropriate personal protective equipment including mask and gloves.  Additional gown if indicated.  Mask used was standard procedure mask. Appropriate PPE was worn during the entire visit.  Hand hygiene was completed before and after.

## 2021-02-16 NOTE — PROGRESS NOTES
Inpatient Rehabilitation Plan of Care Note    Plan of Care  Updated Problems/Interventions  Mobility    [PT] Bed/Chair/Wheelchair(Active)  Current Status(02/16/2021): Supervision, rollator  Weekly Goal(02/23/2021): SBA, rollator  Discharge Goal: SV, rollator    [PT] Walk(Active)  Current Status(02/16/2021): 160' Supervision, rollator  Weekly Goal(02/23/2021): to and from BR SBA, rollator  Discharge Goal: 160' supervision, rollator    [PT] Car Transfers(Active)  Current Status(02/16/2021): SBA, Rollator  Weekly Goal(02/23/2021): PT only  Discharge Goal: SBA, rollator    Signed by: Damon Paulino PT Student     - CoSigned By: Katya Bar, PT 2/16/2021 3:29:30 PM

## 2021-02-16 NOTE — PROGRESS NOTES
Inpatient Rehabilitation Plan of Care Note    Plan of Care  Care Plan Reviewed - Updates as Follows    Body Systems    [RN] Integumentary(Active)  Current Status(02/16/2021): Pt has red area to right buttock (psoriasis) cream  ordered; slight redness breast folds except L lateral red/excoriated; powder  ordered  Weekly Goal(02/23/2021): No s/s of infection  Discharge Goal: No s/s of infection    Performed Intervention(s)  Daily skin inspection  Nutritional support  Apply cream to dry areas as needed  Apply powder under folds as ordered      Pain    [RN] Pain Management(Active)  Current Status(02/16/2021): Chronic pain to left knee r/t arthritis. 2/6 pt  states both legs tender/sore.  Weekly Goal(02/22/2021): Pt able to tolerate therapies  Discharge Goal: Pain under control    Performed Intervention(s)  Offer medication when needed  Practice relaxation techniques  Apply heating pad or cream to area when needed      Psychosocial    [RN] Coping/Adjustment(Active)  Current Status(02/16/2021): Pt expresses effective coping  Weekly Goal(02/23/2021): Allow pt to express concerns  Discharge Goal: Demonstrates healthy coping strategies    Performed Intervention(s)  Verbalizes needs and concerns  Provide therapeutic enviroment      Safety    [RN] Potential for Injury(Active)  Current Status(02/16/2021): No unsafe behaviors  Weekly Goal(02/22/2021): Pt will use call bell 100% of the time  Discharge Goal: No falls    Performed Intervention(s)  Falls precautions/protocol  Safety rounds  Items within reach  Bed alarm/ chair alarm      Sphincter Control    [RN] Bladder Management(Active)  Current Status(02/16/2021): Pt is continent 100%  Weekly Goal(02/23/2021): Pt will be continent 100%  Discharge Goal: Pt will be continent 100%    [RN] Bowel Management(Active)  Current Status(02/16/2021): Pt is continent 100% except 1 incont. bm with  urgency, loose/liquid/ some formed bm on 2/8.  Weekly Goal(02/22/2021): Pt will be continent  100%  Discharge Goal: Pt will remain continent 100%    Performed Intervention(s)  Use incontinence products  offer toileting  Encourage fluids    Signed by: Yanci De Jesus RN     Topical Retinoid Pregnancy And Lactation Text: This medication is Pregnancy Category C. It is unknown if this medication is excreted in breast milk.

## 2021-02-17 VITALS
OXYGEN SATURATION: 98 % | TEMPERATURE: 98.4 F | DIASTOLIC BLOOD PRESSURE: 72 MMHG | HEIGHT: 61 IN | BODY MASS INDEX: 36.74 KG/M2 | HEART RATE: 59 BPM | SYSTOLIC BLOOD PRESSURE: 152 MMHG | RESPIRATION RATE: 18 BRPM

## 2021-02-17 LAB
ALBUMIN SERPL-MCNC: 3 G/DL (ref 3.5–5.2)
ANION GAP SERPL CALCULATED.3IONS-SCNC: 5.6 MMOL/L (ref 5–15)
BUN SERPL-MCNC: 25 MG/DL (ref 8–23)
BUN/CREAT SERPL: 15.3 (ref 7–25)
CALCIUM SPEC-SCNC: 8.6 MG/DL (ref 8.2–9.6)
CHLORIDE SERPL-SCNC: 111 MMOL/L (ref 98–107)
CO2 SERPL-SCNC: 25.4 MMOL/L (ref 22–29)
CREAT SERPL-MCNC: 1.63 MG/DL (ref 0.57–1)
GFR SERPL CREATININE-BSD FRML MDRD: 29 ML/MIN/1.73
GLUCOSE SERPL-MCNC: 86 MG/DL (ref 65–99)
PHOSPHATE SERPL-MCNC: 3.4 MG/DL (ref 2.5–4.5)
POTASSIUM SERPL-SCNC: 4.7 MMOL/L (ref 3.5–5.2)
SODIUM SERPL-SCNC: 142 MMOL/L (ref 136–145)

## 2021-02-17 PROCEDURE — 97535 SELF CARE MNGMENT TRAINING: CPT

## 2021-02-17 PROCEDURE — 80069 RENAL FUNCTION PANEL: CPT | Performed by: PHYSICAL MEDICINE & REHABILITATION

## 2021-02-17 RX ORDER — LEVOTHYROXINE SODIUM 0.15 MG/1
150 TABLET ORAL DAILY
Qty: 30 TABLET | Refills: 1 | Status: SHIPPED | OUTPATIENT
Start: 2021-02-18 | End: 2021-03-02 | Stop reason: SDUPTHER

## 2021-02-17 RX ORDER — ATORVASTATIN CALCIUM 80 MG/1
80 TABLET, FILM COATED ORAL NIGHTLY
Qty: 30 TABLET | Refills: 1 | Status: SHIPPED | OUTPATIENT
Start: 2021-02-17 | End: 2021-03-02 | Stop reason: SDUPTHER

## 2021-02-17 RX ORDER — AMLODIPINE BESYLATE 2.5 MG/1
7.5 TABLET ORAL
Qty: 90 TABLET | Refills: 1 | Status: SHIPPED | OUTPATIENT
Start: 2021-02-18 | End: 2021-03-02 | Stop reason: SDUPTHER

## 2021-02-17 RX ORDER — CLOPIDOGREL BISULFATE 75 MG/1
75 TABLET ORAL DAILY
Qty: 90 TABLET | Refills: 1 | Status: SHIPPED | OUTPATIENT
Start: 2021-02-18 | End: 2021-03-02 | Stop reason: SDUPTHER

## 2021-02-17 RX ORDER — ASPIRIN 81 MG/1
81 TABLET ORAL DAILY
Qty: 11 TABLET | Refills: 0 | Status: SHIPPED | OUTPATIENT
Start: 2021-02-18 | End: 2021-03-02

## 2021-02-17 RX ORDER — MELATONIN
1000 2 TIMES DAILY
Qty: 180 TABLET | Refills: 1 | Status: SHIPPED | OUTPATIENT
Start: 2021-02-17 | End: 2021-03-02 | Stop reason: SDUPTHER

## 2021-02-17 RX ORDER — METOPROLOL SUCCINATE 25 MG/1
12.5 TABLET, EXTENDED RELEASE ORAL DAILY
Qty: 90 TABLET | Refills: 3
Start: 2021-02-17 | End: 2021-02-18

## 2021-02-17 RX ORDER — NYSTATIN 100000 [USP'U]/G
POWDER TOPICAL EVERY 12 HOURS SCHEDULED
Start: 2021-02-17 | End: 2021-12-17

## 2021-02-17 RX ADMIN — METOPROLOL SUCCINATE 12.5 MG: 25 TABLET, EXTENDED RELEASE ORAL at 09:30

## 2021-02-17 RX ADMIN — NYSTATIN: 100000 POWDER TOPICAL at 09:34

## 2021-02-17 RX ADMIN — TIMOLOL MALEATE: 5 SOLUTION/ DROPS OPHTHALMIC at 09:34

## 2021-02-17 RX ADMIN — LEVOTHYROXINE SODIUM 150 MCG: 150 TABLET ORAL at 06:02

## 2021-02-17 RX ADMIN — Medication 1000 UNITS: at 09:30

## 2021-02-17 RX ADMIN — ASPIRIN 81 MG: 81 TABLET, COATED ORAL at 09:30

## 2021-02-17 RX ADMIN — AMLODIPINE BESYLATE 7.5 MG: 2.5 TABLET ORAL at 09:30

## 2021-02-17 RX ADMIN — Medication 250 MCG: at 09:30

## 2021-02-17 RX ADMIN — CLOPIDOGREL 75 MG: 75 TABLET, FILM COATED ORAL at 09:30

## 2021-02-17 NOTE — DISCHARGE INSTRUCTIONS
HOME HEALTH PT, OT, NURSING TO MONITOR BP.    Plavix 75 mg daily   Aspirin 81 mg (on 81 mg daily prior to arrival) x30 days from Jan 30, 2021 then discontinue after dose on Feb 28, 2021    Lab work - CMP and lipid panel and thyroid panel first week of March 2021, can get when sees Dr Pedroza on March 2, 2021    Medication refills per Primary Care

## 2021-02-17 NOTE — PLAN OF CARE
Goal Outcome Evaluation:  Plan of Care Reviewed With: patient     Outcome Summary: Patient alert, meds with water, and assist x1 with walker. She is scheduled for discharge in morning, no pain and no unsafe behavior.

## 2021-02-17 NOTE — THERAPY DISCHARGE NOTE
Inpatient Rehabilitation - Occupational Therapy Discharge Summary  [facility]     Patient Name: Marianne Delgado  : 1925  MRN: [id number]    Today's Date: 2021                 Admit Date: 2/3/2021        OT Recommendation and Plan    Visit Dx:    ICD-10-CM ICD-9-CM   1. Essential hypertension  I10 401.9         Time Calculation- OT     Row Name 21 1100             Time Calculation- OT    OT Start Time  1100  -CC      OT Stop Time  1130  -CC      OT Time Calculation (min)  30 min  -CC        User Key  (r) = Recorded By, (t) = Taken By, (c) = Cosigned By    Initials Name Provider Type    Idania Awan OTR Occupational Therapist            OT IRF GOALS     Row Name 21 1400 02/10/21 1609 21 1621       Transfer Goal 1 (OT-IRF)    Activity/Assistive Device (Transfer Goal 1, OT-IRF)  --  --  sit-to-stand/stand-to-sit;toilet;shower chair with a back;walk-in shower  -KP    [place] Level (Transfer Goal 1, OT-IRF)  --  --  set-up required;contact guard assist  -KP    Time Frame (Transfer Goal 1, OT-IRF)  --  --  short-term goal (STG);1 week  -KP    Progress/Outcomes (Transfer Goal 1, OT-IRF)  --  goal met  -CC  goal ongoing  -KP       Transfer Goal 2 (OT-IRF)    Activity/Assistive Device (Transfer Goal 2, OT-IRF)  --  --  sit-to-stand/stand-to-sit;toilet;walk-in shower  -KP    [place] Level (Transfer Goal 2, OT-IRF)  --  --  standby assist;modified independence  -KP    Time Frame (Transfer Goal 2, OT-IRF)  --  --  long-term goal (LTG);by discharge  -KP    Progress/Outcomes (Transfer Goal 2, OT-IRF)  goal met  -CC  goal partially met  -CC  goal ongoing  -KP       Bathing Goal 1 (OT-IRF)    Activity/Device (Bathing Goal 1, OT-IRF)  --  --  bathing skills, all;grab bar, tub/shower;hand-held shower spray hose;shower chair;long-handled sponge  -KP    [place] Level (Bathing Goal 1, OT-IRF)  --  --  minimum assist (75% or more patient effort);contact guard assist  -KP    Time  Frame (Bathing Goal 1, OT-IRF)  --  --  short-term goal (STG);1 week  -KP    Progress/Outcomes (Bathing Goal 1, OT-IRF)  --  goal met  -CC  goal ongoing  -KP       Bathing Goal 2 (OT-IRF)    Activity/Device (Bathing Goal 2, OT-IRF)  --  --  bathing skills, all;hand-held shower spray hose;grab bar, tub/shower;long-handled sponge;shower chair  -KP    [place] Level (Bathing Goal 2, OT-IRF)  --  --  modified independence;standby assist  -KP    Time Frame (Bathing Goal 2, OT-IRF)  --  --  long-term goal (LTG);by discharge  -KP    Progress/Outcomes (Bathing Goal 2, OT-IRF)  goal partially met  -CC  goal ongoing  -CC  goal ongoing  -KP       UB Dressing Goal 1 (OT-IRF)    Activity/Device (UB Dressing Goal 1, OT-IRF)  --  --  upper body dressing  -KP    [place] (UB Dress Goal 1, OT-IRF)  --  --  modified independence  -KP    Time Frame (UB Dressing Goal 1, OT-IRF)  --  --  short-term goal (STG);1 week  -KP    Progress/Outcomes (UB Dressing Goal 1, OT-IRF)  goal met  -CC  goal ongoing  -CC  goal ongoing  -KP       UB Dressing Goal 2 (OT-IRF)    Activity/Device (UB Dressing Goal 2, OT-IRF)  --  --  upper body dressing  -KP    [place] (UB Dress Goal 2, OT-IRF)  --  --  independent  -KP    Time Frame (UB Dressing Goal 2, OT-IRF)  --  --  long-term goal (LTG);by discharge  -KP    Progress/Outcomes (UB Dressing Goal 2, OT-IRF)  --  goal ongoing  -CC  goal ongoing  -KP       LB Dressing Goal 1 (OT-IRF)    Activity/Device (LB Dressing Goal 1, OT-IRF)  --  --  lower body dressing;sock aid;reacher;long-handled shoe horn  -KP    [place] (LB Dressing Goal 1, OT-IRF)  --  --  contact guard assist;minimum assist (75% or more patient effort);set-up required  -KP    Time Frame (LB Dressing Goal 1, OT-IRF)  --  --  short-term goal (STG);1 week  -KP    Progress/Outcomes (LB Dressing Goal 1, OT-IRF)  goal met  -CC  goal ongoing  -CC  goal ongoing  -KP       LB Dressing Goal 2 (OT-IRF)    Activity/Device (LB Dressing Goal  2, OT-IRF)  --  --  lower body dressing;long-handled shoe horn;sock aid;reacher  -KP    [place] (LB Dressing Goal 2, OT-IRF)  --  --  modified independence;standby assist  -KP    Time Frame (LB Dressing Goal 2, OT-IRF)  --  --  long-term goal (LTG);by discharge  -KP    Progress/Outcomes (LB Dressing Goal 2, OT-IRF)  --  goal ongoing  -CC  goal ongoing  -KP       Grooming Goal 1 (OT-IRF)    Activity/Device (Grooming Goal 1, OT-IRF)  --  --  grooming skills, all  -KP    [place] (Grooming Goal 1, OT-IRF)  --  --  modified independence;independent  -KP    Time Frame (Grooming Goal 1, OT-IRF)  --  --  long-term goal (LTG);by discharge  -KP    Progress/Outcomes (Grooming Goal 1, OT-IRF)  goal met  -CC  goal ongoing  -CC  goal ongoing  -KP       Toileting Goal 1 (OT-IRF)    Activity/Device (Toileting Goal 1, OT-IRF)  --  --  toileting skills, all;grab bar/safety frame  -KP    [place] Level (Toileting Goal 1, OT-IRF)  --  --  set-up required;contact guard assist  -KP    Progress/Outcomes (Toileting Goal 1, OT-IRF)  --  goal met  -CC  goal ongoing  -KP    Time Frame (Toileting Goal 1, OT-IRF)  --  --  short-term goal (STG);1 week  -KP       Toileting Goal 2 (OT-IRF)    Activity/Device (Toileting Goal 2, OT-IRF)  --  --  toileting skills, all;grab bar/safety frame  -KP    [place] Level (Toileting Goal 2, OT-IRF)  --  --  modified independence;standby assist  -KP    Progress/Outcomes (Toileting Goal 2, OT-IRF)  goal met  -CC  goal ongoing  -CC  goal ongoing  -KP    Time Frame (Toileting Goal 2, OT-IRF)  --  --  long-term goal (LTG);by discharge  -KP       Strength Goal 1 (OT-IRF)    Strength Goal 1 (OT-IRF)  --  --  to incr B UE to 4/5  -KP    Time Frame (Strength Goal 1, OT-IRF)  --  --  long-term goal (LTG);by discharge  -KP    Progress/Outcomes (Strength Goal 1, OT-IRF)  goal met  -CC  goal ongoing  -CC  goal ongoing  -KP       Balance Goal 1 (OT)    Activity/Assistive Device (Balance Goal 1, OT)  --  --   standing, static;standing, dynamic  -KP    [place] Level/Cues Needed (Balance Goal 1, OT)  --  --  conditional independence;supervision required  -KP    Time Frame (Balance Goal 1, OT)  --  --  long term goal (LTG);by discharge  -KP    Progress/Outcomes (Balance Goal 1, OT)  goal met  -CC  goal ongoing  -CC  goal ongoing  -KP      User Key  (r) = Recorded By, (t) = Taken By, (c) = Cosigned By    Initials Name Provider Type    CC Idania Blood OTR Occupational Therapist     Mame Nelson OTR Occupational Therapist              Therapy Charges for Today     Code Description Service Date Service Provider Modifiers Qty    50351164179 HC OT SELF CARE/MGMT/TRAIN EA 15 MIN 2/16/2021 Idania Blood OTR GO 2    57557309691 HC OT THER PROC EA 15 MIN 2/16/2021 Idania Blood OTR GO 4    92483718130 HC OT SELF CARE/MGMT/TRAIN EA 15 MIN 2/17/2021 Idania Blood OTR GO 2          OT Discharge Summary  Anticipated Discharge Disposition (OT): home with 24/7 care      HARI Fofana  2/17/2021

## 2021-02-17 NOTE — PROGRESS NOTES
TEAM CONF - FEB 17 -   TRANSFERS SBA. GAIT  FEET SUP. TOILET AND SHOWER TRANSFERS SBA. BATH SBA. LBD SBA MIN ASSIST WITH ADAPTIVE EQUIPEMENT. UBD SET UP. GROOMING SBA MOD INDEPENDENT. CONTINENT BOWEL AND BLADDER.   ELOS-    TODAY. HOME HEALTH PT, OT, NURSING TO MONITOR BP.

## 2021-02-17 NOTE — PROGRESS NOTES
Case Management  Inpatient Rehabilitation Team Conference    Conference Date/Time: 2/17/2021 8:39:08 AM    Team Conference Attendees:  Karen Leon, Pharmacist  ELIZABETH Solis, PT  Idania Blood, OT  Diamond Garrison, MURIELS  Heidi Mac RD, LD  Tone Bernal, RN  Corinne Sandhu, RN    Demographics            Age: 89Y            Gender: Female    Admission Date: 2/3/2021 8:32:00 PM  Rehabilitation Diagnosis:  CVA left siri  Past Medical History: Medical History: has a past medical history of Acute  sinusitis, Acute upper respiratory infection, Arthritis, CKD (chronic kidney  disease), Debility, Fatigue, Foot pain, bilateral, Glaucoma, Gout, Hematuria,  High blood pressure, Hypertension, Hypothyroid (09/1999), Hypothyroidism, IBS  (irritable bowel syndrome), IGT (impaired glucose tolerance), Malaise and  fatigue, Medication management, Melanoma (CMS/[facility]) (1979), OA (osteoarthritis),  Osteopenia (09/1999), Painful joint, Psoriasis, Renal failure, Rosacea, and  Vitamin D deficiency.  Surgical History: has a past surgical history that includes Foot surgery; Other  surgical history; and Cataract extraction.      Plan of Care  Anticipated Discharge Date/Estimated Length of Stay: ELANOS: DC 2/17  Anticipated Discharge Destination: Community discharge with assistance  Discharge Plan : Patient lives in Independent Living apartment at [facility]  Family conference held with patient and niece, by phone on 2/15.  D/C plan is to her [place] apartment with 24/7 hired caregiver.  [facility] to provide home PT and OT.  Medical Necessity Expected Level Rationale: Goals are to achieve a level of  supervision with  mobility and self-care and improved balance.   Rehabilitation  prognosis fair.  Medical prognosis fair.  Intensity and Duration: an average of 3 hours/5 days per week  Medical Supervision and 24 Hour Rehab Nursing: x  Physical Therapy: x  PT Intensity/Duration: 1  hour/day, 5 days/week for approximately 10 days  Occupational Therapy: x  OT Intensity/Duration: 1 hour/day, 5 days/week for approximately 10 days  Speech and Language Therapy: x  SLP Intensity/Duration: 1 hour/day, 5 days/week for approximately 10 days  Social Work: x  Therapeutic Recreation: x  Psychology: x  Updated (if changes indicated)    Anticipated Discharge Date/Estimated Length of Stay:   ELOS: DC 2/17    Based on the patient's medical and functional status, their prognosis and  expected level of functional improvement is: Goals are to achieve a level of  supervision with  mobility and self-care and improved balance.   Rehabilitation  prognosis fair.  Medical prognosis fair.      Interdisciplinary Problem/Goals/Status    All Rehab Problems:  Body Systems    [RN] Integumentary(Active)  Current Status(02/17/2021): Pt has red area to right buttock (psoriasis) cream  ordered; slight redness breast folds except L lateral red/excoriated; powder  ordered  Weekly Goal(02/23/2021): No s/s of infection  Discharge Goal: No s/s of infection        Cognition    [ST] Executive Functions(Resolved)  Current Status(02/09/2021): Pt completed medicine and money management tasks  without errors.  Weekly Goal(02/09/2021): The patient will provide 3 potentional solutions to  possible problems in the rehab setting.  Discharge Goal: Pt is at baseline function per report and performance noted on  2/9.        Mobility    [PT] Bed/Chair/Wheelchair(Active)  Current Status(02/16/2021): Supervision, rollator  Weekly Goal(02/23/2021): SBA, rollator  Discharge Goal: SV, rollator    [PT] Walk(Active)  Current Status(02/16/2021): 160' Supervision, rollator  Weekly Goal(02/23/2021): to and from BR SBA, rollator  Discharge Goal: 160' supervision, rollator    [OT] Toilet Transfers(Active)  Current Status(02/15/2021): SBA  Weekly Goal(02/17/2021): SBA/mod I  Discharge Goal: SBA/mod I    [OT] Tub/Shower Transfers(Active)  Current  Status(02/15/2021): SBA  Weekly Goal(02/17/2021): SBA  Discharge Goal: SBA    [PT] Car Transfers(Active)  Current Status(02/16/2021): SBA, Rollator  Weekly Goal(02/23/2021): PT only  Discharge Goal: SBA, rollator        Pain    [RN] Pain Management(Active)  Current Status(02/17/2021): Chronic pain to left knee r/t arthritis. 2/6 pt  states both legs tender/sore.  Weekly Goal(02/24/2021): Pt able to tolerate therapies  Discharge Goal: Pain under control        Psychosocial    [RN] Coping/Adjustment(Active)  Current Status(02/17/2021): Pt expresses effective coping  Weekly Goal(02/24/2021): Allow pt to express concerns  Discharge Goal: Demonstrates healthy coping strategies        Safety    [RN] Potential for Injury(Active)  Current Status(02/17/2021): No unsafe behaviors  Weekly Goal(02/23/2021): Pt will use call bell 100% of the time  Discharge Goal: No falls        Self Care    [OT] Bathing(Active)  Current Status(02/15/2021): SBA  Weekly Goal(02/16/2021): SBA/mod i  Discharge Goal: SBA/mod I w. AE    [OT] Dressing (Lower)(Active)  Current Status(02/15/2021): SBa/Min w AE  Weekly Goal(02/17/2021): SBA  Discharge Goal: SBA    [OT] Dressing (Upper)(Active)  Current Status(02/15/2021): Set up  Weekly Goal(02/16/2021):  mod I  Discharge Goal: mod I    [OT] Grooming(Active)  Current Status(02/15/2021): SBA/Mod I  Weekly Goal(02/17/2021): Supervision/mod I  Discharge Goal: mod I    [OT] Toileting(Active)  Current Status(02/15/2021): SBA  Weekly Goal(02/16/2021): Mod I  Discharge Goal: Mod I        Sphincter Control    [RN] Bladder Management(Active)  Current Status(02/17/2021): Pt is continent 100%  Weekly Goal(02/24/2021): Pt will be continent 100%  Discharge Goal: Pt will be continent 100%    [RN] Bowel Management(Active)  Current Status(02/17/2021): Pt is continent 100% except 1 incont. bm with  urgency, loose/liquid/ some formed bm on 2/8.  Weekly Goal(02/23/2021): Pt will be continent 100%  Discharge Goal: Pt will  remain continent 100%        Comments: 2/9: patient requested DC from SLP - to test pill sorrting, may sign  off soon;    2/17: team conference postponed one day due to weather yesterday;    Signed by: Tone Bernal RN    Physician CoSigned By: Ricki Matta 02/17/2021 09:20:19

## 2021-02-17 NOTE — PLAN OF CARE
Goal Outcome Evaluation:        Outcome Summary: A&OX4. Continent B&B. Last BM 2/16. Calm, cooperative, and pleasant. Diet: Reg, thin liquids. Protein shakes BID. Nystatin powder to underside breasts. Ate little at meals. L. lower extremity numb/weak. K-pad to L. knee for arthritis. Vitamin D supplement. No CPR code. Eye drops for glaucoma. No complaints of pain. Waiting to be discharged home to family today after lunch. Cream applied to psoriasis on buttock and face.

## 2021-02-17 NOTE — PROGRESS NOTES
SECTION GG      Self Care Performance Discharge:   Oral Hygiene: Patient completed the activities by him/herself with no  assistance from a helper.   Toileting Hygiene: : Roscoe provides verbal cues and/or touching/steadying  and/or contact guard assistance as patient completes activity.   Shower/Bathe Self: Roscoe provides verbal cues and/or touching/steadying and/or  contact guard assistance as patient completes activity.   Upper Body Dressing: Roscoe provides verbal cues and/or touching/steadying  and/or contact guard assistance as patient completes activity.   Lower Body Dressing: Roscoe provides verbal cues and/or touching/steadying  and/or contact guard assistance as patient completes activity.   Putting On/Taking Off Footwear: Roscoe does less than half the effort. Roscoe  lifts, holds or supports trunk or limbs but provides less than half the effort.    Mobility Toilet Transfer Discharge: Roscoe provides verbal cues or  touching/steadying assistance as patient completes activity.    Signed by: Idania Blood OTR/MARITA

## 2021-02-17 NOTE — PLAN OF CARE
Goal Outcome Evaluation:  Plan of Care Reviewed With: patient  Progress: improving  Outcome Summary: Patient alert.  Heating pad used to warm feet; knee pain not reported. Meds with water. Slept well so far, only awakens for toileting. Ambulates with rollator CGA x 1. Continent so far. D/C today.

## 2021-02-17 NOTE — PROGRESS NOTES
Reviewed progress and d/c plans for home today with patient. Patient to d/c today to her independent living apartment at [facility]. Patient will have hired caregivers with her 24/7 for first two weeks. [facility] to provide home PT, OT, NSG. Patient happy with progress. Encouraged her to continue working on endurance by walking frequently at home. She stated she plans to keep herself on better schedule. Niece to transport home.

## 2021-02-17 NOTE — DISCHARGE SUMMARY
[facility] - REHABILITATION UNIT    MK MAYA  7/14/1925    ADMIT DATE:  2/3/2021  8:32 PM  DISCHARGE DATE:  02/17/2021    CHIEF COMPLAINT:    Chief Complaint:   Status post CVA-small focus of restricted diffusion in the right parietal lobe  Impaired cognition/impaired mobility/impaired self-care  Stroke prophylaxis-aspirin and Plavix x30 days then Plavix alone.  Left knee degenerative changes-status post steroid injection February 3  Chronic kidney disease stage IV baseline creatinine around 2.0  Acquired hypothyroidism-recheck TFTs 1 March-on levothyroxine  Morbid obesity  Vitamin D deficiency  Hypertension-metoprolol/amlodipine-systolic blood pressure goal 150-160  Hyperlipidemia-atorvastatin    HISTORY OF PRESENT ILLNESS:    History of Present Illness  Patient is a 89-year-old female who lived in independent living facility.  She would go to the dining room for meals.  She had difficulty with sit to stand.  She would use a lift chair and slept in the left chair as she had difficulty getting out of her bed.  She uses a Rollator.  She was bathing and dressing independently.     She was admitted to [facility] on January 29, 2021 with stroke with left-sided weakness.  She is on dual antiplatelet therapy with aspirin and Plavix x30 days and then Plavix alone.  She also noted generalized fatigue and weakness.  Markedly elevated TSH.  She was on Synthroid prior to admission.  Her dose was increased slightly and TSH has been dropping.  The rapidity of its drop raised question of possible compliance issues.  She will repeat TSH in 4 weeks.  She complained of significant left knee pain.  She is evaluated by orthopedic surgery.  X-ray showed degenerative joint disease.  She had steroid injection of the left knee on February 3.  She has stage IV chronic kidney disease with significant hypertension which was uncontrolled at the time of her presentation to the hospital.  She is  evaluated by nephrology.  Norvasc has been started.  Goal is to get her systolic blood pressure down to approximately 150-160.     Functionally, supine to sit min assist.  Sit to stand min assist.  Attempted gait but was unable to do that secondary to left knee pain.  Grooming set up.  Given her functional impairments and comorbidities she is now omitted for acute inpatient rehabilitation.  She has the ability to hire additional caregivers at her independent living facility for 24/7 care.  Review of Systems      Past History:  Medical History: has a past medical history of Acute sinusitis, Acute upper respiratory infection, Arthritis, CKD (chronic kidney disease), Debility, Fatigue, Foot pain, bilateral, Glaucoma, Gout, Hematuria, High blood pressure, Hypertension, Hypothyroid (09/1999), Hypothyroidism, IBS (irritable bowel syndrome), IGT (impaired glucose tolerance), Malaise and fatigue, Medication management, Melanoma (CMS/[facility]) (1979), OA (osteoarthritis), Osteopenia (09/1999), Painful joint, Psoriasis, Renal failure, Rosacea, and Vitamin D deficiency.   Surgical History: has a past surgical history that includes Foot surgery; Other surgical history; and Cataract extraction.   Family History: family history includes Heart attack in her mother; Heart disease in her sister, sister, sister and another family member; Kidney failure in her father; Leukemia in her brother.   Social History: reports that she has never smoked. She has never used smokeless tobacco. She reports current alcohol use of about 2.0 standard drinks of alcohol per week. She reports that she does not use drugs.     Allergies: Azithromycin, Cefdinir, Doxycycline monohydrate, and Allopurinol    HOSPITAL COURSE:    Patient participated in a comprehensive acute inpatient rehabilitation program.     During the hospital stay the following areas were addressed:      Status post CVA-small focus of restricted diffusion in the right parietal lobe  Adjunctive  medication for stroke recovery-on atorvastatin.  Recheck vitamin D level-39.9.  Check vitamin B-12 level-1204.     Impaired cognition/impaired mobility/impaired self-care     Stroke prophylaxis-aspirin and Plavix x30 days then Plavix alone.     Left knee degenerative changes-status post steroid injection February 3     Chronic kidney disease stage IV baseline creatinine around 2.0     Acquired hypothyroidism-recheck TFTs 1 March-on levothyroxine     Morbid obesity     Vitamin D deficiency-      Hypertension-metoprolol/amlodipine-systolic blood pressure goal 150-160  February 12-Her blood pressure has increased over the last day, late yesterday blood pressure was 191/84 and then today 183/97.  She is on amlodipine 2.5 and metoprolol 12.5 mg daily.  Patient reviewed with internal medicine service and recommended increase amlodipine.  Will give additional amlodipine 2.5 mg this afternoon increased dose to 5 mg daily  February 15-blood pressure pattern improved.  To titrate up further on amlodipine to 7.5 mg daily     Hyperlipidemia-atorvastatin     Hypersensitivity to touch bilateral lower extremities-May possibly be neuropathy.  B12 within normal limits.  She has had hypothyroidism.  Has chronic kidney disease.     TEAM CONF - FEB 9 -  TRANSFERS CTG ROLLATOR. GAIT 160 FEET CTG ROLLATOR. TOILET TRANSFERS MIN. SHOWER TRANSFERS MIN. BATH UB SBA AND LB MIN. LBD MOD. UBD SBA. GROOMING SBA. MODERATE DEFICITS IN EXECUTIVE FUNCTION. PATIENT REQUESTS DISCHARGE FROM SLP AS SHE FEELS SHE IS AT BASELINE. CONTINENT BOWEL AND BLADDER. LEFT KNEE DJD, S/P INJECTION LAST WEEK, PAIN IMPROVED.   ELOS -   WED.  February 17      TEAM CONF - FEB 17 -   TRANSFERS SBA. GAIT  FEET SUP. TOILET AND SHOWER TRANSFERS SBA. BATH SBA. LBD SBA MIN ASSIST WITH ADAPTIVE EQUIPEMENT. UBD SET UP. GROOMING SBA MOD INDEPENDENT. CONTINENT BOWEL AND BLADDER.   ELOS-    TODAY. HOME HEALTH PT, OT, NURSING TO MONITOR BP.     Rehabilitation-admit for  comprehensive acute inpatient rehabilitation .  This would be an interdisciplinary program with physical therapy 1 hour,  occupational therapy 1 hour, and speech therapy 1 hour, 5 days a week.  Rehabilitation nursing for carryover, monitoring of nutritional and blood pressure and neurologic   status, bowel and bladder, and skin  Ongoing physician follow-up.  Weekly team conferences.   Goal is for home with home health   therapies.  Barrier to discharge: Impaired mobility- worked on transfers ADLs to overcome.          Physical Exam near the time of discharge:    Physical Exam  MENTAL STATUS -  AWAKE / ALERT  HEENT- NCAT, PUPILS EQUALLY ROUND, SCLERAE ANICTERIC,   LUNGS - CTA, NO WHEEZES, RALES OR RHONCHI  HEART- RRR, NO RUB, MURMUR, OR GALLOP  ABD - NORMOACTIVE BOWEL SOUNDS, SOFT, NT.    EXT - NO EDEMA OR CYANOSIS  NEURO -oriented x4.     MOTOR EXAM - RUE/RLE 5/5.  Takes resistance with left elbow flexion, finger flexion, knee extension, ADF    RESULTS:  Glucose   Date/Time Value Ref Range Status   02/16/2021 0717 79 70 - 130 mg/dL Final     Results from last 7 days   Lab Units 02/15/21  0655 02/12/21  0613   WBC 10*3/mm3 6.84 6.67   HEMOGLOBIN g/dL 11.7* 11.9*   HEMATOCRIT % 36.6 35.9   PLATELETS 10*3/mm3 203 234     Results from last 7 days   Lab Units 02/17/21  0640 02/15/21  0655 02/12/21  0613   SODIUM mmol/L 142 143 141   POTASSIUM mmol/L 4.7 4.8 4.8   CHLORIDE mmol/L 111* 111* 112*   CO2 mmol/L 25.4 23.2 23.4   BUN mg/dL 25* 26* 29*   CREATININE mg/dL 1.63* 1.58* 1.64*   CALCIUM mg/dL 8.6 9.2 9.1   GLUCOSE mg/dL 86 81 80          Ref. Range 9/15/2020 11:00 1/29/2021 10:35 1/30/2021 06:29 1/31/2021 07:10 2/5/2021 07:11   Hemoglobin A1C Latest Ref Range: 4.80 - 5.60 %   5.07         TSH Baseline Latest Ref Range: 0.270 - 4.200 uIU/mL 5.160 (H) 16.100 (H)   9.150 (H)     Free T4 Latest Ref Range: 0.93 - 1.70 ng/dL 1.39   1.14       T3, Free Latest Ref Range: 2.00 - 4.40 pg/mL       1.94 (L)     Total Cholesterol  Latest Ref Range: 0 - 200 mg/dL   168         HDL Cholesterol Latest Ref Range: 40 - 60 mg/dL   44         LDL Cholesterol  Latest Ref Range: 0 - 100 mg/dL   104 (H)         VLDL Cholesterol Latest Ref Range: 5 - 40 mg/dL   20         Triglycerides Latest Ref Range: 0 - 150 mg/dL   110         Vitamin B-12 Latest Ref Range: 211 - 946 pg/mL         1,204 (H)   25 Hydroxy, Vitamin D Latest Ref Range: 30.0 - 100.0 ng/ml         39.9        Name Relationship Specialty Phone Fax Address Order              [facility] [phone] [address], [address]  [facility] [id number]     Next Steps: Follow up      Instructions: You will be receiving home PT and OT. They will call to schedule in home visits.      Ricki Matta MD   Physical Medicine and Rehabilitation [phone] [address]  [facility] [place] [address]     Next Steps: Follow up on 3/15/2021      Instructions: 2:20 pm  New office location - will see at [address], [address]      Xenia Robert MD  PCP - General Family Medicine [phone] [address]  [facility] [address]     Next Steps: Go on 3/2/2021      Instructions: You are scheduled to see PCP on 3/2 at 10:45 a.m.       lab work-CMP and lipid panel and thyroid panel when sees Dr Pedroza on March 2,2021           Next Steps: Follow up      Cuco Murphy MD   Nephrology [phone] [address]  [facility] [place] [address]     Next Steps: Follow up      Instructions: F/U appointment on April 13th. 11:40A.M.               Discharge Medications      ASK your doctor about these medications      Instructions Start Date   amLODIPine 10 MG tablet  Commonly known as: Norvasc   10 mg, Oral, Daily      aspirin 81 MG chewable tablet   81 mg, Oral, Daily      betamethasone valerate 0.1 % cream  Commonly known as: VALISONE   Topical      bimatoprost 0.01 % ophthalmic  drops  Commonly known as: LUMIGAN   1 drop, Nightly      Clobetasol Propionate Emulsion 0.05 % topical foam   1 application, Topical, 2 Times Daily      clotrimazole 1 % cream  Commonly known as: LOTRIMIN   Topical, 2 Times Daily      dorzolamide-timolol 22.3-6.8 MG/ML ophthalmic solution  Commonly known as: COSOPT   1 drop, 2 Times Daily      indapamide 1.25 MG tablet  Commonly known as: LOZOL   No dose, route, or frequency recorded.      levothyroxine 137 MCG tablet  Commonly known as: SYNTHROID, LEVOTHROID   137 mcg, Oral, Daily      metoprolol succinate XL 25 MG 24 hr tablet  Commonly known as: Toprol XL   25 mg, Oral, Daily      metroNIDAZOLE 0.75 % cream  Commonly known as: METROCREAM   1 application, Topical, 2 Times Daily PRN      multivitamin with minerals tablet tablet   1 tablet, Oral, Daily      nystatin [id number] UNIT/GM powder  Generic drug: nystatin   Topical, 4 Times Daily      triamcinolone 0.1 % cream  Commonly known as: KENALOG   Topical      VITAMIN B-12 PO   Oral      VITAMIN D PO   Oral           HOME HEALTH PT, OT, NURSING TO MONITOR BP.    Plavix 75 mg daily   Aspirin 81 mg (on 81 mg daily prior to arrival) x30 days from Jan 30, 2021 then discontinue after dose on Feb 28, 2021    Lab work - CMP and lipid panel and thyroid panel first week of March 2021, can get when sees Dr Pedroza on March 2, 2021    Medication refills per Primary Care      >30 on discharge    Ricki Matta MD

## 2021-02-17 NOTE — PROGRESS NOTES
Inpatient Rehabilitation Plan of Care Note    Plan of Care  Care Plan Reviewed - No updates at this time.    Body Systems    [RN] Integumentary(Active)  Current Status(02/17/2021): Pt has red area to right buttock (psoriasis) cream  ordered; slight redness breast folds except L lateral red/excoriated; powder  ordered  Weekly Goal(02/23/2021): No s/s of infection  Discharge Goal: No s/s of infection        Pain    [RN] Pain Management(Active)  Current Status(02/17/2021): Chronic pain to left knee r/t arthritis. 2/6 pt  states both legs tender/sore.  Weekly Goal(02/24/2021): Pt able to tolerate therapies  Discharge Goal: Pain under control        Psychosocial    [RN] Coping/Adjustment(Active)  Current Status(02/17/2021): Pt expresses effective coping  Weekly Goal(02/24/2021): Allow pt to express concerns  Discharge Goal: Demonstrates healthy coping strategies        Safety    [RN] Potential for Injury(Active)  Current Status(02/17/2021): No unsafe behaviors  Weekly Goal(02/23/2021): Pt will use call bell 100% of the time  Discharge Goal: No falls        Sphincter Control    [RN] Bladder Management(Active)  Current Status(02/17/2021): Pt is continent 100%  Weekly Goal(02/24/2021): Pt will be continent 100%  Discharge Goal: Pt will be continent 100%    [RN] Bowel Management(Active)  Current Status(02/17/2021): Pt is continent 100% except 1 incont. bm with  urgency, loose/liquid/ some formed bm on 2/8.  Weekly Goal(02/23/2021): Pt will be continent 100%  Discharge Goal: Pt will remain continent 100%    Signed by: Kimmie Yeh RN

## 2021-02-17 NOTE — PROGRESS NOTES
"   LOS: 14 days   Patient Care Team:  Xenia Robert MD as PCP - General (Family Medicine)    Chief Complaint:   Status post CVA-small focus of restricted diffusion in the right parietal lobe  Impaired cognition/impaired mobility/impaired self-care  Stroke prophylaxis-aspirin and Plavix x30 days then Plavix alone.  Left knee degenerative changes-status post steroid injection February 3  Chronic kidney disease stage IV baseline creatinine around 2.0  Acquired hypothyroidism-recheck TFTs 1 March-on levothyroxine  Morbid obesity  Vitamin D deficiency  Hypertension-metoprolol/amlodipine-systolic blood pressure goal 150-160  Hyperlipidemia-atorvastatin       Subjective     History of Present Illness    Subjective    Ready for home today.  No new issues.    History taken from: patient    Objective     Vital Signs  Temp:  [97 °F (36.1 °C)-98.4 °F (36.9 °C)] 98.4 °F (36.9 °C)  Heart Rate:  [59-64] 59  Resp:  [16-18] 18  BP: (124-152)/(62-72) 152/72    Objective:  Vital signs: (most recent): Blood pressure 152/72, pulse 59, temperature 98.4 °F (36.9 °C), temperature source Oral, resp. rate 18, height 154.9 cm (61\"), SpO2 98 %.            Physical Exam  MENTAL STATUS -  AWAKE / ALERT  HEENT- NCAT, PUPILS EQUALLY ROUND, SCLERAE ANICTERIC,   LUNGS - CTA, NO WHEEZES, RALES OR RHONCHI  HEART- RRR, NO RUB, MURMUR, OR GALLOP  ABD - NORMOACTIVE BOWEL SOUNDS, SOFT, NT.    EXT - NO EDEMA OR CYANOSIS  NEURO -oriented x4.     MOTOR EXAM - RUE/RLE 5/5.  Takes resistance with left elbow flexion, finger flexion, knee extension, ADF  Results Review:     I reviewed the patient's new clinical results.  Results from last 7 days   Lab Units 02/15/21  0655 02/12/21  0613   WBC 10*3/mm3 6.84 6.67   HEMOGLOBIN g/dL 11.7* 11.9*   HEMATOCRIT % 36.6 35.9   PLATELETS 10*3/mm3 203 234     Results from last 7 days   Lab Units 02/17/21  0640 02/15/21  0655 02/12/21  0613   SODIUM mmol/L 142 143 141   POTASSIUM mmol/L 4.7 4.8 4.8   CHLORIDE mmol/L " 111* 111* 112*   CO2 mmol/L 25.4 23.2 23.4   BUN mg/dL 25* 26* 29*   CREATININE mg/dL 1.63* 1.58* 1.64*   CALCIUM mg/dL 8.6 9.2 9.1   GLUCOSE mg/dL 86 81 80         Ref. Range 9/15/2020 11:00 1/29/2021 10:35 1/30/2021 06:29 1/31/2021 07:10 2/5/2021 07:11   Hemoglobin A1C Latest Ref Range: 4.80 - 5.60 %  5.07      TSH Baseline Latest Ref Range: 0.270 - 4.200 uIU/mL 5.160 (H) 16.100 (H)  9.150 (H)    Free T4 Latest Ref Range: 0.93 - 1.70 ng/dL 1.39  1.14     T3, Free Latest Ref Range: 2.00 - 4.40 pg/mL    1.94 (L)    Total Cholesterol Latest Ref Range: 0 - 200 mg/dL  168      HDL Cholesterol Latest Ref Range: 40 - 60 mg/dL  44      LDL Cholesterol  Latest Ref Range: 0 - 100 mg/dL  104 (H)      VLDL Cholesterol Latest Ref Range: 5 - 40 mg/dL  20      Triglycerides Latest Ref Range: 0 - 150 mg/dL  110      Vitamin B-12 Latest Ref Range: 211 - 946 pg/mL     1,204 (H)   25 Hydroxy, Vitamin D Latest Ref Range: 30.0 - 100.0 ng/ml     39.9     Medication Review: done  Scheduled Meds:amLODIPine, 7.5 mg, Oral, Q24H  aspirin, 81 mg, Oral, Daily  atorvastatin, 80 mg, Oral, Nightly  betamethasone dipropionate, , Topical, Q24H  cholecalciferol, 1,000 Units, Oral, BID  clopidogrel, 75 mg, Oral, Daily  dorzolamide-timolol, , Both Eyes, BID  latanoprost, 1 drop, Both Eyes, Nightly  levothyroxine, 150 mcg, Oral, Daily  metoprolol succinate XL, 12.5 mg, Oral, Daily  nystatin, , Topical, Q12H  vitamin B-12, 250 mcg, Oral, Daily      Continuous Infusions:   PRN Meds:.      Assessment/Plan       Stroke (cerebrum) (CMS/[facility])    Arthritis    Stage 4 chronic kidney disease (CMS/[facility])    Essential hypertension    Acquired hypothyroidism      Assessment & Plan  Status post CVA-small focus of restricted diffusion in the right parietal lobe  Adjunctive medication for stroke recovery-on atorvastatin.  Recheck vitamin D level.  Check vitamin B-12 level.     Impaired cognition/impaired mobility/impaired self-care     Stroke prophylaxis-aspirin and  Plavix x30 days then Plavix alone.     Left knee degenerative changes-status post steroid injection February 3     Chronic kidney disease stage IV baseline creatinine around 2.0     Acquired hypothyroidism-recheck TFTs 1 March-on levothyroxine     Morbid obesity     Vitamin D deficiency-no home dose listed-recheck vitamin D level     Hypertension-metoprolol/amlodipine-systolic blood pressure goal 150-160  February 12-Her blood pressure has increased over the last day, late yesterday blood pressure was 191/84 and then today 183/97.  She is on amlodipine 2.5 and metoprolol 12.5 mg daily.  Patient reviewed with internal medicine service and recommended increase amlodipine.  Will give additional amlodipine 2.5 mg this afternoon increased dose to 5 mg daily  February 15-blood pressure pattern improved.  To titrate up further on amlodipine to 7.5 mg daily     Hyperlipidemia-atorvastatin    Hypersensitivity to touch bilateral lower extremities-May possibly be neuropathy.  B12 within normal limits.  She has had hypothyroidism.  Has chronic kidney disease.    TEAM CONF - FEB 9 -  TRANSFERS CTG ROLLATOR. GAIT 160 FEET CTG ROLLATOR. TOILET TRANSFERS MIN. SHOWER TRANSFERS MIN. BATH UB SBA AND LB MIN. LBD MOD. UBD SBA. GROOMING SBA. MODERATE DEFICITS IN EXECUTIVE FUNCTION. PATIENT REQUESTS DISCHARGE FROM SLP AS SHE FEELS SHE IS AT BASELINE. CONTINENT BOWEL AND BLADDER. LEFT KNEE DJD, S/P INJECTION LAST WEEK, PAIN IMPROVED.   ELOS -   WED.  February 17     TEAM CONF - FEB 17 -   TRANSFERS SBA. GAIT  FEET SUP. TOILET AND SHOWER TRANSFERS SBA. BATH SBA. LBD SBA MIN ASSIST WITH ADAPTIVE EQUIPEMENT. UBD SET UP. GROOMING SBA MOD INDEPENDENT. CONTINENT BOWEL AND BLADDER.   ELOS-    TODAY. HOME HEALTH PT, OT, NURSING TO MONITOR BP.   Now admit for comprehensive acute inpatient rehabilitation .  This would be an interdisciplinary program with physical therapy 1 hour,  occupational therapy 1 hour, and speech therapy 1 hour, 5 days a  week.  Rehabilitation nursing for carryover, monitoring of nutritional and blood pressure and neurologic   status, bowel and bladder, and skin  Ongoing physician follow-up.  Weekly team conferences.  Goals are to achieve a level of supervision with  mobility and self-care and improved balance.   Rehabilitation prognosis fair.  Medical prognosis fair.  Estimated length of stay is approximately 2 to 3 weeks, but is only an estimation.   The patient's functional status and clinical status is unchanged from preadmission assessment and the patient continues appropriate for acute inpatient rehabilitation.  Goal is for home with home health   therapies.  Barrier to discharge: Impaired mobility- work on transfers ADLs to overcome.      Ricki Matta MD  02/17/21  15:56 EST    Time:   During rounds, used appropriate personal protective equipment including mask and gloves.  Additional gown if indicated.  Mask used was standard procedure mask. Appropriate PPE was worn during the entire visit.  Hand hygiene was completed before and after.

## 2021-02-17 NOTE — PROGRESS NOTES
SECTION GG    Eating Performance Discharge: Patient completed the activities by him/herself  with no assistance from a helper.    Signed by: Corinne Sandhu RN

## 2021-02-18 ENCOUNTER — TRANSITIONAL CARE MANAGEMENT TELEPHONE ENCOUNTER (OUTPATIENT)
Dept: CALL CENTER | Facility: HOSPITAL | Age: 86
End: 2021-02-18

## 2021-02-18 ENCOUNTER — READMISSION MANAGEMENT (OUTPATIENT)
Dept: CALL CENTER | Facility: HOSPITAL | Age: 86
End: 2021-02-18

## 2021-02-18 RX ORDER — METOPROLOL SUCCINATE 25 MG/1
12.5 TABLET, EXTENDED RELEASE ORAL DAILY
Qty: 45 TABLET | Refills: 0 | Status: SHIPPED | OUTPATIENT
Start: 2021-02-18 | End: 2021-03-02 | Stop reason: SDUPTHER

## 2021-02-18 NOTE — PAYOR COMM NOTE
"Good morning!     [facility] # [id number]     The patient listed below was discharged home yesterday, 2/17, with plans for home health therapies. If you have any questions please call.      Thank you!     Tone Bernal RN  p   f     Mk Maya (89 y.o. Female)     Date of Birth Social Security Number Address Home Phone MRN    07/14/1925  [id number] [place] [id number] [phone] [id number]    Confucianism Marital Status          Church        Admission Date Admission Type Admitting Provider Attending Provider Department, Room/Bed    2/3/21 Elective Ricki Matta MD  [facility], 4411/1    Discharge Date Discharge Disposition Discharge Destination        2[place]17/20[place] [facility] [place]              Attending Provider: (none)   Allergies: Azithromycin, Cefdinir, Doxycycline Monohydrate, Allopurinol    Isolation: None   Infection: None   Code Status: Prior    Ht: 154.9 cm (61\")   Wt: 88.2 kg (194 lb 7.1 oz)    Admission Cmt: None   Principal Problem: Stroke (cerebrum) (CMS/[facility]) [I63.9]                 Active Insurance as of 2/3/2021     Primary Coverage     Payor Plan Insurance Group Employer/Plan Group    HUMANA MEDICARE REPLACEMENT HUMANA MEDICARE REPLACEMENT [id number]     Payor Plan Address Payor Plan Phone Number Payor Plan Fax Number Effective Dates    [address] [address] [phone]  1/1/2018 - None Entered    [facility] [id number]       Subscriber Name Subscriber Birth Date Member ID       MK MAYA 7/14/1925 [id number]                 Emergency Contacts      (Rel.) Home Phone Work Phone Mobile Phone    Jessica Osei (Power of ) [phone] -- --              "

## 2021-02-18 NOTE — OUTREACH NOTE
Prep Survey      Responses   [facility] patient discharged from?  [place]   Is LACE score < 7 ?  No   Emergency Room discharge w/ pulse ox?  No   Eligibility  [facility]   Date of Admission  02/03/21   Date of Discharge  02/17/21   Discharge Disposition  Home-Health Care Sv   Discharge diagnosis  CVA   Does the patient have one of the following disease processes/diagnoses(primary or secondary)?  Stroke (TIA)   Does the patient have Home health ordered?  Yes   What is the Home health agency?   [facility]    Is there a DME ordered?  No   General alerts for this patient  return to independent  living apartment at [facility]   Prep survey completed?  Yes          Rosa Knowles RN

## 2021-02-18 NOTE — PROGRESS NOTES
PPS CMG Coordinator  Inpatient Rehabilitation Discharge    Mode of Locomotion: Wheelchair.    Discharge Against Medical Advice:  No.  Discharge Information  Patient Discharged Alive:  Yes  Discharge Destination/Living Setting: Home with Home Health Services  Diagnosis for Interruption/Death: ICD    Impairment Group: Stroke: 01.1 Left Body Involvement (Right Brain)    Comorbidities: ICD    Complications: ICD    QUALITY INDICATORS  Section J Health Conditions: Fall(s) Since Admission:  No    Section M. Skin Conditions Discharge:  Unhealed Pressure Ulcer(s) at Stage 1 or  Higher:  No    . Current Number of Unhealed Pressure Ulcers  Branch    Section N. Medication:  Medication Intervention: N/A - There were no potential clinically significant  medication issues identified since admission or patient is not taking any  medications.    Signed by: Tone Bernal RN

## 2021-02-18 NOTE — OUTREACH NOTE
Call Center TCM Note      Responses   [facility] patient discharged from?  [place]   Does the patient have one of the following disease processes/diagnoses(primary or secondary)?  Stroke (TIA)   TCM attempt successful?  Yes   Call start time  1734   Call end time  1736   General alerts for this patient  return to independent  living apartment at [facility]   Discharge diagnosis  CVA   Does the patient have all medications ordered at discharge?  Yes   Is the patient taking all medications as directed (includes completed medication regime)?  Yes   Does the patient have a primary care provider?   Yes   Does the patient have an appointment with their PCP within 7 days of discharge?  Greater than 7 days   Comments regarding PCP  f/u with Dr. Robert on 3/2/21   Nursing Interventions  Verified appointment date/time/provider   Has the patient kept scheduled appointments due by today?  N/A   What is the Home health agency?   [facility]    Has home health visited the patient within 72 hours of discharge?  Yes   Psychosocial issues?  No   Does the patient require any assistance with activities of daily living such as eating, bathing, dressing, walking, etc.?  Yes   Did the patient receive a copy of their discharge instructions?  Yes   What is the patient's perception of their health status since discharge?  Improving   Nursing interventions  Nurse provided patient education   Is the patient able to teach back FAST for Stroke?  Yes   Is the patient/caregiver able to teach back signs and symptoms related to disease process for when to call 911?  Yes   Is the patient/caregiver able to teach back the hierarchy of who to call/visit for symptoms/problems? PCP, Specialist, Home health nurse, Urgent Care, ED, 911  Yes   TCM call completed?  Yes   Wrap up additional comments  States she is doing well, she is having caregivers around the clock, no questions or concerns at this time.          Sara OROZCO  DEVON Quinonez    2/18/2021, 17:36 EST

## 2021-02-22 ENCOUNTER — TELEPHONE (OUTPATIENT)
Dept: FAMILY MEDICINE CLINIC | Facility: CLINIC | Age: 86
End: 2021-02-22

## 2021-02-22 NOTE — TELEPHONE ENCOUNTER
Provider: Xenia Robert MD  Caller: KURT  Relationship to Patient: [facility]  Pharmacy:  Phone Number: [phone]  Reason for Call: KURT CALLED TO ADVISE THAT [facility] WILL BE  SEEING PATIENT 2 X WEEK FOR 2 WEEKS  AND 1 X WEEK FOR 7 WEEKS.    MARCIAL

## 2021-03-02 ENCOUNTER — READMISSION MANAGEMENT (OUTPATIENT)
Dept: CALL CENTER | Facility: HOSPITAL | Age: 86
End: 2021-03-02

## 2021-03-02 ENCOUNTER — OFFICE VISIT (OUTPATIENT)
Dept: FAMILY MEDICINE CLINIC | Facility: CLINIC | Age: 86
End: 2021-03-02

## 2021-03-02 VITALS
WEIGHT: 187.2 LBS | DIASTOLIC BLOOD PRESSURE: 78 MMHG | SYSTOLIC BLOOD PRESSURE: 132 MMHG | TEMPERATURE: 97.7 F | BODY MASS INDEX: 35.34 KG/M2 | HEIGHT: 61 IN

## 2021-03-02 DIAGNOSIS — I63.9 CEREBROVASCULAR ACCIDENT (CVA), UNSPECIFIED MECHANISM (HCC): Primary | ICD-10-CM

## 2021-03-02 DIAGNOSIS — E53.8 VITAMIN B12 DEFICIENCY: ICD-10-CM

## 2021-03-02 DIAGNOSIS — E55.9 VITAMIN D DEFICIENCY: ICD-10-CM

## 2021-03-02 DIAGNOSIS — E03.9 ACQUIRED HYPOTHYROIDISM: ICD-10-CM

## 2021-03-02 DIAGNOSIS — I10 ESSENTIAL HYPERTENSION: ICD-10-CM

## 2021-03-02 DIAGNOSIS — N18.4 STAGE 4 CHRONIC KIDNEY DISEASE (HCC): ICD-10-CM

## 2021-03-02 PROBLEM — I16.0 HYPERTENSIVE URGENCY: Status: RESOLVED | Noted: 2021-01-29 | Resolved: 2021-03-02

## 2021-03-02 PROCEDURE — 99214 OFFICE O/P EST MOD 30 MIN: CPT | Performed by: FAMILY MEDICINE

## 2021-03-02 RX ORDER — CLOPIDOGREL BISULFATE 75 MG/1
75 TABLET ORAL DAILY
Qty: 90 TABLET | Refills: 1 | Status: SHIPPED | OUTPATIENT
Start: 2021-03-02 | End: 2022-02-23 | Stop reason: SDUPTHER

## 2021-03-02 RX ORDER — AMLODIPINE BESYLATE 2.5 MG/1
7.5 TABLET ORAL
Qty: 270 TABLET | Refills: 1 | Status: SHIPPED | OUTPATIENT
Start: 2021-03-02 | End: 2021-12-17 | Stop reason: DRUGHIGH

## 2021-03-02 RX ORDER — MELATONIN
1000 2 TIMES DAILY
Qty: 180 TABLET | Refills: 1 | Status: SHIPPED | OUTPATIENT
Start: 2021-03-02 | End: 2022-03-07

## 2021-03-02 RX ORDER — LEVOTHYROXINE SODIUM 0.15 MG/1
150 TABLET ORAL DAILY
Qty: 90 TABLET | Refills: 1 | Status: SHIPPED | OUTPATIENT
Start: 2021-03-02 | End: 2021-03-05 | Stop reason: SDUPTHER

## 2021-03-02 RX ORDER — ATORVASTATIN CALCIUM 80 MG/1
80 TABLET, FILM COATED ORAL NIGHTLY
Qty: 90 TABLET | Refills: 1 | Status: SHIPPED | OUTPATIENT
Start: 2021-03-02 | End: 2021-12-17 | Stop reason: SINTOL

## 2021-03-02 RX ORDER — METOPROLOL SUCCINATE 25 MG/1
12.5 TABLET, EXTENDED RELEASE ORAL DAILY
Qty: 45 TABLET | Refills: 1 | Status: SHIPPED | OUTPATIENT
Start: 2021-03-02 | End: 2021-06-01

## 2021-03-02 NOTE — OUTREACH NOTE
Stroke Week 2 Survey      Responses   [facility] patient discharged from?  [place]   Does the patient have one of the following disease processes/diagnoses(primary or secondary)?  Stroke (TIA)   Week 2 attempt successful?  Yes   Call start time  1239   Call end time  1244   General alerts for this patient  return to independent  living apartment at [facility]   Discharge diagnosis  CVA   Person spoke with today (if not patient) and relationship  Jessica-LUIS FELIPE    Meds reviewed with patient/caregiver?  Yes   Is the patient having any side effects they believe may be caused by any medication additions or changes?  No   Prescription comments  Pt finished aspirin on 2/28/21   Is the patient taking all medications as directed (includes completed medication regime)?  Yes   Comments regarding PCP  f/u with Dr. Robert on 3/2/21   Has the patient kept scheduled appointments due by today?  Yes   What is the Home health agency?   [facility]    Does the patient require any assistance with activities of daily living such as eating, bathing, dressing, walking, etc.?  No   Does the patient have any residual symptoms from stroke/TIA?  No   Residual symptoms comments  left leg weakness   Nursing interventions  Reviewed instructions with patient   What is the patient's perception of their health status since discharge?  Improving [BP's are WNL ]   Nursing interventions  Nurse provided patient education   Is the patient able to teach back FAST for Stroke?  Yes   Is the patient/caregiver able to teach back the risk factors for a stroke?  High blood pressure-goal below 120/80, Smoking   If the patient is a current smoker, are they able to teach back resources for cessation?  Not a smoker   Week 2 call completed?  Yes          Gladys Pimentel RN

## 2021-03-02 NOTE — PROGRESS NOTES
Subjective   Marianne Delgado is a 89 y.o. female.     Chief Complaint   Patient presents with   • Hospital Follow Up Visit       History of Present Illness   Patient had some difficulty standing and went to the ER and was admitted for stroke.  Her thyroid medicine was slightly adjusted.  He did have uncontrolled hypertension and that medication was adjusted as well.  Patient has been out of the hospital for about a month now.  Did finish treatment in subacute rehab.  Patient is still having some fatigue and has not gained back the weight that she lost in the hospital.  Patient is still having some weakness in the left leg but is doing outpatient physical therapy.  Blood pressures are much better controlled. Was also placed on vid d and b12.     The following portions of the patient's history were reviewed and updated as appropriate: allergies, current medications, past family history, past medical history, past social history, past surgical history and problem list.    Past Medical History:   Diagnosis Date   • Acute sinusitis    • Acute upper respiratory infection    • Arthritis    • CKD (chronic kidney disease)    • Debility    • Fatigue    • Foot pain, bilateral    • Glaucoma    • Gout    • Hematuria    • High blood pressure    • Hypertension    • Hypothyroid 09/1999   • Hypothyroidism    • IBS (irritable bowel syndrome)    • IGT (impaired glucose tolerance)    • Malaise and fatigue    • Medication management    • Melanoma (CMS/HCC) 1979    left leg   • OA (osteoarthritis)    • Osteopenia 09/1999   • Painful joint    • Psoriasis    • Renal failure    • Rosacea    • Vitamin D deficiency        Past Surgical History:   Procedure Laterality Date   • CATARACT EXTRACTION     • FOOT SURGERY      mauri toe surgery   • OTHER SURGICAL HISTORY      Melanoma Excision: Left leg       Family History   Problem Relation Age of Onset   • Heart attack Mother    • Kidney failure Father    • Heart disease Sister    • Leukemia  "Brother    • Heart disease Sister    • Heart disease Sister    • Heart disease Other         FH in females b/f 65 and males b/f 55       Social History     Socioeconomic History   • Marital status:      Spouse name: Not on file   • Number of children: Not on file   • Years of education: Not on file   • Highest education level: Not on file   Tobacco Use   • Smoking status: Never Smoker   • Smokeless tobacco: Never Used   Substance and Sexual Activity   • Alcohol use: Yes     Alcohol/week: 2.0 standard drinks     Types: 1 Glasses of wine, 1 Shots of liquor per week     Comment: occasionally   • Drug use: No       Review of Systems   Constitutional: Negative for fever.   Respiratory: Negative for shortness of breath.        Objective   Visit Vitals  /78 (BP Location: Right arm, Patient Position: Sitting)   Temp 97.7 °F (36.5 °C)   Ht 154.9 cm (60.98\")   Wt 84.9 kg (187 lb 3.2 oz)   BMI 35.39 kg/m²     Body mass index is 35.39 kg/m².  Physical Exam  Constitutional:       Appearance: Normal appearance. She is well-developed.   Cardiovascular:      Rate and Rhythm: Normal rate and regular rhythm.      Heart sounds: Normal heart sounds.   Pulmonary:      Effort: Pulmonary effort is normal.      Breath sounds: Normal breath sounds.   Musculoskeletal: Normal range of motion.         General: No swelling.   Skin:     General: Skin is warm and dry.      Findings: No rash.   Neurological:      General: No focal deficit present.      Mental Status: She is alert and oriented to person, place, and time.   Psychiatric:         Mood and Affect: Mood normal.         Behavior: Behavior normal.           Assessment/Plan   Diagnoses and all orders for this visit:    1. Cerebrovascular accident (CVA), unspecified mechanism (CMS/HCC) (Primary)  -     Comprehensive Metabolic Panel  -     atorvastatin (LIPITOR) 80 MG tablet; Take 1 tablet by mouth Every Night.  Dispense: 90 tablet; Refill: 1  -     clopidogrel (PLAVIX) 75 MG " tablet; Take 1 tablet by mouth Daily.  Dispense: 90 tablet; Refill: 1    2. Essential hypertension  -     Comprehensive Metabolic Panel  -     Lipid Panel  -     amLODIPine (NORVASC) 2.5 MG tablet; Take 3 tablets by mouth Daily.  Dispense: 270 tablet; Refill: 1  -     metoprolol succinate XL (Toprol XL) 25 MG 24 hr tablet; Take 0.5 tablets by mouth Daily.  Dispense: 45 tablet; Refill: 1    3. Acquired hypothyroidism  -     TSH Rfx On Abnormal To Free T4  -     levothyroxine (SYNTHROID, LEVOTHROID) 150 MCG tablet; Take 1 tablet by mouth Daily.  Dispense: 90 tablet; Refill: 1    4. Vitamin D deficiency  -     Vitamin D 25 Hydroxy  -     cholecalciferol (VITAMIN D3) 25 MCG (1000 UT) tablet; Take 1 tablet by mouth 2 (Two) Times a Day.  Dispense: 180 tablet; Refill: 1    5. Stage 4 chronic kidney disease (CMS/HCC)    6. Vitamin B12 deficiency  -     Vitamin B12  -     cyanocobalamin (VITAMIN B-12) 250 MCG tablet; Take 1 tablet by mouth Daily.  Dispense: 90 tablet; Refill: 1    Discussed ensure and f/u in 1 month for weight check. Cont meds. Has neurology and renal follow up.

## 2021-03-03 LAB
25(OH)D3+25(OH)D2 SERPL-MCNC: 49.4 NG/ML (ref 30–100)
ALBUMIN SERPL-MCNC: 3.8 G/DL (ref 3.5–5.2)
ALBUMIN/GLOB SERPL: 1.7 G/DL
ALP SERPL-CCNC: 151 U/L (ref 39–117)
ALT SERPL-CCNC: 11 U/L (ref 1–33)
AST SERPL-CCNC: 14 U/L (ref 1–32)
BILIRUB SERPL-MCNC: 0.6 MG/DL (ref 0–1.2)
BUN SERPL-MCNC: 21 MG/DL (ref 8–23)
BUN/CREAT SERPL: 11.1 (ref 7–25)
CALCIUM SERPL-MCNC: 9.7 MG/DL (ref 8.2–9.6)
CHLORIDE SERPL-SCNC: 109 MMOL/L (ref 98–107)
CHOLEST SERPL-MCNC: 100 MG/DL (ref 0–200)
CO2 SERPL-SCNC: 23.9 MMOL/L (ref 22–29)
CREAT SERPL-MCNC: 1.9 MG/DL (ref 0.57–1)
GLOBULIN SER CALC-MCNC: 2.3 GM/DL
GLUCOSE SERPL-MCNC: 98 MG/DL (ref 65–99)
HDLC SERPL-MCNC: 48 MG/DL (ref 40–60)
LDLC SERPL CALC-MCNC: 37 MG/DL (ref 0–100)
POTASSIUM SERPL-SCNC: 4.6 MMOL/L (ref 3.5–5.2)
PROT SERPL-MCNC: 6.1 G/DL (ref 6–8.5)
SODIUM SERPL-SCNC: 144 MMOL/L (ref 136–145)
T4 FREE SERPL-MCNC: 2.81 NG/DL (ref 0.93–1.7)
TRIGL SERPL-MCNC: 70 MG/DL (ref 0–150)
TSH SERPL DL<=0.005 MIU/L-ACNC: 0.04 UIU/ML (ref 0.27–4.2)
VIT B12 SERPL-MCNC: 1091 PG/ML (ref 211–946)
VLDLC SERPL CALC-MCNC: 15 MG/DL (ref 5–40)

## 2021-03-05 ENCOUNTER — TELEPHONE (OUTPATIENT)
Dept: FAMILY MEDICINE CLINIC | Facility: CLINIC | Age: 86
End: 2021-03-05

## 2021-03-05 DIAGNOSIS — E03.9 ACQUIRED HYPOTHYROIDISM: ICD-10-CM

## 2021-03-05 DIAGNOSIS — E83.52 HYPERCALCEMIA: Primary | ICD-10-CM

## 2021-03-05 RX ORDER — LEVOTHYROXINE SODIUM 137 UG/1
137 TABLET ORAL DAILY
Qty: 90 TABLET | Refills: 1 | Status: SHIPPED | OUTPATIENT
Start: 2021-03-05 | End: 2021-04-02 | Stop reason: SDUPTHER

## 2021-03-05 NOTE — TELEPHONE ENCOUNTER
----- Message from Xenia Robert MD sent at 3/5/2021  1:44 PM EST -----  Your kidney function is slightly worse.  If you are taking any anti-inflammatory medications like ibuprofen please stop it.  Your calcium is a little bit too high.  Please make a lab only appointment next week to recheck this.  They adjusted your thyroid medicine in the hospital but I think they over adjusted it.  Please decrease your medicine from 150 mcg a day to 137 mcg a day and follow-up in 2 to 3 months and we can recheck this.  This may also help you gain weight.

## 2021-03-08 ENCOUNTER — TELEPHONE (OUTPATIENT)
Dept: FAMILY MEDICINE CLINIC | Facility: CLINIC | Age: 86
End: 2021-03-08

## 2021-03-08 NOTE — TELEPHONE ENCOUNTER
PATIENT IS ASKING IF SHE CAN GET A CALL ABOUT HER LABWORK TODAY. SHE NEEDS TO KNOW BECAUSE IT HAS TO DO  WITH A CHANGE IN HER MEDICATION.    PLEASE ADVISE  [phone]

## 2021-03-10 ENCOUNTER — READMISSION MANAGEMENT (OUTPATIENT)
Dept: CALL CENTER | Facility: HOSPITAL | Age: 86
End: 2021-03-10

## 2021-03-10 NOTE — OUTREACH NOTE
Stroke Week 3 Survey      Responses   [facility]   Does the patient have one of the following disease processes/diagnoses(primary or secondary)?  Stroke (TIA)   Week 3 attempt successful?  Yes   Call start time  1011   Call end time  1035   General alerts for this patient  return to independent  living apartment at [facility]   Discharge diagnosis  CVA   Is patient permission given to speak with other caregiver?  Yes   List who call center can speak with  LUIS FELIPE Lopez   Person spoke with today (if not patient) and relationship  patient   Meds reviewed with patient/caregiver?  Yes   Is the patient having any side effects they believe may be caused by any medication additions or changes?  No   Does the patient have all medications ordered at discharge?  Yes   Is the patient taking all medications as directed (includes completed medication regime)?  Yes   Does the patient have a primary care provider?   Yes   Comments regarding PCP  PCP Dr Robert. Patient has seen since discharge. Patient would like a home physician service and has contacted [facility], with no return call.    Has the patient kept scheduled appointments due by today?  Yes   Notified Case Management  PCP needed [Patient would like information on any other home PCP services besides [facility]. ]   Comments  Follow up with Ricki Matta MD Mar 15, 2:20 pm   What is the Home health agency?   [facility]    Has home health visited the patient within 72 hours of discharge?  Yes   Psychosocial issues?  No   Does the patient require any assistance with activities of daily living such as eating, bathing, dressing, walking, etc.?  No   Does the patient have any residual symptoms from stroke/TIA?  Yes   Residual symptoms comments  Patient reports general weakness.    Does the patient understand the diet ordered at discharge?  Yes   Comments  Keeping record of  home blood pressure.    Did the patient receive a copy of their discharge instructions?  Yes   Nursing interventions  Reviewed instructions with patient   What is the patient's perception of their health status since discharge?  Improving   Nursing interventions  Nurse provided patient education   Is the patient able to teach back FAST for Stroke?  Yes   Is the patient/caregiver able to teach back the risk factors for a stroke?  High blood pressure-goal below 120/80, High Cholesterol   Is the patient/caregiver able to teach back signs and symptoms related to disease process for when to call PCP?  Yes   Is the patient/caregiver able to teach back signs and symptoms related to disease process for when to call 911?  Yes   If the patient is a current smoker, are they able to teach back resources for cessation?  Not a smoker   Is the patient/caregiver able to teach back the hierarchy of who to call/visit for symptoms/problems? PCP, Specialist, Home health nurse, Urgent Care, ED, 911  Yes   Week 3 call completed?  Yes          Katya Rivas RN

## 2021-03-12 DIAGNOSIS — E83.52 HYPERCALCEMIA: Primary | ICD-10-CM

## 2021-03-17 ENCOUNTER — READMISSION MANAGEMENT (OUTPATIENT)
Dept: CALL CENTER | Facility: HOSPITAL | Age: 86
End: 2021-03-17

## 2021-03-17 ENCOUNTER — TELEPHONE (OUTPATIENT)
Dept: NEUROLOGY | Facility: CLINIC | Age: 86
End: 2021-03-17

## 2021-03-17 ENCOUNTER — TELEPHONE (OUTPATIENT)
Dept: FAMILY MEDICINE CLINIC | Facility: CLINIC | Age: 86
End: 2021-03-17

## 2021-03-17 NOTE — TELEPHONE ENCOUNTER
So it is actually an ear nose and throat doctor you need to talk to instead of an endocrinologist.  We have already put that referral in and are working on it.  We will call you with the appointment.

## 2021-03-17 NOTE — OUTREACH NOTE
Stroke Week 4 Survey      Responses   [facility] patient discharged from?  [place]   Does the patient have one of the following disease processes/diagnoses(primary or secondary)?  Stroke (TIA)   Week 4 attempt successful?  Yes   Call start time  1101   Call end time  1109   Medication alerts for this patient  has gotten meds   Meds reviewed with patient/caregiver?  Yes   Is the patient taking all medications as directed (includes completed medication regime)?  Yes   Has the patient kept scheduled appointments due by today?  Yes   Comments  has several appointments coming up and will be seeing an encirongolist for thyroid meds   Is the patient still receiving Home Health Services?  N/A   Does the patient require any assistance with activities of daily living such as eating, bathing, dressing, walking, etc.?  Yes   Does the patient have any residual symptoms from stroke/TIA?  Yes   Does the patient understand the diet ordered at discharge?  Yes [patient is not eating well and feels tired sleeping a lot]   What is the patient's perception of their health status since discharge?  New symptoms unrelated to diagnosis [appetite is poor  and sleeping  alot according to care giver, patient is in the room]   Is the patient able to teach back FAST for Stroke?  Yes [care giver aware]   Is the patient/caregiver able to teach back the risk factors for a stroke?  -- [patient did not feel like speaking today]   Week 4 Call Completed?  Yes   Would the patient like one additional call?  Yes   Wrap up additional comments  patient stated did not feel like talking today, caregiver stated patient is sleeping a lot, appetite is poor, patient is in the room willl schedule another  phone call          Vidhya Monroe RN

## 2021-03-17 NOTE — TELEPHONE ENCOUNTER
Jessica her caretaker received a call the other day but says it was from [facility] and not [facility].  After talking with her visiting nurse, they thought it best she see a specialist instead of ENT doctor.   That is why Marianne has been contacting Endocrinologist to see about an appointment.

## 2021-03-17 NOTE — TELEPHONE ENCOUNTER
Pt is wanting to see a Endocrinologist concerning her parathyroid gland per Dr. Robert recommendation.  She contacted Dr. Katya De Jesus yesterday but was told they only take new patients with a recommendation from the doctor - meaning Dr. Xenia Robert.  Is there any possibility that Dr. Robert can send this recommendation to Dr. De Jesus so that we can get an appointment with her.  She called a different Endocrinologist but cannot be seen until the middle of May.  She would like to get in sooner to hopefully start feeling better soon.

## 2021-03-17 NOTE — TELEPHONE ENCOUNTER
I appreciate everything her caretaker does for her but I do not think an endocrinologist can do anything for her that an ear nose and throat doctor cannot.  The ear nose and throat doctor is the specialist to go to for parathyroid issues because not only can they diagnose and treat they can also perform surgery if it is needed.  Over the years I have sent patients to both endocrinology and ENT and patients have been much more satisfied with the care they get from the ear nose and throat doctor for this issue.  The endocrinologist can diagnose this but cannot really treat it.  If you insist I would be willing to get you into an endocrinologist as well as the ear nose and throat doctor.  I can also tell you it takes months to get into an endocrinologist office.  It is much easier to get into an ear nose and throat doctor's office.

## 2021-03-19 ENCOUNTER — TELEPHONE (OUTPATIENT)
Dept: FAMILY MEDICINE CLINIC | Facility: CLINIC | Age: 86
End: 2021-03-19

## 2021-03-19 NOTE — TELEPHONE ENCOUNTER
PATIENT CALLED AND WANTS TO SPEAK WITH RACQUEL ABOUT GOING TO A ENT SPECIALIST. SHE NEEDS INFORMATION ABOUT WHO, WHERE AND PHONE NUMBER.     PLEASE CALL AND [facility] [phone]

## 2021-03-24 ENCOUNTER — READMISSION MANAGEMENT (OUTPATIENT)
Dept: CALL CENTER | Facility: HOSPITAL | Age: 86
End: 2021-03-24

## 2021-03-24 NOTE — OUTREACH NOTE
Stroke Week 5 Survey      Responses   [facility] patient discharged from?  [place]   Does the patient have one of the following disease processes/diagnoses(primary or secondary)?  Stroke (TIA)   Week 5 attempt successful?  Yes   Call start time  1636   Call end time  1638   General alerts for this patient  return to independent  living apartment at [facility]   Discharge diagnosis  CVA   Meds reviewed with patient/caregiver?  Yes   Is the patient having any side effects they believe may be caused by any medication additions or changes?  No   Is the patient taking all medications as directed (includes completed medication regime)?  Yes   Has the patient kept scheduled appointments due by today?  Yes   Is the patient still receiving Home Health Services?  N/A   Psychosocial issues?  No   Does the patient require any assistance with activities of daily living such as eating, bathing, dressing, walking, etc.?  Yes   Does the patient have any residual symptoms from stroke/TIA?  Yes   Does the patient understand the diet ordered at discharge?  Yes   What is the patient's perception of their health status since discharge?  Improving   Nursing interventions  Nurse provided patient education   Is the patient able to teach back FAST for Stroke?  Yes   Is the patient/caregiver able to teach back the risk factors for a stroke?  High blood pressure-goal below 120/80   Is the patient/caregiver able to teach back signs and symptoms related to disease process for when to call PCP?  Yes   Is the patient/caregiver able to teach back signs and symptoms related to disease process for when to call 911?  Yes   If the patient is a current smoker, are they able to teach back resources for cessation?  Not a smoker   Is the patient/caregiver able to teach back the hierarchy of who to call/visit for symptoms/problems? PCP, Specialist, Home health nurse, Urgent Care, ED, 911  Yes   Graduated  Yes   Is the patient interested  in additional calls from an ambulatory ?  NOTE:  applies to high risk patients requiring additional follow-up.  No   Did the patient feel the follow up calls were helpful during their recovery period?  Yes   Was the number of calls appropriate?  Yes          Marleny Flanagan RN

## 2021-03-26 ENCOUNTER — TELEPHONE (OUTPATIENT)
Dept: FAMILY MEDICINE CLINIC | Facility: CLINIC | Age: 86
End: 2021-03-26

## 2021-03-29 DIAGNOSIS — R74.8 ELEVATED ALKALINE PHOSPHATASE LEVEL: ICD-10-CM

## 2021-03-29 DIAGNOSIS — E83.52 HYPERCALCEMIA: Primary | ICD-10-CM

## 2021-03-31 ENCOUNTER — OFFICE VISIT (OUTPATIENT)
Dept: FAMILY MEDICINE CLINIC | Facility: CLINIC | Age: 86
End: 2021-03-31

## 2021-03-31 VITALS
BODY MASS INDEX: 34.75 KG/M2 | OXYGEN SATURATION: 97 % | SYSTOLIC BLOOD PRESSURE: 110 MMHG | HEART RATE: 66 BPM | TEMPERATURE: 98.2 F | DIASTOLIC BLOOD PRESSURE: 80 MMHG | WEIGHT: 183.8 LBS

## 2021-03-31 DIAGNOSIS — I10 ESSENTIAL HYPERTENSION: Primary | ICD-10-CM

## 2021-03-31 DIAGNOSIS — N18.4 STAGE 4 CHRONIC KIDNEY DISEASE (HCC): ICD-10-CM

## 2021-03-31 DIAGNOSIS — E03.9 ACQUIRED HYPOTHYROIDISM: ICD-10-CM

## 2021-03-31 PROBLEM — H40.1133 PRIMARY OPEN-ANGLE GLAUCOMA, BILATERAL, SEVERE STAGE: Status: ACTIVE | Noted: 2021-03-18

## 2021-03-31 PROCEDURE — 99214 OFFICE O/P EST MOD 30 MIN: CPT | Performed by: FAMILY MEDICINE

## 2021-03-31 NOTE — PROGRESS NOTES
Subjective   Marianne Delgado is a 89 y.o. female.     Chief Complaint   Patient presents with   • Hypertension       History of Present Illness   Patient is here for a 1 month follow-up and weight check.  She lost weight in the hospital and we wanted to make sure that she has either gained it back or held her weight steady.  Her blood pressure has been well controlled on the current medication as this was adjusted in the hospital.  At last labs a month ago her kidney function was slightly worse and she was getting too much thyroid medication.  We decreased this.  Not yet due for a lab recheck.  Was also diagnosed with hyperparathyroidism.  Patient declined ENT referral so we are working on getting her into an endocrinologist which was her preference. She reports that she has seen ent and they got an u/s and discussed further scans and pt and caregiver declines. He advised that he did not think she would survive surgery and advised to do nothing at this time. She is having weakness and fatigue. Has lost another 4 pounds. Pt is not eating as she has no appetite. Only drinking 1/2 a protein drink a day and also one shake with fruit once a day. Maybe a piece of toast as well. Is not drinking much water. She is not taking statin so that cannot be causing symptoms. Discussed risks with pt. History from pt and caregiver.     The following portions of the patient's history were reviewed and updated as appropriate: allergies, current medications, past family history, past medical history, past social history, past surgical history and problem list.    Past Medical History:   Diagnosis Date   • Acute sinusitis    • Acute upper respiratory infection    • Arthritis    • CKD (chronic kidney disease)    • Debility    • Fatigue    • Foot pain, bilateral    • Glaucoma    • Gout    • Hematuria    • High blood pressure    • Hypertension    • Hypothyroid 09/1999   • Hypothyroidism    • IBS (irritable bowel syndrome)    • IGT  (impaired glucose tolerance)    • Malaise and fatigue    • Medication management    • Melanoma (CMS/HCC) 1979    left leg   • OA (osteoarthritis)    • Osteopenia 09/1999   • Painful joint    • Psoriasis    • Renal failure    • Rosacea    • Vitamin D deficiency        Past Surgical History:   Procedure Laterality Date   • CATARACT EXTRACTION     • FOOT SURGERY      mauri toe surgery   • OTHER SURGICAL HISTORY      Melanoma Excision: Left leg       Family History   Problem Relation Age of Onset   • Heart attack Mother    • Kidney failure Father    • Heart disease Sister    • Leukemia Brother    • Heart disease Sister    • Heart disease Sister    • Heart disease Other         FH in females b/f 65 and males b/f 55       Social History     Socioeconomic History   • Marital status:      Spouse name: Not on file   • Number of children: Not on file   • Years of education: Not on file   • Highest education level: Not on file   Tobacco Use   • Smoking status: Never Smoker   • Smokeless tobacco: Never Used   Substance and Sexual Activity   • Alcohol use: Yes     Alcohol/week: 2.0 standard drinks     Types: 1 Glasses of wine, 1 Shots of liquor per week     Comment: occasionally   • Drug use: No       Review of Systems   Constitutional: Negative for fever.   Respiratory: Negative for shortness of breath.        Objective   Visit Vitals  /80 (BP Location: Right arm, Patient Position: Sitting)   Pulse 66   Temp 98.2 °F (36.8 °C)   Wt 83.4 kg (183 lb 12.8 oz)   SpO2 97%   BMI 34.75 kg/m²     Body mass index is 34.75 kg/m².  Physical Exam  Constitutional:       Appearance: Normal appearance. She is well-developed.   Cardiovascular:      Rate and Rhythm: Normal rate and regular rhythm.      Heart sounds: Normal heart sounds.   Pulmonary:      Effort: Pulmonary effort is normal.      Breath sounds: Normal breath sounds.   Musculoskeletal:         General: No swelling. Normal range of motion.      Comments: Gait slow with  walker   Skin:     General: Skin is warm and dry.      Findings: No rash.   Neurological:      General: No focal deficit present.      Mental Status: She is alert and oriented to person, place, and time.   Psychiatric:         Mood and Affect: Mood normal.         Behavior: Behavior normal.           Assessment/Plan   Diagnoses and all orders for this visit:    1. Essential hypertension (Primary)  -     Comprehensive Metabolic Panel  -     TSH Rfx On Abnormal To Free T4    2. Acquired hypothyroidism  -     Comprehensive Metabolic Panel  -     TSH Rfx On Abnormal To Free T4    3. Stage 4 chronic kidney disease (CMS/HCC)  -     Comprehensive Metabolic Panel  -     TSH Rfx On Abnormal To Free T4        F/U for weight check and tsh in 1 month. F/U with endocrine. Drink fluids and increase po intake and get some exercise.

## 2021-04-01 LAB
ALBUMIN SERPL-MCNC: 3.8 G/DL (ref 3.5–5.2)
ALBUMIN/GLOB SERPL: 1.9 G/DL
ALP SERPL-CCNC: 120 U/L (ref 39–117)
ALT SERPL-CCNC: 12 U/L (ref 1–33)
AST SERPL-CCNC: 11 U/L (ref 1–32)
BILIRUB SERPL-MCNC: 0.5 MG/DL (ref 0–1.2)
BUN SERPL-MCNC: 28 MG/DL (ref 8–23)
BUN/CREAT SERPL: 15.8 (ref 7–25)
CALCIUM SERPL-MCNC: 9.9 MG/DL (ref 8.2–9.6)
CHLORIDE SERPL-SCNC: 112 MMOL/L (ref 98–107)
CO2 SERPL-SCNC: 22 MMOL/L (ref 22–29)
CREAT SERPL-MCNC: 1.77 MG/DL (ref 0.57–1)
GLOBULIN SER CALC-MCNC: 2 GM/DL
GLUCOSE SERPL-MCNC: 106 MG/DL (ref 65–99)
POTASSIUM SERPL-SCNC: 4.6 MMOL/L (ref 3.5–5.2)
PROT SERPL-MCNC: 5.8 G/DL (ref 6–8.5)
SODIUM SERPL-SCNC: 145 MMOL/L (ref 136–145)
T4 FREE SERPL-MCNC: 3.15 NG/DL (ref 0.93–1.7)
TSH SERPL DL<=0.005 MIU/L-ACNC: 0.01 UIU/ML (ref 0.27–4.2)

## 2021-04-02 DIAGNOSIS — E03.9 ACQUIRED HYPOTHYROIDISM: ICD-10-CM

## 2021-04-02 RX ORDER — LEVOTHYROXINE SODIUM 0.12 MG/1
137 TABLET ORAL DAILY
Qty: 90 TABLET | Refills: 1 | Status: SHIPPED | OUTPATIENT
Start: 2021-04-02 | End: 2021-06-01

## 2021-04-08 ENCOUNTER — TELEPHONE (OUTPATIENT)
Dept: FAMILY MEDICINE CLINIC | Facility: CLINIC | Age: 86
End: 2021-04-08

## 2021-04-08 NOTE — TELEPHONE ENCOUNTER
Caller: NEHEMIAH    Relationship: [facility]    Best call back number: [phone]    What is the best time to reach you: ANY TIME    Who are you requesting to speak with (clinical staff, provider,  specific staff member): DR DA SILVA OR THE MA    Do you know the name of the person who called: NEHEMIAH    What was the call regarding: PATIENT BEING NON COMPLAINT WITH CARE.  HOME HEALTH STATED PATIENT DOES NOT WANT HOME HEALTH TO STOP BUT PATIENT REFUSES TO COMPLY WITH THEIR  REQUESTS.  PATIENT IS ONLY DRINKING A FRUIT SMOOTHIE AND IS NOT GETTING ANY PROTEIN OR ANY THING SUBSTANTIAL.  NEHEMIAH STATED THAT INSURANCE WILL NOT CONTINUE TO PAY UNLESS THEIR IS A MEDICAL NEED BUT THE PATIENT IS REFUSING ALL HELP WHEN HOME HEALTH IS THERE.    Do you require a callback: YES.

## 2021-04-09 NOTE — TELEPHONE ENCOUNTER
I guess call them back and ask them what they want us to do about this?  I already discussed this with the patient and she said she would be compliant.  I do not really have anything else I can do.  I suggest perhaps that they call her daughter and try to get her to coerce her into participating.

## 2021-04-15 ENCOUNTER — TELEPHONE (OUTPATIENT)
Dept: FAMILY MEDICINE CLINIC | Facility: CLINIC | Age: 86
End: 2021-04-15

## 2021-04-15 NOTE — TELEPHONE ENCOUNTER
Caller: THIERRY     Relationship to patient: NIYAH    Best call back number:[phone]    Patient is needing: WOULD LIKE FOR PATIENT TO HAVE ADDITIONAL OT FOR  5 WEEKS.

## 2021-04-27 ENCOUNTER — OFFICE VISIT (OUTPATIENT)
Dept: FAMILY MEDICINE CLINIC | Facility: CLINIC | Age: 86
End: 2021-04-27

## 2021-04-27 VITALS
DIASTOLIC BLOOD PRESSURE: 62 MMHG | HEIGHT: 61 IN | TEMPERATURE: 98 F | BODY MASS INDEX: 33.61 KG/M2 | WEIGHT: 178 LBS | SYSTOLIC BLOOD PRESSURE: 132 MMHG | HEART RATE: 68 BPM | OXYGEN SATURATION: 98 %

## 2021-04-27 DIAGNOSIS — R79.9 ABNORMAL FINDING OF BLOOD CHEMISTRY, UNSPECIFIED: ICD-10-CM

## 2021-04-27 DIAGNOSIS — E21.3 HYPERPARATHYROIDISM (HCC): Primary | ICD-10-CM

## 2021-04-27 DIAGNOSIS — R63.4 RECENT UNINTENTIONAL WEIGHT LOSS OVER SEVERAL MONTHS: ICD-10-CM

## 2021-04-27 PROCEDURE — 99214 OFFICE O/P EST MOD 30 MIN: CPT | Performed by: FAMILY MEDICINE

## 2021-04-27 PROCEDURE — 71046 X-RAY EXAM CHEST 2 VIEWS: CPT | Performed by: FAMILY MEDICINE

## 2021-04-27 RX ORDER — MIRTAZAPINE 15 MG/1
15 TABLET, FILM COATED ORAL NIGHTLY
Qty: 90 TABLET | Refills: 1 | Status: SHIPPED | OUTPATIENT
Start: 2021-04-27 | End: 2021-04-27

## 2021-04-27 RX ORDER — MEGESTROL ACETATE 40 MG/1
40 TABLET ORAL DAILY
COMMUNITY
End: 2021-04-27

## 2021-04-27 RX ORDER — CLOBETASOL PROPIONATE 0.5 MG/G
OINTMENT TOPICAL 2 TIMES DAILY PRN
Qty: 60 G | Refills: 11 | Status: SHIPPED | OUTPATIENT
Start: 2021-04-27 | End: 2023-03-19 | Stop reason: HOSPADM

## 2021-04-27 RX ORDER — LEVOTHYROXINE SODIUM 112 UG/1
112 TABLET ORAL DAILY
COMMUNITY
End: 2021-06-01

## 2021-04-27 NOTE — PROGRESS NOTES
Subjective   Marianne Delgado is a 89 y.o. female.     Chief Complaint   Patient presents with   • Follow-up       History of Present Illness   Patient is here for a 1 month follow-up and weight check.  She lost weight in the hospital and we want to make sure that she has either gained it back or held her weight steady.  Has seen endocrinology and ENT you for hyperparathyroidism and they have elected no further testing or treatment.  Patient continues to have weakness and fatigue.  Has no appetite.  Has been working on protein drinks.  We have discussed possibility of an occult malignancy in the past and they have not chosen to pursue that.  I have brought this up again today.  Patient is having no bowel issues other than some chronic constipation that is controlled on otc meds.  No blood in the stool.  Has found no lumps or bumps on herself.  No other specific symptoms. She was recently found to be getting too much thyroid medicine and her endocrinologist has adjusted her dose. She eats oatmeal with milk in the am. Cottage cheese and fruit for lunch. Jello for dinner. Endocrine put her on megace. Is in PT already and is in OT as well. Has a few more visits.     The following portions of the patient's history were reviewed and updated as appropriate: allergies, current medications, past family history, past medical history, past social history, past surgical history and problem list.    Past Medical History:   Diagnosis Date   • Acute sinusitis    • Acute upper respiratory infection    • Arthritis    • CKD (chronic kidney disease)    • Debility    • Fatigue    • Foot pain, bilateral    • Glaucoma    • Gout    • Hematuria    • High blood pressure    • Hypertension    • Hypothyroid 09/1999   • Hypothyroidism    • IBS (irritable bowel syndrome)    • IGT (impaired glucose tolerance)    • Malaise and fatigue    • Medication management    • Melanoma (CMS/HCC) 1979    left leg   • OA (osteoarthritis)    • Osteopenia  "09/1999   • Painful joint    • Psoriasis    • Renal failure    • Rosacea    • Vitamin D deficiency        Past Surgical History:   Procedure Laterality Date   • CATARACT EXTRACTION     • FOOT SURGERY      mauri toe surgery   • OTHER SURGICAL HISTORY      Melanoma Excision: Left leg       Family History   Problem Relation Age of Onset   • Heart attack Mother    • Kidney failure Father    • Heart disease Sister    • Leukemia Brother    • Heart disease Sister    • Heart disease Sister    • Heart disease Other         FH in females b/f 65 and males b/f 55       Social History     Socioeconomic History   • Marital status:      Spouse name: Not on file   • Number of children: Not on file   • Years of education: Not on file   • Highest education level: Not on file   Tobacco Use   • Smoking status: Never Smoker   • Smokeless tobacco: Never Used   Substance and Sexual Activity   • Alcohol use: Yes     Alcohol/week: 2.0 standard drinks     Types: 1 Glasses of wine, 1 Shots of liquor per week     Comment: occasionally   • Drug use: No       Review of Systems   Constitutional: Negative for fever.   Respiratory: Negative for shortness of breath.        Objective   Visit Vitals  /62 (BP Location: Right arm, Patient Position: Sitting)   Pulse 68   Temp 98 °F (36.7 °C)   Ht 154.9 cm (60.98\")   Wt 80.7 kg (178 lb)   SpO2 98%   BMI 33.65 kg/m²     Body mass index is 33.65 kg/m².  Physical Exam  Constitutional:       Appearance: Normal appearance. She is well-developed.   Cardiovascular:      Rate and Rhythm: Normal rate and regular rhythm.      Heart sounds: Normal heart sounds.   Pulmonary:      Effort: Pulmonary effort is normal.      Breath sounds: Normal breath sounds.   Musculoskeletal:         General: No swelling. Normal range of motion.      Comments: In wheelchair   Skin:     General: Skin is warm and dry.      Findings: No rash.   Neurological:      General: No focal deficit present.      Mental Status: She is " alert and oriented to person, place, and time.   Psychiatric:         Mood and Affect: Mood normal.         Behavior: Behavior normal.           Assessment/Plan   Diagnoses and all orders for this visit:    1. Hyperparathyroidism (CMS/HCC) (Primary)    2. Recent unintentional weight loss over several months  -     CBC & Differential  -     Comprehensive Metabolic Panel  -     Hemoglobin A1c  -     Urinalysis With Microscopic - Urine, Clean Catch  -     Sedimentation Rate  -     C-reactive Protein  -     XR Chest 2 View    3. Abnormal finding of blood chemistry, unspecified   -     Hemoglobin A1c    Other orders  -     Cancel: TSH Rfx On Abnormal To Free T4  -     Discontinue: mirtazapine (Remeron) 15 MG tablet; Take 1 tablet by mouth Every Night.  Dispense: 90 tablet; Refill: 1  -     clobetasol (TEMOVATE) 0.05 % ointment; Apply  topically to the appropriate area as directed 2 (Two) Times a Day As Needed (rash).  Dispense: 60 g; Refill: 11        Will not try remeron as it can worsen glaucoma. Cont megace and discussed diet. F/U with endo for continued thyroid adjustment. F/U in 1 month for weight check.  Her thyroid could be contributing to her continued weight loss.  Will evaluate for possible malignancy.  Patient and daughter do want this evaluation although the patient says that even if they did have cancer they would not want any treatment including chemo, radiation, or surgery.  They were okay with just getting the labs and the chest x-ray today and follow-up in 1 month.  She did have a CAT scan 2 to 3 months ago that showed some atelectasis and scarring within the left lower lobe of the lung.    Chest x-ray-comparison with a chest x-ray done on 1-.  It appears she continues to have some atelectasis.  No specific infiltrate or mass is seen.  Will await radiology read.

## 2021-04-28 LAB
ALBUMIN SERPL-MCNC: 3.8 G/DL (ref 3.5–5.2)
ALBUMIN/GLOB SERPL: 1.9 G/DL
ALP SERPL-CCNC: 103 U/L (ref 39–117)
ALT SERPL-CCNC: 7 U/L (ref 1–33)
APPEARANCE UR: CLEAR
AST SERPL-CCNC: 10 U/L (ref 1–32)
BACTERIA #/AREA URNS HPF: ABNORMAL /HPF
BASOPHILS # BLD AUTO: 0.05 10*3/MM3 (ref 0–0.2)
BASOPHILS NFR BLD AUTO: 0.7 % (ref 0–1.5)
BILIRUB SERPL-MCNC: 0.4 MG/DL (ref 0–1.2)
BILIRUB UR QL STRIP: NEGATIVE
BUN SERPL-MCNC: 36 MG/DL (ref 8–23)
BUN/CREAT SERPL: 18 (ref 7–25)
CALCIUM SERPL-MCNC: 10.7 MG/DL (ref 8.2–9.6)
CASTS URNS MICRO: ABNORMAL
CHLORIDE SERPL-SCNC: 108 MMOL/L (ref 98–107)
CO2 SERPL-SCNC: 22.4 MMOL/L (ref 22–29)
COLOR UR: YELLOW
CREAT SERPL-MCNC: 2 MG/DL (ref 0.57–1)
CRP SERPL-MCNC: 0.3 MG/DL (ref 0–0.5)
EOSINOPHIL # BLD AUTO: 0.18 10*3/MM3 (ref 0–0.4)
EOSINOPHIL NFR BLD AUTO: 2.4 % (ref 0.3–6.2)
EPI CELLS #/AREA URNS HPF: ABNORMAL /HPF
ERYTHROCYTE [DISTWIDTH] IN BLOOD BY AUTOMATED COUNT: 12 % (ref 12.3–15.4)
ERYTHROCYTE [SEDIMENTATION RATE] IN BLOOD BY WESTERGREN METHOD: 19 MM/HR (ref 0–30)
GLOBULIN SER CALC-MCNC: 2 GM/DL
GLUCOSE SERPL-MCNC: 98 MG/DL (ref 65–99)
GLUCOSE UR QL: NEGATIVE
HBA1C MFR BLD: 4.9 % (ref 4.8–5.6)
HCT VFR BLD AUTO: 42.6 % (ref 34–46.6)
HGB BLD-MCNC: 13.3 G/DL (ref 12–15.9)
HGB UR QL STRIP: NEGATIVE
IMM GRANULOCYTES # BLD AUTO: 0.02 10*3/MM3 (ref 0–0.05)
IMM GRANULOCYTES NFR BLD AUTO: 0.3 % (ref 0–0.5)
KETONES UR QL STRIP: NEGATIVE
LEUKOCYTE ESTERASE UR QL STRIP: ABNORMAL
LYMPHOCYTES # BLD AUTO: 1.72 10*3/MM3 (ref 0.7–3.1)
LYMPHOCYTES NFR BLD AUTO: 22.5 % (ref 19.6–45.3)
MCH RBC QN AUTO: 28 PG (ref 26.6–33)
MCHC RBC AUTO-ENTMCNC: 31.2 G/DL (ref 31.5–35.7)
MCV RBC AUTO: 89.7 FL (ref 79–97)
MONOCYTES # BLD AUTO: 0.78 10*3/MM3 (ref 0.1–0.9)
MONOCYTES NFR BLD AUTO: 10.2 % (ref 5–12)
NEUTROPHILS # BLD AUTO: 4.9 10*3/MM3 (ref 1.7–7)
NEUTROPHILS NFR BLD AUTO: 63.9 % (ref 42.7–76)
NITRITE UR QL STRIP: NEGATIVE
NRBC BLD AUTO-RTO: 0 /100 WBC (ref 0–0.2)
PH UR STRIP: 5.5 [PH] (ref 5–8)
PLATELET # BLD AUTO: 299 10*3/MM3 (ref 140–450)
POTASSIUM SERPL-SCNC: 4.8 MMOL/L (ref 3.5–5.2)
PROT SERPL-MCNC: 5.8 G/DL (ref 6–8.5)
PROT UR QL STRIP: NEGATIVE
RBC # BLD AUTO: 4.75 10*6/MM3 (ref 3.77–5.28)
RBC #/AREA URNS HPF: ABNORMAL /HPF
SODIUM SERPL-SCNC: 141 MMOL/L (ref 136–145)
SP GR UR: 1.02 (ref 1–1.03)
UROBILINOGEN UR STRIP-MCNC: ABNORMAL MG/DL
WBC # BLD AUTO: 7.65 10*3/MM3 (ref 3.4–10.8)
WBC #/AREA URNS HPF: ABNORMAL /HPF

## 2021-05-17 ENCOUNTER — TELEPHONE (OUTPATIENT)
Dept: FAMILY MEDICINE CLINIC | Facility: CLINIC | Age: 86
End: 2021-05-17

## 2021-05-17 DIAGNOSIS — R63.4 RECENT UNINTENTIONAL WEIGHT LOSS OVER SEVERAL MONTHS: Primary | ICD-10-CM

## 2021-05-17 RX ORDER — AMOXICILLIN AND CLAVULANATE POTASSIUM 500; 125 MG/1; MG/1
1 TABLET, FILM COATED ORAL 2 TIMES DAILY
Qty: 20 TABLET | Refills: 0 | Status: SHIPPED | OUTPATIENT
Start: 2021-05-17 | End: 2021-06-01

## 2021-05-17 NOTE — TELEPHONE ENCOUNTER
It looks like there are 2 different patient calls put into the same note.  You can call Suellen with [facility] back and give her a verbal okay for extending the PT services.  You can call the patient's caregiver back and tell her to try rest and fluids.  I do not normally call in antibiotics over the phone like this but I will go ahead and do at this time.  Follow-up if worse or no better.

## 2021-05-17 NOTE — TELEPHONE ENCOUNTER
Caregiver called asking if she could send in something for the pt. She has clear drainage and cough going on since Fri evening.

## 2021-05-17 NOTE — TELEPHONE ENCOUNTER
Also Suellen w/ [facility] called and ask for an extended of PT services 1xwk/4wks.  Suellen can be reached at [phone]

## 2021-06-01 ENCOUNTER — OFFICE VISIT (OUTPATIENT)
Dept: FAMILY MEDICINE CLINIC | Facility: CLINIC | Age: 86
End: 2021-06-01

## 2021-06-01 VITALS
HEART RATE: 60 BPM | DIASTOLIC BLOOD PRESSURE: 80 MMHG | SYSTOLIC BLOOD PRESSURE: 132 MMHG | OXYGEN SATURATION: 96 % | TEMPERATURE: 97.7 F | WEIGHT: 178 LBS | HEIGHT: 61 IN | BODY MASS INDEX: 33.61 KG/M2

## 2021-06-01 DIAGNOSIS — R63.4 WEIGHT LOSS: ICD-10-CM

## 2021-06-01 DIAGNOSIS — N18.4 STAGE 4 CHRONIC KIDNEY DISEASE (HCC): ICD-10-CM

## 2021-06-01 DIAGNOSIS — E21.3 HYPERPARATHYROIDISM (HCC): Primary | ICD-10-CM

## 2021-06-01 DIAGNOSIS — E03.9 ACQUIRED HYPOTHYROIDISM: ICD-10-CM

## 2021-06-01 DIAGNOSIS — I10 ESSENTIAL HYPERTENSION: ICD-10-CM

## 2021-06-01 PROCEDURE — 99214 OFFICE O/P EST MOD 30 MIN: CPT | Performed by: FAMILY MEDICINE

## 2021-06-01 RX ORDER — LEVOTHYROXINE SODIUM 88 MCG
TABLET ORAL
COMMUNITY
Start: 2021-05-13 | End: 2021-12-17

## 2021-06-01 NOTE — PROGRESS NOTES
Subjective   Marianne Delgado is a 89 y.o. female.     Chief Complaint   Patient presents with   • Follow-up       History of Present Illness   Is here for 1 month follow-up and weight check.  Patient continues to have weakness and fatigue.  She gets no exercise and mostly sits in a chair.  Has been working on drinking protein drinks in addition to a meal.  Patient is really not eating much.  Thyroid medicine was adjusted at her endocrinologist about a month ago as she was getting too much thyroid medicine.  This could have affected her weight.  She was put on Megace by endocrinology.  We got a chest x-ray which was okay 1 month ago as well as labs for initial work-up for weight loss.  Everything was okay except for a creatinine of 2 and she does follow with the kidney doctor.  She does have continued hyperparathyroidism but she is not a surgical candidate.  Has seen ENT.  Has seeing Endo for this.  Patient is still getting PT.  Patient has been able to maintain her weight from last month of this month.  No further weight loss. Pt does not want further cancer eval and would not do anything about it if it was found. Pt and daughter report no issues previously with her heart. Is on BB for bp. Her bp have been running 110/60 mostly.     The following portions of the patient's history were reviewed and updated as appropriate: allergies, current medications, past family history, past medical history, past social history, past surgical history and problem list.    Past Medical History:   Diagnosis Date   • Acute sinusitis    • Acute upper respiratory infection    • Arthritis    • CKD (chronic kidney disease)    • Debility    • Fatigue    • Foot pain, bilateral    • Glaucoma    • Gout    • Hematuria    • High blood pressure    • Hypertension    • Hypothyroid 09/1999   • Hypothyroidism    • IBS (irritable bowel syndrome)    • IGT (impaired glucose tolerance)    • Malaise and fatigue    • Medication management    •  "Melanoma (CMS/[facility]) 1979    left leg   • OA (osteoarthritis)    • Osteopenia 09/1999   • Painful joint    • Psoriasis    • Renal failure    • Rosacea    • Vitamin D deficiency        Past Surgical History:   Procedure Laterality Date   • CATARACT EXTRACTION     • FOOT SURGERY      mauri toe surgery   • OTHER SURGICAL HISTORY      Melanoma Excision: Left leg       Family History   Problem Relation Age of Onset   • Heart attack Mother    • Kidney failure Father    • Heart disease Sister    • Leukemia Brother    • Heart disease Sister    • Heart disease Sister    • Heart disease Other         FH in females b/f 65 and males b/f 55       Social History     Socioeconomic History   • Marital status:      Spouse name: Not on file   • Number of children: Not on file   • Years of education: Not on file   • Highest education level: Not on file   Tobacco Use   • Smoking status: Never Smoker   • Smokeless tobacco: Never Used   Substance and Sexual Activity   • Alcohol use: Yes     Alcohol/week: 2.0 standard drinks     Types: 1 Glasses of wine, 1 Shots of liquor per week     Comment: occasionally   • Drug use: No       Review of Systems   Constitutional: Negative for fever.   Respiratory: Negative for shortness of breath.        Objective   Visit Vitals  /80 (BP Location: Left arm, Patient Position: Sitting)   Pulse 60   Temp 97.7 °F (36.5 °C)   Ht 154.9 cm (60.98\")   Wt 80.7 kg (178 lb)   SpO2 96%   BMI 33.65 kg/m²     Body mass index is 33.65 kg/m².  Physical Exam  Constitutional:       Appearance: Normal appearance. She is well-developed.   Cardiovascular:      Rate and Rhythm: Normal rate and regular rhythm.      Heart sounds: Normal heart sounds.   Pulmonary:      Effort: Pulmonary effort is normal.      Breath sounds: Normal breath sounds.   Musculoskeletal:         General: No swelling. Normal range of motion.   Skin:     General: Skin is warm and dry.      Findings: No rash.   Neurological:      General: No " focal deficit present.      Mental Status: She is alert and oriented to person, place, and time.   Psychiatric:         Mood and Affect: Mood normal.         Behavior: Behavior normal.           Assessment/Plan   Diagnoses and all orders for this visit:    1. Hyperparathyroidism (CMS/HCC) (Primary)    2. Stage 4 chronic kidney disease (CMS/HCC)    3. Weight loss    4. Acquired hypothyroidism    5. Essential hypertension          Changing thyroid meds and adding in megace has really helped. Stop BB and f/u in 1 month. Will discuss her high calcium with renal. Encouraged pt to exercise.

## 2021-12-16 RX ORDER — METOPROLOL SUCCINATE 25 MG/1
TABLET, EXTENDED RELEASE ORAL
COMMUNITY
Start: 2021-02-18 | End: 2021-12-17

## 2021-12-16 RX ORDER — UBIDECARENONE 100 MG
CAPSULE ORAL
COMMUNITY
End: 2021-12-17

## 2021-12-16 RX ORDER — BETAMETHASONE VALERATE 1.2 MG/G
AEROSOL, FOAM TOPICAL
Status: ON HOLD | COMMUNITY
Start: 2021-01-18

## 2021-12-17 ENCOUNTER — OFFICE VISIT (OUTPATIENT)
Dept: FAMILY MEDICINE CLINIC | Facility: CLINIC | Age: 86
End: 2021-12-17

## 2021-12-17 VITALS
TEMPERATURE: 97.3 F | BODY MASS INDEX: 34.17 KG/M2 | HEIGHT: 61 IN | DIASTOLIC BLOOD PRESSURE: 65 MMHG | HEART RATE: 77 BPM | SYSTOLIC BLOOD PRESSURE: 153 MMHG | WEIGHT: 181 LBS | OXYGEN SATURATION: 98 %

## 2021-12-17 DIAGNOSIS — E53.8 VITAMIN B12 DEFICIENCY: ICD-10-CM

## 2021-12-17 DIAGNOSIS — E03.9 ACQUIRED HYPOTHYROIDISM: ICD-10-CM

## 2021-12-17 DIAGNOSIS — R53.83 FATIGUE, UNSPECIFIED TYPE: Primary | ICD-10-CM

## 2021-12-17 DIAGNOSIS — R73.9 HYPERGLYCEMIA: ICD-10-CM

## 2021-12-17 DIAGNOSIS — I10 ESSENTIAL HYPERTENSION: ICD-10-CM

## 2021-12-17 DIAGNOSIS — E55.9 VITAMIN D DEFICIENCY: ICD-10-CM

## 2021-12-17 PROCEDURE — 99214 OFFICE O/P EST MOD 30 MIN: CPT | Performed by: FAMILY MEDICINE

## 2021-12-17 RX ORDER — LEVOTHYROXINE SODIUM 0.07 MG/1
75 TABLET ORAL DAILY
COMMUNITY
End: 2021-12-22 | Stop reason: SDUPTHER

## 2021-12-17 RX ORDER — ESCITALOPRAM OXALATE 5 MG/1
5 TABLET ORAL AS NEEDED
COMMUNITY
Start: 2021-11-06 | End: 2021-12-17

## 2021-12-17 RX ORDER — CYANOCOBALAMIN 1000 UG/ML
INJECTION, SOLUTION INTRAMUSCULAR; SUBCUTANEOUS
COMMUNITY
Start: 2021-12-14 | End: 2022-02-01

## 2021-12-17 RX ORDER — MEGESTROL ACETATE 40 MG/ML
SUSPENSION ORAL DAILY PRN
COMMUNITY
End: 2021-12-17

## 2021-12-17 RX ORDER — AMLODIPINE BESYLATE 5 MG/1
5 TABLET ORAL DAILY
COMMUNITY
Start: 2021-10-16 | End: 2023-03-19 | Stop reason: HOSPADM

## 2021-12-17 RX ORDER — CLOTRIMAZOLE 1 %
CREAM (GRAM) TOPICAL
COMMUNITY
Start: 2021-09-23 | End: 2021-12-17

## 2021-12-18 LAB
25(OH)D3+25(OH)D2 SERPL-MCNC: 49.7 NG/ML (ref 30–100)
ALBUMIN SERPL-MCNC: 4.1 G/DL (ref 3.5–4.6)
ALBUMIN/GLOB SERPL: 1.5 {RATIO} (ref 1.2–2.2)
ALP SERPL-CCNC: 102 IU/L (ref 44–121)
ALT SERPL-CCNC: 11 IU/L (ref 0–32)
AST SERPL-CCNC: 16 IU/L (ref 0–40)
BASOPHILS # BLD AUTO: 0.1 X10E3/UL (ref 0–0.2)
BASOPHILS NFR BLD AUTO: 1 %
BILIRUB SERPL-MCNC: 0.2 MG/DL (ref 0–1.2)
BUN SERPL-MCNC: 35 MG/DL (ref 10–36)
BUN/CREAT SERPL: 14 (ref 12–28)
CALCIUM SERPL-MCNC: 9.8 MG/DL (ref 8.7–10.3)
CHLORIDE SERPL-SCNC: 105 MMOL/L (ref 96–106)
CO2 SERPL-SCNC: 21 MMOL/L (ref 20–29)
CREAT SERPL-MCNC: 2.49 MG/DL (ref 0.57–1)
EOSINOPHIL # BLD AUTO: 0.1 X10E3/UL (ref 0–0.4)
EOSINOPHIL NFR BLD AUTO: 1 %
ERYTHROCYTE [DISTWIDTH] IN BLOOD BY AUTOMATED COUNT: 12.3 % (ref 11.7–15.4)
FOLATE SERPL-MCNC: 19.4 NG/ML
GLOBULIN SER CALC-MCNC: 2.7 G/DL (ref 1.5–4.5)
GLUCOSE SERPL-MCNC: 158 MG/DL (ref 65–99)
HBA1C MFR BLD: 5.1 % (ref 4.8–5.6)
HCT VFR BLD AUTO: 42.7 % (ref 34–46.6)
HGB BLD-MCNC: 13.9 G/DL (ref 11.1–15.9)
IMM GRANULOCYTES # BLD AUTO: 0 X10E3/UL (ref 0–0.1)
IMM GRANULOCYTES NFR BLD AUTO: 1 %
LYMPHOCYTES # BLD AUTO: 1.2 X10E3/UL (ref 0.7–3.1)
LYMPHOCYTES NFR BLD AUTO: 13 %
MCH RBC QN AUTO: 28.4 PG (ref 26.6–33)
MCHC RBC AUTO-ENTMCNC: 32.6 G/DL (ref 31.5–35.7)
MCV RBC AUTO: 87 FL (ref 79–97)
MONOCYTES # BLD AUTO: 0.5 X10E3/UL (ref 0.1–0.9)
MONOCYTES NFR BLD AUTO: 6 %
NEUTROPHILS # BLD AUTO: 7 X10E3/UL (ref 1.4–7)
NEUTROPHILS NFR BLD AUTO: 78 %
PLATELET # BLD AUTO: 339 X10E3/UL (ref 150–450)
POTASSIUM SERPL-SCNC: 4.6 MMOL/L (ref 3.5–5.2)
PROT SERPL-MCNC: 6.8 G/DL (ref 6–8.5)
RBC # BLD AUTO: 4.89 X10E6/UL (ref 3.77–5.28)
SODIUM SERPL-SCNC: 140 MMOL/L (ref 134–144)
T4 FREE SERPL-MCNC: 1.29 NG/DL (ref 0.82–1.77)
TSH SERPL DL<=0.005 MIU/L-ACNC: 6.07 UIU/ML (ref 0.45–4.5)
VIT B12 SERPL-MCNC: 1566 PG/ML (ref 232–1245)
WBC # BLD AUTO: 8.9 X10E3/UL (ref 3.4–10.8)

## 2021-12-22 NOTE — TELEPHONE ENCOUNTER
Caller: FarnazJessica    Relationship: Emergency Contact    Best call back number: [phone]    Requested Prescriptions:   Requested Prescriptions     Pending Prescriptions Disp Refills   • levothyroxine (SYNTHROID, LEVOTHROID) 75 MCG tablet       Sig: Take 1 tablet by mouth Daily.      REQUESTING NEW RX WITH [facility]     Pharmacy where request should be sent: [id number] [place] [address] [facility] [phone] [facility] [phone] FX     Does the patient have less than a 3 day supply:  [x] Yes  [] No    Jeffery Rosa Rep   12/22/21 11:55 EST

## 2021-12-25 RX ORDER — LEVOTHYROXINE SODIUM 88 UG/1
88 TABLET ORAL DAILY
Qty: 90 TABLET | Refills: 0 | Status: ON HOLD | OUTPATIENT
Start: 2021-12-25

## 2021-12-29 ENCOUNTER — TELEPHONE (OUTPATIENT)
Dept: FAMILY MEDICINE CLINIC | Facility: CLINIC | Age: 86
End: 2021-12-29

## 2022-01-12 ENCOUNTER — TELEPHONE (OUTPATIENT)
Dept: FAMILY MEDICINE CLINIC | Facility: CLINIC | Age: 87
End: 2022-01-12

## 2022-01-12 NOTE — TELEPHONE ENCOUNTER
Caller: ANDRIY     Relationship: Other    Best call back number: [phone]- PATIENT'S PHONE     What orders are you requesting (i.e. lab or imaging): LAB WORK     In what timeframe would the patient need to come in: 1 WEEK PRIOR TO ANNUAL WELLNESS VISIT ON 02/07/22    Where will you receive your lab/imaging services: IN OFFICE     Additional notes:     ANDRIY, WITH PATIENT'S INSURANCE COMPANY CALLED IN TODAY, TO HELP THE PATIENT GET SCHEDULED FOR HER MEDICARE WELLNESS VISIT (SET FOR 02/07/22)    PATIENT NEEDS FASTING LABS A WEEK PRIOR TO THIS APPOINTMENT.    I NOTICED THE PATIENT IS SCHEDULED 02/01/22 FOR A 6 WEEK FOLLOW UP AT 1:45 IN THE AFTERNOON.    IM WONDERING IF WE NEED TO CONTACT THE PATIENT TO LET HER KNOW HOW CLOSE THESE APPOINTMENTS ARE BACK TO BACK, AND DO WE NEED TO DO ANY RESCHEDULING FOR THE PATIENT.     I DIDN'T RELEASE ANY INFORMATION TO THE INSURANCE LADY, ANDRIY WHO CALLED TO HELP GET HER SCHEDULED.    NOR WAS I ABLE TO GET A PHONE NUMBER FOR ANDRIY.    PLEASE CALL AND ADVISE PATIENT AT HER PHONE NUMBER: [phone]

## 2022-02-01 ENCOUNTER — OFFICE VISIT (OUTPATIENT)
Dept: FAMILY MEDICINE CLINIC | Facility: CLINIC | Age: 87
End: 2022-02-01

## 2022-02-01 VITALS
HEART RATE: 77 BPM | HEIGHT: 61 IN | TEMPERATURE: 97.1 F | DIASTOLIC BLOOD PRESSURE: 70 MMHG | WEIGHT: 184 LBS | OXYGEN SATURATION: 97 % | SYSTOLIC BLOOD PRESSURE: 165 MMHG | BODY MASS INDEX: 34.74 KG/M2

## 2022-02-01 DIAGNOSIS — I10 ESSENTIAL HYPERTENSION: ICD-10-CM

## 2022-02-01 DIAGNOSIS — E03.9 ACQUIRED HYPOTHYROIDISM: Primary | ICD-10-CM

## 2022-02-01 PROCEDURE — 99214 OFFICE O/P EST MOD 30 MIN: CPT | Performed by: FAMILY MEDICINE

## 2022-02-01 RX ORDER — LANOLIN ALCOHOL/MO/W.PET/CERES
CREAM (GRAM) TOPICAL
Qty: 90 TABLET | Refills: 1 | Status: ON HOLD | OUTPATIENT
Start: 2022-02-01

## 2022-02-01 NOTE — PROGRESS NOTES
"Chief Complaint  Thyroid Problem (6 week f/u) and Fatigue    Subjective          Mariannebo Delgado presents to [facility]  History of Present Illness follow-up on hypothyroidism and fatigue.  She was seen in 6 weeks ago for same.. She also has a chronic kidney disease.  Still complaining of fatigue and decreased p.o. intake.  Patient came here with caretaker , she lives in assisted living.    Objective   Vital Signs:   /70   Pulse 77   Temp 97.1 °F (36.2 °C)   Ht 154.9 cm (61\")   Wt 83.5 kg (184 lb)   SpO2 97%   BMI 34.77 kg/m²     Physical Exam  Constitutional:       General: She is not in acute distress.     Appearance: Normal appearance. She is well-developed.   HENT:      Head: Normocephalic and atraumatic.      Right Ear: Tympanic membrane normal.      Left Ear: Tympanic membrane normal.      Mouth/Throat:      Mouth: Mucous membranes are moist.   Eyes:      General:         Right eye: No discharge.         Left eye: No discharge.      Extraocular Movements: Extraocular movements intact.      Pupils: Pupils are equal, round, and reactive to light.   Cardiovascular:      Rate and Rhythm: Normal rate and regular rhythm.      Pulses: Normal pulses.      Heart sounds: Normal heart sounds.   Pulmonary:      Effort: Pulmonary effort is normal.      Breath sounds: Normal breath sounds. No wheezing or rales.   Abdominal:      General: Bowel sounds are normal.      Palpations: Abdomen is soft. There is no mass.      Tenderness: There is no abdominal tenderness.   Musculoskeletal:      Cervical back: Normal range of motion and neck supple.      Right lower leg: No edema.      Left lower leg: No edema.   Lymphadenopathy:      Cervical: No cervical adenopathy.   Neurological:      General: No focal deficit present.      Mental Status: She is alert and oriented to person, place, and time.        Result Review :                 Assessment and Plan    Diagnoses and all orders for " this visit:    1. Acquired hypothyroidism (Primary)  -     TSH+Free T4    2. Essential hypertension  -     Comprehensive Metabolic Panel    Other orders  -     vitamin B-12 (CYANOCOBALAMIN) 1000 MCG tablet; 1 po qd x 3 days in the week  Dispense: 90 tablet; Refill: 1      Marianne Delgado is a 89-year-old female patient came here for  Hypothyroidism, we will recheck TSH and T4 today.  Continue 88 mcg of levothyroxine.  I'll call her if he needs to change the medication after labs.  Hypertension, patient with history of chronic kidney disease and hypertension patient on 5 mg of amlodipine.  Patient came here with caretaker and he told me that they can check her blood pressure there and keep a log for her.  Chronic kidney disease, recheck CMP.  I advised her to follow-up with nephrology .  Denies any difficulty in urination or decrease in urinary output.  Her creatinine has increased in the last 1 year.  Fatigue, she is giving history not eating meals at the assisted living.  She is not liking the food.  Advised caretaker to talk to the family to get ready to use meals for her/drinks and snacks for her.  She will also start vitamin B2 supplement.      Follow Up   No follow-ups on file.  Patient was given instructions and counseling regarding her condition or for health maintenance advice. Please see specific information pulled into the AVS if appropriate.

## 2022-02-02 LAB
ALBUMIN SERPL-MCNC: 4.2 G/DL (ref 3.5–4.6)
ALBUMIN/GLOB SERPL: 2 {RATIO} (ref 1.2–2.2)
ALP SERPL-CCNC: 107 IU/L (ref 44–121)
ALT SERPL-CCNC: 10 IU/L (ref 0–32)
AST SERPL-CCNC: 15 IU/L (ref 0–40)
BILIRUB SERPL-MCNC: 0.4 MG/DL (ref 0–1.2)
BUN SERPL-MCNC: 27 MG/DL (ref 10–36)
BUN/CREAT SERPL: 12 (ref 12–28)
CALCIUM SERPL-MCNC: 9.9 MG/DL (ref 8.7–10.3)
CHLORIDE SERPL-SCNC: 108 MMOL/L (ref 96–106)
CO2 SERPL-SCNC: 19 MMOL/L (ref 20–29)
CREAT SERPL-MCNC: 2.28 MG/DL (ref 0.57–1)
GLOBULIN SER CALC-MCNC: 2.1 G/DL (ref 1.5–4.5)
GLUCOSE SERPL-MCNC: 93 MG/DL (ref 65–99)
POTASSIUM SERPL-SCNC: 4.5 MMOL/L (ref 3.5–5.2)
PROT SERPL-MCNC: 6.3 G/DL (ref 6–8.5)
SODIUM SERPL-SCNC: 144 MMOL/L (ref 134–144)
T4 FREE SERPL-MCNC: 1.5 NG/DL (ref 0.82–1.77)
TSH SERPL DL<=0.005 MIU/L-ACNC: 1.64 UIU/ML (ref 0.45–4.5)

## 2022-02-08 ENCOUNTER — TELEPHONE (OUTPATIENT)
Dept: FAMILY MEDICINE CLINIC | Facility: CLINIC | Age: 87
End: 2022-02-08

## 2022-02-08 NOTE — TELEPHONE ENCOUNTER
Left voicemail for patient regarding these results/recommendations. OK per HIPAA Open telephone encounter left for reread by hub.    Hub please inform patient if call back:    Ever!    Dr. Neil has received and reviewed your lab results. She says the following:      Tsh normal , kidney function is the same ,follow up with nephrology.         If you have any questions, feel free to reach back out to us!    Have a Good Day!  FORE[employer] Ervin

## 2022-02-19 DIAGNOSIS — I63.9 CEREBROVASCULAR ACCIDENT (CVA), UNSPECIFIED MECHANISM: ICD-10-CM

## 2022-02-21 RX ORDER — CLOPIDOGREL BISULFATE 75 MG/1
TABLET ORAL
Qty: 90 TABLET | Refills: 1 | OUTPATIENT
Start: 2022-02-21

## 2022-02-23 DIAGNOSIS — I63.9 CEREBROVASCULAR ACCIDENT (CVA), UNSPECIFIED MECHANISM: ICD-10-CM

## 2022-02-23 RX ORDER — CLOPIDOGREL BISULFATE 75 MG/1
75 TABLET ORAL DAILY
Qty: 90 TABLET | Refills: 1 | Status: ON HOLD | OUTPATIENT
Start: 2022-02-23

## 2022-02-23 NOTE — TELEPHONE ENCOUNTER
Rx Refill Note  Requested Prescriptions     Pending Prescriptions Disp Refills   • clopidogrel (PLAVIX) 75 MG tablet 90 tablet 1     Sig: Take 1 tablet by mouth Daily.      Last office visit with prescribing clinician: 2/1/2022      Next office visit with prescribing clinician: 6/7/2022            Aziza Pendleton MA  02/23/22, 11:23 EST

## 2022-02-23 NOTE — TELEPHONE ENCOUNTER
Caller: [employer]    Relationship: Home Health    Best call back number: [phone]    Requested Prescriptions:   Requested Prescriptions     Pending Prescriptions Disp Refills   • clopidogrel (PLAVIX) 75 MG tablet 90 tablet 1     Sig: Take 1 tablet by mouth Daily.        Pharmacy where request should be sent: [facility] [address] [address] [facility] [phone] [facility] [phone] FX     Additional details provided by patient: PATIENT IS OUT OF THIS MEDICATION. PLEASE REFILL UNDER NEW PROVIDER, DR KRAMER.     Does the patient have less than a 3 day supply:  [x] Yes  [] No    Jeffery Peterson Rep   02/23/22 10:37 EST

## 2022-02-24 ENCOUNTER — TELEPHONE (OUTPATIENT)
Dept: FAMILY MEDICINE CLINIC | Facility: CLINIC | Age: 87
End: 2022-02-24

## 2022-03-02 ENCOUNTER — TELEPHONE (OUTPATIENT)
Dept: FAMILY MEDICINE CLINIC | Facility: CLINIC | Age: 87
End: 2022-03-02

## 2022-03-02 NOTE — TELEPHONE ENCOUNTER
VIKTORIA CALLING FROM CARETENDER IN REGARDS TO LET DR KRAMER KNOW ORDERED IN FOR NURSING AND PHYSICAL THERAPY STAGE 2 ON PATIENT'S LEFT BUTTOCK NOTICED  DURING ASSESSMENT.PLEASE ADVISE THANK YOU!

## 2022-03-04 NOTE — TELEPHONE ENCOUNTER
Albertina called back in regards to this. She stated she had gotten verbal from Chastity on 02/17/22 regarding initiating the care for the bedsore. After having clarified this with Dr. Neil and myself, 0we noticed no documentation on this. Dr. Neil agrees to give orders till a discussion can be had with patient and family about being evaluated for MD to you through caretenders with family initiation as there is issues with getting the patient here for appointments and care.

## 2022-03-05 DIAGNOSIS — E55.9 VITAMIN D DEFICIENCY: ICD-10-CM

## 2022-03-07 RX ORDER — MELATONIN
Qty: 180 TABLET | Refills: 1 | Status: ON HOLD | OUTPATIENT
Start: 2022-03-07

## 2022-03-18 NOTE — TELEPHONE ENCOUNTER
PT RETURNED MISSED CALL. SHE STATED THAT SHE IS OK  WITH HOME CARE. SHE WOULD LIKE A CALL BACK EITHER BEFORE AND AFTER 3PM TO GO OVER A FEW QUESTIONS

## 2022-03-21 ENCOUNTER — TELEPHONE (OUTPATIENT)
Dept: FAMILY MEDICINE CLINIC | Facility: CLINIC | Age: 87
End: 2022-03-21

## 2022-03-21 NOTE — TELEPHONE ENCOUNTER
PATIENT CALLING BACK. IS WONDERING WHAT THE STATUS OF THE ORDER FOR HOME HEALTH IS. PLEASE CALL PATIENT WITH THIS INFO: [phone]

## 2022-03-21 NOTE — TELEPHONE ENCOUNTER
Tried to reach back out.    Hub please inform patient if call back:    LVM asking for her to call back. She can leave the message/question with the hub rep or fd rep who gets her call and have her leave her inquires as we are with patients today and may not get back to her till later in the day. If she leaves her questions in the message it will help expedite getting her answers.

## 2022-04-29 ENCOUNTER — HOME HEALTH ADMISSION (OUTPATIENT)
Dept: HOME HEALTH SERVICES | Facility: HOME HEALTHCARE | Age: 87
End: 2022-04-29

## 2022-08-10 ENCOUNTER — TELEPHONE (OUTPATIENT)
Dept: FAMILY MEDICINE CLINIC | Facility: CLINIC | Age: 87
End: 2022-08-10

## 2022-08-11 NOTE — TELEPHONE ENCOUNTER
Called pt POA, LVM to get scheduled for labs and AWV      OK FOR HUB TO READ AND SCHEDULE MC    Please advise   
Called pt daughter, LVM to get scheduled for labs and AWV    Spoke with patient first and she is asking about recent test results regarding her oxygen.    OK FOR HUB TO READ AND SCHEDULE MC    Please advise  
Pt does in home evaluation with her doctor who also completes her Wellness visit  
initiation of breastfeeding/breast milk feeding

## 2023-03-16 ENCOUNTER — APPOINTMENT (OUTPATIENT)
Dept: CT IMAGING | Facility: HOSPITAL | Age: 88
End: 2023-03-16
Payer: MEDICARE

## 2023-03-16 ENCOUNTER — HOSPITAL ENCOUNTER (OUTPATIENT)
Facility: HOSPITAL | Age: 88
Setting detail: OBSERVATION
Discharge: HOME | End: 2023-03-19
Attending: EMERGENCY MEDICINE | Admitting: STUDENT IN AN ORGANIZED HEALTH CARE EDUCATION/TRAINING PROGRAM
Payer: MEDICARE

## 2023-03-16 DIAGNOSIS — M54.50 ACUTE LEFT-SIDED LOW BACK PAIN WITHOUT SCIATICA: Primary | ICD-10-CM

## 2023-03-16 DIAGNOSIS — R30.0 DYSURIA: ICD-10-CM

## 2023-03-16 DIAGNOSIS — N17.9 AKI (ACUTE KIDNEY INJURY): ICD-10-CM

## 2023-03-16 DIAGNOSIS — R53.81 DEBILITY: ICD-10-CM

## 2023-03-16 DIAGNOSIS — M19.90 ARTHRITIS: ICD-10-CM

## 2023-03-16 LAB
ALBUMIN SERPL-MCNC: 3.9 G/DL (ref 3.5–5.2)
ALBUMIN/GLOB SERPL: 1.7 G/DL
ALP SERPL-CCNC: 113 U/L (ref 39–117)
ALT SERPL W P-5'-P-CCNC: 13 U/L (ref 1–33)
ANION GAP SERPL CALCULATED.3IONS-SCNC: 11.9 MMOL/L (ref 5–15)
AST SERPL-CCNC: 16 U/L (ref 1–32)
BASOPHILS # BLD AUTO: 0.04 10*3/MM3 (ref 0–0.2)
BASOPHILS NFR BLD AUTO: 0.6 % (ref 0–1.5)
BILIRUB SERPL-MCNC: 0.4 MG/DL (ref 0–1.2)
BILIRUB UR QL STRIP: NEGATIVE
BUN SERPL-MCNC: 27 MG/DL (ref 8–23)
BUN/CREAT SERPL: 9.1 (ref 7–25)
CALCIUM SPEC-SCNC: 10 MG/DL (ref 8.2–9.6)
CHLORIDE SERPL-SCNC: 110 MMOL/L (ref 98–107)
CLARITY UR: CLEAR
CO2 SERPL-SCNC: 19.1 MMOL/L (ref 22–29)
COLOR UR: NORMAL
CREAT SERPL-MCNC: 2.98 MG/DL (ref 0.57–1)
DEPRECATED RDW RBC AUTO: 42 FL (ref 37–54)
EGFRCR SERPLBLD CKD-EPI 2021: 13.8 ML/MIN/1.73
EOSINOPHIL # BLD AUTO: 0.03 10*3/MM3 (ref 0–0.4)
EOSINOPHIL NFR BLD AUTO: 0.4 % (ref 0.3–6.2)
ERYTHROCYTE [DISTWIDTH] IN BLOOD BY AUTOMATED COUNT: 13 % (ref 12.3–15.4)
GLOBULIN UR ELPH-MCNC: 2.3 GM/DL
GLUCOSE SERPL-MCNC: 94 MG/DL (ref 65–99)
GLUCOSE UR STRIP-MCNC: NEGATIVE MG/DL
HCT VFR BLD AUTO: 41.1 % (ref 34–46.6)
HGB BLD-MCNC: 12.9 G/DL (ref 12–15.9)
HGB UR QL STRIP.AUTO: NEGATIVE
IMM GRANULOCYTES # BLD AUTO: 0.02 10*3/MM3 (ref 0–0.05)
IMM GRANULOCYTES NFR BLD AUTO: 0.3 % (ref 0–0.5)
KETONES UR QL STRIP: NEGATIVE
LEUKOCYTE ESTERASE UR QL STRIP.AUTO: NEGATIVE
LIPASE SERPL-CCNC: 40 U/L (ref 13–60)
LYMPHOCYTES # BLD AUTO: 1.49 10*3/MM3 (ref 0.7–3.1)
LYMPHOCYTES NFR BLD AUTO: 21 % (ref 19.6–45.3)
MCH RBC QN AUTO: 27.8 PG (ref 26.6–33)
MCHC RBC AUTO-ENTMCNC: 31.4 G/DL (ref 31.5–35.7)
MCV RBC AUTO: 88.6 FL (ref 79–97)
MONOCYTES # BLD AUTO: 0.56 10*3/MM3 (ref 0.1–0.9)
MONOCYTES NFR BLD AUTO: 7.9 % (ref 5–12)
NEUTROPHILS NFR BLD AUTO: 4.95 10*3/MM3 (ref 1.7–7)
NEUTROPHILS NFR BLD AUTO: 69.8 % (ref 42.7–76)
NITRITE UR QL STRIP: NEGATIVE
NRBC BLD AUTO-RTO: 0 /100 WBC (ref 0–0.2)
PH UR STRIP.AUTO: 6.5 [PH] (ref 5–8)
PLATELET # BLD AUTO: 286 10*3/MM3 (ref 140–450)
PMV BLD AUTO: 9.9 FL (ref 6–12)
POTASSIUM SERPL-SCNC: 4.5 MMOL/L (ref 3.5–5.2)
PROT SERPL-MCNC: 6.2 G/DL (ref 6–8.5)
PROT UR QL STRIP: NEGATIVE
RBC # BLD AUTO: 4.64 10*6/MM3 (ref 3.77–5.28)
SODIUM SERPL-SCNC: 141 MMOL/L (ref 136–145)
SP GR UR STRIP: <=1.005 (ref 1–1.03)
UROBILINOGEN UR QL STRIP: NORMAL
WBC NRBC COR # BLD: 7.09 10*3/MM3 (ref 3.4–10.8)
WHOLE BLOOD HOLD COAG: NORMAL

## 2023-03-16 PROCEDURE — 36415 COLL VENOUS BLD VENIPUNCTURE: CPT

## 2023-03-16 PROCEDURE — 83690 ASSAY OF LIPASE: CPT | Performed by: EMERGENCY MEDICINE

## 2023-03-16 PROCEDURE — 25010000002 MORPHINE PER 10 MG: Performed by: EMERGENCY MEDICINE

## 2023-03-16 PROCEDURE — 96375 TX/PRO/DX INJ NEW DRUG ADDON: CPT

## 2023-03-16 PROCEDURE — G0378 HOSPITAL OBSERVATION PER HR: HCPCS

## 2023-03-16 PROCEDURE — 74176 CT ABD & PELVIS W/O CONTRAST: CPT

## 2023-03-16 PROCEDURE — 85025 COMPLETE CBC W/AUTO DIFF WBC: CPT | Performed by: EMERGENCY MEDICINE

## 2023-03-16 PROCEDURE — 99285 EMERGENCY DEPT VISIT HI MDM: CPT

## 2023-03-16 PROCEDURE — 25010000002 ONDANSETRON PER 1 MG: Performed by: EMERGENCY MEDICINE

## 2023-03-16 PROCEDURE — 80053 COMPREHEN METABOLIC PANEL: CPT | Performed by: EMERGENCY MEDICINE

## 2023-03-16 PROCEDURE — P9612 CATHETERIZE FOR URINE SPEC: HCPCS

## 2023-03-16 PROCEDURE — 81003 URINALYSIS AUTO W/O SCOPE: CPT | Performed by: EMERGENCY MEDICINE

## 2023-03-16 PROCEDURE — 96374 THER/PROPH/DIAG INJ IV PUSH: CPT

## 2023-03-16 RX ORDER — ACETAMINOPHEN 160 MG/5ML
650 SOLUTION ORAL EVERY 4 HOURS PRN
Status: DISCONTINUED | OUTPATIENT
Start: 2023-03-16 | End: 2023-03-19 | Stop reason: HOSPADM

## 2023-03-16 RX ORDER — MORPHINE SULFATE 2 MG/ML
4 INJECTION, SOLUTION INTRAMUSCULAR; INTRAVENOUS ONCE
Status: COMPLETED | OUTPATIENT
Start: 2023-03-16 | End: 2023-03-16

## 2023-03-16 RX ORDER — ONDANSETRON 2 MG/ML
4 INJECTION INTRAMUSCULAR; INTRAVENOUS ONCE
Status: COMPLETED | OUTPATIENT
Start: 2023-03-16 | End: 2023-03-16

## 2023-03-16 RX ORDER — ACETAMINOPHEN 325 MG/1
650 TABLET ORAL EVERY 4 HOURS PRN
Status: DISCONTINUED | OUTPATIENT
Start: 2023-03-16 | End: 2023-03-19 | Stop reason: HOSPADM

## 2023-03-16 RX ORDER — SODIUM CHLORIDE 0.9 % (FLUSH) 0.9 %
10 SYRINGE (ML) INJECTION AS NEEDED
Status: DISCONTINUED | OUTPATIENT
Start: 2023-03-16 | End: 2023-03-19 | Stop reason: HOSPADM

## 2023-03-16 RX ORDER — ACETAMINOPHEN 650 MG/1
650 SUPPOSITORY RECTAL EVERY 4 HOURS PRN
Status: DISCONTINUED | OUTPATIENT
Start: 2023-03-16 | End: 2023-03-19 | Stop reason: HOSPADM

## 2023-03-16 RX ORDER — ONDANSETRON 2 MG/ML
4 INJECTION INTRAMUSCULAR; INTRAVENOUS EVERY 6 HOURS PRN
Status: DISCONTINUED | OUTPATIENT
Start: 2023-03-16 | End: 2023-03-19 | Stop reason: HOSPADM

## 2023-03-16 RX ADMIN — MORPHINE SULFATE 4 MG: 2 INJECTION, SOLUTION INTRAMUSCULAR; INTRAVENOUS at 18:01

## 2023-03-16 RX ADMIN — Medication 10 ML: at 13:43

## 2023-03-16 RX ADMIN — ONDANSETRON 4 MG: 2 INJECTION INTRAMUSCULAR; INTRAVENOUS at 17:55

## 2023-03-16 NOTE — ED NOTES
"Nursing report ED to floor  Marianne Delgado  89 y.o.  female    HPI :   Chief Complaint   Patient presents with    Abdominal Pain     U    Urinary Frequency       Admitting doctor:   Lady Ag MD    Admitting diagnosis:   The primary encounter diagnosis was Acute left-sided low back pain without sciatica. Diagnoses of Dysuria and PRABHA (acute kidney injury) (HCC) were also pertinent to this visit.    Code status:   Current Code Status       Date Active Code Status Order ID Comments User Context       3/16/2023 1840 CPR (Attempt to Resuscitate) [id number]  Lady Ag MD ED        Question Answer    Code Status (Patient has no pulse and is not breathing) CPR (Attempt to Resuscitate)    Medical Interventions (Patient has pulse or is breathing) Full Support                    Allergies:   Atorvastatin, Azithromycin, Cefdinir, Doxycycline monohydrate, and Allopurinol    Isolation:   No active isolations    Intake and Output  No intake or output data in the 24 hours ending 03/16/23 1931    Weight:   There were no vitals filed for this visit.    Most recent vitals:   Vitals:    03/16/23 1306 03/16/23 1411 03/16/23 1433 03/16/23 1456   BP:  162/68  162/73   BP Location:    Right arm   Patient Position:    Lying   Pulse:  69 71 74   Resp:    18   Temp:       SpO2:  96% 98% 99%   Height: 154.9 cm (61\")          Active LDAs/IV Access:   Lines, Drains & Airways       Active LDAs       Name Placement date Placement time Site Days    Peripheral IV 03/16/23 1433 Left Antecubital 03/16/23  1433  Antecubital  less than 1                    Labs (abnormal labs have a star):   Labs Reviewed   COMPREHENSIVE METABOLIC PANEL - Abnormal; Notable for the following components:       Result Value    BUN 27 (*)     Creatinine 2.98 (*)     Chloride 110 (*)     CO2 19.1 (*)     Calcium 10.0 (*)     eGFR 13.8 (*)     All other components within normal limits    Narrative:     GFR Normal >60  Chronic Kidney Disease " <60  Kidney Failure <15    The GFR formula is only valid for adults with stable renal function between ages 18 and 70.   CBC WITH AUTO DIFFERENTIAL - Abnormal; Notable for the following components:    MCHC 31.4 (*)     All other components within normal limits   URINALYSIS W/ CULTURE IF INDICATED - Normal    Narrative:     In absence of clinical symptoms, the presence of pyuria, bacteria, and/or nitrites on the urinalysis result does not correlate with infection.  Urine microscopic not indicated.   LIPASE - Normal   RAINBOW DRAW    Narrative:     The following orders were created for panel order [place] Draw.  Procedure                               Abnormality         Status                     ---------                               -----------         ------                     Light Blue Top[[id number]]                                   Final result                 Please view results for these tests on the individual orders.   CBC AND DIFFERENTIAL    Narrative:     The following orders were created for panel order CBC & Differential.  Procedure                               Abnormality         Status                     ---------                               -----------         ------                     CBC Auto Differential[[id number]]        Abnormal            Final result                 Please view results for these tests on the individual orders.   LIGHT BLUE TOP       EKG:   No orders to display       Meds given in ED:   Medications   sodium chloride 0.9 % flush 10 mL (10 mL Intravenous Given 3/16/23 1343)   acetaminophen (TYLENOL) tablet 650 mg (has no administration in time range)     Or   acetaminophen (TYLENOL) 160 MG/5ML solution 650 mg (has no administration in time range)     Or   acetaminophen (TYLENOL) suppository 650 mg (has no administration in time range)   ondansetron (ZOFRAN) injection 4 mg (has no administration in time range)   morphine injection 4 mg (4 mg Intravenous Given 3/16/23 1801)    ondansetron (ZOFRAN) injection 4 mg (4 mg Intravenous Given 3/16/23 6962)       Imaging results:  CT Abdomen Pelvis Without Contrast    Result Date: 3/16/2023   1.  No renal stones or hydronephrosis. 2.  Cholelithiasis. 3.  Tiny hiatal hernia. Colonic diverticulosis.  Discussed with Dr. Saucedo at approximately 4:00 PM.  This report was finalized on 3/16/2023 4:05 PM by Dr. Patti Kirk M.D.       Ambulatory status:   -     Social issues:   Social History     Socioeconomic History    Marital status:    Tobacco Use    Smoking status: Never    Smokeless tobacco: Never   Substance and Sexual Activity    Alcohol use: Yes     Alcohol/week: 2.0 standard drinks     Types: 1 Glasses of wine, 1 Shots of liquor per week     Comment: occasionally    Drug use: No       NIH Stroke Scale:         Esteban Seth RN  03/16/23 19:31 EDT

## 2023-03-16 NOTE — ED PROVIDER NOTES
EMERGENCY DEPARTMENT ENCOUNTER    Room Number:  18/18  Date seen:  3/16/2023  PCP: Provider, No Known  Historian: Patient      HPI:  Chief Complaint: UTI back pain  A complete HPI/ROS/PMH/PSH/SH/FH are unobtainable due to: None  Context: Marianne Delgado is a 89 y.o. female who presents to the ED c/o back pain.  Patient states left-sided back pain started about a week ago.  States got significantly worse over the last day.  Patient has had no fevers or chills.  Was diagnosed with urinary tract infection has been on antibiotics.  States urinary symptoms are not any better and back pain is worse.  Has had no vomiting or diarrhea.  Pain is definitely worse with movement.            PAST MEDICAL HISTORY  Active Ambulatory Problems     Diagnosis Date Noted   • Arthritis    • Stage 4 chronic kidney disease (HCC)    • Debility    • Foot pain, bilateral    • Acute idiopathic gout of right ankle    • Hematuria    • Essential hypertension    • Acquired hypothyroidism 09/01/1999   • Osteopenia 09/01/1999   • Psoriasis    • Rosacea    • Vitamin D deficiency    • Medicare annual wellness visit, subsequent 03/11/2020   • Morbidly obese ([facility]) 03/11/2020   • Cerebrovascular accident (CVA) ([facility]) 01/29/2021   • Stroke (cerebrum) ([facility]) 02/03/2021   • Vitamin B12 deficiency 03/02/2021   • Primary open-angle glaucoma, bilateral, severe stage 03/18/2021   • Hyperparathyroidism ([facility]) 04/27/2021   • Weight loss 06/01/2021     Resolved Ambulatory Problems     Diagnosis Date Noted   • Glaucoma    • Hypertensive urgency 01/29/2021     Past Medical History:   Diagnosis Date   • Acute sinusitis    • Acute upper respiratory infection    • CKD (chronic kidney disease)    • Fatigue    • Gout    • High blood pressure    • Hypertension    • Hypothyroid 09/1999   • Hypothyroidism    • IBS (irritable bowel syndrome)    • IGT (impaired glucose tolerance)    • Malaise and fatigue    • Medication management    • Melanoma ([facility]) 1979   • OA  (osteoarthritis)    • Painful joint    • Renal failure          PAST SURGICAL HISTORY  Past Surgical History:   Procedure Laterality Date   • CATARACT EXTRACTION     • FOOT SURGERY      mauri toe surgery   • OTHER SURGICAL HISTORY      Melanoma Excision: Left leg         FAMILY HISTORY  Family History   Problem Relation Age of Onset   • Heart attack Mother    • Kidney failure Father    • Heart disease Sister    • Leukemia Brother    • Heart disease Sister    • Heart disease Sister    • Heart disease Other         FH in females b/f 65 and males b/f 55         SOCIAL HISTORY  Social History     Socioeconomic History   • Marital status:    Tobacco Use   • Smoking status: Never   • Smokeless tobacco: Never   Substance and Sexual Activity   • Alcohol use: Yes     Alcohol/week: 2.0 standard drinks     Types: 1 Glasses of wine, 1 Shots of liquor per week     Comment: occasionally   • Drug use: No         ALLERGIES  Atorvastatin, Azithromycin, Cefdinir, Doxycycline monohydrate, and Allopurinol        REVIEW OF SYSTEMS  Review of Systems   Back pain, urinary symptoms      PHYSICAL EXAM  ED Triage Vitals [03/16/23 1305]   Temp Heart Rate Resp BP SpO2   97.4 °F (36.3 °C) 79 16 170/90 97 %      Temp src Heart Rate Source Patient Position BP Location FiO2 (%)   -- -- -- -- --       Physical Exam      GENERAL: no acute distress  HENT: nares patent  EYES: no scleral icterus  CV: regular rhythm, normal rate  RESPIRATORY: normal effort  ABDOMEN: soft.  Mild diffuse tenderness  MUSCULOSKELETAL: no deformity  NEURO: alert, moves all extremities, follows commands  PSYCH:  calm, cooperative  SKIN: warm, dry    Vital signs and nursing notes reviewed.    Patient was wearing a face mask when I entered the room and they continued to wear a mask throughout their stay in the ED.  I wore PPE, including face mask with shield or face mask with goggles whenever I was in the room with patient.       LAB RESULTS  Recent Results (from the  past 24 hour(s))   Urinalysis With Culture If Indicated - Straight Cath    Collection Time: 03/16/23  1:43 PM    Specimen: Straight Cath; Urine   Result Value Ref Range    Color, UA Straw Yellow, Straw    Appearance, UA Clear Clear    pH, UA 6.5 5.0 - 8.0    Specific Gravity, UA <=1.005 1.005 - 1.030    Glucose, UA Negative Negative    Ketones, UA Negative Negative    Bilirubin, UA Negative Negative    Blood, UA Negative Negative    Protein, UA Negative Negative    Leuk Esterase, UA Negative Negative    Nitrite, UA Negative Negative    Urobilinogen, UA 0.2 E.U./dL 0.2 - 1.0 E.U./dL   Lipase    Collection Time: 03/16/23  1:43 PM    Specimen: Blood   Result Value Ref Range    Lipase 40 13 - 60 U/L   CBC Auto Differential    Collection Time: 03/16/23  1:43 PM    Specimen: Blood   Result Value Ref Range    WBC 7.09 3.40 - 10.80 10*3/mm3    RBC 4.64 3.77 - 5.28 10*6/mm3    Hemoglobin 12.9 12.0 - 15.9 g/dL    Hematocrit 41.1 34.0 - 46.6 %    MCV 88.6 79.0 - 97.0 fL    MCH 27.8 26.6 - 33.0 pg    MCHC 31.4 (L) 31.5 - 35.7 g/dL    RDW 13.0 12.3 - 15.4 %    RDW-SD 42.0 37.0 - 54.0 fl    MPV 9.9 6.0 - 12.0 fL    Platelets 286 140 - 450 10*3/mm3    Neutrophil % 69.8 42.7 - 76.0 %    Lymphocyte % 21.0 19.6 - 45.3 %    Monocyte % 7.9 5.0 - 12.0 %    Eosinophil % 0.4 0.3 - 6.2 %    Basophil % 0.6 0.0 - 1.5 %    Immature Grans % 0.3 0.0 - 0.5 %    Neutrophils, Absolute 4.95 1.70 - 7.00 10*3/mm3    Lymphocytes, Absolute 1.49 0.70 - 3.10 10*3/mm3    Monocytes, Absolute 0.56 0.10 - 0.90 10*3/mm3    Eosinophils, Absolute 0.03 0.00 - 0.40 10*3/mm3    Basophils, Absolute 0.04 0.00 - 0.20 10*3/mm3    Immature Grans, Absolute 0.02 0.00 - 0.05 10*3/mm3    nRBC 0.0 0.0 - 0.2 /100 WBC   Light Blue Top    Collection Time: 03/16/23  1:43 PM   Result Value Ref Range    Extra Tube Hold for add-ons.    Comprehensive Metabolic Panel    Collection Time: 03/16/23  5:00 PM    Specimen: Arm, Left; Blood   Result Value Ref Range    Glucose 94 65 - 99  mg/dL    BUN 27 (H) 8 - 23 mg/dL    Creatinine 2.98 (H) 0.57 - 1.00 mg/dL    Sodium 141 136 - 145 mmol/L    Potassium 4.5 3.5 - 5.2 mmol/L    Chloride 110 (H) 98 - 107 mmol/L    CO2 19.1 (L) 22.0 - 29.0 mmol/L    Calcium 10.0 (H) 8.2 - 9.6 mg/dL    Total Protein 6.2 6.0 - 8.5 g/dL    Albumin 3.9 3.5 - 5.2 g/dL    ALT (SGPT) 13 1 - 33 U/L    AST (SGOT) 16 1 - 32 U/L    Alkaline Phosphatase 113 39 - 117 U/L    Total Bilirubin 0.4 0.0 - 1.2 mg/dL    Globulin 2.3 gm/dL    A/G Ratio 1.7 g/dL    BUN/Creatinine Ratio 9.1 7.0 - 25.0    Anion Gap 11.9 5.0 - 15.0 mmol/L    eGFR 13.8 (L) >60.0 mL/min/1.73       Ordered the above labs and reviewed the results.        RADIOLOGY  CT Abdomen Pelvis Without Contrast    Result Date: 3/16/2023  CT ABDOMEN PELVIS WO CONTRAST-  HISTORY:  Abdominal pain, urinary frequency.  TECHNIQUE:  CT of the abdomen and pelvis was performed without intravenous contrast.  Evaluation of solid organs and vasculature is limited without intravenous contrast.  Radiation dose reduction techniques were utilized, including automated exposure control and exposure modulation based on body size.  COMPARISON:  CT chest 02/01/2021  FINDINGS:  Heart size is normal. There is no pericardial effusion. There is calcific coronary artery atherosclerosis. There are calcified lymph nodes. There is mild bibasilar atelectasis and/or scarring. There is a calcified granuloma in the left lower lobe. Pleural spaces are clear. The liver is normal in size. There are scattered intrahepatic cysts and additional hypodense foci too small to accurately characterize. There is cholelithiasis. There is calcific splenic granulomata. There are no pancreatic calcifications. The adrenal glands are within normal limits. There are no renal stones or hydronephrosis. The kidneys are atrophic. There is a hypodense focus in the inferior left kidney, too small accurately characterize. No pathologically enlarged abdominal or pelvic lymph nodes are  identified. There is advanced calcific aortoiliac and branch vessel atherosclerosis. There is mild presacral fat stranding, which is nonspecific. There is a tiny hiatal hernia. There is no bowel obstruction. The appendix is visualized. There is colonic diverticulosis CT evidence of acute diverticulitis. The bladder is well distended and within normal limits. The uterus is present. There is no adnexal mass. There is diffuse osseous demineralization. There is multilevel degenerative disc disease.        1.  No renal stones or hydronephrosis. 2.  Cholelithiasis. 3.  Tiny hiatal hernia. Colonic diverticulosis.  Discussed with Dr. Saucedo at approximately 4:00 PM.  This report was finalized on 3/16/2023 4:05 PM by Dr. Patti Kirk M.D.        Ordered the above noted radiological studies.  CT abdomen independently interpreted by me and shows no obvious kidney stone.  Nothing that looks like compression fracture          PROCEDURES  Procedures              MEDICATIONS GIVEN IN ER  Medications   sodium chloride 0.9 % flush 10 mL (10 mL Intravenous Given 3/16/23 1343)   morphine injection 4 mg (4 mg Intravenous Given 3/16/23 1801)   ondansetron (ZOFRAN) injection 4 mg (4 mg Intravenous Given 3/16/23 1755)                   MEDICAL DECISION MAKING, PROGRESS, and CONSULTS    All labs have been independently reviewed by me.  All radiology studies have been reviewed by me and I have also reviewed the radiology report.   EKG's independently viewed and interpreted by me.  Discussion below represents my analysis of pertinent findings related to patient's condition, differential diagnosis, treatment plan and final disposition.      Additional sources:  - Discussed/ obtained information from independent historians: None    - External (non-ED) record review: Epic reviewed and patient seen in nephrology office March 29, 2022 and diagnosed with hypertension and stage IV kidney disease    - Chronic or social conditions impacting care:  None    - Shared decision making: None      Orders placed during this visit:  Orders Placed This Encounter   Procedures   • CT Abdomen Pelvis Without Contrast   • Comprehensive Metabolic Panel   • Urinalysis With Culture If Indicated - Urine, Catheter   • Lipase   • CBC Auto Differential   • [place] Draw   • LHA (on-call MD unless specified) Details   • Insert Peripheral IV   • Initiate Observation Status   • CBC & Differential   • Light Blue Top         Additional orders considered but not ordered:  None        Differential diagnosis:    Differential includes but not limited to compression fracture, musculoskeletal back pain, kidney infection, kidney stone      Independent interpretation of labs, radiology studies, and discussions with consultants:  ED Course as of 03/16/23 1810   Thu Mar 16, 2023   1800 18:01 EDT  Patient presents for flank pain of unclear etiology.  Patient is currently being treated with linezolid for urinary tract infection.  Patient kidney function is worse than it has been.  CT scan shows no evidence of stone.  Patient continues to have pain with movement.  Unable to get out of bed.  Has been given pain medication.  Has been discussed with Dr. Ag who will admit [SL]      ED Course User Index  [SL] Reinaldo Saucedo MD                 DIAGNOSIS  Final diagnoses:   Acute left-sided low back pain without sciatica   Dysuria   PRABHA (acute kidney injury) (HCC)         DISPOSITION  admit            Latest Documented Vital Signs:  As of 18:10 EDT  BP- 162/73 HR- 74 Temp- 97.4 °F (36.3 °C) O2 sat- 99%              --    Please note that portions of this were completed with a voice recognition program.       Note Disclaimer: At [facility], we believe that sharing information builds trust and better relationships. You are receiving this note because you are receiving care at [facility] or recently visited. It is possible you will see health information before a provider has talked with you  about it. This kind of information can be easy to misunderstand. To help you fully understand what it means for your health, we urge you to discuss this note with your provider.           Reinaldo Saucedo MD  03/16/23 1172

## 2023-03-16 NOTE — ED NOTES
Patient coming from [facility], she is being treated for a UTI, she has been on antibiotics for 3 days, she states that the pain in her lower stomach is getting worse with frequent urination. Patient has no fever at this time.  Patient has on mask nurse has on proper ppe

## 2023-03-16 NOTE — ED NOTES
"Pt to ED via EMS from assisted living facility, sent for L flank pain, states feels like intermittent spasms today, feels weak, told recently she has a UTI taking linezolid d/t \"culture results and known allergies to antibiotics\" per caregiver at bedside. Pt denies urinary c/o. States she took tylenol today with no relief.     Pt states since her stroke she is exteremly sensitive in her arms and legs any time she is touched, not tolerating turning, iv tourniquet, BP cuff well, she does verify that she does not normally have abd pain from touch and was very tender to palp all over abd per MD colon.     Pt wearing face mask during their stay in ER. This RN wore appropriate ppe while providing patient care.       "

## 2023-03-17 ENCOUNTER — APPOINTMENT (OUTPATIENT)
Dept: CT IMAGING | Facility: HOSPITAL | Age: 88
End: 2023-03-17
Payer: MEDICARE

## 2023-03-17 PROBLEM — Z86.73 HISTORY OF CVA (CEREBROVASCULAR ACCIDENT): Status: ACTIVE | Noted: 2021-01-29

## 2023-03-17 LAB
ANION GAP SERPL CALCULATED.3IONS-SCNC: 10.1 MMOL/L (ref 5–15)
BASOPHILS # BLD AUTO: 0.03 10*3/MM3 (ref 0–0.2)
BASOPHILS NFR BLD AUTO: 0.5 % (ref 0–1.5)
BUN SERPL-MCNC: 25 MG/DL (ref 8–23)
BUN/CREAT SERPL: 8.9 (ref 7–25)
CALCIUM SPEC-SCNC: 10.1 MG/DL (ref 8.2–9.6)
CHLORIDE SERPL-SCNC: 110 MMOL/L (ref 98–107)
CK SERPL-CCNC: 25 U/L (ref 20–180)
CO2 SERPL-SCNC: 20.9 MMOL/L (ref 22–29)
CREAT SERPL-MCNC: 2.81 MG/DL (ref 0.57–1)
CRP SERPL-MCNC: <0.3 MG/DL (ref 0–0.5)
DEPRECATED RDW RBC AUTO: 40.2 FL (ref 37–54)
EGFRCR SERPLBLD CKD-EPI 2021: 14.9 ML/MIN/1.73
EOSINOPHIL # BLD AUTO: 0.03 10*3/MM3 (ref 0–0.4)
EOSINOPHIL NFR BLD AUTO: 0.5 % (ref 0.3–6.2)
ERYTHROCYTE [DISTWIDTH] IN BLOOD BY AUTOMATED COUNT: 12.5 % (ref 12.3–15.4)
ERYTHROCYTE [SEDIMENTATION RATE] IN BLOOD: 16 MM/HR (ref 0–30)
GLUCOSE SERPL-MCNC: 93 MG/DL (ref 65–99)
HCT VFR BLD AUTO: 37.2 % (ref 34–46.6)
HGB BLD-MCNC: 11.8 G/DL (ref 12–15.9)
IMM GRANULOCYTES # BLD AUTO: 0.01 10*3/MM3 (ref 0–0.05)
IMM GRANULOCYTES NFR BLD AUTO: 0.2 % (ref 0–0.5)
LYMPHOCYTES # BLD AUTO: 1.32 10*3/MM3 (ref 0.7–3.1)
LYMPHOCYTES NFR BLD AUTO: 21.9 % (ref 19.6–45.3)
MCH RBC QN AUTO: 28 PG (ref 26.6–33)
MCHC RBC AUTO-ENTMCNC: 31.7 G/DL (ref 31.5–35.7)
MCV RBC AUTO: 88.2 FL (ref 79–97)
MONOCYTES # BLD AUTO: 0.48 10*3/MM3 (ref 0.1–0.9)
MONOCYTES NFR BLD AUTO: 8 % (ref 5–12)
NEUTROPHILS NFR BLD AUTO: 4.15 10*3/MM3 (ref 1.7–7)
NEUTROPHILS NFR BLD AUTO: 68.9 % (ref 42.7–76)
NRBC BLD AUTO-RTO: 0 /100 WBC (ref 0–0.2)
PLATELET # BLD AUTO: 245 10*3/MM3 (ref 140–450)
PMV BLD AUTO: 9.6 FL (ref 6–12)
POTASSIUM SERPL-SCNC: 5 MMOL/L (ref 3.5–5.2)
RBC # BLD AUTO: 4.22 10*6/MM3 (ref 3.77–5.28)
SODIUM SERPL-SCNC: 141 MMOL/L (ref 136–145)
TSH SERPL DL<=0.05 MIU/L-ACNC: 5.43 UIU/ML (ref 0.27–4.2)
WBC NRBC COR # BLD: 6.02 10*3/MM3 (ref 3.4–10.8)

## 2023-03-17 PROCEDURE — 25010000002 HYDROMORPHONE PER 4 MG: Performed by: STUDENT IN AN ORGANIZED HEALTH CARE EDUCATION/TRAINING PROGRAM

## 2023-03-17 PROCEDURE — 86140 C-REACTIVE PROTEIN: CPT | Performed by: STUDENT IN AN ORGANIZED HEALTH CARE EDUCATION/TRAINING PROGRAM

## 2023-03-17 PROCEDURE — G0378 HOSPITAL OBSERVATION PER HR: HCPCS

## 2023-03-17 PROCEDURE — 84443 ASSAY THYROID STIM HORMONE: CPT | Performed by: STUDENT IN AN ORGANIZED HEALTH CARE EDUCATION/TRAINING PROGRAM

## 2023-03-17 PROCEDURE — 97162 PT EVAL MOD COMPLEX 30 MIN: CPT

## 2023-03-17 PROCEDURE — 82550 ASSAY OF CK (CPK): CPT | Performed by: STUDENT IN AN ORGANIZED HEALTH CARE EDUCATION/TRAINING PROGRAM

## 2023-03-17 PROCEDURE — 97530 THERAPEUTIC ACTIVITIES: CPT

## 2023-03-17 PROCEDURE — 96361 HYDRATE IV INFUSION ADD-ON: CPT

## 2023-03-17 PROCEDURE — 96376 TX/PRO/DX INJ SAME DRUG ADON: CPT

## 2023-03-17 PROCEDURE — 85025 COMPLETE CBC W/AUTO DIFF WBC: CPT | Performed by: INTERNAL MEDICINE

## 2023-03-17 PROCEDURE — 97166 OT EVAL MOD COMPLEX 45 MIN: CPT

## 2023-03-17 PROCEDURE — 80048 BASIC METABOLIC PNL TOTAL CA: CPT | Performed by: INTERNAL MEDICINE

## 2023-03-17 PROCEDURE — 96375 TX/PRO/DX INJ NEW DRUG ADDON: CPT

## 2023-03-17 PROCEDURE — 85652 RBC SED RATE AUTOMATED: CPT | Performed by: STUDENT IN AN ORGANIZED HEALTH CARE EDUCATION/TRAINING PROGRAM

## 2023-03-17 PROCEDURE — 36415 COLL VENOUS BLD VENIPUNCTURE: CPT | Performed by: INTERNAL MEDICINE

## 2023-03-17 RX ORDER — LIDOCAINE 50 MG/G
1 PATCH TOPICAL
Status: DISCONTINUED | OUTPATIENT
Start: 2023-03-17 | End: 2023-03-19 | Stop reason: HOSPADM

## 2023-03-17 RX ORDER — CLOPIDOGREL BISULFATE 75 MG/1
75 TABLET ORAL DAILY
Status: DISCONTINUED | OUTPATIENT
Start: 2023-03-17 | End: 2023-03-19 | Stop reason: HOSPADM

## 2023-03-17 RX ORDER — LEVOTHYROXINE SODIUM 88 UG/1
88 TABLET ORAL
Status: DISCONTINUED | OUTPATIENT
Start: 2023-03-18 | End: 2023-03-19 | Stop reason: HOSPADM

## 2023-03-17 RX ORDER — HYDROMORPHONE HYDROCHLORIDE 1 MG/ML
0.5 INJECTION, SOLUTION INTRAMUSCULAR; INTRAVENOUS; SUBCUTANEOUS
Status: DISCONTINUED | OUTPATIENT
Start: 2023-03-17 | End: 2023-03-19 | Stop reason: HOSPADM

## 2023-03-17 RX ORDER — SODIUM CHLORIDE, SODIUM LACTATE, POTASSIUM CHLORIDE, CALCIUM CHLORIDE 600; 310; 30; 20 MG/100ML; MG/100ML; MG/100ML; MG/100ML
100 INJECTION, SOLUTION INTRAVENOUS CONTINUOUS
Status: DISCONTINUED | OUTPATIENT
Start: 2023-03-17 | End: 2023-03-18

## 2023-03-17 RX ORDER — AMLODIPINE BESYLATE 5 MG/1
5 TABLET ORAL DAILY
Status: DISCONTINUED | OUTPATIENT
Start: 2023-03-17 | End: 2023-03-18

## 2023-03-17 RX ORDER — LATANOPROST 50 UG/ML
1 SOLUTION/ DROPS OPHTHALMIC NIGHTLY
Status: DISCONTINUED | OUTPATIENT
Start: 2023-03-17 | End: 2023-03-19 | Stop reason: HOSPADM

## 2023-03-17 RX ORDER — MELATONIN
1000 2 TIMES DAILY
Status: DISCONTINUED | OUTPATIENT
Start: 2023-03-17 | End: 2023-03-19 | Stop reason: HOSPADM

## 2023-03-17 RX ORDER — CHOLECALCIFEROL (VITAMIN D3) 125 MCG
1000 CAPSULE ORAL EVERY OTHER DAY
Status: DISCONTINUED | OUTPATIENT
Start: 2023-03-17 | End: 2023-03-19 | Stop reason: HOSPADM

## 2023-03-17 RX ORDER — MULTIPLE VITAMINS W/ MINERALS TAB 9MG-400MCG
1 TAB ORAL DAILY
Status: DISCONTINUED | OUTPATIENT
Start: 2023-03-17 | End: 2023-03-19 | Stop reason: HOSPADM

## 2023-03-17 RX ORDER — AMOXICILLIN 250 MG
2 CAPSULE ORAL 2 TIMES DAILY
Status: DISCONTINUED | OUTPATIENT
Start: 2023-03-17 | End: 2023-03-19 | Stop reason: HOSPADM

## 2023-03-17 RX ADMIN — LATANOPROST 1 DROP: 50 SOLUTION OPHTHALMIC at 20:59

## 2023-03-17 RX ADMIN — CLOPIDOGREL BISULFATE 75 MG: 75 TABLET, FILM COATED ORAL at 16:32

## 2023-03-17 RX ADMIN — DOCUSATE SODIUM 50MG AND SENNOSIDES 8.6MG 2 TABLET: 8.6; 5 TABLET, FILM COATED ORAL at 20:59

## 2023-03-17 RX ADMIN — SODIUM CHLORIDE, POTASSIUM CHLORIDE, SODIUM LACTATE AND CALCIUM CHLORIDE 100 ML/HR: 600; 310; 30; 20 INJECTION, SOLUTION INTRAVENOUS at 01:44

## 2023-03-17 RX ADMIN — DOCUSATE SODIUM 50MG AND SENNOSIDES 8.6MG 2 TABLET: 8.6; 5 TABLET, FILM COATED ORAL at 10:18

## 2023-03-17 RX ADMIN — HYDROMORPHONE HYDROCHLORIDE 0.5 MG: 1 INJECTION, SOLUTION INTRAMUSCULAR; INTRAVENOUS; SUBCUTANEOUS at 16:33

## 2023-03-17 RX ADMIN — AMLODIPINE BESYLATE 5 MG: 5 TABLET ORAL at 16:33

## 2023-03-17 RX ADMIN — SODIUM CHLORIDE, POTASSIUM CHLORIDE, SODIUM LACTATE AND CALCIUM CHLORIDE 100 ML/HR: 600; 310; 30; 20 INJECTION, SOLUTION INTRAVENOUS at 21:00

## 2023-03-17 RX ADMIN — Medication 1000 UNITS: at 20:59

## 2023-03-17 RX ADMIN — SODIUM CHLORIDE, POTASSIUM CHLORIDE, SODIUM LACTATE AND CALCIUM CHLORIDE 100 ML/HR: 600; 310; 30; 20 INJECTION, SOLUTION INTRAVENOUS at 12:33

## 2023-03-17 RX ADMIN — HYDROMORPHONE HYDROCHLORIDE 0.5 MG: 1 INJECTION, SOLUTION INTRAMUSCULAR; INTRAVENOUS; SUBCUTANEOUS at 10:34

## 2023-03-17 RX ADMIN — LIDOCAINE 1 PATCH: 50 PATCH TOPICAL at 01:29

## 2023-03-17 RX ADMIN — Medication 1000 UNITS: at 16:33

## 2023-03-17 RX ADMIN — HYDROMORPHONE HYDROCHLORIDE 0.5 MG: 1 INJECTION, SOLUTION INTRAMUSCULAR; INTRAVENOUS; SUBCUTANEOUS at 21:08

## 2023-03-17 RX ADMIN — Medication 1000 MCG: at 16:33

## 2023-03-17 RX ADMIN — METRONIDAZOLE 1 APPLICATION: 7.5 CREAM TOPICAL at 16:34

## 2023-03-17 RX ADMIN — ACETAMINOPHEN 650 MG: 325 TABLET, FILM COATED ORAL at 01:29

## 2023-03-17 RX ADMIN — TRIAMCINOLONE ACETONIDE 1 APPLICATION: 1 OINTMENT TOPICAL at 16:34

## 2023-03-17 RX ADMIN — MULTIPLE VITAMINS W/ MINERALS TAB 1 TABLET: TAB at 16:32

## 2023-03-17 NOTE — PROGRESS NOTES
Name: Marianne Delgado ADMIT: 3/16/2023   : 1925  PCP: Provider, No Known    MRN: [id number] LOS: 0 days   AGE/SEX: 89 y.o. female  ROOM: [facility]     Subjective   Subjective   Continues to complain of significant back pain across the lower back without any radiation.    Objective   Objective   Vital Signs  Temp:  [97.3 °F (36.3 °C)-98.2 °F (36.8 °C)] 97.4 °F (36.3 °C)  Heart Rate:  [58-79] 59  Resp:  [14-18] 14  BP: (129-173)/(53-90) 148/57  SpO2:  [63 %-99 %] 63 %  on   ;   Device (Oxygen Therapy): room air  Body mass index is 39.24 kg/m².  Physical Exam  Constitutional:       Appearance: She is ill-appearing. She is not toxic-appearing.   Cardiovascular:      Rate and Rhythm: Normal rate and regular rhythm.      Pulses: Normal pulses.      Heart sounds: No murmur heard.  Pulmonary:      Effort: Pulmonary effort is normal.      Breath sounds: Normal breath sounds.   Abdominal:      General: Abdomen is flat. There is no distension.      Palpations: Abdomen is soft.      Tenderness: There is no abdominal tenderness.   Musculoskeletal:      Right lower leg: No edema.      Left lower leg: No edema.      Comments: Bilateral lower extremities 4/5 strength; bilateral upper extremities 5/5 strength   Skin:     Findings: Bruising present.   Neurological:      General: No focal deficit present.      Mental Status: She is alert and oriented to person, place, and time.   Psychiatric:         Mood and Affect: Mood normal.         Results Review     I reviewed the patient's new clinical results.  Results from last 7 days   Lab Units 23  0617 23  1343   WBC 10*3/mm3 6.02 7.09   HEMOGLOBIN g/dL 11.8* 12.9   PLATELETS 10*3/mm3 245 286     Results from last 7 days   Lab Units 23  0617 23  1700   SODIUM mmol/L 141 141   POTASSIUM mmol/L 5.0 4.5   CHLORIDE mmol/L 110* 110*   CO2 mmol/L 20.9* 19.1*   BUN mg/dL 25* 27*   CREATININE mg/dL 2.81* 2.98*   GLUCOSE mg/dL 93 94   EGFR mL/min/1.73 14.9*  13.8*     Results from last 7 days   Lab Units 03/16/23  1700   ALBUMIN g/dL 3.9   BILIRUBIN mg/dL 0.4   ALK PHOS U/L 113   AST (SGOT) U/L 16   ALT (SGPT) U/L 13     Results from last 7 days   Lab Units 03/17/23  0617 03/16/23  1700   CALCIUM mg/dL 10.1* 10.0*   ALBUMIN g/dL  --  3.9       No results found for: HGBA1C, POCGLU    CT Abdomen Pelvis Without Contrast    Result Date: 3/16/2023   1.  No renal stones or hydronephrosis. 2.  Cholelithiasis. 3.  Tiny hiatal hernia. Colonic diverticulosis.  Discussed with Dr. Saucedo at approximately 4:00 PM.  This report was finalized on 3/16/2023 4:05 PM by Dr. Patti Kirk M.D.      Scheduled Medications  amLODIPine, 5 mg, Oral, Daily  cholecalciferol, 1,000 Units, Oral, BID  clopidogrel, 75 mg, Oral, Daily  latanoprost, 1 drop, Both Eyes, Nightly  levothyroxine, 88 mcg, Oral, Daily  lidocaine, 1 patch, Transdermal, Q24H  metroNIDAZOLE, 1 application, Topical, Q12H  multivitamin with minerals, 1 tablet, Oral, Daily  senna-docusate sodium, 2 tablet, Oral, BID  triamcinolone, 1 application, Topical, Q12H  vitamin B-12, 1,000 mcg, Oral, Every Other Day    Infusions  lactated ringers, 100 mL/hr, Last Rate: 100 mL/hr (03/17/23 1233)    Diet  Diet: Regular/House Diet; Texture: Regular Texture (IDDSI 7); Fluid Consistency: Thin (IDDSI 0)       Assessment/Plan     Active Hospital Problems    Diagnosis  POA   • **Acute left-sided low back pain without sciatica [M54.50]  Yes   • Vitamin B12 deficiency [E53.8]  Yes   • History of CVA (cerebrovascular accident) [Z86.73]  Not Applicable   • Stage 4 chronic kidney disease (HCC) [N18.4]  Yes   • Essential hypertension [I10]  Yes   • Acquired hypothyroidism [E03.9]  Yes      Resolved Hospital Problems   No resolved problems to display.       89 y.o. female admitted with Acute left-sided low back pain without sciatica.      03/17/23  Plan check CT spine.  Follow-up PT/OT.    L back pain without sciatica  -CT A/P (3/16/2023): No renal  stones or hydronephrosis; cholelithiasis; tiny hiatal hernia; diverticulosis  -Check CT C/T/L-spine  -Lidocaine patch; Dilaudid 0.5 mg every 2 hours as needed to work with therapy; bowel regimen    Recent UTI  -UA bland  -was on linezolid    HTN  -Amlodipine 5 mg    History of CVA  -Plavix 75 mg    PRABHA on CKD4  -Crt 2.98 OA (b/l ~2.2? 2022) > 2.81  -per OP nephrology notes, no plans for HD, if patient develops uremia plan will be Hospice    Hypothyroid  -TSH 1.6 (2/1/22); will recheck  -Synthroid 88 mcg    · SCDs for DVT prophylaxis.  · DNR.  · Discussed with patient.  · [facility] TBD      Iam Keller MD  [facility]  03/17/23  12:49 EDT

## 2023-03-17 NOTE — THERAPY EVALUATION
Patient Name: Marianne Delgado  : 1925    MRN: [id number]                              Today's Date: 3/17/2023       Admit Date: 3/16/2023    Visit Dx:     ICD-10-CM ICD-9-CM   1. Acute left-sided low back pain without sciatica  M54.50 724.2   2. Dysuria  R30.0 788.1   3. PRABHA (acute kidney injury) ([facility])  N17.9 584.9     Patient Active Problem List   Diagnosis   • Arthritis   • Stage 4 chronic kidney disease ([facility])   • Debility   • Foot pain, bilateral   • Acute idiopathic gout of right ankle   • Hematuria   • Essential hypertension   • Acquired hypothyroidism   • Osteopenia   • Psoriasis   • Rosacea   • Vitamin D deficiency   • Medicare annual wellness visit, subsequent   • Morbidly obese ([facility])   • Cerebrovascular accident (CVA) ([facility])   • Stroke (cerebrum) ([facility])   • Vitamin B12 deficiency   • Primary open-angle glaucoma, bilateral, severe stage   • Hyperparathyroidism ([facility])   • Weight loss   • Acute left-sided low back pain without sciatica     Past Medical History:   Diagnosis Date   • Acute sinusitis    • Acute upper respiratory infection    • Arthritis    • CKD (chronic kidney disease)    • Debility    • Fatigue    • Foot pain, bilateral    • Glaucoma    • Gout    • Hematuria    • High blood pressure    • Hypertension    • Hypothyroid 1999   • Hypothyroidism    • IBS (irritable bowel syndrome)    • IGT (impaired glucose tolerance)    • Malaise and fatigue    • Medication management    • Melanoma ([facility]) 1979    left leg   • OA (osteoarthritis)    • Osteopenia 1999   • Painful joint    • Psoriasis    • Renal failure    • Rosacea    • Vitamin D deficiency      Past Surgical History:   Procedure Laterality Date   • CATARACT EXTRACTION     • FOOT SURGERY      mauri toe surgery   • OTHER SURGICAL HISTORY      Melanoma Excision: Left leg      General Information     Row Name 23 1125          OT Time and Intention    Document Type evaluation  -MW     Mode of Treatment co-treatment;occupational therapy   -     Row Name 03/17/23 1125          General Information    Patient Profile Reviewed yes  -MW     Prior Level of Function min assist:;ADL's;independent:;all household mobility;transfer  pt reports mod (I) with use of rollator for mobility, assist with ADLs from caregiver, caregiver present 6 days/week for 8-12 hours at a time  -     Existing Precautions/Restrictions fall  -     Barriers to Rehab none identified  -     Row Name 03/17/23 1125          Living Environment    People in Home facility resident  [facility]  -     Row Name 03/17/23 1125          Cognition    Orientation Status (Cognition) oriented x 3  -     Row Name 03/17/23 1125          Safety Issues, Functional Mobility    Impairments Affecting Function (Mobility) balance;endurance/activity tolerance;strength;pain;postural/trunk control  -           User Key  (r) = Recorded By, (t) = Taken By, (c) = Cosigned By    Initials Name Provider Type     Terri Doe OT Occupational Therapist                 Mobility/ADL's     Row Name 03/17/23 1126          Bed Mobility    Bed Mobility scooting/bridging;supine-sit;sit-supine  -     Scooting/Bridging [place] (Bed Mobility) maximum assist (25% patient effort);2 person assist  -     Supine-Sit [place] (Bed Mobility) maximum assist (25% patient effort);2 person assist;verbal cues  -     Sit-Supine [place] (Bed Mobility) maximum assist (25% patient effort);2 person assist;verbal cues  -     Assistive Device (Bed Mobility) bed rails;head of bed elevated  -     Comment, (Bed Mobility) increased time to complete, cues for hand placement  -     Row Name 03/17/23 1126          Transfers    Transfers sit-stand transfer;toilet transfer  -[facility] Name 03/17/23 1126          Sit-Stand Transfer    Sit-Stand [place] (Transfers) maximum assist (25% patient effort);dependent (less than 25% patient effort);2 person assist;nonverbal cues (demo/gesture);verbal cues  -      Comment, (Sit-Stand Transfer) x2 STS from EOB, unable to clear bottom from bed, increased pain with attempts  -     Row Name 03/17/23 1126          Toilet Transfer    Comment, (Toilet Transfer) not safe to transfer to [facility] [place]  -     Row Name 03/17/23 1126          Activities of Daily Living    BADL Assessment/Intervention lower body dressing;toileting;feeding  -     Row Name 03/17/23 1126          Lower Body Dressing Assessment/Training    [place] Level (Lower Body Dressing) don;doff;shoes/slippers;maximum assist (25% patient effort);dependent (less than 25% patient effort)  -MW     Position (Lower Body Dressing) edge of bed sitting  -     Row Name 03/17/23 1126          Toileting Assessment/Training    [place] Level (Toileting) dependent (less than 25% patient effort)  -MW     Position (Toileting) supine  -     Comment, (Toileting) purewick and brief donned  -[facility] Name 03/17/23 1126          Self-Feeding Assessment/Training    Comment, (Feeding) BUE WFL hand to mouth  -           User Key  (r) = Recorded By, (t) = Taken By, (c) = Cosigned By    Initials Name Provider Type    MW Terri Doe OT Occupational Therapist               Obj/Interventions     Row Name 03/17/23 1128          Sensory Assessment (Somatosensory)    Sensory Assessment (Somatosensory) UE sensation intact  -[facility] Name 03/17/23 1128          Vision Assessment/Intervention    Visual Impairment/Limitations WFL  -[facility] Name 03/17/23 1128          Range of Motion Comprehensive    General Range of Motion bilateral upper extremity ROM WNL  -[facility] Name 03/17/23 1128          Strength Comprehensive (MMT)    General Manual Muscle Testing (MMT) Assessment upper extremity strength deficits identified  -     Comment, General Manual Muscle Testing (MMT) Assessment generalized weakness, BUE grossly 3+/5  -MW     Row Name 03/17/23 1128          Motor Skills    Motor Skills functional endurance  -      Functional Endurance poor, limited by pain  -     Row Name 03/17/23 1128          Balance    Balance Assessment sitting static balance;sit to stand dynamic balance  -     Static Sitting Balance standby assist  -     Dynamic Sitting Balance not tested  -     Position, Sitting Balance sitting edge of bed  -     Sit to Stand Dynamic Balance maximum assist;dependent;2-person assist  -MW           User Key  (r) = Recorded By, (t) = Taken By, (c) = Cosigned By    Initials Name Provider Type     Terri Doe, CHRIS Occupational Therapist               Goals/Plan     Row Name 03/17/23 1133          Transfer Goal 1 (OT)    Activity/Assistive Device (Transfer Goal 1, OT) sit-to-stand/stand-to-sit;bed-to-chair/chair-to-bed;toilet;commode, 3-in-1  -MW     [place] Level/Cues Needed (Transfer Goal 1, OT) moderate assist (50-74% patient effort)  -MW     Time Frame (Transfer Goal 1, OT) short term goal (STG);2 weeks  -MW     Progress/Outcome (Transfer Goal 1, OT) goal ongoing  -[facility] Name 03/17/23 1133          Dressing Goal 1 (OT)    Activity/Device (Dressing Goal 1, OT) lower body dressing;upper body dressing  -     [place]/Cues Needed (Dressing Goal 1, OT) moderate assist (50-74% patient effort)  -MW     Time Frame (Dressing Goal 1, OT) short term goal (STG);2 weeks  -MW     Progress/Outcome (Dressing Goal 1, OT) goal ongoing  -[facility] Name 03/17/23 1133          Toileting Goal 1 (OT)    Activity/Device (Toileting Goal 1, OT) toileting skills, all  -MW     [place] Level/Cues Needed (Toileting Goal 1, OT) moderate assist (50-74% patient effort)  -MW     Time Frame (Toileting Goal 1, OT) short term goal (STG);2 weeks  -MW     Progress/Outcome (Toileting Goal 1, OT) goal ongoing  -     Row Name 03/17/23 1133          Grooming Goal 1 (OT)    Activity/Device (Grooming Goal 1, OT) grooming skills, all  -MW     [place] (Grooming Goal 1, OT) contact guard required  -     Time Frame  (Grooming Goal 1, OT) short term goal (STG);2 weeks  -MW     Progress/Outcome (Grooming Goal 1, OT) goal ongoing  -MW     Row Name 03/17/23 1133          Therapy Assessment/Plan (OT)    Planned Therapy Interventions (OT) activity tolerance training;functional balance retraining;BADL retraining;neuromuscular control/coordination retraining;patient/caregiver education/training;transfer/mobility retraining;strengthening exercise;ROM/therapeutic exercise;occupation/activity based interventions  -MW           User Key  (r) = Recorded By, (t) = Taken By, (c) = Cosigned By    Initials Name Provider Type    MW Terri Doe, CHRIS Occupational Therapist               Clinical Impression     Row Name 03/17/23 1130          Pain Assessment    Pretreatment Pain Rating 10/10  -MW     Posttreatment Pain Rating 10/10  -MW     Pain Location lower  -MW     Pain Location - back  -     Row Name 03/17/23 1131          Plan of Care Review    Plan of Care Reviewed With patient;caregiver  -MW     Progress no change  -MW     Outcome Evaluation Pt is a 89 yo female admitted with UTI and severe low back pain.  Pt resides at [facility] with caregiver support 8-12 hours/day for 6 days/week. Pt seen this date for OT darlene, A&Oxs3, reports use of rollator with mobility, assist with ADLs provided by caregiver, reports x1 fall 2 months ago in shower. Pt presenting with severe pain, decreased act tolerance, impaired functional transfers and mobility, impaired balance, generalized weakness all impacting (I) with ADLs. Pt required max a x2 for bed mob, SBA for sitting balance, max/dep for LBD, STS x2 with depx2 and unable to clear bottom, not safe to attempt further transfers. Pt will continue to benefit from skilled OT to address deficits and progress toward prior level. Recommending SNF at d/c.  -MW     Row Name 03/17/23 1138          Therapy Assessment/Plan (OT)    Rehab Potential (OT) good, to achieve stated therapy goals  -     Criteria for  Skilled Therapeutic Interventions Met (OT) yes;meets criteria;skilled treatment is necessary  -MW     Therapy Frequency (OT) 5 times/wk  -     Row Name 03/17/23 1130          Therapy Plan Review/Discharge Plan (OT)    Anticipated Discharge Disposition (OT) skilled nursing facility  -     Row Name 03/17/23 1130          Vital Signs    O2 Delivery Pre Treatment room air  -MW     Pre Patient Position Supine  -MW     Intra Patient Position Standing  -MW     Post Patient Position Supine  -MW     Row Name 03/17/23 1130          Positioning and Restraints    Pre-Treatment Position in bed  -MW     Post Treatment Position bed  -MW     In Bed notified nsg;fowlers;call light within reach;encouraged to call for assist;exit alarm on;side rails up x3  -MW           User Key  (r) = Recorded By, (t) = Taken By, (c) = Cosigned By    Initials Name Provider Type    Terri Knowles OT Occupational Therapist               Outcome Measures     Row Name 03/17/23 1133          How much help from another is currently needed...    Putting on and taking off regular lower body clothing? 1  -MW     Bathing (including washing, rinsing, and drying) 1  -MW     Toileting (which includes using toilet bed pan or urinal) 1  -MW     Putting on and taking off regular upper body clothing 2  -MW     Taking care of personal grooming (such as brushing teeth) 3  -MW     Eating meals 3  -MW     AM-PAC 6 Clicks Score (OT) 11  -MW     Row Name 03/17/23 1133          Modified [place] Scale    Modified [place] Scale 4 - Moderately severe disability.  Unable to walk without assistance, and unable to attend to own bodily needs without assistance.  -     Row Name 03/17/23 1133          Functional Assessment    Outcome Measure Options AM-PAC 6 Clicks Daily Activity (OT);Modified Lb  -           User Key  (r) = Recorded By, (t) = Taken By, (c) = Cosigned By    Initials Name Provider Type    Terri Knowles OT Occupational Therapist                 Occupational Therapy Education     Title: PT OT SLP Therapies (In Progress)     Topic: Occupational Therapy (In Progress)     Point: ADL training (Done)     Description:   Instruct learner(s) on proper safety adaptation and remediation techniques during self care or transfers.   Instruct in proper use of assistive devices.              Learning Progress Summary           Patient Acceptance, E, VU by  at 3/17/2023 1134    Comment: role of OT, d/c rec   Caregiver Acceptance, E, VU by  at 3/17/2023 1134    Comment: role of OT, d/c rec                   Point: Home exercise program (Not Started)     Description:   Instruct learner(s) on appropriate technique for monitoring, assisting and/or progressing therapeutic exercises/activities.              Learner Progress:  Not documented in this visit.          Point: Precautions (Done)     Description:   Instruct learner(s) on prescribed precautions during self-care and functional transfers.              Learning Progress Summary           Patient Acceptance, E, VU by  at 3/17/2023 1134    Comment: role of OT, d/c rec   Caregiver Acceptance, E, VU by  at 3/17/2023 1134    Comment: role of OT, d/c rec                   Point: Body mechanics (Done)     Description:   Instruct learner(s) on proper positioning and spine alignment during self-care, functional mobility activities and/or exercises.              Learning Progress Summary           Patient Acceptance, E, VU by  at 3/17/2023 1134    Comment: role of OT, d/c rec   Caregiver Acceptance, E, VU by  at 3/17/2023 1134    Comment: role of OT, d/c rec                               User Key     Initials Effective Dates Name Provider Type Discipline     08/20/21 -  Terri Doe OT Occupational Therapist OT              OT Recommendation and Plan  Planned Therapy Interventions (OT): activity tolerance training, functional balance retraining, BADL retraining, neuromuscular control/coordination  retraining, patient/caregiver education/training, transfer/mobility retraining, strengthening exercise, ROM/therapeutic exercise, occupation/activity based interventions  Therapy Frequency (OT): 5 times/wk  Plan of Care Review  Plan of Care Reviewed With: patient, caregiver  Progress: no change  Outcome Evaluation: Pt is a 89 yo female admitted with UTI and severe low back pain.  Pt resides at [facility] with caregiver support 8-12 hours/day for 6 days/week. Pt seen this date for OT eval, A&Oxs3, reports use of rollator with mobility, assist with ADLs provided by caregiver, reports x1 fall 2 months ago in shower. Pt presenting with severe pain, decreased act tolerance, impaired functional transfers and mobility, impaired balance, generalized weakness all impacting (I) with ADLs. Pt required max a x2 for bed mob, SBA for sitting balance, max/dep for LBD, STS x2 with depx2 and unable to clear bottom, not safe to attempt further transfers. Pt will continue to benefit from skilled OT to address deficits and progress toward prior level. Recommending SNF at d/c.     Time Calculation:    Time Calculation- OT     Row Name 03/17/23 1134             Time Calculation- OT    OT Start Time 1058  -MW      OT Stop Time 1116  -MW      OT Time Calculation (min) 18 min  -MW      Total Timed Code Minutes- OT 11 minute(s)  -MW      OT Received On 03/17/23  -MW      OT - Next Appointment 03/20/23  -MW      OT Goal Re-Cert Due Date 03/31/23  -MW         Timed Charges    36804 - OT Therapeutic Activity Minutes 11  -MW         Untimed Charges    OT Eval/Re-eval Minutes 7  -MW         Total Minutes    Timed Charges Total Minutes 11  -MW      Untimed Charges Total Minutes 7  -MW       Total Minutes 18  -MW            User Key  (r) = Recorded By, (t) = Taken By, (c) = Cosigned By    Initials Name Provider Type    Terri Knowles OT Occupational Therapist              Therapy Charges for Today     Code Description Service Date Service Provider  Modifiers Qty    27893658036 HC OT THERAPEUTIC ACT EA 15 MIN 3/17/2023 Terri Doe OT GO 1    31593612623 HC OT EVAL MOD COMPLEXITY 2 3/17/2023 Terri Doe OT GO 1               Terri Doe OT  3/17/2023

## 2023-03-17 NOTE — H&P
HISTORY AND PHYSICAL   [facility]        Date of Admission: 3/16/2023  Patient Identification:  Name: Marianne Delgado  Age: 89 y.o.  Sex: female  :  1925  MRN: [id number]                     Primary Care Physician: Provider, No Known    Chief Complaint:  89 year old female who presented to the emergency room with back ain which started a week ago; she denies injury; it became worse yesterday; she denies fever or chills; she has been taking antibiotics for a uti but has not noted any improvement; a caregiver is at the bedside to help answer questions    History of Present Illness:   As above    Past Medical History:  Past Medical History:   Diagnosis Date   • Acute sinusitis    • Acute upper respiratory infection    • Arthritis    • CKD (chronic kidney disease)    • Debility    • Fatigue    • Foot pain, bilateral    • Glaucoma    • Gout    • Hematuria    • High blood pressure    • Hypertension    • Hypothyroid 1999   • Hypothyroidism    • IBS (irritable bowel syndrome)    • IGT (impaired glucose tolerance)    • Malaise and fatigue    • Medication management    • Melanoma (HCC) 1979    left leg   • OA (osteoarthritis)    • Osteopenia 1999   • Painful joint    • Psoriasis    • Renal failure    • Rosacea    • Vitamin D deficiency      Past Surgical History:  Past Surgical History:   Procedure Laterality Date   • CATARACT EXTRACTION     • FOOT SURGERY      mauri toe surgery   • OTHER SURGICAL HISTORY      Melanoma Excision: Left leg      Home Meds:  Medications Prior to Admission   Medication Sig Dispense Refill Last Dose   • amLODIPine (NORVASC) 5 MG tablet Take 5 mg by mouth 2 (Two) Times a Day.      • Betamethasone Valerate 0.12 % foam       • bimatoprost (LUMIGAN) 0.01 % ophthalmic drops 1 drop Every Night.      • cholecalciferol (VITAMIN D3) 25 MCG (1000 UT) tablet TAKE ONE TABLET BY MOUTH TWO TIMES A  tablet 1    • clobetasol (TEMOVATE) 0.05 % ointment Apply  topically to the  appropriate area as directed 2 (Two) Times a Day As Needed (rash). 60 g 11    • clopidogrel (PLAVIX) 75 MG tablet Take 1 tablet by mouth Daily. 90 tablet 1    • dorzolamide-timolol (COSOPT) 22.3-6.8 MG/ML ophthalmic solution 1 drop 2 (Two) Times a Day.      • levothyroxine (SYNTHROID, LEVOTHROID) 88 MCG tablet Take 1 tablet by mouth Daily. 90 tablet 0    • metroNIDAZOLE (METROCREAM) 0.75 % cream Apply 1 application topically to the appropriate area as directed 2 (Two) Times a Day.      • multivitamin with minerals (CENTRUM SILVER PO) Take 1 tablet by mouth Daily.      • triamcinolone (KENALOG) 0.1 % ointment Apply 1 application topically to the appropriate area as directed 2 (Two) Times a Day.      • vitamin B-12 (CYANOCOBALAMIN) 1000 MCG tablet 1 po qd x 3 days in the week 90 tablet 1        Allergies:  Allergies   Allergen Reactions   • Atorvastatin Nausea And Vomiting   • Azithromycin Diarrhea   • Cefdinir Nausea Only     nausea   • Doxycycline Monohydrate Nausea Only   • Allopurinol Rash     Psoriasis  rash     Immunizations:  Immunization History   Administered Date(s) Administered   • COVID-19 (PFIZER) PURPLE CAP 03/03/2021, 03/24/2021   • Flu Vaccine Split Quad 09/12/2018   • FluLaval/Fluzone >6mos 09/11/2018   • Fluad Quad 65+ 09/11/2018, 10/20/2020   • Fluzone High Dose =>65 Years ([employer] ONLY) 12/13/2016, 09/12/2018, 10/08/2019   • Pneumococcal Conjugate 13-Valent (PCV13) 12/13/2015   • Pneumococcal Polysaccharide (PPSV23) 03/11/2020     Social History:   Social History     Social History Narrative   • Not on file     Social History     Socioeconomic History   • Marital status:    Tobacco Use   • Smoking status: Never   • Smokeless tobacco: Never   Substance and Sexual Activity   • Alcohol use: Yes     Alcohol/week: 2.0 standard drinks     Types: 1 Glasses of wine, 1 Shots of liquor per week     Comment: occasionally   • Drug use: No       Family History:  Family History   Problem Relation Age of  "Onset   • Heart attack Mother    • Kidney failure Father    • Heart disease Sister    • Leukemia Brother    • Heart disease Sister    • Heart disease Sister    • Heart disease Other         FH in females b/f 65 and males b/f 55        Review of Systems  See history of present illness and past medical history.  Patient denies headache, dizziness, syncope, falls, trauma, change in vision, change in hearing, change in taste, changes in weight, changes in appetite, focal weakness, numbness, or paresthesia.  Patient denies chest pain, palpitations, dyspnea, orthopnea, PND, cough, sinus pressure, rhinorrhea, epistaxis, hemoptysis, nausea, vomiting,hematemesis, diarrhea, constipation or hematchezia.  Denies cold or heat intolerance, polydipsia, polyuria, polyphagia. Denies hematuria, pyuria, dysuria, hesitancy, frequency or urgency. Denies consumption of raw and under cooked meats foods or change in water source.  Denies fever, chills, sweats, night sweats.  Denies missing any routine medications. Remainder of ROS is negative.    Objective:  T Max 24 hrs: Temp (24hrs), Av.8 °F (36.6 °C), Min:97.4 °F (36.3 °C), Max:98.2 °F (36.8 °C)    Vitals Ranges:   Temp:  [97.4 °F (36.3 °C)-98.2 °F (36.8 °C)] 98.2 °F (36.8 °C)  Heart Rate:  [69-79] 69  Resp:  [16-18] 18  BP: (129-173)/(53-90) 154/63      Exam:  /63   Pulse 69   Temp 98.2 °F (36.8 °C)   Resp 18   Ht 154.9 cm (61\")   SpO2 99%   BMI 34.77 kg/m²     General Appearance:    Alert, cooperative, no distress, appears stated age   Head:    Normocephalic, without obvious abnormality, atraumatic   Eyes:    PERRL, conjunctivae/corneas clear, EOM's intact, both eyes   Ears:    Normal external ear canals, both ears   Nose:   Nares normal, septum midline, mucosa normal, no drainage    or sinus tenderness   Throat:   Lips, mucosa, and tongue normal   Neck:   Supple, symmetrical, trachea midline, no adenopathy;     thyroid:  no enlargement/tenderness/nodules; no carotid   "  bruit or JVD   Back:     Symmetric, no curvature, ROM normal, no CVA tenderness   Lungs:     Decreased breath sounds bilaterally, respirations unlabored   Chest Wall:    No tenderness or deformity    Heart:    Regular rate and rhythm, S1 and S2 normal, no murmur, rub   or gallop   Abdomen:     Soft, nontender, bowel sounds active all four quadrants,     no masses, no hepatomegaly, no splenomegaly   Extremities:   Extremities normal, atraumatic, no cyanosis or edema   Pulses:   2+ and symmetric all extremities   Skin:   Skin color, texture, turgor normal, no rashes or lesions               .    Data Review:  Labs in chart were reviewed.  WBC   Date Value Ref Range Status   03/16/2023 7.09 3.40 - 10.80 10*3/mm3 Final     Hemoglobin   Date Value Ref Range Status   03/16/2023 12.9 12.0 - 15.9 g/dL Final     Hematocrit   Date Value Ref Range Status   03/16/2023 41.1 34.0 - 46.6 % Final     Platelets   Date Value Ref Range Status   03/16/2023 286 140 - 450 10*3/mm3 Final     Sodium   Date Value Ref Range Status   03/16/2023 141 136 - 145 mmol/L Final     Potassium   Date Value Ref Range Status   03/16/2023 4.5 3.5 - 5.2 mmol/L Final     Chloride   Date Value Ref Range Status   03/16/2023 110 (H) 98 - 107 mmol/L Final     CO2   Date Value Ref Range Status   03/16/2023 19.1 (L) 22.0 - 29.0 mmol/L Final     BUN   Date Value Ref Range Status   03/16/2023 27 (H) 8 - 23 mg/dL Final     Creatinine   Date Value Ref Range Status   03/16/2023 2.98 (H) 0.57 - 1.00 mg/dL Final     Glucose   Date Value Ref Range Status   03/16/2023 94 65 - 99 mg/dL Final     Calcium   Date Value Ref Range Status   03/16/2023 10.0 (H) 8.2 - 9.6 mg/dL Final     AST (SGOT)   Date Value Ref Range Status   03/16/2023 16 1 - 32 U/L Final     ALT (SGPT)   Date Value Ref Range Status   03/16/2023 13 1 - 33 U/L Final     Alkaline Phosphatase   Date Value Ref Range Status   03/16/2023 113 39 - 117 U/L Final                Imaging Results (All)     Procedure  Component Value Units Date/Time    CT Abdomen Pelvis Without Contrast [[id number]] Collected: 03/16/23 1604     Updated: 03/16/23 1608    Narrative:      CT ABDOMEN PELVIS WO CONTRAST-     HISTORY:  Abdominal pain, urinary frequency.      TECHNIQUE:  CT of the abdomen and pelvis was performed without  intravenous contrast.  Evaluation of solid organs and vasculature is  limited without intravenous contrast.  Radiation dose reduction  techniques were utilized, including automated exposure control and  exposure modulation based on body size.     COMPARISON:  CT chest 02/01/2021     FINDINGS:     Heart size is normal. There is no pericardial effusion. There is  calcific coronary artery atherosclerosis. There are calcified lymph  nodes. There is mild bibasilar atelectasis and/or scarring. There is a  calcified granuloma in the left lower lobe. Pleural spaces are clear.  The liver is normal in size. There are scattered intrahepatic cysts and  additional hypodense foci too small to accurately characterize. There is  cholelithiasis. There is calcific splenic granulomata. There are no  pancreatic calcifications. The adrenal glands are within normal limits.  There are no renal stones or hydronephrosis. The kidneys are atrophic.  There is a hypodense focus in the inferior left kidney, too small  accurately characterize.  No pathologically enlarged abdominal or pelvic lymph nodes are  identified. There is advanced calcific aortoiliac and branch vessel  atherosclerosis. There is mild presacral fat stranding, which is  nonspecific.   There is a tiny hiatal hernia. There is no bowel obstruction. The  appendix is visualized. There is colonic diverticulosis CT evidence of  acute diverticulitis. The bladder is well distended and within normal  limits. The uterus is present. There is no adnexal mass.  There is diffuse osseous demineralization. There is multilevel  degenerative disc disease.          Impression:         1.  No renal  stones or hydronephrosis.  2.  Cholelithiasis.  3.  Tiny hiatal hernia. Colonic diverticulosis.     Discussed with Dr. Saucedo at approximately 4:00 PM.     This report was finalized on 3/16/2023 4:05 PM by Dr. Patti Kirk M.D.               Assessment:  Active Hospital Problems    Diagnosis  POA   • **Acute left-sided low back pain without sciatica [M54.50]  Yes      Resolved Hospital Problems   No resolved problems to display.   acute kidney injury      Plan:  Will continue fluids  Trend creatinine  Pt.ot to see  Tylenol for pain   Add lidocaine patch  D.w patient and caregiver as well as ED provider    Lady Ag MD  3/16/2023  21:39 EDT

## 2023-03-17 NOTE — PLAN OF CARE
Problem: Adult Inpatient Plan of Care  Goal: Plan of Care Review  Outcome: Ongoing, Progressing  Goal: Patient-Specific Goal (Individualized)  Outcome: Ongoing, Progressing  Goal: Absence of Hospital-Acquired Illness or Injury  Outcome: Ongoing, Progressing  Intervention: Identify and Manage Fall Risk  Recent Flowsheet Documentation  Taken 3/17/2023 1800 by Adenike Keyes RN  Safety Promotion/Fall Prevention:   safety round/check completed   room organization consistent  Taken 3/17/2023 1600 by Adenike Keyes RN  Safety Promotion/Fall Prevention:   safety round/check completed   room organization consistent  Taken 3/17/2023 1400 by Adenike Keyes RN  Safety Promotion/Fall Prevention:   safety round/check completed   room organization consistent  Taken 3/17/2023 1200 by Adenike Keyes RN  Safety Promotion/Fall Prevention:   safety round/check completed   room organization consistent  Taken 3/17/2023 1000 by Adenike Keyes RN  Safety Promotion/Fall Prevention:   safety round/check completed   room organization consistent  Taken 3/17/2023 0820 by Adenike Keyes RN  Safety Promotion/Fall Prevention:   safety round/check completed   room organization consistent   fall prevention program maintained   clutter free environment maintained  Intervention: Prevent Skin Injury  Recent Flowsheet Documentation  Taken 3/17/2023 0820 by Adenike Keyes RN  Skin Protection: adhesive use limited  Intervention: Prevent and Manage VTE (Venous Thromboembolism) Risk  Recent Flowsheet Documentation  Taken 3/17/2023 0820 by Adenike Keyes RN  Activity Management: activity adjusted per tolerance  Goal: Optimal Comfort and Wellbeing  Outcome: Ongoing, Progressing  Intervention: Provide Person-Centered Care  Recent Flowsheet Documentation  Taken 3/17/2023 0820 by Adenike Keyes RN  Trust Relationship/Rapport:   care explained   choices provided   emotional support provided   empathic listening provided   questions answered    questions encouraged  Goal: Readiness for Transition of Care  Outcome: Ongoing, Progressing     Problem: Hypertension Comorbidity  Goal: Blood Pressure in Desired Range  Outcome: Ongoing, Progressing  Intervention: Maintain Blood Pressure Management  Recent Flowsheet Documentation  Taken 3/17/2023 0820 by Adenike Keyes RN  Medication Review/Management: medications reviewed     Problem: Osteoarthritis Comorbidity  Goal: Maintenance of Osteoarthritis Symptom Control  Outcome: Ongoing, Progressing  Intervention: Maintain Osteoarthritis Symptom Control  Recent Flowsheet Documentation  Taken 3/17/2023 0820 by Adenike Keyes RN  Activity Management: activity adjusted per tolerance  Medication Review/Management: medications reviewed     Problem: Pain Chronic (Persistent) (Comorbidity Management)  Goal: Acceptable Pain Control and Functional Ability  Outcome: Ongoing, Progressing  Intervention: Manage Persistent Pain  Recent Flowsheet Documentation  Taken 3/17/2023 0820 by Adenike Keyes RN  Bowel Elimination Promotion: adequate fluid intake promoted  Medication Review/Management: medications reviewed  Intervention: Optimize Psychosocial Wellbeing  Recent Flowsheet Documentation  Taken 3/17/2023 0820 by Adenike Keyes RN  Supportive Measures: active listening utilized     Problem: Skin Injury Risk Increased  Goal: Skin Health and Integrity  Outcome: Ongoing, Progressing  Intervention: Optimize Skin Protection  Recent Flowsheet Documentation  Taken 3/17/2023 0820 by Adenike Keyes RN  Pressure Reduction Techniques: frequent weight shift encouraged  Head of Bed (HOB) Positioning: HOB elevated  Pressure Reduction Devices: pressure-redistributing mattress utilized  Skin Protection: adhesive use limited     Problem: Pain Acute  Goal: Acceptable Pain Control and Functional Ability  Outcome: Ongoing, Progressing  Intervention: Prevent or Manage Pain  Recent Flowsheet Documentation  Taken 3/17/2023 0820 by Wali  DEVON Jeong  Bowel Elimination Promotion: adequate fluid intake promoted  Medication Review/Management: medications reviewed  Intervention: Optimize Psychosocial Wellbeing  Recent Flowsheet Documentation  Taken 3/17/2023 0820 by Adenike Keyes RN  Supportive Measures: active listening utilized     Problem: Fall Injury Risk  Goal: Absence of Fall and Fall-Related Injury  Outcome: Ongoing, Progressing  Intervention: Identify and Manage Contributors  Recent Flowsheet Documentation  Taken 3/17/2023 0820 by Adenike Keyes RN  Medication Review/Management: medications reviewed  Intervention: Promote Injury-Free Environment  Recent Flowsheet Documentation  Taken 3/17/2023 1800 by Adenike Keyes RN  Safety Promotion/Fall Prevention:   safety round/check completed   room organization consistent  Taken 3/17/2023 1600 by Adenike Keyes RN  Safety Promotion/Fall Prevention:   safety round/check completed   room organization consistent  Taken 3/17/2023 1400 by Adenike Keyes RN  Safety Promotion/Fall Prevention:   safety round/check completed   room organization consistent  Taken 3/17/2023 1200 by Adenike Keyes RN  Safety Promotion/Fall Prevention:   safety round/check completed   room organization consistent  Taken 3/17/2023 1000 by Adenike Keyes RN  Safety Promotion/Fall Prevention:   safety round/check completed   room organization consistent  Taken 3/17/2023 0820 by Adenike Keyes RN  Safety Promotion/Fall Prevention:   safety round/check completed   room organization consistent   fall prevention program maintained   clutter free environment maintained     Problem: Infection  Goal: Absence of Infection Signs and Symptoms  Outcome: Ongoing, Progressing   Goal Outcome Evaluation:

## 2023-03-17 NOTE — PLAN OF CARE
Goal Outcome Evaluation:  Plan of Care Reviewed With: patient           Outcome Evaluation: Pt is a89 yo female admitted with low back pain, also with recent UTI being treated with antibiotics. Pt is from an ILF vs REMI with caregiver support 6 days/weeks for 8-12 hours. She reports use of a rollator for functional mobility and did have a recent fall in the shower. Today, she is limited by significant pain levels in her lower back. She required max A x2 for supine<>sit and max/dependent A x2 to attempt sit<>stand from the EOB but unable to clear her bottom. She demo generalized weakness, impaired balance, decreased activity tolerance and significant pain limiting her independence in functional mobility. Recommend d/c to SNF at this time.

## 2023-03-17 NOTE — PLAN OF CARE
Goal Outcome Evaluation:  Plan of Care Reviewed With: patient, caregiver        Progress: no change  Outcome Evaluation: Pt admitted through ER for UTI, Caregiver from [facility] arrived with her and stayed most of night--very attentive to pt's needs.  Pt complains of severe back pain - on call notified--tylenol and lidocaine patch ordered--pt stated that lidocaine patch did help.  Accu-max applied to bed - pt with bruised second toe on left foot and stage 2 on right gluteal area--placed on turn team.  LR at 100/hr.

## 2023-03-17 NOTE — CASE MANAGEMENT/SOCIAL WORK
Discharge Planning Assessment  [facility]     Patient Name: Marianne Delgado  MRN: [id number]  Today's Date: 3/17/2023    Admit Date: 3/16/2023    Plan: Return home to [facility]   Discharge Needs Assessment     Row Name 03/17/23 1356       Living Environment    People in Home other (see comments)  Independent living resident    Current Living Arrangements independent living facility    Primary Care Provided by other (see comments)  24/7 surveliance via camera and assistance as needed.    Provides Primary Care For no one, unable/limited ability to care for self    Family Caregiver Names Jessica Osei (A) [phone]    Able to Return to Prior Arrangements yes       Transition Planning    Patient/Family Anticipates Transition to home    Patient/Family Anticipated Services at Transition rehabilitation services    Transportation Anticipated health plan transportation       Discharge Needs Assessment    Readmission Within the Last 30 Days no previous admission in last 30 days    Equipment Currently Used at Home cpap;rollator    Concerns to be Addressed discharge planning    Anticipated Changes Related to Illness none    Equipment Needed After Discharge none               Discharge Plan     Row Name 03/17/23 0106       Plan    Plan Return home to [facility]    Patient/Family in Agreement with Plan yes    Plan Comments [facility] letter checked. Met with patient and private care attendant at bedside, introduced self and explained CCP role. Verified facesheet and PCP-Natasha Ramirez. Patient lives at [facility]. There is 24/7 camera surveliance into the apartments and she has private attendant who assists with dressing and bathing. Patient has never had HH and has been to [facility] for Rehab in the past. Patient stated she does not feel she would need rehab after this admission, but if she did they have PT services at  [facility]. Patient has a rollator and CPAP through [facility]. Patient uses [facility] on [address] and reports no difficulty affording medications. Facility has a bus that can possibly provide transportation at [place]. Patient stated she may be interested in a BSC at [place] but would like to wait until closer to [place] to determine if she needs one. CCP to follow. Laz YARBROUGH RN              Continued Care and Services - Admitted Since 3/16/2023    Coordination has not been started for this encounter.       Expected Discharge Date and Time     Expected Discharge Date Expected Discharge Time    Mar 18, 2023          Demographic Summary     Row Name 03/17/23 1352       General Information    Admission Type observation    Referral Source admission list    Reason for Consult discharge planning    Preferred Language English               Functional Status     Row Name 03/17/23 1350       Functional Status    Usual Activity Tolerance moderate    Current Activity Tolerance moderate       Functional Status, IADL    Medications assistive person    Meal Preparation completely dependent    Housekeeping completely dependent    Shopping completely dependent       Mental Status    General Appearance WDL WDL       Mental Status Summary    Recent Changes in Mental Status/Cognitive Functioning no changes               Psychosocial    No documentation.                Abuse/Neglect    No documentation.                Legal    No documentation.                Substance Abuse    No documentation.                Patient Forms    No documentation.                   Laz Torrez RN

## 2023-03-17 NOTE — THERAPY EVALUATION
Patient Name: Marianne Delgado  : 1925    MRN: [id number]                              Today's Date: 3/17/2023       Admit Date: 3/16/2023    Visit Dx:     ICD-10-CM ICD-9-CM   1. Acute left-sided low back pain without sciatica  M54.50 724.2   2. Dysuria  R30.0 788.1   3. PRABHA (acute kidney injury) ([facility])  N17.9 584.9     Patient Active Problem List   Diagnosis   • Arthritis   • Stage 4 chronic kidney disease ([facility])   • Debility   • Foot pain, bilateral   • Acute idiopathic gout of right ankle   • Hematuria   • Essential hypertension   • Acquired hypothyroidism   • Osteopenia   • Psoriasis   • Rosacea   • Vitamin D deficiency   • Medicare annual wellness visit, subsequent   • Morbidly obese ([facility])   • History of CVA (cerebrovascular accident)   • Stroke (cerebrum) ([facility])   • Vitamin B12 deficiency   • Primary open-angle glaucoma, bilateral, severe stage   • Hyperparathyroidism ([facility])   • Weight loss   • Acute left-sided low back pain without sciatica     Past Medical History:   Diagnosis Date   • Acute sinusitis    • Acute upper respiratory infection    • Arthritis    • CKD (chronic kidney disease)    • Debility    • Fatigue    • Foot pain, bilateral    • Glaucoma    • Gout    • Hematuria    • High blood pressure    • Hypertension    • Hypothyroid 1999   • Hypothyroidism    • IBS (irritable bowel syndrome)    • IGT (impaired glucose tolerance)    • Malaise and fatigue    • Medication management    • Melanoma ([facility]) 1979    left leg   • OA (osteoarthritis)    • Osteopenia 1999   • Painful joint    • Psoriasis    • Renal failure    • Rosacea    • Vitamin D deficiency      Past Surgical History:   Procedure Laterality Date   • CATARACT EXTRACTION     • FOOT SURGERY      mauri toe surgery   • OTHER SURGICAL HISTORY      Melanoma Excision: Left leg      General Information     Row Name 23 1438          Physical Therapy Time and Intention    Document Type evaluation  -CW     Mode of Treatment individual  therapy;physical therapy  -CW     Row Name 03/17/23 1438          General Information    Patient Profile Reviewed yes  -CW     Prior Level of Function transfer;all household mobility  Pt reports mod (I) with use of a rollator, has caregiver assist 8-12 hours 6 days/week  -CW     Existing Precautions/Restrictions fall  -CW     Row Name 03/17/23 1438          Living Environment    People in Home other (see comments)  independent living facility with caregivers  -CW     Row Name 03/17/23 1438          Cognition    Orientation Status (Cognition) oriented x 3  -CW     Row Name 03/17/23 1438          Safety Issues, Functional Mobility    Impairments Affecting Function (Mobility) balance;endurance/activity tolerance;strength;pain;postural/trunk control  -CW           User Key  (r) = Recorded By, (t) = Taken By, (c) = Cosigned By    Initials Name Provider Type    Lashanda Louise PT Physical Therapist               Mobility     Row Name 03/17/23 1442          Bed Mobility    Bed Mobility scooting/bridging;supine-sit;sit-supine  -CW     Scooting/Bridging [place] (Bed Mobility) maximum assist (25% patient effort);2 person assist;verbal cues  -CW     Supine-Sit [place] (Bed Mobility) maximum assist (25% patient effort);2 person assist;verbal cues  -CW     Sit-Supine [place] (Bed Mobility) maximum assist (25% patient effort);2 person assist;verbal cues  -CW     Assistive Device (Bed Mobility) bed rails;head of bed elevated  -CW     Row Name 03/17/23 1442          Sit-Stand Transfer    Sit-Stand [place] (Transfers) maximum assist (25% patient effort);dependent (less than 25% patient effort);2 person assist;nonverbal cues (demo/gesture);verbal cues  -CW     Comment, (Sit-Stand Transfer) x2 attempts from EOB but unable to clear bed at all. Increased pain with mobility  -CW           User Key  (r) = Recorded By, (t) = Taken By, (c) = Cosigned By    Initials Name Provider Type    Lashanda Louise PT  Physical Therapist               Obj/Interventions     Row Name 03/17/23 1443          Range of Motion Comprehensive    General Range of Motion bilateral lower extremity ROM WFL  -CW     Row Name 03/17/23 1443          Strength Comprehensive (MMT)    Comment, General Manual Muscle Testing (MMT) Assessment Generalized weakness present  -CW     Row Name 03/17/23 1443          Balance    Balance Assessment sitting static balance  -CW     Static Sitting Balance standby assist  -CW     Position, Sitting Balance sitting edge of bed  -CW     Row Name 03/17/23 1443          Sensory Assessment (Somatosensory)    Sensory Assessment (Somatosensory) sensation intact  -CW           User Key  (r) = Recorded By, (t) = Taken By, (c) = Cosigned By    Initials Name Provider Type    CW Lashanda Elkins, PT Physical Therapist               Goals/Plan     Row Name 03/17/23 1447          Bed Mobility Goal 1 (PT)    Activity/Assistive Device (Bed Mobility Goal 1, PT) bed mobility activities, all  -CW     [place] Level/Cues Needed (Bed Mobility Goal 1, PT) minimum assist (75% or more patient effort)  -CW     Time Frame (Bed Mobility Goal 1, PT) 2 weeks  -CW     Row Name 03/17/23 1447          Transfer Goal 1 (PT)    Activity/Assistive Device (Transfer Goal 1, PT) sit-to-stand/stand-to-sit;bed-to-chair/chair-to-bed  -CW     [place] Level/Cues Needed (Transfer Goal 1, PT) minimum assist (75% or more patient effort)  -CW     Time Frame (Transfer Goal 1, PT) 2 weeks  -CW     Row Name 03/17/23 1447          Gait Training Goal 1 (PT)    Activity/Assistive Device (Gait Training Goal 1, PT) gait (walking locomotion);walker, rolling  -CW     [place] Level (Gait Training Goal 1, PT) minimum assist (75% or more patient effort)  -CW     Distance (Gait Training Goal 1, PT) 40'  -CW     Time Frame (Gait Training Goal 1, PT) 2 weeks  -CW     Row Name 03/17/23 1447          Therapy Assessment/Plan (PT)    Planned Therapy Interventions (PT)  balance training;bed mobility training;gait training;postural re-education;transfer training;ROM (range of motion);strengthening;patient/family education  -CW           User Key  (r) = Recorded By, (t) = Taken By, (c) = Cosigned By    Initials Name Provider Type    CW Lashanda Elkins, PT Physical Therapist               Clinical Impression     Row Name 03/17/23 1443          Pain    Pretreatment Pain Rating 10/10  -CW     Posttreatment Pain Rating 10/10  -CW     Pain Location lower  -CW     Pain Location - back  -CW     Pain Intervention(s) Repositioned;Rest  -CW     Row Name 03/17/23 1443          Plan of Care Review    Plan of Care Reviewed With patient  -CW     Outcome Evaluation Pt is a89 yo female admitted with low back pain, also with recent UTI being treated with antibiotics. Pt is from an ILF vs [facility] with caregiver support 6 days/weeks for 8-12 hours. She reports use of a rollator for functional mobility and did have a recent fall in the shower. Today, she is limited by significant pain levels in her lower back. She required max A x2 for supine<>sit and max/dependent A x2 to attempt sit<>stand from the EOB but unable to clear her bottom. She demo generalized weakness, impaired balance, decreased activity tolerance and significant pain limiting her independence in functional mobility. Recommend d/c to SNF at this time.  -CW     Row Name 03/17/23 1443          Therapy Assessment/Plan (PT)    Rehab Potential (PT) fair, will monitor progress closely  -CW     Criteria for Skilled Interventions Met (PT) yes  -CW     Therapy Frequency (PT) 5 times/wk  -CW     Row Name 03/17/23 1443          Vital Signs    O2 Delivery Pre Treatment room air  -CW     O2 Delivery Intra Treatment room air  -CW     O2 Delivery Post Treatment room air  -CW     Row Name 03/17/23 1443          Positioning and Restraints    Pre-Treatment Position in bed  -CW     Post Treatment Position bed  -CW     In Bed call light within reach;encouraged  to call for assist;exit alarm on;notified nsg;fowlers;side rails up x3  -CW           User Key  (r) = Recorded By, (t) = Taken By, (c) = Cosigned By    Initials Name Provider Type    Lashanda Louise PT Physical Therapist               Outcome Measures     Row Name 03/17/23 1448          How much help from another person do you currently need...    Turning from your back to your side while in flat bed without using bedrails? 2  -CW     Moving from lying on back to sitting on the side of a flat bed without bedrails? 2  -CW     Moving to and from a bed to a chair (including a wheelchair)? 1  -CW     Standing up from a chair using your arms (e.g., wheelchair, bedside chair)? 1  -CW     Climbing 3-5 steps with a railing? 1  -CW     To walk in hospital room? 1  -CW     AM-PAC 6 Clicks Score (PT) 8  -CW     Highest level of mobility 3 --> Sat at edge of bed  -CW     Row Name 03/17/23 1133          Modified Lb Scale    Modified [place] Scale 4 - Moderately severe disability.  Unable to walk without assistance, and unable to attend to own bodily needs without assistance.  -MW     Row Name 03/17/23 1448 03/17/23 1133       Functional Assessment    Outcome Measure Options AM-PAC 6 Clicks Basic Mobility (PT)  -CW AM-PAC 6 Clicks Daily Activity (OT);Modified Runge  -MW          User Key  (r) = Recorded By, (t) = Taken By, (c) = Cosigned By    Initials Name Provider Type    Lashanda Louise PT Physical Therapist    Terri Knowles OT Occupational Therapist                             Physical Therapy Education     Title: PT OT SLP Therapies (In Progress)     Topic: Physical Therapy (In Progress)     Point: Mobility training (In Progress)     Learning Progress Summary           Patient Acceptance, E, NR by CW at 3/17/2023 7694                   Point: Home exercise program (Not Started)     Learner Progress:  Not documented in this visit.          Point: Body mechanics (In Progress)     Learning Progress  Summary           Patient Acceptance, E, NR by CW at 3/17/2023 9189                   Point: Precautions (Not Started)     Learner Progress:  Not documented in this visit.                      User Key     Initials Effective Dates Name Provider Type Discipline     12/13/22 -  Lashanda Elkins PT Physical Therapist PT              PT Recommendation and Plan  Planned Therapy Interventions (PT): balance training, bed mobility training, gait training, postural re-education, transfer training, ROM (range of motion), strengthening, patient/family education  Plan of Care Reviewed With: patient  Outcome Evaluation: Pt is a89 yo female admitted with low back pain, also with recent UTI being treated with antibiotics. Pt is from an ILF vs [facility] with caregiver support 6 days/weeks for 8-12 hours. She reports use of a rollator for functional mobility and did have a recent fall in the shower. Today, she is limited by significant pain levels in her lower back. She required max A x2 for supine<>sit and max/dependent A x2 to attempt sit<>stand from the EOB but unable to clear her bottom. She demo generalized weakness, impaired balance, decreased activity tolerance and significant pain limiting her independence in functional mobility. Recommend d/c to SNF at this time.     Time Calculation:    PT Charges     Row Name 03/17/23 1437             Time Calculation    Start Time 1058  -CW      Stop Time 1116  -CW      Time Calculation (min) 18 min  -CW      PT Received On 03/17/23  -CW      PT - Next Appointment 03/20/23  -CW      PT Goal Re-Cert Due Date 03/31/23  -CW         Time Calculation- PT    Total Timed Code Minutes- PT 11 minute(s)  -CW         Timed Charges    89883 - PT Therapeutic Activity Minutes 11  -CW         Total Minutes    Timed Charges Total Minutes 11  -CW       Total Minutes 11  -CW            User Key  (r) = Recorded By, (t) = Taken By, (c) = Cosigned By    Initials Name Provider Type    CW Lashanda Elkins PT  Physical Therapist              Therapy Charges for Today     Code Description Service Date Service Provider Modifiers Qty    [id number]  PT EVAL MOD COMPLEXITY 3 3/17/2023 Lashanda Elkins, PT GP 1    [id number]  PT THERAPEUTIC ACT EA 15 MIN 3/17/2023 Lashanda Elkins, PT GP 1          PT G-Codes  Outcome Measure Options: AM-PAC 6 Clicks Basic Mobility (PT)  AM-PAC 6 Clicks Score (PT): 8  AM-PAC 6 Clicks Score (OT): 11  Modified [place] Scale: 4 - Moderately severe disability.  Unable to walk without assistance, and unable to attend to own bodily needs without assistance.  PT Discharge Summary  Anticipated Discharge Disposition (PT): skilled nursing facility    Lashanda Elkins PT  3/17/2023

## 2023-03-17 NOTE — PLAN OF CARE
Goal Outcome Evaluation:  Plan of Care Reviewed With: patient, caregiver        Progress: no change  Outcome Evaluation: Pt is a 89 yo female admitted with UTI and severe low back pain.  Pt resides at [facility] with caregiver support 8-12 hours/day for 6 days/week. Pt seen this date for OT eval, A&Oxs3, reports use of rollator with mobility, assist with ADLs provided by caregiver, reports x1 fall 2 months ago in shower. Pt presenting with severe pain, decreased act tolerance, impaired functional transfers and mobility, impaired balance, generalized weakness all impacting (I) with ADLs. Pt required max a x2 for bed mob, SBA for sitting balance, max/dep for LBD, STS x2 with depx2 and unable to clear bottom, not safe to attempt further transfers. Pt will continue to benefit from skilled OT to address deficits and progress toward prior level. Recommending SNF at d/c.

## 2023-03-18 ENCOUNTER — APPOINTMENT (OUTPATIENT)
Dept: CT IMAGING | Facility: HOSPITAL | Age: 88
End: 2023-03-18
Payer: MEDICARE

## 2023-03-18 LAB
ANION GAP SERPL CALCULATED.3IONS-SCNC: 8.1 MMOL/L (ref 5–15)
BUN SERPL-MCNC: 29 MG/DL (ref 8–23)
BUN/CREAT SERPL: 9.8 (ref 7–25)
CALCIUM SPEC-SCNC: 9.6 MG/DL (ref 8.2–9.6)
CHLORIDE SERPL-SCNC: 107 MMOL/L (ref 98–107)
CO2 SERPL-SCNC: 21.9 MMOL/L (ref 22–29)
CREAT SERPL-MCNC: 2.97 MG/DL (ref 0.57–1)
DEPRECATED RDW RBC AUTO: 42.1 FL (ref 37–54)
EGFRCR SERPLBLD CKD-EPI 2021: 13.9 ML/MIN/1.73
ERYTHROCYTE [DISTWIDTH] IN BLOOD BY AUTOMATED COUNT: 12.7 % (ref 12.3–15.4)
GLUCOSE SERPL-MCNC: 96 MG/DL (ref 65–99)
HCT VFR BLD AUTO: 36.1 % (ref 34–46.6)
HGB BLD-MCNC: 11.2 G/DL (ref 12–15.9)
MCH RBC QN AUTO: 28.1 PG (ref 26.6–33)
MCHC RBC AUTO-ENTMCNC: 31 G/DL (ref 31.5–35.7)
MCV RBC AUTO: 90.5 FL (ref 79–97)
PLATELET # BLD AUTO: 219 10*3/MM3 (ref 140–450)
PMV BLD AUTO: 9.6 FL (ref 6–12)
POTASSIUM SERPL-SCNC: 5.3 MMOL/L (ref 3.5–5.2)
RBC # BLD AUTO: 3.99 10*6/MM3 (ref 3.77–5.28)
SODIUM SERPL-SCNC: 137 MMOL/L (ref 136–145)
WBC NRBC COR # BLD: 7.63 10*3/MM3 (ref 3.4–10.8)

## 2023-03-18 PROCEDURE — 72125 CT NECK SPINE W/O DYE: CPT

## 2023-03-18 PROCEDURE — 96376 TX/PRO/DX INJ SAME DRUG ADON: CPT

## 2023-03-18 PROCEDURE — G0378 HOSPITAL OBSERVATION PER HR: HCPCS

## 2023-03-18 PROCEDURE — 96361 HYDRATE IV INFUSION ADD-ON: CPT

## 2023-03-18 PROCEDURE — 85027 COMPLETE CBC AUTOMATED: CPT | Performed by: STUDENT IN AN ORGANIZED HEALTH CARE EDUCATION/TRAINING PROGRAM

## 2023-03-18 PROCEDURE — 25010000002 HYDROMORPHONE PER 4 MG: Performed by: STUDENT IN AN ORGANIZED HEALTH CARE EDUCATION/TRAINING PROGRAM

## 2023-03-18 PROCEDURE — 72128 CT CHEST SPINE W/O DYE: CPT

## 2023-03-18 PROCEDURE — 72131 CT LUMBAR SPINE W/O DYE: CPT

## 2023-03-18 PROCEDURE — 80048 BASIC METABOLIC PNL TOTAL CA: CPT | Performed by: STUDENT IN AN ORGANIZED HEALTH CARE EDUCATION/TRAINING PROGRAM

## 2023-03-18 RX ORDER — BISACODYL 10 MG
10 SUPPOSITORY, RECTAL RECTAL ONCE
Status: COMPLETED | OUTPATIENT
Start: 2023-03-18 | End: 2023-03-18

## 2023-03-18 RX ORDER — METAXALONE 800 MG/1
800 TABLET ORAL 3 TIMES DAILY
Status: DISCONTINUED | OUTPATIENT
Start: 2023-03-18 | End: 2023-03-19 | Stop reason: HOSPADM

## 2023-03-18 RX ADMIN — BISACODYL 10 MG: 10 SUPPOSITORY RECTAL at 14:38

## 2023-03-18 RX ADMIN — SODIUM ZIRCONIUM CYCLOSILICATE 10 G: 10 POWDER, FOR SUSPENSION ORAL at 21:37

## 2023-03-18 RX ADMIN — SODIUM CHLORIDE, POTASSIUM CHLORIDE, SODIUM LACTATE AND CALCIUM CHLORIDE 100 ML/HR: 600; 310; 30; 20 INJECTION, SOLUTION INTRAVENOUS at 05:45

## 2023-03-18 RX ADMIN — MULTIPLE VITAMINS W/ MINERALS TAB 1 TABLET: TAB at 08:23

## 2023-03-18 RX ADMIN — SODIUM ZIRCONIUM CYCLOSILICATE 10 G: 10 POWDER, FOR SUSPENSION ORAL at 16:34

## 2023-03-18 RX ADMIN — HYDROMORPHONE HYDROCHLORIDE 0.5 MG: 1 INJECTION, SOLUTION INTRAMUSCULAR; INTRAVENOUS; SUBCUTANEOUS at 17:38

## 2023-03-18 RX ADMIN — METRONIDAZOLE 1 APPLICATION: 7.5 CREAM TOPICAL at 08:24

## 2023-03-18 RX ADMIN — METAXALONE 800 MG: 800 TABLET ORAL at 16:34

## 2023-03-18 RX ADMIN — LEVOTHYROXINE SODIUM 88 MCG: 0.09 TABLET ORAL at 05:45

## 2023-03-18 RX ADMIN — LATANOPROST 1 DROP: 50 SOLUTION OPHTHALMIC at 21:42

## 2023-03-18 RX ADMIN — HYDROMORPHONE HYDROCHLORIDE 0.5 MG: 1 INJECTION, SOLUTION INTRAMUSCULAR; INTRAVENOUS; SUBCUTANEOUS at 21:37

## 2023-03-18 RX ADMIN — LIDOCAINE 1 PATCH: 50 PATCH TOPICAL at 08:24

## 2023-03-18 RX ADMIN — CLOPIDOGREL BISULFATE 75 MG: 75 TABLET, FILM COATED ORAL at 08:24

## 2023-03-18 RX ADMIN — TRIAMCINOLONE ACETONIDE 1 APPLICATION: 1 OINTMENT TOPICAL at 08:24

## 2023-03-18 RX ADMIN — HYDROMORPHONE HYDROCHLORIDE 0.5 MG: 1 INJECTION, SOLUTION INTRAMUSCULAR; INTRAVENOUS; SUBCUTANEOUS at 05:41

## 2023-03-18 RX ADMIN — Medication 1000 UNITS: at 08:23

## 2023-03-18 RX ADMIN — Medication 10 ML: at 21:37

## 2023-03-18 RX ADMIN — DOCUSATE SODIUM 50MG AND SENNOSIDES 8.6MG 2 TABLET: 8.6; 5 TABLET, FILM COATED ORAL at 08:25

## 2023-03-18 RX ADMIN — AMLODIPINE BESYLATE 5 MG: 5 TABLET ORAL at 08:24

## 2023-03-18 RX ADMIN — HYDROMORPHONE HYDROCHLORIDE 0.5 MG: 1 INJECTION, SOLUTION INTRAMUSCULAR; INTRAVENOUS; SUBCUTANEOUS at 12:18

## 2023-03-18 NOTE — PLAN OF CARE
Goal Outcome Evaluation:  Plan of Care Reviewed With: patient, caregiver        Progress: no change  Outcome Evaluation: VSS.  A&Ox4.  [place].  Dilaudid x 2 given for severe back pain.  Purewick in place.  Refusing turns.  Pt refused to go down to CT last night.  Transport unable to transport patient via bed.  Caregiver very helpful at bedside.

## 2023-03-18 NOTE — NURSING NOTE
Transport attempted to take patient for CT.  Request patient be transported in bed to reduce number of time patient being moved.  Patient in severe pain with movement.  Unable to have 2nd transporter to assist with transportation.  Asked patient if she would be willing to go by stretcher.  Patient refused.

## 2023-03-18 NOTE — PROGRESS NOTES
Name: Marianne Delgado ADMIT: 3/16/2023   : 1925  PCP: Natasha Ramirez APRN    MRN: [id number] LOS: 0 days   AGE/SEX: 89 y.o. female  ROOM: [facility]     Subjective   Subjective   Continues to have significant back pain.  Was unable to go down for CT due to pain.  Discussed and she stated she will try again today.    Objective   Objective   Vital Signs  Temp:  [97.6 °F (36.4 °C)-99.3 °F (37.4 °C)] 99.3 °F (37.4 °C)  Heart Rate:  [57-79] 73  Resp:  [14-16] 16  BP: (141-156)/(56-83) 152/56  SpO2:  [93 %-95 %] 93 %  on   ;   Device (Oxygen Therapy): room air  Body mass index is 39.24 kg/m².  Physical Exam  Constitutional:       Appearance: She is ill-appearing. She is not toxic-appearing.   Cardiovascular:      Rate and Rhythm: Normal rate and regular rhythm.      Pulses: Normal pulses.      Heart sounds: No murmur heard.  Pulmonary:      Effort: Pulmonary effort is normal.      Breath sounds: Normal breath sounds.   Abdominal:      General: Abdomen is flat. There is no distension.      Palpations: Abdomen is soft.      Tenderness: There is no abdominal tenderness.   Musculoskeletal:      Right lower leg: No edema.      Left lower leg: No edema.      Comments: Bilateral lower extremities 4/5 strength; bilateral upper extremities 5/5 strength   Skin:     Findings: Bruising present.   Neurological:      General: No focal deficit present.      Mental Status: She is alert and oriented to person, place, and time.   Psychiatric:         Mood and Affect: Mood normal.         Results Review     I reviewed the patient's new clinical results.  Results from last 7 days   Lab Units 23  0719 23  0617 23  1343   WBC 10*3/mm3 7.63 6.02 7.09   HEMOGLOBIN g/dL 11.2* 11.8* 12.9   PLATELETS 10*3/mm3 219 245 286     Results from last 7 days   Lab Units 23  0719 23  0617 23  1700   SODIUM mmol/L 137 141 141   POTASSIUM mmol/L 5.3* 5.0 4.5   CHLORIDE mmol/L 107 110* 110*   CO2 mmol/L 21.9*  20.9* 19.1*   BUN mg/dL 29* 25* 27*   CREATININE mg/dL 2.97* 2.81* 2.98*   GLUCOSE mg/dL 96 93 94   EGFR mL/min/1.73 13.9* 14.9* 13.8*     Results from last 7 days   Lab Units 03/16/23  1700   ALBUMIN g/dL 3.9   BILIRUBIN mg/dL 0.4   ALK PHOS U/L 113   AST (SGOT) U/L 16   ALT (SGPT) U/L 13     Results from last 7 days   Lab Units 03/18/23  0719 03/17/23  0617 03/16/23  1700   CALCIUM mg/dL 9.6 10.1* 10.0*   ALBUMIN g/dL  --   --  3.9       No results found for: HGBA1C, POCGLU    CT Abdomen Pelvis Without Contrast    Result Date: 3/16/2023   1.  No renal stones or hydronephrosis. 2.  Cholelithiasis. 3.  Tiny hiatal hernia. Colonic diverticulosis.  Discussed with Dr. Saucedo at approximately 4:00 PM.  This report was finalized on 3/16/2023 4:05 PM by Dr. Patti Kirk M.D.      Scheduled Medications  amLODIPine, 5 mg, Oral, Daily  bisacodyl, 10 mg, Rectal, Once  cholecalciferol, 1,000 Units, Oral, BID  clopidogrel, 75 mg, Oral, Daily  latanoprost, 1 drop, Both Eyes, Nightly  levothyroxine, 88 mcg, Oral, Q AM  lidocaine, 1 patch, Transdermal, Q24H  metaxalone, 800 mg, Oral, TID  metroNIDAZOLE, 1 application, Topical, Q12H  multivitamin with minerals, 1 tablet, Oral, Daily  senna-docusate sodium, 2 tablet, Oral, BID  sodium zirconium cyclosilicate, 10 g, Oral, TID  triamcinolone, 1 application, Topical, Q12H  vitamin B-12, 1,000 mcg, Oral, Every Other Day    Infusions   Diet  Diet: Regular/House Diet; Texture: Regular Texture (IDDSI 7); Fluid Consistency: Thin (IDDSI 0)       Assessment/Plan     Active Hospital Problems    Diagnosis  POA   • **Acute left-sided low back pain without sciatica [M54.50]  Yes   • Vitamin B12 deficiency [E53.8]  Yes   • History of CVA (cerebrovascular accident) [Z86.73]  Not Applicable   • Stage 4 chronic kidney disease (HCC) [N18.4]  Yes   • Essential hypertension [I10]  Yes   • Acquired hypothyroidism [E03.9]  Yes      Resolved Hospital Problems   No resolved problems to display.       89  y.o. female admitted with Acute left-sided low back pain without sciatica.      03/18/23  Follow-up CT spine.  Consult nephrology    L back pain without sciatica  -CT A/P (3/16/2023): No renal stones or hydronephrosis; cholelithiasis; tiny hiatal hernia; diverticulosis  -Check CT C/T/L-spine  -Lidocaine patch; Dilaudid 0.5 mg every 2 hours as needed to work with therapy; bowel regimen  -We will add Skelaxin and try to limit opiates    Recent UTI  -UA bland   -was on linezolid    HTN  -Amlodipine 5 mg    History of CVA  -Plavix 75 mg    PRABHA on CKD4  Hyperkalemia  -Crt 2.98 OA (b/l ~2.2? 2022) > 2.81 > 2.97  -per OP nephrology notes, no plans for HD, if patient develops uremia plan will be Hospice  -Consult Nephrology  -stop LR given hyperkalemia    Hypothyroid  -TSH 5.4 (3/17/23)  -Synthroid 88 mcg    · SCDs for DVT prophylaxis.  · DNR.  · Discussed with patient.  · Anticipate discharge to SNF next week      Iam Keller MD  [facility]  03/18/23  13:14 EDT

## 2023-03-19 VITALS
SYSTOLIC BLOOD PRESSURE: 152 MMHG | DIASTOLIC BLOOD PRESSURE: 60 MMHG | HEART RATE: 81 BPM | BODY MASS INDEX: 39.21 KG/M2 | HEIGHT: 61 IN | RESPIRATION RATE: 16 BRPM | TEMPERATURE: 97.7 F | WEIGHT: 207.67 LBS | OXYGEN SATURATION: 91 %

## 2023-03-19 PROBLEM — K57.90 DIVERTICULOSIS: Status: ACTIVE | Noted: 2023-03-19

## 2023-03-19 PROBLEM — K80.20 CHOLELITHIASIS: Status: ACTIVE | Noted: 2023-03-19

## 2023-03-19 LAB
ALBUMIN SERPL-MCNC: 3.3 G/DL (ref 3.5–5.2)
ANION GAP SERPL CALCULATED.3IONS-SCNC: 11 MMOL/L (ref 5–15)
BUN SERPL-MCNC: 34 MG/DL (ref 8–23)
BUN/CREAT SERPL: 11.1 (ref 7–25)
CALCIUM SPEC-SCNC: 9.3 MG/DL (ref 8.2–9.6)
CHLORIDE SERPL-SCNC: 104 MMOL/L (ref 98–107)
CO2 SERPL-SCNC: 21 MMOL/L (ref 22–29)
CREAT SERPL-MCNC: 3.07 MG/DL (ref 0.57–1)
EGFRCR SERPLBLD CKD-EPI 2021: 13.4 ML/MIN/1.73
GLUCOSE SERPL-MCNC: 88 MG/DL (ref 65–99)
MAGNESIUM SERPL-MCNC: 2.2 MG/DL (ref 1.7–2.3)
PHOSPHATE SERPL-MCNC: 5.9 MG/DL (ref 2.5–4.5)
POTASSIUM SERPL-SCNC: 5.3 MMOL/L (ref 3.5–5.2)
SODIUM SERPL-SCNC: 136 MMOL/L (ref 136–145)

## 2023-03-19 PROCEDURE — G0378 HOSPITAL OBSERVATION PER HR: HCPCS

## 2023-03-19 PROCEDURE — 83735 ASSAY OF MAGNESIUM: CPT | Performed by: INTERNAL MEDICINE

## 2023-03-19 PROCEDURE — 25010000002 ONDANSETRON PER 1 MG: Performed by: INTERNAL MEDICINE

## 2023-03-19 PROCEDURE — 96375 TX/PRO/DX INJ NEW DRUG ADDON: CPT

## 2023-03-19 PROCEDURE — 25010000002 HYDROMORPHONE PER 4 MG: Performed by: STUDENT IN AN ORGANIZED HEALTH CARE EDUCATION/TRAINING PROGRAM

## 2023-03-19 PROCEDURE — 96376 TX/PRO/DX INJ SAME DRUG ADON: CPT

## 2023-03-19 PROCEDURE — 80069 RENAL FUNCTION PANEL: CPT | Performed by: INTERNAL MEDICINE

## 2023-03-19 RX ORDER — DORZOLAMIDE HYDROCHLORIDE AND TIMOLOL MALEATE 20; 5 MG/ML; MG/ML
SOLUTION/ DROPS OPHTHALMIC 2 TIMES DAILY
Status: DISCONTINUED | OUTPATIENT
Start: 2023-03-19 | End: 2023-03-19 | Stop reason: HOSPADM

## 2023-03-19 RX ORDER — AMOXICILLIN 250 MG
2 CAPSULE ORAL 2 TIMES DAILY
Qty: 120 TABLET | Refills: 0 | Status: ON HOLD | OUTPATIENT
Start: 2023-03-19

## 2023-03-19 RX ORDER — LIDOCAINE 50 MG/G
1 PATCH TOPICAL
Qty: 60 PATCH | Refills: 0 | Status: ON HOLD | OUTPATIENT
Start: 2023-03-20

## 2023-03-19 RX ORDER — BISACODYL 10 MG
10 SUPPOSITORY, RECTAL RECTAL DAILY
Status: DISCONTINUED | OUTPATIENT
Start: 2023-03-19 | End: 2023-03-19 | Stop reason: HOSPADM

## 2023-03-19 RX ORDER — METAXALONE 800 MG/1
800 TABLET ORAL 3 TIMES DAILY
Qty: 90 TABLET | Refills: 0 | Status: ON HOLD | OUTPATIENT
Start: 2023-03-19

## 2023-03-19 RX ADMIN — METAXALONE 800 MG: 800 TABLET ORAL at 08:59

## 2023-03-19 RX ADMIN — Medication 1000 UNITS: at 08:59

## 2023-03-19 RX ADMIN — ONDANSETRON 4 MG: 2 INJECTION INTRAMUSCULAR; INTRAVENOUS at 16:27

## 2023-03-19 RX ADMIN — HYDROMORPHONE HYDROCHLORIDE 0.5 MG: 1 INJECTION, SOLUTION INTRAMUSCULAR; INTRAVENOUS; SUBCUTANEOUS at 06:50

## 2023-03-19 RX ADMIN — LEVOTHYROXINE SODIUM 88 MCG: 0.09 TABLET ORAL at 09:01

## 2023-03-19 RX ADMIN — METAXALONE 800 MG: 800 TABLET ORAL at 16:23

## 2023-03-19 RX ADMIN — DOCUSATE SODIUM 50MG AND SENNOSIDES 8.6MG 2 TABLET: 8.6; 5 TABLET, FILM COATED ORAL at 08:58

## 2023-03-19 RX ADMIN — LIDOCAINE 1 PATCH: 50 PATCH TOPICAL at 09:01

## 2023-03-19 RX ADMIN — SODIUM ZIRCONIUM CYCLOSILICATE 10 G: 10 POWDER, FOR SUSPENSION ORAL at 09:02

## 2023-03-19 RX ADMIN — BISACODYL 10 MG: 10 SUPPOSITORY RECTAL at 12:25

## 2023-03-19 RX ADMIN — METRONIDAZOLE 1 APPLICATION: 7.5 CREAM TOPICAL at 09:02

## 2023-03-19 RX ADMIN — Medication 1000 MCG: at 08:59

## 2023-03-19 RX ADMIN — TRIAMCINOLONE ACETONIDE 1 APPLICATION: 1 OINTMENT TOPICAL at 09:02

## 2023-03-19 RX ADMIN — HYDROMORPHONE HYDROCHLORIDE 0.5 MG: 1 INJECTION, SOLUTION INTRAMUSCULAR; INTRAVENOUS; SUBCUTANEOUS at 03:51

## 2023-03-19 RX ADMIN — CLOPIDOGREL BISULFATE 75 MG: 75 TABLET, FILM COATED ORAL at 09:01

## 2023-03-19 RX ADMIN — MULTIPLE VITAMINS W/ MINERALS TAB 1 TABLET: TAB at 08:59

## 2023-03-19 NOTE — PLAN OF CARE
Problem: Adult Inpatient Plan of Care  Goal: Plan of Care Review  Outcome: Ongoing, Progressing  Goal: Patient-Specific Goal (Individualized)  Outcome: Ongoing, Progressing  Goal: Absence of Hospital-Acquired Illness or Injury  Outcome: Ongoing, Progressing  Intervention: Identify and Manage Fall Risk  Recent Flowsheet Documentation  Taken 3/18/2023 1800 by Adenike Keyes RN  Safety Promotion/Fall Prevention:   room organization consistent   safety round/check completed  Taken 3/18/2023 1600 by Adenike Keyes RN  Safety Promotion/Fall Prevention:   safety round/check completed   room organization consistent  Taken 3/18/2023 1400 by Adenike Keyes RN  Safety Promotion/Fall Prevention:   safety round/check completed   room organization consistent  Taken 3/18/2023 1200 by Adenike Keyes RN  Safety Promotion/Fall Prevention:   safety round/check completed   room organization consistent  Taken 3/18/2023 1000 by Adenike Keyes RN  Safety Promotion/Fall Prevention:   safety round/check completed   room organization consistent  Taken 3/18/2023 0824 by Adenike Keyes RN  Safety Promotion/Fall Prevention: activity supervised  Intervention: Prevent Skin Injury  Recent Flowsheet Documentation  Taken 3/18/2023 0824 by Adenike Keyes RN  Body Position:   turned   side-lying  Skin Protection: adhesive use limited  Intervention: Prevent and Manage VTE (Venous Thromboembolism) Risk  Recent Flowsheet Documentation  Taken 3/18/2023 0824 by Adenike Keyes RN  Activity Management: activity adjusted per tolerance  VTE Prevention/Management:   bilateral   sequential compression devices on  Intervention: Prevent Infection  Recent Flowsheet Documentation  Taken 3/18/2023 0824 by Adenike Keyes RN  Infection Prevention: rest/sleep promoted  Goal: Optimal Comfort and Wellbeing  Outcome: Ongoing, Progressing  Intervention: Monitor Pain and Promote Comfort  Recent Flowsheet Documentation  Taken 3/18/2023 0824 by Wali  DEVON Jeong  Pain Management Interventions: see MAR  Intervention: Provide Person-Centered Care  Recent Flowsheet Documentation  Taken 3/18/2023 0824 by Adenike Keyes RN  Trust Relationship/Rapport:   care explained   choices provided   emotional support provided   empathic listening provided   questions answered   questions encouraged  Goal: Readiness for Transition of Care  Outcome: Ongoing, Progressing     Problem: Hypertension Comorbidity  Goal: Blood Pressure in Desired Range  Outcome: Ongoing, Progressing  Intervention: Maintain Blood Pressure Management  Recent Flowsheet Documentation  Taken 3/18/2023 0824 by Adenike Keyes RN  Medication Review/Management: medications reviewed     Problem: Osteoarthritis Comorbidity  Goal: Maintenance of Osteoarthritis Symptom Control  Outcome: Ongoing, Progressing  Intervention: Maintain Osteoarthritis Symptom Control  Recent Flowsheet Documentation  Taken 3/18/2023 0824 by Adenike Keyes RN  Activity Management: activity adjusted per tolerance  Medication Review/Management: medications reviewed     Problem: Pain Chronic (Persistent) (Comorbidity Management)  Goal: Acceptable Pain Control and Functional Ability  Outcome: Ongoing, Progressing  Intervention: Manage Persistent Pain  Recent Flowsheet Documentation  Taken 3/18/2023 0824 by Adenike Keyes RN  Bowel Elimination Promotion: adequate fluid intake promoted  Medication Review/Management: medications reviewed  Intervention: Develop Pain Management Plan  Recent Flowsheet Documentation  Taken 3/18/2023 0824 by Adenike Keyes RN  Pain Management Interventions: see MAR  Intervention: Optimize Psychosocial Wellbeing  Recent Flowsheet Documentation  Taken 3/18/2023 0824 by Adenike Keyes RN  Supportive Measures: active listening utilized     Problem: Skin Injury Risk Increased  Goal: Skin Health and Integrity  Outcome: Ongoing, Progressing  Intervention: Optimize Skin Protection  Recent Flowsheet  Documentation  Taken 3/18/2023 0824 by Adenike Keyes RN  Pressure Reduction Techniques:   frequent weight shift encouraged   heels elevated off bed  Head of Bed (HOB) Positioning: HOB elevated  Pressure Reduction Devices: pressure-redistributing mattress utilized  Skin Protection: adhesive use limited     Problem: Pain Acute  Goal: Acceptable Pain Control and Functional Ability  Outcome: Ongoing, Progressing  Intervention: Prevent or Manage Pain  Recent Flowsheet Documentation  Taken 3/18/2023 0824 by Adenike Keyes RN  Bowel Elimination Promotion: adequate fluid intake promoted  Medication Review/Management: medications reviewed  Intervention: Develop Pain Management Plan  Recent Flowsheet Documentation  Taken 3/18/2023 0824 by Adenike Keyes RN  Pain Management Interventions: see MAR  Intervention: Optimize Psychosocial Wellbeing  Recent Flowsheet Documentation  Taken 3/18/2023 0824 by Adenike Keyes RN  Supportive Measures: active listening utilized     Problem: Fall Injury Risk  Goal: Absence of Fall and Fall-Related Injury  Outcome: Ongoing, Progressing  Intervention: Identify and Manage Contributors  Recent Flowsheet Documentation  Taken 3/18/2023 0824 by Adenike Keyes RN  Medication Review/Management: medications reviewed  Intervention: Promote Injury-Free Environment  Recent Flowsheet Documentation  Taken 3/18/2023 1800 by Adenike Keyes RN  Safety Promotion/Fall Prevention:   room organization consistent   safety round/check completed  Taken 3/18/2023 1600 by Adenike Keyes RN  Safety Promotion/Fall Prevention:   safety round/check completed   room organization consistent  Taken 3/18/2023 1400 by Adenike Keyes RN  Safety Promotion/Fall Prevention:   safety round/check completed   room organization consistent  Taken 3/18/2023 1200 by Adenike Keyes RN  Safety Promotion/Fall Prevention:   safety round/check completed   room organization consistent  Taken 3/18/2023 1000 by Adenike Keyes  RN  Safety Promotion/Fall Prevention:   safety round/check completed   room organization consistent  Taken 3/18/2023 0824 by Adenike Keyes RN  Safety Promotion/Fall Prevention: activity supervised     Problem: Infection  Goal: Absence of Infection Signs and Symptoms  Outcome: Ongoing, Progressing   Goal Outcome Evaluation:

## 2023-03-19 NOTE — CASE MANAGEMENT/SOCIAL WORK
"Physicians Statement of Medical Necessity for  Ambulance Transportation    GENERAL INFORMATION     Name: Marianne Delgado  YOB: 1925  Medicare #: see face sheet  Transport Date: 3/19/23  (Valid for round trips this date, or for scheduled repetitive trips for 60 days from the date signed below.)  Origin: [facility]  Destination: [address]  Is the Patient's stay covered under Medicare Part A (PPS/DRG?)Yes  Closest appropriate facility? Yes  If this a hosp-hosp transfer? No  Is this a hospice patient? Yes, Is this transport related to patient's terminal illness? Yes    MEDICAL NECESSITY QUESTIONAIRE    Ambulance Transportation is medically necessary only if other means of transportation are contraindicated or would be potentially harmful to the patient.  To meet this requirement, the patient must be either \"bed confined\" or suffer from a condition such that transport by means other than an ambulance is contraindicated by the patient's condition.  The following questions must be answered by the healthcare professional signing below for this form to be valid:     1) Describe the MEDICAL CONDITION (physical and/or mental) of this patient AT THE TIME OF AMBULANCE TRANSPORT that requires the patient to be transported in an ambulance, and why transport by other means is contraindicated by the patient's condition: see below  Past Medical History:   Diagnosis Date   • Acute sinusitis    • Acute upper respiratory infection    • Arthritis    • CKD (chronic kidney disease)    • Debility    • Fatigue    • Foot pain, bilateral    • Glaucoma    • Gout    • Hematuria    • High blood pressure    • Hypertension    • Hypothyroid 09/1999   • Hypothyroidism    • IBS (irritable bowel syndrome)    • IGT (impaired glucose tolerance)    • Malaise and fatigue    • Medication management    • Melanoma (HCC) 1979    left leg   • OA (osteoarthritis)    • Osteopenia 09/1999   • Painful joint    • Psoriasis    • Renal " "failure    • Rosacea    • Vitamin D deficiency       Past Surgical History:   Procedure Laterality Date   • CATARACT EXTRACTION     • FOOT SURGERY      mauri toe surgery   • OTHER SURGICAL HISTORY      Melanoma Excision: Left leg      2) Is this patient \"bed confined\" as defined below?Yes   To be \"bed confined\" the patient must satisfy all three of the following criteria:  (1) unable to get up from bed without assistance; AND (2) unable to ambulate;  AND (3) unable to sit in a chair or wheelchair.  3) Can this patient safely be transported by car or wheelchair van (I.e., may safely sit during transport, without an attendant or monitoring?)No   4. In addition to completing questions 1-3 above, please check any of the following conditions that apply*:          *Note: supporting documentation for any boxes checked must be maintained in the patient's medical records Moderate/severe pain on movement, Medical attendant required and Unable to tolerate seated position for time needed to transport      SIGNATURE OF PHYSICIAN OR OTHER AUTHORIZED HEALTHCARE PROFESSIONAL    I certify that the above information is true and correct based on my evaluation of this patient, and represent that the patient requires transport by ambulance and that other forms of transport are contraindicated.  I understand that this information will be used by the Centers for Medicare and Medicaid Services (CMS) to support the determiniation of medical necessity for ambulance services, and I represent that I have personal knowledge of the patient's condition at the time of transport.         If this box is checked, I also certify that the patient is physically or mentally incapable of signing the ambulance service's claim form and that the institution with which I am affiliated has furnished care, services or assistance to the patient.  My signature below is made on behalf of the patient pursuant to 42 .36(b)(4). In accordance with 42 CFR " 424.37, the specific reason(s) that the patient is physically or mentally incapable of signing the claim for is as follows: n/a    Signature of Physician or Healthcare Professional  Date/Time:    3/19/23  9025     (For Scheduled repetitive transport, this form is not valid for transports performed more than 60 days after this date).                                                                                                                                            --------------------------------------------------------------------------------------------  Printed Name and Credentials of Physician or Authorized Healthcare Professional     *Form must be signed by patient's attending physician for scheduled, repetitive transports,.  For non-repetitive ambulance transports, if unable to obtain the signature of the attending physician, any of the following may sign (please select below):     Physician  Clinical Nurse Specialist  Registered Nurse     Physician Assistant  Discharge Planner  Licensed Practical Nurse     Nurse Practitioner

## 2023-03-19 NOTE — DISCHARGE SUMMARY
Patient Name: Marianne Delgado  : 1925  MRN: [id number]    Date of Admission: 3/16/2023  Date of Discharge:  3/19/2023  Primary Care Physician: Natasha Ramirez APRN      Chief Complaint:   Abdominal Pain (U) and Urinary Frequency      Discharge Diagnoses     Active Hospital Problems    Diagnosis  POA   • **Acute left-sided low back pain without sciatica [M54.50]  Yes   • Cholelithiasis [K80.20]  Unknown   • Diverticulosis [K57.90]  Unknown   • Vitamin B12 deficiency [E53.8]  Yes   • History of CVA (cerebrovascular accident) [Z86.73]  Not Applicable   • Stage 4 chronic kidney disease (HCC) [N18.4]  Yes   • Essential hypertension [I10]  Yes   • Acquired hypothyroidism [E03.9]  Yes      Resolved Hospital Problems   No resolved problems to display.        Brief Admitting HPI     89 year old female who presented to the emergency room with back ain which started a week ago; she denies injury; it became worse yesterday; she denies fever or chills; she has been taking antibiotics for a uti but has not noted any improvement; a caregiver is at the bedside to help answer questions    Hospital Course     Pt admitted with severe back pain without sciatica-like symptoms.  She underwent CT C/T/L-spine without findings of any acute pathology, noted chronic degenerative changes.  She was treated symptomatically with as needed analgesics and muscle relaxers.  She was seen in consultation with nephrology given elevated creatinine, though this was noted to be near baseline.  They did recommend stopping amlodipine and allowing permissive hypertension in this 89-year-old patient.  I personally discussed the case with patient's POA and discussed all results of the CT scan.  At this time they would prefer to discharge back to assisted living facility as she has 24/ caregiver support and they can get physical therapy at this facility as well.  They think that she will be most successful there and I am in agreement that  that is the best place for her.  Of note patient is a DNR and has previously noted she has no interest in pursuing dialysis if the need ever arose.  Outpatient palliative care services could be considered if pain continues to be an ongoing issue moving forward for symptom management.  All of this was discussed with family and patient and there are no additional questions at this time.  She is stable for discharge back to facility.    Discharge Plan      L back pain without sciatica  -CT A/P (3/16/2023): No renal stones or hydronephrosis; cholelithiasis; tiny hiatal hernia; diverticulosis  -Check CT C/T/L-spine  -Lidocaine patch PRN  -As needed Skelaxin  -outpatient PT recommended     Recent UTI  -UA bland   -was on linezolid     HTN  -DC amlodipine per nephrology     History of CVA  -Plavix 75 mg     CKD5  Hyperkalemia  -Crt 2.98 OA (b/l ~2.2? 2022) > 2.81 > 2.97  -per OP nephrology notes, no plans for HD, if patient develops uremia plan will be Hospice     Hypothyroid  -TSH 5.4 (3/17/23)  -Synthroid 88 mcg    Cholelithiasis  Diverticulosis  -Seen on CT scan; asymptomatic, no intervention necessary    Day of Discharge     Subjective:  Patient resting in bed.  Ready for discharge back to facility.    Physical Exam:  Temp:  [97.1 °F (36.2 °C)-99.1 °F (37.3 °C)] 97.7 °F (36.5 °C)  Heart Rate:  [75-85] 81  Resp:  [16] 16  BP: (118-165)/(45-64) 152/60  Body mass index is 39.24 kg/m².  Physical Exam  Constitutional:       Appearance: She is ill-appearing. She is not toxic-appearing.   Cardiovascular:      Rate and Rhythm: Normal rate and regular rhythm.      Pulses: Normal pulses.      Heart sounds: No murmur heard.  Pulmonary:      Effort: Pulmonary effort is normal.      Breath sounds: Normal breath sounds.   Abdominal:      General: Abdomen is flat. There is no distension.      Palpations: Abdomen is soft.      Tenderness: There is no abdominal tenderness.   Musculoskeletal:      Right lower leg: No edema.      Left  lower leg: No edema.      Comments: Bilateral lower extremities 4/5 strength; bilateral upper extremities 5/5 strength   Skin:     Findings: Bruising present.   Neurological:      General: No focal deficit present.      Mental Status: She is alert and oriented to person, place, and time.   Psychiatric:         Mood and Affect: Mood normal.     Consultants     Consult Orders (all) (From admission, onward)     Start     Ordered    03/18/23 1312  Inpatient Nephrology Consult  Once        Specialty:  Nephrology  Provider:  Cuco Murphy MD    03/18/23 1313    03/16/23 2120  Inpatient Case Management  Consult  Once        Provider:  (Not yet assigned)    03/16/23 2120 03/16/23 1720  [facility] (on-call MD unless specified) Details  Once        Specialty:  Hospitalist  Provider:  Lady Ag MD    03/16/23 1719              Procedures     * Surgery not found *      Imaging Results (All)     Procedure Component Value Units Date/Time    CT Lumbar Spine Without Contrast [[id number]] Collected: 03/18/23 1417     Updated: 03/18/23 1729    Narrative:      CT CERVICAL SPINE, THORACIC AND LUMBAR SPINE     HISTORY: Back pain, bilateral lower extremity weakness     COMPARISON: CT abdomen pelvis 03/16/2023 and CT chest 02/01/2021     TECHNIQUE: CT was performed of the cervical, thoracic and lumbar spine  with axial, coronal, and sagittal reformatted images. No intravenous  contrast was administered. Radiation dose reduction techniques were  utilized, including automated exposure control and exposure modulation  based on body size.     FINDINGS:  CT cervical spine:  There is no fracture of the cervical spine. No prevertebral soft tissue  swelling is present.     Thoracic and lumbar spine:  Small bilateral pleural effusions are present and incompletely  visualized. Hypodense right hepatic lesions demonstrated cystic density  on prior CT abdomen pelvis and is likely benign. There is a small hiatal  hernia.      S-shaped curvature of the thoracolumbar spine is present. There is no  fracture.     Multilevel moderate severe degenerative changes are present, most this  likely results in some degree of neuroforaminal narrowing within the  lumbar spine. Notably within the lower lumbar spine in the form of  left-sided facet arthropathy; however, findings are not well evaluated  with CT..       Impression:      Impression:  1.  No cervical, thoracic or lumbar spine fracture. Multilevel  degenerative changes are present, as notably within the lower lumbar  spine and likely results in some degree of neuroforaminal encroachment;  however findings are not well evaluated on noncontrast CT. Findings can  be further evaluated with MRI if clinically indicated.  2.  Small bilateral pleural effusions which are incompletely visualized.  Small hiatal hernia.  3.  Other findings as above     This report was finalized on 3/18/2023 5:24 PM by Dr. Rob Pickett M.D.       CT Thoracic Spine Without Contrast [[id number]] Collected: 03/18/23 1417     Updated: 03/18/23 1729    Narrative:      CT CERVICAL SPINE, THORACIC AND LUMBAR SPINE     HISTORY: Back pain, bilateral lower extremity weakness     COMPARISON: CT abdomen pelvis 03/16/2023 and CT chest 02/01/2021     TECHNIQUE: CT was performed of the cervical, thoracic and lumbar spine  with axial, coronal, and sagittal reformatted images. No intravenous  contrast was administered. Radiation dose reduction techniques were  utilized, including automated exposure control and exposure modulation  based on body size.     FINDINGS:  CT cervical spine:  There is no fracture of the cervical spine. No prevertebral soft tissue  swelling is present.     Thoracic and lumbar spine:  Small bilateral pleural effusions are present and incompletely  visualized. Hypodense right hepatic lesions demonstrated cystic density  on prior CT abdomen pelvis and is likely benign. There is a small hiatal  hernia.      S-shaped curvature of the thoracolumbar spine is present. There is no  fracture.     Multilevel moderate severe degenerative changes are present, most this  likely results in some degree of neuroforaminal narrowing within the  lumbar spine. Notably within the lower lumbar spine in the form of  left-sided facet arthropathy; however, findings are not well evaluated  with CT..       Impression:      Impression:  1.  No cervical, thoracic or lumbar spine fracture. Multilevel  degenerative changes are present, as notably within the lower lumbar  spine and likely results in some degree of neuroforaminal encroachment;  however findings are not well evaluated on noncontrast CT. Findings can  be further evaluated with MRI if clinically indicated.  2.  Small bilateral pleural effusions which are incompletely visualized.  Small hiatal hernia.  3.  Other findings as above     This report was finalized on 3/18/2023 5:24 PM by Dr. Rob Pickett M.D.       CT Cervical Spine Without Contrast [[id number]] Collected: 03/18/23 1417     Updated: 03/18/23 1729    Narrative:      CT CERVICAL SPINE, THORACIC AND LUMBAR SPINE     HISTORY: Back pain, bilateral lower extremity weakness     COMPARISON: CT abdomen pelvis 03/16/2023 and CT chest 02/01/2021     TECHNIQUE: CT was performed of the cervical, thoracic and lumbar spine  with axial, coronal, and sagittal reformatted images. No intravenous  contrast was administered. Radiation dose reduction techniques were  utilized, including automated exposure control and exposure modulation  based on body size.     FINDINGS:  CT cervical spine:  There is no fracture of the cervical spine. No prevertebral soft tissue  swelling is present.     Thoracic and lumbar spine:  Small bilateral pleural effusions are present and incompletely  visualized. Hypodense right hepatic lesions demonstrated cystic density  on prior CT abdomen pelvis and is likely benign. There is a small hiatal  hernia.      S-shaped curvature of the thoracolumbar spine is present. There is no  fracture.     Multilevel moderate severe degenerative changes are present, most this  likely results in some degree of neuroforaminal narrowing within the  lumbar spine. Notably within the lower lumbar spine in the form of  left-sided facet arthropathy; however, findings are not well evaluated  with CT..       Impression:      Impression:  1.  No cervical, thoracic or lumbar spine fracture. Multilevel  degenerative changes are present, as notably within the lower lumbar  spine and likely results in some degree of neuroforaminal encroachment;  however findings are not well evaluated on noncontrast CT. Findings can  be further evaluated with MRI if clinically indicated.  2.  Small bilateral pleural effusions which are incompletely visualized.  Small hiatal hernia.  3.  Other findings as above     This report was finalized on 3/18/2023 5:24 PM by Dr. Rob Pickett M.D.       CT Abdomen Pelvis Without Contrast [[id number]] Collected: 03/16/23 1604     Updated: 03/16/23 1608    Narrative:      CT ABDOMEN PELVIS WO CONTRAST-     HISTORY:  Abdominal pain, urinary frequency.      TECHNIQUE:  CT of the abdomen and pelvis was performed without  intravenous contrast.  Evaluation of solid organs and vasculature is  limited without intravenous contrast.  Radiation dose reduction  techniques were utilized, including automated exposure control and  exposure modulation based on body size.     COMPARISON:  CT chest 02/01/2021     FINDINGS:     Heart size is normal. There is no pericardial effusion. There is  calcific coronary artery atherosclerosis. There are calcified lymph  nodes. There is mild bibasilar atelectasis and/or scarring. There is a  calcified granuloma in the left lower lobe. Pleural spaces are clear.  The liver is normal in size. There are scattered intrahepatic cysts and  additional hypodense foci too small to accurately characterize. There  is  cholelithiasis. There is calcific splenic granulomata. There are no  pancreatic calcifications. The adrenal glands are within normal limits.  There are no renal stones or hydronephrosis. The kidneys are atrophic.  There is a hypodense focus in the inferior left kidney, too small  accurately characterize.  No pathologically enlarged abdominal or pelvic lymph nodes are  identified. There is advanced calcific aortoiliac and branch vessel  atherosclerosis. There is mild presacral fat stranding, which is  nonspecific.   There is a tiny hiatal hernia. There is no bowel obstruction. The  appendix is visualized. There is colonic diverticulosis CT evidence of  acute diverticulitis. The bladder is well distended and within normal  limits. The uterus is present. There is no adnexal mass.  There is diffuse osseous demineralization. There is multilevel  degenerative disc disease.          Impression:         1.  No renal stones or hydronephrosis.  2.  Cholelithiasis.  3.  Tiny hiatal hernia. Colonic diverticulosis.     Discussed with Dr. Saucedo at approximately 4:00 PM.     This report was finalized on 3/16/2023 4:05 PM by Dr. Patti Kirk M.D.           Results for orders placed during the hospital encounter of 01/29/21    Duplex Carotid Ultrasound CAR    Interpretation Summary  · Proximal right internal carotid artery plaque without significant stenosis.  · Proximal left internal carotid artery plaque without significant stenosis.    Results for orders placed during the hospital encounter of 01/29/21    Adult transthoracic echo complete    Interpretation Summary  · Estimated left ventricular EF = 70% Left ventricular systolic function is normal.  · Estimated right ventricular systolic pressure from tricuspid regurgitation is normal (<35 mmHg).  · Mild tricuspid valve regurgitation is present    Pertinent Labs     Results from last 7 days   Lab Units 03/18/23  0719 03/17/23  0617 03/16/23  1343   WBC 10*3/mm3 7.63 6.02  7.09   HEMOGLOBIN g/dL 11.2* 11.8* 12.9   PLATELETS 10*3/mm3 219 245 286     Results from last 7 days   Lab Units 03/19/23  0739 03/18/23  0719 03/17/23  0617 03/16/23  1700   SODIUM mmol/L 136 137 141 141   POTASSIUM mmol/L 5.3* 5.3* 5.0 4.5   CHLORIDE mmol/L 104 107 110* 110*   CO2 mmol/L 21.0* 21.9* 20.9* 19.1*   BUN mg/dL 34* 29* 25* 27*   CREATININE mg/dL 3.07* 2.97* 2.81* 2.98*   GLUCOSE mg/dL 88 96 93 94   EGFR mL/min/1.73 13.4* 13.9* 14.9* 13.8*     Results from last 7 days   Lab Units 03/19/23  0739 03/16/23  1700   ALBUMIN g/dL 3.3* 3.9   BILIRUBIN mg/dL  --  0.4   ALK PHOS U/L  --  113   AST (SGOT) U/L  --  16   ALT (SGPT) U/L  --  13     Results from last 7 days   Lab Units 03/19/23  0739 03/18/23  0719 03/17/23 0617 03/16/23  1700   CALCIUM mg/dL 9.3 9.6 10.1* 10.0*   ALBUMIN g/dL 3.3*  --   --  3.9   MAGNESIUM mg/dL 2.2  --   --   --    PHOSPHORUS mg/dL 5.9*  --   --   --      Results from last 7 days   Lab Units 03/16/23  1343   LIPASE U/L 40     Results from last 7 days   Lab Units 03/17/23  0617   CK TOTAL U/L 25           Invalid input(s): LDLCALC          Test Results Pending at Discharge       Discharge Details        Discharge Medications      New Medications      Instructions Start Date   lidocaine 5 %  Commonly known as: LIDODERM   1 patch, Transdermal, Every 24 Hours Scheduled, Remove & Discard patch within 12 hours or as directed by MD   Start Date: March 20, 2023     metaxalone 800 MG tablet  Commonly known as: SKELAXIN   800 mg, Oral, 3 Times Daily      sennosides-docusate 8.6-50 MG per tablet  Commonly known as: PERICOLACE   2 tablets, Oral, 2 Times Daily         Changes to Medications      Instructions Start Date   clopidogrel 75 MG tablet  Commonly known as: PLAVIX  What changed: additional instructions   75 mg, Oral, Daily         Continue These Medications      Instructions Start Date   Betamethasone Valerate 0.12 % foam   No dose, route, or frequency recorded.      bimatoprost 0.01  % ophthalmic drops  Commonly known as: LUMIGAN   1 drop, Nightly      cholecalciferol 25 MCG (1000 UT) tablet  Commonly known as: VITAMIN D3   TAKE ONE TABLET BY MOUTH TWO TIMES A DAY      dorzolamide-timolol 22.3-6.8 MG/ML ophthalmic solution  Commonly known as: COSOPT   1 drop, 2 Times Daily      levothyroxine 88 MCG tablet  Commonly known as: SYNTHROID, LEVOTHROID   88 mcg, Oral, Daily      metroNIDAZOLE 0.75 % cream  Commonly known as: METROCREAM   1 application, Topical, 2 Times Daily      multivitamin with minerals tablet tablet   1 tablet, Oral, Daily      triamcinolone 0.1 % ointment  Commonly known as: KENALOG   1 application, Topical, 2 Times Daily      vitamin B-12 1000 MCG tablet  Commonly known as: CYANOCOBALAMIN   1 po qd x 3 days in the week         Stop These Medications    amLODIPine 5 MG tablet  Commonly known as: NORVASC     clobetasol 0.05 % ointment  Commonly known as: TEMOVATE            Allergies   Allergen Reactions   • Atorvastatin Nausea And Vomiting   • Azithromycin Diarrhea   • Cefdinir Nausea Only     nausea   • Doxycycline Monohydrate Nausea Only   • Allopurinol Rash     Psoriasis  rash       Discharge Disposition:  Home or Self Care      Discharge Diet:  Diet Order   Procedures   • Diet: Regular/House Diet; Texture: Regular Texture (IDDSI 7); Fluid Consistency: Thin (IDDSI 0)       Discharge Activity:   Activity Instructions     Gradually Increase Activity Until at Pre-Hospitalization Level            CODE STATUS:    Code Status and Medical Interventions:   Ordered at: 03/16/23 2140     Medical Intervention Limits:    NO intubation (DNI)     Code Status (Patient has no pulse and is not breathing):    No CPR (Do Not Attempt to Resuscitate)     Medical Interventions (Patient has pulse or is breathing):    Limited Support       No future appointments.   Follow-up Information     Natasha Ramirez, PAUL .    Specialty: Family Medicine  Contact information:  [address] [facility]  [id number]  [phone]                         Time Spent on Discharge:  Greater than 30 minutes spent on discharge management including final examination, discussion of hospital stay and patient education, preparation of records, medication reconciliation, follow up planning      Iam Keller MD  [facility]  03/19/23  11:38 EDT

## 2023-03-19 NOTE — CASE MANAGEMENT/SOCIAL WORK
Continued Stay Note  [facility]     Patient Name: Marianne Delgado  MRN: [id number]  Today's Date: 3/19/2023    Admit Date: 3/16/2023    Plan: DC to home via [facility]   Discharge Plan     Row Name 03/19/23 1254       Plan    Plan DC to home via [facility]    Patient/Family in Agreement with Plan yes    Plan Comments Inbound call from staff RN who states pt is discharging to home today and will need ambulance transport. Pt will also need an EMS DNR form. Called and arranged for a 5PM run. Ambulance DNR form notarized at bedside. Updated pt and family on transport time. Updated staff RN............JW               Discharge Codes    No documentation.               Expected Discharge Date and Time     Expected Discharge Date Expected Discharge Time    Mar 19, 2023             Anabella Gandhi, RN

## 2023-03-19 NOTE — PLAN OF CARE
Goal Outcome Evaluation:  Plan of Care Reviewed With: patient, caregiver        Progress: no change  Outcome Evaluation: Patient lethargic, sedated, and difficult to arouse at the beginning of shift.  Started with 5L O2 and currently on 2L.  Per caregiver, patient uses CPAP at home.  Dilaudid x 2 givent this shift.  Patient became restless, trying to get OOB early this shift.  Caregiver at bedside and helpful.

## 2023-03-19 NOTE — CONSULTS
[facility] Consult Note      Patient Name: Marianne Delgado  : 1925  MRN: [id number]  Primary Care Physician:  Natasha Ramirez APRN  Referring Physician: Lady Ag MD  Date of admission: 3/16/2023    Subjective     Reason for Consult:  CKD5    HPI:   Marianne Delgado is a 89 y.o. female with CKD5 (baseline SCR high-2's to 3) followed by Dr. Danica Mitchell of our group, admitted 3/16 for further evaluation of back pain.  SCR on arrival 3.0 with same value today.  Imaging of her back has been done; analgesics have been given.  Full PMH outlined below; patient has declined dialysis when/if she reaches ESRD, preferring death to it.  Unable to obtain ROS as she is slightly lethargic following Dilaudid earlier today, and continues to state that her left leg hurts.  IVF stopped earlier.    Review of Systems:   Not reliably obtainable as patient is slightly confused, in significant discomfort, and perseverating.    Personal History     Past Medical History:   Diagnosis Date   • Acute sinusitis    • Acute upper respiratory infection    • Arthritis    • CKD (chronic kidney disease)    • Debility    • Fatigue    • Foot pain, bilateral    • Glaucoma    • Gout    • Hematuria    • High blood pressure    • Hypertension    • Hypothyroid 1999   • Hypothyroidism    • IBS (irritable bowel syndrome)    • IGT (impaired glucose tolerance)    • Malaise and fatigue    • Medication management    • Melanoma (HCC) 1979    left leg   • OA (osteoarthritis)    • Osteopenia 1999   • Painful joint    • Psoriasis    • Renal failure    • Rosacea    • Vitamin D deficiency        Past Surgical History:   Procedure Laterality Date   • CATARACT EXTRACTION     • FOOT SURGERY      mauri toe surgery   • OTHER SURGICAL HISTORY      Melanoma Excision: Left leg       Family History: family history includes Heart attack in her mother; Heart disease in her sister, sister, sister and another family  member; Kidney failure in her father; Leukemia in her brother.    Social History:  reports that she has never smoked. She has never used smokeless tobacco. She reports current alcohol use of about 2.0 standard drinks per week. She reports that she does not use drugs.    Home Medications:  Prior to Admission medications    Medication Sig Start Date End Date Taking? Authorizing Provider   amLODIPine (NORVASC) 5 MG tablet Take 1 tablet by mouth Daily. Takes at noon 10/16/21  Yes Shana Montoya MD   Betamethasone Valerate 0.12 % foam  1/18/21  Yes Shana Montoya MD   bimatoprost (LUMIGAN) 0.01 % ophthalmic drops 1 drop Every Night.   Yes Shana Montoya MD   cholecalciferol (VITAMIN D3) 25 MCG (1000 UT) tablet TAKE ONE TABLET BY MOUTH TWO TIMES A DAY 3/7/22  Yes Xenia Robert MD   clopidogrel (PLAVIX) 75 MG tablet Take 1 tablet by mouth Daily.  Patient taking differently: Take 1 tablet by mouth Daily. Takes at noon 2/23/22  Yes Naila Neil MD   levothyroxine (SYNTHROID, LEVOTHROID) 88 MCG tablet Take 1 tablet by mouth Daily. 12/25/21  Yes Naila Neil MD   metroNIDAZOLE (METROCREAM) 0.75 % cream Apply 1 application topically to the appropriate area as directed 2 (Two) Times a Day.   Yes Shana Montoya MD   multivitamin with minerals tablet tablet Take 1 tablet by mouth Daily.   Yes Shana Montoya MD   triamcinolone (KENALOG) 0.1 % ointment Apply 1 application topically to the appropriate area as directed 2 (Two) Times a Day.   Yes Shana Montoya MD   vitamin B-12 (CYANOCOBALAMIN) 1000 MCG tablet 1 po qd x 3 days in the week 2/1/22  Yes Naila Neil MD   clobetasol (TEMOVATE) 0.05 % ointment Apply  topically to the appropriate area as directed 2 (Two) Times a Day As Needed (rash).  Patient not taking: Reported on 3/16/2023 4/27/21   Xenia Robert MD   dorzolamide-timolol (COSOPT) 22.3-6.8 MG/ML ophthalmic solution 1 drop 2 (Two) Times a Day.     Provider, Historical, MD       Allergies:  Allergies   Allergen Reactions   • Atorvastatin Nausea And Vomiting   • Azithromycin Diarrhea   • Cefdinir Nausea Only     nausea   • Doxycycline Monohydrate Nausea Only   • Allopurinol Rash     Psoriasis  rash       Objective     Vitals:   Temp:  [97.7 °F (36.5 °C)-99.3 °F (37.4 °C)] 97.7 °F (36.5 °C)  Heart Rate:  [73-85] 84  Resp:  [16] 16  BP: (118-165)/(45-64) 118/45  Flow (L/min):  [2] 2  No intake or output data in the 24 hours ending 03/18/23 2105    Physical Exam:   Constitutional: Awake but lethargic, uncomfortable, pulling at gown  HEENT: Sclera anicteric, no conjunctival injection, dry MM  Neck: Supple, no carotid bruit, trachea at midline, no JVD  Respiratory: Coarse anteriorly, nonlabored on 2 L/min  Cardiovascular: RRR, no rub  Gastrointestinal: BS +, soft, nontender and nondistended  : No palpable bladder  Musculoskeletal: Trace edema both legs; no clubbing  Psychiatric: Perseverating, blunted, not oriented  Neurologic:  moving arms   Skin: Warm and dry       Scheduled Meds:     amLODIPine, 5 mg, Oral, Daily  cholecalciferol, 1,000 Units, Oral, BID  clopidogrel, 75 mg, Oral, Daily  latanoprost, 1 drop, Both Eyes, Nightly  levothyroxine, 88 mcg, Oral, Q AM  lidocaine, 1 patch, Transdermal, Q24H  metaxalone, 800 mg, Oral, TID  metroNIDAZOLE, 1 application, Topical, Q12H  multivitamin with minerals, 1 tablet, Oral, Daily  senna-docusate sodium, 2 tablet, Oral, BID  sodium zirconium cyclosilicate, 10 g, Oral, TID  triamcinolone, 1 application, Topical, Q12H  vitamin B-12, 1,000 mcg, Oral, Every Other Day      IV Meds:        Results Reviewed:   I have personally reviewed the results from the time of this admission to 3/18/2023 21:05 EDT     Lab Results   Component Value Date    GLUCOSE 96 03/18/2023    CALCIUM 9.6 03/18/2023     03/18/2023    K 5.3 (H) 03/18/2023    CO2 21.9 (L) 03/18/2023     03/18/2023    BUN 29 (H) 03/18/2023    CREATININE 2.97  (H) 03/18/2023    EGFRIFAFRI 20 (L) 02/01/2022    EGFRIFNONA 18 (L) 02/01/2022    BCR 9.8 03/18/2023    ANIONGAP 8.1 03/18/2023      Lab Results   Component Value Date    MG 1.9 07/19/2021    PHOS 3.4 02/17/2021    ALBUMIN 3.9 03/16/2023           Assessment / Plan     ASSESSMENT:  1.  CKD5, with UOP not quantified.  Mild hyperkalemia while on LR earlier.  Anion gap stable.  Mucous membranes dry, but mouth breathing; bilateral pleural effusions by imaging.  Urinalysis is bland; no hydronephrosis by abdominal CT  2.  Severe back and left leg pain  3.  Hypertension, overcontrolled given age  4.  Anemia  5.  PVD with prior stroke    PLAN:  1.  Discontinue amlodipine and let blood pressure rise  2.  Lokelma (already ordered)  3.  Surveillance labs    Thank you for involving us in the care of Marianne Delgado.  Please feel free to call with any questions.    Víctor Garcia MD  03/18/23  21:05 EDT    [facility]  [phone]      Please note that portions of this note were completed with a voice recognition program.

## 2023-03-19 NOTE — PLAN OF CARE
Problem: Adult Inpatient Plan of Care  Goal: Plan of Care Review  Outcome: Adequate for Care Transition  Goal: Patient-Specific Goal (Individualized)  Outcome: Adequate for Care Transition  Goal: Absence of Hospital-Acquired Illness or Injury  Outcome: Adequate for Care Transition  Intervention: Identify and Manage Fall Risk  Recent Flowsheet Documentation  Taken 3/19/2023 1600 by Adenike Keyes RN  Safety Promotion/Fall Prevention:   safety round/check completed   room organization consistent  Taken 3/19/2023 1400 by Adenike Keyes RN  Safety Promotion/Fall Prevention:   safety round/check completed   room organization consistent  Taken 3/19/2023 1200 by Adenike Keyes RN  Safety Promotion/Fall Prevention:   safety round/check completed   room organization consistent  Taken 3/19/2023 1000 by Adenike Keyes RN  Safety Promotion/Fall Prevention:   safety round/check completed   room organization consistent  Taken 3/19/2023 0859 by Adenike Keyes RN  Safety Promotion/Fall Prevention:   safety round/check completed   room organization consistent  Intervention: Prevent Skin Injury  Recent Flowsheet Documentation  Taken 3/19/2023 0859 by Adenike Keyes RN  Skin Protection: adhesive use limited  Intervention: Prevent and Manage VTE (Venous Thromboembolism) Risk  Recent Flowsheet Documentation  Taken 3/19/2023 0859 by Adenike Keyes RN  Activity Management: activity adjusted per tolerance  VTE Prevention/Management:   bilateral   sequential compression devices off  Intervention: Prevent Infection  Recent Flowsheet Documentation  Taken 3/19/2023 0859 by Adenike Keyes RN  Infection Prevention: rest/sleep promoted  Goal: Optimal Comfort and Wellbeing  Outcome: Adequate for Care Transition  Intervention: Monitor Pain and Promote Comfort  Recent Flowsheet Documentation  Taken 3/19/2023 0859 by Adenike Keyes RN  Pain Management Interventions: see MAR  Intervention: Provide Person-Centered Care  Recent  Flowsheet Documentation  Taken 3/19/2023 0859 by Adenike Keyes RN  Trust Relationship/Rapport:   care explained   choices provided  Goal: Readiness for Transition of Care  Outcome: Adequate for Care Transition     Problem: Hypertension Comorbidity  Goal: Blood Pressure in Desired Range  Outcome: Adequate for Care Transition  Intervention: Maintain Blood Pressure Management  Recent Flowsheet Documentation  Taken 3/19/2023 0859 by Adenike Keyes RN  Medication Review/Management: medications reviewed     Problem: Osteoarthritis Comorbidity  Goal: Maintenance of Osteoarthritis Symptom Control  Outcome: Adequate for Care Transition  Intervention: Maintain Osteoarthritis Symptom Control  Recent Flowsheet Documentation  Taken 3/19/2023 0859 by Adenike Keyes RN  Activity Management: activity adjusted per tolerance  Medication Review/Management: medications reviewed     Problem: Pain Chronic (Persistent) (Comorbidity Management)  Goal: Acceptable Pain Control and Functional Ability  Outcome: Adequate for Care Transition  Intervention: Manage Persistent Pain  Recent Flowsheet Documentation  Taken 3/19/2023 0859 by Adenike Keyes RN  Bowel Elimination Promotion: adequate fluid intake promoted  Sleep/Rest Enhancement: awakenings minimized  Medication Review/Management: medications reviewed  Intervention: Develop Pain Management Plan  Recent Flowsheet Documentation  Taken 3/19/2023 0859 by Adenike Keyes RN  Pain Management Interventions: see MAR  Intervention: Optimize Psychosocial Wellbeing  Recent Flowsheet Documentation  Taken 3/19/2023 0859 by Adenike Keyes RN  Supportive Measures: active listening utilized     Problem: Skin Injury Risk Increased  Goal: Skin Health and Integrity  Outcome: Adequate for Care Transition  Intervention: Optimize Skin Protection  Recent Flowsheet Documentation  Taken 3/19/2023 0859 by Adenike Keyes RN  Pressure Reduction Techniques: frequent weight shift encouraged  Head of Bed  (HOB) Positioning: HOB elevated  Pressure Reduction Devices: pressure-redistributing mattress utilized  Skin Protection: adhesive use limited     Problem: Pain Acute  Goal: Acceptable Pain Control and Functional Ability  Outcome: Adequate for Care Transition  Intervention: Prevent or Manage Pain  Recent Flowsheet Documentation  Taken 3/19/2023 0859 by Adenike Keyes RN  Bowel Elimination Promotion: adequate fluid intake promoted  Sleep/Rest Enhancement: awakenings minimized  Medication Review/Management: medications reviewed  Intervention: Develop Pain Management Plan  Recent Flowsheet Documentation  Taken 3/19/2023 0859 by Adenike Keyes RN  Pain Management Interventions: see MAR  Intervention: Optimize Psychosocial Wellbeing  Recent Flowsheet Documentation  Taken 3/19/2023 0859 by Adenike Keyes RN  Supportive Measures: active listening utilized     Problem: Fall Injury Risk  Goal: Absence of Fall and Fall-Related Injury  Outcome: Adequate for Care Transition  Intervention: Identify and Manage Contributors  Recent Flowsheet Documentation  Taken 3/19/2023 0859 by Adenike Keyes RN  Medication Review/Management: medications reviewed  Intervention: Promote Injury-Free Environment  Recent Flowsheet Documentation  Taken 3/19/2023 1600 by Adenike Keyes RN  Safety Promotion/Fall Prevention:   safety round/check completed   room organization consistent  Taken 3/19/2023 1400 by Adenike Keyes RN  Safety Promotion/Fall Prevention:   safety round/check completed   room organization consistent  Taken 3/19/2023 1200 by Adenike Keyes RN  Safety Promotion/Fall Prevention:   safety round/check completed   room organization consistent  Taken 3/19/2023 1000 by Adenike Keyes RN  Safety Promotion/Fall Prevention:   safety round/check completed   room organization consistent  Taken 3/19/2023 0859 by Adenike Keyes RN  Safety Promotion/Fall Prevention:   safety round/check completed   room organization consistent      Problem: Infection  Goal: Absence of Infection Signs and Symptoms  Outcome: Adequate for Care Transition   Goal Outcome Evaluation:

## 2023-03-20 NOTE — CASE MANAGEMENT/SOCIAL WORK
Case Management Discharge Note      Final Note: DC'd home via [facility]         Selected Continued Care - Discharged on 3/19/2023 Admission date: 3/16/2023 - Discharge disposition: Home or Self Care    Destination    No services have been selected for the patient.              Durable Medical Equipment    No services have been selected for the patient.              Dialysis/Infusion    No services have been selected for the patient.              Home Medical Care    No services have been selected for the patient.              Therapy    No services have been selected for the patient.              Community Resources    No services have been selected for the patient.              Community & DME    No services have been selected for the patient.                  Transportation Services  Ambulance: [facility]    Final Discharge Disposition Code: 01 - home or self-care

## 2023-04-05 ENCOUNTER — HOSPITAL ENCOUNTER (INPATIENT)
Facility: HOSPITAL | Age: 88
LOS: 10 days | Discharge: HOME HEALTH | DRG: 378 | End: 2023-04-15
Attending: EMERGENCY MEDICINE | Admitting: INTERNAL MEDICINE
Payer: MEDICARE

## 2023-04-05 ENCOUNTER — APPOINTMENT (OUTPATIENT)
Dept: CT IMAGING | Facility: HOSPITAL | Age: 88
DRG: 378 | End: 2023-04-05
Payer: MEDICARE

## 2023-04-05 DIAGNOSIS — R10.84 GENERALIZED ABDOMINAL PAIN: ICD-10-CM

## 2023-04-05 DIAGNOSIS — Z86.73 HISTORY OF CVA (CEREBROVASCULAR ACCIDENT): ICD-10-CM

## 2023-04-05 DIAGNOSIS — K92.2 GASTROINTESTINAL HEMORRHAGE, UNSPECIFIED GASTROINTESTINAL HEMORRHAGE TYPE: Primary | ICD-10-CM

## 2023-04-05 DIAGNOSIS — K44.9 HIATAL HERNIA: ICD-10-CM

## 2023-04-05 DIAGNOSIS — M79.2 NEUROPATHIC PAIN: ICD-10-CM

## 2023-04-05 DIAGNOSIS — N18.4 STAGE 4 CHRONIC KIDNEY DISEASE: ICD-10-CM

## 2023-04-05 DIAGNOSIS — N18.9 CHRONIC KIDNEY DISEASE, UNSPECIFIED CKD STAGE: ICD-10-CM

## 2023-04-05 LAB
ABO GROUP BLD: NORMAL
ALBUMIN SERPL-MCNC: 3.6 G/DL (ref 3.5–5.2)
ALBUMIN/GLOB SERPL: 1.3 G/DL
ALP SERPL-CCNC: 108 U/L (ref 39–117)
ALT SERPL W P-5'-P-CCNC: 10 U/L (ref 1–33)
ANION GAP SERPL CALCULATED.3IONS-SCNC: 12 MMOL/L (ref 5–15)
APTT PPP: <20 SECONDS (ref 22.7–35.4)
AST SERPL-CCNC: 17 U/L (ref 1–32)
BASOPHILS # BLD AUTO: 0.03 10*3/MM3 (ref 0–0.2)
BASOPHILS NFR BLD AUTO: 0.3 % (ref 0–1.5)
BILIRUB SERPL-MCNC: 0.4 MG/DL (ref 0–1.2)
BLD GP AB SCN SERPL QL: NEGATIVE
BUN SERPL-MCNC: 59 MG/DL (ref 8–23)
BUN/CREAT SERPL: 25.7 (ref 7–25)
CALCIUM SPEC-SCNC: 9.7 MG/DL (ref 8.2–9.6)
CHLORIDE SERPL-SCNC: 107 MMOL/L (ref 98–107)
CO2 SERPL-SCNC: 21 MMOL/L (ref 22–29)
CREAT SERPL-MCNC: 2.3 MG/DL (ref 0.57–1)
DEPRECATED RDW RBC AUTO: 43.6 FL (ref 37–54)
EGFRCR SERPLBLD CKD-EPI 2021: 18.9 ML/MIN/1.73
EOSINOPHIL # BLD AUTO: 0.1 10*3/MM3 (ref 0–0.4)
EOSINOPHIL NFR BLD AUTO: 1 % (ref 0.3–6.2)
ERYTHROCYTE [DISTWIDTH] IN BLOOD BY AUTOMATED COUNT: 13.1 % (ref 12.3–15.4)
FERRITIN SERPL-MCNC: 129 NG/ML (ref 13–150)
GLOBULIN UR ELPH-MCNC: 2.7 GM/DL
GLUCOSE SERPL-MCNC: 88 MG/DL (ref 65–99)
HCT VFR BLD AUTO: 39.8 % (ref 34–46.6)
HGB BLD-MCNC: 12.5 G/DL (ref 12–15.9)
IMM GRANULOCYTES # BLD AUTO: 0.13 10*3/MM3 (ref 0–0.05)
IMM GRANULOCYTES NFR BLD AUTO: 1.3 % (ref 0–0.5)
IRON 24H UR-MRATE: 39 MCG/DL (ref 37–145)
IRON SATN MFR SERPL: 12 % (ref 20–50)
LIPASE SERPL-CCNC: 47 U/L (ref 13–60)
LYMPHOCYTES # BLD AUTO: 1.45 10*3/MM3 (ref 0.7–3.1)
LYMPHOCYTES NFR BLD AUTO: 14.5 % (ref 19.6–45.3)
MCH RBC QN AUTO: 28.5 PG (ref 26.6–33)
MCHC RBC AUTO-ENTMCNC: 31.4 G/DL (ref 31.5–35.7)
MCV RBC AUTO: 90.9 FL (ref 79–97)
MONOCYTES # BLD AUTO: 0.83 10*3/MM3 (ref 0.1–0.9)
MONOCYTES NFR BLD AUTO: 8.3 % (ref 5–12)
NEUTROPHILS NFR BLD AUTO: 7.46 10*3/MM3 (ref 1.7–7)
NEUTROPHILS NFR BLD AUTO: 74.6 % (ref 42.7–76)
NRBC BLD AUTO-RTO: 0 /100 WBC (ref 0–0.2)
PLATELET # BLD AUTO: 184 10*3/MM3 (ref 140–450)
PMV BLD AUTO: 10.6 FL (ref 6–12)
POTASSIUM SERPL-SCNC: 5.2 MMOL/L (ref 3.5–5.2)
PROT SERPL-MCNC: 6.3 G/DL (ref 6–8.5)
RBC # BLD AUTO: 4.38 10*6/MM3 (ref 3.77–5.28)
RH BLD: POSITIVE
SODIUM SERPL-SCNC: 140 MMOL/L (ref 136–145)
T&S EXPIRATION DATE: NORMAL
TIBC SERPL-MCNC: 313 MCG/DL (ref 298–536)
TRANSFERRIN SERPL-MCNC: 210 MG/DL (ref 200–360)
WBC NRBC COR # BLD: 10 10*3/MM3 (ref 3.4–10.8)

## 2023-04-05 PROCEDURE — 86850 RBC ANTIBODY SCREEN: CPT | Performed by: NURSE PRACTITIONER

## 2023-04-05 PROCEDURE — 86901 BLOOD TYPING SEROLOGIC RH(D): CPT | Performed by: NURSE PRACTITIONER

## 2023-04-05 PROCEDURE — 86900 BLOOD TYPING SEROLOGIC ABO: CPT | Performed by: NURSE PRACTITIONER

## 2023-04-05 PROCEDURE — 85025 COMPLETE CBC W/AUTO DIFF WBC: CPT | Performed by: NURSE PRACTITIONER

## 2023-04-05 PROCEDURE — P9612 CATHETERIZE FOR URINE SPEC: HCPCS

## 2023-04-05 PROCEDURE — 99285 EMERGENCY DEPT VISIT HI MDM: CPT

## 2023-04-05 PROCEDURE — 83690 ASSAY OF LIPASE: CPT | Performed by: NURSE PRACTITIONER

## 2023-04-05 PROCEDURE — 83540 ASSAY OF IRON: CPT | Performed by: INTERNAL MEDICINE

## 2023-04-05 PROCEDURE — 80053 COMPREHEN METABOLIC PANEL: CPT | Performed by: NURSE PRACTITIONER

## 2023-04-05 PROCEDURE — 74176 CT ABD & PELVIS W/O CONTRAST: CPT

## 2023-04-05 PROCEDURE — 82728 ASSAY OF FERRITIN: CPT | Performed by: INTERNAL MEDICINE

## 2023-04-05 PROCEDURE — 36415 COLL VENOUS BLD VENIPUNCTURE: CPT | Performed by: INTERNAL MEDICINE

## 2023-04-05 PROCEDURE — 85730 THROMBOPLASTIN TIME PARTIAL: CPT | Performed by: NURSE PRACTITIONER

## 2023-04-05 PROCEDURE — 84466 ASSAY OF TRANSFERRIN: CPT | Performed by: INTERNAL MEDICINE

## 2023-04-05 RX ORDER — AMLODIPINE BESYLATE 5 MG/1
5 TABLET ORAL DAILY
COMMUNITY
End: 2023-04-15

## 2023-04-05 RX ORDER — SODIUM CHLORIDE 0.9 % (FLUSH) 0.9 %
10 SYRINGE (ML) INJECTION AS NEEDED
Status: DISCONTINUED | OUTPATIENT
Start: 2023-04-05 | End: 2023-04-15 | Stop reason: HOSPADM

## 2023-04-05 RX ORDER — SODIUM CHLORIDE 9 MG/ML
75 INJECTION, SOLUTION INTRAVENOUS CONTINUOUS
Status: DISCONTINUED | OUTPATIENT
Start: 2023-04-05 | End: 2023-04-07

## 2023-04-05 RX ORDER — PANTOPRAZOLE SODIUM 40 MG/10ML
80 INJECTION, POWDER, LYOPHILIZED, FOR SOLUTION INTRAVENOUS ONCE
Status: COMPLETED | OUTPATIENT
Start: 2023-04-05 | End: 2023-04-05

## 2023-04-05 RX ORDER — ACETAMINOPHEN 325 MG/1
650 TABLET ORAL EVERY 4 HOURS PRN
Status: DISCONTINUED | OUTPATIENT
Start: 2023-04-05 | End: 2023-04-15 | Stop reason: HOSPADM

## 2023-04-05 RX ORDER — ONDANSETRON 4 MG/1
4 TABLET, FILM COATED ORAL EVERY 6 HOURS PRN
Status: DISCONTINUED | OUTPATIENT
Start: 2023-04-05 | End: 2023-04-15 | Stop reason: HOSPADM

## 2023-04-05 RX ORDER — ONDANSETRON 2 MG/ML
4 INJECTION INTRAMUSCULAR; INTRAVENOUS EVERY 6 HOURS PRN
Status: DISCONTINUED | OUTPATIENT
Start: 2023-04-05 | End: 2023-04-15 | Stop reason: HOSPADM

## 2023-04-05 RX ORDER — UREA 10 %
3 LOTION (ML) TOPICAL NIGHTLY PRN
Status: DISCONTINUED | OUTPATIENT
Start: 2023-04-05 | End: 2023-04-15 | Stop reason: HOSPADM

## 2023-04-05 RX ADMIN — PANTOPRAZOLE SODIUM 8 MG/HR: 40 INJECTION, POWDER, FOR SOLUTION INTRAVENOUS at 19:57

## 2023-04-05 RX ADMIN — PANTOPRAZOLE SODIUM 8 MG/HR: 40 INJECTION, POWDER, FOR SOLUTION INTRAVENOUS at 14:41

## 2023-04-05 RX ADMIN — PANTOPRAZOLE SODIUM 80 MG: 40 INJECTION, POWDER, FOR SOLUTION INTRAVENOUS at 14:40

## 2023-04-05 RX ADMIN — SODIUM CHLORIDE 150 ML/HR: 9 INJECTION, SOLUTION INTRAVENOUS at 19:57

## 2023-04-05 NOTE — ED PROVIDER NOTES
EMERGENCY DEPARTMENT ENCOUNTER    Room Number:  03/03  Date seen:  4/5/2023  PCP: Natasha Ramirez APRN  Historian/Independent historian: self, EMS, nursing facility  Chronic or social conditions impacting care: age      HPI:  Chief Complaint: abdominal pain  A complete HPI/ROS/PMH/PSH/SH/FH are unobtainable due to: n/a  Context: Marianne Delgado is a 89 y.o. female who presents to the ED c/o diffuse abdominal pain that has been present for the last few weeks.  She denies any vomiting.  She has had some nausea and belching.  Additionally, the nursing facility states that they noted her to have some dark tarry stools.  She was recently admitted to the hospital for back pain with decreased mobility.  She is not on any anticoagulation.  She does take Plavix.  She has never had a GI bleed before.  She denies any fever, cough, chest pain, shortness of breath, urinary complaints.    External Medical record review:   3/16/2023: Hospital admission for back pain with sciatica, [facility]      PAST MEDICAL HISTORY  Active Ambulatory Problems     Diagnosis Date Noted   • Arthritis    • Stage 4 chronic kidney disease (HCC)    • Debility    • Foot pain, bilateral    • Acute idiopathic gout of right ankle    • Hematuria    • Essential hypertension    • Acquired hypothyroidism 09/01/1999   • Osteopenia 09/01/1999   • Psoriasis    • Rosacea    • Vitamin D deficiency    • Medicare annual wellness visit, subsequent 03/11/2020   • Morbidly obese 03/11/2020   • History of CVA (cerebrovascular accident) 01/29/2021   • Stroke (cerebrum) 02/03/2021   • Vitamin B12 deficiency 03/02/2021   • Primary open-angle glaucoma, bilateral, severe stage 03/18/2021   • Hyperparathyroidism 04/27/2021   • Weight loss 06/01/2021   • Acute left-sided low back pain without sciatica 03/16/2023   • Cholelithiasis 03/19/2023   • Diverticulosis 03/19/2023     Resolved Ambulatory Problems     Diagnosis Date Noted   • Glaucoma    • Hypertensive urgency 01/29/2021      Past Medical History:   Diagnosis Date   • Acute sinusitis    • Acute upper respiratory infection    • CKD (chronic kidney disease)    • Fatigue    • Gout    • High blood pressure    • Hypertension    • Hypothyroid 09/1999   • Hypothyroidism    • IBS (irritable bowel syndrome)    • IGT (impaired glucose tolerance)    • Malaise and fatigue    • Medication management    • Melanoma 1979   • OA (osteoarthritis)    • Painful joint    • Renal failure          PAST SURGICAL HISTORY  Past Surgical History:   Procedure Laterality Date   • CATARACT EXTRACTION     • FOOT SURGERY      mauri toe surgery   • OTHER SURGICAL HISTORY      Melanoma Excision: Left leg         FAMILY HISTORY  Family History   Problem Relation Age of Onset   • Heart attack Mother    • Kidney failure Father    • Heart disease Sister    • Leukemia Brother    • Heart disease Sister    • Heart disease Sister    • Heart disease Other         FH in females b/f 65 and males b/f 55         SOCIAL HISTORY  Social History     Socioeconomic History   • Marital status:    Tobacco Use   • Smoking status: Never   • Smokeless tobacco: Never   Vaping Use   • Vaping Use: Unknown   Substance and Sexual Activity   • Alcohol use: Yes     Alcohol/week: 2.0 standard drinks     Types: 1 Glasses of wine, 1 Shots of liquor per week     Comment: occasionally   • Drug use: No         ALLERGIES  Atorvastatin, Azithromycin, Cefdinir, Doxycycline monohydrate, and Allopurinol        REVIEW OF SYSTEMS  Per HPI, otherwise negative.       PHYSICAL EXAM  ED Triage Vitals [04/05/23 1335]   Temp Heart Rate Resp BP SpO2   98.1 °F (36.7 °C) 75 16 172/80 95 %      Temp src Heart Rate Source Patient Position BP Location FiO2 (%)   -- Monitor -- -- --       Physical Exam  Vitals and nursing note reviewed.   Constitutional:       Appearance: Normal appearance. She is well-developed.   Cardiovascular:      Rate and Rhythm: Normal rate and regular rhythm.      Pulses: Normal pulses.       Heart sounds: Normal heart sounds.   Pulmonary:      Effort: Pulmonary effort is normal.      Breath sounds: Normal breath sounds.   Abdominal:      General: Bowel sounds are normal.      Palpations: Abdomen is soft.      Tenderness: There is abdominal tenderness (mild, diffuse). There is no left CVA tenderness or guarding.   Genitourinary:     Comments: Rectal exam performed:  No internal or external hemorrhoids noted  Dark, tarry stool present.  No kisha blood.  Good rectal tone.  Skin:     General: Skin is warm.      Capillary Refill: Capillary refill takes less than 2 seconds.   Neurological:      Mental Status: She is alert and oriented to person, place, and time. Mental status is at baseline.   Psychiatric:         Mood and Affect: Mood normal.             LAB RESULTS  Recent Results (from the past 24 hour(s))   Comprehensive Metabolic Panel    Collection Time: 04/05/23  2:27 PM    Specimen: Blood   Result Value Ref Range    Glucose 88 65 - 99 mg/dL    BUN 59 (H) 8 - 23 mg/dL    Creatinine 2.30 (H) 0.57 - 1.00 mg/dL    Sodium 140 136 - 145 mmol/L    Potassium 5.2 3.5 - 5.2 mmol/L    Chloride 107 98 - 107 mmol/L    CO2 21.0 (L) 22.0 - 29.0 mmol/L    Calcium 9.7 (H) 8.2 - 9.6 mg/dL    Total Protein 6.3 6.0 - 8.5 g/dL    Albumin 3.6 3.5 - 5.2 g/dL    ALT (SGPT) 10 1 - 33 U/L    AST (SGOT) 17 1 - 32 U/L    Alkaline Phosphatase 108 39 - 117 U/L    Total Bilirubin 0.4 0.0 - 1.2 mg/dL    Globulin 2.7 gm/dL    A/G Ratio 1.3 g/dL    BUN/Creatinine Ratio 25.7 (H) 7.0 - 25.0    Anion Gap 12.0 5.0 - 15.0 mmol/L    eGFR 18.9 (L) >60.0 mL/min/1.73   Lipase    Collection Time: 04/05/23  2:27 PM    Specimen: Blood   Result Value Ref Range    Lipase 47 13 - 60 U/L   Type & Screen    Collection Time: 04/05/23  2:27 PM    Specimen: Blood   Result Value Ref Range    ABO Type B     RH type Positive     Antibody Screen Negative     T&S Expiration Date 4/8/2023 11:59:59 PM    aPTT    Collection Time: 04/05/23  2:27 PM    Specimen:  Blood   Result Value Ref Range    PTT <20.0 (L) 22.7 - 35.4 seconds   CBC Auto Differential    Collection Time: 04/05/23  2:27 PM    Specimen: Blood   Result Value Ref Range    WBC 10.00 3.40 - 10.80 10*3/mm3    RBC 4.38 3.77 - 5.28 10*6/mm3    Hemoglobin 12.5 12.0 - 15.9 g/dL    Hematocrit 39.8 34.0 - 46.6 %    MCV 90.9 79.0 - 97.0 fL    MCH 28.5 26.6 - 33.0 pg    MCHC 31.4 (L) 31.5 - 35.7 g/dL    RDW 13.1 12.3 - 15.4 %    RDW-SD 43.6 37.0 - 54.0 fl    MPV 10.6 6.0 - 12.0 fL    Platelets 184 140 - 450 10*3/mm3    Neutrophil % 74.6 42.7 - 76.0 %    Lymphocyte % 14.5 (L) 19.6 - 45.3 %    Monocyte % 8.3 5.0 - 12.0 %    Eosinophil % 1.0 0.3 - 6.2 %    Basophil % 0.3 0.0 - 1.5 %    Immature Grans % 1.3 (H) 0.0 - 0.5 %    Neutrophils, Absolute 7.46 (H) 1.70 - 7.00 10*3/mm3    Lymphocytes, Absolute 1.45 0.70 - 3.10 10*3/mm3    Monocytes, Absolute 0.83 0.10 - 0.90 10*3/mm3    Eosinophils, Absolute 0.10 0.00 - 0.40 10*3/mm3    Basophils, Absolute 0.03 0.00 - 0.20 10*3/mm3    Immature Grans, Absolute 0.13 (H) 0.00 - 0.05 10*3/mm3    nRBC 0.0 0.0 - 0.2 /100 WBC       Ordered the above labs and reviewed the results.        RADIOLOGY  CT Abdomen Pelvis Without Contrast    Result Date: 4/5/2023  CT ABDOMEN AND PELVIS WITHOUT IV CONTRAST  HISTORY: 89-year-old with nausea vomiting. Abdominal pain x2 weeks. Dark/bloody stools.  TECHNIQUE: Radiation dose reduction techniques were utilized, including automated exposure control and exposure modulation based on body size. 3 mm images were obtained through the abdomen and pelvis without the administration of IV contrast. Lack of IV contrast limits evaluation of solid, visceral, and vascular structures. Sensitivity for underlying lesions and infection decreased. Image quality somewhat degraded by motion.  COMPARISON: 03/16/2023  FINDINGS:  LOWER CHEST: Artifact from overlying soft tissue/upper extremities the heart size is stable. Moderate calcific atherosclerosis including coronary  artery calcifications. Calcified granulomas. Resolution of pleural effusions. Bibasilar atelectasis and/or scarring..  ABDOMEN: Liver/Biliary Tract: Stable indeterminate hypodense liver lesions. Cholelithiasis.  Spleen: Calcified granulomas.  Pancreas: Within normal limits.  Adrenals: Within normal limits.  Kidneys:  Renal parenchymal scarring with perinephric stranding. Intrarenal vascular calcifications. No hydronephrosis.  Bowel:  Small hiatal hernia. The stomach is incompletely distended. Duodenal diverticulum. Normal appendix. Colonic diverticulosis without acute diverticulitis. Retained high-density material in the colon particularly sigmoid diverticula. No free fluid or free air. Given history endoscopy could be considered for better evaluation of the GI tract.  Peritoneum: Within normal limits.  Vasculature:    Extensive atherosclerosis abdominal aorta and branch vessels with calcifications particularly involving the superior mesenteric artery and right renal origin.  Lymph Nodes:  No new adenopathy.                             PELVIS:                                 Pelvic organs: Markedly distended bladder extending to just below the umbilicus. Uterus in situ. Periuterine vascular calcifications. The ovaries are not well visualized. Presacral edema slightly increased from the prior.  Subcutaneous soft tissue edema.  BONES: Demineralization. Multilevel degenerative changes thoracolumbar spine and pelvis. No definitive acute displaced fractures. Please refer to the prior CT for further detail. If there is persistent pain or clinical concern consider MRI.      1. No acute intra-abdominal or pelvic process on this limited noncontrast exam. 2. Markedly distended bladder. 3. Cholelithiasis. 4. Small hiatal hernia with duodenal and colonic diverticulosis. If GI symptoms persist endoscopy could be considered for better evaluation. 5. Please see above for additional findings/recommendations.  This report was  finalized on 4/5/2023 4:17 PM by Dr. Kapil Cruz M.D.        Ordered the above noted radiological studies. Reviewed by me in PACS.        MEDICATIONS GIVEN IN ER  Medications   sodium chloride 0.9 % flush 10 mL (has no administration in time range)   pantoprazole (PROTONIX) 40 mg in 100 mL NS (VTB) (0 mg/hr Intravenous Hold 4/5/23 1442)   pantoprazole (PROTONIX) injection 80 mg (80 mg Intravenous Given 4/5/23 1440)           MEDICAL DECISION MAKING, PROGRESS, and CONSULTS    All labs have been independently reviewed by me.  All radiology studies have been reviewed by me and I have also reviewed the radiology report.   EKG's independently viewed and interpreted by me.  Discussion below represents my analysis of pertinent findings related to patient's condition, differential diagnosis, treatment plan and final disposition.    89-year-old female who presents from nursing facility for diffuse abdominal pain with concern for GI bleeding.  Labs, imaging ordered  Hemoglobin and kidney function stable  Stool positive for occult blood  CT abdomen and pelvis unremarkable, patient admitted for further evaluation    Shared decision making/consideration for admission:  admitted      Orders placed during this visit:  Orders Placed This Encounter   Procedures   • CT Abdomen Pelvis Without Contrast   • Comprehensive Metabolic Panel   • Lipase   • Urinalysis With Culture If Indicated - Urine, Catheter   • aPTT   • CBC Auto Differential   • Cardiac Monitoring   • [facility] (on-call MD unless specified) Details   • Type & Screen   • Insert Peripheral IV   • Inpatient Admission   • CBC & Differential         Differential diagnosis include, but not limited to: GI bleed, gastritis, diverticulitis      Additional orders considered but not ordered: n/a    Independent interpretation of labs, radiology studies, and discussions with consultants:    Discussed with Dr. Mccracken, who agrees with plan.     ED Course as of 04/05/23 1656   Wed Apr 05,  2023 1349 HemaPrompt caer  Lot 210  Exp: 10/31/23  Result: positive [JG]   1432 IVs delayed secondary to difficult stick.  IV team paged at 1414. [JG]   1528 Creatinine(!): 2.30 [JG]   1528 BUN(!): 59 [JG]   1536 Hemoglobin: 12.5 [JG]   1625 I updated the patient on current ED work up, including labs and imaging (if performed) with indication for admission. All questions and concerns addressed and ready for admit at this time.    [JG]   3751 Discussed with Dr. Ag who agrees to admission, no further recommendations.  [JG]      ED Course User Index  [JG] Libia Osborn APRN             DIAGNOSIS  Final diagnoses:   Gastrointestinal hemorrhage, unspecified gastrointestinal hemorrhage type   Chronic kidney disease, unspecified CKD stage   Generalized abdominal pain   Hiatal hernia         DISPOSITION  admit        Latest Documented Vital Signs:  As of 16:56 EDT  BP- 154/60 HR- 68 Temp- 98.1 °F (36.7 °C) O2 sat- 93%              --    Please note that portions of this were completed with a voice recognition program.       Note Disclaimer: At [facility], we believe that sharing information builds trust and better relationships. You are receiving this note because you are receiving care at [facility] or recently visited. It is possible you will see health information before a provider has talked with you about it. This kind of information can be easy to misunderstand. To help you fully understand what it means for your health, we urge you to discuss this note with your provider.           Libia Osborn APRN  04/06/23 0957

## 2023-04-05 NOTE — ED NOTES
Nursing report ED to floor  Marianne Delgado  89 y.o.  female    HPI :   Chief Complaint   Patient presents with    Abdominal Pain    Black or Bloody Stool       Admitting doctor:   Lady Ag MD    Admitting diagnosis:   The primary encounter diagnosis was Gastrointestinal hemorrhage, unspecified gastrointestinal hemorrhage type. Diagnoses of Chronic kidney disease, unspecified CKD stage, Generalized abdominal pain, and Hiatal hernia were also pertinent to this visit.    Code status:   Current Code Status       Date Active Code Status Order ID Comments User Context       Prior            Allergies:   Atorvastatin, Azithromycin, Cefdinir, Doxycycline monohydrate, and Allopurinol    Isolation:   No active isolations    Intake and Output  No intake or output data in the 24 hours ending 04/05/23 1707    Weight:   There were no vitals filed for this visit.    Most recent vitals:   Vitals:    04/05/23 1459 04/05/23 1500 04/05/23 1600 04/05/23 1700   BP: 144/79 154/60 149/58 152/56   Pulse: 69 68 67 71   Resp: 16 16     Temp:       SpO2: 93% 93% 93% 95%       Active LDAs/IV Access:   Lines, Drains & Airways       Active LDAs       Name Placement date Placement time Site Days    Peripheral IV 04/05/23 1430 Anterior;Right;Upper Arm 04/05/23  1430  Arm  less than 1                    Labs (abnormal labs have a star):   Labs Reviewed   COMPREHENSIVE METABOLIC PANEL - Abnormal; Notable for the following components:       Result Value    BUN 59 (*)     Creatinine 2.30 (*)     CO2 21.0 (*)     Calcium 9.7 (*)     BUN/Creatinine Ratio 25.7 (*)     eGFR 18.9 (*)     All other components within normal limits    Narrative:     GFR Normal >60  Chronic Kidney Disease <60  Kidney Failure <15    The GFR formula is only valid for adults with stable renal function between ages 18 and 70.   APTT - Abnormal; Notable for the following components:    PTT <20.0 (*)     All other components within normal limits   CBC WITH AUTO  DIFFERENTIAL - Abnormal; Notable for the following components:    MCHC 31.4 (*)     Lymphocyte % 14.5 (*)     Immature Grans % 1.3 (*)     Neutrophils, Absolute 7.46 (*)     Immature Grans, Absolute 0.13 (*)     All other components within normal limits   LIPASE - Normal   URINALYSIS W/ CULTURE IF INDICATED   TYPE AND SCREEN   CBC AND DIFFERENTIAL    Narrative:     The following orders were created for panel order CBC & Differential.  Procedure                               Abnormality         Status                     ---------                               -----------         ------                     CBC Auto Differential[[id number]]        Abnormal            Final result                 Please view results for these tests on the individual orders.       EKG:   No orders to display       Meds given in ED:   Medications   sodium chloride 0.9 % flush 10 mL (has no administration in time range)   pantoprazole (PROTONIX) 40 mg in 100 mL NS (VTB) (0 mg/hr Intravenous Hold 4/5/23 1442)   pantoprazole (PROTONIX) injection 80 mg (80 mg Intravenous Given 4/5/23 1440)       Imaging results:  CT Abdomen Pelvis Without Contrast    Result Date: 4/5/2023  1. No acute intra-abdominal or pelvic process on this limited noncontrast exam. 2. Markedly distended bladder. 3. Cholelithiasis. 4. Small hiatal hernia with duodenal and colonic diverticulosis. If GI symptoms persist endoscopy could be considered for better evaluation. 5. Please see above for additional findings/recommendations.  This report was finalized on 4/5/2023 4:17 PM by Dr. Kapil Cruz M.D.       Ambulatory status:   - asst x2    Social issues:   Social History     Socioeconomic History    Marital status:    Tobacco Use    Smoking status: Never    Smokeless tobacco: Never   Vaping Use    Vaping Use: Unknown   Substance and Sexual Activity    Alcohol use: Yes     Alcohol/week: 2.0 standard drinks     Types: 1 Glasses of wine, 1 Shots of liquor per week      Comment: occasionally    Drug use: No       NIH Stroke Scale:         Ricki Jones RN  04/05/23 17:07 EDT

## 2023-04-05 NOTE — ED PROVIDER NOTES
MD ATTESTATION NOTE  I wore full protective equipment throughout this patient encounter including an N95 face mask, googles, gown and gloves. Hand hygiene was performed before donning protective equipment and after removal when leaving the room.    The SRINIVAS and I have discussed this patient's history, physical exam, and treatment plan. I have reviewed the documentation and personally had a face to face interaction with the patient. I affirm the SRINIVAS documentation and agree with their diagnostics, findings, treatment, plan, and disposition.    I provided a substantive portion of the care of this patient.  I personally performed the physical exam, in its entirety.  The attached note describes my personal findings.    PCP: Natasha Ramirez APRN  Patient Care Team:  Natasha Ramirez APRN as PCP - General (Family Medicine)     Marianne Delgado is a 89 y.o. female who presents to the ED c/o abdominal pain.  Patient complains of abdominal pain for the last week.  Patient reports nausea, no emesis.  Patient reports dark tarry stools, no gross blood.  Patient denies any history of GI bleed, not on anticoagulation.    On exam:  General: NAD.  Head: NCAT.  ENT: nares patent, no scleral icterus  Neck: Supple, trachea midline.  Cardiac: regular rate and rhythm.  Lungs: normal effort.  Abdomen: Soft, nondistended, mild diffuse tenderness, no rebound tenderness, no guarding or rigidity.   Extremities: Moves all extremities well, no peripheral edema  Neuro: alert, MAEW, follows commands  Psych: calm, cooperative  Skin: Warm, dry.    Medical Decision Making:  After the initial H&P, I discussed pertinent information from history and physical exam with patient/family.  Discussed differential diagnosis.  Discussed plan for ED evaluation/work-up/treatment.  All questions answered.  Patient/family is agreeable with plan.    ED Course as of 04/05/23 1706 Wed Apr 05, 2023   1349 HemaPrompt caer  Lot 210  Exp: 10/31/23  Result:  positive [JG]   1432 IVs delayed secondary to difficult stick.  IV team paged at 1414. [JG]   1528 Creatinine(!): 2.30 [JG]   1528 BUN(!): 59 [JG]   1536 Hemoglobin: 12.5 [JG]   1625 I updated the patient on current ED work up, including labs and imaging (if performed) with indication for admission. All questions and concerns addressed and ready for admit at this time.    [JG]   4004 Discussed with Dr. Ag who agrees to admission, no further recommendations.  [JG]      ED Course User Index  [JG] Libia Osborn, PAUL       Diagnosis  Final diagnoses:   Gastrointestinal hemorrhage, unspecified gastrointestinal hemorrhage type   Chronic kidney disease, unspecified CKD stage   Generalized abdominal pain   Hiatal hernia        Ayush Mccracken MD  04/12/23 5506

## 2023-04-06 ENCOUNTER — APPOINTMENT (OUTPATIENT)
Dept: ULTRASOUND IMAGING | Facility: HOSPITAL | Age: 88
DRG: 378 | End: 2023-04-06
Payer: MEDICARE

## 2023-04-06 PROBLEM — R33.8 ACUTE URINARY RETENTION: Status: ACTIVE | Noted: 2023-04-06

## 2023-04-06 PROBLEM — D64.9 ANEMIA: Status: ACTIVE | Noted: 2023-04-06

## 2023-04-06 PROBLEM — E66.9 OBESITY (BMI 30-39.9): Status: ACTIVE | Noted: 2023-04-06

## 2023-04-06 LAB
ANION GAP SERPL CALCULATED.3IONS-SCNC: 10 MMOL/L (ref 5–15)
BUN SERPL-MCNC: 52 MG/DL (ref 8–23)
BUN/CREAT SERPL: 25.7 (ref 7–25)
CALCIUM SPEC-SCNC: 8.7 MG/DL (ref 8.2–9.6)
CHLORIDE SERPL-SCNC: 111 MMOL/L (ref 98–107)
CO2 SERPL-SCNC: 20 MMOL/L (ref 22–29)
CREAT SERPL-MCNC: 2.02 MG/DL (ref 0.57–1)
DEPRECATED RDW RBC AUTO: 42.9 FL (ref 37–54)
EGFRCR SERPLBLD CKD-EPI 2021: 22.1 ML/MIN/1.73
ERYTHROCYTE [DISTWIDTH] IN BLOOD BY AUTOMATED COUNT: 13 % (ref 12.3–15.4)
GLUCOSE SERPL-MCNC: 85 MG/DL (ref 65–99)
HCT VFR BLD AUTO: 34.9 % (ref 34–46.6)
HGB BLD-MCNC: 11.1 G/DL (ref 12–15.9)
MCH RBC QN AUTO: 28.9 PG (ref 26.6–33)
MCHC RBC AUTO-ENTMCNC: 31.8 G/DL (ref 31.5–35.7)
MCV RBC AUTO: 90.9 FL (ref 79–97)
PLATELET # BLD AUTO: 149 10*3/MM3 (ref 140–450)
PMV BLD AUTO: 10.7 FL (ref 6–12)
POTASSIUM SERPL-SCNC: 4.7 MMOL/L (ref 3.5–5.2)
RBC # BLD AUTO: 3.84 10*6/MM3 (ref 3.77–5.28)
SODIUM SERPL-SCNC: 141 MMOL/L (ref 136–145)
WBC NRBC COR # BLD: 7.54 10*3/MM3 (ref 3.4–10.8)

## 2023-04-06 PROCEDURE — 99222 1ST HOSP IP/OBS MODERATE 55: CPT | Performed by: INTERNAL MEDICINE

## 2023-04-06 PROCEDURE — 76775 US EXAM ABDO BACK WALL LIM: CPT

## 2023-04-06 PROCEDURE — 82436 ASSAY OF URINE CHLORIDE: CPT | Performed by: INTERNAL MEDICINE

## 2023-04-06 PROCEDURE — 76705 ECHO EXAM OF ABDOMEN: CPT

## 2023-04-06 PROCEDURE — 87186 SC STD MICRODIL/AGAR DIL: CPT | Performed by: NURSE PRACTITIONER

## 2023-04-06 PROCEDURE — 82570 ASSAY OF URINE CREATININE: CPT | Performed by: INTERNAL MEDICINE

## 2023-04-06 PROCEDURE — 84300 ASSAY OF URINE SODIUM: CPT | Performed by: INTERNAL MEDICINE

## 2023-04-06 PROCEDURE — 84156 ASSAY OF PROTEIN URINE: CPT | Performed by: INTERNAL MEDICINE

## 2023-04-06 PROCEDURE — 87086 URINE CULTURE/COLONY COUNT: CPT | Performed by: NURSE PRACTITIONER

## 2023-04-06 PROCEDURE — 81001 URINALYSIS AUTO W/SCOPE: CPT | Performed by: NURSE PRACTITIONER

## 2023-04-06 PROCEDURE — 36415 COLL VENOUS BLD VENIPUNCTURE: CPT | Performed by: INTERNAL MEDICINE

## 2023-04-06 PROCEDURE — 85027 COMPLETE CBC AUTOMATED: CPT | Performed by: INTERNAL MEDICINE

## 2023-04-06 PROCEDURE — 87077 CULTURE AEROBIC IDENTIFY: CPT | Performed by: NURSE PRACTITIONER

## 2023-04-06 PROCEDURE — 80048 BASIC METABOLIC PNL TOTAL CA: CPT | Performed by: INTERNAL MEDICINE

## 2023-04-06 PROCEDURE — 82272 OCCULT BLD FECES 1-3 TESTS: CPT | Performed by: INTERNAL MEDICINE

## 2023-04-06 RX ORDER — BISACODYL 10 MG
10 SUPPOSITORY, RECTAL RECTAL DAILY PRN
Status: DISCONTINUED | OUTPATIENT
Start: 2023-04-06 | End: 2023-04-15 | Stop reason: HOSPADM

## 2023-04-06 RX ORDER — MELATONIN
1000 2 TIMES DAILY
Status: DISCONTINUED | OUTPATIENT
Start: 2023-04-06 | End: 2023-04-15 | Stop reason: HOSPADM

## 2023-04-06 RX ORDER — LEVOTHYROXINE SODIUM 88 UG/1
88 TABLET ORAL DAILY
Status: DISCONTINUED | OUTPATIENT
Start: 2023-04-06 | End: 2023-04-07

## 2023-04-06 RX ORDER — POLYETHYLENE GLYCOL 3350 17 G/17G
17 POWDER, FOR SOLUTION ORAL DAILY PRN
Status: DISCONTINUED | OUTPATIENT
Start: 2023-04-06 | End: 2023-04-15 | Stop reason: HOSPADM

## 2023-04-06 RX ORDER — AMOXICILLIN 250 MG
2 CAPSULE ORAL 2 TIMES DAILY
Status: DISCONTINUED | OUTPATIENT
Start: 2023-04-06 | End: 2023-04-13

## 2023-04-06 RX ORDER — BISACODYL 5 MG/1
5 TABLET, DELAYED RELEASE ORAL DAILY PRN
Status: DISCONTINUED | OUTPATIENT
Start: 2023-04-06 | End: 2023-04-15 | Stop reason: HOSPADM

## 2023-04-06 RX ORDER — NYSTATIN 100000 [USP'U]/G
POWDER TOPICAL EVERY 12 HOURS SCHEDULED
Status: DISCONTINUED | OUTPATIENT
Start: 2023-04-06 | End: 2023-04-15 | Stop reason: HOSPADM

## 2023-04-06 RX ORDER — AMLODIPINE BESYLATE 5 MG/1
5 TABLET ORAL DAILY
Status: DISCONTINUED | OUTPATIENT
Start: 2023-04-06 | End: 2023-04-09

## 2023-04-06 RX ADMIN — SODIUM CHLORIDE 150 ML/HR: 9 INJECTION, SOLUTION INTRAVENOUS at 02:39

## 2023-04-06 RX ADMIN — PANTOPRAZOLE SODIUM 8 MG/HR: 40 INJECTION, POWDER, FOR SOLUTION INTRAVENOUS at 05:57

## 2023-04-06 RX ADMIN — SODIUM CHLORIDE 150 ML/HR: 9 INJECTION, SOLUTION INTRAVENOUS at 09:24

## 2023-04-06 RX ADMIN — Medication 1000 UNITS: at 22:39

## 2023-04-06 RX ADMIN — NYSTATIN: 100000 POWDER TOPICAL at 17:35

## 2023-04-06 RX ADMIN — PANTOPRAZOLE SODIUM 8 MG/HR: 40 INJECTION, POWDER, FOR SOLUTION INTRAVENOUS at 17:41

## 2023-04-06 RX ADMIN — DOCUSATE SODIUM 50MG AND SENNOSIDES 8.6MG 2 TABLET: 8.6; 5 TABLET, FILM COATED ORAL at 22:39

## 2023-04-06 RX ADMIN — Medication 1000 UNITS: at 17:33

## 2023-04-06 RX ADMIN — PANTOPRAZOLE SODIUM 8 MG/HR: 40 INJECTION, POWDER, FOR SOLUTION INTRAVENOUS at 00:38

## 2023-04-06 RX ADMIN — SODIUM CHLORIDE 75 ML/HR: 9 INJECTION, SOLUTION INTRAVENOUS at 21:00

## 2023-04-06 RX ADMIN — PANTOPRAZOLE SODIUM 8 MG/HR: 40 INJECTION, POWDER, FOR SOLUTION INTRAVENOUS at 11:54

## 2023-04-06 RX ADMIN — PANTOPRAZOLE SODIUM 8 MG/HR: 40 INJECTION, POWDER, FOR SOLUTION INTRAVENOUS at 22:39

## 2023-04-06 RX ADMIN — AMLODIPINE BESYLATE 5 MG: 5 TABLET ORAL at 17:32

## 2023-04-06 RX ADMIN — NYSTATIN: 100000 POWDER TOPICAL at 20:23

## 2023-04-06 NOTE — CONSULTS
FIRST UROLOGY CONSULT      Patient Identification:  NAME:  Marianne Delgado  Age:  89 y.o.   Sex:  female   :  1925   MRN:  [id number]       Chief complaint: Difficulty urinating    History of present illness: 89-year-old female multiple health issues.  She has chronic kidney disease with a current baseline creatinine around 2.0.  She is largely bedridden.  She wears diapers probably 5-6 a day.  She is required intermittent catheterization and is now in retention with almost a liter in her bladder a catheter was placed this afternoon.  Again she relates history of frequency urgency small voids.  She does not initially have pressure to urinate or have symptoms of incomplete bladder emptying.  No history of any prior UTIs that I could ascertain.      Past medical history:  Past Medical History:   Diagnosis Date   • Acute sinusitis    • Acute upper respiratory infection    • Arthritis    • CKD (chronic kidney disease)    • Debility    • Fatigue    • Foot pain, bilateral    • Glaucoma    • Gout    • Hematuria    • High blood pressure    • Hypertension    • Hypothyroid 1999   • Hypothyroidism    • IBS (irritable bowel syndrome)    • IGT (impaired glucose tolerance)    • Malaise and fatigue    • Medication management    • Melanoma 1979    left leg   • OA (osteoarthritis)    • Osteopenia 1999   • Painful joint    • Psoriasis    • Renal failure    • Rosacea    • Vitamin D deficiency        Past surgical history:  Past Surgical History:   Procedure Laterality Date   • CATARACT EXTRACTION     • FOOT SURGERY      mauri toe surgery   • OTHER SURGICAL HISTORY      Melanoma Excision: Left leg       Allergies:  Atorvastatin, Azithromycin, Cefdinir, Doxycycline monohydrate, and Allopurinol    Home medications:  Medications Prior to Admission   Medication Sig Dispense Refill Last Dose   • amLODIPine (NORVASC) 5 MG tablet Take 1 tablet by mouth Daily.      • Betamethasone Valerate 0.12 % foam       •  bimatoprost (LUMIGAN) 0.01 % ophthalmic drops 1 drop Every Night.      • cholecalciferol (VITAMIN D3) 25 MCG (1000 UT) tablet TAKE ONE TABLET BY MOUTH TWO TIMES A  tablet 1    • clopidogrel (PLAVIX) 75 MG tablet Take 1 tablet by mouth Daily. 90 tablet 1    • dorzolamide-timolol (COSOPT) 22.3-6.8 MG/ML ophthalmic solution 1 drop 2 (Two) Times a Day.      • levothyroxine (SYNTHROID, LEVOTHROID) 88 MCG tablet Take 1 tablet by mouth Daily. 90 tablet 0    • lidocaine (LIDODERM) 5 % Place 1 patch on the skin as directed by provider Daily. Remove & Discard patch within 12 hours or as directed by MD 60 patch 0    • metaxalone (SKELAXIN) 800 MG tablet Take 1 tablet by mouth 3 (Three) Times a Day. 90 tablet 0    • metroNIDAZOLE (METROCREAM) 0.75 % cream Apply 1 application topically to the appropriate area as directed 2 (Two) Times a Day.      • multivitamin with minerals tablet tablet Take 1 tablet by mouth Daily.      • sennosides-docusate (PERICOLACE) 8.6-50 MG per tablet Take 2 tablets by mouth 2 (Two) Times a Day. 120 tablet 0    • triamcinolone (KENALOG) 0.1 % ointment Apply 1 application topically to the appropriate area as directed 2 (Two) Times a Day.      • vitamin B-12 (CYANOCOBALAMIN) 1000 MCG tablet 1 po qd x 3 days in the week 90 tablet 1        Hospital medications:  amLODIPine, 5 mg, Oral, Daily  cholecalciferol, 1,000 Units, Oral, BID  levothyroxine, 88 mcg, Oral, Daily  nystatin, , Topical, Q12H  senna-docusate sodium, 2 tablet, Oral, BID      pantoprazole, 8 mg/hr, Last Rate: 8 mg/hr (04/06/23 1741)  sodium chloride, 75 mL/hr, Last Rate: 75 mL/hr (04/06/23 1154)      •  acetaminophen  •  senna-docusate sodium **AND** polyethylene glycol **AND** bisacodyl **AND** bisacodyl  •  melatonin  •  ondansetron **OR** ondansetron  •  [COMPLETED] Insert Peripheral IV **AND** sodium chloride    Family history:  Family History   Problem Relation Age of Onset   • Heart attack Mother    • Kidney failure Father    •  Heart disease Sister    • Leukemia Brother    • Heart disease Sister    • Heart disease Sister    • Heart disease Other         FH in females b/f 65 and males b/f 55       Social history:  Social History     Tobacco Use   • Smoking status: Never   • Smokeless tobacco: Never   Vaping Use   • Vaping Use: Unknown   Substance Use Topics   • Alcohol use: Yes     Alcohol/week: 2.0 standard drinks     Types: 1 Glasses of wine, 1 Shots of liquor per week     Comment: occasionally   • Drug use: No       Review of systems:    Negative 12-system ROS except for the following: Voiding issues as above.  She also has chronic constipation.      Objective:  TMax 24 hours:   Temp (24hrs), Av.7 °F (36.5 °C), Min:97.5 °F (36.4 °C), Max:98.1 °F (36.7 °C)      Vitals Ranges:   Temp:  [97.5 °F (36.4 °C)-98.1 °F (36.7 °C)] 97.5 °F (36.4 °C)  Heart Rate:  [62-82] 74  Resp:  [16-18] 16  BP: (137-179)/(58-89) 179/76    Intake/Output Last 3 shifts:  I/O last 3 completed shifts:  In: 2188.8 [P.O.:480; I.V.:1708.8]  Out: 1300 [Urine:1300]     Physical Exam:       General Appearance:    Alert, cooperative, in no acute distress   Head:    Normocephalic, without obvious abnormality, atraumatic   Eyes:          PERRL, conjunctivae and corneas clear   Ears:    Normal external inspection   Throat:   No oral lesions, oral mucosa moist   Neck:   Supple, no LAD, trachea midline   Back:     No CVA tenderness   Lungs:     Respirations unlabored, symmetric excursion    Heart:    RRR, intact peripheral pulses   Abdomen:    Mildly tender suprapubic area but bladder is now decompressed   :   Deferred   Extremities:   No edema, no deformity   Skin:   No bleeding, bruising or rashes   Neuro/Psych:   Orientation intact, mood/affect pleasant, no focal findings       Results review:   I reviewed the patient's new clinical results.    Data review:  Lab Results (last 24 hours)     Procedure Component Value Units Date/Time    Basic Metabolic Panel [[id number]]   (Abnormal) Collected: 04/06/23 0354    Specimen: Blood Updated: 04/06/23 0517     Glucose 85 mg/dL      BUN 52 mg/dL      Creatinine 2.02 mg/dL      Sodium 141 mmol/L      Potassium 4.7 mmol/L      Chloride 111 mmol/L      CO2 20.0 mmol/L      Calcium 8.7 mg/dL      BUN/Creatinine Ratio 25.7     Anion Gap 10.0 mmol/L      eGFR 22.1 mL/min/1.73     Narrative:      GFR Normal >60  Chronic Kidney Disease <60  Kidney Failure <15    The GFR formula is only valid for adults with stable renal function between ages 18 and 70.    CBC (No Diff) [[id number]]  (Abnormal) Collected: 04/06/23 0354    Specimen: Blood Updated: 04/06/23 0505     WBC 7.54 10*3/mm3      RBC 3.84 10*6/mm3      Hemoglobin 11.1 g/dL      Hematocrit 34.9 %      MCV 90.9 fL      MCH 28.9 pg      MCHC 31.8 g/dL      RDW 13.0 %      RDW-SD 42.9 fl      MPV 10.7 fL      Platelets 149 10*3/mm3     Ferritin [[id number]]  (Normal) Collected: 04/05/23 1841    Specimen: Blood Updated: 04/05/23 2248     Ferritin 129.00 ng/mL     Narrative:      Results may be falsely decreased if patient taking Biotin.      Iron Profile [[id number]]  (Abnormal) Collected: 04/05/23 1841    Specimen: Blood Updated: 04/05/23 1930     Iron 39 mcg/dL      Iron Saturation 12 %      Transferrin 210 mg/dL      TIBC 313 mcg/dL            Imaging:  Imaging Results (Last 24 Hours)     Procedure Component Value Units Date/Time    US Renal Bilateral [[id number]] Collected: 04/06/23 1523     Updated: 04/06/23 1527    Narrative:      Examination: Renal sonogram     TECHNIQUE: Sonographic images of the kidneys and urinary bladder were  obtained     HISTORY:Urinary retention     COMPARISON: None available     FINDINGS: The kidneys are atrophic, without hydronephrosis, both  measuring approximately 7.5 cm. The urinary bladder is decompressed  around a Morrow catheter. A simple appearing left renal cyst measures up  to 1.2 cm.       Impression:      1. Simple appearing left renal cyst, below the size  requiring follow-up  2. Atrophic kidneys, without hydronephrosis seen.     This report was finalized on 4/6/2023 3:24 PM by Dr. Clive Garduno M.D.        Gallbladder [[id number]] Collected: 04/06/23 1522     Updated: 04/06/23 1526    Narrative:      Examination: Abdominal sonogram     TECHNIQUE: Sonographic images of the abdomen were obtained     HISTORY:Abdominal pain     COMPARISON: None available     FINDINGS: The pancreatic head and body are normal. The common duct  measures 6 mm in its midportion. Cysts are seen in the liver, which is  otherwise normal in appearance, without surface nodularity seen and  without mass seen in visualized portions. There is gallbladder sludge,  without definite shadowing stones seen. The common duct measures 5 mm in  its midportion. The right kidney is atrophic, without hydronephrosis,  measuring approximately 8 cm.       Impression:      1. Normal-appearing liver  2. Gallbladder sludge, without cholelithiasis or sonographic evidence of  acute cholecystitis  3. Atrophic right kidney.     This report was finalized on 4/6/2023 3:23 PM by Dr. Clive Garduno M.D.                Assessment:       Gastrointestinal hemorrhage, unspecified gastrointestinal hemorrhage type    Stage 4 chronic kidney disease (HCC)    Essential hypertension    Acquired hypothyroidism    History of CVA (cerebrovascular accident)    Anemia    Acute urinary retention    Obesity (BMI 30-39.9)    Acute on chronic urinary retention with baseline incontinence    Plan:     Catheter has been placed and will let her bladder have some rest.  We will work on bowel management.  She allude to the fact that she may consider palliative care so we will make a decision on catheter management here as this process unfolds    Brigido Hui MD  04/06/23  18:13 EDT

## 2023-04-06 NOTE — PROGRESS NOTES
"Nutrition Services    Patient Name:  Marianne Delgado  YOB: 1925  MRN: [id number]  Admit Date:  4/5/2023    Assessment Date:  04/06/23    Comment:  Nutriton assessment initiated due to pressure injury (stage1).  Here with GI bleed. GI is following. Pt remains NPO. Obese with BMI 39. No noted weight loss., Will follow for po, intake and tolerance.      CLINICAL NUTRITION ASSESSMENT      Reason for Assessment Pressure Injury and/or Non-Healing Wound     Diagnosis/Problem   GI Bleed, CKD4, HTN, HH,Abd pain   Medical/Surgical History Past Medical History:   Diagnosis Date   • Acute sinusitis    • Acute upper respiratory infection    • Arthritis    • CKD (chronic kidney disease)    • Debility    • Fatigue    • Foot pain, bilateral    • Glaucoma    • Gout    • Hematuria    • High blood pressure    • Hypertension    • Hypothyroid 09/1999   • Hypothyroidism    • IBS (irritable bowel syndrome)    • IGT (impaired glucose tolerance)    • Malaise and fatigue    • Medication management    • Melanoma 1979    left leg   • OA (osteoarthritis)    • Osteopenia 09/1999   • Painful joint    • Psoriasis    • Renal failure    • Rosacea    • Vitamin D deficiency        Past Surgical History:   Procedure Laterality Date   • CATARACT EXTRACTION     • FOOT SURGERY      mauri toe surgery   • OTHER SURGICAL HISTORY      Melanoma Excision: Left leg        Encounter Information        Nutrition History:     Food Preferences:    Supplements:    Factors Affecting Intake: abdominal pain, altered GI function     Anthropometrics        Current Height  Current Weight  BMI kg/m2 Height: 154.9 cm (61\")  Weight: 93.9 kg (207 lb) (04/05/23 2248)  Body mass index is 39.11 kg/m².   Adjusted BMI (if applicable)        Admission Weight 207lb       Ideal Body Weight (IBW) 105lb   Adjusted IBW (if applicable)        Usual Body Weight (UBW) 184lb   Weight Change/Trend Gain       Weight History Wt Readings from Last 30 Encounters:   04/05/23 " "2248 93.9 kg (207 lb)   03/16/23 2122 94.2 kg (207 lb 10.8 oz)   02/01/22 1304 83.5 kg (184 lb)   12/17/21 1505 82.1 kg (181 lb)   06/01/21 1229 80.7 kg (178 lb)   04/27/21 1229 80.7 kg (178 lb)   03/31/21 1101 83.4 kg (183 lb 12.8 oz)   03/02/21 1051 84.9 kg (187 lb 3.2 oz)   02/03/21 0500 88.2 kg (194 lb 7.1 oz)   02/02/21 0500 91.3 kg (201 lb 4.5 oz)   02/01/21 0500 92 kg (202 lb 13.2 oz)   01/30/21 0745 88.9 kg (196 lb)   01/30/21 0524 89.1 kg (196 lb 6.9 oz)   01/29/21 1612 85.7 kg (189 lb)   01/29/21 1027 89.4 kg (197 lb 3.2 oz)   01/29/21 2115 85.7 kg (188 lb 15 oz)   09/15/20 1036 87.3 kg (192 lb 6.4 oz)   03/11/20 1020 87 kg (191 lb 12.8 oz)   12/11/19 1546 78 kg (172 lb)   09/11/19 1020 86.2 kg (190 lb)   01/05/17 0315 75.8 kg (167 lb)             Estimated/Assessed Needs       Energy Requirements    Height for Calculation  Height: 154.9 cm (61\")   Weight for Calculation 93.9kg   Method for Estimation  15 kcal/kg, 20 kcal/kg   EST Needs (kcal/day) 1532-0387       Protein Requirements    Weight for Calculation 93.9kg   EST Protein Needs (g/kg) 1.0 gm/kg   EST Daily Needs (g/day) 94       Fluid Requirements     Method for Estimation 1 mL/kcal    Estimated Needs (mL/day) 1600     Tests/Procedures        Tests/Procedures CT scan     Labs       Pertinent Labs    Results from last 7 days   Lab Units 04/06/23  0354 04/05/23  1427   SODIUM mmol/L 141 140   POTASSIUM mmol/L 4.7 5.2   CHLORIDE mmol/L 111* 107   CO2 mmol/L 20.0* 21.0*   BUN mg/dL 52* 59*   CREATININE mg/dL 2.02* 2.30*   CALCIUM mg/dL 8.7 9.7*   BILIRUBIN mg/dL  --  0.4   ALK PHOS U/L  --  108   ALT (SGPT) U/L  --  10   AST (SGOT) U/L  --  17   GLUCOSE mg/dL 85 88     Results from last 7 days   Lab Units 04/06/23  0354 04/05/23  1427   HEMOGLOBIN g/dL 11.1* 12.5   HEMATOCRIT % 34.9 39.8   WBC 10*3/mm3 7.54 10.00   ALBUMIN g/dL  --  3.6     Results from last 7 days   Lab Units 04/06/23  0354 04/05/23  1427   APTT seconds  --  <20.0*   PLATELETS " 10*3/mm3 149 184     COVID19   Date Value Ref Range Status   02/03/2021 Not Detected Not Detected - Ref. Range Final     Lab Results   Component Value Date    HGBA1C 5.1 12/17/2021          Medications           Scheduled Medications amLODIPine, 5 mg, Oral, Daily  cholecalciferol, 1,000 Units, Oral, BID  levothyroxine, 88 mcg, Oral, Daily  nystatin, , Topical, Q12H  senna-docusate sodium, 2 tablet, Oral, BID       Infusions pantoprazole, 8 mg/hr, Last Rate: 8 mg/hr (04/06/23 1154)  sodium chloride, 75 mL/hr, Last Rate: 75 mL/hr (04/06/23 1154)       PRN Medications •  acetaminophen  •  senna-docusate sodium **AND** polyethylene glycol **AND** bisacodyl **AND** bisacodyl  •  melatonin  •  ondansetron **OR** ondansetron  •  [COMPLETED] Insert Peripheral IV **AND** sodium chloride     Physical Findings          Physical Appearance alert, obese, oriented   Oral/Mouth Cavity WNL   Edema  no edema   Gastrointestinal last bowel movement: tarry stools   Skin  bruising, pressure injury stage 1   Tubes/Drains none   NFPE No clinical signs of muscle wasting or fat loss   --  Current Nutrition Orders & Evaluation of Intake       Oral Nutrition     Food Allergies NKFA   Current PO Diet NPO Diet NPO Type: Sips with Meds   Supplement n/a   PO Evaluation     % PO Intake NPO    # of Days Evaluated    --  PES STATEMENT / NUTRITION DIAGNOSIS      Nutrition Dx Problem  Problem: Altered GI Function  Etiology: Medical Diagnosis GI Bleed  Signs/Symptoms: Report/Observation    Comment:    --  NUTRITION INTERVENTION / PLAN OF CARE      Intervention Goal(s) Maintain nutrition status, Reduce/improve symptoms, Disease management/therapy, Initiate feeding/diet, Tolerate PO  and Appropriate weight loss         RD Intervention/Action Await begin PO diet, Follow Tx Progress and Care plan reviewed         Prescription/Orders:       PO Diet       Supplements       Snacks       Enteral Nutrition       Parenteral Nutrition    New Prescription Ordered?  Continue same per protocol   --      Monitor/Evaluation Per protocol   Discharge Plan/Needs Pending clinical course   Education Will instruct as appropriate   --    RD to follow per protocol.      Electronically signed by:  Kiersten Aceves RD  04/06/23 14:08 EDT

## 2023-04-06 NOTE — PAYOR COMM NOTE
[facility]   &  [facility] Etta Ivory  [address]    [address] [place] [address]    [place] [address]    Sandy Ortiz - [phone]  Utilization Review/Room Reservations  Phone: [phone], [phone], [phone] or [phone]  Fax: [phone]  Email: [email or web address]  Please call, fax back, or email with authorization or any questions! Thanks!      REQUESTED CLINICAL  AUTH#[id number]        This fax contains any of the following:  Face Sheet, H&P, progress notes, consults, orders, meds, lab results, labor record, vitals, delivery worksheet, op note, d/c summary.  The information contained in this fax is confidential for the use of the Individual or entity named above. If the reader of this message is not the Intended recipient (or the employee or agent responsible to deliver it to the Intended recipient), you are hereby notified that any dissemination, distribution, or copy of this communication is prohibited. If you have received this communication in error, please notify us by collect telephone call and return the original message to us at the above address at our expense.  Marianne Delgado (89 y.o. Female)     Date of Birth   07/14/1925    Social Security Number       Address   [address] [place] [id number]    Home Phone   [phone]    MRN   [id number]       Sabianism   Mosque    Marital Status                               Admission Date   4/5/23    Admission Type   Emergency    Admitting Provider   Lady Ag MD    Attending Provider   Chico Shen DO    Department, Room/Bed   [facility], E548/1       Discharge Date       Discharge Disposition       Discharge Destination                               Attending Provider: Chico Shen DO    Allergies: Atorvastatin, Azithromycin, Cefdinir, Doxycycline Monohydrate, Allopurinol    Isolation: None  "  Infection: None   Code Status: No CPR    Ht: 154.9 cm (61\")   Wt: 93.9 kg (207 lb)    Admission Cmt: None   Principal Problem: Gastrointestinal hemorrhage, unspecified gastrointestinal hemorrhage type [K92.2]                 Active Insurance as of 2023     Primary Coverage     Payor Plan Insurance Group Employer/Plan Group    UNITED HEALTHCARE MEDICARE REPLACEMENT UNITED [employer] MEDICARE REPLACEMENT [id number]     Payor Plan Address Payor Plan Phone Number Payor Plan Fax Number Effective Dates    [address] [address]   2022 - None Entered    [facility] [id number]       Subscriber Name Subscriber Birth Date Member ID       MK MAYA 1925 [id number]                 Emergency Contacts      (Rel.) Home Phone Work Phone Mobile Phone    Jessica Osei (Power of ) [phone] -- --               History & Physical      Stingl, Lady Olivo MD at 23 [phone]          HISTORY AND PHYSICAL   [facility]        Date of Admission: 2023  Patient Identification:  Name: Mk Maya  Age: 89 y.o.  Sex: female  :  1925  MRN: [id number]                     Primary Care Physician: Natasha Ramirez APRN    Chief Complaint:  89 year old female who presented to the emergency room with black and bloody stool which was noted in her diaper; she has had abdominal pain for the last two weeks; no fever or chills; no nausea or vomiting;     History of Present Illness:   As above    Past Medical History:  Past Medical History:   Diagnosis Date   • Acute sinusitis    • Acute upper respiratory infection    • Arthritis    • CKD (chronic kidney disease)    • Debility    • Fatigue    • Foot pain, bilateral    • Glaucoma    • Gout    • Hematuria    • High blood pressure    • Hypertension    • Hypothyroid 1999   • Hypothyroidism    • IBS (irritable bowel syndrome)    • IGT (impaired glucose tolerance)    • Malaise and fatigue    • Medication management    • Melanoma 1979" left leg   • OA (osteoarthritis)    • Osteopenia 09/1999   • Painful joint    • Psoriasis    • Renal failure    • Rosacea    • Vitamin D deficiency      Past Surgical History:  Past Surgical History:   Procedure Laterality Date   • CATARACT EXTRACTION     • FOOT SURGERY      mauri toe surgery   • OTHER SURGICAL HISTORY      Melanoma Excision: Left leg      Home Meds:  Medications Prior to Admission   Medication Sig Dispense Refill Last Dose   • amLODIPine (NORVASC) 5 MG tablet Take 1 tablet by mouth Daily.      • Betamethasone Valerate 0.12 % foam       • bimatoprost (LUMIGAN) 0.01 % ophthalmic drops 1 drop Every Night.      • cholecalciferol (VITAMIN D3) 25 MCG (1000 UT) tablet TAKE ONE TABLET BY MOUTH TWO TIMES A  tablet 1    • clopidogrel (PLAVIX) 75 MG tablet Take 1 tablet by mouth Daily. 90 tablet 1    • dorzolamide-timolol (COSOPT) 22.3-6.8 MG/ML ophthalmic solution 1 drop 2 (Two) Times a Day.      • levothyroxine (SYNTHROID, LEVOTHROID) 88 MCG tablet Take 1 tablet by mouth Daily. 90 tablet 0    • lidocaine (LIDODERM) 5 % Place 1 patch on the skin as directed by provider Daily. Remove & Discard patch within 12 hours or as directed by MD 60 patch 0    • metaxalone (SKELAXIN) 800 MG tablet Take 1 tablet by mouth 3 (Three) Times a Day. 90 tablet 0    • metroNIDAZOLE (METROCREAM) 0.75 % cream Apply 1 application topically to the appropriate area as directed 2 (Two) Times a Day.      • multivitamin with minerals tablet tablet Take 1 tablet by mouth Daily.      • sennosides-docusate (PERICOLACE) 8.6-50 MG per tablet Take 2 tablets by mouth 2 (Two) Times a Day. 120 tablet 0    • triamcinolone (KENALOG) 0.1 % ointment Apply 1 application topically to the appropriate area as directed 2 (Two) Times a Day.      • vitamin B-12 (CYANOCOBALAMIN) 1000 MCG tablet 1 po qd x 3 days in the week 90 tablet 1        Allergies:  Allergies   Allergen Reactions   • Atorvastatin Nausea And Vomiting   • Azithromycin Diarrhea   •  Cefdinir Nausea Only     nausea   • Doxycycline Monohydrate Nausea Only   • Allopurinol Rash     Psoriasis  rash     Immunizations:  Immunization History   Administered Date(s) Administered   • COVID-19 (PFIZER) PURPLE CAP 03/03/2021, 03/24/2021   • Flu Vaccine Split Quad 09/12/2018   • FluLaval/Fluzone >6mos 09/11/2018   • Fluad Quad 65+ 09/11/2018, 10/20/2020   • Fluzone High Dose =>65 Years ([employer] ONLY) 12/13/2016, 09/12/2018, 10/08/2019   • Pneumococcal Conjugate 13-Valent (PCV13) 12/13/2015   • Pneumococcal Polysaccharide (PPSV23) 03/11/2020     Social History:   Social History     Social History Narrative   • Not on file     Social History     Socioeconomic History   • Marital status:    Tobacco Use   • Smoking status: Never   • Smokeless tobacco: Never   Vaping Use   • Vaping Use: Unknown   Substance and Sexual Activity   • Alcohol use: Yes     Alcohol/week: 2.0 standard drinks     Types: 1 Glasses of wine, 1 Shots of liquor per week     Comment: occasionally   • Drug use: No   • Sexual activity: Defer       Family History:  Family History   Problem Relation Age of Onset   • Heart attack Mother    • Kidney failure Father    • Heart disease Sister    • Leukemia Brother    • Heart disease Sister    • Heart disease Sister    • Heart disease Other         FH in females b/f 65 and males b/f 55        Review of Systems  See history of present illness and past medical history.  Patient denies headache, dizziness, syncope, falls, trauma, change in vision, change in hearing, change in taste, changes in weight, changes in appetite, focal weakness, numbness, or paresthesia.  Patient denies chest pain, palpitations, dyspnea, orthopnea, PND, cough, sinus pressure, rhinorrhea, epistaxis, hemoptysis, nausea, vomiting,hematemesis, diarrhea, constipation    Denies cold or heat intolerance, polydipsia, polyuria, polyphagia. Denies hematuria, pyuria, dysuria, hesitancy, frequency or urgency. Denies consumption of raw  and under cooked meats foods or change in water source.  Denies fever, chills, sweats, night sweats.  Denies missing any routine medications. Remainder of ROS is negative.    Objective:  T Max 24 hrs: Temp (24hrs), Av.9 °F (36.6 °C), Min:97.5 °F (36.4 °C), Max:98.2 °F (36.8 °C)    Vitals Ranges:   Temp:  [97.5 °F (36.4 °C)-98.2 °F (36.8 °C)] 97.5 °F (36.4 °C)  Heart Rate:  [67-82] 82  Resp:  [16-18] 16  BP: (137-172)/(56-89) 137/89      Exam:  /89 (BP Location: Left arm, Patient Position: Lying)   Pulse 82   Temp 97.5 °F (36.4 °C) (Oral)   Resp 16   SpO2 96%     General Appearance:    Alert, cooperative, no distress, appears stated age   Head:    Normocephalic, without obvious abnormality, atraumatic   Eyes:    PERRL, conjunctivae/corneas clear, EOM's intact, both eyes   Ears:    Normal external ear canals, both ears   Nose:   Nares normal, septum midline, mucosa normal, no drainage    or sinus tenderness   Throat:   Lips, mucosa, and tongue normal   Neck:   Supple, symmetrical, trachea midline, no adenopathy;     thyroid:  no enlargement/tenderness/nodules; no carotid    bruit or JVD   Back:     Symmetric, no curvature, ROM normal, no CVA tenderness   Lungs:     Decreased breath sounds bilaterally, respirations unlabored   Chest Wall:    No tenderness or deformity    Heart:    Regular rate and rhythm, S1 and S2 normal, no murmur, rub   or gallop   Abdomen:     Soft, nontender, bowel sounds active all four quadrants,     no masses, no hepatomegaly, no splenomegaly   Extremities:   Extremities normal, atraumatic, no cyanosis or edema   Pulses:   2+ and symmetric all extremities   Skin:   Skin color, texture, turgor normal, no rashes or lesions               .    Data Review:  Labs in chart were reviewed.  WBC   Date Value Ref Range Status   2023 10.00 3.40 - 10.80 10*3/mm3 Final     Hemoglobin   Date Value Ref Range Status   2023 12.5 12.0 - 15.9 g/dL Final     Hematocrit   Date Value Ref  Range Status   04/05/2023 39.8 34.0 - 46.6 % Final     Platelets   Date Value Ref Range Status   04/05/2023 184 140 - 450 10*3/mm3 Final     Sodium   Date Value Ref Range Status   04/05/2023 140 136 - 145 mmol/L Final     Potassium   Date Value Ref Range Status   04/05/2023 5.2 3.5 - 5.2 mmol/L Final     Comment:     Slight hemolysis detected by analyzer. Results may be affected.     Chloride   Date Value Ref Range Status   04/05/2023 107 98 - 107 mmol/L Final     CO2   Date Value Ref Range Status   04/05/2023 21.0 (L) 22.0 - 29.0 mmol/L Final     BUN   Date Value Ref Range Status   04/05/2023 59 (H) 8 - 23 mg/dL Final     Creatinine   Date Value Ref Range Status   04/05/2023 2.30 (H) 0.57 - 1.00 mg/dL Final     Glucose   Date Value Ref Range Status   04/05/2023 88 65 - 99 mg/dL Final     Calcium   Date Value Ref Range Status   04/05/2023 9.7 (H) 8.2 - 9.6 mg/dL Final     AST (SGOT)   Date Value Ref Range Status   04/05/2023 17 1 - 32 U/L Final     Comment:     Slight hemolysis detected by analyzer. Results may be affected.     ALT (SGPT)   Date Value Ref Range Status   04/05/2023 10 1 - 33 U/L Final     Alkaline Phosphatase   Date Value Ref Range Status   04/05/2023 108 39 - 117 U/L Final                Imaging Results (All)     Procedure Component Value Units Date/Time    CT Abdomen Pelvis Without Contrast [[id number]] Collected: 04/05/23 1606     Updated: 04/05/23 1621    Narrative:      CT ABDOMEN AND PELVIS WITHOUT IV CONTRAST     HISTORY: 89-year-old with nausea vomiting. Abdominal pain x2 weeks.  Dark/bloody stools.     TECHNIQUE: Radiation dose reduction techniques were utilized, including  automated exposure control and exposure modulation based on body size.   3 mm images were obtained through the abdomen and pelvis without the  administration of IV contrast. Lack of IV contrast limits evaluation of  solid, visceral, and vascular structures. Sensitivity for underlying  lesions and infection decreased. Image  quality somewhat degraded by  motion.     COMPARISON: 03/16/2023     FINDINGS:      LOWER CHEST: Artifact from overlying soft tissue/upper extremities the  heart size is stable. Moderate calcific atherosclerosis including  coronary artery calcifications. Calcified granulomas. Resolution of  pleural effusions. Bibasilar atelectasis and/or scarring..     ABDOMEN:  Liver/Biliary Tract: Stable indeterminate hypodense liver lesions.  Cholelithiasis.     Spleen: Calcified granulomas.     Pancreas: Within normal limits.     Adrenals: Within normal limits.     Kidneys:  Renal parenchymal scarring with perinephric stranding.  Intrarenal vascular calcifications. No hydronephrosis.     Bowel:  Small hiatal hernia. The stomach is incompletely distended.  Duodenal diverticulum. Normal appendix. Colonic diverticulosis without  acute diverticulitis. Retained high-density material in the colon  particularly sigmoid diverticula. No free fluid or free air. Given  history endoscopy could be considered for better evaluation of the GI  tract.     Peritoneum: Within normal limits.     Vasculature:    Extensive atherosclerosis abdominal aorta and branch  vessels with calcifications particularly involving the superior  mesenteric artery and right renal origin.     Lymph Nodes:  No new adenopathy.                                 PELVIS:                                   Pelvic organs: Markedly distended bladder extending to just below the  umbilicus. Uterus in situ. Periuterine vascular calcifications. The  ovaries are not well visualized. Presacral edema slightly increased from  the prior.     Subcutaneous soft tissue edema.     BONES: Demineralization. Multilevel degenerative changes thoracolumbar  spine and pelvis. No definitive acute displaced fractures. Please refer  to the prior CT for further detail. If there is persistent pain or  clinical concern consider MRI.       Impression:      1. No acute intra-abdominal or pelvic process on  this limited  noncontrast exam.  2. Markedly distended bladder.  3. Cholelithiasis.  4. Small hiatal hernia with duodenal and colonic diverticulosis. If GI  symptoms persist endoscopy could be considered for better evaluation.  5. Please see above for additional findings/recommendations.     This report was finalized on 4/5/2023 4:17 PM by Dr. Kapil Cruz M.D.               Assessment:  Active Hospital Problems    Diagnosis  POA   • **Gastrointestinal hemorrhage, unspecified gastrointestinal hemorrhage type [K92.2]  Yes      Resolved Hospital Problems   No resolved problems to display.   abdominal pain  ckd4  Hypothyroidism  Hypertension  Hiatal hernia    Plan:  Gi to see  protonix drip started  Check pvr  Trend labs  dw patient and family as well as ed provider  Lady Ag MD  4/5/2023  22:41 EDT      Electronically signed by Lady Ag MD at 04/05/23 2245          Emergency Department Notes      Eliana Isaac, RN at 04/05/23 1333        Pt was brought in by ems from [facility] for upper abd pain x2wks. Staff reports dark stools/ pt was seen at hospital 2wks ago for same.    This RN wore mask and goggles during time of contact      Electronically signed by Eliana Isaac, RN at 04/05/23 1334     Ricki Jones, RN at 04/05/23 1414        Multiple IV attempts made with no success. IV team paged to assist    Electronically signed by Ricki Jones, RN at 04/05/23 1415     Ricki Jones, RN at 04/05/23 1707          Nursing report ED to floor  Marianne Delgado  89 y.o.  female    HPI :   Chief Complaint   Patient presents with   • Abdominal Pain   • Black or Bloody Stool       Admitting doctor:   Lady Ag MD    Admitting diagnosis:   The primary encounter diagnosis was Gastrointestinal hemorrhage, unspecified gastrointestinal hemorrhage type. Diagnoses of Chronic kidney disease, unspecified CKD stage, Generalized abdominal pain, and Hiatal hernia were also pertinent to  this visit.    Code status:   Current Code Status       Date Active Code Status Order ID Comments User Context       Prior            Allergies:   Atorvastatin, Azithromycin, Cefdinir, Doxycycline monohydrate, and Allopurinol    Isolation:   No active isolations    Intake and Output  No intake or output data in the 24 hours ending 04/05/23 1707    Weight:   There were no vitals filed for this visit.    Most recent vitals:   Vitals:    04/05/23 1459 04/05/23 1500 04/05/23 1600 04/05/23 1700   BP: 144/79 154/60 149/58 152/56   Pulse: 69 68 67 71   Resp: 16 16     Temp:       SpO2: 93% 93% 93% 95%       Active LDAs/IV Access:   Lines, Drains & Airways       Active LDAs       Name Placement date Placement time Site Days    Peripheral IV 04/05/23 1430 Anterior;Right;Upper Arm 04/05/23  1430  Arm  less than 1                    Labs (abnormal labs have a star):   Labs Reviewed   COMPREHENSIVE METABOLIC PANEL - Abnormal; Notable for the following components:       Result Value    BUN 59 (*)     Creatinine 2.30 (*)     CO2 21.0 (*)     Calcium 9.7 (*)     BUN/Creatinine Ratio 25.7 (*)     eGFR 18.9 (*)     All other components within normal limits    Narrative:     GFR Normal >60  Chronic Kidney Disease <60  Kidney Failure <15    The GFR formula is only valid for adults with stable renal function between ages 18 and 70.   APTT - Abnormal; Notable for the following components:    PTT <20.0 (*)     All other components within normal limits   CBC WITH AUTO DIFFERENTIAL - Abnormal; Notable for the following components:    MCHC 31.4 (*)     Lymphocyte % 14.5 (*)     Immature Grans % 1.3 (*)     Neutrophils, Absolute 7.46 (*)     Immature Grans, Absolute 0.13 (*)     All other components within normal limits   LIPASE - Normal   URINALYSIS W/ CULTURE IF INDICATED   TYPE AND SCREEN   CBC AND DIFFERENTIAL    Narrative:     The following orders were created for panel order CBC & Differential.  Procedure                                Abnormality         Status                     ---------                               -----------         ------                     CBC Auto Differential[[id number]]        Abnormal            Final result                 Please view results for these tests on the individual orders.       EKG:   No orders to display       Meds given in ED:   Medications   sodium chloride 0.9 % flush 10 mL (has no administration in time range)   pantoprazole (PROTONIX) 40 mg in 100 mL NS (VTB) (0 mg/hr Intravenous Hold 4/5/23 1442)   pantoprazole (PROTONIX) injection 80 mg (80 mg Intravenous Given 4/5/23 1440)       Imaging results:  CT Abdomen Pelvis Without Contrast    Result Date: 4/5/2023  1. No acute intra-abdominal or pelvic process on this limited noncontrast exam. 2. Markedly distended bladder. 3. Cholelithiasis. 4. Small hiatal hernia with duodenal and colonic diverticulosis. If GI symptoms persist endoscopy could be considered for better evaluation. 5. Please see above for additional findings/recommendations.  This report was finalized on 4/5/2023 4:17 PM by Dr. Kapil Cruz M.D.       Ambulatory status:   - asst x2    Social issues:   Social History     Socioeconomic History   • Marital status:    Tobacco Use   • Smoking status: Never   • Smokeless tobacco: Never   Vaping Use   • Vaping Use: Unknown   Substance and Sexual Activity   • Alcohol use: Yes     Alcohol/week: 2.0 standard drinks     Types: 1 Glasses of wine, 1 Shots of liquor per week     Comment: occasionally   • Drug use: No       NIH Stroke Scale:         Ricki Jones RN  04/05/23 17:07 EDT         Electronically signed by Ricki Jones RN at 04/05/23 1707       Orders (all)      Start     Ordered    04/06/23 0600  Basic Metabolic Panel  Morning Draw         04/05/23 1746    04/06/23 0600  CBC (No Diff)  Morning Draw         04/05/23 1746    04/06/23 0001  NPO Diet NPO Type: Strict NPO  Diet Effective Midnight         04/05/23 1746    04/05/23 7257   Bladder Scan  Once         04/05/23 2245    04/05/23 2202  Ferritin  Once         04/05/23 1746    04/05/23 2000  Vital Signs  Every 4 Hours      Comments: Per per hospital policy    04/05/23 1746    04/05/23 1851  Red Rash Within Skin Fold Care Q12H  Every 12 Hours        Comments: - Notify Provider of Severe Rashes Within Skin Folds That May Require Antifungal Cream or Powder (Order Required)  - Wash Skin Folds With Soap & Water, Pat Dry  - Apply Light Coating of Appropriate Cream (Z Guard or Miconazole) or Powder (Miconazole) - If Ordered  - Keep Area Dry With Absorbent Material (Pillow Case, Dry Wash Cloth, Gauze), Change Every 12 Hours & As Needed  - Consult Wound Care if Rash Does Not Improve After 3 Days    04/05/23 1850    04/05/23 1850  Follow Pressure Ulcer Prevention Measures Policy  Continuous        Comments: Implement Appropriate Pressure Ulcer Prevention Measures  - Open Order Report to View Full Instructions  Enter Wound LDA & Document Assessment  Add Wound Care Plan  Add Patient Education Per Policy    04/05/23 1850    04/05/23 1850  Stage I Pressure Ulcer Care  Continuous        Comments: Position Patient Off Area With Stage I Pressure Ulcer    04/05/23 1850    04/05/23 1850  Follow Pressure Ulcer Prevention Measures Policy  Continuous        Comments: Implement Appropriate Pressure Ulcer Prevention Measures  - Open Order Report to View Full Instructions  Enter Wound LDA & Document Assessment  Add Wound Care Plan  Add Patient Education Per Policy    04/05/23 1850    04/05/23 1848  Wound Ostomy Eval & Treat  Once         04/05/23 1848    04/05/23 1834  Inpatient Case Management  Consult  Once        Provider:  (Not yet assigned)    04/05/23 1833    04/05/23 1800  Oral Care  2 Times Daily       04/05/23 1746    04/05/23 1748  sodium chloride 0.9 % infusion  Continuous         04/05/23 1746    04/05/23 1747  Code Status and Medical Interventions:  Continuous         04/05/23 1746     04/05/23 1747  Cardiac Monitoring  Continuous         04/05/23 1746    04/05/23 1747  Intake & Output  Every Shift       04/05/23 1746    04/05/23 1747  Inpatient Gastroenterology Consult  Once        Specialty:  Gastroenterology  Provider:  Bernard Carrasco MD    04/05/23 1746    04/05/23 1747  Iron Profile  Once         04/05/23 1746    04/05/23 1747  Place Sequential Compression Device  Once         04/05/23 1746    04/05/23 1747  Maintain Sequential Compression Device  Continuous         04/05/23 1746    04/05/23 1747  Diet: Liquid Diets; Clear Liquid; Texture: Regular Texture (IDDSI 7); Fluid Consistency: Thin (IDDSI 0)  Diet Effective Now,   Status:  Canceled         04/05/23 1746    04/05/23 1746  acetaminophen (TYLENOL) tablet 650 mg  Every 4 Hours PRN         04/05/23 1746    04/05/23 1746  ondansetron (ZOFRAN) tablet 4 mg  Every 6 Hours PRN        See Hyperspace for full Linked Orders Report.    04/05/23 1746    04/05/23 1746  ondansetron (ZOFRAN) injection 4 mg  Every 6 Hours PRN        See Hyperspace for full Linked Orders Report.    04/05/23 1746    04/05/23 1746  melatonin tablet 3 mg  Nightly PRN         04/05/23 1746    04/05/23 1657  Cardiac Monitoring  Continuous,   Status:  Canceled        Comments: Follow Standing Orders As Outlined in Process Instructions (Open Order Report to View Full Instructions)    04/05/23 1656    04/05/23 1656  Inpatient Admission  Once         04/05/23 1656    04/05/23 1627  [facility] (on-call MD unless specified) Details  Once        Specialty:  Hospitalist  Provider:  Lady Ag MD    04/05/23 1626    04/05/23 1353  pantoprazole (PROTONIX) 40 mg in 100 mL NS (VTB)  Continuous         04/05/23 1351    04/05/23 1353  pantoprazole (PROTONIX) injection 80 mg  Once         04/05/23 1351    04/05/23 1347  CT Abdomen Pelvis Without Contrast  1 Time Imaging         04/05/23 1346    04/05/23 1342  CBC & Differential  Once         04/05/23 1341    04/05/23 1342   Comprehensive Metabolic Panel  Once         04/05/23 1341    04/05/23 1342  Lipase  Once         04/05/23 1341    04/05/23 1342  Urinalysis With Culture If Indicated - Urine, Catheter  Once         04/05/23 1341    04/05/23 1342  Type & Screen  Once         04/05/23 1341    04/05/23 1342  aPTT  Once         04/05/23 1341    04/05/23 1342  Insert Peripheral IV  Once        See Hyperspace for full Linked Orders Report.    04/05/23 1341    04/05/23 1342  CBC Auto Differential  PROCEDURE ONCE         04/05/23 1342    04/05/23 1341  sodium chloride 0.9 % flush 10 mL  As Needed        See Hyperspace for full Linked Orders Report.    04/05/23 1341    Unscheduled  Up with assistance  As Needed       04/05/23 1746    Unscheduled  Red Rash Within Skin Fold Care PRN  As Needed      Comments: - Notify Provider of Severe Rashes Within Skin Folds That May Require Antifungal Cream or Powder (Order Required)  - Wash Skin Folds With Soap & Water, Pat Dry  - Apply Light Coating of Appropriate Cream (Z Guard or Miconazole) or Powder (Miconazole) - If Ordered  - Keep Area Dry With Absorbent Material (Pillow Case, Dry Wash Cloth, Gauze), Change Every 12 Hours & As Needed  - Consult Wound Care if Rash Does Not Improve After 3 Days    04/05/23 1850    --  amLODIPine (NORVASC) 5 MG tablet  Daily         04/05/23 1459    --  SCANNED - TELEMETRY           04/05/23 0000

## 2023-04-06 NOTE — PLAN OF CARE
Goal Outcome Evaluation:      VSS. RA. Purewick changed. Smear sized tarry stool. NPO @ midnight. Lotion applied to dry legs. Under breasts kept dry w/ pillowcases. Meplex on coccyx stage 1. Bladder scanned 980, straight cath with return of 1150cc urine. NS @ 150 mL/hr. Protonix gtt.

## 2023-04-06 NOTE — PLAN OF CARE
Goal Outcome Evaluation:  Plan of Care Reviewed With: patient        Progress: improving   Vital signs stable. Alert and oriented x 3. Niece at bedside. On room air. Normal sinus cardiac rhythm. Turned and repositioned every 2 hours as tolerated. Patient morbidly obese. Requires total care with all ADLS. Urinary catheter inserted per nephrology order for acute urinary retention. 1000 cc urine output for this 12 hr shift. Bladder scan done prior to catheterization = 450 cc. IV fluids Normal Saline infusing at 75 cc / hr. IV protonix infusing at 20 ml / hr. NPO for renal and gallbladder ultrasound. Both exams completed this afternoon. Low fat / low cholesterol diet resumed. Will continue to monitor.

## 2023-04-06 NOTE — CASE MANAGEMENT/SOCIAL WORK
Discharge Planning Assessment  [facility]     Patient Name: Marianne Delgado  MRN: [id number]  Today's Date: 4/6/2023    Admit Date: 4/5/2023    Plan: Home with HH and 24 hour caregiver   Discharge Needs Assessment     Row Name 04/06/23 1646       Living Environment    People in Home alone  24 hour caregivers    Current Living Arrangements independent living facility    Primary Care Provided by homecare agency    Provides Primary Care For no one;no one, unable/limited ability to care for self    Quality of Family Relationships supportive       Resource/Environmental Concerns    Resource/Environmental Concerns none       Transition Planning    Patient/Family Anticipates Transition to home    Patient/Family Anticipated Services at Transition home health care    Transportation Anticipated health plan transportation       Discharge Needs Assessment    Equipment Currently Used at Home hospital bed;wheelchair;commode;rollator;oxygen;cpap    Equipment Needed After Discharge commode               Discharge Plan     Row Name 04/06/23 2130       Plan    Plan Home with HH and 24 hour caregiver    Patient/Family in Agreement with Plan yes    Plan Comments Met with pt and niece at bedside. Introduced self, explained CCP role, facesheet verified. Pt [place] [facility] [place]  [facility] with [facility].  Has hospital bed, walker, rollator, wheelchair, and bedside commode. Pt also has home O2 but doesn't currently use and has CPAP.  Plans to return home with caregivers at discharge.  Will need ambulance for transport.  No further needs identified.  CCP will follow.  KATERINA Sampson RN              Continued Care and Services - Admitted Since 4/5/2023    Coordination has not been started for this encounter.          Demographic Summary     Row Name 04/06/23 1646       General Information    Admission Type inpatient    Arrived From home    Referral Source  admission list    Reason for Consult discharge planning    Preferred Language English       Contact Information    Permission Granted to Share Info With family/designee  Jessica Monroe ([facility]) [phone]               Functional Status    No documentation.                Psychosocial    No documentation.                Abuse/Neglect    No documentation.                Legal    No documentation.                Substance Abuse    No documentation.                Patient Forms    No documentation.                   Rosa Sampson RN

## 2023-04-06 NOTE — CONSULTS
[facility]  Initial Inpatient Consult Note    Referring Provider: Dr. VIKRAM Shen    Reason for Consultation: GI bleed    Subjective     History of present illness:    89 y.o. female with CKD, HTN, h/o melanoma, psoriasis, CVA, obesity who was admitted 4/5/23 with a 2-week history of generalized abdominal pain and pain in her legs as well as black stool.  The patient was on Pepto-Bismol in the recent past.  She has no history of GI bleeding.  She has never had an EGD or colonoscopy.  She denies nonsteroidal anti-inflammatory drug use.  She is a non-smoker and denies alcohol use.  She is a  and has no children.  She is cared for with nieces and nephews.        In the emergency room yesterday she had a CT of the abdomen and pelvis that showed a markedly distended urinary bladder with cholelithiasis, a small hiatal hernia with duodenal and colonic diverticulosis.  This morning her creatinine is 2.02 with a BUN of 52.  Her liver function tests are normal.  This morning her hemoglobin is 11.1, hematocrit 34.9, MCV of 90.9, white count 7.54, platelet count 149,000.  Her nurse states that she did get a lot of urine out of her urinary bladder when she In-N-Out catheterized her.     Past Medical History:  Past Medical History:   Diagnosis Date   • Acute sinusitis    • Acute upper respiratory infection    • Arthritis    • CKD (chronic kidney disease)    • Debility    • Fatigue    • Foot pain, bilateral    • Glaucoma    • Gout    • Hematuria    • High blood pressure    • Hypertension    • Hypothyroid 09/1999   • Hypothyroidism    • IBS (irritable bowel syndrome)    • IGT (impaired glucose tolerance)    • Malaise and fatigue    • Medication management    • Melanoma 1979    left leg   • OA (osteoarthritis)    • Osteopenia 09/1999   • Painful joint    • Psoriasis    • Renal failure    • Rosacea    • Vitamin D deficiency      Past Surgical History:  Past Surgical History:   Procedure Laterality Date   •  CATARACT EXTRACTION     • FOOT SURGERY      mauri toe surgery   • OTHER SURGICAL HISTORY      Melanoma Excision: Left leg      Social History:   Social History     Tobacco Use   • Smoking status: Never   • Smokeless tobacco: Never   Substance Use Topics   • Alcohol use: Yes     Alcohol/week: 2.0 standard drinks     Types: 1 Glasses of wine, 1 Shots of liquor per week     Comment: occasionally      Family History:  Family History   Problem Relation Age of Onset   • Heart attack Mother    • Kidney failure Father    • Heart disease Sister    • Leukemia Brother    • Heart disease Sister    • Heart disease Sister    • Heart disease Other         FH in females b/f 65 and males b/f 55       Home Meds:  Medications Prior to Admission   Medication Sig Dispense Refill Last Dose   • amLODIPine (NORVASC) 5 MG tablet Take 1 tablet by mouth Daily.      • Betamethasone Valerate 0.12 % foam       • bimatoprost (LUMIGAN) 0.01 % ophthalmic drops 1 drop Every Night.      • cholecalciferol (VITAMIN D3) 25 MCG (1000 UT) tablet TAKE ONE TABLET BY MOUTH TWO TIMES A  tablet 1    • clopidogrel (PLAVIX) 75 MG tablet Take 1 tablet by mouth Daily. 90 tablet 1    • dorzolamide-timolol (COSOPT) 22.3-6.8 MG/ML ophthalmic solution 1 drop 2 (Two) Times a Day.      • levothyroxine (SYNTHROID, LEVOTHROID) 88 MCG tablet Take 1 tablet by mouth Daily. 90 tablet 0    • lidocaine (LIDODERM) 5 % Place 1 patch on the skin as directed by provider Daily. Remove & Discard patch within 12 hours or as directed by MD 60 patch 0    • metaxalone (SKELAXIN) 800 MG tablet Take 1 tablet by mouth 3 (Three) Times a Day. 90 tablet 0    • metroNIDAZOLE (METROCREAM) 0.75 % cream Apply 1 application topically to the appropriate area as directed 2 (Two) Times a Day.      • multivitamin with minerals tablet tablet Take 1 tablet by mouth Daily.      • sennosides-docusate (PERICOLACE) 8.6-50 MG per tablet Take 2 tablets by mouth 2 (Two) Times a Day. 120 tablet 0    •  triamcinolone (KENALOG) 0.1 % ointment Apply 1 application topically to the appropriate area as directed 2 (Two) Times a Day.      • vitamin B-12 (CYANOCOBALAMIN) 1000 MCG tablet 1 po qd x 3 days in the week 90 tablet 1      Current Meds:      Allergies:  Allergies   Allergen Reactions   • Atorvastatin Nausea And Vomiting   • Azithromycin Diarrhea   • Cefdinir Nausea Only     nausea   • Doxycycline Monohydrate Nausea Only   • Allopurinol Rash     Psoriasis  rash     Review of Systems  The following systems were reviewed and negative;  constitution, respiratory, cardiovascular, musculoskeletal and neurological     Objective     Vital Signs  Temp:  [97.5 °F (36.4 °C)-98.2 °F (36.8 °C)] 98.1 °F (36.7 °C)  Heart Rate:  [62-82] 67  Resp:  [16-18] 16  BP: (137-172)/(56-89) 168/61  Physical Exam:  General Appearance:    Alert, cooperative, in no acute distress.  Obese   Head:    Normocephalic, without obvious abnormality, atraumatic   Eyes:            Lids and lashes normal, conjunctivae and sclerae normal, no   icterus   Throat:   No oral lesions, no thrush, oral mucosa moist   Neck:   No adenopathy, supple, trachea midline, no thyromegaly, no   carotid bruit, no JVD   Lungs:     Clear to auscultation,respirations regular, even and                   unlabored    Heart:    Regular rhythm and normal rate, normal S1 and S2, no            murmur, no gallop, no rub, no click   Chest Wall:    No abnormalities observed   Abdomen:     Normal bowel sounds, no masses, no organomegaly, soft        +tender, nondistended, no guarding, no rebound                 Tenderness.  Obese   Rectal:    No masses.  Patient has dark stool in the vault.  She is not impacted.   Extremities:   no edema, no cyanosis, no redness   Skin:   No bleeding, bruising or rash   Lymph nodes:   No palpable adenopathy   Psychiatric:  Judgement and insight: normal   Orientation to person place and time: normal   Mood and affect: normal   Results Review:   I  reviewed the patient's new clinical results.    Results from last 7 days   Lab Units 04/06/23  0354 04/05/23  1427   WBC 10*3/mm3 7.54 10.00   HEMOGLOBIN g/dL 11.1* 12.5   HEMATOCRIT % 34.9 39.8   PLATELETS 10*3/mm3 149 184     Results from last 7 days   Lab Units 04/06/23  0354 04/05/23  1427   SODIUM mmol/L 141 140   POTASSIUM mmol/L 4.7 5.2   CHLORIDE mmol/L 111* 107   CO2 mmol/L 20.0* 21.0*   BUN mg/dL 52* 59*   CREATININE mg/dL 2.02* 2.30*   CALCIUM mg/dL 8.7 9.7*   BILIRUBIN mg/dL  --  0.4   ALK PHOS U/L  --  108   ALT (SGPT) U/L  --  10   AST (SGOT) U/L  --  17   GLUCOSE mg/dL 85 88         Lab Results   Lab Value Date/Time    LIPASE 47 04/05/2023 1427    LIPASE 40 03/16/2023 1343       Radiology:  CT Abdomen Pelvis Without Contrast   Final Result   1. No acute intra-abdominal or pelvic process on this limited   noncontrast exam.   2. Markedly distended bladder.   3. Cholelithiasis.   4. Small hiatal hernia with duodenal and colonic diverticulosis. If GI   symptoms persist endoscopy could be considered for better evaluation.   5. Please see above for additional findings/recommendations.       This report was finalized on 4/5/2023 4:17 PM by Dr. Kapil Cruz M.D.              Assessment & Plan   Assessment:   1.  89 y.o. female with CKD, HTN, h/o melanoma, psoriasis, CVA, obesity  2.  2-week history of abdominal pain and leg pain.  She does have gallstones.  3.  She has melena although she has recently been on Pepto-Bismol.  4.  She has never had an EGD or colonoscopy.    Plan:   1.  I will Hemoccult her stool.  2.  I will check a urinalysis to rule out a urinary tract infection?  3.  I will check serial H&H's and transfuse if necessary.  4.  I offered to do an EGD.  The patient is not interested in endoscopy if possible.  She wants to think about whether she wants to do an EGD or not?  5. I am going to check a gallbladder US.     I discussed the patient's findings and my recommendations with patient, family  and nursing staff.         Rishabh Tinoco M.D.  [facility]  [address] [place] [address]  Office: [phone]

## 2023-04-06 NOTE — H&P
HISTORY AND PHYSICAL   [facility]        Date of Admission: 2023  Patient Identification:  Name: Marianne Delgado  Age: 89 y.o.  Sex: female  :  1925  MRN: [id number]                     Primary Care Physician: Natasha Ramirez APRN    Chief Complaint:  89 year old female who presented to the emergency room with black and bloody stool which was noted in her diaper; she has had abdominal pain for the last two weeks; no fever or chills; no nausea or vomiting;     History of Present Illness:   As above    Past Medical History:  Past Medical History:   Diagnosis Date   • Acute sinusitis    • Acute upper respiratory infection    • Arthritis    • CKD (chronic kidney disease)    • Debility    • Fatigue    • Foot pain, bilateral    • Glaucoma    • Gout    • Hematuria    • High blood pressure    • Hypertension    • Hypothyroid 1999   • Hypothyroidism    • IBS (irritable bowel syndrome)    • IGT (impaired glucose tolerance)    • Malaise and fatigue    • Medication management    • Melanoma 1979    left leg   • OA (osteoarthritis)    • Osteopenia 1999   • Painful joint    • Psoriasis    • Renal failure    • Rosacea    • Vitamin D deficiency      Past Surgical History:  Past Surgical History:   Procedure Laterality Date   • CATARACT EXTRACTION     • FOOT SURGERY      mauri toe surgery   • OTHER SURGICAL HISTORY      Melanoma Excision: Left leg      Home Meds:  Medications Prior to Admission   Medication Sig Dispense Refill Last Dose   • amLODIPine (NORVASC) 5 MG tablet Take 1 tablet by mouth Daily.      • Betamethasone Valerate 0.12 % foam       • bimatoprost (LUMIGAN) 0.01 % ophthalmic drops 1 drop Every Night.      • cholecalciferol (VITAMIN D3) 25 MCG (1000 UT) tablet TAKE ONE TABLET BY MOUTH TWO TIMES A  tablet 1    • clopidogrel (PLAVIX) 75 MG tablet Take 1 tablet by mouth Daily. 90 tablet 1    • dorzolamide-timolol (COSOPT) 22.3-6.8 MG/ML ophthalmic solution 1 drop 2 (Two) Times  a Day.      • levothyroxine (SYNTHROID, LEVOTHROID) 88 MCG tablet Take 1 tablet by mouth Daily. 90 tablet 0    • lidocaine (LIDODERM) 5 % Place 1 patch on the skin as directed by provider Daily. Remove & Discard patch within 12 hours or as directed by MD 60 patch 0    • metaxalone (SKELAXIN) 800 MG tablet Take 1 tablet by mouth 3 (Three) Times a Day. 90 tablet 0    • metroNIDAZOLE (METROCREAM) 0.75 % cream Apply 1 application topically to the appropriate area as directed 2 (Two) Times a Day.      • multivitamin with minerals tablet tablet Take 1 tablet by mouth Daily.      • sennosides-docusate (PERICOLACE) 8.6-50 MG per tablet Take 2 tablets by mouth 2 (Two) Times a Day. 120 tablet 0    • triamcinolone (KENALOG) 0.1 % ointment Apply 1 application topically to the appropriate area as directed 2 (Two) Times a Day.      • vitamin B-12 (CYANOCOBALAMIN) 1000 MCG tablet 1 po qd x 3 days in the week 90 tablet 1        Allergies:  Allergies   Allergen Reactions   • Atorvastatin Nausea And Vomiting   • Azithromycin Diarrhea   • Cefdinir Nausea Only     nausea   • Doxycycline Monohydrate Nausea Only   • Allopurinol Rash     Psoriasis  rash     Immunizations:  Immunization History   Administered Date(s) Administered   • COVID-19 (PFIZER) PURPLE CAP 03/03/2021, 03/24/2021   • Flu Vaccine Split Quad 09/12/2018   • FluLaval/Fluzone >6mos 09/11/2018   • Fluad Quad 65+ 09/11/2018, 10/20/2020   • Fluzone High Dose =>65 Years ([employer] ONLY) 12/13/2016, 09/12/2018, 10/08/2019   • Pneumococcal Conjugate 13-Valent (PCV13) 12/13/2015   • Pneumococcal Polysaccharide (PPSV23) 03/11/2020     Social History:   Social History     Social History Narrative   • Not on file     Social History     Socioeconomic History   • Marital status:    Tobacco Use   • Smoking status: Never   • Smokeless tobacco: Never   Vaping Use   • Vaping Use: Unknown   Substance and Sexual Activity   • Alcohol use: Yes     Alcohol/week: 2.0 standard drinks      Types: 1 Glasses of wine, 1 Shots of liquor per week     Comment: occasionally   • Drug use: No   • Sexual activity: Defer       Family History:  Family History   Problem Relation Age of Onset   • Heart attack Mother    • Kidney failure Father    • Heart disease Sister    • Leukemia Brother    • Heart disease Sister    • Heart disease Sister    • Heart disease Other         FH in females b/f 65 and males b/f 55        Review of Systems  See history of present illness and past medical history.  Patient denies headache, dizziness, syncope, falls, trauma, change in vision, change in hearing, change in taste, changes in weight, changes in appetite, focal weakness, numbness, or paresthesia.  Patient denies chest pain, palpitations, dyspnea, orthopnea, PND, cough, sinus pressure, rhinorrhea, epistaxis, hemoptysis, nausea, vomiting,hematemesis, diarrhea, constipation    Denies cold or heat intolerance, polydipsia, polyuria, polyphagia. Denies hematuria, pyuria, dysuria, hesitancy, frequency or urgency. Denies consumption of raw and under cooked meats foods or change in water source.  Denies fever, chills, sweats, night sweats.  Denies missing any routine medications. Remainder of ROS is negative.    Objective:  T Max 24 hrs: Temp (24hrs), Av.9 °F (36.6 °C), Min:97.5 °F (36.4 °C), Max:98.2 °F (36.8 °C)    Vitals Ranges:   Temp:  [97.5 °F (36.4 °C)-98.2 °F (36.8 °C)] 97.5 °F (36.4 °C)  Heart Rate:  [67-82] 82  Resp:  [16-18] 16  BP: (137-172)/(56-89) 137/89      Exam:  /89 (BP Location: Left arm, Patient Position: Lying)   Pulse 82   Temp 97.5 °F (36.4 °C) (Oral)   Resp 16   SpO2 96%     General Appearance:    Alert, cooperative, no distress, appears stated age   Head:    Normocephalic, without obvious abnormality, atraumatic   Eyes:    PERRL, conjunctivae/corneas clear, EOM's intact, both eyes   Ears:    Normal external ear canals, both ears   Nose:   Nares normal, septum midline, mucosa normal, no drainage     or sinus tenderness   Throat:   Lips, mucosa, and tongue normal   Neck:   Supple, symmetrical, trachea midline, no adenopathy;     thyroid:  no enlargement/tenderness/nodules; no carotid    bruit or JVD   Back:     Symmetric, no curvature, ROM normal, no CVA tenderness   Lungs:     Decreased breath sounds bilaterally, respirations unlabored   Chest Wall:    No tenderness or deformity    Heart:    Regular rate and rhythm, S1 and S2 normal, no murmur, rub   or gallop   Abdomen:     Soft, nontender, bowel sounds active all four quadrants,     no masses, no hepatomegaly, no splenomegaly   Extremities:   Extremities normal, atraumatic, no cyanosis or edema   Pulses:   2+ and symmetric all extremities   Skin:   Skin color, texture, turgor normal, no rashes or lesions               .    Data Review:  Labs in chart were reviewed.  WBC   Date Value Ref Range Status   04/05/2023 10.00 3.40 - 10.80 10*3/mm3 Final     Hemoglobin   Date Value Ref Range Status   04/05/2023 12.5 12.0 - 15.9 g/dL Final     Hematocrit   Date Value Ref Range Status   04/05/2023 39.8 34.0 - 46.6 % Final     Platelets   Date Value Ref Range Status   04/05/2023 184 140 - 450 10*3/mm3 Final     Sodium   Date Value Ref Range Status   04/05/2023 140 136 - 145 mmol/L Final     Potassium   Date Value Ref Range Status   04/05/2023 5.2 3.5 - 5.2 mmol/L Final     Comment:     Slight hemolysis detected by analyzer. Results may be affected.     Chloride   Date Value Ref Range Status   04/05/2023 107 98 - 107 mmol/L Final     CO2   Date Value Ref Range Status   04/05/2023 21.0 (L) 22.0 - 29.0 mmol/L Final     BUN   Date Value Ref Range Status   04/05/2023 59 (H) 8 - 23 mg/dL Final     Creatinine   Date Value Ref Range Status   04/05/2023 2.30 (H) 0.57 - 1.00 mg/dL Final     Glucose   Date Value Ref Range Status   04/05/2023 88 65 - 99 mg/dL Final     Calcium   Date Value Ref Range Status   04/05/2023 9.7 (H) 8.2 - 9.6 mg/dL Final     AST (SGOT)   Date Value Ref  Range Status   04/05/2023 17 1 - 32 U/L Final     Comment:     Slight hemolysis detected by analyzer. Results may be affected.     ALT (SGPT)   Date Value Ref Range Status   04/05/2023 10 1 - 33 U/L Final     Alkaline Phosphatase   Date Value Ref Range Status   04/05/2023 108 39 - 117 U/L Final                Imaging Results (All)     Procedure Component Value Units Date/Time    CT Abdomen Pelvis Without Contrast [[id number]] Collected: 04/05/23 1606     Updated: 04/05/23 1621    Narrative:      CT ABDOMEN AND PELVIS WITHOUT IV CONTRAST     HISTORY: 89-year-old with nausea vomiting. Abdominal pain x2 weeks.  Dark/bloody stools.     TECHNIQUE: Radiation dose reduction techniques were utilized, including  automated exposure control and exposure modulation based on body size.   3 mm images were obtained through the abdomen and pelvis without the  administration of IV contrast. Lack of IV contrast limits evaluation of  solid, visceral, and vascular structures. Sensitivity for underlying  lesions and infection decreased. Image quality somewhat degraded by  motion.     COMPARISON: 03/16/2023     FINDINGS:      LOWER CHEST: Artifact from overlying soft tissue/upper extremities the  heart size is stable. Moderate calcific atherosclerosis including  coronary artery calcifications. Calcified granulomas. Resolution of  pleural effusions. Bibasilar atelectasis and/or scarring..     ABDOMEN:  Liver/Biliary Tract: Stable indeterminate hypodense liver lesions.  Cholelithiasis.     Spleen: Calcified granulomas.     Pancreas: Within normal limits.     Adrenals: Within normal limits.     Kidneys:  Renal parenchymal scarring with perinephric stranding.  Intrarenal vascular calcifications. No hydronephrosis.     Bowel:  Small hiatal hernia. The stomach is incompletely distended.  Duodenal diverticulum. Normal appendix. Colonic diverticulosis without  acute diverticulitis. Retained high-density material in the colon  particularly sigmoid  diverticula. No free fluid or free air. Given  history endoscopy could be considered for better evaluation of the GI  tract.     Peritoneum: Within normal limits.     Vasculature:    Extensive atherosclerosis abdominal aorta and branch  vessels with calcifications particularly involving the superior  mesenteric artery and right renal origin.     Lymph Nodes:  No new adenopathy.                                 PELVIS:                                   Pelvic organs: Markedly distended bladder extending to just below the  umbilicus. Uterus in situ. Periuterine vascular calcifications. The  ovaries are not well visualized. Presacral edema slightly increased from  the prior.     Subcutaneous soft tissue edema.     BONES: Demineralization. Multilevel degenerative changes thoracolumbar  spine and pelvis. No definitive acute displaced fractures. Please refer  to the prior CT for further detail. If there is persistent pain or  clinical concern consider MRI.       Impression:      1. No acute intra-abdominal or pelvic process on this limited  noncontrast exam.  2. Markedly distended bladder.  3. Cholelithiasis.  4. Small hiatal hernia with duodenal and colonic diverticulosis. If GI  symptoms persist endoscopy could be considered for better evaluation.  5. Please see above for additional findings/recommendations.     This report was finalized on 4/5/2023 4:17 PM by Dr. Kapil Cruz M.D.               Assessment:  Active Hospital Problems    Diagnosis  POA   • **Gastrointestinal hemorrhage, unspecified gastrointestinal hemorrhage type [K92.2]  Yes      Resolved Hospital Problems   No resolved problems to display.   abdominal pain  ckd4  Hypothyroidism  Hypertension  Hiatal hernia    Plan:  Gi to see  protonix drip started  Check pvr  Trend labs  dw patient and family as well as ed provider  Lady Ag MD  4/5/2023  22:41 EDT

## 2023-04-06 NOTE — CONSULTS
[facility] Consult Note      Patient Name: Marianne Delgado  : 1925  MRN: [id number]  Primary Care Physician:  Natasha Ramirez APRN  Referring Physician: Lady Ag MD  Date of admission: 2023    Subjective     Reason for Consult: Chronic kidney    HPI:   Marianne Delgado is a 89 y.o. female patient was admitted with GI bleed, she has history of chronic kidney disease followed by my partner Dr. Christie Mitchell her baseline creatinine around 2-2.3.  She has history of hypertension, prior history of CVA, obesity, history of melanoma and psoriasis, she had degenerative joint disease, history of gout and glaucoma, hypothyroidism.  At present she has not been able to void since admission and she is having problem with constipation, she was straight cathetered and she had about 700 cc in the and she had significant urinary retention now with almost 1000 cc on bladder scan    Patient is complaining of abdominal pain she is very sensitive to touch when I try to examine her lower extremities just touching her skin made her scream.  She denies any chest pain or shortness of air, no orthopnea or PND.    Review of Systems:   14 point review of systems is otherwise negative except for mentioned above on HPI    Personal History     Past Medical History:   Diagnosis Date   • Acute sinusitis    • Acute upper respiratory infection    • Arthritis    • CKD (chronic kidney disease)    • Debility    • Fatigue    • Foot pain, bilateral    • Glaucoma    • Gout    • Hematuria    • High blood pressure    • Hypertension    • Hypothyroid 1999   • Hypothyroidism    • IBS (irritable bowel syndrome)    • IGT (impaired glucose tolerance)    • Malaise and fatigue    • Medication management    • Melanoma 1979    left leg   • OA (osteoarthritis)    • Osteopenia 1999   • Painful joint    • Psoriasis    • Renal failure    • Rosacea    • Vitamin D deficiency        Past Surgical History:    Procedure Laterality Date   • CATARACT EXTRACTION     • FOOT SURGERY      mauri toe surgery   • OTHER SURGICAL HISTORY      Melanoma Excision: Left leg       Family History: family history includes Heart attack in her mother; Heart disease in her sister, sister, sister and another family member; Kidney failure in her father; Leukemia in her brother.    Social History:  reports that she has never smoked. She has never used smokeless tobacco. She reports current alcohol use of about 2.0 standard drinks per week. She reports that she does not use drugs.    Home Medications:  Prior to Admission medications    Medication Sig Start Date End Date Taking? Authorizing Provider   amLODIPine (NORVASC) 5 MG tablet Take 1 tablet by mouth Daily.    Shana Montoya MD   Betamethasone Valerate 0.12 % foam  1/18/21   Shana Montoya MD   bimatoprost (LUMIGAN) 0.01 % ophthalmic drops 1 drop Every Night.    ProviderShana MD   cholecalciferol (VITAMIN D3) 25 MCG (1000 UT) tablet TAKE ONE TABLET BY MOUTH TWO TIMES A DAY 3/7/22   Xenia Robert MD   clopidogrel (PLAVIX) 75 MG tablet Take 1 tablet by mouth Daily. 2/23/22   Naila Neil MD   dorzolamide-timolol (COSOPT) 22.3-6.8 MG/ML ophthalmic solution 1 drop 2 (Two) Times a Day.    ProviderShana MD   levothyroxine (SYNTHROID, LEVOTHROID) 88 MCG tablet Take 1 tablet by mouth Daily. 12/25/21   Naila Neil MD   lidocaine (LIDODERM) 5 % Place 1 patch on the skin as directed by provider Daily. Remove & Discard patch within 12 hours or as directed by MD 3/20/23   Iam Keller MD   metaxalone (SKELAXIN) 800 MG tablet Take 1 tablet by mouth 3 (Three) Times a Day. 3/19/23   Iam Keller MD   metroNIDAZOLE (METROCREAM) 0.75 % cream Apply 1 application topically to the appropriate area as directed 2 (Two) Times a Day.    ProviderShana MD   multivitamin with minerals tablet tablet Take 1 tablet by mouth Daily.    Provider  MD Shana   sennosides-docusate (PERICOLACE) 8.6-50 MG per tablet Take 2 tablets by mouth 2 (Two) Times a Day. 3/19/23   Iam Keller MD   triamcinolone (KENALOG) 0.1 % ointment Apply 1 application topically to the appropriate area as directed 2 (Two) Times a Day.    Provider, MD Shana   vitamin B-12 (CYANOCOBALAMIN) 1000 MCG tablet 1 po qd x 3 days in the week 2/1/22   Naila Neil MD       Allergies:  Allergies   Allergen Reactions   • Atorvastatin Nausea And Vomiting   • Azithromycin Diarrhea   • Cefdinir Nausea Only     nausea   • Doxycycline Monohydrate Nausea Only   • Allopurinol Rash     Psoriasis  rash       Objective     Vitals:   Temp:  [97.5 °F (36.4 °C)-98.2 °F (36.8 °C)] 98.1 °F (36.7 °C)  Heart Rate:  [62-82] 72  Resp:  [16-18] 18  BP: (137-172)/(56-89) 165/58    Intake/Output Summary (Last 24 hours) at 4/6/2023 1120  Last data filed at 4/6/2023 0800  Gross per 24 hour   Intake 2188.83 ml   Output 1300 ml   Net 888.83 ml       Physical Exam:   Constitutional: Awake, alert, no acute distress.  Chronically ill and frail, obese   HEENT: Sclera anicteric, no conjunctival injection, oral mucosa is dry  Neck: Supple, no thyromegaly, no lymphadenopathy, trachea at midline, no JVD  Respiratory: Clear to auscultation bilaterally, nonlabored respiration  Cardiovascular: RRR, no murmurs, no rubs or gallops, no carotid bruit  Gastrointestinal: Positive bowel sounds, abdomen is soft, diffuse tenderness  : Palpable bladder  Musculoskeletal: 1+ lower extremity edema edema, no clubbing or cyanosis  Psychiatric: Flat affect, cooperative  Neurologic: Oriented x3, moving all extremities, normal speech and mental status  Skin: Warm and dry       Scheduled Meds:     amLODIPine, 5 mg, Oral, Daily  cholecalciferol, 1,000 Units, Oral, BID  levothyroxine, 88 mcg, Oral, Daily  nystatin, , Topical, Q12H  senna-docusate sodium, 2 tablet, Oral, BID      IV Meds:   pantoprazole, 8 mg/hr, Last Rate: 8  mg/hr (04/06/23 0844)  sodium chloride, 150 mL/hr, Last Rate: 150 mL/hr (04/06/23 0924)        Results Reviewed:   I have personally reviewed the results from the time of this admission to 4/6/2023 11:20 EDT     Lab Results   Component Value Date    GLUCOSE 85 04/06/2023    CALCIUM 8.7 04/06/2023     04/06/2023    K 4.7 04/06/2023    CO2 20.0 (L) 04/06/2023     (H) 04/06/2023    BUN 52 (H) 04/06/2023    CREATININE 2.02 (H) 04/06/2023    EGFRIFAFRI 20 (L) 02/01/2022    EGFRIFNONA 18 (L) 02/01/2022    BCR 25.7 (H) 04/06/2023    ANIONGAP 10.0 04/06/2023      Lab Results   Component Value Date    MG 2.2 03/19/2023    PHOS 5.9 (H) 03/19/2023    ALBUMIN 3.6 04/05/2023           Assessment / Plan     ASSESSMENT:  -Chronic kidney disease stage IV associated with nephrosclerosis and advanced age  -Urinary retention which is acute  -Abdominal pain could be related to her urinary retention  -History of black tarry stools with recent intake of Pepto-Bismol being evaluated by GI  -Iron deficiency anemia hemoglobin 11.1 and her iron saturation 12% also has probably component of chronic kidney disease      PLAN:  -Morrow catheter to bedside drainage  -Increase IV fluid to 75 cc/h  -Check random urine for sodium, chloride and protein to creatinine rate  -Surveillance labs    I discussed the case with the patient's nurse also with her niece at the bedside    Thank you for involving us in the care of Marianne Delgado.  Please feel free to call with any questions.    Derek Singleton MD  04/06/23  11:20 EDT    [facility]  [phone]      Please note that portions of this note were completed with a voice recognition program.

## 2023-04-06 NOTE — DISCHARGE PLACEMENT REQUEST
"Mk Maya (89 y.o. Female)     Date of Birth   07/14/1925    Social Security Number       Address   [address] [place] [address]    Home Phone   [phone]    MRN   [id number]       Lutheran   Jain    Marital Status                               Admission Date   4/5/23    Admission Type   Emergency    Admitting Provider   Lady Ag MD    Attending Provider   Chico Shen DO    Department, Room/Bed   [address], [address]/1       Discharge Date       Discharge Disposition       Discharge Destination                               Attending Provider: Chico Shen DO    Allergies: Atorvastatin, Azithromycin, Cefdinir, Doxycycline Monohydrate, Allopurinol    Isolation: None   Infection: None   Code Status: No CPR    Ht: 154.9 cm (61\")   Wt: 93.9 kg (207 lb)    Admission Cmt: None   Principal Problem: Gastrointestinal hemorrhage, unspecified gastrointestinal hemorrhage type [K92.2]                 Active Insurance as of 4/5/2023     Primary Coverage     Payor Plan Insurance Group Employer/Plan Group    [facility] MEDICARE REPLACEMENT [facility] MEDICARE REPLACEMENT [id number]     Payor Plan Address Payor Plan Phone Number Payor Plan Fax Number Effective Dates    [address] [address]   2/1/2022 - None Entered    [facility] [id number]       Subscriber Name Subscriber Birth Date Member ID       MK MAYA 7/14/1925 [id number]                 Emergency Contacts      (Rel.) Home Phone Work Phone Mobile Phone    Jessica Osei (Power of ) [phone] -- --              "

## 2023-04-06 NOTE — NURSING NOTE
Wound/ostomy - consult received to skin concerns to gluteal and coccyx. Patient was assisted to turn onto side, patient noted to be obese with deep gluteal cleft, there is an area of healed and peeling skin to R gluteal consistent with previous skin damage of combined etiology of moisture, pressure and shear. An optifoam was  In place but was removed as due to deep glutal cleft the dressing was only aiding in warmth and moisture to area. Recommend to treat skin to gluteals and creases with zinc based barrier cream, there is also some intertriginous dermatitis with yeast present beneath large pendulous breasts, nystatin has been ordered, pillow case is in place to prevent moisture and friction.     Heels noted to be slow to divine redness, heels were on bed and patient doesn't seem to be moving extremities well to reposition and due to age may lack some sensation, recommend to keep heels offloaded with a pillow

## 2023-04-06 NOTE — PROGRESS NOTES
Name: Marianne Delgado ADMIT: 2023   : 1925  PCP: Natasha Ramirez APRN    MRN: [id number] LOS: 1 days   AGE/SEX: 89 y.o. female  ROOM: [facility]     Subjective   Subjective    Patient seen and examined this morning.  Hospital day 1.  Discussed with nursing, patient having urinary retention since admission, has required intermittent straight caths.  Repeat bladder scans this morning still showing evidence of retention.  Discussed with patient, she is endorsing generalized abdominal tenderness, constipation and urinary retention.  She denies history of urinary retention in the past.  Family at bedside.         Objective   Objective   Vital Signs  Temp:  [97.5 °F (36.4 °C)-98.2 °F (36.8 °C)] 98.1 °F (36.7 °C)  Heart Rate:  [62-82] 67  Resp:  [16-18] 16  BP: (137-172)/(56-89) 168/61  SpO2:  [90 %-96 %] 94 %  on   ;   Device (Oxygen Therapy): room air  Body mass index is 39.11 kg/m².  Const: Obese, chronically ill-appearing, uncomfortable at present  Eyes: PERRL, normal conjunctiva  HENT: atraumatic, non-tender  CV: Regular rate, regular rhythm  RESP: FIO2 21, clear to auscultation B/L, normal effort  GI: soft, mild distention, generalized tenderness to palpation  MSK: No gross deformities appreciated, no tenderness, trace bilateral lower extremity edema.  Skin: Warm, dry. No rashes, scattered bruises.  Neuro: Awake, alert, oriented x3, no appreciable focal neurological deficits.  Psych: Appropriate mood and affect.    Results Review     I reviewed the patient's new clinical results.  Results from last 7 days   Lab Units 23  0354 23  1427   WBC 10*3/mm3 7.54 10.00   HEMOGLOBIN g/dL 11.1* 12.5   PLATELETS 10*3/mm3 149 184     Results from last 7 days   Lab Units 23  0354 23  1427   SODIUM mmol/L 141 140   POTASSIUM mmol/L 4.7 5.2   CHLORIDE mmol/L 111* 107   CO2 mmol/L 20.0* 21.0*   BUN mg/dL 52* 59*   CREATININE mg/dL 2.02* 2.30*   GLUCOSE mg/dL 85 88   EGFR mL/min/1.73 22.1*  18.9*     Results from last 7 days   Lab Units 04/05/23  1427   ALBUMIN g/dL 3.6   BILIRUBIN mg/dL 0.4   ALK PHOS U/L 108   AST (SGOT) U/L 17   ALT (SGPT) U/L 10     Results from last 7 days   Lab Units 04/06/23  0354 04/05/23  1427   CALCIUM mg/dL 8.7 9.7*   ALBUMIN g/dL  --  3.6       No results found for: HGBA1C, POCGLU    CT Abdomen Pelvis Without Contrast    Result Date: 4/5/2023  1. No acute intra-abdominal or pelvic process on this limited noncontrast exam. 2. Markedly distended bladder. 3. Cholelithiasis. 4. Small hiatal hernia with duodenal and colonic diverticulosis. If GI symptoms persist endoscopy could be considered for better evaluation. 5. Please see above for additional findings/recommendations.  This report was finalized on 4/5/2023 4:17 PM by Dr. Kapil Cruz M.D.      I have personally reviewed all medications:  Scheduled Medications   Infusions  pantoprazole, 8 mg/hr, Last Rate: 8 mg/hr (04/06/23 0844)  sodium chloride, 150 mL/hr, Last Rate: 150 mL/hr (04/06/23 0924)    Diet  Diet: Regular/House Diet; Texture: Regular Texture (IDDSI 7); Fluid Consistency: Thin (IDDSI 0)    I have personally reviewed:  [x]  Laboratory   [x]  Radiology   [x]  EKG/Telemetry  [x]  Cardiology/Vascular   [x]  Records       Assessment/Plan     Active Hospital Problems    Diagnosis  POA   • **Gastrointestinal hemorrhage, unspecified gastrointestinal hemorrhage type [K92.2]  Yes   • Anemia [D64.9]  Yes   • Acute urinary retention [R33.8]  No   • Obesity (BMI 30-39.9) [E66.9]  Yes   • History of CVA (cerebrovascular accident) [Z86.73]  Not Applicable   • Stage 4 chronic kidney disease (HCC) [N18.4]  Yes   • Essential hypertension [I10]  Yes   • Acquired hypothyroidism [E03.9]  Yes      Resolved Hospital Problems   No resolved problems to display.       89 y.o. female admitted with Gastrointestinal hemorrhage, unspecified gastrointestinal hemorrhage type.    Gastrointestinal hemorrhage  Anemia  - Patient endorsing dark  stools, prior to arrival, however, has recent been on Pepto-Bismol.  - GI consulted and following, they are going to order serial H&H's.  They offered EGD, however, patient reportedly not interested in endoscopy if possible, wants to hold off for now.  - GI going to order right upper quadrant ultrasound for further evaluation.  - Hemoglobin slightly low, however, appears stable from admission.  No evidence of overt blood loss at present.  - Order repeat CBC in AM for reassessment. Continue to monitor, transfuse for hemoglobin <7.  - Follow up GI plans and recommendations. Follow up testing results. Continue PPI.    Acute urinary retention  - Noted during admission, no prior history per patient.  - Order renal US.   - Consult Urology.  - Acute urinary retention protocol.    Chronic kidney disease stage 4  - On most recent labs, patient's creatinine found to be 2.02, appears stable and near apparent baseline, per review of previous lab values and outside records.  Patient states that she previously followed with Dr. Mitchell with [facility], however, has not been seen by them in about 6 months, and would like to be evaluated by them while she is in the hospital.  - Consult nephrology for further evaluation.  - Order repeat BMP in AM for reassessment of renal function.   - Will continue to monitor and trend creatinine to guide ongoing management decisions. Avoid nephrotoxic medications, IVF, strict I/O, daily weights.    Hypertension  - BP acceptable acutely. Appears stable. No indications to warrant acute changes/intervention at this time.  - Continue current medications as prescribed. Trend BP to guide ongoing management decisions.    History of CVA  - On Plavix as outpatient, holding on admission due to concerns for possible GI bleeding, can resume when okay with GI.    Hypothyroidism  - Stable. Continue Levothyroxine as prescribed.    Obesity  - BMI 39.11 kg/m2. Complicating all medical  problems.    · SCDs for DVT prophylaxis.  · Limited code (no CPR, no intubation).  · Discussed with patient, family and nursing staff.  · Anticipate discharge TBD timing yet to be determined.      Chico Shen DO  [facility]  04/06/23  10:22 EDT

## 2023-04-07 PROBLEM — R11.2 NAUSEA WITH VOMITING: Status: ACTIVE | Noted: 2023-04-07

## 2023-04-07 PROBLEM — N39.0 ACUTE UTI (URINARY TRACT INFECTION): Status: ACTIVE | Noted: 2023-04-07

## 2023-04-07 PROBLEM — K59.00 CONSTIPATION: Status: ACTIVE | Noted: 2023-04-07

## 2023-04-07 LAB
ALBUMIN SERPL-MCNC: 2.7 G/DL (ref 3.5–5.2)
ANION GAP SERPL CALCULATED.3IONS-SCNC: 10 MMOL/L (ref 5–15)
ANION GAP SERPL CALCULATED.3IONS-SCNC: 10 MMOL/L (ref 5–15)
BACTERIA UR QL AUTO: ABNORMAL /HPF
BASOPHILS # BLD AUTO: 0.03 10*3/MM3 (ref 0–0.2)
BASOPHILS NFR BLD AUTO: 0.4 % (ref 0–1.5)
BUN SERPL-MCNC: 40 MG/DL (ref 8–23)
BUN SERPL-MCNC: 40 MG/DL (ref 8–23)
BUN/CREAT SERPL: 19.6 (ref 7–25)
BUN/CREAT SERPL: 19.6 (ref 7–25)
CALCIUM SPEC-SCNC: 8.6 MG/DL (ref 8.2–9.6)
CALCIUM SPEC-SCNC: 8.6 MG/DL (ref 8.2–9.6)
CHLORIDE SERPL-SCNC: 113 MMOL/L (ref 98–107)
CHLORIDE SERPL-SCNC: 113 MMOL/L (ref 98–107)
CHLORIDE UR-SCNC: 114 MMOL/L
CO2 SERPL-SCNC: 16 MMOL/L (ref 22–29)
CO2 SERPL-SCNC: 16 MMOL/L (ref 22–29)
CREAT SERPL-MCNC: 2.04 MG/DL (ref 0.57–1)
CREAT SERPL-MCNC: 2.04 MG/DL (ref 0.57–1)
CREAT UR-MCNC: 34 MG/DL
D-LACTATE SERPL-SCNC: 0.7 MMOL/L (ref 0.5–2)
DEPRECATED RDW RBC AUTO: 41.1 FL (ref 37–54)
EGFRCR SERPLBLD CKD-EPI 2021: 21.8 ML/MIN/1.73
EGFRCR SERPLBLD CKD-EPI 2021: 21.8 ML/MIN/1.73
EOSINOPHIL # BLD AUTO: 0.13 10*3/MM3 (ref 0–0.4)
EOSINOPHIL NFR BLD AUTO: 1.8 % (ref 0.3–6.2)
ERYTHROCYTE [DISTWIDTH] IN BLOOD BY AUTOMATED COUNT: 12.7 % (ref 12.3–15.4)
GLUCOSE SERPL-MCNC: 96 MG/DL (ref 65–99)
GLUCOSE SERPL-MCNC: 96 MG/DL (ref 65–99)
HCT VFR BLD AUTO: 31.6 % (ref 34–46.6)
HEMOCCULT STL QL: POSITIVE
HGB BLD-MCNC: 10.2 G/DL (ref 12–15.9)
HYALINE CASTS UR QL AUTO: ABNORMAL /LPF
IMM GRANULOCYTES # BLD AUTO: 0.09 10*3/MM3 (ref 0–0.05)
IMM GRANULOCYTES NFR BLD AUTO: 1.2 % (ref 0–0.5)
LYMPHOCYTES # BLD AUTO: 1.08 10*3/MM3 (ref 0.7–3.1)
LYMPHOCYTES NFR BLD AUTO: 14.8 % (ref 19.6–45.3)
MAGNESIUM SERPL-MCNC: 1.9 MG/DL (ref 1.7–2.3)
MCH RBC QN AUTO: 28.3 PG (ref 26.6–33)
MCHC RBC AUTO-ENTMCNC: 32.3 G/DL (ref 31.5–35.7)
MCV RBC AUTO: 87.8 FL (ref 79–97)
MONOCYTES # BLD AUTO: 0.74 10*3/MM3 (ref 0.1–0.9)
MONOCYTES NFR BLD AUTO: 10.1 % (ref 5–12)
NEUTROPHILS NFR BLD AUTO: 5.23 10*3/MM3 (ref 1.7–7)
NEUTROPHILS NFR BLD AUTO: 71.7 % (ref 42.7–76)
NRBC BLD AUTO-RTO: 0 /100 WBC (ref 0–0.2)
PHOSPHATE SERPL-MCNC: 3.2 MG/DL (ref 2.5–4.5)
PLATELET # BLD AUTO: 140 10*3/MM3 (ref 140–450)
PMV BLD AUTO: 10 FL (ref 6–12)
POTASSIUM SERPL-SCNC: 4.3 MMOL/L (ref 3.5–5.2)
POTASSIUM SERPL-SCNC: 4.3 MMOL/L (ref 3.5–5.2)
PROT ?TM UR-MCNC: 23.7 MG/DL
PROT/CREAT UR: 697.1 MG/G CREA (ref 0–200)
RBC # BLD AUTO: 3.6 10*6/MM3 (ref 3.77–5.28)
RBC # UR STRIP: ABNORMAL /HPF
REF LAB TEST METHOD: ABNORMAL
SODIUM SERPL-SCNC: 139 MMOL/L (ref 136–145)
SODIUM SERPL-SCNC: 139 MMOL/L (ref 136–145)
SODIUM UR-SCNC: 125 MMOL/L
SQUAMOUS #/AREA URNS HPF: ABNORMAL /HPF
URATE SERPL-MCNC: 7.3 MG/DL (ref 2.4–5.7)
WBC # UR STRIP: ABNORMAL /HPF
WBC NRBC COR # BLD: 7.3 10*3/MM3 (ref 3.4–10.8)

## 2023-04-07 PROCEDURE — 85025 COMPLETE CBC W/AUTO DIFF WBC: CPT | Performed by: INTERNAL MEDICINE

## 2023-04-07 PROCEDURE — 80048 BASIC METABOLIC PNL TOTAL CA: CPT | Performed by: STUDENT IN AN ORGANIZED HEALTH CARE EDUCATION/TRAINING PROGRAM

## 2023-04-07 PROCEDURE — 99232 SBSQ HOSP IP/OBS MODERATE 35: CPT | Performed by: INTERNAL MEDICINE

## 2023-04-07 PROCEDURE — 80069 RENAL FUNCTION PANEL: CPT | Performed by: INTERNAL MEDICINE

## 2023-04-07 PROCEDURE — 25010000002 CEFTRIAXONE PER 250 MG: Performed by: STUDENT IN AN ORGANIZED HEALTH CARE EDUCATION/TRAINING PROGRAM

## 2023-04-07 PROCEDURE — 84550 ASSAY OF BLOOD/URIC ACID: CPT | Performed by: INTERNAL MEDICINE

## 2023-04-07 PROCEDURE — 83605 ASSAY OF LACTIC ACID: CPT | Performed by: STUDENT IN AN ORGANIZED HEALTH CARE EDUCATION/TRAINING PROGRAM

## 2023-04-07 PROCEDURE — 87040 BLOOD CULTURE FOR BACTERIA: CPT | Performed by: STUDENT IN AN ORGANIZED HEALTH CARE EDUCATION/TRAINING PROGRAM

## 2023-04-07 PROCEDURE — 83735 ASSAY OF MAGNESIUM: CPT | Performed by: STUDENT IN AN ORGANIZED HEALTH CARE EDUCATION/TRAINING PROGRAM

## 2023-04-07 RX ORDER — LEVOTHYROXINE SODIUM 88 UG/1
88 TABLET ORAL
Status: DISCONTINUED | OUTPATIENT
Start: 2023-04-08 | End: 2023-04-15 | Stop reason: HOSPADM

## 2023-04-07 RX ORDER — BUMETANIDE 0.25 MG/ML
4 INJECTION INTRAMUSCULAR; INTRAVENOUS EVERY 8 HOURS
Status: COMPLETED | OUTPATIENT
Start: 2023-04-07 | End: 2023-04-08

## 2023-04-07 RX ORDER — PANTOPRAZOLE SODIUM 40 MG/1
40 TABLET, DELAYED RELEASE ORAL
Status: DISCONTINUED | OUTPATIENT
Start: 2023-04-08 | End: 2023-04-15 | Stop reason: HOSPADM

## 2023-04-07 RX ADMIN — PANTOPRAZOLE SODIUM 8 MG/HR: 40 INJECTION, POWDER, FOR SOLUTION INTRAVENOUS at 04:16

## 2023-04-07 RX ADMIN — BUMETANIDE 4 MG: 0.25 INJECTION INTRAMUSCULAR; INTRAVENOUS at 14:25

## 2023-04-07 RX ADMIN — LEVOTHYROXINE SODIUM 88 MCG: 0.09 TABLET ORAL at 08:59

## 2023-04-07 RX ADMIN — Medication 3 MG: at 01:21

## 2023-04-07 RX ADMIN — BUMETANIDE 4 MG: 0.25 INJECTION INTRAMUSCULAR; INTRAVENOUS at 21:09

## 2023-04-07 RX ADMIN — DOCUSATE SODIUM 50MG AND SENNOSIDES 8.6MG 2 TABLET: 8.6; 5 TABLET, FILM COATED ORAL at 08:59

## 2023-04-07 RX ADMIN — PANTOPRAZOLE SODIUM 8 MG/HR: 40 INJECTION, POWDER, FOR SOLUTION INTRAVENOUS at 09:02

## 2023-04-07 RX ADMIN — DOCUSATE SODIUM 50MG AND SENNOSIDES 8.6MG 2 TABLET: 8.6; 5 TABLET, FILM COATED ORAL at 21:08

## 2023-04-07 RX ADMIN — Medication 1000 UNITS: at 21:08

## 2023-04-07 RX ADMIN — BISACODYL 10 MG: 10 SUPPOSITORY RECTAL at 08:59

## 2023-04-07 RX ADMIN — AMLODIPINE BESYLATE 5 MG: 5 TABLET ORAL at 08:59

## 2023-04-07 RX ADMIN — CEFTRIAXONE SODIUM 1 G: 1 INJECTION, POWDER, FOR SOLUTION INTRAMUSCULAR; INTRAVENOUS at 11:22

## 2023-04-07 RX ADMIN — NYSTATIN: 100000 POWDER TOPICAL at 21:20

## 2023-04-07 RX ADMIN — NYSTATIN: 100000 POWDER TOPICAL at 09:02

## 2023-04-07 RX ADMIN — Medication 1000 UNITS: at 08:59

## 2023-04-07 NOTE — PROGRESS NOTES
[facility] Progress [facility]    04/07/23 17:30 EDT        Not an impressive diuresis.  Creatinine 2.07.  We will leave the catheter in over the weekend to check on her on Monday.

## 2023-04-07 NOTE — PROGRESS NOTES
[facility] Progress Note      Patient Name: Marianne Delgado  : 1925  MRN: [id number]  Primary Care Physician:  Natasha Ramirez APRN  Date of admission: 2023    Subjective     Interval History:   Follow-up chronic kidney disease    Patient is feeling miserable she is having abdominal pain also inability to have a bowel movement she was given laxative  Have increasing lower extremity edema, she has Morrow catheter anchored in place    Review of Systems:   As noted above    Objective     Vitals:   Temp:  [97.4 °F (36.3 °C)-99 °F (37.2 °C)] 98.6 °F (37 °C)  Heart Rate:  [60-85] 85  Resp:  [16-24] 16  BP: (162-179)/(62-85) 164/78    Intake/Output Summary (Last 24 hours) at 2023 1303  Last data filed at 2023 1200  Gross per 24 hour   Intake 4005.42 ml   Output 1025 ml   Net 2980.42 ml       Physical Exam:    General Appearance: Obese, chronically ill and frail, awake and  Skin: warm and dry  HEENT: oral mucosa normal, nonicteric sclera  Neck: supple, no JVD  Lungs: Scattered rhonchi, breathing effort unlabored  Heart: RRR, normal S1 and S2, no S3, no  Abdomen: soft, diffuse tenderness, normoactive bowel  : Fully catheter is anchored  Extremities: 2+ edema  Neuro: Moving all extremity    Scheduled Meds:     amLODIPine, 5 mg, Oral, Daily  cefTRIAXone, 1 g, Intravenous, Q24H  cholecalciferol, 1,000 Units, Oral, BID  levothyroxine, 88 mcg, Oral, Daily  nystatin, , Topical, Q12H  senna-docusate sodium, 2 tablet, Oral, BID      IV Meds:   pantoprazole, 8 mg/hr, Last Rate: 8 mg/hr (23)  sodium chloride, 75 mL/hr, Last Rate: 75 mL/hr (23)        Results Reviewed:   I have personally reviewed the results from the time of this admission to 2023 13:03 EDT     Results from last 7 days   Lab Units 23  0338 23  0354 04/05/23  1427   SODIUM mmol/L 139  139 141 140   POTASSIUM mmol/L 4.3  4.3 4.7 5.2   CHLORIDE mmol/L 113*  113* 111* 107    CO2 mmol/L 16.0*  16.0* 20.0* 21.0*   BUN mg/dL 40*  40* 52* 59*   CREATININE mg/dL 2.04*  2.04* 2.02* 2.30*   CALCIUM mg/dL 8.6  8.6 8.7 9.7*   BILIRUBIN mg/dL  --   --  0.4   ALK PHOS U/L  --   --  108   ALT (SGPT) U/L  --   --  10   AST (SGOT) U/L  --   --  17   GLUCOSE mg/dL 96  96 85 88       Estimated Creatinine Clearance: 16.8 mL/min (A) (by C-G formula based on SCr of 2.04 mg/dL (H)).    Results from last 7 days   Lab Units 04/07/23  0338   MAGNESIUM mg/dL 1.9   PHOSPHORUS mg/dL 3.2       Results from last 7 days   Lab Units 04/07/23  0338   URIC ACID mg/dL 7.3*       Results from last 7 days   Lab Units 04/07/23  0337 04/06/23  0354 04/05/23  1427   WBC 10*3/mm3 7.30 7.54 10.00   HEMOGLOBIN g/dL 10.2* 11.1* 12.5   PLATELETS 10*3/mm3 140 149 184             Assessment / Plan     ASSESSMENT:  -Chronic kidney disease stage IV associated with nephrosclerosis and advanced age, creatinine 2.04, stable, patient has fluid excess  -Urinary retention which is acute  -Abdominal pain could be related to her urinary retention  -History of black tarry stools with recent intake of Pepto-Bismol being evaluated by GI  -Iron deficiency anemia hemoglobin 11.1 and her iron saturation 12% also has probably component of chronic kidney disease    PLAN:  -Discontinue IV fluid  -Diurese with bumetanide 4 mg every 8 hours x 3 doses  -Surveillance labs  I discussed the case with the patient's niece at the bedside    Thank you for involving us in the care of Marianne Delgado.  Please feel free to call with any questions.    Derek Singleton MD  04/07/23  13:03 EDT    [facility]  [phone]    Please note that portions of this note were completed with a voice recognition program.

## 2023-04-07 NOTE — PROGRESS NOTES
[facility]  Inpatient Progress Note    Reason for Follow Up: GI bleed    Subjective     Interval History:   Discussed with patient and family at bedside.  Advised the stool Hemoccult was positive.  Patient is now amenable to considering upper endoscopic evaluation.  She has a history of Plavix use and would require clearance for 5 days.  Her last dose was on Tuesday which means she would be ready for endoscopic evaluation this coming Monday.  She complains of constipation and feeling as if she needs to have a bowel movement.  She was given a suppository without significant effect.  We talked about initiation of enema as needed.    Current Facility-Administered Medications:   •  acetaminophen (TYLENOL) tablet 650 mg, 650 mg, Oral, Q4H PRN, Lady Ag MD  •  amLODIPine (NORVASC) tablet 5 mg, 5 mg, Oral, Daily, Chico Shen DO, 5 mg at 04/07/23 0859  •  sennosides-docusate (PERICOLACE) 8.6-50 MG per tablet 2 tablet, 2 tablet, Oral, BID, 2 tablet at 04/07/23 0859 **AND** polyethylene glycol (MIRALAX) packet 17 g, 17 g, Oral, Daily PRN **AND** bisacodyl (DULCOLAX) EC tablet 5 mg, 5 mg, Oral, Daily PRN **AND** bisacodyl (DULCOLAX) suppository 10 mg, 10 mg, Rectal, Daily PRN, Chico Shen DO, 10 mg at 04/07/23 0859  •  bumetanide (BUMEX) injection 4 mg, 4 mg, Intravenous, Q8H, Derek Singleton MD  •  cefTRIAXone (ROCEPHIN) 1 g in sodium chloride 0.9 % 100 mL IVPB-VTB, 1 g, Intravenous, Q24H, Chico Shen DO, Last Rate: 200 mL/hr at 04/07/23 1122, 1 g at 04/07/23 1122  •  cholecalciferol (VITAMIN D3) tablet 1,000 Units, 1,000 Units, Oral, BID, Chico Shen DO, 1,000 Units at 04/07/23 0859  •  levothyroxine (SYNTHROID, LEVOTHROID) tablet 88 mcg, 88 mcg, Oral, Daily, Chico Shen DO, 88 mcg at 04/07/23 0859  •  melatonin tablet 3 mg, 3 mg, Oral, Nightly PRN, Lady Ag MD, 3 mg at 04/07/23 0121  •  nystatin (MYCOSTATIN) powder, , Topical, Q12H, Chico Shen DO, Given  at 04/07/23 0902  •  ondansetron (ZOFRAN) tablet 4 mg, 4 mg, Oral, Q6H PRN **OR** ondansetron (ZOFRAN) injection 4 mg, 4 mg, Intravenous, Q6H PRN, Lady Ag MD  •  pantoprazole (PROTONIX) 40 mg in 100 mL NS (VTB), 8 mg/hr, Intravenous, Continuous, Libia Osborn APRN, Last Rate: 20 mL/hr at 04/07/23 0902, 8 mg/hr at 04/07/23 0902  •  [COMPLETED] Insert Peripheral IV, , , Once **AND** sodium chloride 0.9 % flush 10 mL, 10 mL, Intravenous, PRN, Libia Osborn APRN  Review of Systems:    All systems were reviewed and negative except for:  Gastrointestinal: positive for  See HPI    Objective     Vital Signs  Temp:  [97.4 °F (36.3 °C)-99 °F (37.2 °C)] 98.6 °F (37 °C)  Heart Rate:  [60-85] 85  Resp:  [16-24] 16  BP: (162-179)/(62-85) 164/78  Body mass index is 40.36 kg/m².    Intake/Output Summary (Last 24 hours) at 4/7/2023 1355  Last data filed at 4/7/2023 1200  Gross per 24 hour   Intake 4005.42 ml   Output 1025 ml   Net 2980.42 ml     I/O this shift:  In: 872.9 [P.O.:480; I.V.:292.9; IV Piggyback:100]  Out: 225 [Urine:225]     Physical Exam:   General: patient awake, alert and cooperative   Eyes: Normal lids and lashes, no scleral icterus   Neck: supple, normal ROM   Skin: warm and dry, not jaundiced   Cardiovascular: regular rhythm and rate, no murmurs auscultated   Pulm: clear to auscultation bilaterally, regular and unlabored   Abdomen: soft, nontender, nondistended; normal bowel sounds   Extremities: no rash or edema   Psychiatric: Normal mood and behavior; memory intact     Results Review:     I reviewed the patient's new clinical results.    Results from last 7 days   Lab Units 04/07/23  0337 04/06/23  0354 04/05/23  1427   WBC 10*3/mm3 7.30 7.54 10.00   HEMOGLOBIN g/dL 10.2* 11.1* 12.5   HEMATOCRIT % 31.6* 34.9 39.8   PLATELETS 10*3/mm3 140 149 184     Results from last 7 days   Lab Units 04/07/23  0338 04/06/23  0354 04/05/23  1427   SODIUM mmol/L 139  139 141 140   POTASSIUM  mmol/L 4.3  4.3 4.7 5.2   CHLORIDE mmol/L 113*  113* 111* 107   CO2 mmol/L 16.0*  16.0* 20.0* 21.0*   BUN mg/dL 40*  40* 52* 59*   CREATININE mg/dL 2.04*  2.04* 2.02* 2.30*   CALCIUM mg/dL 8.6  8.6 8.7 9.7*   BILIRUBIN mg/dL  --   --  0.4   ALK PHOS U/L  --   --  108   ALT (SGPT) U/L  --   --  10   AST (SGOT) U/L  --   --  17   GLUCOSE mg/dL 96  96 85 88         Lab Results   Lab Value Date/Time    LIPASE 47 04/05/2023 1427    LIPASE 40 03/16/2023 1343       Radiology:  US Gallbladder   Final Result   1. Normal-appearing liver   2. Gallbladder sludge, without cholelithiasis or sonographic evidence of   acute cholecystitis   3. Atrophic right kidney.       This report was finalized on 4/6/2023 3:23 PM by Dr. Clive Garduno M.D.          US Renal Bilateral   Final Result   1. Simple appearing left renal cyst, below the size requiring follow-up   2. Atrophic kidneys, without hydronephrosis seen.       This report was finalized on 4/6/2023 3:24 PM by Dr. Clive Garduno M.D.          CT Abdomen Pelvis Without Contrast   Final Result   1. No acute intra-abdominal or pelvic process on this limited   noncontrast exam.   2. Markedly distended bladder.   3. Cholelithiasis.   4. Small hiatal hernia with duodenal and colonic diverticulosis. If GI   symptoms persist endoscopy could be considered for better evaluation.   5. Please see above for additional findings/recommendations.       This report was finalized on 4/5/2023 4:17 PM by Dr. Kapil Cruz M.D.              Assessment & Plan     Active Hospital Problems    Diagnosis    • **Gastrointestinal hemorrhage, unspecified gastrointestinal hemorrhage type    • Acute UTI (urinary tract infection)    • Constipation    • Nausea with vomiting    • Anemia    • Acute urinary retention    • Obesity (BMI 30-39.9)    • History of CVA (cerebrovascular accident)    • Stage 4 chronic kidney disease (HCC)    • Essential hypertension    • Acquired hypothyroidism         Assessment:  1. Anemia: Recent report of melena although she had been on Pepto-Bismol  2. Heme positive stool  3. Anticoagulant therapy: Currently off Plavix for 3 days  4. Constipation      Plan:  · Anticipate upper endoscopic evaluation on Monday  · Administer enema to facilitate bowel clearance  · Continue to monitor H&H  I discussed the patients findings and my recommendations with patient and family.    Bernard Carrasco MD

## 2023-04-07 NOTE — PLAN OF CARE
Goal Outcome Evaluation:      VSS. RA. Urine labs & occult lab obtained. Morrow cath care complete, patent. NS @ 75 mL/hr. Protonix gtt. Orange barrier cream on coccyx, Nystatin powder & pillowcases under breasts. Q2 turns. PRN melatonin x1.

## 2023-04-07 NOTE — PLAN OF CARE
Goal Outcome Evaluation:  Plan of Care Reviewed With: patient        Progress: improving   Vital signs stable. Alert and oriented x 3. Niece at bedside. On room air. Normal sinus cardiac rhythm. Urinary catheter patent to bedside drainage. Urine clear yellow. IV fluids discontinued. IV protonix drip discontinued. EGD scheduled for Monday per GI service. Tolearating low fat / low cholesterol diet fair. Incontinent of small to moderate amount of black soft stools 3 x today. Skin care provided. Coccyx reddened but skin intact. Moisture barrier applied. Turned every 2 hours. Accumax on bed. IV Rocephin 1 gram IV given as ordered for UTI. 3+ pedal edema and generalized edema noted. Patient morbidly obese. Will continue to monitor.

## 2023-04-07 NOTE — PROGRESS NOTES
Name: Marianne Delgado ADMIT: 2023   : 1925  PCP: Natasha Ramirez APRN    MRN: [id number] LOS: 2 days   AGE/SEX: 89 y.o. female  ROOM: [facility]     Subjective   Subjective    Patient seen and examined this morning.  Hospital day 2.  Had to have Morrow catheter placed on  secondary to urinary retention.  Urology and nephrology are following.  This morning, patient is having nausea and vomiting and continues to have generalized abdominal pain.  Discussed with caregiver at bedside, discussed with overnight nurse, who noted blood in the small bowel movement that the patient had, FOBT was positive.      Objective   Objective   Vital Signs  Temp:  [97.4 °F (36.3 °C)-99 °F (37.2 °C)] 97.4 °F (36.3 °C)  Heart Rate:  [60-80] 77  Resp:  [16-24] 16  BP: (162-179)/(58-85) 170/62  SpO2:  [92 %-96 %] 96 %  on   ;   Device (Oxygen Therapy): room air  Body mass index is 40.36 kg/m².  Const: Obese, chronically ill-appearing, uncomfortable  Eyes: normal conjunctiva  HENT: atraumatic, non-tender  CV: Regular rate, regular rhythm  RESP: FIO2 21, clear to auscultation B/L, normal effort  GI: soft, distended, tender to palpation  MSK: No gross deformities appreciated, no tenderness, trace bilateral lower extremity edema.  Skin: Warm, dry. No rashes, scattered bruises.  Neuro: Awake, alert, oriented x3, no appreciable focal neurological deficits.  Psych: Appropriate mood and affect.    Results Review     I reviewed the patient's new clinical results.  Results from last 7 days   Lab Units 23  0337 23  0354 23  1427   WBC 10*3/mm3 7.30 7.54 10.00   HEMOGLOBIN g/dL 10.2* 11.1* 12.5   PLATELETS 10*3/mm3 140 149 184     Results from last 7 days   Lab Units 23  0338 23  0354 23  1427   SODIUM mmol/L 139  139 141 140   POTASSIUM mmol/L 4.3  4.3 4.7 5.2   CHLORIDE mmol/L 113*  113* 111* 107   CO2 mmol/L 16.0*  16.0* 20.0* 21.0*   BUN mg/dL 40*  40* 52* 59*   CREATININE mg/dL 2.04*   2.04* 2.02* 2.30*   GLUCOSE mg/dL 96  96 85 88   EGFR mL/min/1.73 21.8*  21.8* 22.1* 18.9*     Results from last 7 days   Lab Units 04/07/23  0338 04/05/23  1427   ALBUMIN g/dL 2.7* 3.6   BILIRUBIN mg/dL  --  0.4   ALK PHOS U/L  --  108   AST (SGOT) U/L  --  17   ALT (SGPT) U/L  --  10     Results from last 7 days   Lab Units 04/07/23  0338 04/06/23  0354 04/05/23  1427   CALCIUM mg/dL 8.6  8.6 8.7 9.7*   ALBUMIN g/dL 2.7*  --  3.6   MAGNESIUM mg/dL 1.9  --   --    PHOSPHORUS mg/dL 3.2  --   --        No results found for: HGBA1C, POCGLU    CT Abdomen Pelvis Without Contrast    Result Date: 4/5/2023  1. No acute intra-abdominal or pelvic process on this limited noncontrast exam. 2. Markedly distended bladder. 3. Cholelithiasis. 4. Small hiatal hernia with duodenal and colonic diverticulosis. If GI symptoms persist endoscopy could be considered for better evaluation. 5. Please see above for additional findings/recommendations.  This report was finalized on 4/5/2023 4:17 PM by Dr. Kapil Cruz M.D.      US Gallbladder    Result Date: 4/6/2023  1. Normal-appearing liver 2. Gallbladder sludge, without cholelithiasis or sonographic evidence of acute cholecystitis 3. Atrophic right kidney.  This report was finalized on 4/6/2023 3:23 PM by Dr. Clive Garduno M.D.      US Renal Bilateral    Result Date: 4/6/2023  1. Simple appearing left renal cyst, below the size requiring follow-up 2. Atrophic kidneys, without hydronephrosis seen.  This report was finalized on 4/6/2023 3:24 PM by Dr. Clive Garduno M.D.      I have personally reviewed all medications:  Scheduled Medications  amLODIPine, 5 mg, Oral, Daily  cholecalciferol, 1,000 Units, Oral, BID  levothyroxine, 88 mcg, Oral, Daily  nystatin, , Topical, Q12H  senna-docusate sodium, 2 tablet, Oral, BID    Infusions  pantoprazole, 8 mg/hr, Last Rate: 8 mg/hr (04/07/23 0902)  sodium chloride, 75 mL/hr, Last Rate: 75 mL/hr (04/07/23 0903)    Diet  Diet: Cardiac Diets; Healthy  Heart (2-3 Na+); Texture: Regular Texture (IDDSI 7); Fluid Consistency: Thin (IDDSI 0)    I have personally reviewed:  [x]  Laboratory   [x]  Radiology   [x]  EKG/Telemetry  [x]  Cardiology/Vascular   [x]  Records       Assessment/Plan     Active Hospital Problems    Diagnosis  POA   • **Gastrointestinal hemorrhage, unspecified gastrointestinal hemorrhage type [K92.2]  Yes   • Acute UTI (urinary tract infection) [N39.0]  Yes   • Constipation [K59.00]  Yes   • Nausea with vomiting [R11.2]  Yes   • Anemia [D64.9]  Yes   • Acute urinary retention [R33.8]  No   • Obesity (BMI 30-39.9) [E66.9]  Yes   • History of CVA (cerebrovascular accident) [Z86.73]  Not Applicable   • Stage 4 chronic kidney disease (HCC) [N18.4]  Yes   • Essential hypertension [I10]  Yes   • Acquired hypothyroidism [E03.9]  Yes      Resolved Hospital Problems   No resolved problems to display.       89 y.o. female admitted with Gastrointestinal hemorrhage, unspecified gastrointestinal hemorrhage type.    Acute blood loss anemia secondary to Gastrointestinal hemorrhage  Generalized abdominal pain, nausea with vomiting  Constipation  - Patient endorsing dark stools, prior to arrival, however, has recent been on Pepto-Bismol.  - GI consulted and following, they offered EGD, however, patient reportedly was not interested in endoscopy if possible at time of initial exam. However, overnight nurse collected stool, noted presence of blood. FOBT positive. Readdressed with patient this morning, she is agreeable to scope if this is what GI wants to do.  - CT abdomen pelvis obtained on admission showing markedly distended bladder, cholelithiasis, small hiatal hernia with duodenal and colonic diverticulosis, no free air or free fluid, no evidence of small bowel obstruction.  RUQ ordered, results reviewed, showing gallbladder sludge without cholelithiasis or sonographic evidence of acute cholecystitis.  - Patient continues to have significant generalized abdominal  pain, intermittent nausea/vomting, states that she is having difficulty having BM, although, did have small one overnight.  Imaging obtained thus far is negative for any evidence of small bowel obstruction. Will continue IVF and bowel regimen for now and monitor.  - Hemoglobin slightly low, decreased from prior on most recent labs.   - Follow up GI plans and recommendations. Continue PPI.  - Order repeat CBC in AM for reassessment. Continue to monitor, transfuse for hemoglobin <7.    Acute urinary retention  - Noted during admission, no prior history per patient. Ordered renal US which showed simple appearing left renal cyst, no hydronephrosis.  - Consulted Urology, Morrow catheter placed.  Continue as ordered, follow-up urology plans recommendations.  - Acute urinary retention protocol.    Urinary tract infection  - UA obtained, showing trace leukocyte esterase, 3-5 WBC and 4+ bacteria without squamous epithelial cells.  Patient endorsing increased urinary urgency and frequency prior to arrival.  - Start ceftriaxone empirically while awaiting results of further work-up.  - Follow-up urine culture.    Chronic kidney disease stage 4  - On most recent labs, patient's creatinine found to be 2.04, appears stable and near apparent baseline, per review of previous lab values and outside records.  Patient states that she previously followed with Dr. Mitchell with [facility], however, had not been seen by them in about 6 months, and wanted to be evaluated by them while she is in the hospital.  - Nephrology consulted and following.  We will follow-up their plans and recommendations, greatly appreciate their help.  - Order repeat BMP in AM for reassessment of renal function.   - Will continue to monitor and trend creatinine to guide ongoing management decisions. Avoid nephrotoxic medications, IVF, strict I/O, daily weights.    Hypertension  - BP acceptable acutely. Appears stable. No indications to  warrant acute changes/intervention at this time.  - Continue current medications as prescribed. Trend BP to guide ongoing management decisions.    History of CVA  - On Plavix as outpatient, holding on admission due to concerns for possible GI bleeding, can resume when okay with GI.    Hypothyroidism  - Stable. Continue Levothyroxine as prescribed.    Obesity  - BMI 39.11 kg/m2. Complicating all medical problems.    · SCDs for DVT prophylaxis.  · Limited code (no CPR, no intubation).  · Discussed with patient, family and nursing staff.  · Anticipate discharge TBD timing yet to be determined.      Chico Shen DO  [facility]  04/07/23  09:44 EDT

## 2023-04-08 ENCOUNTER — APPOINTMENT (OUTPATIENT)
Dept: CT IMAGING | Facility: HOSPITAL | Age: 88
DRG: 378 | End: 2023-04-08
Payer: MEDICARE

## 2023-04-08 PROBLEM — R10.84 ACUTE GENERALIZED ABDOMINAL PAIN: Status: ACTIVE | Noted: 2023-04-08

## 2023-04-08 LAB
ALBUMIN SERPL-MCNC: 2.9 G/DL (ref 3.5–5.2)
ANION GAP SERPL CALCULATED.3IONS-SCNC: 10 MMOL/L (ref 5–15)
BASOPHILS # BLD AUTO: 0.03 10*3/MM3 (ref 0–0.2)
BASOPHILS NFR BLD AUTO: 0.4 % (ref 0–1.5)
BILIRUB UR QL STRIP: NEGATIVE
BUN SERPL-MCNC: 35 MG/DL (ref 8–23)
BUN/CREAT SERPL: 16.2 (ref 7–25)
CALCIUM SPEC-SCNC: 9.4 MG/DL (ref 8.2–9.6)
CHLORIDE SERPL-SCNC: 109 MMOL/L (ref 98–107)
CLARITY UR: CLEAR
CO2 SERPL-SCNC: 19 MMOL/L (ref 22–29)
COLOR UR: YELLOW
CREAT SERPL-MCNC: 2.16 MG/DL (ref 0.57–1)
D-LACTATE SERPL-SCNC: 1.5 MMOL/L (ref 0.5–2)
DEPRECATED RDW RBC AUTO: 40 FL (ref 37–54)
EGFRCR SERPLBLD CKD-EPI 2021: 20.4 ML/MIN/1.73
EOSINOPHIL # BLD AUTO: 0.16 10*3/MM3 (ref 0–0.4)
EOSINOPHIL NFR BLD AUTO: 2.1 % (ref 0.3–6.2)
ERYTHROCYTE [DISTWIDTH] IN BLOOD BY AUTOMATED COUNT: 12.6 % (ref 12.3–15.4)
GLUCOSE BLDC GLUCOMTR-MCNC: 86 MG/DL (ref 70–130)
GLUCOSE SERPL-MCNC: 85 MG/DL (ref 65–99)
GLUCOSE UR STRIP-MCNC: NEGATIVE MG/DL
HCT VFR BLD AUTO: 33.4 % (ref 34–46.6)
HGB BLD-MCNC: 10.8 G/DL (ref 12–15.9)
HGB UR QL STRIP.AUTO: ABNORMAL
IMM GRANULOCYTES # BLD AUTO: 0.09 10*3/MM3 (ref 0–0.05)
IMM GRANULOCYTES NFR BLD AUTO: 1.2 % (ref 0–0.5)
KETONES UR QL STRIP: NEGATIVE
LEUKOCYTE ESTERASE UR QL STRIP.AUTO: ABNORMAL
LYMPHOCYTES # BLD AUTO: 1.25 10*3/MM3 (ref 0.7–3.1)
LYMPHOCYTES NFR BLD AUTO: 16.8 % (ref 19.6–45.3)
MAGNESIUM SERPL-MCNC: 1.8 MG/DL (ref 1.7–2.3)
MCH RBC QN AUTO: 28.1 PG (ref 26.6–33)
MCHC RBC AUTO-ENTMCNC: 32.3 G/DL (ref 31.5–35.7)
MCV RBC AUTO: 86.8 FL (ref 79–97)
MONOCYTES # BLD AUTO: 0.84 10*3/MM3 (ref 0.1–0.9)
MONOCYTES NFR BLD AUTO: 11.3 % (ref 5–12)
NEUTROPHILS NFR BLD AUTO: 5.09 10*3/MM3 (ref 1.7–7)
NEUTROPHILS NFR BLD AUTO: 68.2 % (ref 42.7–76)
NITRITE UR QL STRIP: NEGATIVE
NRBC BLD AUTO-RTO: 0 /100 WBC (ref 0–0.2)
PH UR STRIP.AUTO: 5.5 [PH] (ref 5–8)
PHOSPHATE SERPL-MCNC: 3.8 MG/DL (ref 2.5–4.5)
PLATELET # BLD AUTO: 164 10*3/MM3 (ref 140–450)
PMV BLD AUTO: 10.4 FL (ref 6–12)
POTASSIUM SERPL-SCNC: 4.1 MMOL/L (ref 3.5–5.2)
PROT UR QL STRIP: ABNORMAL
RBC # BLD AUTO: 3.85 10*6/MM3 (ref 3.77–5.28)
SODIUM SERPL-SCNC: 138 MMOL/L (ref 136–145)
SP GR UR STRIP: 1.01 (ref 1–1.03)
URATE SERPL-MCNC: 8.2 MG/DL (ref 2.4–5.7)
UROBILINOGEN UR QL STRIP: ABNORMAL
WBC NRBC COR # BLD: 7.46 10*3/MM3 (ref 3.4–10.8)

## 2023-04-08 PROCEDURE — 84550 ASSAY OF BLOOD/URIC ACID: CPT | Performed by: INTERNAL MEDICINE

## 2023-04-08 PROCEDURE — 99221 1ST HOSP IP/OBS SF/LOW 40: CPT | Performed by: SURGERY

## 2023-04-08 PROCEDURE — 82962 GLUCOSE BLOOD TEST: CPT

## 2023-04-08 PROCEDURE — 85025 COMPLETE CBC W/AUTO DIFF WBC: CPT | Performed by: INTERNAL MEDICINE

## 2023-04-08 PROCEDURE — 25010000002 ONDANSETRON PER 1 MG: Performed by: INTERNAL MEDICINE

## 2023-04-08 PROCEDURE — 99232 SBSQ HOSP IP/OBS MODERATE 35: CPT | Performed by: INTERNAL MEDICINE

## 2023-04-08 PROCEDURE — 74177 CT ABD & PELVIS W/CONTRAST: CPT

## 2023-04-08 PROCEDURE — 83735 ASSAY OF MAGNESIUM: CPT | Performed by: STUDENT IN AN ORGANIZED HEALTH CARE EDUCATION/TRAINING PROGRAM

## 2023-04-08 PROCEDURE — 80069 RENAL FUNCTION PANEL: CPT | Performed by: INTERNAL MEDICINE

## 2023-04-08 PROCEDURE — 25510000001 IOPAMIDOL 61 % SOLUTION: Performed by: STUDENT IN AN ORGANIZED HEALTH CARE EDUCATION/TRAINING PROGRAM

## 2023-04-08 PROCEDURE — 25010000002 CEFTRIAXONE PER 250 MG: Performed by: STUDENT IN AN ORGANIZED HEALTH CARE EDUCATION/TRAINING PROGRAM

## 2023-04-08 PROCEDURE — 83605 ASSAY OF LACTIC ACID: CPT | Performed by: STUDENT IN AN ORGANIZED HEALTH CARE EDUCATION/TRAINING PROGRAM

## 2023-04-08 RX ORDER — METRONIDAZOLE 500 MG/100ML
500 INJECTION, SOLUTION INTRAVENOUS EVERY 8 HOURS
Status: DISCONTINUED | OUTPATIENT
Start: 2023-04-08 | End: 2023-04-10

## 2023-04-08 RX ORDER — BUMETANIDE 0.25 MG/ML
4 INJECTION INTRAMUSCULAR; INTRAVENOUS EVERY 8 HOURS
Status: DISCONTINUED | OUTPATIENT
Start: 2023-04-08 | End: 2023-04-08

## 2023-04-08 RX ORDER — SODIUM CHLORIDE 9 MG/ML
100 INJECTION, SOLUTION INTRAVENOUS CONTINUOUS
Status: DISCONTINUED | OUTPATIENT
Start: 2023-04-08 | End: 2023-04-09

## 2023-04-08 RX ORDER — SODIUM BICARBONATE 650 MG/1
1300 TABLET ORAL 3 TIMES DAILY
Status: DISCONTINUED | OUTPATIENT
Start: 2023-04-08 | End: 2023-04-11

## 2023-04-08 RX ORDER — FAMOTIDINE 20 MG/1
20 TABLET, FILM COATED ORAL NIGHTLY
Status: DISCONTINUED | OUTPATIENT
Start: 2023-04-08 | End: 2023-04-15 | Stop reason: HOSPADM

## 2023-04-08 RX ADMIN — PANTOPRAZOLE SODIUM 40 MG: 40 TABLET, DELAYED RELEASE ORAL at 07:59

## 2023-04-08 RX ADMIN — Medication 1000 UNITS: at 20:30

## 2023-04-08 RX ADMIN — SODIUM BICARBONATE 1300 MG: 650 TABLET ORAL at 16:21

## 2023-04-08 RX ADMIN — DOCUSATE SODIUM 50MG AND SENNOSIDES 8.6MG 2 TABLET: 8.6; 5 TABLET, FILM COATED ORAL at 07:58

## 2023-04-08 RX ADMIN — SODIUM CHLORIDE 100 ML/HR: 9 INJECTION, SOLUTION INTRAVENOUS at 13:24

## 2023-04-08 RX ADMIN — NYSTATIN: 100000 POWDER TOPICAL at 07:59

## 2023-04-08 RX ADMIN — METRONIDAZOLE 500 MG: 500 INJECTION, SOLUTION INTRAVENOUS at 17:22

## 2023-04-08 RX ADMIN — ACETAMINOPHEN 650 MG: 325 TABLET, FILM COATED ORAL at 12:10

## 2023-04-08 RX ADMIN — IOPAMIDOL 85 ML: 612 INJECTION, SOLUTION INTRAVENOUS at 14:10

## 2023-04-08 RX ADMIN — LEVOTHYROXINE SODIUM 88 MCG: 0.09 TABLET ORAL at 06:30

## 2023-04-08 RX ADMIN — SODIUM BICARBONATE 1300 MG: 650 TABLET ORAL at 20:30

## 2023-04-08 RX ADMIN — Medication 1000 UNITS: at 07:59

## 2023-04-08 RX ADMIN — NYSTATIN: 100000 POWDER TOPICAL at 20:31

## 2023-04-08 RX ADMIN — CEFTRIAXONE SODIUM 1 G: 1 INJECTION, POWDER, FOR SOLUTION INTRAMUSCULAR; INTRAVENOUS at 10:05

## 2023-04-08 RX ADMIN — AMLODIPINE BESYLATE 5 MG: 5 TABLET ORAL at 07:59

## 2023-04-08 RX ADMIN — ONDANSETRON 4 MG: 2 INJECTION INTRAMUSCULAR; INTRAVENOUS at 16:44

## 2023-04-08 RX ADMIN — BUMETANIDE 4 MG: 0.25 INJECTION INTRAMUSCULAR; INTRAVENOUS at 06:45

## 2023-04-08 RX ADMIN — FAMOTIDINE 20 MG: 20 TABLET, FILM COATED ORAL at 20:31

## 2023-04-08 NOTE — PLAN OF CARE
Problem: Fall Injury Risk  Goal: Absence of Fall and Fall-Related Injury  Outcome: Ongoing, Not Progressing  Intervention: Identify and Manage Contributors  Description: Develop a fall prevention plan with the patient and caregiver/family.  Provide reorientation, appropriate sensory stimulation and routines with changes in mental status to decrease risk of fall.  Promote use of personal vision and auditory aids.  Assess assistance level required for safe and effective self-care; provide support as needed, such as toileting, mobilization. For age 65 and older, implement timed toileting with assistance.  Encourage physical activity, such as performance of mobility and self-care at highest level of patient ability, multicomponent exercise program and provision of appropriate assistive devices.  If fall occurs, assess the severity of injury; implement fall injury protocol. Determine the cause and revise fall injury prevention plan.  Regularly review medication contribution to fall risk; adjust medication administration times to minimize risk of falling.  Consider risk related to polypharmacy and age.  Balance adequate pain management with potential for oversedation.  Recent Flowsheet Documentation  Taken 4/8/2023 1440 by Sandra Quinonez, RN  Medication Review/Management: medications reviewed  Taken 4/8/2023 0759 by Sandra Quinonez, RN  Medication Review/Management: medications reviewed  Intervention: Promote Injury-Free Environment  Description: Provide a safe, barrier-free environment that encourages independent activity.  Keep care area uncluttered and well-lighted.  Determine need for increased observation or monitoring.  Avoid use of devices that minimize mobility, such as restraints or indwelling urinary catheter.  Recent Flowsheet Documentation  Taken 4/8/2023 1440 by Sandra Quinonez, RN  Safety Promotion/Fall Prevention:   activity supervised   assistive device/personal items within reach   clutter free  environment maintained   fall prevention program maintained   nonskid shoes/slippers when out of bed   room organization consistent   safety round/check completed  Taken 4/8/2023 1400 by Sandra Quinonez RN  Safety Promotion/Fall Prevention: patient off unit  Taken 4/8/2023 1210 by Sandra Quinonez RN  Safety Promotion/Fall Prevention: safety round/check completed  Taken 4/8/2023 1005 by Sandra Quinonez RN  Safety Promotion/Fall Prevention: safety round/check completed  Taken 4/8/2023 0759 by Sandra Quinonez RN  Safety Promotion/Fall Prevention:   activity supervised   assistive device/personal items within reach   clutter free environment maintained   fall prevention program maintained   nonskid shoes/slippers when out of bed   room organization consistent   safety round/check completed   muscle strengthening facilitated     Problem: Skin Injury Risk Increased  Goal: Skin Health and Integrity  Outcome: Ongoing, Not Progressing  Intervention: Optimize Skin Protection  Description: Perform a full pressure injury risk assessment, as indicated by screening, upon admission to care unit.  Reassess skin (injury risk, full inspection) frequently (e.g., scheduled interval, with change in condition) to provide optimal early detection and prevention.  Maintain adequate tissue perfusion (e.g., encourage fluid balance; avoid crossing legs, constrictive clothing or devices) to promote tissue oxygenation.  Maintain head of bed at lowest degree of elevation tolerated, considering medical condition and other restrictions.  Avoid positioning onto an area that remains reddened.  Minimize incontinence and moisture (e.g., toileting schedule; moisture-wicking pad, diaper or incontinence collection device; skin moisture barrier).  Cleanse skin promptly and gently when soiled utilizing a pH-balanced cleanser.  Relieve and redistribute pressure (e.g., scheduled position changes, weight shifts, use of support surface, medical device  repositioning, protective dressing application, use of positioning device, microclimate control, use of pressure-injury-monitor  Encourage increased activity, such as sitting in a chair at the bedside or early mobilization, when able to tolerate.  Recent Flowsheet Documentation  Taken 4/8/2023 1440 by Sandra Quinonez RN  Pressure Reduction Techniques:   frequent weight shift encouraged   weight shift assistance provided  Head of Bed (HOB) Positioning: HOB at 20-30 degrees  Pressure Reduction Devices: alternating pressure pump (ADD)  Taken 4/8/2023 1210 by Sandra Quinonez RN  Head of Bed (HOB) Positioning: HOB elevated  Taken 4/8/2023 1005 by Sandra Quinonez RN  Head of Bed (HOB) Positioning: HOB at 20-30 degrees  Taken 4/8/2023 0759 by Sandra Quinonez RN  Pressure Reduction Techniques:   frequent weight shift encouraged   heels elevated off bed   positioned off wounds   pressure points protected   weight shift assistance provided  Head of Bed (HOB) Positioning: HOB at 20-30 degrees  Pressure Reduction Devices: alternating pressure pump (ADD)  Skin Protection:   adhesive use limited   incontinence pads utilized   tubing/devices free from skin contact     Problem: Adjustment to Illness (Gastrointestinal Bleeding)  Goal: Optimal Coping with Acute Illness  Outcome: Ongoing, Not Progressing  Intervention: Optimize Psychosocial Response  Description: Acknowledge, normalize and validate response to gastrointestinal bleeding.  Support expression and identification of feelings, fears and worries; provide reassurance.  Encourage questions; provide simple information to increase knowledge and lessen fear.  Support coping by recognizing current coping strategies; provide aid in developing new strategies.  Assess and monitor for signs and symptoms of psychosocial concerns that may impact recovery (e.g, stress, substance use); provide resources for support.  Acknowledge and normalize difficulty in managing life-long lifestyle  changes and expectations.  Recent Flowsheet Documentation  Taken 4/8/2023 1440 by Sandra Quinonez RN  Supportive Measures: active listening utilized  Taken 4/8/2023 0462 by Sandra Quinonez RN  Supportive Measures: active listening utilized     Problem: Bleeding (Gastrointestinal Bleeding)  Goal: Hemostasis  Outcome: Ongoing, Not Progressing  Intervention: Manage Gastrointestinal Bleeding  Description: Determine risk for rebleeding; monitor vital signs, laboratory values and bleeding characteristics frequently and trend change over time.  Anticipate need for respiratory support and fluid volume replacement to restore and maintain tissue perfusion and oxygenation; consider need for vasopressor therapy.  Administer blood products, as ordered, to maintain target hemoglobin and hematocrit levels or to replace ongoing blood loss.  Monitor coagulation status and provide treatment.  Maintain body temperature within desired range to optimize clotting ability.  Anticipate use of pharmacologic therapy alone or in combination with endoscopic hemostasis based on cause and source of bleeding; monitor efficacy and manage side effects.  Anticipate and prepare for therapeutic endoscopic procedure, such as injection, cautery or mechanical intervention, to control bleeding.  Anticipate need for surgical intervention with continued bleeding and circulatory instability.  Elevate head of bed to a semi-recumbent position or turn to side during vomiting; keep suction equipment at hand.  Provide comfort measures (e.g., oral care, cool cloth; decreased environmental stimuli including light, sound and smell); lessen highly odiferous smell of bloody stools or emesis.  Maintain perineal skin integrity when patient has frequent, bloody stools; cleanse gently and thoroughly; avoid alcohol-containing wipes.  When resuming oral intake, observe for symptoms of nausea, vomiting and recurrent bleeding; gradually reintroduce foods.  Recent Flowsheet  Documentation  Taken 4/8/2023 1440 by Sandra Quinonez, RN  Environmental Support: calm environment promoted  Taken 4/8/2023 0759 by Sandra Quinonez, RN  Environmental Support: calm environment promoted     Problem: Adult Inpatient Plan of Care  Goal: Plan of Care Review  Outcome: Ongoing, Not Progressing  Flowsheets (Taken 4/8/2023 1709)  Progress: declining  Plan of Care Reviewed With:   patient   durable power of   Outcome Evaluation: VSS, A+Ox4, assist x 1-2, SR, RA ex when asleep then 2Lvia n/c, patient c/o of more diffuse abdominal pain, CT of abd/pelvis with contrast completed, general surgery consulted, will continue to monitor  Goal: Patient-Specific Goal (Individualized)  Outcome: Ongoing, Not Progressing  Goal: Absence of Hospital-Acquired Illness or Injury  Outcome: Ongoing, Not Progressing  Intervention: Identify and Manage Fall Risk  Description: Perform standard risk assessment on admission using a validated tool or comprehensive approach appropriate to the patient; reassess fall risk frequently, with change in status or transfer to another level of care.  Communicate fall injury risk to interprofessional healthcare team.  Determine need for increased observation, equipment and environmental modification, such as low bed, signage and supportive, nonskid footwear.  Adjust safety measures to individual developmental age, stage and identified risk factors.  Reinforce the importance of safety and physical activity with patient and family.  Perform regular intentional rounding to assess need for position change, pain assessment and personal needs, including assistance with toileting.  Recent Flowsheet Documentation  Taken 4/8/2023 1440 by Sandra Quinonez, RN  Safety Promotion/Fall Prevention:   activity supervised   assistive device/personal items within reach   clutter free environment maintained   fall prevention program maintained   nonskid shoes/slippers when out of bed   room organization  consistent   safety round/check completed  Taken 4/8/2023 1400 by Sandra Quinonez RN  Safety Promotion/Fall Prevention: patient off unit  Taken 4/8/2023 1210 by Sandra Quinonez RN  Safety Promotion/Fall Prevention: safety round/check completed  Taken 4/8/2023 1005 by Sandra Quinonez RN  Safety Promotion/Fall Prevention: safety round/check completed  Taken 4/8/2023 0759 by Sandra Quinonez RN  Safety Promotion/Fall Prevention:   activity supervised   assistive device/personal items within reach   clutter free environment maintained   fall prevention program maintained   nonskid shoes/slippers when out of bed   room organization consistent   safety round/check completed   muscle strengthening facilitated  Intervention: Prevent Skin Injury  Description: Perform a screening for skin injury risk, such as pressure or moisture associated skin damage on admission and at regular intervals throughout hospital stay.  Keep all areas of skin (especially folds) clean and dry.  Maintain adequate skin hydration.  Relieve and redistribute pressure and protect bony prominences; implement measures based on patient-specific risk factors.  Match turning and repositioning schedule to clinical condition.  Encourage weight shift frequently; assist with reposition if unable to complete independently.  Float heels off bed; avoid pressure on the Achilles tendon.  Keep skin free from extended contact with medical devices.  Encourage functional activity and mobility, as early as tolerated.  Use aids (e.g., slide boards, mechanical lift) during transfer.  Recent Flowsheet Documentation  Taken 4/8/2023 1440 by Sandra Quinonez RN  Body Position:   supine   30 degrees  Taken 4/8/2023 1210 by Sandra Quinonez RN  Body Position: sitting up in bed  Taken 4/8/2023 1005 by Sandra Quinonez RN  Body Position:   tilted   right   supine  Taken 4/8/2023 0759 by Sandra Quinonez RN  Body Position:   supine   30 degrees   tilted   left  Skin Protection:    adhesive use limited   incontinence pads utilized   tubing/devices free from skin contact  Intervention: Prevent and Manage VTE (Venous Thromboembolism) Risk  Description: Assess for VTE (venous thromboembolism) risk.  Encourage and assist with early ambulation.  Initiate and maintain compression or other therapy, as indicated, based on identified risk in accordance with organizational protocol and provider order.  Encourage both active and passive leg exercises while in bed, if unable to ambulate.  Recent Flowsheet Documentation  Taken 4/8/2023 1440 by Sandra Quinonez RN  Activity Management: bedrest  VTE Prevention/Management: (patient states it hurts too much)   bilateral   sequential compression devices off   patient refused intervention   other (see comments)  Range of Motion: active ROM (range of motion) encouraged  Taken 4/8/2023 0759 by Sandra Quinonez RN  Activity Management: bedrest  VTE Prevention/Management: (patient states it hurts too much)   bilateral   sequential compression devices off   patient refused intervention  Range of Motion: active ROM (range of motion) encouraged  Intervention: Prevent Infection  Description: Maintain skin and mucous membrane integrity; promote hand, oral and pulmonary hygiene.  Optimize fluid balance, nutrition, sleep and glycemic control to maximize infection resistance.  Identify potential sources of infection early to prevent or mitigate progression of infection (e.g., wound, lines, devices).  Evaluate ongoing need for invasive devices; remove promptly when no longer indicated.  Recent Flowsheet Documentation  Taken 4/8/2023 1440 by Sandra Quinonez RN  Infection Prevention:   environmental surveillance performed   hand hygiene promoted   personal protective equipment utilized   single patient room provided   visitors restricted/screened  Taken 4/8/2023 0756 by Sandra Quinonez, RN  Infection Prevention:   environmental surveillance performed   hand hygiene promoted    personal protective equipment utilized   single patient room provided   visitors restricted/screened  Goal: Optimal Comfort and Wellbeing  Outcome: Ongoing, Not Progressing  Intervention: Monitor Pain and Promote Comfort  Description: Assess pain level, treatment efficacy and patient response at regular intervals using a consistent pain scale.  Consider the presence and impact of preexisting chronic pain.  Encourage patient and caregiver involvement in pain assessment, interventions and safety measures.  Recent Flowsheet Documentation  Taken 4/8/2023 1210 by Sandra Quinonez RN  Pain Management Interventions: see MAR  Intervention: Provide Person-Centered Care  Description: Use a family-focused approach to care.  Develop trust and rapport by proactively providing information, encouraging questions, addressing concerns and offering reassurance.  Acknowledge emotional response to hospitalization.  Recognize and utilize personal coping strategies.  Honor spiritual and cultural preferences.  Recent Flowsheet Documentation  Taken 4/8/2023 1440 by Sandra Quinonez RN  Trust Relationship/Rapport: care explained  Taken 4/8/2023 0759 by Sandra Quinonez RN  Trust Relationship/Rapport:   care explained   choices provided   emotional support provided   empathic listening provided   questions answered   questions encouraged   reassurance provided   thoughts/feelings acknowledged  Goal: Readiness for Transition of Care  Outcome: Ongoing, Not Progressing     Problem: Pain Chronic (Persistent) (Comorbidity Management)  Goal: Acceptable Pain Control and Functional Ability  Outcome: Ongoing, Not Progressing  Intervention: Manage Persistent Pain  Description: Evaluate pain level, effect of treatment and patient response at regular intervals.  Minimize pain stimuli; coordinate care and adjust environment (e.g., light, noise, unnecessary movement); promote sleep/rest.  Match pharmacologic analgesia to severity and type of pain mechanism  (e.g., neuropathic, muscle, inflammatory); consider multimodal approach (e.g., nonopioid, opioid, adjuvant).  Provide medication at regular intervals; titrate to patient response.  Manage breakthrough pain with additional doses; consider rotation or switching medication.  Monitor for signs of substance tolerance (increased dose to reach desired effect, decreased effect with same dose).  Avoid abrupt withdrawal of medication, especially agents capable of causing physical dependence.  Manage medication-induced effects, such as constipation, nausea, pruritus, urinary retention, somnolence and dizziness.  Provide multimodal treatment interventions, such as physical activity, therapeutic exercise, yoga, TENS (transcutaneous electrical nerve stimulation) and manual therapy.  Train in functional activity modifications, such as body mechanics, posture, ergonomics, energy conservation and activity pacing.  Consider addition of complementary or alternative therapy, such as acupuncture, hypnosis or therapeutic touch.  Recent Flowsheet Documentation  Taken 4/8/2023 1440 by Sandra Quinonez RN  Medication Review/Management: medications reviewed  Taken 4/8/2023 3159 by Sandra Quinonez RN  Medication Review/Management: medications reviewed  Intervention: Develop Pain Management Plan  Description: Acknowledge patient as the expert in pain self-management.  Use a consistent, validated tool for pain assessment; include function and quality of life.  Evaluate risk for opioid use and dependence.  Set pain management goals; determine acceptable level of discomfort to allow for maximal functioning and quality of life.  Determine [id number] pain management plan, including both pharmacologic and nonpharmacologic measures.  Identify and integrate past successful treatment measures, if able.  Encourage patient and caregiver involvement in pain assessment, interventions and safety measures.  Re-evaluate plan regularly.  Recent  Flowsheet Documentation  Taken 4/8/2023 1210 by Sandra Quinonez, RN  Pain Management Interventions: see MAR  Intervention: Optimize Psychosocial Wellbeing  Description: Facilitate patient’s self-control over pain by providing pain information and allowing choices in treatment.  Consider and address emotional response to pain.  Explore and promote use of coping strategies; address barriers to successful coping.  Evaluate and assist with psychosocial, cultural and spiritual factors impacting pain.  Modify pain perception by using techniques, such as distraction, mindfulness, guided imagery, meditation or music.  Assess and monitor for signs and symptoms of behavioral health concerns, such as unhealthy substance use, depression and suicidal ideation.  Consider referral for ongoing coping support, such as cognitive behavioral therapy and mindfulness-based stress reduction.  Recent Flowsheet Documentation  Taken 4/8/2023 1440 by Sandra Quinonez, RN  Supportive Measures: active listening utilized  Diversional Activities: television  Family/Support System Care:   involvement promoted   presence promoted  Taken 4/8/2023 0759 by Sandra Quinonez, RN  Supportive Measures: active listening utilized  Diversional Activities: television  Family/Support System Care:   caregiver stress acknowledged   involvement promoted   presence promoted   self-care encouraged   support provided   Goal Outcome Evaluation:  Plan of Care Reviewed With: patient, durable power of         Progress: declining  Outcome Evaluation: VSS, A+Ox4, assist x 1-2, SR, RA ex when asleep then 2Lvia n/c, patient c/o of more diffuse abdominal pain, CT of abd/pelvis with contrast completed, general surgery consulted, will continue to monitor

## 2023-04-08 NOTE — SIGNIFICANT NOTE
MD requests RN presence along with nephrology in patient's room for discussion regarding patient need for CT of abdomen with contrast due to acute assessment findings of abdomen. Patient c/o of severe abdominal pain, and on exam with palpation, patient exhibits extreme response to deep palpation including severe rebound tenderness. Attending and nephrologist discussed with patient and patient's niece the urgency of need for CT of abdomen and pelvis with contrast, but also stressed the risk of kidney injury with receipt of IV contrast dye. Attending and nephrologist stressed the benefit of the CT outweighing the risk of kidney injury due to contrast dye and patient and niece state understanding and agreement with plan. IV fluids ordered NS @ 100 mL/hr to help with kidney function and Bumex IV dose at 1430 ordered to be held by nephrology.

## 2023-04-08 NOTE — PROGRESS NOTES
[facility]  Inpatient Progress Note    Reason for Follow Up: Melena, heme positive stool    Subjective     Interval History:   Hemoglobin is stable.  Just had a bowel movement, I saw the stool in her brief and it was completely brown, no blood.  She is having diffuse pain all over her body but does have some stomach pain.  It is not worse with eating but she does not have a great appetite    Current Facility-Administered Medications:   •  acetaminophen (TYLENOL) tablet 650 mg, 650 mg, Oral, Q4H PRN, Lady Ag MD  •  amLODIPine (NORVASC) tablet 5 mg, 5 mg, Oral, Daily, Chico Shen DO, 5 mg at 04/08/23 0759  •  sennosides-docusate (PERICOLACE) 8.6-50 MG per tablet 2 tablet, 2 tablet, Oral, BID, 2 tablet at 04/08/23 0758 **AND** polyethylene glycol (MIRALAX) packet 17 g, 17 g, Oral, Daily PRN **AND** bisacodyl (DULCOLAX) EC tablet 5 mg, 5 mg, Oral, Daily PRN **AND** bisacodyl (DULCOLAX) suppository 10 mg, 10 mg, Rectal, Daily PRN, Chico Shen DO, 10 mg at 04/07/23 0859  •  cefTRIAXone (ROCEPHIN) 1 g in sodium chloride 0.9 % 100 mL IVPB-VTB, 1 g, Intravenous, Q24H, Chico Shen DO, Last Rate: 200 mL/hr at 04/08/23 1005, 1 g at 04/08/23 1005  •  cholecalciferol (VITAMIN D3) tablet 1,000 Units, 1,000 Units, Oral, BID, Chico Shen DO, 1,000 Units at 04/08/23 0759  •  levothyroxine (SYNTHROID, LEVOTHROID) tablet 88 mcg, 88 mcg, Oral, Q AM, Bernard Carrasco MD, 88 mcg at 04/08/23 0630  •  melatonin tablet 3 mg, 3 mg, Oral, Nightly PRN, Lady Ag MD, 3 mg at 04/07/23 0121  •  nystatin (MYCOSTATIN) powder, , Topical, Q12H, Chico Shen DO, Given at 04/08/23 0759  •  ondansetron (ZOFRAN) tablet 4 mg, 4 mg, Oral, Q6H PRN **OR** ondansetron (ZOFRAN) injection 4 mg, 4 mg, Intravenous, Q6H PRN, Lady Ag MD  •  pantoprazole (PROTONIX) EC tablet 40 mg, 40 mg, Oral, Luna TANG Michael V, MD, 40 mg at 04/08/23 0759  •  [COMPLETED] Insert Peripheral  IV, , , Once **AND** sodium chloride 0.9 % flush 10 mL, 10 mL, Intravenous, PRN, Libia Osborn APRN  Review of Systems:   All systems reviewed and negative except for: Constitution: Diffuse pain; GI: Stomach pain    Objective     Vital Signs  Temp:  [98.4 °F (36.9 °C)-99.6 °F (37.6 °C)] 98.8 °F (37.1 °C)  Heart Rate:  [62-87] 74  Resp:  [16-18] 16  BP: (125-189)/(54-94) 131/64  Body mass index is 41.16 kg/m².    Intake/Output Summary (Last 24 hours) at 4/8/2023 1138  Last data filed at 4/8/2023 1045  Gross per 24 hour   Intake 840 ml   Output 3275 ml   Net -2435 ml     I/O this shift:  In: 240 [P.O.:240]  Out: 675 [Urine:675]     Physical Exam:   General: patient awake, alert and cooperative, appears stated age, quite elderly and frail   Eyes: Normal lids and lashes, no scleral icterus   Neck: supple, normal ROM   Skin: warm and dry, not jaundiced   Cardiovascular: regular rhythm and rate, no murmurs auscultated   Pulm: clear to auscultation bilaterally, regular and unlabored   Abdomen: soft, nontender, nondistended; normal bowel sounds   Rectal: deferred   Extremities: no rash or edema   Psychiatric: Normal mood and behavior; memory intact     Results Review:     I reviewed the patient's new clinical results.    Results from last 7 days   Lab Units 04/08/23 0344 04/07/23 0337 04/06/23 0354   WBC 10*3/mm3 7.46 7.30 7.54   HEMOGLOBIN g/dL 10.8* 10.2* 11.1*   HEMATOCRIT % 33.4* 31.6* 34.9   PLATELETS 10*3/mm3 164 140 149     Results from last 7 days   Lab Units 04/08/23 0344 04/07/23  0338 04/06/23  0354 04/05/23  1427   SODIUM mmol/L 138 139  139 141 140   POTASSIUM mmol/L 4.1 4.3  4.3 4.7 5.2   CHLORIDE mmol/L 109* 113*  113* 111* 107   CO2 mmol/L 19.0* 16.0*  16.0* 20.0* 21.0*   BUN mg/dL 35* 40*  40* 52* 59*   CREATININE mg/dL 2.16* 2.04*  2.04* 2.02* 2.30*   CALCIUM mg/dL 9.4 8.6  8.6 8.7 9.7*   BILIRUBIN mg/dL  --   --   --  0.4   ALK PHOS U/L  --   --   --  108   ALT (SGPT) U/L  --   --   --   10   AST (SGOT) U/L  --   --   --  17   GLUCOSE mg/dL 85 96  96 85 88         Lab Results   Lab Value Date/Time    LIPASE 47 04/05/2023 1427    LIPASE 40 03/16/2023 1343       Radiology:  US Gallbladder   Final Result   1. Normal-appearing liver   2. Gallbladder sludge, without cholelithiasis or sonographic evidence of   acute cholecystitis   3. Atrophic right kidney.       This report was finalized on 4/6/2023 3:23 PM by Dr. Clive Garduno M.D.          US Renal Bilateral   Final Result   1. Simple appearing left renal cyst, below the size requiring follow-up   2. Atrophic kidneys, without hydronephrosis seen.       This report was finalized on 4/6/2023 3:24 PM by Dr. Clive Garduno M.D.          CT Abdomen Pelvis Without Contrast   Final Result   1. No acute intra-abdominal or pelvic process on this limited   noncontrast exam.   2. Markedly distended bladder.   3. Cholelithiasis.   4. Small hiatal hernia with duodenal and colonic diverticulosis. If GI   symptoms persist endoscopy could be considered for better evaluation.   5. Please see above for additional findings/recommendations.       This report was finalized on 4/5/2023 4:17 PM by Dr. Kapli Cruz M.D.              Assessment & Plan     Active Hospital Problems    Diagnosis    • **Gastrointestinal hemorrhage, unspecified gastrointestinal hemorrhage type    • Acute UTI (urinary tract infection)    • Constipation    • Nausea with vomiting    • Anemia    • Acute urinary retention    • Obesity (BMI 30-39.9)    • History of CVA (cerebrovascular accident)    • Stage 4 chronic kidney disease (HCC)    • Essential hypertension    • Acquired hypothyroidism        Impression  1.  Normocytic anemia: With a component of iron deficiency anemia    2.  Heme positive stool: Small amount of bright red blood with a bowel movement.  Today's bowel movement quite normal    3.  Antiplatelet therapy: He has been on Plavix    4.  Constipation: Now having bowel movements    5.   Frailty, immobility, advanced age    6.  Abdominal pain: Unclear if this is a superficial type pain or something intra-abdominal; gallbladder sludge on imaging    Plan  No evidence of aggressive GI bleeding  Follow hemoglobin  Transfuse as per admitting  Discussed risk and benefits of EGD in this frail 89-year-old woman; patient and family are going to continue to think about this  If she continues to have abdominal pain, consider HIDA scan  Add nightly Pepcid in addition to daily PPI  Continue bowel regimen      I discussed the patients findings and my recommendations with patient and family.    All necessary PPE, including face mask and eye protection, were worn during this encounter.  Hand sanitization was performed both before and after the patient interaction.    Over 25 minutes was spent reviewing the patient's chart and records, in discussion with the patient, in examination of the patient, and in discussion with members of the patient's medical team.    Maria Luisa Olmedo MD

## 2023-04-08 NOTE — PROGRESS NOTES
Name: Marianne Delgado ADMIT: 2023   : 1925  PCP: Natasha Ramirez APRN    MRN: [id number] LOS: 3 days   AGE/SEX: 89 y.o. female  ROOM: [facility]     Subjective   Subjective   Patient seen and examined this morning.  Hospital day 3.  Morrow catheter placed on  secondary to urinary retention.  Urology, GI and nephrology are following.  This morning, patient having significantly worse abdominal pain when compared to prior, pain is generalized/diffuse in nature, worsened with any type of palpation or movement.  She is now having bowel movements.       Objective   Objective   Vital Signs  Temp:  [98.4 °F (36.9 °C)-99.6 °F (37.6 °C)] 98.8 °F (37.1 °C)  Heart Rate:  [62-87] 74  Resp:  [16-18] 16  BP: (125-189)/(54-94) 131/64  SpO2:  [91 %-98 %] 97 %  on  Flow (L/min):  [2] 2;   Device (Oxygen Therapy): nasal cannula  Body mass index is 41.16 kg/m².  Physical Exam  Vitals and nursing note reviewed.   Constitutional:       General: She is in acute distress.      Appearance: She is obese. She is ill-appearing.   Eyes:      General: No scleral icterus.     Conjunctiva/sclera: Conjunctivae normal.   Cardiovascular:      Rate and Rhythm: Normal rate and regular rhythm.      Pulses: Normal pulses.      Heart sounds: Normal heart sounds.   Pulmonary:      Effort: Pulmonary effort is normal. No respiratory distress.      Breath sounds: Normal breath sounds. No wheezing.   Abdominal:      General: Bowel sounds are normal. There is distension.      Palpations: Abdomen is soft.      Tenderness: There is abdominal tenderness. There is guarding and rebound.   Genitourinary:     Comments: Morrow catheter in place  Musculoskeletal:         General: Tenderness (Bilateral lower extremities) present.      Right lower leg: Edema present.      Left lower leg: Edema present.   Skin:     General: Skin is warm and dry.      Coloration: Skin is not jaundiced.   Neurological:      General: No focal deficit present.      Mental  Status: She is alert and oriented to person, place, and time.   Psychiatric:         Mood and Affect: Mood is anxious.       Results Review     I reviewed the patient's new clinical results.  Results from last 7 days   Lab Units 04/08/23 0344 04/07/23 0337 04/06/23 0354 04/05/23  1427   WBC 10*3/mm3 7.46 7.30 7.54 10.00   HEMOGLOBIN g/dL 10.8* 10.2* 11.1* 12.5   PLATELETS 10*3/mm3 164 140 149 184     Results from last 7 days   Lab Units 04/08/23 0344 04/07/23 0338 04/06/23 0354 04/05/23  1427   SODIUM mmol/L 138 139  139 141 140   POTASSIUM mmol/L 4.1 4.3  4.3 4.7 5.2   CHLORIDE mmol/L 109* 113*  113* 111* 107   CO2 mmol/L 19.0* 16.0*  16.0* 20.0* 21.0*   BUN mg/dL 35* 40*  40* 52* 59*   CREATININE mg/dL 2.16* 2.04*  2.04* 2.02* 2.30*   GLUCOSE mg/dL 85 96  96 85 88   EGFR mL/min/1.73 20.4* 21.8*  21.8* 22.1* 18.9*     Results from last 7 days   Lab Units 04/08/23 0344 04/07/23 0338 04/05/23  1427   ALBUMIN g/dL 2.9* 2.7* 3.6   BILIRUBIN mg/dL  --   --  0.4   ALK PHOS U/L  --   --  108   AST (SGOT) U/L  --   --  17   ALT (SGPT) U/L  --   --  10     Results from last 7 days   Lab Units 04/08/23 0344 04/07/23 0338 04/06/23 0354 04/05/23  1427   CALCIUM mg/dL 9.4 8.6  8.6 8.7 9.7*   ALBUMIN g/dL 2.9* 2.7*  --  3.6   MAGNESIUM mg/dL 1.8 1.9  --   --    PHOSPHORUS mg/dL 3.8 3.2  --   --      Results from last 7 days   Lab Units 04/07/23  1020   LACTATE mmol/L 0.7     Glucose   Date/Time Value Ref Range Status   04/08/2023 1042 86 70 - 130 mg/dL Final     Comment:     Meter: [id number] : [id number] Raulito Uma FRANK       US Gallbladder    Result Date: 4/6/2023  1. Normal-appearing liver 2. Gallbladder sludge, without cholelithiasis or sonographic evidence of acute cholecystitis 3. Atrophic right kidney.  This report was finalized on 4/6/2023 3:23 PM by Dr. Clive Garduno M.D.      US Renal Bilateral    Result Date: 4/6/2023  1. Simple appearing left renal cyst, below the size requiring follow-up  2. Atrophic kidneys, without hydronephrosis seen.  This report was finalized on 4/6/2023 3:24 PM by Dr. Clive Garduno M.D.      I have personally reviewed all medications:  Scheduled Medications  amLODIPine, 5 mg, Oral, Daily  bumetanide, 4 mg, Intravenous, Q8H  cefTRIAXone, 1 g, Intravenous, Q24H  cholecalciferol, 1,000 Units, Oral, BID  famotidine, 20 mg, Oral, Nightly  levothyroxine, 88 mcg, Oral, Q AM  nystatin, , Topical, Q12H  pantoprazole, 40 mg, Oral, QAM AC  senna-docusate sodium, 2 tablet, Oral, BID  sodium bicarbonate, 1,300 mg, Oral, TID    Infusions  sodium chloride, 100 mL/hr    Diet  Diet: Cardiac Diets; Healthy Heart (2-3 Na+); Texture: Regular Texture (IDDSI 7); Fluid Consistency: Thin (IDDSI 0)    I have personally reviewed:  [x]  Laboratory   [x]  Microbiology   [x]  Radiology   [x]  EKG/Telemetry  [x]  Cardiology/Vascular        Assessment/Plan     Active Hospital Problems    Diagnosis  POA   • **Gastrointestinal hemorrhage, unspecified gastrointestinal hemorrhage type [K92.2]  Yes   • Acute generalized abdominal pain [R10.84]  No   • Acute UTI (urinary tract infection) [N39.0]  Yes   • Constipation [K59.00]  Yes   • Nausea with vomiting [R11.2]  Yes   • Anemia [D64.9]  Yes   • Acute urinary retention [R33.8]  No   • Obesity (BMI 30-39.9) [E66.9]  Yes   • History of CVA (cerebrovascular accident) [Z86.73]  Not Applicable   • Stage 4 chronic kidney disease (HCC) [N18.4]  Yes   • Essential hypertension [I10]  Yes   • Acquired hypothyroidism [E03.9]  Yes      Resolved Hospital Problems   No resolved problems to display.       89 y.o. female admitted with Gastrointestinal hemorrhage, unspecified gastrointestinal hemorrhage type.    Generalized abdominal pain, acutely worsening/Acute abdomen/intemittent nausea with vomiting  - Generalized abdominal pain with intermittent nausea/vomiting was noted by patient on arrival, however, was stable.  Imaging obtained thus far as discussed elsewhere.  - Update  (04/08), This morning, patient having significantly worse abdominal pain when compared to prior, pain is generalized/diffuse in nature, worsened with any type of palpation or movement.  On physical exam, patient had abdomen that was generally distended, with significant tenderness to palpation throughout, also, noted rebound tenderness and guarding.  -Dr. Singleton and I discussed the case, we also went back into the room to discuss with patient and her niece regarding our concerns about findings suggestive of acute abdomen.  We discussed the need for stat CT abdomen pelvis with IV contrast, given that patient has significant underlying CKD, the risks of the study were explained to both the patient and her niece, including potential for development of worsening renal failure and that renal failure could progress to the point where patient could possibly require dialysis.  However, given the findings suggestive of acute abdomen, the risk of not obtaining further imaging studies to assess for the underlying etiology are felt to outweigh the risks associated with potentially worsening renal impairment.  Both Dr. Singleton and JAI performed a subsequent physical exam while in the room together, findings unchanged from those listed as above, including the notice of rebound tenderness.  Patient and her niece both voiced understanding of the risks versus benefits and they were agreeable with our recommendations.  - Order STAT CT AP with IV contrast.  - Nephrology d/c diuretics (after discussion with patient and caregiver, regarding the potential for temporarily worsening swelling) and starting IVF now, to hopefully offset the potential side effects from IV contrast.  - Order STAT lactic acid.  - Discussed with Dr. Mack with general surgery, she is going to review CT once it is back and see patient. Greatly appreciate her help.  - Further management based upon results of imaging studies.    Acute blood loss anemia secondary to  Gastrointestinal hemorrhage  - Patient endorsing dark stools, prior to arrival, however, has recent been on Pepto-Bismol.  - CT abdomen pelvis without contrast obtained on admission showing markedly distended bladder, cholelithiasis, small hiatal hernia with duodenal and colonic diverticulosis, no free air or free fluid, no evidence of small bowel obstruction.  RUQ ordered, results reviewed, showing gallbladder sludge without cholelithiasis or sonographic evidence of acute cholecystitis.  - GI consulted and following, they offered EGD, however, patient reportedly was not interested in endoscopy if possible at time of initial exam. However, on 04/07 nurse collected stool, noted presence of blood. FOBT positive.  Another discussion was held with patient, she was then agreeable.  Per GI most recent note, tentative plan is for EGD this upcoming Monday (04/10).  - Continue to follow GI plans and recommendations. Continue PPI.  - Order repeat CBC in AM for reassessment. Continue to monitor, transfuse for hemoglobin <7.     Acute urinary retention  - Noted during admission, no prior history per patient. Ordered renal US which showed simple appearing left renal cyst, no hydronephrosis.  - Consulted Urology, Morrow catheter placed.  Continue as ordered, follow-up urology plans recommendations.  - Acute urinary retention protocol.     Urinary tract infection  - UA obtained, showing trace leukocyte esterase, 3-5 WBC and 4+ bacteria without squamous epithelial cells.  Patient endorsing increased urinary urgency and frequency prior to arrival. Urine culture pending, blood cultures no growth to date.  - Continue empiric ceftriaxone.  - Follow-up urine culture.     Chronic kidney disease stage 4  - On most recent labs, patient's creatinine found to be 2.16. Patient states that she previously followed with Dr. Mitchell with [facility], however, had not been seen by them in about 6 months, and wanted to be  evaluated by them while she is in the hospital.  - Nephrology consulted and following.  We will follow-up their plans and recommendations, greatly appreciate their help.  - Order repeat BMP in AM for reassessment of renal function.   - Will continue to monitor and trend creatinine to guide ongoing management decisions. Avoid nephrotoxic medications, IVF, strict I/O, daily weights.     Hypertension  - BP acceptable acutely. Appears stable. No indications to warrant acute changes/intervention at this time.  - Continue current medications as prescribed. Trend BP to guide ongoing management decisions.     History of CVA  - On Plavix as outpatient, holding on admission due to concerns for possible GI bleeding, can resume when okay with GI.     Hypothyroidism  - Stable. Continue Levothyroxine as prescribed.     Obesity  - BMI 39.11 kg/m2. Complicating all medical problems.    Constipation, resolved  - Previously endorsed, Imaging obtained thus far is negative for any evidence of small bowel obstruction, resolved with bowel regimen.      · SCDs for DVT prophylaxis.  · Limited code (no CPR, no intubation).  · Discussed with patient, family, nursing staff and consulting provider.  · Anticipate discharge TBD timing yet to be determined.      Chico Shen DO  [facility]  04/08/23

## 2023-04-08 NOTE — PLAN OF CARE
Goal Outcome Evaluation:   O2 dropped into 70s and was placed on 2L of oxygen; able to rest between care; BP high; urine output was clear yellow 1950 ccs for shift; A/O x 3; turned q2hs; VSS

## 2023-04-08 NOTE — CONSULTS
General Surgery Consultation    Consulting Physician: Julissa Mack MD    Referring Physician: Chico Shen DO    Reason for consultation: Abdominal pain    CC: Abdominal pain    HPI:   The patient is a very pleasant 89 y.o. female who presented to the hospital with concern for GI bleed.  Today, she complained of worse abdominal pain.  She reports it is diffuse.  It is better when lying still and worse with palpation.  States is worse in the lower abdomen but also points to the upper abdomen.  Reports some nausea but denies vomiting.  She has had a normal-appearing bowel movement since being here.  CT was obtained due to the change in her abdominal exam today.    Past Medical History:  Past Medical History:   Diagnosis Date   • Acute sinusitis    • Acute upper respiratory infection    • Arthritis    • CKD (chronic kidney disease)    • Debility    • Fatigue    • Foot pain, bilateral    • Glaucoma    • Gout    • Hematuria    • High blood pressure    • Hypertension    • Hypothyroid 09/1999   • Hypothyroidism    • IBS (irritable bowel syndrome)    • IGT (impaired glucose tolerance)    • Malaise and fatigue    • Medication management    • Melanoma 1979    left leg   • OA (osteoarthritis)    • Osteopenia 09/1999   • Painful joint    • Psoriasis    • Renal failure    • Rosacea    • Vitamin D deficiency        Past Surgical History:  Past Surgical History:   Procedure Laterality Date   • CATARACT EXTRACTION     • FOOT SURGERY      mauri toe surgery   • OTHER SURGICAL HISTORY      Melanoma Excision: Left leg       Medications:  Medications Prior to Admission   Medication Sig Dispense Refill Last Dose   • amLODIPine (NORVASC) 5 MG tablet Take 1 tablet by mouth Daily.      • Betamethasone Valerate 0.12 % foam       • bimatoprost (LUMIGAN) 0.01 % ophthalmic drops 1 drop Every Night.      • cholecalciferol (VITAMIN D3) 25 MCG (1000 UT) tablet TAKE ONE TABLET BY MOUTH TWO TIMES A  tablet 1    • clopidogrel (PLAVIX) 75 MG  tablet Take 1 tablet by mouth Daily. 90 tablet 1    • dorzolamide-timolol (COSOPT) 22.3-6.8 MG/ML ophthalmic solution 1 drop 2 (Two) Times a Day.      • levothyroxine (SYNTHROID, LEVOTHROID) 88 MCG tablet Take 1 tablet by mouth Daily. 90 tablet 0    • lidocaine (LIDODERM) 5 % Place 1 patch on the skin as directed by provider Daily. Remove & Discard patch within 12 hours or as directed by MD 60 patch 0    • metaxalone (SKELAXIN) 800 MG tablet Take 1 tablet by mouth 3 (Three) Times a Day. 90 tablet 0    • metroNIDAZOLE (METROCREAM) 0.75 % cream Apply 1 application topically to the appropriate area as directed 2 (Two) Times a Day.      • multivitamin with minerals tablet tablet Take 1 tablet by mouth Daily.      • sennosides-docusate (PERICOLACE) 8.6-50 MG per tablet Take 2 tablets by mouth 2 (Two) Times a Day. 120 tablet 0    • triamcinolone (KENALOG) 0.1 % ointment Apply 1 application topically to the appropriate area as directed 2 (Two) Times a Day.      • vitamin B-12 (CYANOCOBALAMIN) 1000 MCG tablet 1 po qd x 3 days in the week 90 tablet 1        Allergies:   Allergies   Allergen Reactions   • Atorvastatin Nausea And Vomiting   • Azithromycin Diarrhea   • Cefdinir Nausea Only     nausea   • Doxycycline Monohydrate Nausea Only   • Allopurinol Rash     Psoriasis  rash       Social History:   Social History     Socioeconomic History   • Marital status:    Tobacco Use   • Smoking status: Never   • Smokeless tobacco: Never   Vaping Use   • Vaping Use: Unknown   Substance and Sexual Activity   • Alcohol use: Yes     Alcohol/week: 2.0 standard drinks     Types: 1 Glasses of wine, 1 Shots of liquor per week     Comment: occasionally   • Drug use: No   • Sexual activity: Defer       Family History:   Family History   Problem Relation Age of Onset   • Heart attack Mother    • Kidney failure Father    • Heart disease Sister    • Leukemia Brother    • Heart disease Sister    • Heart disease Sister    • Heart disease  Other         FH in females b/f 65 and males b/f 55       Review of Systems:  Constitutional: denies any weight changes, fatigue, or weakness  Eyes: denies blurred/double vision or scleral icterus  Cardiovascular: denies chest pain, palpitations, or edemas  Respiratory: denies cough, sputum, or dyspnea  Gastrointestinal: Reports abdominal pain, nausea  Genitourinary: denies dysuria or hematuria  Endocrine: denies cold intolerance, lethargy, or flushing  Hematologic: denies excessive bruising or bleeding  Musculoskeletal: denies weakness, joint swelling, joint pain, or stiffness  Neurologic: denies seizures, CVA, paresthesia, or peripheral neuropathy  Skin: denies change in nevi, rashes, masses, or jaundice     All other systems reviewed and were negative.    Physical Exam:   Vitals:    04/08/23 1443   BP: 169/59   Pulse: 68   Resp: 16   Temp: 98.8 °F (37.1 °C)   SpO2: 97%     Height: 61 inches  Weight: 98.8  BMI: 41  GENERAL: awake and alert, no acute distress, oriented to person, place, and time  HEENT: normocephalic, atraumatic, no scleral icterus, moist mucous membranes  NECK: Supple, there is no thyromegaly or lymphadenopathy  RESPIRATORY: clear to auscultation, no wheezes, rales or rhonchi, symmetric air entry  CARDIOVASCULAR: regular rate and rhythm    GASTROINTESTINAL: Soft, nondistended, tender to palpation throughout the abdomen  MUSCULOSKELETAL: no cyanosis or clubbing, bilateral lower extremity edema present  NEUROLOGIC: alert and oriented, normal speech, cranial nerves 2-12 grossly intact, no focal deficits   SKIN: Moist, warm, no rashes, no jaundice      Diagnostic work-up:     Pertinent labs:   Results from last 7 days   Lab Units 04/08/23  0344 04/07/23  0337 04/06/23  0354 04/05/23  1427   WBC 10*3/mm3 7.46 7.30 7.54 10.00   HEMOGLOBIN g/dL 10.8* 10.2* 11.1* 12.5   HEMATOCRIT % 33.4* 31.6* 34.9 39.8   PLATELETS 10*3/mm3 164 140 149 184     Results from last 7 days   Lab Units 04/08/23  0344  04/07/23  0338 04/06/23  0354 04/05/23  1427   SODIUM mmol/L 138 139  139 141 140   POTASSIUM mmol/L 4.1 4.3  4.3 4.7 5.2   CHLORIDE mmol/L 109* 113*  113* 111* 107   CO2 mmol/L 19.0* 16.0*  16.0* 20.0* 21.0*   BUN mg/dL 35* 40*  40* 52* 59*   CREATININE mg/dL 2.16* 2.04*  2.04* 2.02* 2.30*   CALCIUM mg/dL 9.4 8.6  8.6 8.7 9.7*   BILIRUBIN mg/dL  --   --   --  0.4   ALK PHOS U/L  --   --   --  108   ALT (SGPT) U/L  --   --   --  10   AST (SGOT) U/L  --   --   --  17   GLUCOSE mg/dL 85 96  96 85 88       Imaging:  I reviewed the images from her CT abdomen and pelvis that was done today.  There is no intraperitoneal free air or free fluid.  She does have significant diverticulosis with some surrounding inflammation, suggesting diverticulitis.  There is no evidence of abscess or perforation.  No other abnormality of the stomach or small intestine.  Her gallbladder is distended but does not appear to have a thickened wall or any surrounding inflammation.    Assessment and plan:   The patient is a 89 y.o. female with abdominal pain and history of GI bleed.  Her CT finding seem most consistent with diverticulitis.  I recommended adding Flagyl to the Rocephin which she is already receiving.  I would also stick with a liquid diet for bowel rest.  We may want to hold off on performing colonoscopy on Monday.      Julissa Mack MD  General, Robotic, and Endoscopic Surgery  [facility]     [address], [address]  [place], [place], [address]  P: [phone]  F: [phone]

## 2023-04-08 NOTE — PROGRESS NOTES
[facility] Progress Note      Patient Name: Marianne Delgado  : 1925  MRN: [id number]  Primary Care Physician:  Natasha Ramirez APRN  Date of admission: 2023    Subjective     Interval History:   Follow-up chronic kidney disease    The patient is complaining of abdominal pain, she has Morrow catheter anchored in place she diuresed quite well but continued to have significant edema, she denies chest pain or shortness of breath.    Review of Systems:   As noted above    Objective     Vitals:   Temp:  [98.4 °F (36.9 °C)-99.6 °F (37.6 °C)] 98.8 °F (37.1 °C)  Heart Rate:  [62-87] 74  Resp:  [16-18] 16  BP: (125-189)/(54-94) 131/64  Flow (L/min):  [2] 2    Intake/Output Summary (Last 24 hours) at 2023 1214  Last data filed at 2023 1045  Gross per 24 hour   Intake 600 ml   Output 3275 ml   Net -2675 ml       Physical Exam:    General Appearance: Obese, chronically ill and frail, awake and  Skin: warm and dry  HEENT: oral mucosa normal, nonicteric sclera  Neck: supple, no JVD  Lungs: Scattered rhonchi, breathing effort unlabored  Heart: RRR, normal S1 and S2, no S3, no  Abdomen: soft, diffuse tenderness, normoactive bowel  : Fully catheter is anchored  Extremities: 2+ edema  Neuro: Moving all extremity    Scheduled Meds:     amLODIPine, 5 mg, Oral, Daily  cefTRIAXone, 1 g, Intravenous, Q24H  cholecalciferol, 1,000 Units, Oral, BID  famotidine, 20 mg, Oral, Nightly  levothyroxine, 88 mcg, Oral, Q AM  nystatin, , Topical, Q12H  pantoprazole, 40 mg, Oral, QAM AC  senna-docusate sodium, 2 tablet, Oral, BID      IV Meds:        Results Reviewed:   I have personally reviewed the results from the time of this admission to 2023 12:14 EDT     Results from last 7 days   Lab Units 23  0344 23  0338 23  0354 23  1427   SODIUM mmol/L 138 139  139 141 140   POTASSIUM mmol/L 4.1 4.3  4.3 4.7 5.2   CHLORIDE mmol/L 109* 113*  113* 111* 107   CO2 mmol/L  19.0* 16.0*  16.0* 20.0* 21.0*   BUN mg/dL 35* 40*  40* 52* 59*   CREATININE mg/dL 2.16* 2.04*  2.04* 2.02* 2.30*   CALCIUM mg/dL 9.4 8.6  8.6 8.7 9.7*   BILIRUBIN mg/dL  --   --   --  0.4   ALK PHOS U/L  --   --   --  108   ALT (SGPT) U/L  --   --   --  10   AST (SGOT) U/L  --   --   --  17   GLUCOSE mg/dL 85 96  96 85 88       Estimated Creatinine Clearance: 16 mL/min (A) (by C-G formula based on SCr of 2.16 mg/dL (H)).    Results from last 7 days   Lab Units 04/08/23  0344 04/07/23  0338   MAGNESIUM mg/dL 1.8 1.9   PHOSPHORUS mg/dL 3.8 3.2       Results from last 7 days   Lab Units 04/08/23  0344 04/07/23  0338   URIC ACID mg/dL 8.2* 7.3*       Results from last 7 days   Lab Units 04/08/23  0344 04/07/23  0337 04/06/23  0354 04/05/23  1427   WBC 10*3/mm3 7.46 7.30 7.54 10.00   HEMOGLOBIN g/dL 10.8* 10.2* 11.1* 12.5   PLATELETS 10*3/mm3 164 140 149 184             Assessment / Plan     ASSESSMENT:  -Chronic kidney disease stage IV associated with nephrosclerosis and advanced age, creatinine 2.16, stable, patient has fluid excess, electrolyte within acceptable range other than total CO2 19 which is improved  -Metabolic acidosis with normal anion gap associated with the chronic kidney disease  -Urinary retention which is acute  -Abdominal pain could be related to her urinary retention  -History of black tarry stools with recent intake of Pepto-Bismol being evaluated by GI  -Iron deficiency anemia hemoglobin 10.8 and her iron saturation 12% also has probably component of chronic kidney disease    PLAN:  -Continue to diurese with IV bumetanide today  -Add oral sodium bicarbonate  -Surveillance labs  I discussed the case with the patient's niece at the bedside    Thank you for involving us in the care of Mariannekeyla Delgado.  Please feel free to call with any questions.    Derek Singleton MD  04/08/23  12:14 EDT    [facility]  [phone]    Please note that portions of this note  were completed with a voice recognition program.

## 2023-04-09 LAB
ALBUMIN SERPL-MCNC: 2.2 G/DL (ref 3.5–5.2)
ANION GAP SERPL CALCULATED.3IONS-SCNC: 9.4 MMOL/L (ref 5–15)
BASOPHILS # BLD AUTO: 0.03 10*3/MM3 (ref 0–0.2)
BASOPHILS NFR BLD AUTO: 0.5 % (ref 0–1.5)
BUN SERPL-MCNC: 33 MG/DL (ref 8–23)
BUN/CREAT SERPL: 13.1 (ref 7–25)
CALCIUM SPEC-SCNC: 9.1 MG/DL (ref 8.2–9.6)
CHLORIDE SERPL-SCNC: 110 MMOL/L (ref 98–107)
CO2 SERPL-SCNC: 21.6 MMOL/L (ref 22–29)
CREAT SERPL-MCNC: 2.52 MG/DL (ref 0.57–1)
DEPRECATED RDW RBC AUTO: 43 FL (ref 37–54)
EGFRCR SERPLBLD CKD-EPI 2021: 16.9 ML/MIN/1.73
EOSINOPHIL # BLD AUTO: 0.23 10*3/MM3 (ref 0–0.4)
EOSINOPHIL NFR BLD AUTO: 3.5 % (ref 0.3–6.2)
ERYTHROCYTE [DISTWIDTH] IN BLOOD BY AUTOMATED COUNT: 13 % (ref 12.3–15.4)
GLUCOSE SERPL-MCNC: 86 MG/DL (ref 65–99)
HCT VFR BLD AUTO: 30.1 % (ref 34–46.6)
HGB BLD-MCNC: 9.8 G/DL (ref 12–15.9)
IMM GRANULOCYTES # BLD AUTO: 0.1 10*3/MM3 (ref 0–0.05)
IMM GRANULOCYTES NFR BLD AUTO: 1.5 % (ref 0–0.5)
LYMPHOCYTES # BLD AUTO: 1.24 10*3/MM3 (ref 0.7–3.1)
LYMPHOCYTES NFR BLD AUTO: 18.7 % (ref 19.6–45.3)
MAGNESIUM SERPL-MCNC: 1.7 MG/DL (ref 1.7–2.3)
MCH RBC QN AUTO: 29 PG (ref 26.6–33)
MCHC RBC AUTO-ENTMCNC: 32.6 G/DL (ref 31.5–35.7)
MCV RBC AUTO: 89.1 FL (ref 79–97)
MONOCYTES # BLD AUTO: 0.8 10*3/MM3 (ref 0.1–0.9)
MONOCYTES NFR BLD AUTO: 12.1 % (ref 5–12)
NEUTROPHILS NFR BLD AUTO: 4.22 10*3/MM3 (ref 1.7–7)
NEUTROPHILS NFR BLD AUTO: 63.7 % (ref 42.7–76)
NRBC BLD AUTO-RTO: 0.2 /100 WBC (ref 0–0.2)
PHOSPHATE SERPL-MCNC: 4.7 MG/DL (ref 2.5–4.5)
PLATELET # BLD AUTO: 148 10*3/MM3 (ref 140–450)
PMV BLD AUTO: 10.5 FL (ref 6–12)
POTASSIUM SERPL-SCNC: 3.7 MMOL/L (ref 3.5–5.2)
RBC # BLD AUTO: 3.38 10*6/MM3 (ref 3.77–5.28)
SODIUM SERPL-SCNC: 141 MMOL/L (ref 136–145)
URATE SERPL-MCNC: 8.8 MG/DL (ref 2.4–5.7)
WBC NRBC COR # BLD: 6.62 10*3/MM3 (ref 3.4–10.8)

## 2023-04-09 PROCEDURE — 83735 ASSAY OF MAGNESIUM: CPT | Performed by: STUDENT IN AN ORGANIZED HEALTH CARE EDUCATION/TRAINING PROGRAM

## 2023-04-09 PROCEDURE — 99232 SBSQ HOSP IP/OBS MODERATE 35: CPT | Performed by: INTERNAL MEDICINE

## 2023-04-09 PROCEDURE — 25010000002 CEFTRIAXONE PER 250 MG: Performed by: STUDENT IN AN ORGANIZED HEALTH CARE EDUCATION/TRAINING PROGRAM

## 2023-04-09 PROCEDURE — 85025 COMPLETE CBC W/AUTO DIFF WBC: CPT | Performed by: INTERNAL MEDICINE

## 2023-04-09 PROCEDURE — 84550 ASSAY OF BLOOD/URIC ACID: CPT | Performed by: INTERNAL MEDICINE

## 2023-04-09 PROCEDURE — 99232 SBSQ HOSP IP/OBS MODERATE 35: CPT | Performed by: SURGERY

## 2023-04-09 PROCEDURE — 80069 RENAL FUNCTION PANEL: CPT | Performed by: INTERNAL MEDICINE

## 2023-04-09 RX ORDER — GABAPENTIN 100 MG/1
100 CAPSULE ORAL DAILY
Status: DISCONTINUED | OUTPATIENT
Start: 2023-04-09 | End: 2023-04-14

## 2023-04-09 RX ORDER — BUMETANIDE 0.25 MG/ML
4 INJECTION INTRAMUSCULAR; INTRAVENOUS EVERY 8 HOURS
Status: DISPENSED | OUTPATIENT
Start: 2023-04-09 | End: 2023-04-10

## 2023-04-09 RX ORDER — POLYETHYLENE GLYCOL 3350 17 G/17G
17 POWDER, FOR SOLUTION ORAL DAILY
Status: DISCONTINUED | OUTPATIENT
Start: 2023-04-09 | End: 2023-04-13

## 2023-04-09 RX ORDER — HYDROCODONE BITARTRATE AND ACETAMINOPHEN 5; 325 MG/1; MG/1
1 TABLET ORAL EVERY 6 HOURS PRN
Status: DISCONTINUED | OUTPATIENT
Start: 2023-04-09 | End: 2023-04-15 | Stop reason: HOSPADM

## 2023-04-09 RX ORDER — LIDOCAINE 50 MG/G
2 PATCH TOPICAL
Status: DISCONTINUED | OUTPATIENT
Start: 2023-04-09 | End: 2023-04-15 | Stop reason: HOSPADM

## 2023-04-09 RX ADMIN — SODIUM BICARBONATE 1300 MG: 650 TABLET ORAL at 08:26

## 2023-04-09 RX ADMIN — METRONIDAZOLE 500 MG: 500 INJECTION, SOLUTION INTRAVENOUS at 00:31

## 2023-04-09 RX ADMIN — AMLODIPINE BESYLATE 5 MG: 5 TABLET ORAL at 08:26

## 2023-04-09 RX ADMIN — Medication 1000 UNITS: at 20:06

## 2023-04-09 RX ADMIN — BUMETANIDE 4 MG: 0.25 INJECTION INTRAMUSCULAR; INTRAVENOUS at 13:56

## 2023-04-09 RX ADMIN — ACETAMINOPHEN 650 MG: 325 TABLET, FILM COATED ORAL at 08:25

## 2023-04-09 RX ADMIN — SODIUM CHLORIDE 100 ML/HR: 9 INJECTION, SOLUTION INTRAVENOUS at 00:31

## 2023-04-09 RX ADMIN — FAMOTIDINE 20 MG: 20 TABLET, FILM COATED ORAL at 20:06

## 2023-04-09 RX ADMIN — BUMETANIDE 4 MG: 0.25 INJECTION INTRAMUSCULAR; INTRAVENOUS at 19:25

## 2023-04-09 RX ADMIN — POLYETHYLENE GLYCOL 3350 17 G: 17 POWDER, FOR SOLUTION ORAL at 10:51

## 2023-04-09 RX ADMIN — SODIUM BICARBONATE 1300 MG: 650 TABLET ORAL at 20:06

## 2023-04-09 RX ADMIN — DOCUSATE SODIUM 50MG AND SENNOSIDES 8.6MG 2 TABLET: 8.6; 5 TABLET, FILM COATED ORAL at 20:06

## 2023-04-09 RX ADMIN — CEFTRIAXONE SODIUM 1 G: 1 INJECTION, POWDER, FOR SOLUTION INTRAMUSCULAR; INTRAVENOUS at 10:04

## 2023-04-09 RX ADMIN — DOCUSATE SODIUM 50MG AND SENNOSIDES 8.6MG 2 TABLET: 8.6; 5 TABLET, FILM COATED ORAL at 08:26

## 2023-04-09 RX ADMIN — SODIUM BICARBONATE 1300 MG: 650 TABLET ORAL at 16:17

## 2023-04-09 RX ADMIN — LEVOTHYROXINE SODIUM 88 MCG: 0.09 TABLET ORAL at 06:38

## 2023-04-09 RX ADMIN — Medication 1000 UNITS: at 08:26

## 2023-04-09 RX ADMIN — Medication 10 ML: at 08:26

## 2023-04-09 RX ADMIN — PANTOPRAZOLE SODIUM 40 MG: 40 TABLET, DELAYED RELEASE ORAL at 06:38

## 2023-04-09 RX ADMIN — GABAPENTIN 100 MG: 100 CAPSULE ORAL at 10:51

## 2023-04-09 RX ADMIN — METRONIDAZOLE 500 MG: 500 INJECTION, SOLUTION INTRAVENOUS at 08:25

## 2023-04-09 RX ADMIN — Medication 10 ML: at 20:07

## 2023-04-09 RX ADMIN — METRONIDAZOLE 500 MG: 500 INJECTION, SOLUTION INTRAVENOUS at 16:17

## 2023-04-09 RX ADMIN — NYSTATIN: 100000 POWDER TOPICAL at 08:27

## 2023-04-09 RX ADMIN — NYSTATIN: 100000 POWDER TOPICAL at 20:07

## 2023-04-09 RX ADMIN — LIDOCAINE 2 PATCH: 700 PATCH TOPICAL at 12:28

## 2023-04-09 NOTE — PLAN OF CARE
Problem: Fall Injury Risk  Goal: Absence of Fall and Fall-Related Injury  Outcome: Ongoing, Progressing  Intervention: Identify and Manage Contributors  Description: Develop a fall prevention plan with the patient and caregiver/family.  Provide reorientation, appropriate sensory stimulation and routines with changes in mental status to decrease risk of fall.  Promote use of personal vision and auditory aids.  Assess assistance level required for safe and effective self-care; provide support as needed, such as toileting, mobilization. For age 65 and older, implement timed toileting with assistance.  Encourage physical activity, such as performance of mobility and self-care at highest level of patient ability, multicomponent exercise program and provision of appropriate assistive devices.  If fall occurs, assess the severity of injury; implement fall injury protocol. Determine the cause and revise fall injury prevention plan.  Regularly review medication contribution to fall risk; adjust medication administration times to minimize risk of falling.  Consider risk related to polypharmacy and age.  Balance adequate pain management with potential for oversedation.  Recent Flowsheet Documentation  Taken 4/9/2023 1356 by Sandra Quinonez, RN  Medication Review/Management: medications reviewed  Taken 4/9/2023 0826 by Sandra Quinonez, RN  Medication Review/Management: medications reviewed  Intervention: Promote Injury-Free Environment  Description: Provide a safe, barrier-free environment that encourages independent activity.  Keep care area uncluttered and well-lighted.  Determine need for increased observation or monitoring.  Avoid use of devices that minimize mobility, such as restraints or indwelling urinary catheter.  Recent Flowsheet Documentation  Taken 4/9/2023 1756 by Sandra Quinonez, RN  Safety Promotion/Fall Prevention: safety round/check completed  Taken 4/9/2023 1617 by Sandra Quinonez, RN  Safety Promotion/Fall  Prevention: safety round/check completed  Taken 4/9/2023 1356 by Sandra Quinonez RN  Safety Promotion/Fall Prevention:   activity supervised   assistive device/personal items within reach   clutter free environment maintained   fall prevention program maintained   nonskid shoes/slippers when out of bed   room organization consistent   safety round/check completed  Taken 4/9/2023 1228 by Sandra Quinonez RN  Safety Promotion/Fall Prevention: safety round/check completed  Taken 4/9/2023 1004 by Sandra Quinonez RN  Safety Promotion/Fall Prevention: safety round/check completed  Taken 4/9/2023 0826 by Sandra Quinonez RN  Safety Promotion/Fall Prevention:   activity supervised   assistive device/personal items within reach   clutter free environment maintained   fall prevention program maintained   muscle strengthening facilitated   nonskid shoes/slippers when out of bed   room organization consistent   safety round/check completed     Problem: Skin Injury Risk Increased  Goal: Skin Health and Integrity  Outcome: Ongoing, Progressing  Intervention: Optimize Skin Protection  Description: Perform a full pressure injury risk assessment, as indicated by screening, upon admission to care unit.  Reassess skin (injury risk, full inspection) frequently (e.g., scheduled interval, with change in condition) to provide optimal early detection and prevention.  Maintain adequate tissue perfusion (e.g., encourage fluid balance; avoid crossing legs, constrictive clothing or devices) to promote tissue oxygenation.  Maintain head of bed at lowest degree of elevation tolerated, considering medical condition and other restrictions.  Avoid positioning onto an area that remains reddened.  Minimize incontinence and moisture (e.g., toileting schedule; moisture-wicking pad, diaper or incontinence collection device; skin moisture barrier).  Cleanse skin promptly and gently when soiled utilizing a pH-balanced cleanser.  Relieve and redistribute  pressure (e.g., scheduled position changes, weight shifts, use of support surface, medical device repositioning, protective dressing application, use of positioning device, microclimate control, use of pressure-injury-monitor  Encourage increased activity, such as sitting in a chair at the bedside or early mobilization, when able to tolerate.  Recent Flowsheet Documentation  Taken 4/9/2023 1756 by Sandra Quinonez RN  Head of Bed (HOB) Positioning:   HOB elevated   HOB at 20-30 degrees  Taken 4/9/2023 1617 by Sandra Quinonez RN  Head of Bed (HOB) Positioning: HOB at 20-30 degrees  Taken 4/9/2023 1356 by Sandra Quinonez RN  Pressure Reduction Techniques:   frequent weight shift encouraged   heels elevated off bed   positioned off wounds   weight shift assistance provided  Head of Bed (HOB) Positioning: HOB elevated  Pressure Reduction Devices: alternating pressure pump (ADD)  Skin Protection:   adhesive use limited   incontinence pads utilized   tubing/devices free from skin contact  Taken 4/9/2023 1228 by Sandra Quinonez RN  Head of Bed (HOB) Positioning: HOB elevated  Taken 4/9/2023 1004 by Sandra Quinonez RN  Head of Bed (HOB) Positioning:   HOB elevated   HOB at 20-30 degrees  Taken 4/9/2023 0826 by Sandra Quinonez RN  Pressure Reduction Techniques:   frequent weight shift encouraged   heels elevated off bed   positioned off wounds   pressure points protected   weight shift assistance provided  Head of Bed (HOB) Positioning: HOB elevated  Pressure Reduction Devices: alternating pressure pump (ADD)  Skin Protection:   adhesive use limited   incontinence pads utilized   tubing/devices free from skin contact     Problem: Adjustment to Illness (Gastrointestinal Bleeding)  Goal: Optimal Coping with Acute Illness  Outcome: Ongoing, Progressing  Intervention: Optimize Psychosocial Response  Description: Acknowledge, normalize and validate response to gastrointestinal bleeding.  Support expression and identification of  feelings, fears and worries; provide reassurance.  Encourage questions; provide simple information to increase knowledge and lessen fear.  Support coping by recognizing current coping strategies; provide aid in developing new strategies.  Assess and monitor for signs and symptoms of psychosocial concerns that may impact recovery (e.g, stress, substance use); provide resources for support.  Acknowledge and normalize difficulty in managing life-long lifestyle changes and expectations.  Recent Flowsheet Documentation  Taken 4/9/2023 1356 by Sandra Quinonez RN  Supportive Measures: active listening utilized  Taken 4/9/2023 0806 by Sandra Quinonez, RN  Supportive Measures: active listening utilized     Problem: Bleeding (Gastrointestinal Bleeding)  Goal: Hemostasis  Outcome: Ongoing, Progressing  Intervention: Manage Gastrointestinal Bleeding  Description: Determine risk for rebleeding; monitor vital signs, laboratory values and bleeding characteristics frequently and trend change over time.  Anticipate need for respiratory support and fluid volume replacement to restore and maintain tissue perfusion and oxygenation; consider need for vasopressor therapy.  Administer blood products, as ordered, to maintain target hemoglobin and hematocrit levels or to replace ongoing blood loss.  Monitor coagulation status and provide treatment.  Maintain body temperature within desired range to optimize clotting ability.  Anticipate use of pharmacologic therapy alone or in combination with endoscopic hemostasis based on cause and source of bleeding; monitor efficacy and manage side effects.  Anticipate and prepare for therapeutic endoscopic procedure, such as injection, cautery or mechanical intervention, to control bleeding.  Anticipate need for surgical intervention with continued bleeding and circulatory instability.  Elevate head of bed to a semi-recumbent position or turn to side during vomiting; keep suction equipment at  hand.  Provide comfort measures (e.g., oral care, cool cloth; decreased environmental stimuli including light, sound and smell); lessen highly odiferous smell of bloody stools or emesis.  Maintain perineal skin integrity when patient has frequent, bloody stools; cleanse gently and thoroughly; avoid alcohol-containing wipes.  When resuming oral intake, observe for symptoms of nausea, vomiting and recurrent bleeding; gradually reintroduce foods.  Recent Flowsheet Documentation  Taken 4/9/2023 1356 by Sandra Quinonez, RN  Environmental Support: calm environment promoted  Taken 4/9/2023 0826 by Sandra Quinonez, RN  Environmental Support: calm environment promoted     Problem: Adult Inpatient Plan of Care  Goal: Plan of Care Review  Outcome: Ongoing, Progressing  Flowsheets (Taken 4/9/2023 1813)  Progress: no change  Plan of Care Reviewed With:   patient   durable power of   Outcome Evaluation: VSS, A+Ox4, assist x 1-2, SR, RA ex when asleep then 2L via n/c, patient stated that she felt better after lidoderm patches placed and gabapentin given, but is now complaining of diffuse abdominal pain again, will continue to monitor  Goal: Patient-Specific Goal (Individualized)  Outcome: Ongoing, Progressing  Goal: Absence of Hospital-Acquired Illness or Injury  Outcome: Ongoing, Progressing  Intervention: Identify and Manage Fall Risk  Description: Perform standard risk assessment on admission using a validated tool or comprehensive approach appropriate to the patient; reassess fall risk frequently, with change in status or transfer to another level of care.  Communicate fall injury risk to interprofessional healthcare team.  Determine need for increased observation, equipment and environmental modification, such as low bed, signage and supportive, nonskid footwear.  Adjust safety measures to individual developmental age, stage and identified risk factors.  Reinforce the importance of safety and physical activity with  patient and family.  Perform regular intentional rounding to assess need for position change, pain assessment and personal needs, including assistance with toileting.  Recent Flowsheet Documentation  Taken 4/9/2023 1756 by Sandra Quinonez RN  Safety Promotion/Fall Prevention: safety round/check completed  Taken 4/9/2023 1617 by Sandra Quinonez RN  Safety Promotion/Fall Prevention: safety round/check completed  Taken 4/9/2023 1356 by Sandra Quinonez RN  Safety Promotion/Fall Prevention:   activity supervised   assistive device/personal items within reach   clutter free environment maintained   fall prevention program maintained   nonskid shoes/slippers when out of bed   room organization consistent   safety round/check completed  Taken 4/9/2023 1228 by Sandra Quinonez RN  Safety Promotion/Fall Prevention: safety round/check completed  Taken 4/9/2023 1004 by Sandra Quinonez RN  Safety Promotion/Fall Prevention: safety round/check completed  Taken 4/9/2023 0826 by Sandra Quinonez RN  Safety Promotion/Fall Prevention:   activity supervised   assistive device/personal items within reach   clutter free environment maintained   fall prevention program maintained   muscle strengthening facilitated   nonskid shoes/slippers when out of bed   room organization consistent   safety round/check completed  Intervention: Prevent Skin Injury  Description: Perform a screening for skin injury risk, such as pressure or moisture associated skin damage on admission and at regular intervals throughout hospital stay.  Keep all areas of skin (especially folds) clean and dry.  Maintain adequate skin hydration.  Relieve and redistribute pressure and protect bony prominences; implement measures based on patient-specific risk factors.  Match turning and repositioning schedule to clinical condition.  Encourage weight shift frequently; assist with reposition if unable to complete independently.  Float heels off bed; avoid pressure on the Achilles  tendon.  Keep skin free from extended contact with medical devices.  Encourage functional activity and mobility, as early as tolerated.  Use aids (e.g., slide boards, mechanical lift) during transfer.  Recent Flowsheet Documentation  Taken 4/9/2023 1756 by Sandra Quinonez RN  Body Position:   supine   30 degrees  Taken 4/9/2023 1617 by Sandra Quinonez RN  Body Position:   supine   30 degrees  Taken 4/9/2023 1356 by Sandra Quinonez RN  Body Position:   tilted   right  Skin Protection:   adhesive use limited   incontinence pads utilized   tubing/devices free from skin contact  Taken 4/9/2023 1228 by Sandra Quinonez RN  Body Position:   log-rolled   tilted   left  Taken 4/9/2023 1004 by Snadra Quinonez RN  Body Position:   lower extremity elevated   turned   right   foot of bed elevated  Taken 4/9/2023 0826 by Sandra Quinonez RN  Body Position:   weight shifting   sitting up in bed   foot of bed elevated   log-rolled   lower extremity elevated  Skin Protection:   adhesive use limited   incontinence pads utilized   tubing/devices free from skin contact  Intervention: Prevent and Manage VTE (Venous Thromboembolism) Risk  Description: Assess for VTE (venous thromboembolism) risk.  Encourage and assist with early ambulation.  Initiate and maintain compression or other therapy, as indicated, based on identified risk in accordance with organizational protocol and provider order.  Encourage both active and passive leg exercises while in bed, if unable to ambulate.  Recent Flowsheet Documentation  Taken 4/9/2023 1356 by Sandra Quinonez RN  VTE Prevention/Management: (patient states it hurts too much)   bilateral   sequential compression devices off   patient refused intervention  Range of Motion: active ROM (range of motion) encouraged  Taken 4/9/2023 0826 by Sandra Quinonez RN  VTE Prevention/Management: (patient states it hurts too much)   bilateral   sequential compression devices off   patient refused intervention  Range  of Motion: active ROM (range of motion) encouraged  Intervention: Prevent Infection  Description: Maintain skin and mucous membrane integrity; promote hand, oral and pulmonary hygiene.  Optimize fluid balance, nutrition, sleep and glycemic control to maximize infection resistance.  Identify potential sources of infection early to prevent or mitigate progression of infection (e.g., wound, lines, devices).  Evaluate ongoing need for invasive devices; remove promptly when no longer indicated.  Recent Flowsheet Documentation  Taken 4/9/2023 1356 by Sandra Quinonez RN  Infection Prevention:   environmental surveillance performed   hand hygiene promoted   personal protective equipment utilized   single patient room provided  Taken 4/9/2023 0826 by Sandra Quinonez RN  Infection Prevention:   environmental surveillance performed   hand hygiene promoted   personal protective equipment utilized   single patient room provided  Goal: Optimal Comfort and Wellbeing  Outcome: Ongoing, Progressing  Intervention: Monitor Pain and Promote Comfort  Description: Assess pain level, treatment efficacy and patient response at regular intervals using a consistent pain scale.  Consider the presence and impact of preexisting chronic pain.  Encourage patient and caregiver involvement in pain assessment, interventions and safety measures.  Recent Flowsheet Documentation  Taken 4/9/2023 1356 by Sandra Quinonez RN  Pain Management Interventions: medication offered but refused  Intervention: Provide Person-Centered Care  Description: Use a family-focused approach to care.  Develop trust and rapport by proactively providing information, encouraging questions, addressing concerns and offering reassurance.  Acknowledge emotional response to hospitalization.  Recognize and utilize personal coping strategies.  Honor spiritual and cultural preferences.  Recent Flowsheet Documentation  Taken 4/9/2023 1356 by Sandra Quinonez RN  [place]  Relationship/Rapport:   care explained   choices provided   emotional support provided   empathic listening provided   questions answered   questions encouraged   reassurance provided   thoughts/feelings acknowledged  Taken 4/9/2023 0826 by Sandra Quinonez RN  Trust Relationship/Rapport:   care explained   choices provided   emotional support provided   empathic listening provided   questions answered   questions encouraged   reassurance provided   thoughts/feelings acknowledged  Goal: Readiness for Transition of Care  Outcome: Ongoing, Progressing     Problem: Pain Chronic (Persistent) (Comorbidity Management)  Goal: Acceptable Pain Control and Functional Ability  Outcome: Ongoing, Progressing  Intervention: Manage Persistent Pain  Description: Evaluate pain level, effect of treatment and patient response at regular intervals.  Minimize pain stimuli; coordinate care and adjust environment (e.g., light, noise, unnecessary movement); promote sleep/rest.  Match pharmacologic analgesia to severity and type of pain mechanism (e.g., neuropathic, muscle, inflammatory); consider multimodal approach (e.g., nonopioid, opioid, adjuvant).  Provide medication at regular intervals; titrate to patient response.  Manage breakthrough pain with additional doses; consider rotation or switching medication.  Monitor for signs of substance tolerance (increased dose to reach desired effect, decreased effect with same dose).  Avoid abrupt withdrawal of medication, especially agents capable of causing physical dependence.  Manage medication-induced effects, such as constipation, nausea, pruritus, urinary retention, somnolence and dizziness.  Provide multimodal treatment interventions, such as physical activity, therapeutic exercise, yoga, TENS (transcutaneous electrical nerve stimulation) and manual therapy.  Train in functional activity modifications, such as body mechanics, posture, ergonomics, energy conservation and activity  pacing.  Consider addition of complementary or alternative therapy, such as acupuncture, hypnosis or therapeutic touch.  Recent Flowsheet Documentation  Taken 4/9/2023 1356 by Sandra Quinonez RN  Medication Review/Management: medications reviewed  Taken 4/9/2023 0826 by Sandra Quinonez RN  Medication Review/Management: medications reviewed  Intervention: Develop Pain Management Plan  Description: Acknowledge patient as the expert in pain self-management.  Use a consistent, validated tool for pain assessment; include function and quality of life.  Evaluate risk for opioid use and dependence.  Set pain management goals; determine acceptable level of discomfort to allow for maximal functioning and quality of life.  Determine [id number] pain management plan, including both pharmacologic and nonpharmacologic measures.  Identify and integrate past successful treatment measures, if able.  Encourage patient and caregiver involvement in pain assessment, interventions and safety measures.  Re-evaluate plan regularly.  Recent Flowsheet Documentation  Taken 4/9/2023 1356 by Sandra Quinonez RN  Pain Management Interventions: medication offered but refused  Intervention: Optimize Psychosocial Wellbeing  Description: Facilitate patient’s self-control over pain by providing pain information and allowing choices in treatment.  Consider and address emotional response to pain.  Explore and promote use of coping strategies; address barriers to successful coping.  Evaluate and assist with psychosocial, cultural and spiritual factors impacting pain.  Modify pain perception by using techniques, such as distraction, mindfulness, guided imagery, meditation or music.  Assess and monitor for signs and symptoms of behavioral health concerns, such as unhealthy substance use, depression and suicidal ideation.  Consider referral for ongoing coping support, such as cognitive behavioral therapy and mindfulness-based stress  reduction.  Recent Flowsheet Documentation  Taken 4/9/2023 1356 by Sandra Quinonez, RN  Supportive Measures: active listening utilized  Diversional Activities: television  Family/Support System Care:   involvement promoted   presence promoted  Taken 4/9/2023 0881 by Sandra Quinonez, RN  Supportive Measures: active listening utilized  Diversional Activities: television  Family/Support System Care:   caregiver stress acknowledged   involvement promoted   self-care encouraged   presence promoted   support provided   Goal Outcome Evaluation:  Plan of Care Reviewed With: patient, durable power of         Progress: no change  Outcome Evaluation: VSS, A+Ox4, assist x 1-2, SR, RA ex when asleep then 2L via n/c, patient stated that she felt better after lidoderm patches placed and gabapentin given, but is now complaining of diffuse abdominal pain again, will continue to monitor

## 2023-04-09 NOTE — PROGRESS NOTES
[facility]  Inpatient Progress Note    Reason for Follow Up: Melena, heme positive stool    Subjective     Interval History:   Hemoglobin down about 1 g from yesterday but no overt bleeding.  CT scan and general surgery consult for worsening abdominal pain; presacral edema noted but no acute abdominal findings.    She is still complaining of abdominal pain.  She says it has not changed.  It is not localized to 1 area.  She says that she feels that it is sore and on the inside.  Not worse with eating though she still has a poor appetite.  She is also complaining of back pain and wanting lidocaine patches.    Her nurse reports that she is still having diffuse complaints of pain all over her body; she was even complaining of the pulse oximeter irritating her finger.      Current Facility-Administered Medications:   •  acetaminophen (TYLENOL) tablet 650 mg, 650 mg, Oral, Q4H PRN, Lady Ag MD, 650 mg at 04/09/23 0825  •  amLODIPine (NORVASC) tablet 5 mg, 5 mg, Oral, Daily, Chico Shen DO, 5 mg at 04/09/23 0826  •  sennosides-docusate (PERICOLACE) 8.6-50 MG per tablet 2 tablet, 2 tablet, Oral, BID, 2 tablet at 04/09/23 0826 **AND** polyethylene glycol (MIRALAX) packet 17 g, 17 g, Oral, Daily PRN **AND** bisacodyl (DULCOLAX) EC tablet 5 mg, 5 mg, Oral, Daily PRN **AND** bisacodyl (DULCOLAX) suppository 10 mg, 10 mg, Rectal, Daily PRN, Chico Shen DO, 10 mg at 04/07/23 0859  •  cefTRIAXone (ROCEPHIN) 1 g in sodium chloride 0.9 % 100 mL IVPB-VTB, 1 g, Intravenous, Q24H, Chico Shen DO, Last Rate: 200 mL/hr at 04/08/23 1005, 1 g at 04/08/23 1005  •  cholecalciferol (VITAMIN D3) tablet 1,000 Units, 1,000 Units, Oral, BID, Chico Shen DO, 1,000 Units at 04/09/23 0826  •  famotidine (PEPCID) tablet 20 mg, 20 mg, Oral, Nightly, Maria Luisa Olmedo MD, 20 mg at 04/08/23 2031  •  levothyroxine (SYNTHROID, LEVOTHROID) tablet 88 mcg, 88 mcg, Oral, Q AM, Bernard Carrasco MD, 88 mcg at  04/09/23 0638  •  melatonin tablet 3 mg, 3 mg, Oral, Nightly PRN, Lady Ag MD, 3 mg at 04/07/23 0121  •  metroNIDAZOLE (FLAGYL) IVPB 500 mg, 500 mg, Intravenous, Q8H, Chico Shen DO, 500 mg at 04/09/23 0825  •  nystatin (MYCOSTATIN) powder, , Topical, Q12H, Chico Shen DO, Given at 04/08/23 2031  •  ondansetron (ZOFRAN) tablet 4 mg, 4 mg, Oral, Q6H PRN **OR** ondansetron (ZOFRAN) injection 4 mg, 4 mg, Intravenous, Q6H PRN, Lady Ag MD, 4 mg at 04/08/23 1644  •  pantoprazole (PROTONIX) EC tablet 40 mg, 40 mg, Oral, ROSANNA , Bernard Carrasco MD, 40 mg at 04/09/23 0638  •  sodium bicarbonate tablet 1,300 mg, 1,300 mg, Oral, TID, Derek Singleton MD, 1,300 mg at 04/09/23 0826  •  [COMPLETED] Insert Peripheral IV, , , Once **AND** sodium chloride 0.9 % flush 10 mL, 10 mL, Intravenous, PRN, Libia Osborn, APRN, 10 mL at 04/09/23 0826  •  sodium chloride 0.9 % infusion, 100 mL/hr, Intravenous, Continuous, Derek Singleton MD, Last Rate: 100 mL/hr at 04/09/23 0031, 100 mL/hr at 04/09/23 0031  Review of Systems:   All systems reviewed and negative except for: Constitution: Diffuse pain; GI: Stomach pain     Objective     Vital Signs  Temp:  [98.2 °F (36.8 °C)-98.8 °F (37.1 °C)] 98.2 °F (36.8 °C)  Heart Rate:  [68-74] 69  Resp:  [16] 16  BP: (123-169)/(50-64) 123/50  Body mass index is 40.07 kg/m².    Intake/Output Summary (Last 24 hours) at 4/9/2023 0828  Last data filed at 4/9/2023 0605  Gross per 24 hour   Intake 1770 ml   Output 2025 ml   Net -255 ml     No intake/output data recorded.     Physical Exam:   General: patient awake, alert and cooperative, uncomfortable appearing, appears stated  age, quite elderly and frail   Eyes: Normal lids and lashes, no scleral icterus   Neck: supple, normal ROM   Skin: warm and dry, not jaundiced   Cardiovascular: regular rhythm and rate, no murmurs auscultated   Pulm: clear to auscultation bilaterally, regular and  unlabored   Abdomen: obese and soft, no distension, I can lightly touch one area and she will flinch  but I can also grab subcutaneous tissue and she has no reaction   Rectal: deferred   Extremities: Bruising of upper extremities, atrophic extremities   Psychiatric: Normal mood and behavior; memory intact     Results Review:     I reviewed the patient's new clinical results.    Results from last 7 days   Lab Units 04/09/23 0418 04/08/23 0344 04/07/23  0337   WBC 10*3/mm3 6.62 7.46 7.30   HEMOGLOBIN g/dL 9.8* 10.8* 10.2*   HEMATOCRIT % 30.1* 33.4* 31.6*   PLATELETS 10*3/mm3 148 164 140     Results from last 7 days   Lab Units 04/09/23 0418 04/08/23 0344 04/07/23 0338 04/06/23  0354 04/05/23  1427   SODIUM mmol/L 141 138 139  139   < > 140   POTASSIUM mmol/L 3.7 4.1 4.3  4.3   < > 5.2   CHLORIDE mmol/L 110* 109* 113*  113*   < > 107   CO2 mmol/L 21.6* 19.0* 16.0*  16.0*   < > 21.0*   BUN mg/dL 33* 35* 40*  40*   < > 59*   CREATININE mg/dL 2.52* 2.16* 2.04*  2.04*   < > 2.30*   CALCIUM mg/dL 9.1 9.4 8.6  8.6   < > 9.7*   BILIRUBIN mg/dL  --   --   --   --  0.4   ALK PHOS U/L  --   --   --   --  108   ALT (SGPT) U/L  --   --   --   --  10   AST (SGOT) U/L  --   --   --   --  17   GLUCOSE mg/dL 86 85 96  96   < > 88    < > = values in this interval not displayed.         Lab Results   Lab Value Date/Time    LIPASE 47 04/05/2023 1427    LIPASE 40 03/16/2023 1343       Radiology:  CT Abdomen Pelvis With Contrast   Final Result       1. Presacral edema is present and may reflect proctitis. Colonic   diverticulosis.   2. No obstructive uropathy.   3. Cholelithiasis.       This report was finalized on 4/8/2023 2:48 PM by Dr. Zaid Cain M.D.          US Gallbladder   Final Result   1. Normal-appearing liver   2. Gallbladder sludge, without cholelithiasis or sonographic evidence of   acute cholecystitis   3. Atrophic right kidney.       This report was finalized on 4/6/2023 3:23 PM by Dr. Clive Garduno,  M.D.          US Renal Bilateral   Final Result   1. Simple appearing left renal cyst, below the size requiring follow-up   2. Atrophic kidneys, without hydronephrosis seen.       This report was finalized on 4/6/2023 3:24 PM by Dr. Clive Garduno M.D.          CT Abdomen Pelvis Without Contrast   Final Result   1. No acute intra-abdominal or pelvic process on this limited   noncontrast exam.   2. Markedly distended bladder.   3. Cholelithiasis.   4. Small hiatal hernia with duodenal and colonic diverticulosis. If GI   symptoms persist endoscopy could be considered for better evaluation.   5. Please see above for additional findings/recommendations.       This report was finalized on 4/5/2023 4:17 PM by Dr. Kapil Cruz M.D.              Assessment & Plan     Active Hospital Problems    Diagnosis    • **Gastrointestinal hemorrhage, unspecified gastrointestinal hemorrhage type    • Acute generalized abdominal pain    • Acute UTI (urinary tract infection)    • Constipation    • Nausea with vomiting    • Anemia    • Acute urinary retention    • Obesity (BMI 30-39.9)    • History of CVA (cerebrovascular accident)    • Stage 4 chronic kidney disease (HCC)    • Essential hypertension    • Acquired hypothyroidism        Impression  1.  Normocytic anemia: With a component of iron deficiency anemia    2.  Heme positive stool: Small amount of bright red blood with a bowel movement.  Witnessed bowel movement yesterday was completely normal and brown    3.  Antiplatelet therapy: He has been on Plavix    4.  Constipation: Now having bowel movements    5.  Frailty, immobility, advanced age    6.  Abdominal pain: Diffuse, inconsistent exam with her flinching to the very lightest of touches superficially but no reaction to me completely grabbing subcutaneous tissue at other times.  Reassuring imaging.?  Neuropathic versus other    Plan  Her exam is very difficult to interpret.  I do not particularly see any benefit to EGD given  diffuse nature of pain and reaction to very superficial (me brushing my finger on her skin) palpation.  I question if this is neuropathic pain or referred pain and I think the best treatment will be aggressive pain management for her; will need to be cognizant of bowel regimen with pain meds    Her nurse mentioned a palliative care referral for goals of care and I do think that is appropriate    Continue PPI, H2 blocker    It is reasonable to check a stool for H. pylori antigen and treat if positive    Appreciate general surgery's input as well as hospitalist input    I do not think she is a great candidate for aggressive interventions; I think the risk to her far outweigh any reasonable benefit and I have discussed that with the patient and her family; the patient is in agreement with that        I discussed the patients findings and my recommendations with patient, family, nursing staff and Dr Shen.    All necessary PPE, including face mask and eye protection, were worn during this encounter.  Hand sanitization was performed both before and after the patient interaction.    Over 25 minutes was spent reviewing the patient's chart and records, in discussion with the patient, in examination of the patient, and in discussion with members of the patient's medical team.    Maria Luisa Olmedo MD

## 2023-04-09 NOTE — PROGRESS NOTES
Name: Marianne Delgado ADMIT: 2023   : 1925  PCP: Natasha Ramirez APRN    MRN: [id number] LOS: 4 days   AGE/SEX: 89 y.o. female  ROOM: [facility]     Subjective   Subjective   Patient seen and examined this morning.  Hospital day 4.  Morrow catheter placed on  secondary to urinary retention.  Urology, GI, general surgery and nephrology are following.  She continues to have significant abdominal pain, generalized in nature along with bilateral lower extremity pain, worsened with palpation.  However, symptoms are slightly improved when compared to prior.  Discussed with family and patient at bedside today, they stated that she was recently started on gabapentin as an outpatient for neuropathic pain.       Objective   Objective   Vital Signs  Temp:  [98.2 °F (36.8 °C)-98.6 °F (37 °C)] 98.3 °F (36.8 °C)  Heart Rate:  [68-74] 74  Resp:  [16] 16  BP: (123-154)/(41-65) 127/41  SpO2:  [92 %-96 %] 95 %  on  Flow (L/min):  [2] 2;   Device (Oxygen Therapy): nasal cannula  Body mass index is 40.07 kg/m².  Physical Exam  Vitals and nursing note reviewed.   Constitutional:       Appearance: She is obese. She is ill-appearing.   Cardiovascular:      Rate and Rhythm: Normal rate and regular rhythm.      Pulses: Normal pulses.      Heart sounds: Normal heart sounds.   Pulmonary:      Effort: Pulmonary effort is normal. No respiratory distress.      Breath sounds: Normal breath sounds. No wheezing.   Abdominal:      General: Bowel sounds are normal. There is distension.      Palpations: Abdomen is soft.      Tenderness: There is abdominal tenderness. There is guarding.   Genitourinary:     Comments: Morrow catheter in place  Musculoskeletal:         General: Tenderness (Bilateral lower extremities) present.      Right lower leg: Edema present.      Left lower leg: Edema present.   Skin:     General: Skin is warm and dry.      Coloration: Skin is not jaundiced.   Neurological:      General: No focal deficit  present.      Mental Status: She is alert and oriented to person, place, and time.   Psychiatric:         Mood and Affect: Mood is anxious.       Results Review     I reviewed the patient's new clinical results.  Results from last 7 days   Lab Units 04/09/23 0418 04/08/23 0344 04/07/23 0337 04/06/23  0354   WBC 10*3/mm3 6.62 7.46 7.30 7.54   HEMOGLOBIN g/dL 9.8* 10.8* 10.2* 11.1*   PLATELETS 10*3/mm3 148 164 140 149     Results from last 7 days   Lab Units 04/09/23 0418 04/08/23 0344 04/07/23 0338 04/06/23  0354   SODIUM mmol/L 141 138 139  139 141   POTASSIUM mmol/L 3.7 4.1 4.3  4.3 4.7   CHLORIDE mmol/L 110* 109* 113*  113* 111*   CO2 mmol/L 21.6* 19.0* 16.0*  16.0* 20.0*   BUN mg/dL 33* 35* 40*  40* 52*   CREATININE mg/dL 2.52* 2.16* 2.04*  2.04* 2.02*   GLUCOSE mg/dL 86 85 96  96 85   EGFR mL/min/1.73 16.9* 20.4* 21.8*  21.8* 22.1*     Results from last 7 days   Lab Units 04/09/23 0418 04/08/23 0344 04/07/23 0338 04/05/23  1427   ALBUMIN g/dL 2.2* 2.9* 2.7* 3.6   BILIRUBIN mg/dL  --   --   --  0.4   ALK PHOS U/L  --   --   --  108   AST (SGOT) U/L  --   --   --  17   ALT (SGPT) U/L  --   --   --  10     Results from last 7 days   Lab Units 04/09/23 0418 04/08/23 0344 04/07/23 0338 04/06/23 0354 04/05/23  1427   CALCIUM mg/dL 9.1 9.4 8.6  8.6 8.7 9.7*   ALBUMIN g/dL 2.2* 2.9* 2.7*  --  3.6   MAGNESIUM mg/dL 1.7 1.8 1.9  --   --    PHOSPHORUS mg/dL 4.7* 3.8 3.2  --   --      Results from last 7 days   Lab Units 04/08/23  1315 04/07/23  1020   LACTATE mmol/L 1.5 0.7     Glucose   Date/Time Value Ref Range Status   04/08/2023 1042 86 70 - 130 mg/dL Final     Comment:     Meter: [id number] : [id number] Raulito Uma NA       CT Abdomen Pelvis With Contrast    Result Date: 4/8/2023   1. Presacral edema is present and may reflect proctitis. Colonic diverticulosis. 2. No obstructive uropathy. 3. Cholelithiasis.  This report was finalized on 4/8/2023 2:48 PM by Dr. Zaid Cain M.D.       I have personally reviewed all medications:  Scheduled Medications  bumetanide, 4 mg, Intravenous, Q8H  cefTRIAXone, 1 g, Intravenous, Q24H  cholecalciferol, 1,000 Units, Oral, BID  famotidine, 20 mg, Oral, Nightly  gabapentin, 100 mg, Oral, Daily  levothyroxine, 88 mcg, Oral, Q AM  lidocaine, 2 patch, Transdermal, Q24H  metroNIDAZOLE, 500 mg, Intravenous, Q8H  nystatin, , Topical, Q12H  pantoprazole, 40 mg, Oral, QAM AC  polyethylene glycol, 17 g, Oral, Daily  senna-docusate sodium, 2 tablet, Oral, BID  sodium bicarbonate, 1,300 mg, Oral, TID    Infusions   Diet  Diet: Gastrointestinal Diets; Fiber-Restricted, Low Irritant; Texture: Regular Texture (IDDSI 7); Fluid Consistency: Thin (IDDSI 0)    I have personally reviewed:  [x]  Laboratory   [x]  Microbiology   [x]  Radiology   [x]  EKG/Telemetry  [x]  Cardiology/Vascular        Assessment/Plan     Active Hospital Problems    Diagnosis  POA   • **Gastrointestinal hemorrhage, unspecified gastrointestinal hemorrhage type [K92.2]  Yes   • Acute generalized abdominal pain [R10.84]  No   • Acute UTI (urinary tract infection) [N39.0]  Yes   • Constipation [K59.00]  Yes   • Nausea with vomiting [R11.2]  Yes   • Anemia [D64.9]  Yes   • Acute urinary retention [R33.8]  No   • Obesity (BMI 30-39.9) [E66.9]  Yes   • History of CVA (cerebrovascular accident) [Z86.73]  Not Applicable   • Stage 4 chronic kidney disease (HCC) [N18.4]  Yes   • Essential hypertension [I10]  Yes   • Acquired hypothyroidism [E03.9]  Yes      Resolved Hospital Problems   No resolved problems to display.       89 y.o. female admitted with Gastrointestinal hemorrhage, unspecified gastrointestinal hemorrhage type.    Generalized abdominal pain/intemittent nausea with vomiting/Neuropathic pain  - Interval history: on 04/08, patient having significantly worsening abdominal pain with evidence of rebound/guarding [Please see A/P from note 04/08 for more detailed report], concerning for acute abdomen.  CT  AP ordered, general surgery was consulted. Surgery felt that imaging could likely reflect diverticulitis, so Flagyl was added to treatment regimen. Discussed with GI today (04/09), do not feel like she has diverticulitis, could potentially have proctitis, based on CT read, however, nonetheless, treatment with Flagyl would serve to cover any possible anaerobic organisms.  - She is now on ceftriaxone and Flagyl.  She does state that her abdominal symptoms are slightly improved today, however, she continues to have significant abdominal pain, along with abdominal and lower extremity symptoms that seem to be consistent with a neuropathic origin.  I discussed this with patient and her family in the room this morning, they stated that she was recently started on gabapentin as an outpatient for neuropathic pain symptoms.  -Discussed with Dr. Singleton about initiation of gabapentin, given her renal impairment, he agreed that initiation of treatment would likely be of benefit to the patient, stating that we could start at a lower dose and titrate up as needed based on her response and renal function.  Additionally, patient has not received opioid pain medication, as she previously was not having bowel movements and wanted to prevent worsening constipation from opioids.  However, now she is having BM regularly, no evidence of obstruction on any imaging, so can adjust her pain medication regimen now.  - Continue Ceftriaxone and Flagyl.  - Start Gabapentin 100 mg daily.  - Start Norco 5 mg every 6 hours as needed.  - Order Lidocaine patch per family request.  - Follow up GI and general surgery plans and recommendations. Monitor patient response to treatment, further management based on clinical course.    Acute blood loss anemia secondary to Gastrointestinal hemorrhage  - Patient endorsing dark stools, prior to arrival, however, had recently been on Pepto-Bismol.  - CT abdomen pelvis without contrast obtained on admission showing  markedly distended bladder, cholelithiasis, small hiatal hernia with duodenal and colonic diverticulosis, no free air or free fluid, no evidence of small bowel obstruction.  RUQ ordered, results reviewed, showing gallbladder sludge without cholelithiasis or sonographic evidence of acute cholecystitis.  - GI consulted and following, they offered EGD, however, patient reportedly was not interested in endoscopy if possible at time of initial exam. However, on 04/07 nurse collected stool, noted presence of blood. FOBT positive.  Another discussion was held with patient, she was then agreeable.  Per GI the prior tentative plan was for EGD this upcoming Monday (04/10), however, given new imaging findings on CT suggestive of possible diverticulitis, I discussed with Dr. Olmedo, going to hold off on scopes at this time.  - Hemoglobin low, but relatively stable from prior.  - Continue to follow GI plans and recommendations. Continue PPI.  - Order repeat CBC in AM for reassessment. Continue to monitor, transfuse for hemoglobin <7.     Acute urinary retention  - Noted during admission, no prior history per patient. Ordered renal US which showed simple appearing left renal cyst, no hydronephrosis.  - Consulted Urology, Morrow catheter placed.  Continue as ordered, follow-up urology plans recommendations.  - Acute urinary retention protocol.     Urinary tract infection  - UA obtained, showing trace leukocyte esterase, 3-5 WBC and 4+ bacteria without squamous epithelial cells.  Patient endorsing increased urinary urgency and frequency prior to arrival. Urine culture pending, blood cultures no growth to date.  - Continue empiric ceftriaxone.  - Follow-up urine culture.     Acute kidney injury, not POA  Chronic kidney disease stage 4  -  Followed by Dr. Mitchell with [facility] as outpatient.  Creatinine was initially in her baseline, however, today is worse when compared to prior, most recently 2.52 from  2.16.  Development of PRABHA a result of contrast that was given with CT scan on 04/08. Suspect this will improve over the next few days.  - Nephrology consulted and following. Will follow-up their plans and recommendations, greatly appreciate their help.  - Order repeat BMP in AM for reassessment of renal function.   - Will continue to monitor and trend creatinine to guide ongoing management decisions. Avoid nephrotoxic medications, IVF, strict I/O, daily weights.     Hypertension  - BP acceptable acutely. Appears stable. No indications to warrant acute changes/intervention at this time.  - Continue current medications as prescribed. Trend BP to guide ongoing management decisions.     History of CVA  - On Plavix as outpatient, holding on admission due to concerns for possible GI bleeding, can resume when okay with GI.     Hypothyroidism  - Stable. Continue Levothyroxine as prescribed.     Obesity  - BMI 39.11 kg/m2. Complicating all medical problems.    Constipation, resolved  - Previously endorsed, Imaging obtained thus far is negative for any evidence of small bowel obstruction, resolved with bowel regimen.      · SCDs for DVT prophylaxis.  · Limited code (no CPR, no intubation).  · Discussed with patient, family, nursing staff and consulting provider.  · Anticipate discharge TBD timing yet to be determined.      Chico Shen DO  [facility]  04/09/23

## 2023-04-09 NOTE — PROGRESS NOTES
"General Surgery Progress Note       S: Reports feeling bad.  Continues to pass gas but did not have a bowel movement overnight.  Denies nausea or vomiting.  Still complains of abdominal pain.    O:/50 (BP Location: Right arm, Patient Position: Lying)   Pulse 69   Temp 98.2 °F (36.8 °C) (Oral)   Resp 16   Ht 154.9 cm (61\")   Wt 96.2 kg (212 lb 1.3 oz)   SpO2 92%   BMI 40.07 kg/m²     Intake & Output (last day)       04/08 0701  04/09 0700 04/09 0701  04/10 0700    P.O. 710     I.V. (mL/kg) 1200 (12.5)     IV Piggyback 100     Total Intake(mL/kg) 2010 (20.9)     Urine (mL/kg/hr) 2025 (0.9)     Stool 0     Total Output 2025     Net -15           Stool Unmeasured Occurrence 1 x           GENERAL: awake, alert, no apparent distress  HEENT: normochephalic, atraumatic, moist mucus membranes, clear sclera   CHEST: clear to auscultation, no wheezes, no increased work of breathing  CARDIAC: regular rate and rhythm    ABDOMEN: soft, nondistended, diffuse ttp  EXTREMITIES: no cyanosis, clubbing or edema    SKIN: Warm and moist, no rashes    LABS  Results from last 7 days   Lab Units 04/09/23 0418 04/08/23 0344 04/07/23  0337   WBC 10*3/mm3 6.62 7.46 7.30   HEMOGLOBIN g/dL 9.8* 10.8* 10.2*   HEMATOCRIT % 30.1* 33.4* 31.6*   PLATELETS 10*3/mm3 148 164 140     Results from last 7 days   Lab Units 04/09/23 0418 04/08/23 0344 04/07/23  0338 04/06/23  0354 04/05/23  1427   SODIUM mmol/L 141 138 139  139   < > 140   POTASSIUM mmol/L 3.7 4.1 4.3  4.3   < > 5.2   CHLORIDE mmol/L 110* 109* 113*  113*   < > 107   CO2 mmol/L 21.6* 19.0* 16.0*  16.0*   < > 21.0*   BUN mg/dL 33* 35* 40*  40*   < > 59*   CREATININE mg/dL 2.52* 2.16* 2.04*  2.04*   < > 2.30*   CALCIUM mg/dL 9.1 9.4 8.6  8.6   < > 9.7*   BILIRUBIN mg/dL  --   --   --   --  0.4   ALK PHOS U/L  --   --   --   --  108   ALT (SGPT) U/L  --   --   --   --  10   AST (SGOT) U/L  --   --   --   --  17   GLUCOSE mg/dL 86 85 96  96   < > 88    < > = values in " this interval not displayed.     Results from last 7 days   Lab Units 04/05/23  1427   APTT seconds <20.0*         A/P: 89 y.o. female with abdominal pain.   No obvious findings on CT to explain her exam, but there could be some mild diverticulitis. Continue antibiotics. Ok for GI soft diet. I explained that she does not have any obvious cause for her pain on CT imaging, and that any surgery at her age and frailty would likely result in multiple complications and prolonged hospitalization. Her niece was in agreement with avoiding surgery if possible.      REUBEN KAY MD  General, Robotic and Endoscopic Surgery  [facility]    [address], [address]  [place], [place], [address]  P: [phone]  F: [phone]

## 2023-04-09 NOTE — PLAN OF CARE
Goal Outcome Evaluation:      VSS. 2L nc. No BM's. F/c in place. A/o x4. IVF anf flagyl given. Full liquid diet, meds crushed in applesauce.

## 2023-04-09 NOTE — PROGRESS NOTES
[facility] Progress Note      Patient Name: Marianne Delgado  : 1925  MRN: [id number]  Primary Care Physician:  Natasha Ramirez APRN  Date of admission: 2023    Subjective     Interval History:   Follow-up chronic kidney disease    The patient had CT with IV contrast yesterday to evaluate her abdominal pain essentially no acute process were identified the patient is very tender to touch touching her abdominal wall and her lower extremities she complains of severe pain.  No chest pain, no shortness of breath, no orthopnea or PND.  Continues to have significant edema      Review of Systems:   As noted above    Objective     Vitals:   Temp:  [98.2 °F (36.8 °C)-98.8 °F (37.1 °C)] 98.2 °F (36.8 °C)  Heart Rate:  [68-69] 69  Resp:  [16] 16  BP: (123-169)/(50-59) 123/50  Flow (L/min):  [2] 2    Intake/Output Summary (Last 24 hours) at 2023 1119  Last data filed at 2023 0900  Gross per 24 hour   Intake  ml   Output 1350 ml   Net 660 ml       Physical Exam:    General Appearance: Obese, chronically ill and frail, awake and  Skin: warm and dry  HEENT: oral mucosa normal, nonicteric sclera  Neck: supple, no JVD  Lungs: Scattered rhonchi, breathing effort unlabored  Heart: RRR, normal S1 and S2, no S3, no  Abdomen: soft, diffuse tenderness, even to touch, normoactive bowel  : Fully catheter is anchored  Extremities: 2+ edema  Neuro: Moving all extremity    Scheduled Meds:     amLODIPine, 5 mg, Oral, Daily  cefTRIAXone, 1 g, Intravenous, Q24H  cholecalciferol, 1,000 Units, Oral, BID  famotidine, 20 mg, Oral, Nightly  gabapentin, 100 mg, Oral, Daily  levothyroxine, 88 mcg, Oral, Q AM  lidocaine, 2 patch, Transdermal, Q24H  metroNIDAZOLE, 500 mg, Intravenous, Q8H  nystatin, , Topical, Q12H  pantoprazole, 40 mg, Oral, QAM AC  polyethylene glycol, 17 g, Oral, Daily  senna-docusate sodium, 2 tablet, Oral, BID  sodium bicarbonate, 1,300 mg, Oral, TID      IV Meds:   sodium  chloride, 100 mL/hr, Last Rate: 100 mL/hr (04/09/23 0031)        Results Reviewed:   I have personally reviewed the results from the time of this admission to 4/9/2023 11:19 EDT     Results from last 7 days   Lab Units 04/09/23 0418 04/08/23 0344 04/07/23 0338 04/06/23 0354 04/05/23  1427   SODIUM mmol/L 141 138 139  139   < > 140   POTASSIUM mmol/L 3.7 4.1 4.3  4.3   < > 5.2   CHLORIDE mmol/L 110* 109* 113*  113*   < > 107   CO2 mmol/L 21.6* 19.0* 16.0*  16.0*   < > 21.0*   BUN mg/dL 33* 35* 40*  40*   < > 59*   CREATININE mg/dL 2.52* 2.16* 2.04*  2.04*   < > 2.30*   CALCIUM mg/dL 9.1 9.4 8.6  8.6   < > 9.7*   BILIRUBIN mg/dL  --   --   --   --  0.4   ALK PHOS U/L  --   --   --   --  108   ALT (SGPT) U/L  --   --   --   --  10   AST (SGOT) U/L  --   --   --   --  17   GLUCOSE mg/dL 86 85 96  96   < > 88    < > = values in this interval not displayed.       Estimated Creatinine Clearance: 13.5 mL/min (A) (by C-G formula based on SCr of 2.52 mg/dL (H)).    Results from last 7 days   Lab Units 04/09/23 0418 04/08/23 0344 04/07/23  0338   MAGNESIUM mg/dL 1.7 1.8 1.9   PHOSPHORUS mg/dL 4.7* 3.8 3.2       Results from last 7 days   Lab Units 04/09/23 0418 04/08/23 0344 04/07/23  0338   URIC ACID mg/dL 8.8* 8.2* 7.3*       Results from last 7 days   Lab Units 04/09/23 0418 04/08/23 0344 04/07/23  0337 04/06/23 0354 04/05/23  1427   WBC 10*3/mm3 6.62 7.46 7.30 7.54 10.00   HEMOGLOBIN g/dL 9.8* 10.8* 10.2* 11.1* 12.5   PLATELETS 10*3/mm3 148 164 140 149 184             Assessment / Plan     ASSESSMENT:  -Chronic kidney disease stage IV associated with nephrosclerosis and advanced age, creatinine is up to 2.52 probably related to contrast-induced nephropathy after having IV contrast for her CT scan to evaluate her severe abdominal pain.  Her electrolyte within acceptable range.  Continue to have excessive fluid.  -Metabolic acidosis with normal anion gap associated with the chronic kidney disease  -Urinary  retention which is acute  -Abdominal pain could be related to her urinary retention  -History of black tarry stools with recent intake of Pepto-Bismol being evaluated by GI  -Iron deficiency anemia hemoglobin 9.8, and her iron saturation 12% also has probably component of chronic kidney disease  -Probable neuropathic pain    PLAN:  -Discontinue IV fluid  -Discontinue amlodipine since the patient has significant edema and use other agents if needed  -Restart diuretics, bumetanide 4 mg every 8 hours x 3 doses and reassess evaluate in the next 24 hours to decide when to transition to oral diuretics  -Surveillance labs  -I agree with the addition of gabapentin teen    I discussed the case with Dr. Shen  Also I discussed the case with the patient and her niece at the bedside.    Thank you for involving us in the care of Marianne Delgado.  Please feel free to call with any questions.    Derek Singleton MD  04/09/23  11:19 EDT    [facility]  [phone]    Please note that portions of this note were completed with a voice recognition program.

## 2023-04-10 PROBLEM — N28.9 HYPERVOLEMIA ASSOCIATED WITH RENAL INSUFFICIENCY: Status: ACTIVE | Noted: 2023-04-10

## 2023-04-10 PROBLEM — E83.42 HYPOMAGNESEMIA: Status: ACTIVE | Noted: 2023-04-10

## 2023-04-10 PROBLEM — E87.70 HYPERVOLEMIA ASSOCIATED WITH RENAL INSUFFICIENCY: Status: ACTIVE | Noted: 2023-04-10

## 2023-04-10 PROBLEM — N17.9 AKI (ACUTE KIDNEY INJURY): Status: ACTIVE | Noted: 2023-04-10

## 2023-04-10 PROBLEM — M79.2 NEUROPATHIC PAIN: Status: ACTIVE | Noted: 2023-04-10

## 2023-04-10 LAB
ALBUMIN SERPL-MCNC: 2.7 G/DL (ref 3.5–5.2)
ANION GAP SERPL CALCULATED.3IONS-SCNC: 10.9 MMOL/L (ref 5–15)
BACTERIA SPEC AEROBE CULT: ABNORMAL
BACTERIA SPEC AEROBE CULT: ABNORMAL
BASOPHILS # BLD AUTO: 0.04 10*3/MM3 (ref 0–0.2)
BASOPHILS NFR BLD AUTO: 0.5 % (ref 0–1.5)
BUN SERPL-MCNC: 29 MG/DL (ref 8–23)
BUN/CREAT SERPL: 11.8 (ref 7–25)
CALCIUM SPEC-SCNC: 9 MG/DL (ref 8.2–9.6)
CHLORIDE SERPL-SCNC: 106 MMOL/L (ref 98–107)
CO2 SERPL-SCNC: 23.1 MMOL/L (ref 22–29)
CREAT SERPL-MCNC: 2.46 MG/DL (ref 0.57–1)
DEPRECATED RDW RBC AUTO: 40.6 FL (ref 37–54)
EGFRCR SERPLBLD CKD-EPI 2021: 17.4 ML/MIN/1.73
EOSINOPHIL # BLD AUTO: 0.22 10*3/MM3 (ref 0–0.4)
EOSINOPHIL NFR BLD AUTO: 2.9 % (ref 0.3–6.2)
ERYTHROCYTE [DISTWIDTH] IN BLOOD BY AUTOMATED COUNT: 12.8 % (ref 12.3–15.4)
GLUCOSE SERPL-MCNC: 122 MG/DL (ref 65–99)
HCT VFR BLD AUTO: 31 % (ref 34–46.6)
HGB BLD-MCNC: 10 G/DL (ref 12–15.9)
IMM GRANULOCYTES # BLD AUTO: 0.07 10*3/MM3 (ref 0–0.05)
IMM GRANULOCYTES NFR BLD AUTO: 0.9 % (ref 0–0.5)
LYMPHOCYTES # BLD AUTO: 0.82 10*3/MM3 (ref 0.7–3.1)
LYMPHOCYTES NFR BLD AUTO: 11 % (ref 19.6–45.3)
MAGNESIUM SERPL-MCNC: 1.6 MG/DL (ref 1.7–2.3)
MCH RBC QN AUTO: 28.4 PG (ref 26.6–33)
MCHC RBC AUTO-ENTMCNC: 32.3 G/DL (ref 31.5–35.7)
MCV RBC AUTO: 88.1 FL (ref 79–97)
MONOCYTES # BLD AUTO: 0.76 10*3/MM3 (ref 0.1–0.9)
MONOCYTES NFR BLD AUTO: 10.2 % (ref 5–12)
NEUTROPHILS NFR BLD AUTO: 5.56 10*3/MM3 (ref 1.7–7)
NEUTROPHILS NFR BLD AUTO: 74.5 % (ref 42.7–76)
NRBC BLD AUTO-RTO: 0 /100 WBC (ref 0–0.2)
PHOSPHATE SERPL-MCNC: 4 MG/DL (ref 2.5–4.5)
PLATELET # BLD AUTO: 163 10*3/MM3 (ref 140–450)
PMV BLD AUTO: 10.5 FL (ref 6–12)
POTASSIUM SERPL-SCNC: 3.5 MMOL/L (ref 3.5–5.2)
RBC # BLD AUTO: 3.52 10*6/MM3 (ref 3.77–5.28)
SODIUM SERPL-SCNC: 140 MMOL/L (ref 136–145)
URATE SERPL-MCNC: 9 MG/DL (ref 2.4–5.7)
WBC NRBC COR # BLD: 7.47 10*3/MM3 (ref 3.4–10.8)

## 2023-04-10 PROCEDURE — 99232 SBSQ HOSP IP/OBS MODERATE 35: CPT | Performed by: INTERNAL MEDICINE

## 2023-04-10 PROCEDURE — 99232 SBSQ HOSP IP/OBS MODERATE 35: CPT | Performed by: SURGERY

## 2023-04-10 PROCEDURE — 99222 1ST HOSP IP/OBS MODERATE 55: CPT | Performed by: NURSE PRACTITIONER

## 2023-04-10 PROCEDURE — 25010000002 ONDANSETRON PER 1 MG: Performed by: INTERNAL MEDICINE

## 2023-04-10 PROCEDURE — 25010000002 CEFTRIAXONE PER 250 MG: Performed by: STUDENT IN AN ORGANIZED HEALTH CARE EDUCATION/TRAINING PROGRAM

## 2023-04-10 PROCEDURE — 85025 COMPLETE CBC W/AUTO DIFF WBC: CPT | Performed by: INTERNAL MEDICINE

## 2023-04-10 PROCEDURE — 83735 ASSAY OF MAGNESIUM: CPT | Performed by: STUDENT IN AN ORGANIZED HEALTH CARE EDUCATION/TRAINING PROGRAM

## 2023-04-10 PROCEDURE — 84550 ASSAY OF BLOOD/URIC ACID: CPT | Performed by: INTERNAL MEDICINE

## 2023-04-10 PROCEDURE — 87338 HPYLORI STOOL AG IA: CPT | Performed by: INTERNAL MEDICINE

## 2023-04-10 PROCEDURE — 80069 RENAL FUNCTION PANEL: CPT | Performed by: INTERNAL MEDICINE

## 2023-04-10 RX ORDER — SIMETHICONE 80 MG
80 TABLET,CHEWABLE ORAL 4 TIMES DAILY PRN
Status: DISCONTINUED | OUTPATIENT
Start: 2023-04-10 | End: 2023-04-15 | Stop reason: HOSPADM

## 2023-04-10 RX ORDER — LEVOFLOXACIN 500 MG/1
500 TABLET, FILM COATED ORAL
Status: DISCONTINUED | OUTPATIENT
Start: 2023-04-10 | End: 2023-04-15 | Stop reason: HOSPADM

## 2023-04-10 RX ORDER — BUMETANIDE 0.25 MG/ML
4 INJECTION INTRAMUSCULAR; INTRAVENOUS EVERY 8 HOURS
Status: COMPLETED | OUTPATIENT
Start: 2023-04-10 | End: 2023-04-11

## 2023-04-10 RX ORDER — METRONIDAZOLE 500 MG/1
500 TABLET ORAL EVERY 8 HOURS SCHEDULED
Status: DISCONTINUED | OUTPATIENT
Start: 2023-04-10 | End: 2023-04-15 | Stop reason: HOSPADM

## 2023-04-10 RX ADMIN — POLYETHYLENE GLYCOL 3350 17 G: 17 POWDER, FOR SOLUTION ORAL at 08:50

## 2023-04-10 RX ADMIN — METRONIDAZOLE 500 MG: 500 INJECTION, SOLUTION INTRAVENOUS at 00:55

## 2023-04-10 RX ADMIN — PANTOPRAZOLE SODIUM 40 MG: 40 TABLET, DELAYED RELEASE ORAL at 06:25

## 2023-04-10 RX ADMIN — HYDROCODONE BITARTRATE AND ACETAMINOPHEN 1 TABLET: 5; 325 TABLET ORAL at 02:24

## 2023-04-10 RX ADMIN — BUMETANIDE 4 MG: 0.25 INJECTION INTRAMUSCULAR; INTRAVENOUS at 18:00

## 2023-04-10 RX ADMIN — LEVOTHYROXINE SODIUM 88 MCG: 0.09 TABLET ORAL at 06:25

## 2023-04-10 RX ADMIN — LEVOFLOXACIN 500 MG: 500 TABLET, FILM COATED ORAL at 20:56

## 2023-04-10 RX ADMIN — ONDANSETRON 4 MG: 2 INJECTION INTRAMUSCULAR; INTRAVENOUS at 08:57

## 2023-04-10 RX ADMIN — MAGNESIUM HYDROXIDE 10 ML: 2400 SUSPENSION ORAL at 13:26

## 2023-04-10 RX ADMIN — NYSTATIN: 100000 POWDER TOPICAL at 20:55

## 2023-04-10 RX ADMIN — METRONIDAZOLE 500 MG: 500 TABLET, FILM COATED ORAL at 18:00

## 2023-04-10 RX ADMIN — LIDOCAINE 2 PATCH: 700 PATCH TOPICAL at 08:51

## 2023-04-10 RX ADMIN — BUMETANIDE 4 MG: 0.25 INJECTION INTRAMUSCULAR; INTRAVENOUS at 08:50

## 2023-04-10 RX ADMIN — SODIUM BICARBONATE 1300 MG: 650 TABLET ORAL at 20:55

## 2023-04-10 RX ADMIN — METRONIDAZOLE 500 MG: 500 INJECTION, SOLUTION INTRAVENOUS at 10:00

## 2023-04-10 RX ADMIN — NYSTATIN: 100000 POWDER TOPICAL at 08:52

## 2023-04-10 RX ADMIN — GABAPENTIN 100 MG: 100 CAPSULE ORAL at 08:51

## 2023-04-10 RX ADMIN — SODIUM BICARBONATE 1300 MG: 650 TABLET ORAL at 08:51

## 2023-04-10 RX ADMIN — CEFTRIAXONE SODIUM 1 G: 1 INJECTION, POWDER, FOR SOLUTION INTRAMUSCULAR; INTRAVENOUS at 11:00

## 2023-04-10 RX ADMIN — Medication 1000 UNITS: at 08:51

## 2023-04-10 RX ADMIN — DOCUSATE SODIUM 50MG AND SENNOSIDES 8.6MG 2 TABLET: 8.6; 5 TABLET, FILM COATED ORAL at 08:51

## 2023-04-10 RX ADMIN — SODIUM BICARBONATE 1300 MG: 650 TABLET ORAL at 18:00

## 2023-04-10 RX ADMIN — FAMOTIDINE 20 MG: 20 TABLET, FILM COATED ORAL at 20:55

## 2023-04-10 RX ADMIN — Medication 1000 UNITS: at 22:50

## 2023-04-10 NOTE — CONSULTS
.              [facility]iative [facility] Services    Palliative Care Initial Consult   Attending Physician: Chico Shen DO  Referring Provider: Chico Shen DO     Reason for Referral: assistance with clarification of goals of care  Family/Support: niece (Jessica)    Code Status and Medical Interventions:   Ordered at: 04/05/23 3204     Medical Intervention Limits:    NO intubation (DNI)     Code Status (Patient has no pulse and is not breathing):    No CPR (Do Not Attempt to Resuscitate)     Medical Interventions (Patient has pulse or is breathing):    Limited Support     Goals of Care: To be treated for acute conditions and return to home setting with home health services and private caregivers.   HPI:   89 y.o. female with history of hypertension, prior history of CVA, obesity, history of melanoma and psoriasis, she had degenerative joint disease, history of gout and glaucoma, hypothyroidism. She resided at home prior to admission.   Patient presented to [facility] on 4/5/2023 related to black and bloody stool, along with abdominal pain. CT abd/pelvis on admission revealed markedly distended bladder, cholelithiasis, small hiatal hernia with duodenal and colonic diverticulosis, no free air or free fluid, no evidence of small bowel obstruction.  RUQ ordered, results reviewed, showing gallbladder sludge without cholelithiasis or sonographic evidence of acute cholecystitis. Consults were placed for GI (GI bleed), urology (urinary retention), surgery (abdominal pain), nephrology (CKD). There was initial plans to consider EGD, however further imaging with CT abd/pelvis with contrast with concerns for diverticulitis so placed on hold. She is currently being treated with Rocephin and Flagyl. Of note, urine culture resulted this morning revealing pseudomonas aeruginosa and klebsiella oxytoca. In addition, stool for evaluation of h pylori is pending. The palliative care team was consulted  for support with goals of care conversation.   Palliative Care Spoke With: patient and HCS  Quality of life:  fair  Functional Status: walker with assist per family  Due to the Palliative Care Topics Discussed: palliative care, goals of care, care options, [facility] and discharge options we will establish an advance care plan.   Advance Care Planning   Advance Care Planning Discussion: see below          Review of Systems   Constitutional: Positive for decreased appetite and malaise/fatigue.   Cardiovascular: Negative for chest pain and dyspnea on exertion.   Respiratory: Negative for shortness of breath.    Gastrointestinal: Positive for abdominal pain and nausea.   Neurological: Positive for weakness.   Psychiatric/Behavioral: Negative for depression. The patient is not nervous/anxious.        1- Pain Assessment  Nonverbal Indicators of Pain: nonverbal indicators absent  Pain Location: abdomen  Pain Description: constant, dull    Past Medical History:   Diagnosis Date   • Acute sinusitis    • Acute upper respiratory infection    • Arthritis    • CKD (chronic kidney disease)    • Debility    • Fatigue    • Foot pain, bilateral    • Glaucoma    • Gout    • Hematuria    • High blood pressure    • Hypertension    • Hypothyroid 09/1999   • Hypothyroidism    • IBS (irritable bowel syndrome)    • IGT (impaired glucose tolerance)    • Malaise and fatigue    • Medication management    • Melanoma 1979    left leg   • OA (osteoarthritis)    • Osteopenia 09/1999   • Painful joint    • Psoriasis    • Renal failure    • Rosacea    • Vitamin D deficiency      Past Surgical History:   Procedure Laterality Date   • CATARACT EXTRACTION     • FOOT SURGERY      mauri toe surgery   • OTHER SURGICAL HISTORY      Melanoma Excision: Left leg     Social History     Socioeconomic History   • Marital status:    Tobacco Use   • Smoking status: Never   • Smokeless tobacco: Never   Vaping Use   • Vaping Use: Unknown   Substance and  Sexual Activity   • Alcohol use: Yes     Alcohol/week: 2.0 standard drinks     Types: 1 Glasses of wine, 1 Shots of liquor per week     Comment: occasionally   • Drug use: No   • Sexual activity: Defer       Current Facility-Administered Medications   Medication Dose Route Frequency Provider Last Rate Last Admin   • acetaminophen (TYLENOL) tablet 650 mg  650 mg Oral Q4H PRN Lady Ag MD   650 mg at 04/09/23 0825   • sennosides-docusate (PERICOLACE) 8.6-50 MG per tablet 2 tablet  2 tablet Oral BID Chico Shen DO   2 tablet at 04/10/23 0851    And   • polyethylene glycol (MIRALAX) packet 17 g  17 g Oral Daily PRN Chico Shen DO        And   • bisacodyl (DULCOLAX) EC tablet 5 mg  5 mg Oral Daily PRN Chico Shen DO        And   • bisacodyl (DULCOLAX) suppository 10 mg  10 mg Rectal Daily PRN Chico Shen DO   10 mg at 04/07/23 0859   • bumetanide (BUMEX) injection 4 mg  4 mg Intravenous Q8H Mark Bejarano MD   4 mg at 04/10/23 0850   • cefTRIAXone (ROCEPHIN) 1 g in sodium chloride 0.9 % 100 mL IVPB-VTB  1 g Intravenous Q24H Chico Shen  mL/hr at 04/09/23 1004 1 g at 04/09/23 1004   • cholecalciferol (VITAMIN D3) tablet 1,000 Units  1,000 Units Oral BID Chico Shen DO   1,000 Units at 04/10/23 0851   • famotidine (PEPCID) tablet 20 mg  20 mg Oral Nightly Maria Luisa Olmedo MD   20 mg at 04/09/23 2006   • gabapentin (NEURONTIN) capsule 100 mg  100 mg Oral Daily Chico Shen DO   100 mg at 04/10/23 0851   • HYDROcodone-acetaminophen (NORCO) 5-325 MG per tablet 1 tablet  1 tablet Oral Q6H PRN Chico Shen DO   1 tablet at 04/10/23 0224   • levothyroxine (SYNTHROID, LEVOTHROID) tablet 88 mcg  88 mcg Oral Q AM Bernard Carrasco MD   88 mcg at 04/10/23 0625   • lidocaine (LIDODERM) 5 % 2 patch  2 patch Transdermal Q24H Chico Shen DO   2 patch at 04/10/23 0851   • magnesium hydroxide (MILK OF MAGNESIA) suspension 10 mL  10 mL Oral Once Shandra Johnson MD       • melatonin tablet 3 mg   3 mg Oral Nightly PRN Lady Ag MD   3 mg at 04/07/23 0121   • metroNIDAZOLE (FLAGYL) IVPB 500 mg  500 mg Intravenous Q8H Chico Shen DO   500 mg at 04/10/23 0055   • nystatin (MYCOSTATIN) powder   Topical Q12H Chico Shen DO   Given at 04/10/23 0852   • ondansetron (ZOFRAN) tablet 4 mg  4 mg Oral Q6H PRN Lady Ag MD        Or   • ondansetron (ZOFRAN) injection 4 mg  4 mg Intravenous Q6H PRN Lady Ag MD   4 mg at 04/10/23 0857   • pantoprazole (PROTONIX) EC tablet 40 mg  40 mg Oral QAM AC Bernard Carrasco MD   40 mg at 04/10/23 0625   • polyethylene glycol (MIRALAX) packet 17 g  17 g Oral Daily Maria Luisa Olmedo MD   17 g at 04/10/23 0850   • simethicone (MYLICON) chewable tablet 80 mg  80 mg Oral 4x Daily PRN Shandra Johnson MD       • sodium bicarbonate tablet 1,300 mg  1,300 mg Oral TID Derek Singleton MD   1,300 mg at 04/10/23 0851   • sodium chloride 0.9 % flush 10 mL  10 mL Intravenous PRN Libai Osborn APRN   10 mL at 04/09/23 2007        •  acetaminophen  •  senna-docusate sodium **AND** polyethylene glycol **AND** bisacodyl **AND** bisacodyl  •  HYDROcodone-acetaminophen  •  melatonin  •  ondansetron **OR** ondansetron  •  simethicone  •  [COMPLETED] Insert Peripheral IV **AND** sodium chloride    Allergies   Allergen Reactions   • Atorvastatin Nausea And Vomiting   • Azithromycin Diarrhea   • Cefdinir Nausea Only     nausea   • Doxycycline Monohydrate Nausea Only   • Allopurinol Rash     Psoriasis  rash     Attest that current medications reviewed  including but not limited to prescriptions, over-the counter, herbals and vitamin/mineral/dietary (nutritional) supplements for name, route of administration, type, dose and frequency and are current using all immediate resources available at time of dictation.      Intake/Output Summary (Last 24 hours) at 4/10/2023 1321  Last data filed at 4/10/2023 0900  Gross per 24 hour   Intake 940 ml  "  Output 2050 ml   Net -1110 ml       Physical Exam:    Diagnostics: Reviewed  /53 (BP Location: Left arm, Patient Position: Lying)   Pulse 66   Temp 98.3 °F (36.8 °C) (Oral)   Resp 16   Ht 154.9 cm (61\")   Wt 94.1 kg (207 lb 7.3 oz)   SpO2 92%   BMI 39.20 kg/m²     Constitutional:       Appearance: Not in distress. Chronically ill-appearing.      Interventions: Nasal cannula in place.      Comments: NC 2L    Pulmonary:      Effort: Pulmonary effort is normal.      Breath sounds: Examination of the right-lower field reveals decreased breath sounds. Examination of the left-lower field reveals decreased breath sounds. Decreased breath sounds present.   Cardiovascular:      PMI at left midclavicular line. Normal rate. Regular rhythm.      Murmurs: There is no murmur.   Edema:     Peripheral edema present.     Pretibial: trace edema of the left pretibial area.  Abdominal:      General: Bowel sounds are normal.      Palpations: Abdomen is soft.      Tenderness: There is generalized abdominal tenderness.   Genitourinary:     Comments: F/c with yellow urine   Neurological:      Mental Status: Alert and oriented to person, place, and time.   Psychiatric:         Mood and Affect: Mood and affect normal.         Speech: Speech normal.         Behavior: Behavior is cooperative.         Thought Content: Thought content normal.         Cognition and Memory: Cognition normal.         Judgment: Judgment normal.       Patient status: Disease state: Controlled with current treatments.  Functional status: Palliative Performance Scale Score: Performance 40% based on the following measures: Ambulation: Mainly in bed, Activity and Evidence of Disease: Unable to do any work, extensive evidence of disease, Self-Care: Mainly assistance required,  Intake: Normal or reduced, LOC: Full, drowsy or confusion   Nutritional status: Albumin 2.7 Body mass index is 39.2 kg/m².    Family support: The patient receives support from her " extended family..  Advance Directives: Advance Directive Status: Patient has advance directive, copy in chart   POA/Healthcare surrogate-niece- Jessica.       Impression/Problem List:    1. GI hemorrhage  2. Abdominal pain   3. UTI    4. Chronic kidney disease stage IV  5. Hypertension  6. History of CVA  7. Hypothyroidism       Recommendations/Plan:  1. Provide support with goals of care  2. [facility] referral    2.  Palliative care encounter  I spoke with patient and her niece/HCS, Jessica at bedside. They have a very good understanding of her medical conditions (acute and chronic), which we reviewed. The patient resides at home alone with 24/7 private caregivers. They report she has had progressive decline in her health over the last several months. In the last several weeks requiring caregivers with assistance with ADLS. Her goal at this time is to be treated for acute conditions,  return to home setting with [facility] (PT/OT) and private caregivers. She would love to have better pain control and to be more mobile so that she attend bridge with her social network. She is very reluctant to any invasive procedures and surgery due to her age. We did discuss palliative care, [facility] and [facility] services. She is interested in [facility] so referral will be placed. I didn't discuss CODE status or medical interventions as this was already addressed. She denies any spiritual concerns. I spoke with Dr Mack and Paula RN.       Thank you for this consult and allowing us to participate in patient's plan of care. Palliative Care Team will sign off and see PRN. .     Time spent: 60 minutes spent reviewing medical and medication records, assessing and examining patient, discussing with family, answering questions, providing some guidance about a plan and documentation of care, and coordinating care with other healthcare members, with > 50% time spent face to face.   \    Shima Palma, APRN  4/10/2023  13:21 EDT

## 2023-04-10 NOTE — PLAN OF CARE
Goal Outcome Evaluation:  Plan of Care Reviewed With: patient     VSS. A/o x4. Pain treated with prn norco. Q 2 turns. Open spot to R gluteal, wound consult placed. Awaiting stool sample. 2L nc.      Progress: no change

## 2023-04-10 NOTE — CASE MANAGEMENT/SOCIAL WORK
Continued Stay Note  [facility]     Patient Name: Marianne Delgado  MRN: [id number]  Today's Date: 4/10/2023    Admit Date: 4/5/2023    Plan: Home with HH  and 24 hour caregiver   Discharge Plan     Row Name 04/10/23 1247       Plan    Plan Home with HH  and 24 hour caregiver    Patient/Family in Agreement with Plan yes    Plan Comments Met wiht pt and niece at bedside who confirm plan for [place] return [place] at discharge with [facility] and 24 hour caregiver.  Will need ambulance at discharge.  No further needs identified.  KATERINA Sampson RN               Discharge Codes    No documentation.                     Rosa Sampson, RN

## 2023-04-10 NOTE — NURSING NOTE
04/10/23 0921   Wound 03/16/23 0100 Right gluteal Pressure Injury   Placement Date/Time: 03/16/23 0100   Present on Hospital Admission: Yes  Side: Right  Location: gluteal  Primary Wound Type: Pressure Injury   Pressure Injury Stage 2   Dressing Appearance open to air   Base clean;red   Periwound intact;blanchable   Edges irregular   Wound Length (cm) 1.5 cm   Wound Width (cm) 1.5 cm   Wound Surface Area (cm^2) 2.25 cm^2   Drainage Amount none     WOCN: Buttock pressure injury combination shear and friction has progressed from stage 1 to stage 2. Patient weighs 207lbs. Needs assist of 2 to reposition. Continue to apply moisture barrier cream.  Patient has accumax pump on mattress. Please call if any further needs.

## 2023-04-10 NOTE — PROGRESS NOTES
Name: Marianne Delgado ADMIT: 2023   : 1925  PCP: Natasha Ramirez APRN    MRN: [id number] LOS: 5 days   AGE/SEX: 89 y.o. female  ROOM: [facility]     Subjective   Subjective   Patient seen and examined this morning.  Hospital day 5.  Morrow catheter placed on  secondary to urinary retention.  She continues to have generalized abdominal pain, relatively unchanged from prior.  Also having ongoing bilateral lower extremity burning type pain, slightly improved from prior.  Was previously having BM, however, she tells me today that she has not had a bowel movement since Saturday.       Objective   Objective   Vital Signs  Temp:  [97.9 °F (36.6 °C)-98.8 °F (37.1 °C)] 98.3 °F (36.8 °C)  Heart Rate:  [69-83] 69  Resp:  [16-24] 24  BP: (127-170)/(41-68) 170/68  SpO2:  [94 %-97 %] 97 %  on  Flow (L/min):  [2] 2;   Device (Oxygen Therapy): nasal cannula  Body mass index is 39.2 kg/m².  Physical Exam  Vitals and nursing note reviewed.   Constitutional:       Appearance: She is obese. She is ill-appearing.   Cardiovascular:      Rate and Rhythm: Normal rate and regular rhythm.      Pulses: Normal pulses.      Heart sounds: Normal heart sounds.   Pulmonary:      Effort: Pulmonary effort is normal. No respiratory distress.      Breath sounds: Normal breath sounds. No wheezing.   Abdominal:      General: Bowel sounds are normal. There is distension.      Palpations: Abdomen is soft.      Tenderness: There is abdominal tenderness. There is no guarding.   Genitourinary:     Comments: Morrow catheter in place  Musculoskeletal:         General: Tenderness (Bilateral lower extremities) present.      Right lower leg: Edema present.      Left lower leg: Edema present.   Skin:     General: Skin is warm and dry.      Coloration: Skin is not jaundiced.   Neurological:      General: No focal deficit present.      Mental Status: She is alert and oriented to person, place, and time.   Psychiatric:         Mood and Affect:  Mood is anxious.       Results Review     I reviewed the patient's new clinical results.  Results from last 7 days   Lab Units 04/10/23  0515 04/09/23  0418 04/08/23  0344 04/07/23  0337   WBC 10*3/mm3 7.47 6.62 7.46 7.30   HEMOGLOBIN g/dL 10.0* 9.8* 10.8* 10.2*   PLATELETS 10*3/mm3 163 148 164 140     Results from last 7 days   Lab Units 04/10/23  0515 04/09/23  0418 04/08/23  0344 04/07/23  0338   SODIUM mmol/L 140 141 138 139  139   POTASSIUM mmol/L 3.5 3.7 4.1 4.3  4.3   CHLORIDE mmol/L 106 110* 109* 113*  113*   CO2 mmol/L 23.1 21.6* 19.0* 16.0*  16.0*   BUN mg/dL 29* 33* 35* 40*  40*   CREATININE mg/dL 2.46* 2.52* 2.16* 2.04*  2.04*   GLUCOSE mg/dL 122* 86 85 96  96   EGFR mL/min/1.73 17.4* 16.9* 20.4* 21.8*  21.8*     Results from last 7 days   Lab Units 04/10/23  0515 04/09/23  0418 04/08/23  0344 04/07/23  0338 04/05/23  1427   ALBUMIN g/dL 2.7* 2.2* 2.9* 2.7* 3.6   BILIRUBIN mg/dL  --   --   --   --  0.4   ALK PHOS U/L  --   --   --   --  108   AST (SGOT) U/L  --   --   --   --  17   ALT (SGPT) U/L  --   --   --   --  10     Results from last 7 days   Lab Units 04/10/23  0515 04/09/23  0418 04/08/23  0344 04/07/23  0338   CALCIUM mg/dL 9.0 9.1 9.4 8.6  8.6   ALBUMIN g/dL 2.7* 2.2* 2.9* 2.7*   MAGNESIUM mg/dL 1.6* 1.7 1.8 1.9   PHOSPHORUS mg/dL 4.0 4.7* 3.8 3.2     Results from last 7 days   Lab Units 04/08/23  1315 04/07/23  1020   LACTATE mmol/L 1.5 0.7     Glucose   Date/Time Value Ref Range Status   04/08/2023 1042 86 70 - 130 mg/dL Final     Comment:     Meter: [id number] : [id number] Raulito FRANK       CT Abdomen Pelvis With Contrast    Result Date: 4/8/2023   1. Presacral edema is present and may reflect proctitis. Colonic diverticulosis. 2. No obstructive uropathy. 3. Cholelithiasis.  This report was finalized on 4/8/2023 2:48 PM by Dr. Zaid Cain M.D.      I have personally reviewed all medications:  Scheduled Medications  bumetanide, 4 mg, Intravenous, Q8H  bumetanide, 4  mg, Intravenous, Q8H  cefTRIAXone, 1 g, Intravenous, Q24H  cholecalciferol, 1,000 Units, Oral, BID  famotidine, 20 mg, Oral, Nightly  gabapentin, 100 mg, Oral, Daily  levothyroxine, 88 mcg, Oral, Q AM  lidocaine, 2 patch, Transdermal, Q24H  metroNIDAZOLE, 500 mg, Intravenous, Q8H  nystatin, , Topical, Q12H  pantoprazole, 40 mg, Oral, QAM AC  polyethylene glycol, 17 g, Oral, Daily  senna-docusate sodium, 2 tablet, Oral, BID  sodium bicarbonate, 1,300 mg, Oral, TID    Infusions   Diet  Diet: Gastrointestinal Diets; Fiber-Restricted, Low Irritant; Texture: Regular Texture (IDDSI 7); Fluid Consistency: Thin (IDDSI 0)    I have personally reviewed:  [x]  Laboratory   [x]  Microbiology   [x]  Radiology   [x]  EKG/Telemetry        Assessment/Plan     Active Hospital Problems    Diagnosis  POA   • **Gastrointestinal hemorrhage, unspecified gastrointestinal hemorrhage type [K92.2]  Yes   • PRABHA (acute kidney injury) [N17.9]  No   • Neuropathic pain [M79.2]  Clinically Undetermined   • Hypomagnesemia [E83.42]  No   • Hypervolemia associated with renal insufficiency [E87.70, N28.9]  Yes   • Acute generalized abdominal pain [R10.84]  No   • Acute UTI (urinary tract infection) [N39.0]  Yes   • Constipation [K59.00]  Yes   • Nausea with vomiting [R11.2]  Yes   • Anemia [D64.9]  Yes   • Acute urinary retention [R33.8]  No   • Obesity (BMI 30-39.9) [E66.9]  Yes   • History of CVA (cerebrovascular accident) [Z86.73]  Not Applicable   • Stage 4 chronic kidney disease (HCC) [N18.4]  Yes   • Essential hypertension [I10]  Yes   • Acquired hypothyroidism [E03.9]  Yes      Resolved Hospital Problems   No resolved problems to display.     89 y.o. female admitted with Gastrointestinal hemorrhage, unspecified gastrointestinal hemorrhage type.    Generalized abdominal pain/intemittent nausea with vomiting/Neuropathic pain  - Interval history: on 04/08, patient having significantly worsening abdominal pain with evidence of rebound/guarding  [Please see A/P from note 04/08 for more detailed report], concerning for acute abdomen.  CT AP ordered, general surgery was consulted. Surgery felt that imaging could likely reflect diverticulitis, so Flagyl was added to treatment regimen. Discussed with GI (04/09), do not feel like she has diverticulitis, could potentially have proctitis, based on CT read, however, nonetheless, treatment with Flagyl would serve to cover any possible anaerobic organisms.  - She is now on ceftriaxone and Flagyl.  She does state that her abdominal symptoms are slightly improved today, however, she continues to have significant abdominal pain, along with abdominal and lower extremity symptoms that seem to be consistent with a neuropathic origin.  I discussed this with patient and her family in the room this morning, they stated that she was recently started on gabapentin as an outpatient for neuropathic pain symptoms.  - Discussed with Dr. Singleton about initiation of gabapentin, given her renal impairment, he agreed that initiation of treatment would likely be of benefit to the patient, stating that we could start at a lower dose and titrate up as needed based on her response and renal function.   - Continue Ceftriaxone and Flagyl.  - Continue Gabapentin 100 mg daily, Norco mg every 6 hours as needed, lidocaine patch.  - Order Lidocaine patch per family request.  - Follow up GI and general surgery plans and recommendations. Monitor patient response to treatment, further management based on clinical course.    Acute blood loss anemia secondary to Gastrointestinal hemorrhage  - Patient endorsing dark stools, prior to arrival, however, had recently been on Pepto-Bismol.  - CT abdomen pelvis without contrast obtained on admission showing markedly distended bladder, cholelithiasis, small hiatal hernia with duodenal and colonic diverticulosis, no free air or free fluid, no evidence of small bowel obstruction.  RUQ ordered, results reviewed,  showing gallbladder sludge without cholelithiasis or sonographic evidence of acute cholecystitis.  - GI consulted and following, they offered EGD, however, patient reportedly was not interested in endoscopy if possible at time of initial exam. However, on 04/07 nurse collected stool, noted presence of blood. FOBT positive.  Another discussion was held with patient, she was then agreeable.  Per GI the prior tentative plan was for EGD this upcoming Monday (04/10), however, given new imaging findings on CT suggestive of possible diverticulitis, I discussed with Dr. Olmedo, going to hold off on scopes at this time.  - Hemoglobin low, but improved from prior.  No indications to warrant changes to treatment regimen at this time.  - Continue to follow GI plans and recommendations. Continue PPI.  - Order repeat CBC in AM for reassessment. Continue to monitor, transfuse for hemoglobin <7.     Acute urinary retention  - Noted during admission, no prior history per patient. Ordered renal US which showed simple appearing left renal cyst, no hydronephrosis.  - Consulted Urology, Morrow catheter placed.  Continue as ordered, follow-up urology plans recommendations.  - Acute urinary retention protocol.     Urinary tract infection  - UA obtained, showing trace leukocyte esterase, 3-5 WBC and 4+ bacteria without squamous epithelial cells.  Patient endorsing increased urinary urgency and frequency prior to arrival. Urine culture showing greater than 100,000 CFU/mL gram negative bacilli, speciation pending. Blood cultures no growth to date.  - Continue empiric ceftriaxone.  - Follow-up urine culture speciation/sensitivities.     Acute kidney injury, not POA  Chronic kidney disease stage 4  Hypervolemia  -  Followed by Dr. Mitchell with [facility] as outpatient.  Creatinine was initially in her baseline, however, on 04/09, creatinine jumped up to 2.52 after receiving contrast from CT scan.  Now, on most recent  labs, creatinine remains elevated above baseline, however, is downtrending, most recently 2.46.  - Nephrology consulted and following.  Continuing treatment with Bumex 4 mg every 8 hours.  Will follow-up their plans and recommendations, greatly appreciate their help.  - Order repeat BMP in AM for reassessment of renal function.   - Will continue to monitor and trend creatinine to guide ongoing management decisions. Avoid nephrotoxic medications, IVF, strict I/O, daily weights.    Hypomagnesemia  - Magnesium low on most recent labs; Will replete via electrolyte protocol. Follow up repeat labs to guide ongoing management decisions. Replete PRN per protocol. Continue to monitor    Hypertension  - BP acceptable acutely. Appears stable. No indications to warrant acute changes/intervention at this time.  - Continue current medications as prescribed. Trend BP to guide ongoing management decisions.     History of CVA  - On Plavix as outpatient, holding on admission due to concerns for possible GI bleeding, can resume when okay with GI.     Hypothyroidism  - Stable. Continue Levothyroxine as prescribed.     Obesity  - BMI 39.11 kg/m2. Complicating all medical problems.    Constipation  - Previously now endorsing constipation. GI going to increase bowel regimen.      · SCDs for DVT prophylaxis.  · Limited code (no CPR, no intubation).  · Discussed with patient, family, nursing staff and consulting provider.  · Anticipate discharge TBD timing yet to be determined.      Chico Shen DO  [facility]  04/10/23

## 2023-04-10 NOTE — PROGRESS NOTES
"  [facility] Progress Note    Chief Complaint: No complaints of new concern    She remained stable.  Various aches and pains but nothing specific.  She is tolerating her catheter well.  Still working on some of her generalized abdominal pain and myalgias.  Urine output has been excellent.  Creatinine trending down a bit but not to its baseline    Review of Systems:    The following systems were reviewed and negative;  respiratory and cardiovascular          Vital Signs  /91 (BP Location: Left arm, Patient Position: Lying)   Pulse 70   Temp 98.1 °F (36.7 °C) (Oral)   Resp 16   Ht 154.9 cm (61\")   Wt 94.1 kg (207 lb 7.3 oz)   SpO2 94%   BMI 39.20 kg/m²     Physical Exam:     General Appearance:    Alert, cooperative, NAD   HEENT:    No trauma, pupils reactive, hearing intact   Back:     No CVA tenderness   Lungs:     Respirations unlabored, no wheezing    Heart:    RRR, intact peripheral pulses   Abdomen:     Soft, NDNT, no masses, no guarding   :   Morrow catheter to atrophic urethra   Extremities:   No edema, no deformity   Lymphatic:   No neck or groin LAD   Skin:   No bleeding, bruising or rashes   Neuro/Psych:   Orientation intact, mood/affect pleasant, no focal findings        Results Review:     I reviewed the patient's new clinical results.  Lab Results (last 24 hours)     Procedure Component Value Units Date/Time    H. Pylori Antigen, Stool - Stool, Per Rectum [[id number]] Collected: 04/10/23 1511    Specimen: Stool from Per Rectum Updated: 04/10/23 1514    Urine Culture - Urine, Urine, Catheter [[id number]]  (Abnormal)  (Susceptibility) Collected: 04/06/23 2309    Specimen: Urine, Catheter Updated: 04/10/23 1108     Urine Culture >100,000 CFU/mL Pseudomonas aeruginosa      >100,000 CFU/mL Klebsiella oxytoca    Narrative:      Colonization of the urinary tract without infection is common. Treatment is discouraged unless the patient is symptomatic, pregnant, or undergoing an invasive urologic " procedure.      Susceptibility      Pseudomonas aeruginosa      DAYO      Cefepime Susceptible      Ceftazidime Susceptible      Ciprofloxacin Susceptible      Gentamicin Susceptible      Levofloxacin Susceptible      Piperacillin + Tazobactam Susceptible                       Susceptibility      Klebsiella oxytoca      DAYO      Ampicillin Resistant     Ampicillin + Sulbactam Intermediate      Cefazolin Susceptible      Cefepime Susceptible      Ceftazidime Susceptible      Ceftriaxone Susceptible      Gentamicin Susceptible      Levofloxacin Susceptible      Nitrofurantoin Susceptible      Piperacillin + Tazobactam Susceptible      Trimethoprim + Sulfamethoxazole Susceptible                           Blood Culture - Blood, Hand, Left [[id number]]  (Normal) Collected: 04/07/23 1020    Specimen: Blood from Hand, Left Updated: 04/10/23 1045     Blood Culture No growth at 3 days    Blood Culture - Blood, Hand, Right [[id number]]  (Normal) Collected: 04/07/23 1028    Specimen: Blood from Hand, Right Updated: 04/10/23 1045     Blood Culture No growth at 3 days    Renal Function Panel [[id number]]  (Abnormal) Collected: 04/10/23 0515    Specimen: Blood Updated: 04/10/23 0610     Glucose 122 mg/dL      BUN 29 mg/dL      Creatinine 2.46 mg/dL      Sodium 140 mmol/L      Potassium 3.5 mmol/L      Chloride 106 mmol/L      CO2 23.1 mmol/L      Calcium 9.0 mg/dL      Albumin 2.7 g/dL      Phosphorus 4.0 mg/dL      Anion Gap 10.9 mmol/L      BUN/Creatinine Ratio 11.8     eGFR 17.4 mL/min/1.73     Narrative:      GFR Normal >60  Chronic Kidney Disease <60  Kidney Failure <15    The GFR formula is only valid for adults with stable renal function between ages 18 and 70.    Uric Acid [[id number]]  (Abnormal) Collected: 04/10/23 0515    Specimen: Blood Updated: 04/10/23 0610     Uric Acid 9.0 mg/dL     Magnesium [[id number]]  (Abnormal) Collected: 04/10/23 0515    Specimen: Blood Updated: 04/10/23 0610     Magnesium 1.6 mg/dL     CBC &  Differential [[id number]]  (Abnormal) Collected: 04/10/23 0515    Specimen: Blood Updated: 04/10/23 0551    Narrative:      The following orders were created for panel order CBC & Differential.  Procedure                               Abnormality         Status                     ---------                               -----------         ------                     CBC Auto Differential[[id number]]        Abnormal            Final result                 Please view results for these tests on the individual orders.    CBC Auto Differential [[id number]]  (Abnormal) Collected: 04/10/23 0515    Specimen: Blood Updated: 04/10/23 0551     WBC 7.47 10*3/mm3      RBC 3.52 10*6/mm3      Hemoglobin 10.0 g/dL      Hematocrit 31.0 %      MCV 88.1 fL      MCH 28.4 pg      MCHC 32.3 g/dL      RDW 12.8 %      RDW-SD 40.6 fl      MPV 10.5 fL      Platelets 163 10*3/mm3      Neutrophil % 74.5 %      Lymphocyte % 11.0 %      Monocyte % 10.2 %      Eosinophil % 2.9 %      Basophil % 0.5 %      Immature Grans % 0.9 %      Neutrophils, Absolute 5.56 10*3/mm3      Lymphocytes, Absolute 0.82 10*3/mm3      Monocytes, Absolute 0.76 10*3/mm3      Eosinophils, Absolute 0.22 10*3/mm3      Basophils, Absolute 0.04 10*3/mm3      Immature Grans, Absolute 0.07 10*3/mm3      nRBC 0.0 /100 WBC         Imaging Results (Last 24 Hours)     ** No results found for the last 24 hours. **          Medication Review:   I have personally reviewed    Current Facility-Administered Medications:   •  acetaminophen (TYLENOL) tablet 650 mg, 650 mg, Oral, Q4H PRN, Stingl, Lady Olivo MD, 650 mg at 04/09/23 0825  •  sennosides-docusate (PERICOLACE) 8.6-50 MG per tablet 2 tablet, 2 tablet, Oral, BID, 2 tablet at 04/10/23 0851 **AND** polyethylene glycol (MIRALAX) packet 17 g, 17 g, Oral, Daily PRN **AND** bisacodyl (DULCOLAX) EC tablet 5 mg, 5 mg, Oral, Daily PRN **AND** bisacodyl (DULCOLAX) suppository 10 mg, 10 mg, Rectal, Daily PRN, Chico Shen, , 10 mg at  04/07/23 0859  •  bumetanide (BUMEX) injection 4 mg, 4 mg, Intravenous, Q8H, Mark Bejarano MD, 4 mg at 04/10/23 0850  •  cholecalciferol (VITAMIN D3) tablet 1,000 Units, 1,000 Units, Oral, BID, Chico Shen DO, 1,000 Units at 04/10/23 0851  •  famotidine (PEPCID) tablet 20 mg, 20 mg, Oral, Nightly, Maria Luisa Olmedo MD, 20 mg at 04/09/23 2006  •  gabapentin (NEURONTIN) capsule 100 mg, 100 mg, Oral, Daily, Chico Shen DO, 100 mg at 04/10/23 0851  •  HYDROcodone-acetaminophen (NORCO) 5-325 MG per tablet 1 tablet, 1 tablet, Oral, Q6H PRN, Chico Shen DO, 1 tablet at 04/10/23 0224  •  levoFLOXacin (LEVAQUIN) tablet 500 mg, 500 mg, Oral, Q48H, Chico Shen DO  •  levothyroxine (SYNTHROID, LEVOTHROID) tablet 88 mcg, 88 mcg, Oral, Q AM, Bernard Carrasco MD, 88 mcg at 04/10/23 0625  •  lidocaine (LIDODERM) 5 % 2 patch, 2 patch, Transdermal, Q24H, Chico Shen DO, 2 patch at 04/10/23 0851  •  melatonin tablet 3 mg, 3 mg, Oral, Nightly PRN, StingLady lucia MD, 3 mg at 04/07/23 0121  •  metroNIDAZOLE (FLAGYL) tablet 500 mg, 500 mg, Oral, Q8H, Chico Shen DO  •  nystatin (MYCOSTATIN) powder, , Topical, Q12H, Chico Shen DO, Given at 04/10/23 0852  •  ondansetron (ZOFRAN) tablet 4 mg, 4 mg, Oral, Q6H PRN **OR** ondansetron (ZOFRAN) injection 4 mg, 4 mg, Intravenous, Q6H PRN, Lady Ag MD, 4 mg at 04/10/23 0857  •  pantoprazole (PROTONIX) EC tablet 40 mg, 40 mg, Oral, DALIAM BARBARA, Bernard Carrasco MD, 40 mg at 04/10/23 0625  •  polyethylene glycol (MIRALAX) packet 17 g, 17 g, Oral, Daily, Maria Luisa Olmedo MD, 17 g at 04/10/23 0850  •  simethicone (MYLICON) chewable tablet 80 mg, 80 mg, Oral, 4x Daily PRN, Shandra Johnson MD  •  sodium bicarbonate tablet 1,300 mg, 1,300 mg, Oral, TID, Derek Singleton MD, 1,300 mg at 04/10/23 0851  •  [COMPLETED] Insert Peripheral IV, , , Once **AND** sodium chloride 0.9 % flush 10 mL, 10 mL, Intravenous, PRN, Libia Osborn APRN, 10 mL at  04/09/23 2007    Allergies:    Atorvastatin, Azithromycin, Cefdinir, Doxycycline monohydrate, and Allopurinol    Assessment:    Active Problems:  Patient Active Problem List   Diagnosis   • Arthritis   • Stage 4 chronic kidney disease (HCC)   • Debility   • Foot pain, bilateral   • Acute idiopathic gout of right ankle   • Hematuria   • Essential hypertension   • Acquired hypothyroidism   • Osteopenia   • Psoriasis   • Rosacea   • Vitamin D deficiency   • Medicare annual wellness visit, subsequent   • Morbidly obese   • History of CVA (cerebrovascular accident)   • Stroke (cerebrum)   • Vitamin B12 deficiency   • Primary open-angle glaucoma, bilateral, severe stage   • Hyperparathyroidism   • Weight loss   • Acute left-sided low back pain without sciatica   • Cholelithiasis   • Diverticulosis   • Gastrointestinal hemorrhage, unspecified gastrointestinal hemorrhage type   • Anemia   • Acute urinary retention   • Obesity (BMI 30-39.9)   • Acute UTI (urinary tract infection)   • Constipation   • Nausea with vomiting   • Acute generalized abdominal pain   • PRABHA (acute kidney injury)   • Neuropathic pain   • Hypomagnesemia   • Hypervolemia associated with renal insufficiency       Urinary retention likely chronic with acute exacerbation due to immobility, constipation and generalized weakness    Plan:    Continue Morrow catheter for now.  Looks as if palliative care may be under evaluation.  No plans for voiding trial quite yet.      Brigido Hui MD    4/10/2023  18:30 EDT

## 2023-04-10 NOTE — PROGRESS NOTES
[facility]  Inpatient Progress Note    Reason for Follow Up: Melena, heme positive stool    Subjective     Interval History:   She reports she feels worse today.  She reports she was getting some improvement yesterday but is back to having significant abdominal pain today.  She is coughing up clear phlegm.  No bowel movement since Saturday.  She feels bloated.    Current Facility-Administered Medications:   •  acetaminophen (TYLENOL) tablet 650 mg, 650 mg, Oral, Q4H PRN, Lady Ag MD, 650 mg at 04/09/23 0825  •  sennosides-docusate (PERICOLACE) 8.6-50 MG per tablet 2 tablet, 2 tablet, Oral, BID, 2 tablet at 04/10/23 0851 **AND** polyethylene glycol (MIRALAX) packet 17 g, 17 g, Oral, Daily PRN **AND** bisacodyl (DULCOLAX) EC tablet 5 mg, 5 mg, Oral, Daily PRN **AND** bisacodyl (DULCOLAX) suppository 10 mg, 10 mg, Rectal, Daily PRN, Chico Shen DO, 10 mg at 04/07/23 0859  •  bumetanide (BUMEX) injection 4 mg, 4 mg, Intravenous, Q8H, Derek Singleton MD, 4 mg at 04/09/23 1925  •  bumetanide (BUMEX) injection 4 mg, 4 mg, Intravenous, Q8H, Mark Bejarano MD, 4 mg at 04/10/23 0850  •  cefTRIAXone (ROCEPHIN) 1 g in sodium chloride 0.9 % 100 mL IVPB-VTB, 1 g, Intravenous, Q24H, Chico Shen DO, Last Rate: 200 mL/hr at 04/09/23 1004, 1 g at 04/09/23 1004  •  cholecalciferol (VITAMIN D3) tablet 1,000 Units, 1,000 Units, Oral, BID, Chico Shen DO, 1,000 Units at 04/10/23 0851  •  famotidine (PEPCID) tablet 20 mg, 20 mg, Oral, Nightly, Maria Luisa Olmedo MD, 20 mg at 04/09/23 2006  •  gabapentin (NEURONTIN) capsule 100 mg, 100 mg, Oral, Daily, Chico Shen DO, 100 mg at 04/10/23 0851  •  HYDROcodone-acetaminophen (NORCO) 5-325 MG per tablet 1 tablet, 1 tablet, Oral, Q6H PRN, Chico Shen DO, 1 tablet at 04/10/23 0224  •  levothyroxine (SYNTHROID, LEVOTHROID) tablet 88 mcg, 88 mcg, Oral, Q AM, Bernard Carrasco MD, 88 mcg at 04/10/23 0625  •  lidocaine (LIDODERM) 5 % 2  patch, 2 patch, Transdermal, Q24H, Chico Shen, DO, 2 patch at 04/10/23 0851  •  melatonin tablet 3 mg, 3 mg, Oral, Nightly PRN, Lady Ag MD, 3 mg at 04/07/23 0121  •  metroNIDAZOLE (FLAGYL) IVPB 500 mg, 500 mg, Intravenous, Q8H, Cali Shenmy, DO, 500 mg at 04/10/23 0055  •  nystatin (MYCOSTATIN) powder, , Topical, Q12H, Chico Shen, DO, Given at 04/10/23 0852  •  ondansetron (ZOFRAN) tablet 4 mg, 4 mg, Oral, Q6H PRN **OR** ondansetron (ZOFRAN) injection 4 mg, 4 mg, Intravenous, Q6H PRN, Lady Ag MD, 4 mg at 04/10/23 0857  •  pantoprazole (PROTONIX) EC tablet 40 mg, 40 mg, Oral, ROSANNA JIMENEZ, Bernard Carrasco MD, 40 mg at 04/10/23 0625  •  polyethylene glycol (MIRALAX) packet 17 g, 17 g, Oral, Daily, Maria Luisa Olmedo MD, 17 g at 04/10/23 0850  •  sodium bicarbonate tablet 1,300 mg, 1,300 mg, Oral, TID, Derek Singleton MD, 1,300 mg at 04/10/23 0851  •  [COMPLETED] Insert Peripheral IV, , , Once **AND** sodium chloride 0.9 % flush 10 mL, 10 mL, Intravenous, PRN, Libia Osborn APRN, 10 mL at 04/09/23 2007  Review of Systems:    Positive for abdominal distention and pain, negative for fever chills    Objective     Vital Signs  Temp:  [97.9 °F (36.6 °C)-98.8 °F (37.1 °C)] 98.3 °F (36.8 °C)  Heart Rate:  [69-83] 69  Resp:  [16-24] 24  BP: (127-170)/(41-68) 170/68  Body mass index is 39.2 kg/m².    Intake/Output Summary (Last 24 hours) at 4/10/2023 0909  Last data filed at 4/10/2023 0617  Gross per 24 hour   Intake 1060 ml   Output 2350 ml   Net -1290 ml     No intake/output data recorded.     Physical Exam:   General: patient awake, alert and cooperative   Eyes: Normal lids and lashes, no scleral icterus   Neck: supple, normal ROM   Skin: warm and dry, not jaundiced   Pulm:regular and unlabored   Abdomen: soft, tender even to the lightest touch with no pressure her-her skin more so than truly abdominal tenderness-this is diffuse across her abdomen not significantly distended;  normal bowel sounds   Extremities: no rash or edema   Psychiatric: Normal mood and behavior; memory intact     Results Review:     I reviewed the patient's new clinical results.    Results from last 7 days   Lab Units 04/10/23  0515 04/09/23  0418 04/08/23  0344   WBC 10*3/mm3 7.47 6.62 7.46   HEMOGLOBIN g/dL 10.0* 9.8* 10.8*   HEMATOCRIT % 31.0* 30.1* 33.4*   PLATELETS 10*3/mm3 163 148 164     Results from last 7 days   Lab Units 04/10/23  0515 04/09/23  0418 04/08/23  0344 04/06/23  0354 04/05/23  1427   SODIUM mmol/L 140 141 138   < > 140   POTASSIUM mmol/L 3.5 3.7 4.1   < > 5.2   CHLORIDE mmol/L 106 110* 109*   < > 107   CO2 mmol/L 23.1 21.6* 19.0*   < > 21.0*   BUN mg/dL 29* 33* 35*   < > 59*   CREATININE mg/dL 2.46* 2.52* 2.16*   < > 2.30*   CALCIUM mg/dL 9.0 9.1 9.4   < > 9.7*   BILIRUBIN mg/dL  --   --   --   --  0.4   ALK PHOS U/L  --   --   --   --  108   ALT (SGPT) U/L  --   --   --   --  10   AST (SGOT) U/L  --   --   --   --  17   GLUCOSE mg/dL 122* 86 85   < > 88    < > = values in this interval not displayed.         Lab Results   Lab Value Date/Time    LIPASE 47 04/05/2023 1427    LIPASE 40 03/16/2023 1343       Radiology:  CT Abdomen Pelvis With Contrast   Final Result       1. Presacral edema is present and may reflect proctitis. Colonic   diverticulosis.   2. No obstructive uropathy.   3. Cholelithiasis.       This report was finalized on 4/8/2023 2:48 PM by Dr. Ziad Cain M.D.           Gallbladder   Final Result   1. Normal-appearing liver   2. Gallbladder sludge, without cholelithiasis or sonographic evidence of   acute cholecystitis   3. Atrophic right kidney.       This report was finalized on 4/6/2023 3:23 PM by Dr. Clive Garduno M.D.          US Renal Bilateral   Final Result   1. Simple appearing left renal cyst, below the size requiring follow-up   2. Atrophic kidneys, without hydronephrosis seen.       This report was finalized on 4/6/2023 3:24 PM by Dr. Clive Garduno M.D.           CT Abdomen Pelvis Without Contrast   Final Result   1. No acute intra-abdominal or pelvic process on this limited   noncontrast exam.   2. Markedly distended bladder.   3. Cholelithiasis.   4. Small hiatal hernia with duodenal and colonic diverticulosis. If GI   symptoms persist endoscopy could be considered for better evaluation.   5. Please see above for additional findings/recommendations.       This report was finalized on 4/5/2023 4:17 PM by Dr. Kapil Cruz M.D.              Assessment & Plan     Active Hospital Problems    Diagnosis    • **Gastrointestinal hemorrhage, unspecified gastrointestinal hemorrhage type    • Acute generalized abdominal pain    • Acute UTI (urinary tract infection)    • Constipation    • Nausea with vomiting    • Anemia    • Acute urinary retention    • Obesity (BMI 30-39.9)    • History of CVA (cerebrovascular accident)    • Stage 4 chronic kidney disease (HCC)    • Essential hypertension    • Acquired hypothyroidism      All problems are new to me today  Assessment:  1.  Normocytic anemia: With a component of iron deficiency anemia     2.  Heme positive stool: Small amount of bright red blood with a bowel movement.  Witnessed bowel movement by MD was completely normal and brown     3.  Antiplatelet therapy: He has been on Plavix     4.  Constipation: Now having bowel movements     5.  Frailty, immobility, advanced age     6.  Abdominal pain: Diffuse, inconsistent exam with her flinching to the very lightest of touches superficially but no reaction to me completely grabbing subcutaneous tissue at other times.  Reassuring imaging.?  Neuropathic versus other      Plan:  · Hemoglobin stable  · Continue PPI, H2 blocker  · Follow-up results of H. pylori stool antigen-treat if positive  · Continue bowel regimen-we will give dose of MOM today given mild constipation with bloating which may be exacerbating her symptoms.  · Given age and frailty, risks of aggressive intervention far  outweigh the benefits.  Surgery/internal medicine recommendations reviewed    I discussed the patients findings and my recommendations with patient, family and primary care team.    Shandra Johnson MD

## 2023-04-10 NOTE — PROGRESS NOTES
"General Surgery Progress Note       S: No major changes.  Still having abdominal pain.  Palliative care meeting with patient today.  Continues to tolerate diet.    O:/91 (BP Location: Left arm, Patient Position: Lying)   Pulse 70   Temp 98.1 °F (36.7 °C) (Oral)   Resp 16   Ht 154.9 cm (61\")   Wt 94.1 kg (207 lb 7.3 oz)   SpO2 92%   BMI 39.20 kg/m²     Intake & Output (last day)       04/09 0701  04/10 0700 04/10 0701 04/11 0700    P.O. 1200 480    I.V. (mL/kg)      IV Piggyback 100 200    Total Intake(mL/kg) 1300 (13.8) 680 (7.2)    Urine (mL/kg/hr) 2350 (1)     Stool      Total Output 2350     Net -1050 +680                GENERAL: awake, alert, no apparent distress  HEENT: normochephalic, atraumatic, moist mucus membranes, clear sclera   CHEST: clear to auscultation, no wheezes, no increased work of breathing  CARDIAC: regular rate and rhythm    ABDOMEN: Soft, diffusely tender, nondistended  EXTREMITIES: no cyanosis, clubbing or edema    SKIN: Warm and moist, no rashes    LABS  Results from last 7 days   Lab Units 04/10/23  0515 04/09/23  0418 04/08/23  0344   WBC 10*3/mm3 7.47 6.62 7.46   HEMOGLOBIN g/dL 10.0* 9.8* 10.8*   HEMATOCRIT % 31.0* 30.1* 33.4*   PLATELETS 10*3/mm3 163 148 164     Results from last 7 days   Lab Units 04/10/23  0515 04/09/23  0418 04/08/23  0344 04/06/23  0354 04/05/23  1427   SODIUM mmol/L 140 141 138   < > 140   POTASSIUM mmol/L 3.5 3.7 4.1   < > 5.2   CHLORIDE mmol/L 106 110* 109*   < > 107   CO2 mmol/L 23.1 21.6* 19.0*   < > 21.0*   BUN mg/dL 29* 33* 35*   < > 59*   CREATININE mg/dL 2.46* 2.52* 2.16*   < > 2.30*   CALCIUM mg/dL 9.0 9.1 9.4   < > 9.7*   BILIRUBIN mg/dL  --   --   --   --  0.4   ALK PHOS U/L  --   --   --   --  108   ALT (SGPT) U/L  --   --   --   --  10   AST (SGOT) U/L  --   --   --   --  17   GLUCOSE mg/dL 122* 86 85   < > 88    < > = values in this interval not displayed.     Results from last 7 days   Lab Units 04/05/23  1427   APTT seconds <20.0* "         A/P: 89 y.o. female with abdominal pain.  I currently see no indications for surgical intervention.  Additionally, given her age and frailty, I do not think she would do well with surgical intervention or the postoperative recovery.  She verbalized understanding of this as well.  Looking at her CT, she could have a component of chronic mesenteric ischemia.  I discussed with Dr. Shen, and have ordered a mesenteric duplex ultrasound to evaluate for this.    Continue diet as tolerated.  Complete antibiotic course, stop date 4/12/2023.      REUBEN KAY MD  General, Robotic and Endoscopic Surgery  [facility]    [address], [address]  [place], [place], [address]  P: [phone]  F: [phone]

## 2023-04-10 NOTE — PLAN OF CARE
Goal Outcome Evaluation:  Plan of Care Reviewed With: patient        Progress: improving   Vital signs stable. Alert and oriented x 3. Niece visited earlier today. Turned every 2 hours as tolerated. Urinary catheter patent to bedside drainage. Urine clear yellow. IV bumex given as ordered. IV antibiotics given but changed to oral route this afternoon. Tolerating consistent carbohydrate diet fair. Blood sugar monitoring done AC/HS. IV zofran given for complaint of nausea. On 2 liters oxygen per nasal cannula. Denies shortness of breath. Normal sinus cardiac rhythm. Discharge in a few days back to assisted living center. Will continue to monitor.

## 2023-04-10 NOTE — PROGRESS NOTES
[facility] Progress Note      Patient Name: Marianne Delgado  : 1925  MRN: [id number]  Primary Care Physician:  Natasha Ramirez APRN  Date of admission: 2023    Subjective     Interval History:   Follow-up chronic kidney disease    Seen and examined.  Denies shortness of air or chest pain.  No new issues.      Review of Systems:   As noted above    Objective     Vitals:   Temp:  [97.9 °F (36.6 °C)-98.8 °F (37.1 °C)] 98.8 °F (37.1 °C)  Heart Rate:  [69-83] 83  Resp:  [16-20] 20  BP: (123-161)/(41-67) 161/67  Flow (L/min):  [2] 2    Intake/Output Summary (Last 24 hours) at 4/10/2023 0657  Last data filed at 4/10/2023 0617  Gross per 24 hour   Intake 1300 ml   Output 2350 ml   Net -1050 ml       Physical Exam:    General Appearance: Obese, chronically ill and frail, awake and  Skin: warm and dry  HEENT: oral mucosa normal, nonicteric sclera  Neck: supple, no JVD  Lungs: Scattered rhonchi, breathing effort unlabored  Heart: RRR, normal S1 and S2, no S3, no  Abdomen: soft, diffuse tenderness, even to touch, normoactive bowel  : Fully catheter is anchored  Extremities: 2+ edema  Neuro: Moving all extremity    Scheduled Meds:     bumetanide, 4 mg, Intravenous, Q8H  cefTRIAXone, 1 g, Intravenous, Q24H  cholecalciferol, 1,000 Units, Oral, BID  famotidine, 20 mg, Oral, Nightly  gabapentin, 100 mg, Oral, Daily  levothyroxine, 88 mcg, Oral, Q AM  lidocaine, 2 patch, Transdermal, Q24H  metroNIDAZOLE, 500 mg, Intravenous, Q8H  nystatin, , Topical, Q12H  pantoprazole, 40 mg, Oral, QAM AC  polyethylene glycol, 17 g, Oral, Daily  senna-docusate sodium, 2 tablet, Oral, BID  sodium bicarbonate, 1,300 mg, Oral, TID      IV Meds:        Results Reviewed:   I have personally reviewed the results from the time of this admission to 4/10/2023 06:57 EDT     Results from last 7 days   Lab Units 04/10/23  0515 23  0418 23  0344 23  0354 23  1427   SODIUM mmol/L 140 141  138   < > 140   POTASSIUM mmol/L 3.5 3.7 4.1   < > 5.2   CHLORIDE mmol/L 106 110* 109*   < > 107   CO2 mmol/L 23.1 21.6* 19.0*   < > 21.0*   BUN mg/dL 29* 33* 35*   < > 59*   CREATININE mg/dL 2.46* 2.52* 2.16*   < > 2.30*   CALCIUM mg/dL 9.0 9.1 9.4   < > 9.7*   BILIRUBIN mg/dL  --   --   --   --  0.4   ALK PHOS U/L  --   --   --   --  108   ALT (SGPT) U/L  --   --   --   --  10   AST (SGOT) U/L  --   --   --   --  17   GLUCOSE mg/dL 122* 86 85   < > 88    < > = values in this interval not displayed.       Estimated Creatinine Clearance: 13.7 mL/min (A) (by C-G formula based on SCr of 2.46 mg/dL (H)).    Results from last 7 days   Lab Units 04/10/23  0515 04/09/23  0418 04/08/23  0344   MAGNESIUM mg/dL 1.6* 1.7 1.8   PHOSPHORUS mg/dL 4.0 4.7* 3.8       Results from last 7 days   Lab Units 04/10/23  0515 04/09/23  0418 04/08/23  0344 04/07/23  0338   URIC ACID mg/dL 9.0* 8.8* 8.2* 7.3*       Results from last 7 days   Lab Units 04/10/23  0515 04/09/23  0418 04/08/23  0344 04/07/23  0337 04/06/23  0354   WBC 10*3/mm3 7.47 6.62 7.46 7.30 7.54   HEMOGLOBIN g/dL 10.0* 9.8* 10.8* 10.2* 11.1*   PLATELETS 10*3/mm3 163 148 164 140 149             Assessment / Plan     ASSESSMENT:  -Chronic kidney disease stage IV associated with nephrosclerosis and advanced age, creatinine is up to 2.52 probably related to contrast-induced nephropathy after having IV contrast for her CT scan to evaluate her severe abdominal pain.  Her electrolyte within acceptable range.  Continue to have excessive fluid.  -Metabolic acidosis with normal anion gap associated with the chronic kidney disease  -Urinary retention which is acute  -Abdominal pain could be related to her urinary retention  -History of black tarry stools with recent intake of Pepto-Bismol being evaluated by GI  -Iron deficiency anemia hemoglobin 9.8, and her iron saturation 12% also has probably component of chronic kidney disease  -Probable neuropathic pain    PLAN:    -Continue  bumetanide 4 mg every 8 hours  -Surveillance labs      I discussed the case with Dr. Shen  Also I discussed the case with the patient and her niece at the bedside.    Thank you for involving us in the care of Marianne Delgado.  Please feel free to call with any questions.    Mark Bejarano MD  04/10/23  06:57 EDT    [facility]  [phone]    Please note that portions of this note were completed with a voice recognition program.

## 2023-04-11 ENCOUNTER — APPOINTMENT (OUTPATIENT)
Dept: CARDIOLOGY | Facility: HOSPITAL | Age: 88
DRG: 378 | End: 2023-04-11
Payer: MEDICARE

## 2023-04-11 LAB
ANION GAP SERPL CALCULATED.3IONS-SCNC: 9.1 MMOL/L (ref 5–15)
BASOPHILS # BLD AUTO: 0.03 10*3/MM3 (ref 0–0.2)
BASOPHILS NFR BLD AUTO: 0.4 % (ref 0–1.5)
BH CV VAS SMA AORTA EDV: 0 CM/S
BH CV VAS SMA AORTA PSV: 62 CM/S
BH CV VAS SMA CELIAC DIST EDV: 19 CM/S
BH CV VAS SMA CELIAC DIST PSV: 105 CM/S
BH CV VAS SMA CELIAC ORIGIN EDV: 18 CM/S
BH CV VAS SMA CELIAC ORIGIN PSV: 111 CM/S
BH CV VAS SMA CELIAC PROX EDV: 18 CM/S
BH CV VAS SMA CELIAC PROX PSV: 108 CM/S
BH CV VAS SMA HEPATIC EDV: 14 CM/S
BH CV VAS SMA HEPATIC PSV: 95 CM/S
BH CV VAS SMA IMA EDV: 0 CM/S
BH CV VAS SMA IMA PSV: 152 CM/S
BH CV VAS SMA ORIGIN EDV: 0 CM/S
BH CV VAS SMA ORIGIN PSV: 174 CM/S
BH CV VAS SMA SMA DIST EDV: 0 CM/S
BH CV VAS SMA SMA DIST PSV: 95 CM/S
BH CV VAS SMA SMA MID EDV: 0 CM/S
BH CV VAS SMA SMA MID PSV: 101 CM/S
BH CV VAS SMA SMA PROX EDV: 0 CM/S
BH CV VAS SMA SMA PROX PSV: 169 CM/S
BH CV VAS SMA SPLENIC EDV: 20 CM/S
BH CV VAS SMA SPLENIC PSV: 95 CM/S
BUN SERPL-MCNC: 26 MG/DL (ref 8–23)
BUN/CREAT SERPL: 10.1 (ref 7–25)
CALCIUM SPEC-SCNC: 9.5 MG/DL (ref 8.2–9.6)
CHLORIDE SERPL-SCNC: 102 MMOL/L (ref 98–107)
CO2 SERPL-SCNC: 27.9 MMOL/L (ref 22–29)
CREAT SERPL-MCNC: 2.57 MG/DL (ref 0.57–1)
DEPRECATED RDW RBC AUTO: 40.2 FL (ref 37–54)
EGFRCR SERPLBLD CKD-EPI 2021: 16.5 ML/MIN/1.73
EOSINOPHIL # BLD AUTO: 0.2 10*3/MM3 (ref 0–0.4)
EOSINOPHIL NFR BLD AUTO: 2.4 % (ref 0.3–6.2)
ERYTHROCYTE [DISTWIDTH] IN BLOOD BY AUTOMATED COUNT: 12.8 % (ref 12.3–15.4)
GLUCOSE SERPL-MCNC: 94 MG/DL (ref 65–99)
HCT VFR BLD AUTO: 33.2 % (ref 34–46.6)
HGB BLD-MCNC: 10.6 G/DL (ref 12–15.9)
IMM GRANULOCYTES # BLD AUTO: 0.09 10*3/MM3 (ref 0–0.05)
IMM GRANULOCYTES NFR BLD AUTO: 1.1 % (ref 0–0.5)
LYMPHOCYTES # BLD AUTO: 0.91 10*3/MM3 (ref 0.7–3.1)
LYMPHOCYTES NFR BLD AUTO: 11 % (ref 19.6–45.3)
MAGNESIUM SERPL-MCNC: 1.7 MG/DL (ref 1.7–2.3)
MAXIMAL PREDICTED HEART RATE: 123 BPM
MCH RBC QN AUTO: 28 PG (ref 26.6–33)
MCHC RBC AUTO-ENTMCNC: 31.9 G/DL (ref 31.5–35.7)
MCV RBC AUTO: 87.6 FL (ref 79–97)
MONOCYTES # BLD AUTO: 0.86 10*3/MM3 (ref 0.1–0.9)
MONOCYTES NFR BLD AUTO: 10.4 % (ref 5–12)
NEUTROPHILS NFR BLD AUTO: 6.17 10*3/MM3 (ref 1.7–7)
NEUTROPHILS NFR BLD AUTO: 74.7 % (ref 42.7–76)
NRBC BLD AUTO-RTO: 0 /100 WBC (ref 0–0.2)
PLATELET # BLD AUTO: 181 10*3/MM3 (ref 140–450)
PMV BLD AUTO: 10.7 FL (ref 6–12)
POTASSIUM SERPL-SCNC: 3.4 MMOL/L (ref 3.5–5.2)
RBC # BLD AUTO: 3.79 10*6/MM3 (ref 3.77–5.28)
SODIUM SERPL-SCNC: 139 MMOL/L (ref 136–145)
STRESS TARGET HR: 105 BPM
WBC NRBC COR # BLD: 8.26 10*3/MM3 (ref 3.4–10.8)

## 2023-04-11 PROCEDURE — 93975 VASCULAR STUDY: CPT

## 2023-04-11 PROCEDURE — 85025 COMPLETE CBC W/AUTO DIFF WBC: CPT | Performed by: INTERNAL MEDICINE

## 2023-04-11 PROCEDURE — 80048 BASIC METABOLIC PNL TOTAL CA: CPT | Performed by: STUDENT IN AN ORGANIZED HEALTH CARE EDUCATION/TRAINING PROGRAM

## 2023-04-11 PROCEDURE — 25010000002 ONDANSETRON PER 1 MG: Performed by: INTERNAL MEDICINE

## 2023-04-11 PROCEDURE — 99232 SBSQ HOSP IP/OBS MODERATE 35: CPT | Performed by: INTERNAL MEDICINE

## 2023-04-11 PROCEDURE — 83735 ASSAY OF MAGNESIUM: CPT | Performed by: STUDENT IN AN ORGANIZED HEALTH CARE EDUCATION/TRAINING PROGRAM

## 2023-04-11 RX ORDER — TORSEMIDE 100 MG/1
100 TABLET ORAL DAILY
Status: DISCONTINUED | OUTPATIENT
Start: 2023-04-11 | End: 2023-04-13

## 2023-04-11 RX ORDER — POTASSIUM CHLORIDE 750 MG/1
40 TABLET, FILM COATED, EXTENDED RELEASE ORAL ONCE
Status: COMPLETED | OUTPATIENT
Start: 2023-04-11 | End: 2023-04-11

## 2023-04-11 RX ADMIN — PANTOPRAZOLE SODIUM 40 MG: 40 TABLET, DELAYED RELEASE ORAL at 06:39

## 2023-04-11 RX ADMIN — FAMOTIDINE 20 MG: 20 TABLET, FILM COATED ORAL at 21:53

## 2023-04-11 RX ADMIN — Medication 1000 UNITS: at 21:53

## 2023-04-11 RX ADMIN — TORSEMIDE 100 MG: 100 TABLET ORAL at 13:35

## 2023-04-11 RX ADMIN — Medication 1000 UNITS: at 13:32

## 2023-04-11 RX ADMIN — BUMETANIDE 4 MG: 0.25 INJECTION INTRAMUSCULAR; INTRAVENOUS at 00:11

## 2023-04-11 RX ADMIN — NYSTATIN: 100000 POWDER TOPICAL at 22:03

## 2023-04-11 RX ADMIN — METRONIDAZOLE 500 MG: 500 TABLET, FILM COATED ORAL at 21:53

## 2023-04-11 RX ADMIN — METRONIDAZOLE 500 MG: 500 TABLET, FILM COATED ORAL at 13:32

## 2023-04-11 RX ADMIN — LEVOTHYROXINE SODIUM 88 MCG: 0.09 TABLET ORAL at 06:39

## 2023-04-11 RX ADMIN — POTASSIUM CHLORIDE 40 MEQ: 750 TABLET, EXTENDED RELEASE ORAL at 13:34

## 2023-04-11 RX ADMIN — LIDOCAINE 2 PATCH: 700 PATCH TOPICAL at 13:33

## 2023-04-11 RX ADMIN — ONDANSETRON 4 MG: 2 INJECTION INTRAMUSCULAR; INTRAVENOUS at 06:39

## 2023-04-11 RX ADMIN — GABAPENTIN 100 MG: 100 CAPSULE ORAL at 13:33

## 2023-04-11 RX ADMIN — NYSTATIN 1 APPLICATION: 100000 POWDER TOPICAL at 09:00

## 2023-04-11 NOTE — PROGRESS NOTES
Name: Marianne Delgado ADMIT: 2023   : 1925  PCP: Natasha Ramirez APRN    MRN: [id number] LOS: 6 days   AGE/SEX: 89 y.o. female  ROOM: [facility]     Subjective   Subjective   Patient seen and examined this morning.  Hospital day 6.  Morrow catheter placed on  secondary to urinary retention.    Today, patient states she is feeling slightly better when compared to prior, does continue to have generalized abdominal pain, however, slightly improved.  Lower extremity neuropathic symptoms improved after starting gabapentin.        Objective   Objective   Vital Signs  Temp:  [97.2 °F (36.2 °C)-98.6 °F (37 °C)] 98.4 °F (36.9 °C)  Heart Rate:  [64-72] 69  Resp:  [16-18] 18  BP: (134-167)/(53-91) 134/64  SpO2:  [92 %-97 %] 96 %  on  Flow (L/min):  [2] 2;   Device (Oxygen Therapy): nasal cannula  Body mass index is 38.74 kg/m².  Physical Exam  Vitals and nursing note reviewed.   Constitutional:       Appearance: She is obese. She is ill-appearing.   Cardiovascular:      Rate and Rhythm: Normal rate and regular rhythm.      Pulses: Normal pulses.      Heart sounds: Normal heart sounds.   Pulmonary:      Effort: Pulmonary effort is normal. No respiratory distress.      Breath sounds: Normal breath sounds. No wheezing.   Abdominal:      General: Bowel sounds are normal. There is distension.      Palpations: Abdomen is soft.      Tenderness: There is abdominal tenderness. There is no guarding.   Genitourinary:     Comments: Morrow catheter in place  Musculoskeletal:         General: No tenderness (Bilateral lower extremities, previously noted, resolved at time of my examination today).      Right lower leg: Edema present.      Left lower leg: Edema present.   Skin:     General: Skin is warm and dry.      Coloration: Skin is not jaundiced.   Neurological:      General: No focal deficit present.      Mental Status: She is alert and oriented to person, place, and time.       Results Review     I reviewed the  patient's new clinical results.  Results from last 7 days   Lab Units 04/11/23  0628 04/10/23  0515 04/09/23  0418 04/08/23  0344   WBC 10*3/mm3 8.26 7.47 6.62 7.46   HEMOGLOBIN g/dL 10.6* 10.0* 9.8* 10.8*   PLATELETS 10*3/mm3 181 163 148 164     Results from last 7 days   Lab Units 04/11/23  0628 04/10/23  0515 04/09/23  0418 04/08/23  0344   SODIUM mmol/L 139 140 141 138   POTASSIUM mmol/L 3.4* 3.5 3.7 4.1   CHLORIDE mmol/L 102 106 110* 109*   CO2 mmol/L 27.9 23.1 21.6* 19.0*   BUN mg/dL 26* 29* 33* 35*   CREATININE mg/dL 2.57* 2.46* 2.52* 2.16*   GLUCOSE mg/dL 94 122* 86 85   EGFR mL/min/1.73 16.5* 17.4* 16.9* 20.4*     Results from last 7 days   Lab Units 04/10/23  0515 04/09/23  0418 04/08/23  0344 04/07/23  0338 04/05/23  1427   ALBUMIN g/dL 2.7* 2.2* 2.9* 2.7* 3.6   BILIRUBIN mg/dL  --   --   --   --  0.4   ALK PHOS U/L  --   --   --   --  108   AST (SGOT) U/L  --   --   --   --  17   ALT (SGPT) U/L  --   --   --   --  10     Results from last 7 days   Lab Units 04/11/23  0628 04/10/23  0515 04/09/23  0418 04/08/23  0344 04/07/23  0338   CALCIUM mg/dL 9.5 9.0 9.1 9.4 8.6  8.6   ALBUMIN g/dL  --  2.7* 2.2* 2.9* 2.7*   MAGNESIUM mg/dL 1.7 1.6* 1.7 1.8 1.9   PHOSPHORUS mg/dL  --  4.0 4.7* 3.8 3.2     Results from last 7 days   Lab Units 04/08/23  1315 04/07/23  1020   LACTATE mmol/L 1.5 0.7     Glucose   Date/Time Value Ref Range Status   04/08/2023 1042 86 70 - 130 mg/dL Final     Comment:     Meter: [id number] : [id number] Raulito FRANK       No radiology results for the last day  I have personally reviewed all medications:  Scheduled Medications  cholecalciferol, 1,000 Units, Oral, BID  famotidine, 20 mg, Oral, Nightly  gabapentin, 100 mg, Oral, Daily  levoFLOXacin, 500 mg, Oral, Q48H  levothyroxine, 88 mcg, Oral, Q AM  lidocaine, 2 patch, Transdermal, Q24H  metroNIDAZOLE, 500 mg, Oral, Q8H  nystatin, , Topical, Q12H  pantoprazole, 40 mg, Oral, QAM AC  polyethylene glycol, 17 g, Oral,  Daily  potassium chloride, 40 mEq, Oral, Once  senna-docusate sodium, 2 tablet, Oral, BID  torsemide, 100 mg, Oral, Daily    Infusions   Diet  Diet: Gastrointestinal Diets; Fiber-Restricted, Low Irritant; Texture: Regular Texture (IDDSI 7); Fluid Consistency: Thin (IDDSI 0)    I have personally reviewed:  [x]  Laboratory   [x]  Microbiology   [x]  Radiology   [x]  EKG/Telemetry        Assessment/Plan     Active Hospital Problems    Diagnosis  POA   • **Gastrointestinal hemorrhage, unspecified gastrointestinal hemorrhage type [K92.2]  Yes   • PRABHA (acute kidney injury) [N17.9]  No   • Neuropathic pain [M79.2]  Clinically Undetermined   • Hypomagnesemia [E83.42]  No   • Hypervolemia associated with renal insufficiency [E87.70, N28.9]  Yes   • Acute generalized abdominal pain [R10.84]  No   • Acute UTI (urinary tract infection) [N39.0]  Yes   • Constipation [K59.00]  Yes   • Nausea with vomiting [R11.2]  Yes   • Anemia [D64.9]  Yes   • Acute urinary retention [R33.8]  No   • Obesity (BMI 30-39.9) [E66.9]  Yes   • History of CVA (cerebrovascular accident) [Z86.73]  Not Applicable   • Stage 4 chronic kidney disease (HCC) [N18.4]  Yes   • Essential hypertension [I10]  Yes   • Acquired hypothyroidism [E03.9]  Yes      Resolved Hospital Problems   No resolved problems to display.     89 y.o. female admitted with Gastrointestinal hemorrhage, unspecified gastrointestinal hemorrhage type.    Generalized abdominal pain/intemittent nausea with vomiting  - Interval history: on 04/08, patient having significantly worsening abdominal pain with evidence of rebound/guarding [Please see A/P from note 04/08 for more detailed report], concerning for acute abdomen.  CT AP ordered, general surgery was consulted. Surgery felt that imaging could likely reflect diverticulitis, so Flagyl was added to treatment regimen. Discussed with GI (04/09), do not feel like she has diverticulitis, could potentially have proctitis, based on CT read,  however, nonetheless, treatment with Flagyl would serve to cover any possible anaerobic organisms.  - She had been on treatment with Ceftriaxone (empiric in setting of UTI) and Flagyl. However, abdominal pain symptoms have persisted, discussed with Dr. Mack on 04/10, had concern that patient could potentially have component of chronic mesenteric ischemia so further imaging was ordered. GI ordered testing for H. Pylori.   - Update (04/11): Results of CT and H. Pylori pending, patient continues to have ongoing abdominal pain symptoms, generalized in nature, with bloating, intermittent constipation, however, she does not that symptoms are slightly less severe when compared to prior. She remains on Flagyl, and Levaquin (started in place of ceftriaxone on 04/10, per pharmacy recommendations, based on urine culture results).  - General Surgery most recent plans: Follow up results of CT for evaluation of possible chronic mesenteric ischemia.  - GI most recent plans: Continue PPI, H2 blocker, follow up results of H. Pylori antigen testing, increase bowel regimen in setting of mild constipation, follow up flow studies of mesenteric vessels.  - Continue PRN Norco, lidocaine patch as prescribed. Gabapentin for neuropathic symptoms as discussed elsewhere.  - Continue Levaquin in setting of concomitant UTI and Flagyl per general surgery recommendations.  - Follow up consultants plans/recommendations, follow up imaging/lab studies. Further management based on testing results and clinical course.  - Follow up GI and general surgery plans and recommendations. Monitor patient response to treatment, further management based on clinical course.    Urinary tract infection  - UA obtained, showing trace leukocyte esterase, 3-5 WBC and 4+ bacteria without squamous epithelial cells.  Patient endorsing increased urinary urgency and frequency prior to arrival. Started on empiric treatment with Ceftriaxone. Urine culture showing greater  than 100,000 CFU/mL gram negative bacilli, final speciation returned on 04/10 showing >100K Pseudomonas and Klebsiella. Discussed with pharmacy, they recommended change to Levaquin, which was subsequently ordered. Given that Ceftriaxone not cover Pseudomonas, will consider 04/10 to be day 1 of treatment, with plan to treat 5-7 days based on patient response. Blood cultures no growth to date.  - Continue antibiotics as prescribed (renally dosed per pharmacy).    Acute kidney injury, not POA  Chronic kidney disease stage 4  Hypervolemia associated with renal insufficiency  -  Follow[facility] Dr. Mitchell [facility] as outpatient.    - Nephrology consulted and following. Creatinine was initially at her baseline, however, on 04/09, creatinine jumped up to 2.52 after receiving contrast from CT scan.  Remains elevated at present, most recently 2.57. They are changing her diuretic regimen from Bumex 4 mg every 8 hours to Torsemide 100 mg PO daily at this time. Also, going to discuss with Urology about voiding trial.  - Will continue to follow-up their plans and recommendations, greatly appreciate their help.  - Order repeat BMP in AM for reassessment of renal function.   - Will continue to monitor and trend creatinine to guide ongoing management decisions. Avoid nephrotoxic medications, IVF, strict I/O, daily weights.    Acute blood loss anemia secondary to Gastrointestinal hemorrhage  - Patient endorsing dark stools, prior to arrival, however, had recently been on Pepto-Bismol.  - CT abdomen pelvis without contrast obtained on admission showing markedly distended bladder, cholelithiasis, small hiatal hernia with duodenal and colonic diverticulosis, no free air or free fluid, no evidence of small bowel obstruction.  RUQ ordered, results reviewed, showing gallbladder sludge without cholelithiasis or sonographic evidence of acute cholecystitis.  - GI consulted and following, they offered EGD,  however, patient reportedly was not interested in endoscopy if possible at time of initial exam. However, on 04/07 nurse collected stool, noted presence of blood. FOBT positive.  Another discussion was held with patient, she was then agreeable.  Per GI the prior tentative plan was for EGD on 04/10, however, given new imaging findings on CT suggestive of possible diverticulitis, I discussed with Dr. Olmedo, going to hold off on scopes at this time.  - Hemoglobin low, but stable.  No indications to warrant changes to treatment regimen at this time.  - Continue to follow GI plans and recommendations. Continue PPI.  - Order repeat CBC in AM for reassessment. Continue to monitor, transfuse for hemoglobin <7.     Acute urinary retention  - Noted during admission, no prior history per patient. Ordered renal US which showed simple appearing left renal cyst, no hydronephrosis.  - Consulted Urology, Morrow catheter placed.  Continue as ordered, Nephrology reaching out to see when voiding trial can be performed.  - Acute urinary retention protocol.    Lower extremity neuropathy  - Noted during hospital stay. Discussed with Dr. Singleton about initiation of gabapentin, given her renal impairment, he agreed that initiation of treatment would likely be of benefit to the patient, stating that we could start at a lower dose and titrate up as needed based on her response and renal function. subsequently started on gabapentin 100 mg daily, which has led to significant improvement.  - Continue as prescribed and monitor for patient response.    Hypokalemia  - K low on most recent labs; Will replete via electrolyte protocol. Follow up repeat labs to guide ongoing management decisions. Replete PRN per protocol. Continue to monitor    Hypertension  - BP acceptable acutely. Appears stable. No indications to warrant acute changes/intervention at this time.  - Continue current medications as prescribed. Trend BP to guide ongoing management  decisions.     History of CVA  - On Plavix as outpatient, holding on admission due to concerns for possible GI bleeding, can resume when okay with GI.     Hypothyroidism  - Stable. Continue Levothyroxine as prescribed.     Obesity  - BMI 39.11 kg/m2. Complicating all medical problems.    Constipation  - Noted previously, bowel regimen increased.  Continue to monitor for now.    Hypomagnesemia, resolved  - Magnesium low previously and repleted via electrolyte protocol. Follow up repeat labs to guide ongoing management decisions. Replete PRN per protocol. Continue to monitor    · SCDs for DVT prophylaxis.  · Limited code (no CPR, no intubation).  · Discussed with patient, family, nursing staff and consulting provider.  · Anticipate discharge TBD timing yet to be determined.      Chico Shen DO  [facility]  04/11/23

## 2023-04-11 NOTE — PROGRESS NOTES
[facility]  Inpatient Progress Note    Reason for Follow Up: Abdominal pain constipation    Subjective     Interval History:   She had 2 bowel movements last night with a bowel regimen given to her yesterday, still with nausea a bit of vomiting awaiting Doppler flow studies    Current Facility-Administered Medications:   •  acetaminophen (TYLENOL) tablet 650 mg, 650 mg, Oral, Q4H PRN, Lady Ag MD, 650 mg at 04/09/23 0825  •  sennosides-docusate (PERICOLACE) 8.6-50 MG per tablet 2 tablet, 2 tablet, Oral, BID, 2 tablet at 04/10/23 0851 **AND** polyethylene glycol (MIRALAX) packet 17 g, 17 g, Oral, Daily PRN **AND** bisacodyl (DULCOLAX) EC tablet 5 mg, 5 mg, Oral, Daily PRN **AND** bisacodyl (DULCOLAX) suppository 10 mg, 10 mg, Rectal, Daily PRN, Chico Shen DO, 10 mg at 04/07/23 0859  •  cholecalciferol (VITAMIN D3) tablet 1,000 Units, 1,000 Units, Oral, BID, Chico Shen, , 1,000 Units at 04/10/23 2250  •  famotidine (PEPCID) tablet 20 mg, 20 mg, Oral, Nightly, Maria Luisa Olmedo MD, 20 mg at 04/10/23 2055  •  gabapentin (NEURONTIN) capsule 100 mg, 100 mg, Oral, Daily, Chico Shen DO, 100 mg at 04/10/23 0851  •  HYDROcodone-acetaminophen (NORCO) 5-325 MG per tablet 1 tablet, 1 tablet, Oral, Q6H PRN, Chico Shen DO, 1 tablet at 04/10/23 0224  •  levoFLOXacin (LEVAQUIN) tablet 500 mg, 500 mg, Oral, Q48H, Chico Shen DO, 500 mg at 04/10/23 2056  •  levothyroxine (SYNTHROID, LEVOTHROID) tablet 88 mcg, 88 mcg, Oral, Q AM, Bernard Carrasco MD, 88 mcg at 04/11/23 0639  •  lidocaine (LIDODERM) 5 % 2 patch, 2 patch, Transdermal, Q24H, Chico Shen DO, 2 patch at 04/10/23 0851  •  melatonin tablet 3 mg, 3 mg, Oral, Nightly PRN, Stingl, Lady Olivo MD, 3 mg at 04/07/23 0121  •  metroNIDAZOLE (FLAGYL) tablet 500 mg, 500 mg, Oral, Q8H, Chico Shen DO, 500 mg at 04/10/23 1800  •  nystatin (MYCOSTATIN) powder, , Topical, Q12H, Chico Shen DO, Given at 04/10/23 2055  •   ondansetron (ZOFRAN) tablet 4 mg, 4 mg, Oral, Q6H PRN **OR** ondansetron (ZOFRAN) injection 4 mg, 4 mg, Intravenous, Q6H PRN, Lady Ag MD, 4 mg at 04/11/23 0639  •  pantoprazole (PROTONIX) EC tablet 40 mg, 40 mg, Oral, QAM AC, Bernard Carrasco MD, 40 mg at 04/11/23 0639  •  polyethylene glycol (MIRALAX) packet 17 g, 17 g, Oral, Daily, Maria Luisa Olmedo MD, 17 g at 04/10/23 0850  •  simethicone (MYLICON) chewable tablet 80 mg, 80 mg, Oral, 4x Daily PRN, Shandra Johnson MD  •  sodium bicarbonate tablet 1,300 mg, 1,300 mg, Oral, TID, Derek Singleton MD, 1,300 mg at 04/10/23 2055  •  [COMPLETED] Insert Peripheral IV, , , Once **AND** sodium chloride 0.9 % flush 10 mL, 10 mL, Intravenous, PRN, Libia Osborn APRN, 10 mL at 04/09/23 2007  Review of Systems:    There is weakness fatigue all other systems reviewed and negative    Objective     Vital Signs  Temp:  [97.2 °F (36.2 °C)-98.6 °F (37 °C)] 98.4 °F (36.9 °C)  Heart Rate:  [64-72] 69  Resp:  [16-18] 18  BP: (134-167)/(53-91) 134/64  Body mass index is 38.74 kg/m².    Intake/Output Summary (Last 24 hours) at 4/11/2023 0804  Last data filed at 4/11/2023 0658  Gross per 24 hour   Intake 1280 ml   Output 2100 ml   Net -820 ml     No intake/output data recorded.     Physical Exam:   General: patient awake, alert and cooperative   Eyes: Normal lids and lashes, no scleral icterus   Neck: supple, normal ROM   Skin: warm and dry, not jaundiced   Cardiovascular: regular rhythm and rate, no murmurs auscultated   Pulm: clear to auscultation bilaterally, regular and unlabored   Abdomen: soft, nontender, nondistended; normal bowel sounds   Extremities: no rash or edema   Psychiatric: Normal mood and behavior; memory intact     Results Review:     I reviewed the patient's new clinical results.    Results from last 7 days   Lab Units 04/11/23  0628 04/10/23  0515 04/09/23  0418   WBC 10*3/mm3 8.26 7.47 6.62   HEMOGLOBIN g/dL 10.6* 10.0* 9.8*    HEMATOCRIT % 33.2* 31.0* 30.1*   PLATELETS 10*3/mm3 181 163 148     Results from last 7 days   Lab Units 04/11/23  0628 04/10/23  0515 04/09/23  0418 04/06/23  0354 04/05/23  1427   SODIUM mmol/L 139 140 141   < > 140   POTASSIUM mmol/L 3.4* 3.5 3.7   < > 5.2   CHLORIDE mmol/L 102 106 110*   < > 107   CO2 mmol/L 27.9 23.1 21.6*   < > 21.0*   BUN mg/dL 26* 29* 33*   < > 59*   CREATININE mg/dL 2.57* 2.46* 2.52*   < > 2.30*   CALCIUM mg/dL 9.5 9.0 9.1   < > 9.7*   BILIRUBIN mg/dL  --   --   --   --  0.4   ALK PHOS U/L  --   --   --   --  108   ALT (SGPT) U/L  --   --   --   --  10   AST (SGOT) U/L  --   --   --   --  17   GLUCOSE mg/dL 94 122* 86   < > 88    < > = values in this interval not displayed.         Lab Results   Lab Value Date/Time    LIPASE 47 04/05/2023 1427    LIPASE 40 03/16/2023 1343       Radiology:  CT Abdomen Pelvis With Contrast   Final Result       1. Presacral edema is present and may reflect proctitis. Colonic   diverticulosis.   2. No obstructive uropathy.   3. Cholelithiasis.       This report was finalized on 4/8/2023 2:48 PM by Dr. Zaid Cain M.D.          US Gallbladder   Final Result   1. Normal-appearing liver   2. Gallbladder sludge, without cholelithiasis or sonographic evidence of   acute cholecystitis   3. Atrophic right kidney.       This report was finalized on 4/6/2023 3:23 PM by Dr. Clive Garduno M.D.          US Renal Bilateral   Final Result   1. Simple appearing left renal cyst, below the size requiring follow-up   2. Atrophic kidneys, without hydronephrosis seen.       This report was finalized on 4/6/2023 3:24 PM by Dr. Clive Garduno M.D.          CT Abdomen Pelvis Without Contrast   Final Result   1. No acute intra-abdominal or pelvic process on this limited   noncontrast exam.   2. Markedly distended bladder.   3. Cholelithiasis.   4. Small hiatal hernia with duodenal and colonic diverticulosis. If GI   symptoms persist endoscopy could be considered for better  evaluation.   5. Please see above for additional findings/recommendations.       This report was finalized on 4/5/2023 4:17 PM by Dr. Kapil Cruz M.D.              Assessment & Plan     Active Hospital Problems    Diagnosis    • **Gastrointestinal hemorrhage, unspecified gastrointestinal hemorrhage type    • PRABHA (acute kidney injury)    • Neuropathic pain    • Hypomagnesemia    • Hypervolemia associated with renal insufficiency    • Acute generalized abdominal pain    • Acute UTI (urinary tract infection)    • Constipation    • Nausea with vomiting    • Anemia    • Acute urinary retention    • Obesity (BMI 30-39.9)    • History of CVA (cerebrovascular accident)    • Stage 4 chronic kidney disease (HCC)    • Essential hypertension    • Acquired hypothyroidism        All problems are new to me today  Assessment:  1.  Normocytic anemia: With a component of iron deficiency anemia     2.  Heme positive stool: Small amount of bright red blood with a bowel movement couple days ago.  Witnessed bowel movement by MD was completely normal and brown.  2 bowel movements last night no blood.     3.  Antiplatelet therapy: she had been on Plavix     4.  Constipation: Resolving, now having bowel movements     5.  Frailty, immobility, advanced age     6.  Abdominal pain: Diffuse, inconsistent exam with her flinching to the very lightest of touches superficially but no reaction to me completely grabbing subcutaneous tissue at other times.  Reassuring imaging.?  Neuropathic versus other, will surgery note reviewed concern for mesenteric ischemia, Dopplers pending        Plan:  • Hemoglobin stable  • Continue PPI, H2 blocker  • Follow-up results of H. pylori stool antigen-treat if positive  • Continue bowel regimen-we will give dose of MOM today given mild constipation with bloating which may be exacerbating her symptoms.  • Given age and frailty, risks of aggressive intervention far outweigh the benefits.  Surgery/internal medicine  recommendations reviewed   • Palliative care has met with the patient note reviewed  • Awaiting Doppler flow studies of the mesenteric vessels, general surgery following  I discussed the patients findings and my recommendations with patient and nursing staff.    Antwon Ferris MD

## 2023-04-11 NOTE — PLAN OF CARE
Goal Outcome Evaluation:  Plan of Care Reviewed With: patient        Progress: improving   Vital signs stable. Alert and oriented x 3. Turned every 2 hours as tolerated. Normal sinus cardiac rhythm. On 2 liters oxygen per nasal cannula. No complaints of discomfort or nausea today. Tolerating GI soft diet fair.  Urinary catheter patent to bedside drainage. Urine clear yellow. Resting quietly in bed with eyes closed. Will continue to monitor. Possible discharge back to assisted living residence in next day or two.

## 2023-04-11 NOTE — PLAN OF CARE
Goal Outcome Evaluation:      VSS. 2L O2 NC @ night. Cath care completed x2, patent. BM x2, zinc orange cream applied for coccyx. Nystatin applied to bilateral under breasts & groins. Q2 turns.

## 2023-04-11 NOTE — PROGRESS NOTES
[facility] Progress Note      Patient Name: Marianne Delgado  : 1925  MRN: [id number]  Primary Care Physician:  Natasha Ramirez APRN  Date of admission: 2023    Subjective     Interval History:   Follow-up chronic kidney disease    Seen and examined.  Denies shortness of air or chest pain.  No new issues.  Alert and oriented and answers all question appropriately.  Morrow catheter in place.  Reported some nausea.      Review of Systems:   As noted above    Objective     Vitals:   Temp:  [97.2 °F (36.2 °C)-98.6 °F (37 °C)] 98.4 °F (36.9 °C)  Heart Rate:  [64-72] 69  Resp:  [16-18] 18  BP: (134-167)/(53-91) 134/64  Flow (L/min):  [2] 2    Intake/Output Summary (Last 24 hours) at 2023 0800  Last data filed at 2023 0658  Gross per 24 hour   Intake 1280 ml   Output 2100 ml   Net -820 ml       Physical Exam:    General Appearance: Obese, chronically ill and frail, awake and  Skin: warm and dry  HEENT: oral mucosa normal, nonicteric sclera  Neck: supple, no JVD  Lungs: Scattered rhonchi, breathing effort unlabored  Heart: RRR, normal S1 and S2, no S3, no  Abdomen: soft, diffuse tenderness, even to touch, normoactive bowel  : Fully catheter is anchored  Extremities: 2+ edema  Neuro: Moving all extremity    Scheduled Meds:     cholecalciferol, 1,000 Units, Oral, BID  famotidine, 20 mg, Oral, Nightly  gabapentin, 100 mg, Oral, Daily  levoFLOXacin, 500 mg, Oral, Q48H  levothyroxine, 88 mcg, Oral, Q AM  lidocaine, 2 patch, Transdermal, Q24H  metroNIDAZOLE, 500 mg, Oral, Q8H  nystatin, , Topical, Q12H  pantoprazole, 40 mg, Oral, QAM AC  polyethylene glycol, 17 g, Oral, Daily  senna-docusate sodium, 2 tablet, Oral, BID  sodium bicarbonate, 1,300 mg, Oral, TID      IV Meds:        Results Reviewed:   I have personally reviewed the results from the time of this admission to 2023 08:00 EDT     Results from last 7 days   Lab Units 23  0628 04/10/23  0515  04/09/23 0418 04/06/23  0354 04/05/23  1427   SODIUM mmol/L 139 140 141   < > 140   POTASSIUM mmol/L 3.4* 3.5 3.7   < > 5.2   CHLORIDE mmol/L 102 106 110*   < > 107   CO2 mmol/L 27.9 23.1 21.6*   < > 21.0*   BUN mg/dL 26* 29* 33*   < > 59*   CREATININE mg/dL 2.57* 2.46* 2.52*   < > 2.30*   CALCIUM mg/dL 9.5 9.0 9.1   < > 9.7*   BILIRUBIN mg/dL  --   --   --   --  0.4   ALK PHOS U/L  --   --   --   --  108   ALT (SGPT) U/L  --   --   --   --  10   AST (SGOT) U/L  --   --   --   --  17   GLUCOSE mg/dL 94 122* 86   < > 88    < > = values in this interval not displayed.       Estimated Creatinine Clearance: 13 mL/min (A) (by C-G formula based on SCr of 2.57 mg/dL (H)).    Results from last 7 days   Lab Units 04/11/23  0628 04/10/23  0515 04/09/23  0418 04/08/23  0344   MAGNESIUM mg/dL 1.7 1.6* 1.7 1.8   PHOSPHORUS mg/dL  --  4.0 4.7* 3.8       Results from last 7 days   Lab Units 04/10/23  0515 04/09/23  0418 04/08/23  0344 04/07/23  0338   URIC ACID mg/dL 9.0* 8.8* 8.2* 7.3*       Results from last 7 days   Lab Units 04/11/23  0628 04/10/23  0515 04/09/23  0418 04/08/23  0344 04/07/23  0337   WBC 10*3/mm3 8.26 7.47 6.62 7.46 7.30   HEMOGLOBIN g/dL 10.6* 10.0* 9.8* 10.8* 10.2*   PLATELETS 10*3/mm3 181 163 148 164 140             Assessment / Plan     ASSESSMENT:  -Chronic kidney disease stage IV associated with nephrosclerosis and advanced age, creatinine is up to 2.52 probably related to contrast-induced nephropathy after having IV contrast for her CT scan to evaluate her severe abdominal pain.  Her electrolyte within acceptable range.   -Metabolic acidosis with normal anion gap associated with the chronic kidney disease  -Urinary retention which is acute  -Abdominal pain could be related to her urinary retention.  Possibly mesenteric ischemia as well  -History of black tarry stools with recent intake of Pepto-Bismol being evaluated by GI  -Iron deficiency anemia hemoglobin 9.8, and her iron saturation 12% also has  probably component of chronic kidney disease  -Probable neuropathic pain    PLAN:    -We will switch patient to torsemide 100 mg p.o. daily.  We will hold sodium bicarb.  -Check with urology about voiding trial  -Surveillance labs      I discussed the case with Dr. Shen  Also I discussed the case with the patient and her niece at the bedside.    Thank you for involving us in the care of Marianne Delgado.  Please feel free to call with any questions.    Mark Bejarano MD  04/11/23  08:00 EDT    [facility]  [phone]    Please note that portions of this note were completed with a voice recognition program.

## 2023-04-12 LAB
ANION GAP SERPL CALCULATED.3IONS-SCNC: 9 MMOL/L (ref 5–15)
BACTERIA SPEC AEROBE CULT: NORMAL
BACTERIA SPEC AEROBE CULT: NORMAL
BASOPHILS # BLD AUTO: 0.04 10*3/MM3 (ref 0–0.2)
BASOPHILS NFR BLD AUTO: 0.5 % (ref 0–1.5)
BUN SERPL-MCNC: 26 MG/DL (ref 8–23)
BUN/CREAT SERPL: 8.6 (ref 7–25)
CALCIUM SPEC-SCNC: 9 MG/DL (ref 8.2–9.6)
CHLORIDE SERPL-SCNC: 101 MMOL/L (ref 98–107)
CO2 SERPL-SCNC: 27 MMOL/L (ref 22–29)
CREAT SERPL-MCNC: 3.04 MG/DL (ref 0.57–1)
DEPRECATED RDW RBC AUTO: 40.5 FL (ref 37–54)
EGFRCR SERPLBLD CKD-EPI 2021: 13.5 ML/MIN/1.73
EOSINOPHIL # BLD AUTO: 0.19 10*3/MM3 (ref 0–0.4)
EOSINOPHIL NFR BLD AUTO: 2.4 % (ref 0.3–6.2)
ERYTHROCYTE [DISTWIDTH] IN BLOOD BY AUTOMATED COUNT: 12.9 % (ref 12.3–15.4)
GLUCOSE SERPL-MCNC: 92 MG/DL (ref 65–99)
H PYLORI AG STL QL IA: NEGATIVE
HCT VFR BLD AUTO: 32.5 % (ref 34–46.6)
HGB BLD-MCNC: 10.4 G/DL (ref 12–15.9)
IMM GRANULOCYTES # BLD AUTO: 0.12 10*3/MM3 (ref 0–0.05)
IMM GRANULOCYTES NFR BLD AUTO: 1.5 % (ref 0–0.5)
LYMPHOCYTES # BLD AUTO: 1.01 10*3/MM3 (ref 0.7–3.1)
LYMPHOCYTES NFR BLD AUTO: 12.8 % (ref 19.6–45.3)
MAGNESIUM SERPL-MCNC: 1.8 MG/DL (ref 1.7–2.3)
MCH RBC QN AUTO: 27.8 PG (ref 26.6–33)
MCHC RBC AUTO-ENTMCNC: 32 G/DL (ref 31.5–35.7)
MCV RBC AUTO: 86.9 FL (ref 79–97)
MONOCYTES # BLD AUTO: 0.9 10*3/MM3 (ref 0.1–0.9)
MONOCYTES NFR BLD AUTO: 11.4 % (ref 5–12)
NEUTROPHILS NFR BLD AUTO: 5.65 10*3/MM3 (ref 1.7–7)
NEUTROPHILS NFR BLD AUTO: 71.4 % (ref 42.7–76)
NRBC BLD AUTO-RTO: 0 /100 WBC (ref 0–0.2)
PLATELET # BLD AUTO: 184 10*3/MM3 (ref 140–450)
PMV BLD AUTO: 10.6 FL (ref 6–12)
POTASSIUM SERPL-SCNC: 3.7 MMOL/L (ref 3.5–5.2)
RBC # BLD AUTO: 3.74 10*6/MM3 (ref 3.77–5.28)
SODIUM SERPL-SCNC: 137 MMOL/L (ref 136–145)
WBC NRBC COR # BLD: 7.91 10*3/MM3 (ref 3.4–10.8)

## 2023-04-12 PROCEDURE — 97162 PT EVAL MOD COMPLEX 30 MIN: CPT

## 2023-04-12 PROCEDURE — 99231 SBSQ HOSP IP/OBS SF/LOW 25: CPT | Performed by: SURGERY

## 2023-04-12 PROCEDURE — 80048 BASIC METABOLIC PNL TOTAL CA: CPT | Performed by: STUDENT IN AN ORGANIZED HEALTH CARE EDUCATION/TRAINING PROGRAM

## 2023-04-12 PROCEDURE — 99232 SBSQ HOSP IP/OBS MODERATE 35: CPT | Performed by: INTERNAL MEDICINE

## 2023-04-12 PROCEDURE — 97530 THERAPEUTIC ACTIVITIES: CPT

## 2023-04-12 PROCEDURE — 97110 THERAPEUTIC EXERCISES: CPT

## 2023-04-12 PROCEDURE — 83735 ASSAY OF MAGNESIUM: CPT | Performed by: STUDENT IN AN ORGANIZED HEALTH CARE EDUCATION/TRAINING PROGRAM

## 2023-04-12 PROCEDURE — 85025 COMPLETE CBC W/AUTO DIFF WBC: CPT | Performed by: INTERNAL MEDICINE

## 2023-04-12 RX ADMIN — METRONIDAZOLE 500 MG: 500 TABLET, FILM COATED ORAL at 09:23

## 2023-04-12 RX ADMIN — LEVOTHYROXINE SODIUM 88 MCG: 0.09 TABLET ORAL at 06:36

## 2023-04-12 RX ADMIN — METRONIDAZOLE 500 MG: 500 TABLET, FILM COATED ORAL at 22:25

## 2023-04-12 RX ADMIN — Medication 1000 UNITS: at 09:24

## 2023-04-12 RX ADMIN — FAMOTIDINE 20 MG: 20 TABLET, FILM COATED ORAL at 20:17

## 2023-04-12 RX ADMIN — LEVOFLOXACIN 500 MG: 500 TABLET, FILM COATED ORAL at 20:17

## 2023-04-12 RX ADMIN — LIDOCAINE 2 PATCH: 700 PATCH TOPICAL at 09:23

## 2023-04-12 RX ADMIN — NYSTATIN: 100000 POWDER TOPICAL at 20:17

## 2023-04-12 RX ADMIN — METRONIDAZOLE 500 MG: 500 TABLET, FILM COATED ORAL at 16:50

## 2023-04-12 RX ADMIN — PANTOPRAZOLE SODIUM 40 MG: 40 TABLET, DELAYED RELEASE ORAL at 09:23

## 2023-04-12 RX ADMIN — NYSTATIN: 100000 POWDER TOPICAL at 09:24

## 2023-04-12 RX ADMIN — Medication 1000 UNITS: at 20:17

## 2023-04-12 RX ADMIN — GABAPENTIN 100 MG: 100 CAPSULE ORAL at 09:23

## 2023-04-12 NOTE — THERAPY EVALUATION
Patient Name: Marianne Delgado  : 1925    MRN: [id number]                              Today's Date: 2023       Admit Date: 2023    Visit Dx:     ICD-10-CM ICD-9-CM   1. Gastrointestinal hemorrhage, unspecified gastrointestinal hemorrhage type  K92.2 578.9   2. Chronic kidney disease, unspecified CKD stage  N18.9 585.9   3. Generalized abdominal pain  R10.84 789.07   4. Hiatal hernia  K44.9 553.3     Patient Active Problem List   Diagnosis   • Arthritis   • Stage 4 chronic kidney disease (HCC)   • Debility   • Foot pain, bilateral   • Acute idiopathic gout of right ankle   • Hematuria   • Essential hypertension   • Acquired hypothyroidism   • Osteopenia   • Psoriasis   • Rosacea   • Vitamin D deficiency   • Medicare annual wellness visit, subsequent   • Morbidly obese   • History of CVA (cerebrovascular accident)   • Stroke (cerebrum)   • Vitamin B12 deficiency   • Primary open-angle glaucoma, bilateral, severe stage   • Hyperparathyroidism   • Weight loss   • Acute left-sided low back pain without sciatica   • Cholelithiasis   • Diverticulosis   • Gastrointestinal hemorrhage, unspecified gastrointestinal hemorrhage type   • Anemia   • Acute urinary retention   • Obesity (BMI 30-39.9)   • Acute UTI (urinary tract infection)   • Constipation   • Nausea with vomiting   • Acute generalized abdominal pain   • PRABHA (acute kidney injury)   • Neuropathic pain   • Hypomagnesemia   • Hypervolemia associated with renal insufficiency     Past Medical History:   Diagnosis Date   • Acute sinusitis    • Acute upper respiratory infection    • Arthritis    • CKD (chronic kidney disease)    • Debility    • Fatigue    • Foot pain, bilateral    • Glaucoma    • Gout    • Hematuria    • High blood pressure    • Hypertension    • Hypothyroid 1999   • Hypothyroidism    • IBS (irritable bowel syndrome)    • IGT (impaired glucose tolerance)    • Malaise and fatigue    • Medication management    • Melanoma     left  leg   • OA (osteoarthritis)    • Osteopenia 09/1999   • Painful joint    • Psoriasis    • Renal failure    • Rosacea    • Vitamin D deficiency      Past Surgical History:   Procedure Laterality Date   • CATARACT EXTRACTION     • FOOT SURGERY      mauri toe surgery   • OTHER SURGICAL HISTORY      Melanoma Excision: Left leg      General Information     [facility] Name 04/12/23 1738          Physical Therapy Time and Intention    Document Type evaluation  -     Mode of Treatment individual therapy;physical therapy  -[facility] Name 04/12/23 1738          General Information    Patient Profile Reviewed yes  -     Prior Level of Function independent:;gait;transfer;bed mobility  24/7 caregivers assisted with ADLs, but normally able to walk with a rollator independently  -     Existing Precautions/Restrictions fall  -     Barriers to Rehab medically complex  -[facility] Name 04/12/23 1738          Living Environment    People in Home alone;facility resident  ILF with 24/7 caregivers  -[facility] Name 04/12/23 1738          Home Main Entrance    Number of Stairs, Main Entrance none  -[facility] Name 04/12/23 1738          Stairs Within Home, Primary    Number of Stairs, Within Home, Primary none  -[facility] Name 04/12/23 1738          Cognition    Orientation Status (Cognition) oriented x 4  -[facility] Name 04/12/23 1738          Safety Issues, Functional Mobility    Impairments Affecting Function (Mobility) balance;endurance/activity tolerance;strength;pain;postural/trunk control  -           User Key  (r) = Recorded By, (t) = Taken By, (c) = Cosigned By    Initials Name Provider Type     Maria Luz Dewitt PT Physical Therapist               Mobility     Row Name 04/12/23 1739          Bed Mobility    Bed Mobility supine-sit;sit-supine;scooting/bridging  -     Scooting/Bridging [place] (Bed Mobility) dependent (less than 25% patient effort);2 person assist;nonverbal cues (demo/gesture)  -     Supine-Sit  [place] (Bed Mobility) moderate assist (50% patient effort);1 person assist;verbal cues  -     Sit-Supine [place] (Bed Mobility) 2 person assist;verbal cues;moderate assist (50% patient effort)  -     Assistive Device (Bed Mobility) bed rails;head of bed elevated;draw sheet  -BH     Row Name 04/12/23 1739          Sit-Stand Transfer    Sit-Stand [place] (Transfers) maximum assist (25% patient effort);2 person assist;verbal cues  -     Assistive Device (Sit-Stand Transfers) walker, front-wheeled  -     Comment, (Sit-Stand Transfer) 2x STS - able to clear bed but unable to achieve upright posture, significantly forward flexed, only able to stand for ~5 seconds each time  -           User Key  (r) = Recorded By, (t) = Taken By, (c) = Cosigned By    Initials Name Provider Type     Maria Luz Dewitt PT Physical Therapist               Obj/Interventions     Row Name 04/12/23 1740          Range of Motion Comprehensive    General Range of Motion bilateral lower extremity ROM WFL  -BH     Row Name 04/12/23 1740          Strength Comprehensive (MMT)    General Manual Muscle Testing (MMT) Assessment lower extremity strength deficits identified  -     Comment, General Manual Muscle Testing (MMT) Assessment Generalized weakness, BLE grossly 3/5  -BH     Row Name 04/12/23 1740          Motor Skills    Therapeutic Exercise --  5 reps B AP/SLR/hip abduction/GS  -BH     Row Name 04/12/23 1740          Balance    Balance Assessment sitting static balance;sitting dynamic balance;standing static balance  -     Static Sitting Balance contact guard;verbal cues  -     Dynamic Sitting Balance minimal assist;verbal cues  -     Position, Sitting Balance sitting edge of bed  -     Static Standing Balance maximum assist;2-person assist;verbal cues  -     Position/Device Used, Standing Balance walker, front-wheeled;supported  -     Balance Interventions sitting;standing;sit to  stand;supported;static;dynamic  -     Row Name 04/12/23 1740          Sensory Assessment (Somatosensory)    Sensory Assessment (Somatosensory) LE sensation intact  -           User Key  (r) = Recorded By, (t) = Taken By, (c) = Cosigned By    Initials Name Provider Type     Maria Luz Dewitt, PT Physical Therapist               Goals/Plan     Row Name 04/12/23 1745          Bed Mobility Goal 1 (PT)    Activity/Assistive Device (Bed Mobility Goal 1, PT) bed mobility activities, all  -     [place] Level/Cues Needed (Bed Mobility Goal 1, PT) contact guard required  -     Time Frame (Bed Mobility Goal 1, PT) 1 week  -[facility] Name 04/12/23 1745          Transfer Goal 1 (PT)    Activity/Assistive Device (Transfer Goal 1, PT) transfers, all  -     [place] Level/Cues Needed (Transfer Goal 1, PT) minimum assist (75% or more patient effort)  -     Time Frame (Transfer Goal 1, PT) 1 week  -BH     Row Name 04/12/23 1745          Gait Training Goal 1 (PT)    Activity/Assistive Device (Gait Training Goal 1, PT) gait (walking locomotion)  -     [place] Level (Gait Training Goal 1, PT) minimum assist (75% or more patient effort)  -     Distance (Gait Training Goal 1, PT) 30ft  -     Time Frame (Gait Training Goal 1, PT) 1 week  -BH     Row Name 04/12/23 1745          Therapy Assessment/Plan (PT)    Planned Therapy Interventions (PT) balance training;bed mobility training;gait training;home exercise program;patient/family education;strengthening;transfer training  -           User Key  (r) = Recorded By, (t) = Taken By, (c) = Cosigned By    Initials Name Provider Type     Maria Luz Dewitt, PT Physical Therapist               Clinical Impression     Row Name 04/12/23 1740          Pain    Pre/Posttreatment Pain Comment C/o LLE pain with movement, no numerical value given  -     Pain Intervention(s) Repositioned;Ambulation/increased activity;Rest  -     Row Name 04/12/23 1740          Plan  of Care Review    Plan of Care Reviewed With patient  -     Outcome Evaluation Pt is a 89 yo F admitted from [facility] with GI bleed, UTI, PRABHA. Pt has 24/7 caregivers at her facility, states she is normally independent with mobility using a rollator, but staff assists with ADLs/household chores. Pt does not have any steps, reports 1 recent fall in the shower. Pt presents to PT with impaired strength, endurance, and pain limiting overall mobility. Pt required mod A x1 to transfer to EOB - c/o dizziness, BP checked and WFL. Pt agreeable to standing, required max A x2 to clear bed using rwx, unable to achieve upright posture - significantly forward flexed over walker. Only able to stand for ~5 seconds on each attempt, very fearful of falling, and disappointed at how weak she is. Pt required mod A x2 to reterun to supine, dep A x2 to scoot up in bed. Discussed DC and pt states she plans return to [facility] with 24/7 caregivers. Pt educated about benefits of rehab as pt is unable to safely pivot even to a WC at this time so mobility at home would be significantly limited. Unsure how much caregiver assist is able to be provided, but recommending DC to SNF as pt is significantly deconditioned, functioning below BL, and a high risk for falls at home with attempted mobility. PT will continue to follow to progress mobility as tolerated.  -     Row Name 04/12/23 1740          Therapy Assessment/Plan (PT)    Patient/Family Therapy Goals Statement (PT) Return to [facility]  -     Rehab Potential (PT) good, to achieve stated therapy goals  -     Criteria for Skilled Interventions Met (PT) yes  -     Therapy Frequency (PT) 6 times/wk  -     Row Name 04/12/23 1740          Vital Signs    O2 Delivery Pre Treatment room air  -     O2 Delivery Intra Treatment room air  -     O2 Delivery Post Treatment room air  -     Row Name 04/12/23 1740          Positioning and Restraints    Pre-Treatment Position in bed  -     Post Treatment  Position bed  -BH     In Bed fowlers;call light within reach;encouraged to call for assist;exit alarm on  -           User Key  (r) = Recorded By, (t) = Taken By, (c) = Cosigned By    Initials Name Provider Type    Maria Luz Becker PT Physical Therapist               Outcome Measures     Row Name 04/12/23 1746          How much help from another person do you currently need...    Turning from your back to your side while in flat bed without using bedrails? 2  -BH     Moving from lying on back to sitting on the side of a flat bed without bedrails? 2  -BH     Moving to and from a bed to a chair (including a wheelchair)? 1  -BH     Standing up from a chair using your arms (e.g., wheelchair, bedside chair)? 2  -BH     Climbing 3-5 steps with a railing? 1  -BH     To walk in hospital room? 1  -     AM-PAC 6 Clicks Score (PT) 9  -     Highest level of mobility 3 --> Sat at edge of bed  -     Row Name 04/12/23 1746          Functional Assessment    Outcome Measure Options AM-PAC 6 Clicks Basic Mobility (PT)  -           User Key  (r) = Recorded By, (t) = Taken By, (c) = Cosigned By    Initials Name Provider Type    Maria Luz Becker PT Physical Therapist                             Physical Therapy Education     Title: PT OT SLP Therapies (Done)     Topic: Physical Therapy (Done)     Point: Mobility training (Done)     Learning Progress Summary           Patient Acceptance, E,TB,D, VU,NR by  at 4/12/2023 1746                   Point: Home exercise program (Done)     Learning Progress Summary           Patient Acceptance, E,TB,D, VU,NR by  at 4/12/2023 1746                   Point: Body mechanics (Done)     Learning Progress Summary           Patient Acceptance, E,TB,D, VU,NR by  at 4/12/2023 1746                   Point: Precautions (Done)     Learning Progress Summary           Patient Acceptance, E,TB,D, VU,NR by  at 4/12/2023 1746                               User Key     Initials Effective  Dates Name Provider Type Discipline     04/08/22 -  Maria Luz Dewitt, PT Physical Therapist PT              PT Recommendation and Plan  Planned Therapy Interventions (PT): balance training, bed mobility training, gait training, home exercise program, patient/family education, strengthening, transfer training  Plan of Care Reviewed With: patient  Outcome Evaluation: Pt is a 89 yo F admitted from [facility] with GI bleed, UTI, PRABHA. Pt has 24/7 caregivers at her facility, states she is normally independent with mobility using a rollator, but staff assists with ADLs/household chores. Pt does not have any steps, reports 1 recent fall in the shower. Pt presents to PT with impaired strength, endurance, and pain limiting overall mobility. Pt required mod A x1 to transfer to EOB - c/o dizziness, BP checked and WFL. Pt agreeable to standing, required max A x2 to clear bed using rwx, unable to achieve upright posture - significantly forward flexed over walker. Only able to stand for ~5 seconds on each attempt, very fearful of falling, and disappointed at how weak she is. Pt required mod A x2 to reterun to supine, dep A x2 to scoot up in bed. Discussed DC and pt states she plans return to [facility] with 24/7 caregivers. Pt educated about benefits of rehab as pt is unable to safely pivot even to a WC at this time so mobility at home would be significantly limited. Unsure how much caregiver assist is able to be provided, but recommending DC to SNF as pt is significantly deconditioned, functioning below BL, and a high risk for falls at home with attempted mobility. PT will continue to follow to progress mobility as tolerated.     Time Calculation:    PT Charges     Row Name 04/12/23 6905             Time Calculation    Start Time 1704  -      Stop Time 1731  -      Time Calculation (min) 27 min  -      PT Received On 04/12/23  -      PT - Next Appointment 04/13/23  -      PT Goal Re-Cert Due Date 04/19/23  -         Time  Calculation- PT    Total Timed Code Minutes- PT 23 minute(s)  -BH         Timed Charges    40974 - PT Therapeutic Exercise Minutes 8  -BH      36623 - PT Therapeutic Activity Minutes 15  -BH         Untimed Charges    PT Eval/Re-eval Minutes 5  -BH         Total Minutes    Timed Charges Total Minutes 23  -BH      Untimed Charges Total Minutes 5  -BH       Total Minutes 28  -BH            User Key  (r) = Recorded By, (t) = Taken By, (c) = Cosigned By    Initials Name Provider Type     Maria Luz Dewitt, PT Physical Therapist              Therapy Charges for Today     Code Description Service Date Service Provider Modifiers Qty    27966747124 HC PT THER PROC EA 15 MIN 4/12/2023 Maria Luz Dewitt, PT GP 1    88805413076 HC PT THERAPEUTIC ACT EA 15 MIN 4/12/2023 Maria Luz Dewitt, PT GP 1    [id number]  PT EVAL MOD COMPLEXITY 3 4/12/2023 Maria Luz Dewitt, PT GP 1          PT G-Codes  Outcome Measure Options: AM-PAC 6 Clicks Basic Mobility (PT)  AM-PAC 6 Clicks Score (PT): 9  PT Discharge Summary  Anticipated Discharge Disposition (PT): skilled nursing facility    Maria Luz Dewitt PT  4/12/2023

## 2023-04-12 NOTE — PROGRESS NOTES
Name: Marianne Delgado ADMIT: 2023   : 1925  PCP: Natasha Ramirez APRN    MRN: [id number] LOS: 7 days   AGE/SEX: 89 y.o. female  ROOM: [facility]     Subjective   Subjective   No acute events overnight.  Patient lying in bed comfortably.  No abdominal pain this morning.  Tolerating diet this morning.  No bloody stool or melena.  Hemoglobin stable.  Creatinine did bump up this morning, nephrology holding diuretics.  Patient requesting Morrow catheter be removed when possible.    Review of Systems   Constitutional: Negative for chills and fever.   Respiratory: Negative for cough and shortness of breath.    Cardiovascular: Negative for chest pain and leg swelling.   Gastrointestinal: Negative for abdominal pain, diarrhea and nausea.     As above     Objective   Objective   Vital Signs  Temp:  [97.4 °F (36.3 °C)-98 °F (36.7 °C)] 97.5 °F (36.4 °C)  Heart Rate:  [65-77] 77  Resp:  [18] 18  BP: (121-135)/(46-70) 122/56  SpO2:  [94 %-98 %] 94 %  on  Flow (L/min):  [2] 2;   Device (Oxygen Therapy): room air  Body mass index is 38.45 kg/m².  Physical Exam  Constitutional:       General: She is not in acute distress.     Appearance: She is ill-appearing (Chronically). She is not diaphoretic.      Comments: Elderly, frail   HENT:      Head: Normocephalic and atraumatic.      Mouth/Throat:      Mouth: Mucous membranes are moist.      Pharynx: Oropharynx is clear.   Eyes:      Extraocular Movements: Extraocular movements intact.      Pupils: Pupils are equal, round, and reactive to light.   Cardiovascular:      Rate and Rhythm: Normal rate and regular rhythm.   Pulmonary:      Effort: Pulmonary effort is normal. No respiratory distress.   Abdominal:      General: Abdomen is flat. There is no distension.      Palpations: Abdomen is soft.      Tenderness: There is no abdominal tenderness. There is no guarding or rebound.   Musculoskeletal:         General: No swelling or deformity. Normal range of motion.   Skin:      General: Skin is warm and dry.   Neurological:      General: No focal deficit present.      Mental Status: She is alert. Mental status is at baseline.         Results Review     I reviewed the patient's new clinical results.  Results from last 7 days   Lab Units 04/12/23  0600 04/11/23  0628 04/10/23  0515 04/09/23  0418   WBC 10*3/mm3 7.91 8.26 7.47 6.62   HEMOGLOBIN g/dL 10.4* 10.6* 10.0* 9.8*   PLATELETS 10*3/mm3 184 181 163 148     Results from last 7 days   Lab Units 04/12/23  0600 04/11/23  0628 04/10/23  0515 04/09/23  0418   SODIUM mmol/L 137 139 140 141   POTASSIUM mmol/L 3.7 3.4* 3.5 3.7   CHLORIDE mmol/L 101 102 106 110*   CO2 mmol/L 27.0 27.9 23.1 21.6*   BUN mg/dL 26* 26* 29* 33*   CREATININE mg/dL 3.04* 2.57* 2.46* 2.52*   GLUCOSE mg/dL 92 94 122* 86   Estimated Creatinine Clearance: 11 mL/min (A) (by C-G formula based on SCr of 3.04 mg/dL (H)).  Results from last 7 days   Lab Units 04/10/23  0515 04/09/23  0418 04/08/23  0344 04/07/23  0338 04/05/23  1427   ALBUMIN g/dL 2.7* 2.2* 2.9* 2.7* 3.6   BILIRUBIN mg/dL  --   --   --   --  0.4   ALK PHOS U/L  --   --   --   --  108   AST (SGOT) U/L  --   --   --   --  17   ALT (SGPT) U/L  --   --   --   --  10     Results from last 7 days   Lab Units 04/12/23  0600 04/11/23  0628 04/10/23  0515 04/09/23  0418 04/08/23  0344 04/07/23  0338   CALCIUM mg/dL 9.0 9.5 9.0 9.1 9.4 8.6  8.6   ALBUMIN g/dL  --   --  2.7* 2.2* 2.9* 2.7*   MAGNESIUM mg/dL 1.8 1.7 1.6* 1.7 1.8 1.9   PHOSPHORUS mg/dL  --   --  4.0 4.7* 3.8 3.2     Results from last 7 days   Lab Units 04/08/23  1315 04/07/23  1020   LACTATE mmol/L 1.5 0.7     COVID19   Date Value Ref Range Status   02/03/2021 Not Detected Not Detected - Ref. Range Final   01/29/2021 Not Detected Not Detected - Ref. Range Final     No results found for: HGBA1C, POCGLU    Duplex Mesenteric Complete CAR  •  No significant stenosis of the celiac axis or superior mesenteric   artery.  •  The inferior mesenteric artery is  patent.    I reviewed the patient's daily medications.  Scheduled Medications  cholecalciferol, 1,000 Units, Oral, BID  famotidine, 20 mg, Oral, Nightly  gabapentin, 100 mg, Oral, Daily  levoFLOXacin, 500 mg, Oral, Q48H  levothyroxine, 88 mcg, Oral, Q AM  lidocaine, 2 patch, Transdermal, Q24H  metroNIDAZOLE, 500 mg, Oral, Q8H  nystatin, , Topical, Q12H  pantoprazole, 40 mg, Oral, QAM AC  polyethylene glycol, 17 g, Oral, Daily  senna-docusate sodium, 2 tablet, Oral, BID  torsemide, 100 mg, Oral, Daily    Infusions   Diet  Diet: Gastrointestinal Diets; Fiber-Restricted, Low Irritant; Texture: Regular Texture (IDDSI 7); Fluid Consistency: Thin (IDDSI 0)         I have personally reviewed:  [x]  Laboratory   []  Microbiology   [x]  Radiology   [x]  EKG/Telemetry   []  Cardiology/Vascular   []  Pathology   [x]  Records     Assessment/Plan     Active Hospital Problems    Diagnosis  POA   • **Gastrointestinal hemorrhage, unspecified gastrointestinal hemorrhage type [K92.2]  Yes   • PRABHA (acute kidney injury) [N17.9]  No   • Neuropathic pain [M79.2]  Clinically Undetermined   • Hypomagnesemia [E83.42]  No   • Hypervolemia associated with renal insufficiency [E87.70, N28.9]  Yes   • Acute generalized abdominal pain [R10.84]  No   • Acute UTI (urinary tract infection) [N39.0]  Yes   • Constipation [K59.00]  Yes   • Nausea with vomiting [R11.2]  Yes   • Anemia [D64.9]  Yes   • Acute urinary retention [R33.8]  No   • Obesity (BMI 30-39.9) [E66.9]  Yes   • History of CVA (cerebrovascular accident) [Z86.73]  Not Applicable   • Stage 4 chronic kidney disease (HCC) [N18.4]  Yes   • Essential hypertension [I10]  Yes   • Acquired hypothyroidism [E03.9]  Yes      Resolved Hospital Problems   No resolved problems to display.       89 y.o. female admitted with Gastrointestinal hemorrhage, unspecified gastrointestinal hemorrhage type.    Generalized abdominal pain/intemittent nausea with vomiting  Concern for diverticulitis  -No abdominal  pain today, tolerated diet this morning  -Continue IV Flagyl and ceftriaxone  -GI consulted, appreciate recommendations  -Surgery consulted, appreciate recommendations  -Duplex SMA complete without any signs of stenosis  -H. pylori testing pending  -Continue PPI, H2 blocker     Urinary tract infection  -Urine culture growing Pseudomonas and Klebsiella both sensitive to Levaquin, plan for 5-day course, last dose on 4/14     Acute kidney injury, not POA  Chronic kidney disease stage 4  Hypervolemia associated with renal insufficiency  - Followed by Dr. Mitchell with [facility] as outpatient.    -Nephrology consulted, appreciate recommendations-creatinine jumped to 3 today, torsemide held by nephrology.     Acute blood loss anemia secondary to Gastrointestinal hemorrhage  - Patient endorsing dark stools, prior to arrival, however, had recently been on Pepto-Bismol.  - GI consulted no plan for EGD at this time.  Continue PPI  - Hemoglobin stable.     Acute urinary retention  - Consulted Urology, Morrow catheter placed.  Hopefully we can do a voiding trial today if okay with urology.     Lower extremity neuropathy  - Continue gabapentin 100 mg daily that was started this admission.      Hypokalemia, improved  - Replete PRN per protocol. Continue to monitor     Hypertension  -Continue current regimen     History of CVA  -Holding Plavix because of anemia.  We will restart when hemoglobin stable     Hypothyroidism  - Stable. Continue Levothyroxine as prescribed.     Obesity  - BMI 39.11 kg/m2. Complicating all medical problems.    · SCDs for DVT prophylaxis.  · DNR.  · Discussed with patient and nursing staff.  · Anticipate discharge home with family in 1-2 days.    Expected Discharge Date and Time     Expected Discharge Date Expected Discharge Time    Apr 12, 2023             Justus Squires MD  [facility]  04/12/23  12:43 EDT

## 2023-04-12 NOTE — PLAN OF CARE
Goal Outcome Evaluation:      VSS. 2L O2 NC. Orange Z cream for coccyx PI. Nystatin powder under bilateral breasts & groins. Cath care completed, patent. Q2 turns. Intermittent nausea b/c of antibx pills but no emesis.

## 2023-04-12 NOTE — PROGRESS NOTES
"Nutrition Services    Patient Name:  Marianne Delgado  YOB: 1925  MRN: [id number]  Admit Date:  4/5/2023    Assessment Date:  04/12/23    Comment:  Nutrition follow up. Pt reports poor appetite but a little better today and no issues with abd pain.  States can't eat very much at a time. + soft brown BM yesterday.  Still on low fiber diet, asking for cantaloupe. Mesenteric duplex was negative. GI and surgery look like they are signing off. ? Liberalize her diet.  Will add some Boost plus with dinner. Encouraged po.    Will follow for po, intake and tolerance.      CLINICAL NUTRITION ASSESSMENT      Reason for Assessment Follow-up Protocol     Diagnosis/Problem   GI Bleed, CKD4, HTN, HH,Abd pain     Encounter Information        Nutrition History:     Food Preferences:    Supplements:    Factors Affecting Intake: altered GI function, decreased appetite     Anthropometrics        Current Height  Current Weight  BMI kg/m2 Height: 154.9 cm (61\")  Weight: 92.3 kg (203 lb 7.8 oz) (04/12/23 0500)  Body mass index is 38.45 kg/m².   Adjusted BMI (if applicable)        Admission Weight 207lb       Ideal Body Weight (IBW) 105lb   Adjusted IBW (if applicable)        Usual Body Weight (UBW) 184lb   Weight Change/Trend Gain       Weight History Wt Readings from Last 30 Encounters:   04/12/23 0500 92.3 kg (203 lb 7.8 oz)   04/11/23 0500 93 kg (205 lb 0.4 oz)   04/10/23 0304 94.1 kg (207 lb 7.3 oz)   04/09/23 0500 96.2 kg (212 lb 1.3 oz)   04/08/23 0500 98.8 kg (217 lb 13 oz)   04/07/23 0447 96.9 kg (213 lb 10 oz)   04/05/23 2248 93.9 kg (207 lb)   03/16/23 2122 94.2 kg (207 lb 10.8 oz)   02/01/22 1304 83.5 kg (184 lb)   12/17/21 1505 82.1 kg (181 lb)   06/01/21 1229 80.7 kg (178 lb)   04/27/21 1229 80.7 kg (178 lb)   03/31/21 1101 83.4 kg (183 lb 12.8 oz)   03/02/21 1051 84.9 kg (187 lb 3.2 oz)   02/03/21 0500 88.2 kg (194 lb 7.1 oz)   02/02/21 0500 91.3 kg (201 lb 4.5 oz)   02/01/21 0500 92 kg (202 lb 13.2 oz) " "  01/30/21 0745 88.9 kg (196 lb)   01/30/21 0524 89.1 kg (196 lb 6.9 oz)   01/29/21 1612 85.7 kg (189 lb)   01/29/21 1027 89.4 kg (197 lb 3.2 oz)   01/29/21 2115 85.7 kg (188 lb 15 oz)   09/15/20 1036 87.3 kg (192 lb 6.4 oz)   03/11/20 1020 87 kg (191 lb 12.8 oz)   12/11/19 1546 78 kg (172 lb)   09/11/19 1020 86.2 kg (190 lb)   01/05/17 0315 75.8 kg (167 lb)             Estimated/Assessed Needs       Energy Requirements    Height for Calculation  Height: 154.9 cm (61\")   Weight for Calculation 93.9kg   Method for Estimation  15 kcal/kg, 20 kcal/kg   EST Needs (kcal/day) 9202-0840       Protein Requirements    Weight for Calculation 93.9kg   EST Protein Needs (g/kg) 1.0 gm/kg   EST Daily Needs (g/day) 94       Fluid Requirements     Method for Estimation 1 mL/kcal    Estimated Needs (mL/day) 1600     Tests/Procedures        Tests/Procedures Other: duplex scan     Labs       Pertinent Labs    Results from last 7 days   Lab Units 04/12/23  0600 04/11/23  0628 04/10/23  0515   SODIUM mmol/L 137 139 140   POTASSIUM mmol/L 3.7 3.4* 3.5   CHLORIDE mmol/L 101 102 106   CO2 mmol/L 27.0 27.9 23.1   BUN mg/dL 26* 26* 29*   CREATININE mg/dL 3.04* 2.57* 2.46*   CALCIUM mg/dL 9.0 9.5 9.0   GLUCOSE mg/dL 92 94 122*     Results from last 7 days   Lab Units 04/12/23  0600 04/11/23  0628 04/10/23  0515   MAGNESIUM mg/dL 1.8 1.7 1.6*   PHOSPHORUS mg/dL  --   --  4.0   HEMOGLOBIN g/dL 10.4* 10.6* 10.0*   HEMATOCRIT % 32.5* 33.2* 31.0*   WBC 10*3/mm3 7.91 8.26 7.47   ALBUMIN g/dL  --   --  2.7*     Results from last 7 days   Lab Units 04/12/23  0600 04/11/23  0628 04/10/23  0515 04/09/23  0418 04/08/23  0344   PLATELETS 10*3/mm3 184 181 163 148 164     COVID19   Date Value Ref Range Status   02/03/2021 Not Detected Not Detected - Ref. Range Final     Lab Results   Component Value Date    HGBA1C 5.1 12/17/2021          Medications           Scheduled Medications cholecalciferol, 1,000 Units, Oral, BID  famotidine, 20 mg, Oral, " Nightly  gabapentin, 100 mg, Oral, Daily  levoFLOXacin, 500 mg, Oral, Q48H  levothyroxine, 88 mcg, Oral, Q AM  lidocaine, 2 patch, Transdermal, Q24H  metroNIDAZOLE, 500 mg, Oral, Q8H  nystatin, , Topical, Q12H  pantoprazole, 40 mg, Oral, QAM AC  polyethylene glycol, 17 g, Oral, Daily  senna-docusate sodium, 2 tablet, Oral, BID  torsemide, 100 mg, Oral, Daily       Infusions     PRN Medications •  acetaminophen  •  senna-docusate sodium **AND** polyethylene glycol **AND** bisacodyl **AND** bisacodyl  •  HYDROcodone-acetaminophen  •  melatonin  •  ondansetron **OR** ondansetron  •  simethicone  •  [COMPLETED] Insert Peripheral IV **AND** sodium chloride     Physical Findings          Physical Appearance alert, obese, oriented   Oral/Mouth Cavity WNL   Edema  1+ (trace), 2+ (mild), 3+ (moderate)   Gastrointestinal last bowel movement: brown BM 4/11   Skin  bruising, pressure injury stage 1   Tubes/Drains none   NFPE No clinical signs of muscle wasting or fat loss   --  Current Nutrition Orders & Evaluation of Intake       Oral Nutrition     Food Allergies NKFA   Current PO Diet Diet: Gastrointestinal Diets; Fiber-Restricted, Low Irritant; Texture: Regular Texture (IDDSI 7); Fluid Consistency: Thin (IDDSI 0)   Supplement n/a   PO Evaluation     % PO Intake 25%    # of Days Evaluated 4   --  PES STATEMENT / NUTRITION DIAGNOSIS      Nutrition Dx Problem  Problem: Altered GI Function  Etiology: Medical Diagnosis GI Bleed  Signs/Symptoms: Report/Observation    Comment:    --  NUTRITION INTERVENTION / PLAN OF CARE      Intervention Goal(s) Maintain nutrition status, Reduce/improve symptoms, Disease management/therapy, Initiate feeding/diet, Tolerate PO  and Appropriate weight loss         RD Intervention/Action Advise alternative selection, Advise available snack, Interview for preferences, Encourage intake, Follow Tx Progress, Care plan reviewed and Recommend/ordered:          Prescription/Orders:       PO Diet        Supplements Boost plus with dinner      Snacks       Enteral Nutrition       Parenteral Nutrition    New Prescription Ordered? Yes   --      Monitor/Evaluation Per protocol   Discharge Plan/Needs Pending clinical course   Education Will instruct as appropriate   --    RD to follow per protocol.      Electronically signed by:  Kiersten Aceves RD  04/12/23 14:56 EDT

## 2023-04-12 NOTE — PROGRESS NOTES
[facility] Progress Note      Patient Name: Marianne Delgado  : 1925  MRN: [id number]  Primary Care Physician:  Natasha Ramirez APRN  Date of admission: 2023    Subjective     Interval History:   Follow-up chronic kidney disease    Seen and examined.  Denies shortness of air or chest pain.  No new issues.  Alert and oriented and answers all question appropriately.        Review of Systems:   As noted above    Objective     Vitals:   Temp:  [97.4 °F (36.3 °C)-98 °F (36.7 °C)] 98 °F (36.7 °C)  Heart Rate:  [65-73] 66  Resp:  [18] 18  BP: (121-135)/(52-70) 121/52  Flow (L/min):  [2] 2    Intake/Output Summary (Last 24 hours) at 2023 0739  Last data filed at 2023 0004  Gross per 24 hour   Intake 960 ml   Output 800 ml   Net 160 ml       Physical Exam:    General Appearance: Obese, chronically ill and frail, awake and  Skin: warm and dry  HEENT: oral mucosa normal, nonicteric sclera  Neck: supple, no JVD  Lungs: Scattered rhonchi, breathing effort unlabored  Heart: RRR, normal S1 and S2, no S3, no  Abdomen: soft, diffuse tenderness, even to touch, normoactive bowel  : Fully catheter is anchored  Extremities: + edema  Neuro: Moving all extremity    Scheduled Meds:     cholecalciferol, 1,000 Units, Oral, BID  famotidine, 20 mg, Oral, Nightly  gabapentin, 100 mg, Oral, Daily  levoFLOXacin, 500 mg, Oral, Q48H  levothyroxine, 88 mcg, Oral, Q AM  lidocaine, 2 patch, Transdermal, Q24H  metroNIDAZOLE, 500 mg, Oral, Q8H  nystatin, , Topical, Q12H  pantoprazole, 40 mg, Oral, QAM AC  polyethylene glycol, 17 g, Oral, Daily  senna-docusate sodium, 2 tablet, Oral, BID  torsemide, 100 mg, Oral, Daily      IV Meds:        Results Reviewed:   I have personally reviewed the results from the time of this admission to 2023 07:39 EDT     Results from last 7 days   Lab Units 23  0600 23  0628 04/10/23  0515 23  0354 23  1427   SODIUM mmol/L 137 139 140    < > 140   POTASSIUM mmol/L 3.7 3.4* 3.5   < > 5.2   CHLORIDE mmol/L 101 102 106   < > 107   CO2 mmol/L 27.0 27.9 23.1   < > 21.0*   BUN mg/dL 26* 26* 29*   < > 59*   CREATININE mg/dL 3.04* 2.57* 2.46*   < > 2.30*   CALCIUM mg/dL 9.0 9.5 9.0   < > 9.7*   BILIRUBIN mg/dL  --   --   --   --  0.4   ALK PHOS U/L  --   --   --   --  108   ALT (SGPT) U/L  --   --   --   --  10   AST (SGOT) U/L  --   --   --   --  17   GLUCOSE mg/dL 92 94 122*   < > 88    < > = values in this interval not displayed.       Estimated Creatinine Clearance: 11 mL/min (A) (by C-G formula based on SCr of 3.04 mg/dL (H)).    Results from last 7 days   Lab Units 04/12/23  0600 04/11/23  0628 04/10/23  0515 04/09/23  0418 04/08/23  0344   MAGNESIUM mg/dL 1.8 1.7 1.6* 1.7 1.8   PHOSPHORUS mg/dL  --   --  4.0 4.7* 3.8       Results from last 7 days   Lab Units 04/10/23  0515 04/09/23  0418 04/08/23  0344 04/07/23  0338   URIC ACID mg/dL 9.0* 8.8* 8.2* 7.3*       Results from last 7 days   Lab Units 04/12/23  0600 04/11/23  0628 04/10/23  0515 04/09/23  0418 04/08/23  0344   WBC 10*3/mm3 7.91 8.26 7.47 6.62 7.46   HEMOGLOBIN g/dL 10.4* 10.6* 10.0* 9.8* 10.8*   PLATELETS 10*3/mm3 184 181 163 148 164             Assessment / Plan     ASSESSMENT:  -Chronic kidney disease stage IV associated with nephrosclerosis and advanced age, creatinine is up to 2.52 probably related to contrast-induced nephropathy after having IV contrast for her CT scan to evaluate her severe abdominal pain.  Her electrolyte within acceptable range.   -Metabolic acidosis with normal anion gap associated with the chronic kidney disease  -Urinary retention which is acute  -Abdominal pain could be related to her urinary retention.  Possibly mesenteric ischemia as well  -History of black tarry stools with recent intake of Pepto-Bismol being evaluated by GI  -Iron deficiency anemia hemoglobin 9.8, and her iron saturation 12% also has probably component of chronic kidney disease  -Probable  neuropathic pain    PLAN:    -Kidney function has worsened with a creatinine up to 3 mg/dL.  We will hold diuretic today.  Urology to decide on the Morrow catheter  -Surveillance labs        Thank you for involving us in the care of Mariannekeyla Delgado.  Please feel free to call with any questions.    Mark Bejarano MD  04/12/23  07:39 EDT    [facility]  [phone]    Please note that portions of this note were completed with a voice recognition program.

## 2023-04-12 NOTE — PLAN OF CARE
Goal Outcome Evaluation:  Plan of Care Reviewed With: patient           Outcome Evaluation: Pt is a 89 yo F admitted from [facility] with GI bleed, UTI, PRABHA. Pt has 24/7 caregivers at her facility, states she is normally independent with mobility using a rollator, but staff assists with ADLs/household chores. Pt does not have any steps, reports 1 recent fall in the shower. Pt presents to PT with impaired strength, endurance, and pain limiting overall mobility. Pt required mod A x1 to transfer to EOB - c/o dizziness, BP checked and WFL. Pt agreeable to standing, required max A x2 to clear bed using rwx, unable to achieve upright posture - significantly forward flexed over walker. Only able to stand for ~5 seconds on each attempt, very fearful of falling, and disappointed at how weak she is. Pt required mod A x2 to reterun to supine, dep A x2 to scoot up in bed. Discussed DC and pt states she plans return to [facility] with 24/7 caregivers. Pt educated about benefits of rehab as pt is unable to safely pivot even to a WC at this time so mobility at home would be significantly limited. Unsure how much caregiver assist is able to be provided, but recommending DC to SNF as pt is significantly deconditioned, functioning below BL, and a high risk for falls at home with attempted mobility. PT will continue to follow to progress mobility as tolerated.

## 2023-04-12 NOTE — PROGRESS NOTES
[facility]  Inpatient Progress Note    Reason for Followup:  Constipation    Subjective     Interval History:   No BM yet today but overall much improved.      Current Facility-Administered Medications:   •  acetaminophen (TYLENOL) tablet 650 mg, 650 mg, Oral, Q4H PRN, Lady Ag MD, 650 mg at 04/09/23 0825  •  sennosides-docusate (PERICOLACE) 8.6-50 MG per tablet 2 tablet, 2 tablet, Oral, BID, 2 tablet at 04/10/23 0851 **AND** polyethylene glycol (MIRALAX) packet 17 g, 17 g, Oral, Daily PRN **AND** bisacodyl (DULCOLAX) EC tablet 5 mg, 5 mg, Oral, Daily PRN **AND** bisacodyl (DULCOLAX) suppository 10 mg, 10 mg, Rectal, Daily PRN, Chico Shen, , 10 mg at 04/07/23 0859  •  cholecalciferol (VITAMIN D3) tablet 1,000 Units, 1,000 Units, Oral, BID, Chico Shen DO, 1,000 Units at 04/11/23 2153  •  famotidine (PEPCID) tablet 20 mg, 20 mg, Oral, Nightly, Maria Luisa Olmedo MD, 20 mg at 04/11/23 2153  •  gabapentin (NEURONTIN) capsule 100 mg, 100 mg, Oral, Daily, Chico Shen DO, 100 mg at 04/11/23 1333  •  HYDROcodone-acetaminophen (NORCO) 5-325 MG per tablet 1 tablet, 1 tablet, Oral, Q6H PRN, Chico Shen DO, 1 tablet at 04/10/23 0224  •  levoFLOXacin (LEVAQUIN) tablet 500 mg, 500 mg, Oral, Q48H, Chico Shen DO, 500 mg at 04/10/23 2056  •  levothyroxine (SYNTHROID, LEVOTHROID) tablet 88 mcg, 88 mcg, Oral, Q AM, Bernard Carrasco MD, 88 mcg at 04/12/23 0636  •  lidocaine (LIDODERM) 5 % 2 patch, 2 patch, Transdermal, Q24H, Chico Shen DO, 2 patch at 04/11/23 1333  •  melatonin tablet 3 mg, 3 mg, Oral, Nightly PRN, Lady Ag MD, 3 mg at 04/07/23 0121  •  metroNIDAZOLE (FLAGYL) tablet 500 mg, 500 mg, Oral, Q8H, Chico Shen DO, 500 mg at 04/11/23 2153  •  nystatin (MYCOSTATIN) powder, , Topical, Q12H, Chico Shen DO, Given at 04/11/23 2203  •  ondansetron (ZOFRAN) tablet 4 mg, 4 mg, Oral, Q6H PRN **OR** ondansetron (ZOFRAN) injection 4 mg, 4 mg, Intravenous, Q6H  PRN, Lady Ag MD, 4 mg at 04/11/23 0639  •  pantoprazole (PROTONIX) EC tablet 40 mg, 40 mg, Oral, Luna TANG Michael V, MD, 40 mg at 04/11/23 0639  •  polyethylene glycol (MIRALAX) packet 17 g, 17 g, Oral, Daily, Maria Luisa Olmedo MD, 17 g at 04/10/23 0850  •  simethicone (MYLICON) chewable tablet 80 mg, 80 mg, Oral, 4x Daily PRN, Shandra Johnson MD  •  [COMPLETED] Insert Peripheral IV, , , Once **AND** sodium chloride 0.9 % flush 10 mL, 10 mL, Intravenous, PRN, Libia Osborn APRN, 10 mL at 04/09/23 2007  •  torsemide (DEMADEX) tablet 100 mg, 100 mg, Oral, Daily, Mark Bejarano MD, 100 mg at 04/11/23 1335    Objective     Vital Signs  Temp:  [97.4 °F (36.3 °C)-98 °F (36.7 °C)] 97.4 °F (36.3 °C)  Heart Rate:  [65-73] 71  Resp:  [18] 18  BP: (121-135)/(46-70) 135/46  Body mass index is 38.45 kg/m².    Intake/Output Summary (Last 24 hours) at 4/12/2023 0821  Last data filed at 4/12/2023 0800  Gross per 24 hour   Intake 1110 ml   Output 800 ml   Net 310 ml     I/O this shift:  In: 150 [P.O.:150]  Out: -      Physical Exam:   General: patient awake, alert and cooperative   Abdomen: soft, nontender, nondistended; normal bowel sounds     Results Review:     I reviewed the patient's new clinical results.  I reviewed the patient's new imaging results and agree with the interpretation.    Results from last 7 days   Lab Units 04/12/23  0600 04/11/23  0628 04/10/23  0515   WBC 10*3/mm3 7.91 8.26 7.47   HEMOGLOBIN g/dL 10.4* 10.6* 10.0*   HEMATOCRIT % 32.5* 33.2* 31.0*   PLATELETS 10*3/mm3 184 181 163     Results from last 7 days   Lab Units 04/12/23  0600 04/11/23  0628 04/10/23  0515 04/06/23  0354 04/05/23  1427   SODIUM mmol/L 137 139 140   < > 140   POTASSIUM mmol/L 3.7 3.4* 3.5   < > 5.2   CHLORIDE mmol/L 101 102 106   < > 107   CO2 mmol/L 27.0 27.9 23.1   < > 21.0*   BUN mg/dL 26* 26* 29*   < > 59*   CREATININE mg/dL 3.04* 2.57* 2.46*   < > 2.30*   CALCIUM mg/dL 9.0 9.5 9.0   < > 9.7*    BILIRUBIN mg/dL  --   --   --   --  0.4   ALK PHOS U/L  --   --   --   --  108   ALT (SGPT) U/L  --   --   --   --  10   AST (SGOT) U/L  --   --   --   --  17   GLUCOSE mg/dL 92 94 122*   < > 88    < > = values in this interval not displayed.         Lab Results   Lab Value Date/Time    LIPASE 47 04/05/2023 1427    LIPASE 40 03/16/2023 1343       Radiology:  [unfilled]      Assessment & Plan   Assessment:   1.  Normocytic anemia: With a component of iron deficiency anemia  2.  Heme positive stool:   3.  Antiplatelet therapy: she had been on Plavix  4.  Constipation: Resolving, now having bowel movements  5.  Frailty, immobility, advanced age  6.  Abdominal pain: Diffuse, inconsistent exam with her flinching to the very lightest of touches superficially but no reaction to me completely grabbing subcutaneous tissue at other times.  Reassuring imaging.?  Neuropathic versus other, will surgery note reviewed concern for mesenteric ischemia, Dopplers pending    All problems new to me today    Plan:   Mesenteric doppler reviewed, no signficant arterial stenosis  Continue current bowel regimen  No plans for further aggressive GI workup at this time.  We will see again as needed    I discussed the patient's findings and my recommendations with patient and family.          Antwon Ray M.D.  [facility]  [address] [place] [address]  Office: [phone]

## 2023-04-12 NOTE — PROGRESS NOTES
"General Surgery Progress Note     S: Doing much better today. Denies abdominal pain while eating. States she has a good appetite today. Mesenteric duplex was negative.    O:/56 (BP Location: Left arm, Patient Position: Lying)   Pulse 77   Temp 97.5 °F (36.4 °C) (Oral)   Resp 18   Ht 154.9 cm (61\")   Wt 92.3 kg (203 lb 7.8 oz)   SpO2 94%   BMI 38.45 kg/m²     Intake & Output (last day)       04/11 0701 04/12 0700 04/12 0701 04/13 0700    P.O. 960 150    IV Piggyback      Total Intake(mL/kg) 960 (10.4) 150 (1.6)    Urine (mL/kg/hr) 800 (0.4)     Stool 0     Total Output 800     Net +160 +150          Stool Unmeasured Occurrence 2 x           GENERAL: awake, alert, no apparent distress  HEENT: normochephalic, atraumatic, moist mucus membranes, clear sclera   CHEST: clear to auscultation, no wheezes, no increased work of breathing  CARDIAC: regular rate and rhythm    ABDOMEN: soft, nondistended, nontender, still flinches when abdomen is first touched.  EXTREMITIES: no cyanosis, clubbing or edema    SKIN: Warm and moist, no rashes    LABS  Results from last 7 days   Lab Units 04/12/23  0600 04/11/23  0628 04/10/23  0515   WBC 10*3/mm3 7.91 8.26 7.47   HEMOGLOBIN g/dL 10.4* 10.6* 10.0*   HEMATOCRIT % 32.5* 33.2* 31.0*   PLATELETS 10*3/mm3 184 181 163     Results from last 7 days   Lab Units 04/12/23  0600 04/11/23  0628 04/10/23  0515 04/06/23  0354 04/05/23  1427   SODIUM mmol/L 137 139 140   < > 140   POTASSIUM mmol/L 3.7 3.4* 3.5   < > 5.2   CHLORIDE mmol/L 101 102 106   < > 107   CO2 mmol/L 27.0 27.9 23.1   < > 21.0*   BUN mg/dL 26* 26* 29*   < > 59*   CREATININE mg/dL 3.04* 2.57* 2.46*   < > 2.30*   CALCIUM mg/dL 9.0 9.5 9.0   < > 9.7*   BILIRUBIN mg/dL  --   --   --   --  0.4   ALK PHOS U/L  --   --   --   --  108   ALT (SGPT) U/L  --   --   --   --  10   AST (SGOT) U/L  --   --   --   --  17   GLUCOSE mg/dL 92 94 122*   < > 88    < > = values in this interval not displayed.     Results from last 7 " days   Lab Units 04/05/23  1427   APTT seconds <20.0*         A/P: 89 y.o. female with abdominal pain.  - pain has improved. Duplex was negative for flow limiting lesion in mesenteric vessels.   -complete antibiotics today  -no surgical needs. I will sign off. Please call with questions or concerns.      REUBEN KAY MD  General, Robotic and Endoscopic Surgery  [facility]    [address], [address]  [place], [place], [address]  P: [phone]  F: [phone]

## 2023-04-13 LAB
ALBUMIN SERPL-MCNC: 2.8 G/DL (ref 3.5–5.2)
ALBUMIN/GLOB SERPL: 1.5 G/DL
ALP SERPL-CCNC: 70 U/L (ref 39–117)
ALT SERPL W P-5'-P-CCNC: 7 U/L (ref 1–33)
ANION GAP SERPL CALCULATED.3IONS-SCNC: 10.8 MMOL/L (ref 5–15)
ANION GAP SERPL CALCULATED.3IONS-SCNC: 13.9 MMOL/L (ref 5–15)
AST SERPL-CCNC: 8 U/L (ref 1–32)
BASOPHILS # BLD AUTO: 0.03 10*3/MM3 (ref 0–0.2)
BASOPHILS NFR BLD AUTO: 0.4 % (ref 0–1.5)
BILIRUB SERPL-MCNC: <0.2 MG/DL (ref 0–1.2)
BUN SERPL-MCNC: 33 MG/DL (ref 8–23)
BUN SERPL-MCNC: 34 MG/DL (ref 8–23)
BUN/CREAT SERPL: 10 (ref 7–25)
BUN/CREAT SERPL: 9.7 (ref 7–25)
CALCIUM SPEC-SCNC: 8.9 MG/DL (ref 8.2–9.6)
CALCIUM SPEC-SCNC: 9 MG/DL (ref 8.2–9.6)
CHLORIDE SERPL-SCNC: 101 MMOL/L (ref 98–107)
CHLORIDE SERPL-SCNC: 98 MMOL/L (ref 98–107)
CO2 SERPL-SCNC: 24.2 MMOL/L (ref 22–29)
CO2 SERPL-SCNC: 25.1 MMOL/L (ref 22–29)
CREAT SERPL-MCNC: 3.41 MG/DL (ref 0.57–1)
CREAT SERPL-MCNC: 3.41 MG/DL (ref 0.57–1)
DEPRECATED RDW RBC AUTO: 40 FL (ref 37–54)
EGFRCR SERPLBLD CKD-EPI 2021: 11.8 ML/MIN/1.73
EGFRCR SERPLBLD CKD-EPI 2021: 11.8 ML/MIN/1.73
EOSINOPHIL # BLD AUTO: 0.11 10*3/MM3 (ref 0–0.4)
EOSINOPHIL NFR BLD AUTO: 1.4 % (ref 0.3–6.2)
ERYTHROCYTE [DISTWIDTH] IN BLOOD BY AUTOMATED COUNT: 12.8 % (ref 12.3–15.4)
GLOBULIN UR ELPH-MCNC: 1.9 GM/DL
GLUCOSE SERPL-MCNC: 95 MG/DL (ref 65–99)
GLUCOSE SERPL-MCNC: 95 MG/DL (ref 65–99)
HCT VFR BLD AUTO: 29.5 % (ref 34–46.6)
HGB BLD-MCNC: 9.4 G/DL (ref 12–15.9)
IMM GRANULOCYTES # BLD AUTO: 0.15 10*3/MM3 (ref 0–0.05)
IMM GRANULOCYTES NFR BLD AUTO: 1.9 % (ref 0–0.5)
LYMPHOCYTES # BLD AUTO: 1.23 10*3/MM3 (ref 0.7–3.1)
LYMPHOCYTES NFR BLD AUTO: 16 % (ref 19.6–45.3)
MAGNESIUM SERPL-MCNC: 1.7 MG/DL (ref 1.7–2.3)
MCH RBC QN AUTO: 27.7 PG (ref 26.6–33)
MCHC RBC AUTO-ENTMCNC: 31.9 G/DL (ref 31.5–35.7)
MCV RBC AUTO: 87 FL (ref 79–97)
MONOCYTES # BLD AUTO: 0.8 10*3/MM3 (ref 0.1–0.9)
MONOCYTES NFR BLD AUTO: 10.4 % (ref 5–12)
NEUTROPHILS NFR BLD AUTO: 5.38 10*3/MM3 (ref 1.7–7)
NEUTROPHILS NFR BLD AUTO: 69.9 % (ref 42.7–76)
NRBC BLD AUTO-RTO: 0 /100 WBC (ref 0–0.2)
PLATELET # BLD AUTO: 178 10*3/MM3 (ref 140–450)
PMV BLD AUTO: 10.6 FL (ref 6–12)
POTASSIUM SERPL-SCNC: 3.6 MMOL/L (ref 3.5–5.2)
POTASSIUM SERPL-SCNC: 3.6 MMOL/L (ref 3.5–5.2)
PROT SERPL-MCNC: 4.7 G/DL (ref 6–8.5)
RBC # BLD AUTO: 3.39 10*6/MM3 (ref 3.77–5.28)
SODIUM SERPL-SCNC: 136 MMOL/L (ref 136–145)
SODIUM SERPL-SCNC: 137 MMOL/L (ref 136–145)
WBC NRBC COR # BLD: 7.7 10*3/MM3 (ref 3.4–10.8)

## 2023-04-13 PROCEDURE — 80053 COMPREHEN METABOLIC PANEL: CPT | Performed by: STUDENT IN AN ORGANIZED HEALTH CARE EDUCATION/TRAINING PROGRAM

## 2023-04-13 PROCEDURE — 83735 ASSAY OF MAGNESIUM: CPT | Performed by: STUDENT IN AN ORGANIZED HEALTH CARE EDUCATION/TRAINING PROGRAM

## 2023-04-13 PROCEDURE — 85025 COMPLETE CBC W/AUTO DIFF WBC: CPT | Performed by: INTERNAL MEDICINE

## 2023-04-13 RX ADMIN — FAMOTIDINE 20 MG: 20 TABLET, FILM COATED ORAL at 20:05

## 2023-04-13 RX ADMIN — LEVOTHYROXINE SODIUM 88 MCG: 0.09 TABLET ORAL at 05:55

## 2023-04-13 RX ADMIN — GABAPENTIN 100 MG: 100 CAPSULE ORAL at 11:27

## 2023-04-13 RX ADMIN — METRONIDAZOLE 500 MG: 500 TABLET, FILM COATED ORAL at 11:27

## 2023-04-13 RX ADMIN — METRONIDAZOLE 500 MG: 500 TABLET, FILM COATED ORAL at 20:05

## 2023-04-13 RX ADMIN — Medication 1000 UNITS: at 11:28

## 2023-04-13 RX ADMIN — Medication 1000 UNITS: at 20:05

## 2023-04-13 RX ADMIN — NYSTATIN 1 APPLICATION: 100000 POWDER TOPICAL at 20:06

## 2023-04-13 NOTE — PLAN OF CARE
Goal Outcome Evaluation:      VSS. 2L O2 NC. Ballard cath patent & care completed, plans to remove this AM, voiding trial to start this AM after ballard removed. Q2 turns. X1 BM this shift. Z orange cream to stage 2 coccyx. Nystatin powder to bilateral under breasts & groins.

## 2023-04-13 NOTE — DISCHARGE PLACEMENT REQUEST
"Mk Maya (89 y.o. Female)     Date of Birth   07/14/1925    Social Security Number       Address   [address] [place] [address]    Home Phone   [phone]    MRN   [id number]       Buddhist   Tem[employer]    Marital Status                               Admission Date   4/5/23    Admission Type   Emergency    Admitting Provider   Lady Ag MD    Attending Provider   Justus Squires MD    Department, Room/Bed   [address], [address]       Discharge Date       Discharge Disposition       Discharge Destination                               Attending Provider: Justus Squires MD    Allergies: Atorvastatin, Azithromycin, Cefdinir, Doxycycline Monohydrate, Allopurinol    Isolation: None   Infection: None   Code Status: No CPR    Ht: 154.9 cm (61\")   Wt: 91.1 kg (200 lb 13.4 oz)    Admission Cmt: None   Principal Problem: Gastrointestinal hemorrhage, unspecified gastrointestinal hemorrhage type [K92.2]                 Active Insurance as of 4/5/2023     Primary Coverage     Payor Plan Insurance Group Employer/Plan Group    [facility] MEDICARE REPLACEMENT [facility] MEDICARE REPLACEMENT [id number]     Payor Plan Address Payor Plan Phone Number Payor Plan Fax Number Effective Dates    [address] [address]   2/1/2022 - None Entered    [facility] [id number]       Subscriber Name Subscriber Birth Date Member ID       MK MAYA 7/14/1925 [id number]                 Emergency Contacts      (Rel.) Home Phone Work Phone Mobile Phone    Jessica Osei (Power of ) [phone] -- --              "

## 2023-04-13 NOTE — PROGRESS NOTES
Name: Marianne Delgado ADMIT: 2023   : 1925  PCP: Natasha Ramirez APRN    MRN: [id number] LOS: 8 days   AGE/SEX: 89 y.o. female  ROOM: [facility]     Subjective   Subjective   Patient lying in bed.  Niece at bedside.  Patient asking me if she should go hospice.  Discussed that her kidney numbers have continued to worsen today, but we are looking into why that is the case.  Patient without any abdominal pain but is having some nausea.  Did refuse her medications earlier this morning, but now is agreeable to take them.    Review of Systems   Constitutional: Negative for chills and fever.   Respiratory: Negative for cough and shortness of breath.    Cardiovascular: Negative for chest pain and leg swelling.   Gastrointestinal: Positive for nausea. Negative for abdominal pain and diarrhea.     As above     Objective   Objective   Vital Signs  Temp:  [97.5 °F (36.4 °C)-98.6 °F (37 °C)] 97.5 °F (36.4 °C)  Heart Rate:  [68-81] 70  Resp:  [16-18] 16  BP: (113-135)/(46-59) 135/59  SpO2:  [95 %-98 %] 95 %  on  Flow (L/min):  [1] 1;   Device (Oxygen Therapy): nasal cannula  Body mass index is 37.95 kg/m².  Physical Exam  Constitutional:       General: She is not in acute distress.     Appearance: She is ill-appearing (Chronically). She is not diaphoretic.      Comments: Elderly, frail   HENT:      Head: Normocephalic and atraumatic.      Mouth/Throat:      Mouth: Mucous membranes are moist.      Pharynx: Oropharynx is clear.   Eyes:      Extraocular Movements: Extraocular movements intact.      Pupils: Pupils are equal, round, and reactive to light.   Cardiovascular:      Rate and Rhythm: Normal rate and regular rhythm.   Pulmonary:      Effort: Pulmonary effort is normal. No respiratory distress.   Abdominal:      General: Abdomen is flat. There is no distension.      Palpations: Abdomen is soft.      Tenderness: There is no abdominal tenderness. There is no guarding or rebound.   Musculoskeletal:          General: No swelling or deformity. Normal range of motion.   Skin:     General: Skin is warm and dry.   Neurological:      General: No focal deficit present.      Mental Status: She is alert. Mental status is at baseline.         Results Review     I reviewed the patient's new clinical results.  Results from last 7 days   Lab Units 04/13/23  0516 04/12/23  0600 04/11/23  0628 04/10/23  0515   WBC 10*3/mm3 7.70 7.91 8.26 7.47   HEMOGLOBIN g/dL 9.4* 10.4* 10.6* 10.0*   PLATELETS 10*3/mm3 178 184 181 163     Results from last 7 days   Lab Units 04/13/23  0516 04/12/23  0600 04/11/23  0628 04/10/23  0515   SODIUM mmol/L 137  136 137 139 140   POTASSIUM mmol/L 3.6  3.6 3.7 3.4* 3.5   CHLORIDE mmol/L 98  101 101 102 106   CO2 mmol/L 25.1  24.2 27.0 27.9 23.1   BUN mg/dL 33*  34* 26* 26* 29*   CREATININE mg/dL 3.41*  3.41* 3.04* 2.57* 2.46*   GLUCOSE mg/dL 95  95 92 94 122*   Estimated Creatinine Clearance: 9.7 mL/min (A) (by C-G formula based on SCr of 3.41 mg/dL (H)).  Results from last 7 days   Lab Units 04/13/23  0516 04/10/23  0515 04/09/23  0418 04/08/23  0344   ALBUMIN g/dL 2.8* 2.7* 2.2* 2.9*   BILIRUBIN mg/dL <0.2  --   --   --    ALK PHOS U/L 70  --   --   --    AST (SGOT) U/L 8  --   --   --    ALT (SGPT) U/L 7  --   --   --      Results from last 7 days   Lab Units 04/13/23  0516 04/12/23  0600 04/11/23  0628 04/10/23  0515 04/09/23  0418 04/08/23  0344 04/07/23  0338   CALCIUM mg/dL 9.0  8.9 9.0 9.5 9.0 9.1 9.4 8.6  8.6   ALBUMIN g/dL 2.8*  --   --  2.7* 2.2* 2.9* 2.7*   MAGNESIUM mg/dL 1.7 1.8 1.7 1.6* 1.7 1.8 1.9   PHOSPHORUS mg/dL  --   --   --  4.0 4.7* 3.8 3.2     Results from last 7 days   Lab Units 04/08/23  1315 04/07/23  1020   LACTATE mmol/L 1.5 0.7     COVID19   Date Value Ref Range Status   02/03/2021 Not Detected Not Detected - Ref. Range Final   01/29/2021 Not Detected Not Detected - Ref. Range Final     No results found for: HGBA1C, POCGLU    Duplex Mesenteric Complete CAR  •  No  significant stenosis of the celiac axis or superior mesenteric   artery.  •  The inferior mesenteric artery is patent.    I reviewed the patient's daily medications.  Scheduled Medications  cholecalciferol, 1,000 Units, Oral, BID  famotidine, 20 mg, Oral, Nightly  gabapentin, 100 mg, Oral, Daily  levoFLOXacin, 500 mg, Oral, Q48H  levothyroxine, 88 mcg, Oral, Q AM  lidocaine, 2 patch, Transdermal, Q24H  metroNIDAZOLE, 500 mg, Oral, Q8H  nystatin, , Topical, Q12H  pantoprazole, 40 mg, Oral, QAM AC    Infusions   Diet  Diet: Gastrointestinal Diets; Fiber-Restricted, Low Irritant; Texture: Regular Texture (IDDSI 7); Fluid Consistency: Thin (IDDSI 0)         I have personally reviewed:  [x]  Laboratory   []  Microbiology   [x]  Radiology   [x]  EKG/Telemetry   []  Cardiology/Vascular   []  Pathology   [x]  Records     Assessment/Plan     Active Hospital Problems    Diagnosis  POA   • **Gastrointestinal hemorrhage, unspecified gastrointestinal hemorrhage type [K92.2]  Yes   • PRABHA (acute kidney injury) [N17.9]  No   • Neuropathic pain [M79.2]  Clinically Undetermined   • Hypomagnesemia [E83.42]  No   • Hypervolemia associated with renal insufficiency [E87.70, N28.9]  Yes   • Acute generalized abdominal pain [R10.84]  No   • Acute UTI (urinary tract infection) [N39.0]  Yes   • Constipation [K59.00]  Yes   • Nausea with vomiting [R11.2]  Yes   • Anemia [D64.9]  Yes   • Acute urinary retention [R33.8]  No   • Obesity (BMI 30-39.9) [E66.9]  Yes   • History of CVA (cerebrovascular accident) [Z86.73]  Not Applicable   • Stage 4 chronic kidney disease (HCC) [N18.4]  Yes   • Essential hypertension [I10]  Yes   • Acquired hypothyroidism [E03.9]  Yes      Resolved Hospital Problems   No resolved problems to display.       89 y.o. female admitted with Gastrointestinal hemorrhage, unspecified gastrointestinal hemorrhage type.    Generalized abdominal pain/intemittent nausea with vomiting  Concern for diverticulitis  -No abdominal pain  today, tolerated diet this morning  -Continue oral Flagyl and Levaquin  -GI consulted, appreciate recommendations  -Surgery consulted, appreciate recommendations, has now signed off  -Duplex SMA complete without any signs of stenosis  -H. pylori testing negative  -Continue PPI, H2 blocker     Urinary tract infection  -Urine culture growing Pseudomonas and Klebsiella both sensitive to Levaquin, plan for 5-day course, last dose on 4/14     Acute kidney injury, not POA  Chronic kidney disease stage 4  Hypervolemia associated with renal insufficiency  - Followed by Dr. Mitchell with [facility] as outpatient.    -Nephrology consulted, appreciate recommendations-creatinine jumped to 3.5 today, diuretics held.  -Should be out of the window for THIAGO, may be more related to the diuretics she required initially while she was here as she is not on any at home.  And with her low albumin I suspect she will always have some degree of swelling.     Acute blood loss anemia secondary to Gastrointestinal hemorrhage  - Patient endorsing dark stools, prior to arrival, however, had recently been on Pepto-Bismol.  - GI consulted no plan for EGD at this time.  Continue PPI  - Hemoglobin stable.     Acute urinary retention  - Consulted Urology, Morrow catheter placed.  Morrow catheter removed today.  Voiding trial underway.     Lower extremity neuropathy  - Continue gabapentin 100 mg daily that was started this admission.      Hypokalemia, improved  - Replete PRN per protocol. Continue to monitor     Hypertension  -Continue current regimen     History of CVA  -Holding Plavix because of anemia.  We will restart when hemoglobin stable     Hypothyroidism  - Stable. Continue Levothyroxine as prescribed.     Obesity  - BMI 39.11 kg/m2. Complicating all medical problems.    · SCDs for DVT prophylaxis.  · DNR.  · Discussed with patient and nursing staff.  · Anticipate discharge home with family in 1-2 days.    Expected  Discharge Date and Time     Expected Discharge Date Expected Discharge Time    Apr 14, 2023             Justus Squires MD  [facility]  04/13/23  12:27 EDT

## 2023-04-13 NOTE — SIGNIFICANT NOTE
04/13/23 1453   OTHER   Discipline physical therapist   Rehab Time/Intention   Session Not Performed patient/family declined treatment  (DEVON Lester reports pt is declining PT services this PM as it hurt too much to sit EOB during last session and does not want any PTs in her room at this time. PT will check back tomorrow.)   Recommendation   PT - Next Appointment 04/14/23

## 2023-04-13 NOTE — PLAN OF CARE
"Goal Outcome Evaluation:  Plan of Care Reviewed With: patient, daughter        Progress: no change  Outcome Evaluation: Patient refusing PT and various medications, claims \"I hate it here, I just want to go home\". Minimal urine production per bladder scan without discomfort. Plan to return to assisted living at discharge. No pain, discomfort noted per patient.  "

## 2023-04-13 NOTE — PROGRESS NOTES
[facility] Progress Note      Patient Name: Marianne Delgado  : 1925  MRN: [id number]  Primary Care Physician:  Natasha Ramirez APRN  Date of admission: 2023    Subjective     Interval History:   Follow-up chronic kidney disease    Seen and examined.  Denies shortness of air or chest pain.  No new issues.  Alert and oriented and answers all question appropriately.        Review of Systems:   As noted above    Objective     Vitals:   Temp:  [97.4 °F (36.3 °C)-98.6 °F (37 °C)] 98.6 °F (37 °C)  Heart Rate:  [70-81] 70  Resp:  [16-18] 16  BP: (113-135)/(46-56) 113/55  Flow (L/min):  [1] 1    Intake/Output Summary (Last 24 hours) at 2023 0610  Last data filed at 2023 0000  Gross per 24 hour   Intake 690 ml   Output 225 ml   Net 465 ml       Physical Exam:    General Appearance: Obese, chronically ill and frail, awake and  Skin: warm and dry  HEENT: oral mucosa normal, nonicteric sclera  Neck: supple, no JVD  Lungs: Scattered rhonchi, breathing effort unlabored  Heart: RRR, normal S1 and S2, no S3, no  Abdomen: soft, diffuse tenderness, even to touch, normoactive bowel  : Fully catheter is anchored  Extremities: + edema  Neuro: Moving all extremity    Scheduled Meds:     cholecalciferol, 1,000 Units, Oral, BID  famotidine, 20 mg, Oral, Nightly  gabapentin, 100 mg, Oral, Daily  levoFLOXacin, 500 mg, Oral, Q48H  levothyroxine, 88 mcg, Oral, Q AM  lidocaine, 2 patch, Transdermal, Q24H  metroNIDAZOLE, 500 mg, Oral, Q8H  nystatin, , Topical, Q12H  pantoprazole, 40 mg, Oral, QAM AC  polyethylene glycol, 17 g, Oral, Daily  senna-docusate sodium, 2 tablet, Oral, BID  torsemide, 100 mg, Oral, Daily      IV Meds:        Results Reviewed:   I have personally reviewed the results from the time of this admission to 2023 06:10 EDT     Results from last 7 days   Lab Units 23  0600 23  0628 04/10/23  0515   SODIUM mmol/L 137 139 140   POTASSIUM mmol/L 3.7 3.4*  3.5   CHLORIDE mmol/L 101 102 106   CO2 mmol/L 27.0 27.9 23.1   BUN mg/dL 26* 26* 29*   CREATININE mg/dL 3.04* 2.57* 2.46*   CALCIUM mg/dL 9.0 9.5 9.0   GLUCOSE mg/dL 92 94 122*       Estimated Creatinine Clearance: 10.9 mL/min (A) (by C-G formula based on SCr of 3.04 mg/dL (H)).    Results from last 7 days   Lab Units 04/12/23  0600 04/11/23  0628 04/10/23  0515 04/09/23 0418 04/08/23  0344   MAGNESIUM mg/dL 1.8 1.7 1.6* 1.7 1.8   PHOSPHORUS mg/dL  --   --  4.0 4.7* 3.8       Results from last 7 days   Lab Units 04/10/23  0515 04/09/23 0418 04/08/23  0344 04/07/23  0338   URIC ACID mg/dL 9.0* 8.8* 8.2* 7.3*       Results from last 7 days   Lab Units 04/13/23  0516 04/12/23  0600 04/11/23  0628 04/10/23  0515 04/09/23  0418   WBC 10*3/mm3 7.70 7.91 8.26 7.47 6.62   HEMOGLOBIN g/dL 9.4* 10.4* 10.6* 10.0* 9.8*   PLATELETS 10*3/mm3 178 184 181 163 148             Assessment / Plan     ASSESSMENT:  -Chronic kidney disease stage IV associated with nephrosclerosis and advanced age, creatinine is up to 3.5  probably related to contrast-induced nephropathy after having IV contrast for her CT scan to evaluate her severe abdominal pain.  Her electrolyte within acceptable range.   -Metabolic acidosis with normal anion gap associated with the chronic kidney disease  -Urinary retention which is acute  -Abdominal pain could be related to her urinary retention.  Possibly mesenteric ischemia as well  -History of black tarry stools with recent intake of Pepto-Bismol being evaluated by GI  -Iron deficiency anemia hemoglobin 9.8, and her iron saturation 12% also has probably component of chronic kidney disease  -Probable neuropathic pain    PLAN:    -Kidney function has worsened with a creatinine up to 3 mg/dL.  We will continue  hold diuretic today.    -Surveillance labs        Thank you for involving us in the care of Marianne R Delgado.  Please feel free to call with any questions.    Mark Bejarano MD  04/13/23  06:10  KISHOR    [facility]  [phone]    Please note that portions of this note were completed with a voice recognition program.

## 2023-04-13 NOTE — CASE MANAGEMENT/SOCIAL WORK
Continued Stay Note  [facility]     Patient Name: Marianne Delgado  MRN: [id number]  Today's Date: 4/13/2023    Admit Date: 4/5/2023    Plan: Home with HH and 24 hour caregiver   Discharge Plan     Row Name 04/13/23 1040       Plan    Plan Home with HH and 24 hour caregiver    Patient/Family in Agreement with Plan yes    Plan Comments Met with pt at bedside who confirms plan to return home at discharge.  Will need ambulance for transport.  Pt states she would like a different mattress on her hospital bed at home.  Does not want low air loss mattress.  Spoke with Feroz/Micheal who states only other option would be gel overlay.  Gel overlay ordered.  No further needs at this time.  KATERINA Sampson RN               Discharge Codes    No documentation.               Expected Discharge Date and Time     Expected Discharge Date Expected Discharge Time    Apr 14, 2023             Rosa Sampson, RN

## 2023-04-14 VITALS
HEART RATE: 70 BPM | BODY MASS INDEX: 37.92 KG/M2 | RESPIRATION RATE: 16 BRPM | OXYGEN SATURATION: 99 % | TEMPERATURE: 97.6 F | SYSTOLIC BLOOD PRESSURE: 140 MMHG | HEIGHT: 61 IN | DIASTOLIC BLOOD PRESSURE: 55 MMHG | WEIGHT: 200.84 LBS

## 2023-04-14 PROBLEM — E83.42 HYPOMAGNESEMIA: Status: RESOLVED | Noted: 2023-04-10 | Resolved: 2023-04-14

## 2023-04-14 PROBLEM — R11.2 NAUSEA WITH VOMITING: Status: RESOLVED | Noted: 2023-04-07 | Resolved: 2023-04-14

## 2023-04-14 PROBLEM — K92.2 GASTROINTESTINAL HEMORRHAGE, UNSPECIFIED GASTROINTESTINAL HEMORRHAGE TYPE: Status: RESOLVED | Noted: 2023-04-05 | Resolved: 2023-04-14

## 2023-04-14 LAB
ANION GAP SERPL CALCULATED.3IONS-SCNC: 12.5 MMOL/L (ref 5–15)
BACTERIA UR QL AUTO: ABNORMAL /HPF
BASOPHILS # BLD AUTO: 0.04 10*3/MM3 (ref 0–0.2)
BASOPHILS NFR BLD AUTO: 0.6 % (ref 0–1.5)
BILIRUB UR QL STRIP: NEGATIVE
BUN SERPL-MCNC: 38 MG/DL (ref 8–23)
BUN/CREAT SERPL: 10.6 (ref 7–25)
CALCIUM SPEC-SCNC: 9.5 MG/DL (ref 8.2–9.6)
CHLORIDE SERPL-SCNC: 101 MMOL/L (ref 98–107)
CHLORIDE UR-SCNC: <20 MMOL/L
CLARITY UR: CLEAR
CO2 SERPL-SCNC: 25.5 MMOL/L (ref 22–29)
COLOR UR: YELLOW
CREAT SERPL-MCNC: 3.57 MG/DL (ref 0.57–1)
CREAT UR-MCNC: 53 MG/DL
DEPRECATED RDW RBC AUTO: 40.5 FL (ref 37–54)
EGFRCR SERPLBLD CKD-EPI 2021: 11.2 ML/MIN/1.73
EOSINOPHIL # BLD AUTO: 0.07 10*3/MM3 (ref 0–0.4)
EOSINOPHIL NFR BLD AUTO: 1 % (ref 0.3–6.2)
ERYTHROCYTE [DISTWIDTH] IN BLOOD BY AUTOMATED COUNT: 12.8 % (ref 12.3–15.4)
GLUCOSE SERPL-MCNC: 93 MG/DL (ref 65–99)
GLUCOSE UR STRIP-MCNC: NEGATIVE MG/DL
HCT VFR BLD AUTO: 31.6 % (ref 34–46.6)
HGB BLD-MCNC: 10.2 G/DL (ref 12–15.9)
HGB UR QL STRIP.AUTO: NEGATIVE
HYALINE CASTS UR QL AUTO: ABNORMAL /LPF
IMM GRANULOCYTES # BLD AUTO: 0.17 10*3/MM3 (ref 0–0.05)
IMM GRANULOCYTES NFR BLD AUTO: 2.4 % (ref 0–0.5)
KETONES UR QL STRIP: NEGATIVE
LEUKOCYTE ESTERASE UR QL STRIP.AUTO: ABNORMAL
LYMPHOCYTES # BLD AUTO: 1.32 10*3/MM3 (ref 0.7–3.1)
LYMPHOCYTES NFR BLD AUTO: 18.3 % (ref 19.6–45.3)
MAGNESIUM SERPL-MCNC: 2 MG/DL (ref 1.7–2.3)
MCH RBC QN AUTO: 28.4 PG (ref 26.6–33)
MCHC RBC AUTO-ENTMCNC: 32.3 G/DL (ref 31.5–35.7)
MCV RBC AUTO: 88 FL (ref 79–97)
MONOCYTES # BLD AUTO: 0.83 10*3/MM3 (ref 0.1–0.9)
MONOCYTES NFR BLD AUTO: 11.5 % (ref 5–12)
NEUTROPHILS NFR BLD AUTO: 4.78 10*3/MM3 (ref 1.7–7)
NEUTROPHILS NFR BLD AUTO: 66.2 % (ref 42.7–76)
NITRITE UR QL STRIP: NEGATIVE
NRBC BLD AUTO-RTO: 0 /100 WBC (ref 0–0.2)
PH UR STRIP.AUTO: 5.5 [PH] (ref 5–8)
PLATELET # BLD AUTO: 172 10*3/MM3 (ref 140–450)
PMV BLD AUTO: 10.2 FL (ref 6–12)
POTASSIUM SERPL-SCNC: 4 MMOL/L (ref 3.5–5.2)
PROT ?TM UR-MCNC: 6.1 MG/DL
PROT UR QL STRIP: NEGATIVE
PROT/CREAT UR: 115.1 MG/G CREA (ref 0–200)
RBC # BLD AUTO: 3.59 10*6/MM3 (ref 3.77–5.28)
RBC # UR STRIP: ABNORMAL /HPF
REF LAB TEST METHOD: ABNORMAL
SODIUM SERPL-SCNC: 139 MMOL/L (ref 136–145)
SODIUM UR-SCNC: <20 MMOL/L
SP GR UR STRIP: 1.01 (ref 1–1.03)
SQUAMOUS #/AREA URNS HPF: ABNORMAL /HPF
UROBILINOGEN UR QL STRIP: ABNORMAL
WBC # UR STRIP: ABNORMAL /HPF
WBC NRBC COR # BLD: 7.21 10*3/MM3 (ref 3.4–10.8)

## 2023-04-14 PROCEDURE — 81001 URINALYSIS AUTO W/SCOPE: CPT | Performed by: STUDENT IN AN ORGANIZED HEALTH CARE EDUCATION/TRAINING PROGRAM

## 2023-04-14 PROCEDURE — 85025 COMPLETE CBC W/AUTO DIFF WBC: CPT | Performed by: INTERNAL MEDICINE

## 2023-04-14 PROCEDURE — 83735 ASSAY OF MAGNESIUM: CPT | Performed by: STUDENT IN AN ORGANIZED HEALTH CARE EDUCATION/TRAINING PROGRAM

## 2023-04-14 PROCEDURE — 87086 URINE CULTURE/COLONY COUNT: CPT | Performed by: STUDENT IN AN ORGANIZED HEALTH CARE EDUCATION/TRAINING PROGRAM

## 2023-04-14 PROCEDURE — 82436 ASSAY OF URINE CHLORIDE: CPT | Performed by: INTERNAL MEDICINE

## 2023-04-14 PROCEDURE — 84156 ASSAY OF PROTEIN URINE: CPT | Performed by: INTERNAL MEDICINE

## 2023-04-14 PROCEDURE — 84300 ASSAY OF URINE SODIUM: CPT | Performed by: INTERNAL MEDICINE

## 2023-04-14 PROCEDURE — 80048 BASIC METABOLIC PNL TOTAL CA: CPT | Performed by: STUDENT IN AN ORGANIZED HEALTH CARE EDUCATION/TRAINING PROGRAM

## 2023-04-14 PROCEDURE — 82570 ASSAY OF URINE CREATININE: CPT | Performed by: INTERNAL MEDICINE

## 2023-04-14 RX ORDER — SODIUM CHLORIDE 9 MG/ML
75 INJECTION, SOLUTION INTRAVENOUS CONTINUOUS
Status: DISCONTINUED | OUTPATIENT
Start: 2023-04-14 | End: 2023-04-15 | Stop reason: HOSPADM

## 2023-04-14 RX ORDER — GABAPENTIN 100 MG/1
100 CAPSULE ORAL EVERY 12 HOURS SCHEDULED
Status: DISCONTINUED | OUTPATIENT
Start: 2023-04-14 | End: 2023-04-15 | Stop reason: HOSPADM

## 2023-04-14 RX ADMIN — LIDOCAINE 2 PATCH: 700 PATCH TOPICAL at 10:51

## 2023-04-14 RX ADMIN — SODIUM CHLORIDE 75 ML/HR: 9 INJECTION, SOLUTION INTRAVENOUS at 11:06

## 2023-04-14 RX ADMIN — LEVOTHYROXINE SODIUM 88 MCG: 0.09 TABLET ORAL at 06:31

## 2023-04-14 RX ADMIN — METRONIDAZOLE 500 MG: 500 TABLET, FILM COATED ORAL at 14:37

## 2023-04-14 RX ADMIN — NYSTATIN: 100000 POWDER TOPICAL at 10:49

## 2023-04-14 RX ADMIN — NYSTATIN: 100000 POWDER TOPICAL at 21:11

## 2023-04-14 RX ADMIN — HYDROCODONE BITARTRATE AND ACETAMINOPHEN 1 TABLET: 5; 325 TABLET ORAL at 11:05

## 2023-04-14 RX ADMIN — GABAPENTIN 100 MG: 100 CAPSULE ORAL at 10:50

## 2023-04-14 RX ADMIN — PANTOPRAZOLE SODIUM 40 MG: 40 TABLET, DELAYED RELEASE ORAL at 06:31

## 2023-04-14 RX ADMIN — Medication 1000 UNITS: at 10:49

## 2023-04-14 RX ADMIN — METRONIDAZOLE 500 MG: 500 TABLET, FILM COATED ORAL at 10:49

## 2023-04-14 NOTE — CASE MANAGEMENT/SOCIAL WORK
Continued Stay Note  [facility]     Patient Name: Marianne Delgado  MRN: [id number]  Today's Date: 4/14/2023    Admit Date: 4/5/2023    Plan: Home with HH and 24hr caregiver, to f/u with [facility]   Discharge Plan     Row Name 04/14/23 1328       Plan    Plan Home with HH and 24hr caregiver, to f/u with [facility]    Patient/Family in Agreement with Plan yes    Plan Comments Spoke with floyd/Jessica who states pt wants to return home and that pt expresses that she is ready for [facility].  Floyd states that pt has been evaluated by [facility] in past and wasn't medically appropriate at that time.  Would like new referral to [facility] but would not want that to delay discharge home. Notified [facility] of referral and that pt plans discharge home tomorrow, they will reach out to floyd to schedule meeting.  Ambulance arranged for 11am tomorrow.  KATERINA Sampson RN               Discharge Codes    No documentation.               Expected Discharge Date and Time     Expected Discharge Date Expected Discharge Time    Apr 14, 2023             Rosa Sampson, RN

## 2023-04-14 NOTE — PROGRESS NOTES
[facility] Progress Note      Patient Name: Marianne Delgado  : 1925  MRN: [id number]  Primary Care Physician:  Natasha Ramirez APRN  Date of admission: 2023    Subjective     Interval History:   Follow-up chronic kidney disease    Seen and examined.  Denies shortness of air or chest pain.  No new issues.  Alert and oriented and answers all question appropriately.  Morrow catheter was removed yesterday.  Not much urine output.  Daughter is on bedside.      Review of Systems:   As noted above    Objective     Vitals:   Temp:  [97.8 °F (36.6 °C)-98.7 °F (37.1 °C)] 97.8 °F (36.6 °C)  Heart Rate:  [75-78] 75  Resp:  [16] 16  BP: (115-133)/(53-76) 133/76  Flow (L/min):  [2] 2    Intake/Output Summary (Last 24 hours) at 2023  Last data filed at 2023  Gross per 24 hour   Intake 630 ml   Output --   Net 630 ml       Physical Exam:    General Appearance: Obese, chronically ill and frail, awake and  Skin: warm and dry  HEENT: oral mucosa normal, nonicteric sclera  Neck: supple, no JVD  Lungs: Scattered rhonchi, breathing effort unlabored  Heart: RRR, normal S1 and S2, no S3, no  Abdomen: soft, diffuse tenderness, even to touch, normoactive bowel  : Fully catheter is anchored  Extremities: + edema  Neuro: Moving all extremity    Scheduled Meds:     cholecalciferol, 1,000 Units, Oral, BID  famotidine, 20 mg, Oral, Nightly  gabapentin, 100 mg, Oral, Daily  levoFLOXacin, 500 mg, Oral, Q48H  levothyroxine, 88 mcg, Oral, Q AM  lidocaine, 2 patch, Transdermal, Q24H  metroNIDAZOLE, 500 mg, Oral, Q8H  nystatin, , Topical, Q12H  pantoprazole, 40 mg, Oral, QAM AC      IV Meds:        Results Reviewed:   I have personally reviewed the results from the time of this admission to 2023 09:19 EDT     Results from last 7 days   Lab Units 23  0734 23  0516 04/12/23  0600   SODIUM mmol/L 139 137  136 137   POTASSIUM mmol/L 4.0 3.6  3.6 3.7   CHLORIDE mmol/L  101 98  101 101   CO2 mmol/L 25.5 25.1  24.2 27.0   BUN mg/dL 38* 33*  34* 26*   CREATININE mg/dL 3.57* 3.41*  3.41* 3.04*   CALCIUM mg/dL 9.5 9.0  8.9 9.0   BILIRUBIN mg/dL  --  <0.2  --    ALK PHOS U/L  --  70  --    ALT (SGPT) U/L  --  7  --    AST (SGOT) U/L  --  8  --    GLUCOSE mg/dL 93 95  95 92       Estimated Creatinine Clearance: 9.3 mL/min (A) (by C-G formula based on SCr of 3.57 mg/dL (H)).    Results from last 7 days   Lab Units 04/14/23  0734 04/13/23  0516 04/12/23  0600 04/11/23  0628 04/10/23  0515 04/09/23  0418 04/08/23  0344   MAGNESIUM mg/dL 2.0 1.7 1.8   < > 1.6* 1.7 1.8   PHOSPHORUS mg/dL  --   --   --   --  4.0 4.7* 3.8    < > = values in this interval not displayed.       Results from last 7 days   Lab Units 04/10/23  0515 04/09/23  0418 04/08/23  0344   URIC ACID mg/dL 9.0* 8.8* 8.2*       Results from last 7 days   Lab Units 04/14/23 0734 04/13/23 0516 04/12/23  0600 04/11/23  0628 04/10/23  0515   WBC 10*3/mm3 7.21 7.70 7.91 8.26 7.47   HEMOGLOBIN g/dL 10.2* 9.4* 10.4* 10.6* 10.0*   PLATELETS 10*3/mm3 172 178 184 181 163             Assessment / Plan     ASSESSMENT:  -Chronic kidney disease stage IV associated with nephrosclerosis and advanced age, creatinine is up to 3.5  probably related to contrast-induced nephropathy after having IV contrast for her CT scan to evaluate her severe abdominal pain.  Her electrolyte within acceptable range.   -Metabolic acidosis with normal anion gap associated with the chronic kidney disease  -Urinary retention which is acute  -Abdominal pain could be related to her urinary retention.  Possibly mesenteric ischemia as well  -History of black tarry stools with recent intake of Pepto-Bismol being evaluated by GI  -Iron deficiency anemia  her iron saturation 12% also has probably component of chronic kidney disease  -Probable neuropathic pain    PLAN:    -Kidney function continues to worsen.  Not much of urine output.  Morrow catheter was removed by  urology yesterday.  Continue to hold diuretic and will need to monitor her kidney function closely.  Start gentle hydration as well.  Discussed with nursing staff  -Surveillance labs        Thank you for involving us in the care of Marianne Delgado.  Please feel free to call with any questions.    Mark Bejarano MD  04/14/23  09:19 EDT    [facility]  [phone]    Please note that portions of this note were completed with a voice recognition program.

## 2023-04-14 NOTE — PROGRESS NOTES
"  [facility] Progress Note    Chief Complaint: None    Sleeping did not arouse much when I came into the room and asked her a few questions.  No family present.  Catheter was removed the other day and replaced.    Review of Systems:    Review of systems could not be obtained due to  patient sedation status.          Vital Signs  /59   Pulse 91   Temp 97.7 °F (36.5 °C)   Resp 14   Ht 154.9 cm (61\")   Wt 91.1 kg (200 lb 13.4 oz)   SpO2 97%   BMI 37.95 kg/m²     Physical Exam:     General Appearance:    NAD   HEENT:    No trauma, pupils reactive, hearing intact   Back:     No CVA tenderness   Lungs:     Respirations unlabored, no wheezing    Heart:    RRR, intact peripheral pulses   Abdomen:     Soft, NDNT, no masses, no guarding   :   Morrow   Extremities:   No edema, no deformity   Lymphatic:   No neck or groin LAD   Skin:   No bleeding, bruising or rashes   Neuro/Psych:           Results Review:     I reviewed the patient's new clinical results.  Lab Results (last 24 hours)     Procedure Component Value Units Date/Time    Protein / Creatinine Ratio, Urine - Indwelling Urethral Catheter [[id number]] Collected: 04/14/23 1047    Specimen: Urine from Indwelling Urethral Catheter Updated: 04/14/23 1343     Protein/Creatinine Ratio, Urine 115.1 mg/G Crea      Creatinine, Urine 53.0 mg/dL      Total Protein, Urine 6.1 mg/dL     Sodium, Urine, Random - Indwelling Urethral Catheter [[id number]] Collected: 04/14/23 1047    Specimen: Urine from Indwelling Urethral Catheter Updated: 04/14/23 1246     Sodium, Urine <20 mmol/L     Narrative:      Reference intervals for random urine have not been established.  Clinical usage is dependent upon physician's interpretation in combination with other laboratory tests.       Chloride, Urine, Random - Indwelling Urethral Catheter [[id number]] Collected: 04/14/23 1047    Specimen: Urine from Indwelling Urethral Catheter Updated: 04/14/23 1246     Chloride, Urine <20 mmol/L  "    Narrative:      Reference intervals for random urine have not been established.  Clinical usage is dependent upon physician's interpretation in combination with other laboratory tests.       Urinalysis, Microscopic Only - Indwelling Urethral Catheter [[id number]]  (Abnormal) Collected: 04/14/23 1047    Specimen: Urine from Indwelling Urethral Catheter Updated: 04/14/23 1124     RBC, UA 0-2 /HPF      WBC, UA 6-12 /HPF      Bacteria, UA None Seen /HPF      Squamous Epithelial Cells, UA 0-2 /HPF      Hyaline Casts, UA 0-2 /LPF      Methodology Automated Microscopy    Urine Culture - Urine, Indwelling Urethral Catheter [[id number]] Collected: 04/14/23 1047    Specimen: Urine from Indwelling Urethral Catheter Updated: 04/14/23 1124    Urinalysis With Culture If Indicated - Indwelling Urethral Catheter [[id number]]  (Abnormal) Collected: 04/14/23 1047    Specimen: Urine from Indwelling Urethral Catheter Updated: 04/14/23 1124     Color, UA Yellow     Appearance, UA Clear     pH, UA 5.5     Specific Gravity, UA 1.010     Glucose, UA Negative     Ketones, UA Negative     Bilirubin, UA Negative     Blood, UA Negative     Protein, UA Negative     Leuk Esterase, UA Trace     Nitrite, UA Negative     Urobilinogen, UA 0.2 E.U./dL    Narrative:      In absence of clinical symptoms, the presence of pyuria, bacteria, and/or nitrites on the urinalysis result does not correlate with infection.    Magnesium [[id number]]  (Normal) Collected: 04/14/23 0734    Specimen: Blood Updated: 04/14/23 0821     Magnesium 2.0 mg/dL     Basic Metabolic Panel [[id number]]  (Abnormal) Collected: 04/14/23 0734    Specimen: Blood Updated: 04/14/23 0820     Glucose 93 mg/dL      BUN 38 mg/dL      Creatinine 3.57 mg/dL      Sodium 139 mmol/L      Potassium 4.0 mmol/L      Chloride 101 mmol/L      CO2 25.5 mmol/L      Calcium 9.5 mg/dL      BUN/Creatinine Ratio 10.6     Anion Gap 12.5 mmol/L      eGFR 11.2 mL/min/1.73      Comment: <15 Indicative of kidney  failure       Narrative:      GFR Normal >60  Chronic Kidney Disease <60  Kidney Failure <15    The GFR formula is only valid for adults with stable renal function between ages 18 and 70.    CBC & Differential [[id number]]  (Abnormal) Collected: 04/14/23 0734    Specimen: Blood Updated: 04/14/23 0809    Narrative:      The following orders were created for panel order CBC & Differential.  Procedure                               Abnormality         Status                     ---------                               -----------         ------                     CBC Auto Differential[[id number]]        Abnormal            Final result                 Please view results for these tests on the individual orders.    CBC Auto Differential [[id number]]  (Abnormal) Collected: 04/14/23 0734    Specimen: Blood Updated: 04/14/23 0809     WBC 7.21 10*3/mm3      RBC 3.59 10*6/mm3      Hemoglobin 10.2 g/dL      Hematocrit 31.6 %      MCV 88.0 fL      MCH 28.4 pg      MCHC 32.3 g/dL      RDW 12.8 %      RDW-SD 40.5 fl      MPV 10.2 fL      Platelets 172 10*3/mm3      Neutrophil % 66.2 %      Lymphocyte % 18.3 %      Monocyte % 11.5 %      Eosinophil % 1.0 %      Basophil % 0.6 %      Immature Grans % 2.4 %      Neutrophils, Absolute 4.78 10*3/mm3      Lymphocytes, Absolute 1.32 10*3/mm3      Monocytes, Absolute 0.83 10*3/mm3      Eosinophils, Absolute 0.07 10*3/mm3      Basophils, Absolute 0.04 10*3/mm3      Immature Grans, Absolute 0.17 10*3/mm3      nRBC 0.0 /100 WBC         Imaging Results (Last 24 Hours)     ** No results found for the last 24 hours. **          Medication Review:   I have personally reviewed    Current Facility-Administered Medications:   •  acetaminophen (TYLENOL) tablet 650 mg, 650 mg, Oral, Q4H PRN, Stingl, Lady Olivo MD, 650 mg at 04/09/23 0825  •  [DISCONTINUED] sennosides-docusate (PERICOLACE) 8.6-50 MG per tablet 2 tablet, 2 tablet, Oral, BID, 2 tablet at 04/10/23 0851 **AND** polyethylene glycol  (MIRALAX) packet 17 g, 17 g, Oral, Daily PRN **AND** bisacodyl (DULCOLAX) EC tablet 5 mg, 5 mg, Oral, Daily PRN **AND** bisacodyl (DULCOLAX) suppository 10 mg, 10 mg, Rectal, Daily PRN, Chico Shen DO, 10 mg at 04/07/23 0859  •  cholecalciferol (VITAMIN D3) tablet 1,000 Units, 1,000 Units, Oral, BID, Chico Shen DO, 1,000 Units at 04/14/23 1049  •  famotidine (PEPCID) tablet 20 mg, 20 mg, Oral, Nightly, Maria Luisa Olmedo MD, 20 mg at 04/13/23 2005  •  gabapentin (NEURONTIN) capsule 100 mg, 100 mg, Oral, Q12H, Avel Blackmon MD  •  HYDROcodone-acetaminophen (NORCO) 5-325 MG per tablet 1 tablet, 1 tablet, Oral, Q6H PRN, Avel Blackmon MD, 1 tablet at 04/14/23 1105  •  levoFLOXacin (LEVAQUIN) tablet 500 mg, 500 mg, Oral, Q48H, Chico Shen DO, 500 mg at 04/12/23 2017  •  levothyroxine (SYNTHROID, LEVOTHROID) tablet 88 mcg, 88 mcg, Oral, Q AM, Bernard Carrasco MD, 88 mcg at 04/14/23 0631  •  lidocaine (LIDODERM) 5 % 2 patch, 2 patch, Transdermal, Q24H, Chico Shen DO, 2 patch at 04/14/23 1051  •  melatonin tablet 3 mg, 3 mg, Oral, Nightly PRN, StingLady lucia MD, 3 mg at 04/07/23 0121  •  metroNIDAZOLE (FLAGYL) tablet 500 mg, 500 mg, Oral, Q8H, Chico Shen DO, 500 mg at 04/14/23 1437  •  nystatin (MYCOSTATIN) powder, , Topical, Q12H, Chico Shen, , Given at 04/14/23 1049  •  ondansetron (ZOFRAN) tablet 4 mg, 4 mg, Oral, Q6H PRN **OR** ondansetron (ZOFRAN) injection 4 mg, 4 mg, Intravenous, Q6H PRN, Lady Ag MD, 4 mg at 04/11/23 0639  •  pantoprazole (PROTONIX) EC tablet 40 mg, 40 mg, Oral, QAM AC, Bernard Carrasco MD, 40 mg at 04/14/23 0631  •  simethicone (MYLICON) chewable tablet 80 mg, 80 mg, Oral, 4x Daily PRN, Shandra Johnson MD  •  [COMPLETED] Insert Peripheral IV, , , Once **AND** sodium chloride 0.9 % flush 10 mL, 10 mL, Intravenous, PRN, Libia Osborn APRN, 10 mL at 04/09/23 2007  •  sodium chloride 0.9 % infusion, 75 mL/hr,  Intravenous, Continuous, Mark Bejarano MD, Last Rate: 75 mL/hr at 04/14/23 1106, 75 mL/hr at 04/14/23 1106    Allergies:    Atorvastatin, Azithromycin, Cefdinir, Doxycycline monohydrate, and Allopurinol    Assessment:    Active Problems:  Patient Active Problem List   Diagnosis   • Arthritis   • Stage 4 chronic kidney disease (HCC)   • Debility   • Foot pain, bilateral   • Acute idiopathic gout of right ankle   • Hematuria   • Essential hypertension   • Acquired hypothyroidism   • Osteopenia   • Psoriasis   • Rosacea   • Vitamin D deficiency   • Medicare annual wellness visit, subsequent   • Morbidly obese   • History of CVA (cerebrovascular accident)   • Stroke (cerebrum)   • Vitamin B12 deficiency   • Primary open-angle glaucoma, bilateral, severe stage   • Hyperparathyroidism   • Weight loss   • Acute left-sided low back pain without sciatica   • Cholelithiasis   • Diverticulosis   • Anemia   • Acute urinary retention   • Obesity (BMI 30-39.9)   • Acute UTI (urinary tract infection)   • Constipation   • Acute generalized abdominal pain   • PRABHA (acute kidney injury)   • Neuropathic pain   • Hypervolemia associated with renal insufficiency       Urinary retention likely chronic with acute exacerbation now failed several voiding trials.  Multiple factors as etiology.    Plan:    Keep her catheter in.  No voiding trials at this time unless she goes to rehab and shows some significant improvement in her mobility.      Brigido Hui MD    4/14/2023  16:43 EDT

## 2023-04-14 NOTE — PLAN OF CARE
Goal Outcome Evaluation:  VSS. No c/o pain. Attempted to turn patient every 2 hours but she refused at times. Still not producing much urine. Plan to dc home with HH and caregiver. Will continue to monitor.

## 2023-04-14 NOTE — SIGNIFICANT NOTE
04/14/23 1322   OTHER   Discipline physical therapist   Rehab Time/Intention   Session Not Performed   (Pt refused, stating she wants palliative now, visitor present.  RN notified.  Plan to f/u 4/17 as appropriate.)   Recommendation   PT - Next Appointment 04/17/23

## 2023-04-14 NOTE — PROGRESS NOTES
"    Name: Marianne Delgado ADMIT: 2023   : 1925  PCP: Natasha Ramirez APRN    MRN: [id number] LOS: 9 days   AGE/SEX: 89 y.o. female  ROOM: [facility]     Subjective   Subjective   Morrow replaced this AM. 500mL returned per family at bedside    Pt c/o diffuse achy pain. \"I just want to go home\"    Review of Systems     Objective   Objective   Vital Signs  Temp:  [97.5 °F (36.4 °C)-98.7 °F (37.1 °C)] 97.7 °F (36.5 °C)  Heart Rate:  [75-91] 91  Resp:  [14-16] 14  BP: (115-148)/(53-76) 148/59  SpO2:  [92 %-97 %] 97 %  on  Flow (L/min):  [2] 2;   Device (Oxygen Therapy): room air  Body mass index is 37.95 kg/m².  Physical Exam  Vitals reviewed.   Constitutional:       Appearance: She is obese. She is ill-appearing.   Cardiovascular:      Rate and Rhythm: Normal rate and regular rhythm.   Pulmonary:      Effort: Pulmonary effort is normal. No respiratory distress.      Breath sounds: Normal breath sounds.   Abdominal:      Palpations: Abdomen is soft.      Tenderness: There is no abdominal tenderness.   Musculoskeletal:         General: Tenderness present.      Right lower leg: Edema present.      Left lower leg: Edema present.      Comments: Diffuse tenderness to light touch   Neurological:      General: No focal deficit present.      Mental Status: She is alert.   Psychiatric:      Comments: Flat affect, looks sad       Results Review     I reviewed the patient's new clinical results.  Results from last 7 days   Lab Units 23  0734 23  0516 23  0623  0628   WBC 10*3/mm3 7.21 7.70 7.91 8.26   HEMOGLOBIN g/dL 10.2* 9.4* 10.4* 10.6*   PLATELETS 10*3/mm3 172 178 184 181     Results from last 7 days   Lab Units 23  0734 23  0516 23  0600 23  0628   SODIUM mmol/L 139 137  136 137 139   POTASSIUM mmol/L 4.0 3.6  3.6 3.7 3.4*   CHLORIDE mmol/L 101 98  101 101 102   CO2 mmol/L 25.5 25.1  24.2 27.0 27.9   BUN mg/dL 38* 33*  34* 26* 26*   CREATININE mg/dL 3.57* " 3.41*  3.41* 3.04* 2.57*   GLUCOSE mg/dL 93 95  95 92 94   EGFR mL/min/1.73 11.2* 11.8*  11.8* 13.5* 16.5*     Results from last 7 days   Lab Units 04/13/23  0516 04/10/23  0515 04/09/23 0418 04/08/23  0344   ALBUMIN g/dL 2.8* 2.7* 2.2* 2.9*   BILIRUBIN mg/dL <0.2  --   --   --    ALK PHOS U/L 70  --   --   --    AST (SGOT) U/L 8  --   --   --    ALT (SGPT) U/L 7  --   --   --      Results from last 7 days   Lab Units 04/14/23  0734 04/13/23  0516 04/12/23  0600 04/11/23  0628 04/10/23  0515 04/09/23 0418 04/08/23  0344   CALCIUM mg/dL 9.5 9.0  8.9 9.0 9.5 9.0 9.1 9.4   ALBUMIN g/dL  --  2.8*  --   --  2.7* 2.2* 2.9*   MAGNESIUM mg/dL 2.0 1.7 1.8 1.7 1.6* 1.7 1.8   PHOSPHORUS mg/dL  --   --   --   --  4.0 4.7* 3.8     Results from last 7 days   Lab Units 04/08/23  1315   LACTATE mmol/L 1.5     No results found for: HGBA1C, POCGLU    No radiology results for the last day  I have personally reviewed all medications:  Scheduled Medications  cholecalciferol, 1,000 Units, Oral, BID  famotidine, 20 mg, Oral, Nightly  gabapentin, 100 mg, Oral, Daily  levoFLOXacin, 500 mg, Oral, Q48H  levothyroxine, 88 mcg, Oral, Q AM  lidocaine, 2 patch, Transdermal, Q24H  metroNIDAZOLE, 500 mg, Oral, Q8H  nystatin, , Topical, Q12H  pantoprazole, 40 mg, Oral, QAM AC    Infusions  sodium chloride, 75 mL/hr, Last Rate: 75 mL/hr (04/14/23 1106)    Diet  Diet: Gastrointestinal Diets; Fiber-Restricted, Low Irritant; Texture: Regular Texture (IDDSI 7); Fluid Consistency: Thin (IDDSI 0)    I have personally reviewed:  [x]  Laboratory   []  Microbiology   [x]  Radiology   []  EKG/Telemetry  []  Cardiology/Vascular   []  Pathology    []  Records       Assessment/Plan     Active Hospital Problems    Diagnosis  POA   • PRABHA (acute kidney injury) [N17.9]  No   • Neuropathic pain [M79.2]  Clinically Undetermined   • Hypervolemia associated with renal insufficiency [E87.70, N28.9]  Yes   • Acute generalized abdominal pain [R10.84]  No   • Acute UTI  (urinary tract infection) [N39.0]  Yes   • Constipation [K59.00]  Yes   • Anemia [D64.9]  Yes   • Acute urinary retention [R33.8]  No   • Obesity (BMI 30-39.9) [E66.9]  Yes   • History of CVA (cerebrovascular accident) [Z86.73]  Not Applicable   • Stage 4 chronic kidney disease (HCC) [N18.4]  Yes   • Essential hypertension [I10]  Yes   • Acquired hypothyroidism [E03.9]  Yes      Resolved Hospital Problems    Diagnosis Date Resolved POA   • **Gastrointestinal hemorrhage, unspecified gastrointestinal hemorrhage type [K92.2] 04/14/2023 Yes   • Hypomagnesemia [E83.42] 04/14/2023 No   • Nausea with vomiting [R11.2] 04/14/2023 Yes     Patient is a 89-year-old female with history of CKD stage IV, prior CVA, hypothyroidism, obesity, hypertension who was initially admitted for concerns of GI bleeding. Complicated hospital course discussed below:    Generalized abdominal pain/intemittent nausea with vomiting  Concern for diverticulitis  -Improving  -Continue oral Flagyl and Levaquin, finishing course next 24 hrs  -GI/Surg consulted, appreciate recommendations. Signed off  -Duplex SMA complete without any signs of stenosis  -H. pylori testing negative  -Continue PPI, H2 blocker     Urinary tract infection  -Urine culture growing Pseudomonas and Klebsiella both sensitive to Levaquin, plan for 5-day course, last dose on 4/14     Acute kidney injury, not POA  Chronic kidney disease stage 4  Hypervolemia associated with renal insufficiency/hypoalbuminemia  -Nephrology following. Plans for gentle IVF and reinsertion of Morrow noted today.   - Recheck Cr in AM     Acute blood loss anemia secondary to Gastrointestinal hemorrhage  - Patient endorsing dark stools, prior to arrival, however, had recently been on Pepto-Bismol.  - GI consulted no plan for EGD at this time.  Continue PPI  - Hemoglobin stable.     Acute urinary retention  - Consulted Urology, Morrow catheter placed.    - Failed void trial since yesterday. Will need to go home  with Morrow     Lower extremity neuropathy  -Gabapentin 100 mg daily was started this admission. Will titrate upward slightly in light of discussion below     Hypokalemia, improved  - Replete PRN per protocol. Continue to monitor     Hypertension  -Continue current regimen     History of CVA  -Holding Plavix because of anemia.  We will restart at d/c?     Hypothyroidism  - Stable. Continue Levothyroxine as prescribed.     Obesity  - BMI 39.11 kg/m2. Complicating all medical problems.       · SCDs for DVT prophylaxis.  · Dispo: Patient prefers to go home with [facility] following. Per CCP they will meet with her at her home. Transport set up for 11 tomorrow. D/w niece at bedside as well. Will plan d/c regardless of creatinine    Expected Discharge  Expected Discharge Date and Time     Expected Discharge Date Expected Discharge Time    Apr 15, 2023           Avel Blackmon MD  [facility]  04/14/23  14:59 EDT

## 2023-04-15 LAB
ANION GAP SERPL CALCULATED.3IONS-SCNC: 8.3 MMOL/L (ref 5–15)
BACTERIA SPEC AEROBE CULT: NO GROWTH
BASOPHILS # BLD AUTO: 0.03 10*3/MM3 (ref 0–0.2)
BASOPHILS NFR BLD AUTO: 0.4 % (ref 0–1.5)
BUN SERPL-MCNC: 34 MG/DL (ref 8–23)
BUN/CREAT SERPL: 9.7 (ref 7–25)
CALCIUM SPEC-SCNC: 9.2 MG/DL (ref 8.2–9.6)
CHLORIDE SERPL-SCNC: 102 MMOL/L (ref 98–107)
CO2 SERPL-SCNC: 25.7 MMOL/L (ref 22–29)
CREAT SERPL-MCNC: 3.52 MG/DL (ref 0.57–1)
DEPRECATED RDW RBC AUTO: 42 FL (ref 37–54)
EGFRCR SERPLBLD CKD-EPI 2021: 11.3 ML/MIN/1.73
EOSINOPHIL # BLD AUTO: 0.09 10*3/MM3 (ref 0–0.4)
EOSINOPHIL NFR BLD AUTO: 1.1 % (ref 0.3–6.2)
ERYTHROCYTE [DISTWIDTH] IN BLOOD BY AUTOMATED COUNT: 13 % (ref 12.3–15.4)
GLUCOSE SERPL-MCNC: 104 MG/DL (ref 65–99)
HCT VFR BLD AUTO: 30 % (ref 34–46.6)
HGB BLD-MCNC: 9.7 G/DL (ref 12–15.9)
IMM GRANULOCYTES # BLD AUTO: 0.17 10*3/MM3 (ref 0–0.05)
IMM GRANULOCYTES NFR BLD AUTO: 2.1 % (ref 0–0.5)
LYMPHOCYTES # BLD AUTO: 1.08 10*3/MM3 (ref 0.7–3.1)
LYMPHOCYTES NFR BLD AUTO: 13.3 % (ref 19.6–45.3)
MAGNESIUM SERPL-MCNC: 2 MG/DL (ref 1.7–2.3)
MCH RBC QN AUTO: 28.6 PG (ref 26.6–33)
MCHC RBC AUTO-ENTMCNC: 32.3 G/DL (ref 31.5–35.7)
MCV RBC AUTO: 88.5 FL (ref 79–97)
MONOCYTES # BLD AUTO: 0.83 10*3/MM3 (ref 0.1–0.9)
MONOCYTES NFR BLD AUTO: 10.2 % (ref 5–12)
NEUTROPHILS NFR BLD AUTO: 5.91 10*3/MM3 (ref 1.7–7)
NEUTROPHILS NFR BLD AUTO: 72.9 % (ref 42.7–76)
NRBC BLD AUTO-RTO: 0 /100 WBC (ref 0–0.2)
PLATELET # BLD AUTO: 161 10*3/MM3 (ref 140–450)
PMV BLD AUTO: 10.4 FL (ref 6–12)
POTASSIUM SERPL-SCNC: 4 MMOL/L (ref 3.5–5.2)
RBC # BLD AUTO: 3.39 10*6/MM3 (ref 3.77–5.28)
SODIUM SERPL-SCNC: 136 MMOL/L (ref 136–145)
WBC NRBC COR # BLD: 8.11 10*3/MM3 (ref 3.4–10.8)

## 2023-04-15 PROCEDURE — 83735 ASSAY OF MAGNESIUM: CPT | Performed by: STUDENT IN AN ORGANIZED HEALTH CARE EDUCATION/TRAINING PROGRAM

## 2023-04-15 PROCEDURE — 80048 BASIC METABOLIC PNL TOTAL CA: CPT | Performed by: STUDENT IN AN ORGANIZED HEALTH CARE EDUCATION/TRAINING PROGRAM

## 2023-04-15 PROCEDURE — 85025 COMPLETE CBC W/AUTO DIFF WBC: CPT | Performed by: INTERNAL MEDICINE

## 2023-04-15 RX ORDER — GABAPENTIN 100 MG/1
100 CAPSULE ORAL EVERY 12 HOURS SCHEDULED
Qty: 60 CAPSULE | Refills: 0 | Status: SHIPPED | OUTPATIENT
Start: 2023-04-15

## 2023-04-15 RX ADMIN — LIDOCAINE 2 PATCH: 700 PATCH TOPICAL at 08:08

## 2023-04-15 RX ADMIN — GABAPENTIN 100 MG: 100 CAPSULE ORAL at 08:07

## 2023-04-15 RX ADMIN — NYSTATIN: 100000 POWDER TOPICAL at 09:25

## 2023-04-15 NOTE — PROGRESS NOTES
"Physicians Statement of Medical Necessity for  Ambulance Transportation    GENERAL INFORMATION     Name: Marianne Delgado  YOB: 1925  Medicare #: [id number]  Transport Date: 4/15/2023   (Valid for round trips this date, or for scheduled repetitive trips for 60 days from the date signed below.)  Origin: [facility], [address] [address] [address]  Destination: [facility], [address], [address], [place] [address]  Is the Patient's stay covered under Medicare Part A (PPS/DRG?)Yes  Closest appropriate facility? Yes  If this a hosp-hosp transfer? No  Is this a hospice patient? No    MEDICAL NECESSITY QUESTIONAIRE    Ambulance Transportation is medically necessary only if other means of transportation are contraindicated or would be potentially harmful to the patient.  To meet this requirement, the patient must be either \"bed confined\" or suffer from a condition such that transport by means other than an ambulance is contraindicated by the patient's condition.  The following questions must be answered by the healthcare professional signing below for this form to be valid:     1) Describe the MEDICAL CONDITION (physical and/or mental) of this patient AT THE TIME OF AMBULANCE TRANSPORT that requires the patient to be transported in an ambulance, and why transport by other means is contraindicated by the patient's condition:   Past Medical History:   Diagnosis Date   • Acute sinusitis    • Acute upper respiratory infection    • Arthritis    • CKD (chronic kidney disease)    • Debility    • Fatigue    • Foot pain, bilateral    • Glaucoma    • Gout    • Hematuria    • High blood pressure    • Hypertension    • Hypothyroid 09/1999   • Hypothyroidism    • IBS (irritable bowel syndrome)    • IGT (impaired glucose tolerance)    • Malaise and fatigue    • Medication management    • Melanoma 1979    left leg   • OA (osteoarthritis)    • Osteopenia 09/1999   • Painful joint    • " "Psoriasis    • Renal failure    • Rosacea    • Vitamin D deficiency       Past Surgical History:   Procedure Laterality Date   • CATARACT EXTRACTION     • FOOT SURGERY      mauri toe surgery   • OTHER SURGICAL HISTORY      Melanoma Excision: Left leg      2) Is this patient \"bed confined\" as defined below?Yes   To be \"bed confined\" the patient must satisfy all three of the following criteria:  (1) unable to get up from bed without assistance; AND (2) unable to ambulate;  AND (3) unable to sit in a chair or wheelchair.  3) Can this patient safely be transported by car or wheelchair van (I.e., may safely sit during transport, without an attendant or monitoring?)No   4. In addition to completing questions 1-3 above, please check any of the following conditions that apply*:          *Note: supporting documentation for any boxes checked must be maintained in the patient's medical records Patient is confused, Medical attendant required and Unable to tolerate seated position for time needed to transport      SIGNATURE OF PHYSICIAN OR OTHER AUTHORIZED HEALTHCARE PROFESSIONAL    I certify that the above information is true and correct based on my evaluation of this patient, and represent that the patient requires transport by ambulance and that other forms of transport are contraindicated.  I understand that this information will be used by the Centers for Medicare and Medicaid Services (CMS) to support the determiniation of medical necessity for ambulance services, and I represent that I have personal knowledge of the patient's condition at the time of transport.       If this box is checked, I also certify that the patient is physically or mentally incapable of signing the ambulance service's claim form and that the institution with which I am affiliated has furnished care, services or assistance to the patient.  My signature below is made on behalf of the patient pursuant to 42 .36(b)(4). In accordance with 42 CFR " 424.37, the specific reason(s) that the patient is physically or mentally incapable of signing the claim for is as follows:     Signature of Physician or Healthcare Professional  Date/Time:        (For Scheduled repetitive transport, this form is not valid for transports performed more than 60 days after this date).                                                                                                                                            --------------------------------------------------------------------------------------------  Printed Name and Credentials of Physician or Authorized Healthcare Professional     *Form must be signed by patient's attending physician for scheduled, repetitive transports,.  For non-repetitive ambulance transports, if unable to obtain the signature of the attending physician, any of the following may sign (please select below):     Physician  Clinical Nurse Specialist  Registered Nurse     Physician Assistant  Discharge Planner  Licensed Practical Nurse     Nurse Practitioner

## 2023-04-15 NOTE — PROGRESS NOTES
[facility] Progress Note      Patient Name: Marianne Delgado  : 1925  MRN: [id number]  Primary Care Physician:  Natasha Ramirez APRN  Date of admission: 2023    Subjective     Interval History:   Follow-up chronic kidney disease    Seen and examined.  Denies shortness of air or chest pain.  No new issues.  Alert and oriented and answers all question appropriately.  Morrow catheter was removed yesterday.  Not much urine output.  Daughter is on bedside.      Review of Systems:   As noted above    Objective     Vitals:   Temp:  [97.6 °F (36.4 °C)-97.9 °F (36.6 °C)] 97.6 °F (36.4 °C)  Heart Rate:  [70-91] 70  Resp:  [16] 16  BP: (139-148)/(55-59) 140/55    Intake/Output Summary (Last 24 hours) at 4/15/2023 1121  Last data filed at 4/15/2023 0500  Gross per 24 hour   Intake --   Output 500 ml   Net -500 ml       Physical Exam:    General Appearance: Obese, chronically ill and frail, awake and  Skin: warm and dry  HEENT: oral mucosa normal, nonicteric sclera  Neck: supple, no JVD  Lungs: Scattered rhonchi, breathing effort unlabored  Heart: RRR, normal S1 and S2, no S3, no  Abdomen: soft, diffuse tenderness, even to touch, normoactive bowel  : Fully catheter is anchored  Extremities: + edema  Neuro: Moving all extremity    Scheduled Meds:     cholecalciferol, 1,000 Units, Oral, BID  famotidine, 20 mg, Oral, Nightly  gabapentin, 100 mg, Oral, Q12H  levoFLOXacin, 500 mg, Oral, Q48H  levothyroxine, 88 mcg, Oral, Q AM  lidocaine, 2 patch, Transdermal, Q24H  metroNIDAZOLE, 500 mg, Oral, Q8H  nystatin, , Topical, Q12H  pantoprazole, 40 mg, Oral, QAM AC      IV Meds:   sodium chloride, 75 mL/hr, Last Rate: 75 mL/hr (23 1106)        Results Reviewed:   I have personally reviewed the results from the time of this admission to 4/15/2023 11:21 EDT     Results from last 7 days   Lab Units 04/15/23  0327 23  0734 23  0516   SODIUM mmol/L 136 139 137  136   POTASSIUM  mmol/L 4.0 4.0 3.6  3.6   CHLORIDE mmol/L 102 101 98  101   CO2 mmol/L 25.7 25.5 25.1  24.2   BUN mg/dL 34* 38* 33*  34*   CREATININE mg/dL 3.52* 3.57* 3.41*  3.41*   CALCIUM mg/dL 9.2 9.5 9.0  8.9   BILIRUBIN mg/dL  --   --  <0.2   ALK PHOS U/L  --   --  70   ALT (SGPT) U/L  --   --  7   AST (SGOT) U/L  --   --  8   GLUCOSE mg/dL 104* 93 95  95       Estimated Creatinine Clearance: 9.4 mL/min (A) (by C-G formula based on SCr of 3.52 mg/dL (H)).    Results from last 7 days   Lab Units 04/15/23  0327 04/14/23  0734 04/13/23  0516 04/11/23  0628 04/10/23  0515 04/09/23  0418   MAGNESIUM mg/dL 2.0 2.0 1.7   < > 1.6* 1.7   PHOSPHORUS mg/dL  --   --   --   --  4.0 4.7*    < > = values in this interval not displayed.       Results from last 7 days   Lab Units 04/10/23  0515 04/09/23  0418   URIC ACID mg/dL 9.0* 8.8*       Results from last 7 days   Lab Units 04/15/23  0327 04/14/23  0734 04/13/23  0516 04/12/23  0600 04/11/23  0628   WBC 10*3/mm3 8.11 7.21 7.70 7.91 8.26   HEMOGLOBIN g/dL 9.7* 10.2* 9.4* 10.4* 10.6*   PLATELETS 10*3/mm3 161 172 178 184 181             Assessment / Plan     ASSESSMENT:  -Chronic kidney disease stage IV associated with nephrosclerosis and advanced age, creatinine is up to 3.5  probably related to contrast-induced nephropathy after having IV contrast for her CT scan to evaluate her severe abdominal pain.  Her electrolyte within acceptable range.   -Metabolic acidosis with normal anion gap associated with the chronic kidney disease  -Urinary retention which is acute  -Abdominal pain could be related to her urinary retention.  Possibly mesenteric ischemia as well  -History of black tarry stools with recent intake of Pepto-Bismol being evaluated by GI  -Iron deficiency anemia  her iron saturation 12% also has probably component of chronic kidney disease  -Probable neuropathic pain    PLAN:    -Kidney function continues to worsen.  DC IV fluid.  Patient is to be discharged she will need  to follow-up with nephrology in 1 week with labs prior to her appointment.  Continue to hold diuretic for now but she will likely to be resumed in the next week when she meets with her nephrologist  -Surveillance labs        Thank you for involving us in the care of Mariannekeyla Delgado.  Please feel free to call with any questions.    Mark Bejarano MD  04/15/23  11:21 EDT    [facility]  [phone]    Please note that portions of this note were completed with a voice recognition program.

## 2023-04-15 NOTE — DISCHARGE SUMMARY
Patient Name: Marianne Delgado  : 1925  MRN: [id number]    Date of Admission: 2023  Date of Discharge:  4/15/2023  Primary Care Physician: Natasha Ramirez APRN      Chief Complaint:   Abdominal Pain and Black or Bloody Stool      Discharge Diagnoses     Active Hospital Problems    Diagnosis  POA   • PRABHA (acute kidney injury) [N17.9]  No   • Neuropathic pain [M79.2]  Clinically Undetermined   • Hypervolemia associated with renal insufficiency [E87.70, N28.9]  Yes   • Acute generalized abdominal pain [R10.84]  No   • Acute UTI (urinary tract infection) [N39.0]  Yes   • Constipation [K59.00]  Yes   • Anemia [D64.9]  Yes   • Acute urinary retention [R33.8]  No   • Obesity (BMI 30-39.9) [E66.9]  Yes   • History of CVA (cerebrovascular accident) [Z86.73]  Not Applicable   • Stage 4 chronic kidney disease (HCC) [N18.4]  Yes   • Essential hypertension [I10]  Yes   • Acquired hypothyroidism [E03.9]  Yes      Resolved Hospital Problems    Diagnosis Date Resolved POA   • **Gastrointestinal hemorrhage, unspecified gastrointestinal hemorrhage type [K92.2] 2023 Yes   • Hypomagnesemia [E83.42] 2023 No   • Nausea with vomiting [R11.2] 2023 Yes        Hospital Course     Ms. Delgado is a 89 y.o. female with a history of CKD 4, prior stroke, HTN and obesity who presented to [facility] initially complaining of GI bleeding.  Please see the admitting history and physical for further details.  She was found to have diverticulitis and UTI and was admitted to the hospital for further evaluation and treatment.  She was given a course of antibiotics for both her diverticulitis and UTI.  She developed significant acute kidney injury during hospital stay and has been seen by nephrology.  She had urinary retention requiring Morrow catheter.  Despite treatment of acute medical issues patient has continued to have significant abdominal pain.  Patient does not want aggressive treatment.  No  plans for endoscopy.  Given ongoing severe pain and worsening renal failure the palliative care team was consulted.  Patient wishes to go home to pursue comfort care only.  She does not want to continue her chronic medications she was taking before hospitalization.  She does have some relief of her neuropathic pain with gabapentin which will be continued.  She has been seen by the hospice team here who will prescribe her hospice comfort medications after discharge and will follow-up with her later today.      Day of Discharge     Subjective:  Complains of pain but is eager to go home.  Has met with hospice who will follow-up with her later today    Physical Exam:  Temp:  [97.5 °F (36.4 °C)-97.9 °F (36.6 °C)] 97.6 °F (36.4 °C)  Heart Rate:  [70-91] 70  Resp:  [14-16] 16  BP: (139-148)/(55-62) 140/55  Body mass index is 37.95 kg/m².  Physical Exam  Vitals and nursing note reviewed.   Constitutional:       Appearance: Normal appearance.   Pulmonary:      Effort: Pulmonary effort is normal.   Neurological:      Mental Status: She is alert. Mental status is at baseline.   Psychiatric:         Mood and Affect: Mood normal.         Consultants     Consult Orders (all) (From admission, onward)     Start     Ordered    04/14/23 1326  Inpatient Hospice / [facility] Consult  Once        Specialty:  Hospice and Palliative Medicine  Provider:  (Not yet assigned)    04/14/23 1325    04/09/23 0956  Inpatient Palliative Care Team Consult  Once        Provider:  (Not yet assigned)    04/09/23 0956    04/08/23 1351  Inpatient General Surgery Consult  Once        Specialty:  General Surgery  Provider:  Julissa Mack MD    04/08/23 1351    04/06/23 1032  Inpatient Urology Consult  Once        Specialty:  Urology  Provider:  Arjun Castillo MD    04/06/23 1032    04/06/23 1030  Inpatient Nephrology Consult  Once        Specialty:  Nephrology  Provider:  Víctor Garcia MD    04/06/23 1032    04/05/23 1834  Inpatient Case  Management  Consult  Once        Provider:  (Not yet assigned)    04/05/23 1833    04/05/23 1747  Inpatient Gastroenterology Consult  Once        Specialty:  Gastroenterology  Provider:  Bernard Carrasco MD    04/05/23 1746    04/05/23 1627  [facility] (on-call MD unless specified) Details  Once        Specialty:  Hospitalist  Provider:  Lady Ag MD    04/05/23 1626              Procedures     Imaging Results (All)     Procedure Component Value Units Date/Time    CT Abdomen Pelvis With Contrast [[id number]] Collected: 04/08/23 1443     Updated: 04/08/23 1451    Narrative:      CT ABDOMEN PELVIS W CONTRAST-     INDICATIONS: Abdominal pain     TECHNIQUE: Radiation dose reduction techniques were utilized, including  automated exposure control and exposure modulation based on body size.  Enhanced ABDOMEN AND PELVIS CT     COMPARISON: 04/05/2023     FINDINGS:     Hepatic cysts and liver and bilateral renal low densities too small to  characterize are demonstrated. Renal cortical thinning is present  bilaterally. Cholelithiasis is again suggested, although assessment of  the gallbladder is limited by motion artifact.     Pancreas is thinned.     The urinary bladder, catheterized, is mostly empty, limiting its  assessment.     Otherwise unremarkable appearance of the liver, spleen, adrenal glands,  pancreas, kidneys, bladder.     No bowel obstruction or abnormal bowel thickening is identified.  Presacral edema is present and could reflect proctitis. Small hiatal  hernia is present. Colonic diverticula are seen that do not appear  inflamed. Appendix does not appear inflamed.     No free intraperitoneal gas or free fluid.     Scattered small mesenteric and para-aortic lymph nodes are seen that are  not significant by size criteria.     Abdominal aorta is not aneurysmal. Aortic and other arterial  calcifications are present.     The lung bases show mild atelectasis.     Degenerative changes are seen  in the spine. No acute fracture is  identified.             Impression:         1. Presacral edema is present and may reflect proctitis. Colonic  diverticulosis.  2. No obstructive uropathy.  3. Cholelithiasis.     This report was finalized on 4/8/2023 2:48 PM by Dr. Zaid Cain M.D.       US Renal Bilateral [[id number]] Collected: 04/06/23 1523     Updated: 04/06/23 1527    Narrative:      Examination: Renal sonogram     TECHNIQUE: Sonographic images of the kidneys and urinary bladder were  obtained     HISTORY:Urinary retention     COMPARISON: None available     FINDINGS: The kidneys are atrophic, without hydronephrosis, both  measuring approximately 7.5 cm. The urinary bladder is decompressed  around a Morrow catheter. A simple appearing left renal cyst measures up  to 1.2 cm.       Impression:      1. Simple appearing left renal cyst, below the size requiring follow-up  2. Atrophic kidneys, without hydronephrosis seen.     This report was finalized on 4/6/2023 3:24 PM by Dr. Clive Garduno M.D.       US Gallbladder [[id number]] Collected: 04/06/23 1522     Updated: 04/06/23 1526    Narrative:      Examination: Abdominal sonogram     TECHNIQUE: Sonographic images of the abdomen were obtained     HISTORY:Abdominal pain     COMPARISON: None available     FINDINGS: The pancreatic head and body are normal. The common duct  measures 6 mm in its midportion. Cysts are seen in the liver, which is  otherwise normal in appearance, without surface nodularity seen and  without mass seen in visualized portions. There is gallbladder sludge,  without definite shadowing stones seen. The common duct measures 5 mm in  its midportion. The right kidney is atrophic, without hydronephrosis,  measuring approximately 8 cm.       Impression:      1. Normal-appearing liver  2. Gallbladder sludge, without cholelithiasis or sonographic evidence of  acute cholecystitis  3. Atrophic right kidney.     This report was finalized on 4/6/2023  3:23 PM by Dr. Clive Garduno M.D.       CT Abdomen Pelvis Without Contrast [[id number]] Collected: 04/05/23 1606     Updated: 04/05/23 1621    Narrative:      CT ABDOMEN AND PELVIS WITHOUT IV CONTRAST     HISTORY: 89-year-old with nausea vomiting. Abdominal pain x2 weeks.  Dark/bloody stools.     TECHNIQUE: Radiation dose reduction techniques were utilized, including  automated exposure control and exposure modulation based on body size.   3 mm images were obtained through the abdomen and pelvis without the  administration of IV contrast. Lack of IV contrast limits evaluation of  solid, visceral, and vascular structures. Sensitivity for underlying  lesions and infection decreased. Image quality somewhat degraded by  motion.     COMPARISON: 03/16/2023     FINDINGS:      LOWER CHEST: Artifact from overlying soft tissue/upper extremities the  heart size is stable. Moderate calcific atherosclerosis including  coronary artery calcifications. Calcified granulomas. Resolution of  pleural effusions. Bibasilar atelectasis and/or scarring..     ABDOMEN:  Liver/Biliary Tract: Stable indeterminate hypodense liver lesions.  Cholelithiasis.     Spleen: Calcified granulomas.     Pancreas: Within normal limits.     Adrenals: Within normal limits.     Kidneys:  Renal parenchymal scarring with perinephric stranding.  Intrarenal vascular calcifications. No hydronephrosis.     Bowel:  Small hiatal hernia. The stomach is incompletely distended.  Duodenal diverticulum. Normal appendix. Colonic diverticulosis without  acute diverticulitis. Retained high-density material in the colon  particularly sigmoid diverticula. No free fluid or free air. Given  history endoscopy could be considered for better evaluation of the GI  tract.     Peritoneum: Within normal limits.     Vasculature:    Extensive atherosclerosis abdominal aorta and branch  vessels with calcifications particularly involving the superior  mesenteric artery and right renal  origin.     Lymph Nodes:  No new adenopathy.                                 PELVIS:                                   Pelvic organs: Markedly distended bladder extending to just below the  umbilicus. Uterus in situ. Periuterine vascular calcifications. The  ovaries are not well visualized. Presacral edema slightly increased from  the prior.     Subcutaneous soft tissue edema.     BONES: Demineralization. Multilevel degenerative changes thoracolumbar  spine and pelvis. No definitive acute displaced fractures. Please refer  to the prior CT for further detail. If there is persistent pain or  clinical concern consider MRI.       Impression:      1. No acute intra-abdominal or pelvic process on this limited  noncontrast exam.  2. Markedly distended bladder.  3. Cholelithiasis.  4. Small hiatal hernia with duodenal and colonic diverticulosis. If GI  symptoms persist endoscopy could be considered for better evaluation.  5. Please see above for additional findings/recommendations.     This report was finalized on 4/5/2023 4:17 PM by Dr. Kapil Cruz M.D.           Results for orders placed during the hospital encounter of 04/05/23    Duplex Mesenteric Complete CAR    Interpretation Summary  •  No significant stenosis of the celiac axis or superior mesenteric artery.  •  The inferior mesenteric artery is patent.    Results for orders placed during the hospital encounter of 01/29/21    Adult transthoracic echo complete    Interpretation Summary  · Estimated left ventricular EF = 70% Left ventricular systolic function is normal.  · Estimated right ventricular systolic pressure from tricuspid regurgitation is normal (<35 mmHg).  · Mild tricuspid valve regurgitation is present    Pertinent Labs     Results from last 7 days   Lab Units 04/15/23  0327 04/14/23  0734 04/13/23  0516 04/12/23  0600   WBC 10*3/mm3 8.11 7.21 7.70 7.91   HEMOGLOBIN g/dL 9.7* 10.2* 9.4* 10.4*   PLATELETS 10*3/mm3 161 172 178 184     Results from last 7  days   Lab Units 04/15/23  0327 04/14/23  0734 04/13/23  0516 04/12/23  0600   SODIUM mmol/L 136 139 137  136 137   POTASSIUM mmol/L 4.0 4.0 3.6  3.6 3.7   CHLORIDE mmol/L 102 101 98  101 101   CO2 mmol/L 25.7 25.5 25.1  24.2 27.0   BUN mg/dL 34* 38* 33*  34* 26*   CREATININE mg/dL 3.52* 3.57* 3.41*  3.41* 3.04*   GLUCOSE mg/dL 104* 93 95  95 92   EGFR mL/min/1.73 11.3* 11.2* 11.8*  11.8* 13.5*     Results from last 7 days   Lab Units 04/13/23  0516 04/10/23  0515 04/09/23  0418   ALBUMIN g/dL 2.8* 2.7* 2.2*   BILIRUBIN mg/dL <0.2  --   --    ALK PHOS U/L 70  --   --    AST (SGOT) U/L 8  --   --    ALT (SGPT) U/L 7  --   --      Results from last 7 days   Lab Units 04/15/23  0327 04/14/23  0734 04/13/23  0516 04/12/23  0600 04/11/23  0628 04/10/23  0515 04/09/23  0418   CALCIUM mg/dL 9.2 9.5 9.0  8.9 9.0   < > 9.0 9.1   ALBUMIN g/dL  --   --  2.8*  --   --  2.7* 2.2*   MAGNESIUM mg/dL 2.0 2.0 1.7 1.8   < > 1.6* 1.7   PHOSPHORUS mg/dL  --   --   --   --   --  4.0 4.7*    < > = values in this interval not displayed.         Results from last 7 days   Lab Units 04/14/23  1047 04/10/23  0515   SODIUM UR mmol/L <20  --    CREATININE UR mg/dL 53.0  --    CHLORIDE UR mmol/L <20  --    PROTEIN TOTAL URINE mg/dL 6.1  --    URIC ACID mg/dL  --  9.0*   PROT/CREAT RATIO UR mg/G Crea 115.1  --          Invalid input(s): LDLCALC          Test Results Pending at Discharge     Pending Labs     Order Current Status    Urine Culture - Urine, Indwelling Urethral Catheter In process          Discharge Details        Discharge Medications      New Medications      Instructions Start Date   gabapentin 100 MG capsule  Commonly known as: NEURONTIN   100 mg, Oral, Every 12 Hours Scheduled         Stop These Medications    amLODIPine 5 MG tablet  Commonly known as: NORVASC     Betamethasone Valerate 0.12 % foam     bimatoprost 0.01 % ophthalmic drops  Commonly known as: LUMIGAN     cholecalciferol 25 MCG (1000 UT) tablet  Commonly  known as: VITAMIN D3     clopidogrel 75 MG tablet  Commonly known as: PLAVIX     dorzolamide-timolol 22.3-6.8 MG/ML ophthalmic solution  Commonly known as: COSOPT     levothyroxine 88 MCG tablet  Commonly known as: SYNTHROID, LEVOTHROID     lidocaine 5 %  Commonly known as: LIDODERM     metaxalone 800 MG tablet  Commonly known as: SKELAXIN     metroNIDAZOLE 0.75 % cream  Commonly known as: METROCREAM     multivitamin with minerals tablet tablet     sennosides-docusate 8.6-50 MG per tablet  Commonly known as: PERICOLACE     triamcinolone 0.1 % ointment  Commonly known as: KENALOG     vitamin B-12 1000 MCG tablet  Commonly known as: CYANOCOBALAMIN            Allergies   Allergen Reactions   • Atorvastatin Nausea And Vomiting   • Azithromycin Diarrhea   • Cefdinir Nausea Only     nausea   • Doxycycline Monohydrate Nausea Only   • Allopurinol Rash     Psoriasis  rash       Discharge Disposition:  Hospice/Home      Discharge Diet:  Diet Order   Procedures   • Diet: Gastrointestinal Diets; Fiber-Restricted, Low Irritant; Texture: Regular Texture (IDDSI 7); Fluid Consistency: Thin (IDDSI 0)       Discharge Activity:   Activity Instructions     Activity as Tolerated            CODE STATUS:    Code Status and Medical Interventions:   Ordered at: 04/05/23 1746     Medical Intervention Limits:    NO intubation (DNI)     Code Status (Patient has no pulse and is not breathing):    No CPR (Do Not Attempt to Resuscitate)     Medical Interventions (Patient has pulse or is breathing):    Limited Support       Future Appointments   Date Time Provider Department Center   4/15/2023 11:00 AM EMS MED 2  [facility]     Additional Instructions for the Follow-ups that You Need to Schedule     Discharge Follow-up with PCP   As directed       Currently Documented PCP:    Natasha Ramirez APRN    PCP Phone Number:    [phone]     Follow Up Details: 1 to 2 weeks (or sooner if problems)            Follow-up Information     James  PAUL Kaur .    Specialty: Family Medicine  Why: 1 to 2 weeks (or sooner if problems)  Contact information:  [address] [id number]  [phone]                         Additional Instructions for the Follow-ups that You Need to Schedule     Discharge Follow-up with PCP   As directed       Currently Documented PCP:    Natasha Ramirez APRN    PCP Phone Number:    [phone]     Follow Up Details: 1 to 2 weeks (or sooner if problems)           Time Spent on Discharge:  Greater than 30 minutes      Doug Whalen MD  [facility]  04/15/23  10:24 EDT

## 2023-04-15 NOTE — PLAN OF CARE
Goal Outcome Evaluation:   Patient wanted to rest overnight.  Did not want to take any medications or turn during shift.

## 2023-04-15 NOTE — NURSING NOTE
PT IS GOING HOME WITH [facility] TODAY. PT DECIDED TO STOP ALL MEDS EXCEPT COMFORT MEDS. SPOKE WITH DR ADELIA TALLEY FOR MEDICAL ELIGIBILITY. UPDATED NURSE HERBERTH.         THANK YOU  YAKOV GAY RN. [facility]  [phone]

## 2023-04-17 NOTE — CASE MANAGEMENT/SOCIAL WORK
Case Management Discharge Note      Final Note: DC'd home 4/15 with caregivers             Home Medical Care Coordination complete.    Service Provider Selected Services Address Phone Fax Patient Preferred    [facility] [address], [address], [place] [address] [phone] --               Transportation Services  Ambulance: [facility]    Final Discharge Disposition Code: 06 - home with home health care

## 2024-11-21 NOTE — ED TRIAGE NOTES
Addended by: SAMAN GALCIIA on: 11/21/2024 11:32 AM     Modules accepted: Orders     Pt was brought in by ems from [facility] for upper abd pain x2wks. Staff reports dark stools/ pt was seen at hospital 2wks ago for same.    This RN wore mask and goggles during time of contact